# Patient Record
Sex: MALE | Race: WHITE | NOT HISPANIC OR LATINO | Employment: OTHER | URBAN - METROPOLITAN AREA
[De-identification: names, ages, dates, MRNs, and addresses within clinical notes are randomized per-mention and may not be internally consistent; named-entity substitution may affect disease eponyms.]

---

## 2017-01-23 ENCOUNTER — APPOINTMENT (OUTPATIENT)
Dept: VASCULAR LAB | Facility: MEDICAL CENTER | Age: 82
End: 2017-01-23
Attending: NURSE PRACTITIONER
Payer: MEDICARE

## 2017-01-26 ENCOUNTER — ANTICOAGULATION VISIT (OUTPATIENT)
Dept: VASCULAR LAB | Facility: MEDICAL CENTER | Age: 82
End: 2017-01-26
Attending: INTERNAL MEDICINE
Payer: MEDICARE

## 2017-01-26 VITALS — HEART RATE: 70 BPM | SYSTOLIC BLOOD PRESSURE: 146 MMHG | DIASTOLIC BLOOD PRESSURE: 87 MMHG

## 2017-01-26 DIAGNOSIS — I48.0 PAROXYSMAL ATRIAL FIBRILLATION (HCC): ICD-10-CM

## 2017-01-26 LAB
INR BLD: 3.2 (ref 0.9–1.2)
INR PPP: 3.2 (ref 2–3.5)

## 2017-01-26 PROCEDURE — 99212 OFFICE O/P EST SF 10 MIN: CPT

## 2017-01-26 PROCEDURE — 85610 PROTHROMBIN TIME: CPT

## 2017-01-26 NOTE — MR AVS SNAPSHOT
Tristan Hester   2017 9:00 AM   Anticoagulation Visit   MRN: [id number]    Department:  Vascular Medicine   Dept Phone:  [phone]    Description:  Male : 1935   Provider:  [facility] EXAM 4           Allergies as of 2017     Allergen Noted Reactions    Cantaloupe 2015       Pt stated cantaloupe causes his throat to constrict    Nkda [No Known Drug Allergy] 2008       Other Environmental 10/08/2010       Seasonal, cats-hayfever      You were diagnosed with     Paroxysmal atrial fibrillation (CMS-[facility])   [573162]         Vital Signs     Blood Pressure Pulse Smoking Status             146/87 mmHg 70 Former Smoker         Basic Information     Date Of Birth Sex Race Ethnicity Preferred Language    1935 Male White Non- English      Your appointments     2017  9:45 AM   Established Patient with [facility] EXAM 5   [facility]  (--)    [address] [place] [address]   [phone]            2017 10:15 AM   ECHO with ECHO [facility], [facility] EXAM 9   ECHOCARDIOLOGY [facility] ([facility])    [address] [place] [address]   [phone]           No prep            May 23, 2017 10:00 AM   Follow Up Visit with Michael J Bloch, M.D.   [facility]  (--)    [address] [place] [address]   [phone]              Problem List              ICD-10-CM Priority Class Noted - Resolved    Hx of TIA (transient ischemic attack) G45.9 Low  Unknown - Present    Arthritis M19.90 Medium  Unknown - Present    Hx of Prostate Cancer C80.1 Low  Unknown - Present    Essential hypertension, benign I10   Unknown - Present    Mitral regurgitation I34.0 Medium  2014 - Present    Carotid arterial disease (CMS-[facility]) I77.9 Low  2014 - Present    HLD (hyperlipidemia) E78.5 Low  2014 - Present    Paroxysmal atrial fibrillation (CMS-[facility]) I48.0 Medium  2015 - Present    DVT  (deep venous thrombosis) (CMS-[facility]) I82.409 Medium  6/18/2015 - Present    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 Low  6/18/2015 - Present    Hypokalemia E87.6 Medium  6/20/2015 - Present    Insomnia G47.00 Low  8/21/2015 - Present    Lumbar stenosis M48.06 Medium  12/28/2015 - Present    Postoperative CSF leak G97.82 High  1/5/2016 - Present    Lower back pain M54.5   12/27/2016 - Present      Health Maintenance        Date Due Completion Dates    A1C SCREENING 1935 ---    DIABETES MONOFILAMENT / LE EXAM 1935 ---    RETINAL SCREENING 6/7/1954 ---    URINE ACR / MICROALBUMIN 6/7/1954 ---    IMM DTaP/Tdap/Td Vaccine (1 - Tdap) 6/7/1955 ---    COLONOSCOPY 6/7/1986 ---    IMM ZOSTER VACCINE 6/7/1996 ---    IMM PNEUMOCOCCAL 65+ (ADULT) HIGH/HIGHEST RISK SERIES (1 of 2 - PCV13) 6/7/2001 ---    FASTING LIPID PROFILE 8/19/2015 8/19/2014, 6/2/2008    IMM INFLUENZA (1) 9/1/2016 10/1/2014, 10/9/2010    SERUM CREATININE 1/12/2017 1/12/2016, 1/11/2016, 1/10/2016, 1/6/2016, 1/4/2016, 12/31/2015, 12/30/2015, 12/29/2015, 12/14/2015, 6/23/2015, 6/22/2015, 6/21/2015, 6/20/2015, 6/19/2015, 6/16/2015, 6/14/2015, 6/13/2015, 6/12/2015, 10/24/2010, 10/1/2010, 10/16/2009, 7/2/2008, 6/4/2008, 6/3/2008, 6/2/2008, 6/2/2008            Results     POCT Protime      Component    INR    3.2                        Current Immunizations     Influenza TIV (IM) 10/1/2014, 10/9/2010  9:15 AM    Pneumococcal Vaccine (UF)Historical Data 1/1/2010      Below and/or attached are the medications your provider expects you to take. Review all of your home medications and newly ordered medications with your provider and/or pharmacist. Follow medication instructions as directed by your provider and/or pharmacist. Please keep your medication list with you and share with your provider. Update the information when medications are discontinued, doses are changed, or new medications (including over-the-counter products) are added; and carry medication information at all  times in the event of emergency situations     Allergies:  CANTALOUPE - (reactions not documented)     NKDA - (reactions not documented)     OTHER ENVIRONMENTAL - (reactions not documented)               Medications  Valid as of: January 26, 2017 -  9:15 AM    Generic Name Brand Name Tablet Size Instructions for use    Acetaminophen (Tab) TYLENOL 325 MG Take 650 mg by mouth every four hours as needed.        Atorvastatin Calcium (Tab) LIPITOR 80 MG Take 1 Tab by mouth every evening.        DiltiaZEM HCl (CAPSULE SR 24 HR) CARDIZEM  MG Take 1 Cap by mouth every day.        Enoxaparin Sodium (Solution) LOVENOX 120 MG/0.8ML Inject 120 mg as instructed every day.        Irbesartan (Tab) AVAPRO 300 MG Take 1 Tab by mouth every evening. Indications: High Blood Pressure        Multiple Vitamins-Minerals (Tab) THERAGRAN-M  Take 1 Tab by mouth every day.        Rivaroxaban (Tab) XARELTO 20 MG Take 1 Tab by mouth with dinner.        Temazepam (Cap) RESTORIL 30 MG Take 1 Cap by mouth at bedtime as needed for Sleep. Patient needs early refill - [place]        Warfarin Sodium (Tab) COUMADIN 4 MG TAKE 1 TO 2 TABLETS EVERY DAY AS DIRECTED  BY  COUMADIN  CLINIC        .                 Medicines prescribed today were sent to:     [facility] ([place]), [place] - [address] [address] [facility] ([place]) [place] [address]    Phone: [phone] Fax: [phone]    Open 24 Hours?: No    [facility] # [facility] - [place], [place] - [address] [facility]    [address] [facility] [address]    Phone: [phone] Fax: [phone]    Open 24 Hours?: No    [facility] - [place], [place] - [address] [facility]    [address] [facility] [address]    Phone: [phone] Fax: [phone]    Open 24 Hours?: No      Medication refill instructions:       If your prescription bottle indicates you have medication refills left, it is not necessary to call your provider’s office. Please contact your pharmacy and they will  refill your medication.    If your prescription bottle indicates you do not have any refills left, you may request refills at any time through one of the following ways: The online Naroomi system (except Urgent Care), by calling your provider’s office, or by asking your pharmacy to contact your provider’s office with a refill request. Medication refills are processed only during regular business hours and may not be available until the next business day. Your provider may request additional information or to have a follow-up visit with you prior to refilling your medication.   *Please Note: Medication refills are assigned a new Rx number when refilled electronically. Your pharmacy may indicate that no refills were authorized even though a new prescription for the same medication is available at the pharmacy. Please request the medicine by name with the pharmacy before contacting your provider for a refill.        Warfarin Dosing Calendar   January 2017 Details    Sun Mon Tue Wed Thu Fri Sat     1               2               3               4               5               6               7                 8               9               10               11               12               13               14                 15               16               17               18               19               20               21                 22               23               24               25               26   3.2   8 mg   See details      27      6 mg         28      6 mg           29      8 mg         30      6 mg         31      8 mg              Date Details   01/26 This INR check   INR: 3.2               How to take your warfarin dose     To take:  6 mg Take 1.5 of the 4 mg tablets.    To take:  8 mg Take 2 of the 4 mg tablets.           Warfarin Dosing Calendar   February 2017 Details    Sun Mon Tue Wed Thu Fri Sat        1      6 mg         2      8 mg         3      6 mg         4      6 mg           5       8 mg         6      6 mg         7      8 mg         8      6 mg         9      8 mg         10               11                 12               13               14               15               16               17               18                 19               20               21               22               23               24               25                 26               27               28                    Date Details   No additional details    Date of next INR:  2/9/2017         How to take your warfarin dose     To take:  6 mg Take 1.5 of the 4 mg tablets.    To take:  8 mg Take 2 of the 4 mg tablets.              Freightos Access Code: [email or web address]  Expires: 1/29/2017 12:18 PM    Freightos  A secure, online tool to manage your health information     [employer] Freightos® is a secure, online tool that connects you to your personalized health information from the privacy of your home -- day or night - making it very easy for you to manage your healthcare. Once the activation process is completed, you can even access your medical information using the Freightos azalea, which is available for free in the Apple Azalea store or Google Play store.     Freightos provides the following levels of access (as shown below):   My Chart Features   [facility] Doctor [facility]  Specialists [facility]  Urgent  Care Non-[facility]  Primary Care  Doctor   Email your healthcare team securely and privately 24/7 X X X    Manage appointments: schedule your next appointment; view details of past/upcoming appointments X      Request prescription refills. X      View recent personal medical records, including lab and immunizations X X X X   View health record, including health history, allergies, medications X X X X   Read reports about your outpatient visits, procedures, consult and ER notes X X X X   See your discharge summary, which is a recap of your hospital and/or ER visit that includes your diagnosis, lab results, and  care plan. X X       How to register for [employer]:  1. Go to  [email or web address].  2. Click on the Sign Up Now box, which takes you to the New Member Sign Up page. You will need to provide the following information:  a. Enter your [employer] Access Code exactly as it appears at the top of this page. (You will not need to use this code after you’ve completed the sign-up process. If you do not sign up before the expiration date, you must request a new code.)   b. Enter your date of birth.   c. Enter your home email address.   d. Click Submit, and follow the next screen’s instructions.  3. Create a [employer] ID. This will be your H[employer]S [employer] login ID and cannot be changed, so think of one that is secure and easy to remember.  4. Create a iCo Therapeuticst password. You can change your password at any time.  5. Enter your Password Reset Question and Answer. This can be used at a later time if you forget your password.   6. Enter your e-mail address. This allows you to receive e-mail notifications when new information is available in [employer].  7. Click Sign Up. You can now view your health information.    For assistance activating your [employer] account, call [phone]

## 2017-01-31 DIAGNOSIS — I48.0 PAROXYSMAL ATRIAL FIBRILLATION (HCC): ICD-10-CM

## 2017-01-31 DIAGNOSIS — Z86.718 HISTORY OF DVT (DEEP VEIN THROMBOSIS): ICD-10-CM

## 2017-01-31 DIAGNOSIS — Z86.73 HISTORY OF STROKE: ICD-10-CM

## 2017-02-09 ENCOUNTER — HOSPITAL ENCOUNTER (OUTPATIENT)
Dept: CARDIOLOGY | Facility: MEDICAL CENTER | Age: 82
End: 2017-02-09
Attending: INTERNAL MEDICINE
Payer: MEDICARE

## 2017-02-09 ENCOUNTER — ANTICOAGULATION VISIT (OUTPATIENT)
Dept: VASCULAR LAB | Facility: MEDICAL CENTER | Age: 82
End: 2017-02-09
Attending: NURSE PRACTITIONER
Payer: MEDICARE

## 2017-02-09 ENCOUNTER — APPOINTMENT (OUTPATIENT)
Dept: RADIOLOGY | Facility: MEDICAL CENTER | Age: 82
End: 2017-02-09
Attending: CLINICAL NURSE SPECIALIST
Payer: MEDICARE

## 2017-02-09 VITALS — SYSTOLIC BLOOD PRESSURE: 146 MMHG | DIASTOLIC BLOOD PRESSURE: 87 MMHG | HEART RATE: 70 BPM

## 2017-02-09 DIAGNOSIS — I48.0 PAROXYSMAL ATRIAL FIBRILLATION (HCC): ICD-10-CM

## 2017-02-09 DIAGNOSIS — M54.16 LUMBAR RADICULOPATHY: ICD-10-CM

## 2017-02-09 DIAGNOSIS — I34.0 MITRAL VALVE INSUFFICIENCY, UNSPECIFIED ETIOLOGY: ICD-10-CM

## 2017-02-09 LAB
INR PPP: 2 (ref 2–3.5)
LV EJECT FRACT MOD 2C 99903: 64.28
LV EJECT FRACT MOD 4C 99902: 62.02
LV EJECT FRACT MOD BP 99901: 60.55

## 2017-02-09 PROCEDURE — 99211 OFF/OP EST MAY X REQ PHY/QHP: CPT

## 2017-02-09 PROCEDURE — 93306 TTE W/DOPPLER COMPLETE: CPT | Mod: 26 | Performed by: INTERNAL MEDICINE

## 2017-02-09 PROCEDURE — 85610 PROTHROMBIN TIME: CPT

## 2017-02-09 NOTE — MR AVS SNAPSHOT
Tristan Hester   2017 9:45 AM   Anticoagulation Visit   MRN: [id number]    Department:  Vascular Medicine   Dept Phone:  [phone]    Description:  Male : 1935   Provider:  [facility] EXAM 5           Allergies as of 2017     Allergen Noted Reactions    Cantaloupe 2015       Pt stated cantaloupe causes his throat to constrict    Nkda [No Known Drug Allergy] 2008       Other Environmental 10/08/2010       Seasonal, cats-hayfever      You were diagnosed with     Paroxysmal atrial fibrillation (CMS-[facility])   [441921]         Vital Signs     Smoking Status                   Former Smoker           Basic Information     Date Of Birth Sex Race Ethnicity Preferred Language    1935 Male White Non- English      Your appointments     2017 10:15 AM   ECHO with ECHO [facility], [facility] EXAM 9   ECHOCARDIOLOGY [facility] ([facility])    [address] [place] [address]   [phone]           No prep            2017  1:30 PM   MR SP WO 30 with DOUBLE R MRI 1   IMAGING DOUBLE R. (Double R)    [address]  [facility] [address]   [phone]            Mar 09, 2017  9:45 AM   Established Patient with [facility] EXAM 4   [facility]  (--)    [address] [place] [address]   [phone]            May 23, 2017 10:00 AM   Follow Up Visit with Michael J Bloch, M.D.   [facility]  (--)    [address] [place] [address]   [phone]              Problem List              ICD-10-CM Priority Class Noted - Resolved    Hx of TIA (transient ischemic attack) G45.9 Low  Unknown - Present    Arthritis M19.90 Medium  Unknown - Present    Hx of Prostate Cancer C80.1 Low  Unknown - Present    Essential hypertension, benign I10   Unknown - Present    Mitral regurgitation I34.0 Medium  2014 - Present    Carotid arterial disease (CMS-[facility]) I77.9 Low  2014 - Present    HLD (hyperlipidemia) E78.5 Low  1/6/2014 - Present    Paroxysmal atrial fibrillation (CMS-[facility]) I48.0 Medium  6/18/2015 - Present    DVT (deep venous thrombosis) (CMS-[facility]) I82.409 Medium  6/18/2015 - Present    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 Low  6/18/2015 - Present    Hypokalemia E87.6 Medium  6/20/2015 - Present    Insomnia G47.00 Low  8/21/2015 - Present    Lumbar stenosis M48.06 Medium  12/28/2015 - Present    Postoperative CSF leak G97.82 High  1/5/2016 - Present    Lower back pain M54.5   12/27/2016 - Present      Health Maintenance        Date Due Completion Dates    A1C SCREENING 1935 ---    DIABETES MONOFILAMENT / LE EXAM 1935 ---    RETINAL SCREENING 6/7/1954 ---    URINE ACR / MICROALBUMIN 6/7/1954 ---    IMM DTaP/Tdap/Td Vaccine (1 - Tdap) 6/7/1955 ---    COLONOSCOPY 6/7/1986 ---    IMM ZOSTER VACCINE 6/7/1996 ---    IMM PNEUMOCOCCAL 65+ (ADULT) HIGH/HIGHEST RISK SERIES (1 of 2 - PCV13) 6/7/2001 ---    FASTING LIPID PROFILE 8/19/2015 8/19/2014, 6/2/2008    IMM INFLUENZA (1) 9/1/2016 10/1/2014, 10/9/2010    SERUM CREATININE 1/12/2017 1/12/2016, 1/11/2016, 1/10/2016, 1/6/2016, 1/4/2016, 12/31/2015, 12/30/2015, 12/29/2015, 12/14/2015, 6/23/2015, 6/22/2015, 6/21/2015, 6/20/2015, 6/19/2015, 6/16/2015, 6/14/2015, 6/13/2015, 6/12/2015, 10/24/2010, 10/1/2010, 10/16/2009, 7/2/2008, 6/4/2008, 6/3/2008, 6/2/2008, 6/2/2008            Results     POCT Protime      Component    INR    2.0                        Current Immunizations     Influenza TIV (IM) 10/1/2014, 10/9/2010  9:15 AM    Pneumococcal Vaccine (UF)Historical Data 1/1/2010      Below and/or attached are the medications your provider expects you to take. Review all of your home medications and newly ordered medications with your provider and/or pharmacist. Follow medication instructions as directed by your provider and/or pharmacist. Please keep your medication list with you and share with your provider. Update the information when medications are  discontinued, doses are changed, or new medications (including over-the-counter products) are added; and carry medication information at all times in the event of emergency situations     Allergies:  CANTALOUPE - (reactions not documented)     NKDA - (reactions not documented)     OTHER ENVIRONMENTAL - (reactions not documented)               Medications  Valid as of: February 09, 2017 -  9:51 AM    Generic Name Brand Name Tablet Size Instructions for use    Acetaminophen (Tab) TYLENOL 325 MG Take 650 mg by mouth every four hours as needed.        Atorvastatin Calcium (Tab) LIPITOR 80 MG Take 1 Tab by mouth every evening.        DiltiaZEM HCl (CAPSULE SR 24 HR) CARDIZEM  MG Take 1 Cap by mouth every day.        Enoxaparin Sodium (Solution) LOVENOX 120 MG/0.8ML Inject 120 mg as instructed every day.        Irbesartan (Tab) AVAPRO 300 MG Take 1 Tab by mouth every evening. Indications: High Blood Pressure        Multiple Vitamins-Minerals (Tab) THERAGRAN-M  Take 1 Tab by mouth every day.        Rivaroxaban (Tab) XARELTO 20 MG Take 1 Tab by mouth with dinner.        Temazepam (Cap) RESTORIL 30 MG Take 1 Cap by mouth at bedtime as needed for Sleep. Patient needs early refill - [place]        Warfarin Sodium (Tab) COUMADIN 4 MG TAKE 1 TO 2 TABLETS EVERY DAY AS DIRECTED  BY  COUMADIN  CLINIC        .                 Medicines prescribed today were sent to:     [facility] - [place] ([place]), [place] - [address] [facility]    [address] ([place]) [place] [address]    Phone: [phone] Fax: [phone]    Open 24 Hours?: No    [facility] # 25 - [place], [place] - [address] [facility]    [address] [facility] [place] [address]    Phone: [phone] Fax: [phone]    Open 24 Hours?: No    [facility] - [place], [place] - [address] [facility]    [address] [facility] [address]    Phone: [phone] Fax: [phone]    Open 24 Hours?: No      Medication refill instructions:       If your prescription  bottle indicates you have medication refills left, it is not necessary to call your provider’s office. Please contact your pharmacy and they will refill your medication.    If your prescription bottle indicates you do not have any refills left, you may request refills at any time through one of the following ways: The online P-Commerce system (except Urgent Care), by calling your provider’s office, or by asking your pharmacy to contact your provider’s office with a refill request. Medication refills are processed only during regular business hours and may not be available until the next business day. Your provider may request additional information or to have a follow-up visit with you prior to refilling your medication.   *Please Note: Medication refills are assigned a new Rx number when refilled electronically. Your pharmacy may indicate that no refills were authorized even though a new prescription for the same medication is available at the pharmacy. Please request the medicine by name with the pharmacy before contacting your provider for a refill.        Warfarin Dosing Calendar   February 2017 Details    Sun Mon Tue Wed Thu Fri Sat        1               2               3               4                 5               6               7               8               9   2.0   8 mg   See details      10      6 mg         11      6 mg           12      8 mg         13      6 mg         14      8 mg         15      6 mg         16      8 mg         17      6 mg         18      6 mg           19      8 mg         20      6 mg         21      8 mg         22      6 mg         23      8 mg         24      6 mg         25      6 mg           26      8 mg         27      6 mg         28      8 mg              Date Details   02/09 This INR check   INR: 2.0               How to take your warfarin dose     To take:  6 mg Take 1.5 of the 4 mg tablets.    To take:  8 mg Take 2 of the 4 mg tablets.           Warfarin Dosing  Calendar March 2017 Details    Sun Mon Tue Wed Thu Fri Sat        1      6 mg         2      8 mg         3      6 mg         4      6 mg           5      8 mg         6      6 mg         7      8 mg         8      6 mg         9      8 mg         10               11                 12               13               14               15               16               17               18                 19               20               21               22               23               24               25                 26               27               28               29               30               31                 Date Details   No additional details    Date of next INR:  3/9/2017         How to take your warfarin dose     To take:  6 mg Take 1.5 of the 4 mg tablets.    To take:  8 mg Take 2 of the 4 mg tablets.              CardioFocus Access Code: [email or web address]  Expires: 3/11/2017  9:51 AM    CardioFocus  A secure, online tool to manage your health information     [employer] CardioFocus® is a secure, online tool that connects you to your personalized health information from the privacy of your home -- day or night - making it very easy for you to manage your healthcare. Once the activation process is completed, you can even access your medical information using the CardioFocus azalea, which is available for free in the Apple Azalea store or Google Play store.     CardioFocus provides the following levels of access (as shown below):   My Chart Features   [facility] Doctor [facility]  Specialists [facility]  Urgent  Care Non-[facility]  Primary Care  Doctor   Email your healthcare team securely and privately 24/7 X X X    Manage appointments: schedule your next appointment; view details of past/upcoming appointments X      Request prescription refills. X      View recent personal medical records, including lab and immunizations X X X X   View health record, including health history, allergies, medications X X X X   Read  reports about your outpatient visits, procedures, consult and ER notes X X X X   See your discharge summary, which is a recap of your hospital and/or ER visit that includes your diagnosis, lab results, and care plan. X X       How to register for [employer]:  1. Go to  [email or web address].  2. Click on the Sign Up Now box, which takes you to the New Member Sign Up page. You will need to provide the following information:  a. Enter your [employer] Access Code exactly as it appears at the top of this page. (You will not need to use this code after you’ve completed the sign-up process. If you do not sign up before the expiration date, you must request a new code.)   b. Enter your date of birth.   c. Enter your home email address.   d. Click Submit, and follow the next screen’s instructions.  3. Create a [facility] ID. This will be your [facility] login ID and cannot be changed, so think of one that is secure and easy to remember.  4. Create a [facility] password. You can change your password at any time.  5. Enter your Password Reset Question and Answer. This can be used at a later time if you forget your password.   6. Enter your e-mail address. This allows you to receive e-mail notifications when new information is available in [employer].  7. Click Sign Up. You can now view your health information.    For assistance activating your [employer] account, call [phone]

## 2017-02-09 NOTE — PROGRESS NOTES
Anticoagulation Summary as of 2/9/2017     INR goal 2.0-3.0   Selected INR 2.0 (2/9/2017)   Maintenance plan 8 mg (4 mg x 2) on Sun, Tue, Thu; 6 mg (4 mg x 1.5) all other days   Weekly total 48 mg   Plan last modified JAVIER PierreD (1/26/2017)   Next INR check 3/9/2017   Target end date Indefinite    Indications   DVT (deep venous thrombosis) (CMS-HCC) [I82.409]  Paroxysmal atrial fibrillation (CMS-HCC) [I48.0]  Hx of Stroke (Resolved) [I63.9]  Hx of TIA (transient ischemic attack) [G45.9]  Deep vein thrombosis (DVT) (CMS-HCC) (Resolved) [I82.409]  Atrial fibrillation (CMS-HCC) (Resolved) [I48.91]         Anticoagulation Episode Summary     INR check location [facility]    Preferred lab     Send INR reminders to     Comments       Anticoagulation Care Providers     Provider Role Specialty Phone number    Michael J Bloch, M.D. Referring Internal Medicine [phone]    [facility]   [phone]        Anticoagulation Patient Findings   Negatives Missed Doses, Extra Doses, Medication Changes, Antibiotic Use, Diet Changes, Dental/Other Procedures, Hospitalization, Bleeding Gums, Nose Bleeds, Blood in Urine, Blood in Stool, Any Bruising, Other Complaints          Tristan Hester seen in clinic today  INR  therapeutic.    Denies signs/symptoms of bleeding and/or thrombosis.    Denies changes to diet or medications.   Follow up appointment in 4 week(s).    Continue weekly warfarin dose as noted    Conor Locke, PHARMD

## 2017-02-10 LAB — INR BLD: 2 (ref 0.9–1.2)

## 2017-02-14 ENCOUNTER — HOSPITAL ENCOUNTER (EMERGENCY)
Facility: MEDICAL CENTER | Age: 82
End: 2017-02-14
Attending: EMERGENCY MEDICINE
Payer: MEDICARE

## 2017-02-14 VITALS
HEART RATE: 60 BPM | TEMPERATURE: 98.3 F | SYSTOLIC BLOOD PRESSURE: 142 MMHG | RESPIRATION RATE: 16 BRPM | WEIGHT: 190 LBS | OXYGEN SATURATION: 93 % | BODY MASS INDEX: 26.6 KG/M2 | HEIGHT: 71 IN | DIASTOLIC BLOOD PRESSURE: 89 MMHG

## 2017-02-14 DIAGNOSIS — M51.36 DEGENERATIVE DISC DISEASE, LUMBAR: ICD-10-CM

## 2017-02-14 DIAGNOSIS — M54.32 BILATERAL SCIATICA: ICD-10-CM

## 2017-02-14 DIAGNOSIS — M54.31 BILATERAL SCIATICA: ICD-10-CM

## 2017-02-14 PROCEDURE — A9270 NON-COVERED ITEM OR SERVICE: HCPCS | Performed by: EMERGENCY MEDICINE

## 2017-02-14 PROCEDURE — 700102 HCHG RX REV CODE 250 W/ 637 OVERRIDE(OP): Performed by: EMERGENCY MEDICINE

## 2017-02-14 PROCEDURE — 99284 EMERGENCY DEPT VISIT MOD MDM: CPT

## 2017-02-14 RX ORDER — METHYLPREDNISOLONE 4 MG/1
TABLET ORAL
Qty: 1 KIT | Refills: 0 | Status: SHIPPED | OUTPATIENT
Start: 2017-02-14 | End: 2017-05-23

## 2017-02-14 RX ORDER — OXYCODONE AND ACETAMINOPHEN 7.5; 325 MG/1; MG/1
1 TABLET ORAL EVERY 4 HOURS PRN
Qty: 23 TAB | Refills: 0 | Status: SHIPPED | OUTPATIENT
Start: 2017-02-14 | End: 2017-05-23

## 2017-02-14 RX ORDER — DIAZEPAM 2 MG/1
2 TABLET ORAL EVERY 6 HOURS PRN
Qty: 12 TAB | Refills: 0 | Status: SHIPPED | OUTPATIENT
Start: 2017-02-14 | End: 2017-05-23

## 2017-02-14 RX ORDER — DIAZEPAM 2 MG/1
2 TABLET ORAL ONCE
Status: COMPLETED | OUTPATIENT
Start: 2017-02-14 | End: 2017-02-14

## 2017-02-14 RX ORDER — OXYCODONE AND ACETAMINOPHEN 7.5; 325 MG/1; MG/1
1 TABLET ORAL ONCE
Status: COMPLETED | OUTPATIENT
Start: 2017-02-14 | End: 2017-02-14

## 2017-02-14 RX ADMIN — OXYCODONE AND ACETAMINOPHEN 1 TABLET: 7.5; 325 TABLET ORAL at 19:49

## 2017-02-14 RX ADMIN — DIAZEPAM 2 MG: 2 TABLET ORAL at 19:49

## 2017-02-14 ASSESSMENT — PAIN SCALES - GENERAL
PAINLEVEL_OUTOF10: 5
PAINLEVEL_OUTOF10: 5

## 2017-02-14 NOTE — ED AVS SNAPSHOT
Etherios Access Code: [email or web address]  Expires: 3/11/2017  9:51 AM    Etherios  A secure, online tool to manage your health information     [employer]’s Etherios® is a secure, online tool that connects you to your personalized health information from the privacy of your home -- day or night - making it very easy for you to manage your healthcare. Once the activation process is completed, you can even access your medical information using the Etherios azalea, which is available for free in the Apple Azalea store or Google Play store.     Etherios provides the following levels of access (as shown below):   My Chart Features   [facility] Primary Care Doctor [facility]  Specialists [facility]  Urgent  Care Non-[facility]  Primary Care  Doctor   Email your healthcare team securely and privately 24/7 X X X X   Manage appointments: schedule your next appointment; view details of past/upcoming appointments X      Request prescription refills. X      View recent personal medical records, including lab and immunizations X X X X   View health record, including health history, allergies, medications X X X X   Read reports about your outpatient visits, procedures, consult and ER notes X X X X   See your discharge summary, which is a recap of your hospital and/or ER visit that includes your diagnosis, lab results, and care plan. X X       How to register for Etherios:  1. Go to  [email or web address].  2. Click on the Sign Up Now box, which takes you to the New Member Sign Up page. You will need to provide the following information:  a. Enter your Etherios Access Code exactly as it appears at the top of this page. (You will not need to use this code after you’ve completed the sign-up process. If you do not sign up before the expiration date, you must request a new code.)   b. Enter your date of birth.   c. Enter your home email address.   d. Click Submit, and follow the next screen’s instructions.  3. Create a Etherios ID. This will be your Etherios  login ID and cannot be changed, so think of one that is secure and easy to remember.  4. Create a GetO2 password. You can change your password at any time.  5. Enter your Password Reset Question and Answer. This can be used at a later time if you forget your password.   6. Enter your e-mail address. This allows you to receive e-mail notifications when new information is available in GetO2.  7. Click Sign Up. You can now view your health information.    For assistance activating your GetO2 account, call [phone]

## 2017-02-14 NOTE — ED AVS SNAPSHOT
Home Care Instructions                                                                                                                Tristan Hester   MRN: [id number]    Department:  [facility], Emergency Dept   Date of Visit:  2/14/2017            [facility], Emergency Dept    [address] [place] [id number]    Phone:  [phone]      You were seen by     Pebbles Shi M.D.      Your Diagnosis Was     Degenerative disc disease, lumbar     M51.36       These are the medications you received during your hospitalization from 02/14/2017 1746 to 02/14/2017 2003     Date/Time Order Dose Route Action    02/14/2017 1949 oxycodone-acetaminophen (PERCOCET) 7.5-325 MG per tablet 1 Tab 1 Tab Oral Given    02/14/2017 1949 diazepam (VALIUM) tablet 2 mg 2 mg Oral Given      Follow-up Information     1. Follow up with [facility], Emergency Dept.    Specialty:  Emergency Medicine    Why:  If symptoms worsen - worsening weakness, difficulty w bowel or bladder    Contact information    [address] [place] [id number] [phone]      Medication Information     Review all of your home medications and newly ordered medications with your primary doctor and/or pharmacist as soon as possible. Follow medication instructions as directed by your doctor and/or pharmacist.     Please keep your complete medication list with you and share with your physician. Update the information when medications are discontinued, doses are changed, or new medications (including over-the-counter products) are added; and carry medication information at all times in the event of emergency situations.               Medication List      START taking these medications        Instructions    diazepam 2 MG Tabs   Commonly known as:  VALIUM    Take 1 Tab by mouth every 6 hours as needed (back spasm).   Dose:  2 mg       MethylPREDNISolone 4 MG Tbpk   Commonly known as:  MEDROL DOSEPAK     Use as directed       oxycodone-acetaminophen 7.5-325 MG per tablet   Commonly known as:  PERCOCET    Take 1 Tab by mouth every four hours as needed.   Dose:  1 Tab         ASK your doctor about these medications        Instructions    acetaminophen 325 MG Tabs   Commonly known as:  TYLENOL    Take 650 mg by mouth every four hours as needed.   Dose:  650 mg       atorvastatin 80 MG tablet   Commonly known as:  LIPITOR    Take 1 Tab by mouth every evening.   Dose:  80 mg       diltiazem 120 MG XR capsule   Commonly known as:  CARDIZEM CD    Take 1 Cap by mouth every day.   Dose:  120 mg       irbesartan 300 MG Tabs   Commonly known as:  AVAPRO    Take 1 Tab by mouth every evening. Indications: High Blood Pressure   Dose:  300 mg       temazepam 30 MG capsule   Commonly known as:  RESTORIL    Take 1 Cap by mouth at bedtime as needed for Sleep. Patient needs early refill - going [place]   Dose:  30 mg       therapeutic multivitamin-minerals Tabs    Take 1 Tab by mouth every day.   Dose:  1 Tab       warfarin 4 MG Tabs   Commonly known as:  COUMADIN    TAKE 1 TO 2 TABLETS EVERY DAY AS DIRECTED  BY  [facility]  CLIN[facility]                 Discharge Instructions       Back Exercises  Back exercises help treat and prevent back injuries. The goal is to increase your strength in your belly (abdominal) and back muscles. These exercises can also help with flexibility. Start these exercises when told by your doctor.    Do not drive, operate any machinery, or partake in dangerous activities that require maximum physical and mental performance while taking the prescribed pain killer. Do not take tylenol or tylenol containing products in addition to the pain killer that was prescibed, as the excess tylenol can cause life threatening liver problems. Do not combine this drug with alcohol or other sedatives or narcotics. Follow up with your primary care doctor in regards to the future management of this medication.    HOME  CARE  Back exercises include:  Pelvic Tilt.  · Lie on your back with your knees bent. Tilt your pelvis until the lower part of your back is against the floor. Hold this position 5 to 10 sec. Repeat this exercise 5 to 10 times.  Knee to Chest.  · Pull 1 knee up against your chest and hold for 20 to 30 seconds. Repeat this with the other knee. This may be done with the other leg straight or bent, whichever feels better. Then, pull both knees up against your chest.  Sit-Ups or Curl-Ups.  · Bend your knees 90 degrees. Start with tilting your pelvis, and do a partial, slow sit-up. Only lift your upper half 30 to 45 degrees off the floor. Take at least 2 to 3 seonds for each sit-up. Do not do sit-ups with your knees out straight. If partial sit-ups are difficult, simply do the above but with only tightening your belly (abdominal) muscles and holding it as told.  Hip-Lift.  · Lie on your back with your knees flexed 90 degrees. Push down with your feet and shoulders as you raise your hips 2 inches off the floor. Hold for 10 seconds, repeat 5 to 10 times.  Back Arches.  · Lie on your stomach. Prop yourself up on bent elbows. Slowly press on your hands, causing an arch in your low back. Repeat 3 to 5 times.  Shoulder-Lifts.  · Lie face down with arms beside your body. Keep hips and belly pressed to floor as you slowly lift your head and shoulders off the floor.  Do not overdo your exercises. Be careful in the beginning. Exercises may cause you some mild back discomfort. If the pain lasts for more than 15 minutes, stop the exercises until you see your doctor. Improvement with exercise for back problems is slow.      This information is not intended to replace advice given to you by your health care provider. Make sure you discuss any questions you have with your health care provider.     Document Released: 01/20/2012 Document Revised: 03/11/2013 Document Reviewed: 02/11/2016  [employer] Interactive Patient Education ©2016  [employer]  Sciatica  Sciatica is pain, weakness, numbness, or tingling along the path of the sciatic nerve. The nerve starts in the lower back and runs down the back of each leg. The nerve controls the muscles in the lower leg and in the back of the knee, while also providing sensation to the back of the thigh, lower leg, and the sole of your foot. Sciatica is a symptom of another medical condition. For instance, nerve damage or certain conditions, such as a herniated disk or bone spur on the spine, pinch or put pressure on the sciatic nerve. This causes the pain, weakness, or other sensations normally associated with sciatica. Generally, sciatica only affects one side of the body.  CAUSES   · Herniated or slipped disc.  · Degenerative disk disease.  · A pain disorder involving the narrow muscle in the buttocks (piriformis syndrome).  · Pelvic injury or fracture.  · Pregnancy.  · Tumor (rare).  SYMPTOMS   Symptoms can vary from mild to very severe. The symptoms usually travel from the low back to the buttocks and down the back of the leg. Symptoms can include:  · Mild tingling or dull aches in the lower back, leg, or hip.  · Numbness in the back of the calf or sole of the foot.  · Burning sensations in the lower back, leg, or hip.  · Sharp pains in the lower back, leg, or hip.  · Leg weakness.  · Severe back pain inhibiting movement.  These symptoms may get worse with coughing, sneezing, laughing, or prolonged sitting or standing. Also, being overweight may worsen symptoms.  DIAGNOSIS   Your caregiver will perform a physical exam to look for common symptoms of sciatica. He or she may ask you to do certain movements or activities that would trigger sciatic nerve pain. Other tests may be performed to find the cause of the sciatica. These may include:  · Blood tests.  · X-rays.  · Imaging tests, such as an MRI or CT scan.  TREATMENT   Treatment is directed at the cause of the sciatic pain. Sometimes, treatment is  not necessary and the pain and discomfort goes away on its own. If treatment is needed, your caregiver may suggest:  · Over-the-counter medicines to relieve pain.  · Prescription medicines, such as anti-inflammatory medicine, muscle relaxants, or narcotics.  · Applying heat or ice to the painful area.  · Steroid injections to lessen pain, irritation, and inflammation around the nerve.  · Reducing activity during periods of pain.  · Exercising and stretching to strengthen your abdomen and improve flexibility of your spine. Your caregiver may suggest losing weight if the extra weight makes the back pain worse.  · Physical therapy.  · Surgery to eliminate what is pressing or pinching the nerve, such as a bone spur or part of a herniated disk.  HOME CARE INSTRUCTIONS   · Only take over-the-counter or prescription medicines for pain or discomfort as directed by your caregiver.  · Apply ice to the affected area for 20 minutes, 3-4 times a day for the first 48-72 hours. Then try heat in the same way.  · Exercise, stretch, or perform your usual activities if these do not aggravate your pain.  · Attend physical therapy sessions as directed by your caregiver.  · Keep all follow-up appointments as directed by your caregiver.  · Do not wear high heels or shoes that do not provide proper support.  · Check your mattress to see if it is too soft. A firm mattress may lessen your pain and discomfort.  SEEK IMMEDIATE MEDICAL CARE IF:   · You lose control of your bowel or bladder (incontinence).  · You have increasing weakness in the lower back, pelvis, buttocks, or legs.  · You have redness or swelling of your back.  · You have a burning sensation when you urinate.  · You have pain that gets worse when you lie down or awakens you at night.  · Your pain is worse than you have experienced in the past.  · Your pain is lasting longer than 4 weeks.  · You are suddenly losing weight without reason.  MAKE SURE YOU:  · Understand these  instructions.  · Will watch your condition.  · Will get help right away if you are not doing well or get worse.     This information is not intended to replace advice given to you by your health care provider. Make sure you discuss any questions you have with your health care provider.     Document Released: 12/12/2002 Document Revised: 06/18/2013 Document Reviewed: 04/28/2013  [employer] Interactive Patient Education ©2016 [employer]            Patient Information     Patient Information    Following emergency treatment: all patient requiring follow-up care must return either to a private physician or a clinic if your condition worsens before you are able to obtain further medical attention, please return to the emergency room.     Billing Information    At [facility], we work to make the billing process streamlined for our patients.  Our Representatives are here to answer any questions you may have regarding your hospital bill.  If you have insurance coverage and have supplied your insurance information to us, we will submit a claim to your insurer on your behalf.  Should you have any questions regarding your bill, we can be reached online or by phone as follows:  Online: You are able pay your bills online or live chat with our representatives about any billing questions you may have. We are here to help Monday - Friday from 8:00am to 7:30pm and 9:00am - 12:00pm on Saturdays.  Please visit [email or web address]  for more information.   Phone:  [phone] or [phone]    Please note that your emergency physician, surgeon, pathologist, radiologist, anesthesiologist, and other specialists are not employed by [facility] and will therefore bill separately for their services.  Please contact them directly for any questions concerning their bills at the numbers below:     Emergency Physician Services:  [phone]  [facility]:  [phone]  [facility]:  [phone]  [facility]:  [phone]    1. Your final bill may vary from the amount quoted upon discharge if all procedures are not complete at that time, or if your doctor has additional procedures of which we are not aware. You will receive an additional bill if you return to the Emergency Department at [facility] for suture removal regardless of the facility of which the sutures were placed.     2. Please arrange for settlement of this account at the emergency registration.    3. All self-pay accounts are due in full at the time of treatment.  If you are unable to meet this obligation then payment is expected within 4-5 days.     4. If you have had radiology studies (CT, X-ray, Ultrasound, MRI), you have received a preliminary result during your emergency department visit. Please contact the radiology department [phone] to receive a copy of your final result. Please discuss the Final result with your primary physician or with the follow up physician provided.     Crisis Hotline:  [place] Crisis Hotline:  1-185-EQKZXHP or 1-810.776.8039  [place] Crisis Hotline:    1-424.447.8746 or 342-023-0219         ED Discharge Follow Up Questions    1. In order to provide you with very good care, we would like to follow up with a phone call in the next few days.  May we have your permission to contact you?     YES /  NO    2. What is the best phone number to call you? (       )_____-__________    3. What is the best time to call you?      Morning  /  Afternoon  /  Evening                   Patient Signature:  ____________________________________________________________    Date:  ____________________________________________________________      Your appointments     Mar 09, 2017  9:45 AM   Established Patient with V EXAM 4   [facility]  (--)    [facility] [address]   [phone]            May 23, 2017  10:00 AM   Follow Up Visit with Michael J Bloch, M.D.   [facility]  (--)    [facility] [id number] [phone]

## 2017-02-14 NOTE — ED AVS SNAPSHOT
2/14/2017          Tristan Hester  [address] [place] [address]    Dear Tristan:    [facility] wants to ensure your discharge home is safe and you or your loved ones have had all your questions answered regarding your care after you leave the hospital.    You may receive a telephone call within two days of your discharge.  This call is to make certain you understand your discharge instructions as well as ensure we provided you with the best care possible during your stay with us.     The call will only last approximately 3-5 minutes and will be done by a nurse.    Once again, we want to ensure your discharge home is safe and that you have a clear understanding of any next steps in your care.  If you have any questions or concerns, please do not hesitate to contact us, we are here for you.  Thank you for choosing [facility] for your healthcare needs.    Sincerely,    Jamil Flowers    [facility]

## 2017-02-15 DIAGNOSIS — G47.9 SLEEP DISTURBANCE: ICD-10-CM

## 2017-02-15 NOTE — ED NOTES
Discharge in good condition, aox4, via wc. Accompanied by wife. Discharge instructions given and understood x 3 scripts. All personal belongings in hand . Escorted out of er.

## 2017-02-15 NOTE — DISCHARGE INSTRUCTIONS
Back Exercises  Back exercises help treat and prevent back injuries. The goal is to increase your strength in your belly (abdominal) and back muscles. These exercises can also help with flexibility. Start these exercises when told by your doctor.    Do not drive, operate any machinery, or partake in dangerous activities that require maximum physical and mental performance while taking the prescribed pain killer. Do not take tylenol or tylenol containing products in addition to the pain killer that was prescibed, as the excess tylenol can cause life threatening liver problems. Do not combine this drug with alcohol or other sedatives or narcotics. Follow up with your primary care doctor in regards to the future management of this medication.    HOME CARE  Back exercises include:  Pelvic Tilt.  · Lie on your back with your knees bent. Tilt your pelvis until the lower part of your back is against the floor. Hold this position 5 to 10 sec. Repeat this exercise 5 to 10 times.  Knee to Chest.  · Pull 1 knee up against your chest and hold for 20 to 30 seconds. Repeat this with the other knee. This may be done with the other leg straight or bent, whichever feels better. Then, pull both knees up against your chest.  Sit-Ups or Curl-Ups.  · Bend your knees 90 degrees. Start with tilting your pelvis, and do a partial, slow sit-up. Only lift your upper half 30 to 45 degrees off the floor. Take at least 2 to 3 seonds for each sit-up. Do not do sit-ups with your knees out straight. If partial sit-ups are difficult, simply do the above but with only tightening your belly (abdominal) muscles and holding it as told.  Hip-Lift.  · Lie on your back with your knees flexed 90 degrees. Push down with your feet and shoulders as you raise your hips 2 inches off the floor. Hold for 10 seconds, repeat 5 to 10 times.  Back Arches.  · Lie on your stomach. Prop yourself up on bent elbows. Slowly press on your hands, causing an arch in your low  back. Repeat 3 to 5 times.  Shoulder-Lifts.  · Lie face down with arms beside your body. Keep hips and belly pressed to floor as you slowly lift your head and shoulders off the floor.  Do not overdo your exercises. Be careful in the beginning. Exercises may cause you some mild back discomfort. If the pain lasts for more than 15 minutes, stop the exercises until you see your doctor. Improvement with exercise for back problems is slow.      This information is not intended to replace advice given to you by your health care provider. Make sure you discuss any questions you have with your health care provider.     Document Released: 01/20/2012 Document Revised: 03/11/2013 Document Reviewed: 02/11/2016  [employer] Interactive Patient Education ©2016 [employer]  Sciatica  Sciatica is pain, weakness, numbness, or tingling along the path of the sciatic nerve. The nerve starts in the lower back and runs down the back of each leg. The nerve controls the muscles in the lower leg and in the back of the knee, while also providing sensation to the back of the thigh, lower leg, and the sole of your foot. Sciatica is a symptom of another medical condition. For instance, nerve damage or certain conditions, such as a herniated disk or bone spur on the spine, pinch or put pressure on the sciatic nerve. This causes the pain, weakness, or other sensations normally associated with sciatica. Generally, sciatica only affects one side of the body.  CAUSES   · Herniated or slipped disc.  · Degenerative disk disease.  · A pain disorder involving the narrow muscle in the buttocks (piriformis syndrome).  · Pelvic injury or fracture.  · Pregnancy.  · Tumor (rare).  SYMPTOMS   Symptoms can vary from mild to very severe. The symptoms usually travel from the low back to the buttocks and down the back of the leg. Symptoms can include:  · Mild tingling or dull aches in the lower back, leg, or hip.  · Numbness in the back of the calf or sole of the  foot.  · Burning sensations in the lower back, leg, or hip.  · Sharp pains in the lower back, leg, or hip.  · Leg weakness.  · Severe back pain inhibiting movement.  These symptoms may get worse with coughing, sneezing, laughing, or prolonged sitting or standing. Also, being overweight may worsen symptoms.  DIAGNOSIS   Your caregiver will perform a physical exam to look for common symptoms of sciatica. He or she may ask you to do certain movements or activities that would trigger sciatic nerve pain. Other tests may be performed to find the cause of the sciatica. These may include:  · Blood tests.  · X-rays.  · Imaging tests, such as an MRI or CT scan.  TREATMENT   Treatment is directed at the cause of the sciatic pain. Sometimes, treatment is not necessary and the pain and discomfort goes away on its own. If treatment is needed, your caregiver may suggest:  · Over-the-counter medicines to relieve pain.  · Prescription medicines, such as anti-inflammatory medicine, muscle relaxants, or narcotics.  · Applying heat or ice to the painful area.  · Steroid injections to lessen pain, irritation, and inflammation around the nerve.  · Reducing activity during periods of pain.  · Exercising and stretching to strengthen your abdomen and improve flexibility of your spine. Your caregiver may suggest losing weight if the extra weight makes the back pain worse.  · Physical therapy.  · Surgery to eliminate what is pressing or pinching the nerve, such as a bone spur or part of a herniated disk.  HOME CARE INSTRUCTIONS   · Only take over-the-counter or prescription medicines for pain or discomfort as directed by your caregiver.  · Apply ice to the affected area for 20 minutes, 3-4 times a day for the first 48-72 hours. Then try heat in the same way.  · Exercise, stretch, or perform your usual activities if these do not aggravate your pain.  · Attend physical therapy sessions as directed by your caregiver.  · Keep all follow-up  appointments as directed by your caregiver.  · Do not wear high heels or shoes that do not provide proper support.  · Check your mattress to see if it is too soft. A firm mattress may lessen your pain and discomfort.  SEEK IMMEDIATE MEDICAL CARE IF:   · You lose control of your bowel or bladder (incontinence).  · You have increasing weakness in the lower back, pelvis, buttocks, or legs.  · You have redness or swelling of your back.  · You have a burning sensation when you urinate.  · You have pain that gets worse when you lie down or awakens you at night.  · Your pain is worse than you have experienced in the past.  · Your pain is lasting longer than 4 weeks.  · You are suddenly losing weight without reason.  MAKE SURE YOU:  · Understand these instructions.  · Will watch your condition.  · Will get help right away if you are not doing well or get worse.     This information is not intended to replace advice given to you by your health care provider. Make sure you discuss any questions you have with your health care provider.     Document Released: 12/12/2002 Document Revised: 06/18/2013 Document Reviewed: 04/28/2013  [employer] Interactive Patient Education ©2016 [employer]

## 2017-02-15 NOTE — ED NOTES
Tristan Hester  81 y.o.  Chief Complaint   Patient presents with   • Back Pain     Pt had lumbar fusion by dr. camilo about 1 yr ago. Pt's back pain has worsened the past wk. Today pt was unable to ambulate. Pt had recent MRI by doctor nevarez. Pt has follow up next wk. Pt's pain and inability to ambulate, pt called ems. vss no acute distress noted. Pt explained triage process. Pt aware to inform staff of any changes.

## 2017-02-15 NOTE — ED PROVIDER NOTES
ED Provider Note    CHIEF COMPLAINT  Chief Complaint   Patient presents with   • Back Pain       HPI  Tristan Hester is a 81 y.o. male who presents to the emergency department chief complaining of bilateral chronic back pain. Patient states he underwent laminectomy last year by Dr. Almonte. He states that over the last 2 months he's had chronic worsening bilateral lower back pain and over the last 10 days it is worsened and today he had bilateral back spasms which is difficult to get off of the toilet. Patient states he took 2 Norco approximate 4 PM and since then they have kicked in and he is feeling much better and able to ambulate better. He denies any difficulty with bowel or bladder habits at this time denies any fevers or recent falls or back trauma. Patient states he hasn't taken her very often but yet is a dictated to the pain. He endorses of the pain is worse on the left than the right and he feels that stinging sensation radiating down of the left thigh. Patient spoke with his neurosurgeon and actually underwent an MRI on the 19th of this month which have access to.    Currently pain is dull 4/10     REVIEW OF SYSTEMS  See HPI for further details. All other systems are negative.     PAST MEDICAL HISTORY   has a past medical history of TIA (transient ischemic attack); Arthritis; Heart valve disease; Cancer; Hypertension; Urinary bladder disorder; Mitral regurgitation (1/6/2014); Carotid arterial disease (1/6/2014); HLD (hyperlipidemia) (1/6/2014); Urinary incontinence; Blood clotting disorder; Arrhythmia; Bronchitis; Pneumonia; Pain (12-14-15); Cataract; and Stroke (2008).    SOCIAL HISTORY  Social History     Social History Main Topics   • Smoking status: Former Smoker -- 0.50 packs/day for 30 years     Types: Cigarettes, Pipe     Quit date: 01/01/1985   • Smokeless tobacco: Never Used   • Alcohol Use: 3.0 oz/week     3 Cans of beer, 3 Shots of liquor per week      Comment: 1 x week   • Drug Use: No  "  • Sexual Activity: Not Currently       SURGICAL HISTORY   has past surgical history that includes carotid endarterectomy (7/10/08); prostatectomy, radial (11/2000); bladder sling male (11/2004); cervical disk and fusion anterior (10/26/2009); lumbar laminectomy diskectomy (10/8/2010); thoracoscopy (6/13/2015); lumbar fusion posterior (12/28/2015); and lumbar decompression (12/28/2015).    CURRENT MEDICATIONS  Home Medications     **Home medications have not yet been reviewed for this encounter**          ALLERGIES  Allergies   Allergen Reactions   • Cantaloupe      Pt stated cantaloupe causes his throat to constrict   • Nkda [No Known Drug Allergy]    • Other Environmental      Seasonal, cats-hayfever       PHYSICAL EXAM  VITAL SIGNS: /96 mmHg  Pulse 75  Temp(Src) 36.8 °C (98.3 °F)  Resp 16  Ht 1.803 m (5' 11\")  Wt 86.183 kg (190 lb)  BMI 26.51 kg/m2  SpO2 96%   Pulse ox interpretation: I interpret this pulse ox as normal.  Constitutional: Alert in no apparent distress.  HENT: Normocephalic, Atraumatic  Eyes: Pupils are equal and reactive. Conjunctiva normal, non-icteric.   Heart: Regular rate and rythm, no murmurs.    Lungs: Clear to auscultation bilaterally.  Abdomen: Non-tender, non-distended, normal bowel sounds  Skin: Warm, Dry, No erythema, No rash.   Neurologic: Alert, Grossly non-focal.   +DP pulse, +cap refill, plantar and dorsiflexion intact at ankle, extension and flexion intact at knee, flexes at hip, sensation intact in four dorsal, medial, lateral and plantar foot, intact to medial and lateral calf and thigh, no bony ttp, compartments soft   Ambulates slowly with steady gait  Back: no midline pain, swelling or ttp, healed midline incision, bilateral paraspinal muscular ttp, sciatic ttp      Radiology  MRI LUMBAR SPINE 2/9/17  1.  Previous laminectomies at the L3-4 and L4-5 levels.    2.  Interval placement of pedicle screw and eliceo fixation from L3 through L5 bilaterally.    3.  Mild " "central canal stenosis at the L2-3 level secondary to facet arthropathy.    4.   Minimal multilevel lumbar spondylotic change.    5.  Mild to moderate multilevel neural foraminal narrowing.    COURSE & MEDICAL DECISION MAKING  Pertinent Labs & Imaging studies reviewed. (See chart for details)    This is a 81 y.o. male who presents with acute on chronic bilateral lower sciatica, patient had an MRI done 5 days ago shows no evidence or concern for cauda equina syndrome, no evidence of infection at this time he is ambulating after his pain is improved and does have some muscle spasms on my examination at this time. Patient will be treated here with oxycodone and Valium and written up short prescription for both as well as Medrol Dosepak. He has an appointment with his neurosurgeon in the next few weeks and feels comfortable going home and following up then. He was given strict return precautions which include any worsening or new difficulty ambulating peeing stooling or weakness or numbness in his legs. He understands the strict return precautions and feels comfortable going home    /89 mmHg  Pulse 60  Temp(Src) 36.8 °C (98.3 °F)  Resp 16  Ht 1.803 m (5' 11\")  Wt 86.183 kg (190 lb)  BMI 26.51 kg/m2  SpO2 93%      The patient will not drink alcohol nor drive with prescribed medications. The patient will return for worsening symptoms and is stable at the time of discharge. The patient verbalizes understanding and will comply.    FOLLOW UP    [facility], Emergency Dept  [address] [address] [phone]    If symptoms worsen - worsening weakness, difficulty w bowel or bladder      FINAL IMPRESSION  1. Degenerative disc disease, lumbar    2. Bilateral sciatica               Electronically signed by: Pebbles Shi, 2/14/2017 7:08 PM    This dictation has been created using voice recognition software and/or scribes. The accuracy of the dictation is limited by the " abilities of the software and the expertise of the scribes. I expect there may be some errors of grammar and possibly content. I made every attempt to manually correct the errors within my dictation. However, errors related to voice recognition software and/or scribes may still exist and should be interpreted within the appropriate context.

## 2017-02-17 RX ORDER — TEMAZEPAM 30 MG/1
CAPSULE ORAL
Qty: 90 CAP | Refills: 0 | Status: SHIPPED | OUTPATIENT
Start: 2017-02-17 | End: 2017-02-21 | Stop reason: SDUPTHER

## 2017-02-21 RX ORDER — TEMAZEPAM 30 MG/1
CAPSULE ORAL
Qty: 90 CAP | Refills: 2 | Status: SHIPPED
Start: 2017-02-21 | End: 2017-11-17 | Stop reason: SDUPTHER

## 2017-02-24 DIAGNOSIS — G47.9 SLEEP DISTURBANCE: ICD-10-CM

## 2017-02-24 RX ORDER — TEMAZEPAM 30 MG/1
CAPSULE ORAL
Qty: 90 CAP | Refills: 2 | Status: CANCELLED | OUTPATIENT
Start: 2017-02-24

## 2017-03-09 ENCOUNTER — ANTICOAGULATION VISIT (OUTPATIENT)
Dept: VASCULAR LAB | Facility: MEDICAL CENTER | Age: 82
End: 2017-03-09
Attending: INTERNAL MEDICINE
Payer: MEDICARE

## 2017-03-09 VITALS — SYSTOLIC BLOOD PRESSURE: 152 MMHG | DIASTOLIC BLOOD PRESSURE: 88 MMHG

## 2017-03-09 DIAGNOSIS — I48.0 PAROXYSMAL ATRIAL FIBRILLATION (HCC): ICD-10-CM

## 2017-03-09 LAB
INR BLD: 2.5 (ref 0.9–1.2)
INR PPP: 2.5 (ref 2–3.5)

## 2017-03-09 PROCEDURE — 85610 PROTHROMBIN TIME: CPT

## 2017-03-09 PROCEDURE — 99211 OFF/OP EST MAY X REQ PHY/QHP: CPT

## 2017-03-09 NOTE — PROGRESS NOTES
.  Anticoagulation Summary as of 3/9/2017     INR goal 2.0-3.0   Selected INR 2.5 (3/9/2017)   Maintenance plan 8 mg (4 mg x 2) on Sun, Tue, Thu; 6 mg (4 mg x 1.5) all other days   Weekly total 48 mg   Plan last modified JAVIER PierreD (1/26/2017)   Next INR check 4/20/2017   Target end date Indefinite    Indications   DVT (deep venous thrombosis) (CMS-HCC) [I82.409]  Paroxysmal atrial fibrillation (CMS-HCC) [I48.0]  Hx of Stroke (Resolved) [I63.9]  Hx of TIA (transient ischemic attack) [G45.9]  Deep vein thrombosis (DVT) (CMS-HCC) (Resolved) [I82.409]  Atrial fibrillation (CMS-HCC) (Resolved) [I48.91]         Anticoagulation Episode Summary     INR check location [facility]    Preferred lab     Send INR reminders to     Comments       Anticoagulation Care Providers     Provider Role Specialty Phone number    Michael J Bloch, M.D. Referring Internal Medicine [phone]    [facility]   [phone]        Anticoagulation Patient Findings      Tristan Hester seen in clinic today  INR  therapeutic.    Denies signs/symptoms of bleeding and/or thrombosis.    Denies changes to diet or medications.   Follow up appointment in 6 week(s).    Continue weekly warfarin dose as noted    Conor Locke, PHARMD

## 2017-03-09 NOTE — MR AVS SNAPSHOT
Tristan Hester   3/9/2017 9:45 AM   Anticoagulation Visit   MRN: [id number]    Department:  Vascular Medicine   Dept Phone:  [phone]    Description:  Male : 1935   Provider:  [facility] EXAM 4           Allergies as of 3/9/2017     Allergen Noted Reactions    Cantaloupe 2015       Pt stated cantaloupe causes his throat to constrict    Nkda [No Known Drug Allergy] 2008       Other Environmental 10/08/2010       Seasonal, cats-hayfever      You were diagnosed with     Paroxysmal atrial fibrillation (CMS-[facility])   [283514]         Vital Signs     Blood Pressure Smoking Status                152/88 mmHg Former Smoker          Basic Information     Date Of Birth Sex Race Ethnicity Preferred Language    1935 Male White Non- English      Your appointments     2017  9:45 AM   Established Patient with [facility] EXAM 4   [facility]  (--)    [address] [address]   [phone]            May 23, 2017 10:00 AM   Follow Up Visit with Michael J Bloch, M.D.   [facility]  (--)    [address] [address]   [phone]              Problem List              ICD-10-CM Priority Class Noted - Resolved    Hx of TIA (transient ischemic attack) G45.9 Low  Unknown - Present    Arthritis M19.90 Medium  Unknown - Present    Hx of Prostate Cancer C80.1 Low  Unknown - Present    Essential hypertension, benign I10   Unknown - Present    Mitral regurgitation I34.0 Medium  2014 - Present    Carotid arterial disease (CMS-HCC) I77.9 Low  2014 - Present    HLD (hyperlipidemia) E78.5 Low  2014 - Present    Paroxysmal atrial fibrillation (CMS-HCC) I48.0 Medium  2015 - Present    DVT (deep venous thrombosis) (CMS-HCC) I82.409 Medium  2015 - Present    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 Low  2015 - Present    Hypokalemia E87.6 Medium  2015 - Present    Insomnia G47.00 Low  8/21/2015 - Present    Lumbar stenosis M48.06 Medium  12/28/2015 - Present    Postoperative CSF leak G97.82 High  1/5/2016 - Present    Lower back pain M54.5   12/27/2016 - Present      Health Maintenance        Date Due Completion Dates    A1C SCREENING 1935 ---    DIABETES MONOFILAMENT / LE EXAM 1935 ---    RETINAL SCREENING 6/7/1954 ---    URINE ACR / MICROALBUMIN 6/7/1954 ---    IMM DTaP/Tdap/Td Vaccine (1 - Tdap) 6/7/1955 ---    COLONOSCOPY 6/7/1986 ---    IMM ZOSTER VACCINE 6/7/1996 ---    IMM PNEUMOCOCCAL 65+ (ADULT) HIGH/HIGHEST RISK SERIES (1 of 2 - PCV13) 6/7/2001 ---    FASTING LIPID PROFILE 8/19/2015 8/19/2014, 6/2/2008    IMM INFLUENZA (1) 9/1/2016 10/1/2014, 10/9/2010    SERUM CREATININE 1/12/2017 1/12/2016, 1/11/2016, 1/10/2016, 1/6/2016, 1/4/2016, 12/31/2015, 12/30/2015, 12/29/2015, 12/14/2015, 6/23/2015, 6/22/2015, 6/21/2015, 6/20/2015, 6/19/2015, 6/16/2015, 6/14/2015, 6/13/2015, 6/12/2015, 10/24/2010, 10/1/2010, 10/16/2009, 7/2/2008, 6/4/2008, 6/3/2008, 6/2/2008, 6/2/2008            Results     POCT Protime      Component    INR    2.5                        Current Immunizations     Influenza TIV (IM) 10/1/2014, 10/9/2010  9:15 AM    Pneumococcal Vaccine (UF)Historical Data 1/1/2010      Below and/or attached are the medications your provider expects you to take. Review all of your home medications and newly ordered medications with your provider and/or pharmacist. Follow medication instructions as directed by your provider and/or pharmacist. Please keep your medication list with you and share with your provider. Update the information when medications are discontinued, doses are changed, or new medications (including over-the-counter products) are added; and carry medication information at all times in the event of emergency situations     Allergies:  CANTALOUPE - (reactions not documented)     NKDA - (reactions not documented)     OTHER ENVIRONMENTAL - (reactions  not documented)               Medications  Valid as of: March 09, 2017 -  9:55 AM    Generic Name Brand Name Tablet Size Instructions for use    Acetaminophen (Tab) TYLENOL 325 MG Take 650 mg by mouth every four hours as needed.        Atorvastatin Calcium (Tab) LIPITOR 80 MG Take 1 Tab by mouth every evening.        DiazePAM (Tab) VALIUM 2 MG Take 1 Tab by mouth every 6 hours as needed (back spasm).        DiltiaZEM HCl (CAPSULE SR 24 HR) CARDIZEM  MG Take 1 Cap by mouth every day.        Irbesartan (Tab) AVAPRO 300 MG Take 1 Tab by mouth every evening. Indications: High Blood Pressure        MethylPREDNISolone (Tablet Therapy Pack) MEDROL DOSEPAK 4 MG Use as directed        Multiple Vitamins-Minerals (Tab) THERAGRAN-M  Take 1 Tab by mouth every day.        Oxycodone-Acetaminophen (Tab) PERCOCET 7.5-325 MG Take 1 Tab by mouth every four hours as needed.        Temazepam (Cap) RESTORIL 30 MG TAKE ONE CAPSULE BY MOUTH ONCE DAILY AT BEDTIME AS NEEDED FOR SLEEP        Warfarin Sodium (Tab) COUMADIN 4 MG TAKE 1 TO 2 TABLETS EVERY DAY AS DIRECTED  BY  COUMADIN  CLINIC        .                 Medicines prescribed today were sent to:     [facility] [address] [facility] ([place]), [place] - [address] [id number] [facility] ([place]) [place] [address]    Phone: [phone] Fax: [phone]    Open 24 Hours?: No    [facility] [address] - [place], [place] - [address] [facility]    [address] [place] [address]    Phone: [phone] Fax: [phone]    Open 24 Hours?: No    [facility] MAIL DELIVERY - [place], [place] - [address] [facility] [address] [facility]    [address] [facility] [address]    Phone: [phone] Fax: [phone]    Open 24 Hours?: No      Medication refill instructions:       If your prescription bottle indicates you have medication refills left, it is not necessary to call your provider’s office. Please contact your pharmacy and they will refill your medication.    If your prescription bottle indicates you do not have any refills  left, you may request refills at any time through one of the following ways: The online LeadFire system (except Urgent Care), by calling your provider’s office, or by asking your pharmacy to contact your provider’s office with a refill request. Medication refills are processed only during regular business hours and may not be available until the next business day. Your provider may request additional information or to have a follow-up visit with you prior to refilling your medication.   *Please Note: Medication refills are assigned a new Rx number when refilled electronically. Your pharmacy may indicate that no refills were authorized even though a new prescription for the same medication is available at the pharmacy. Please request the medicine by name with the pharmacy before contacting your provider for a refill.        Warfarin Dosing Calendar   March 2017 Details    Sun Mon Tue Wed Thu Fri Sat        1               2               3               4                 5               6               7               8               9   2.5   8 mg   See details      10      6 mg         11      6 mg           12      8 mg         13      6 mg         14      8 mg         15      6 mg         16      8 mg         17      6 mg         18      6 mg           19      8 mg         20      6 mg         21      8 mg         22      6 mg         23      8 mg         24      6 mg         25      6 mg           26      8 mg         27      6 mg         28      8 mg         29      6 mg         30      8 mg         31      6 mg           Date Details   03/09 This INR check   INR: 2.5               How to take your warfarin dose     To take:  6 mg Take 1.5 of the 4 mg tablets.    To take:  8 mg Take 2 of the 4 mg tablets.           Warfarin Dosing Calendar   April 2017 Details    Sun Mon Tue Wed Thu Fri Sat           1      6 mg           2      8 mg         3      6 mg         4      8 mg         5      6 mg         6      8 mg          7      6 mg         8      6 mg           9      8 mg         10      6 mg         11      8 mg         12      6 mg         13      8 mg         14      6 mg         15      6 mg           16      8 mg         17      6 mg         18      8 mg         19      6 mg         20      8 mg         21               22                 23               24               25               26               27               28               29                 30                      Date Details   No additional details    Date of next INR:  4/20/2017         How to take your warfarin dose     To take:  6 mg Take 1.5 of the 4 mg tablets.    To take:  8 mg Take 2 of the 4 mg tablets.              [employer] Access Code: [email or web address]  Expires: 3/11/2017  9:51 AM    [employer]  A secure, online tool to manage your health information     [employer]’s [employer]® is a secure, online tool that connects you to your personalized health information from the privacy of your home -- day or night - making it very easy for you to manage your healthcare. Once the activation process is completed, you can even access your medical information using the [employer] azalea, which is available for free in the Apple Azalea store or Google Play store.     [employer] provides the following levels of access (as shown below):   My Chart Features   [facility] Doctor [facility]  Specialists [facility]  Urgent  Care Non-[facility]  Primary Care  Doctor   Email your healthcare team securely and privately 24/7 X X X    Manage appointments: schedule your next appointment; view details of past/upcoming appointments X      Request prescription refills. X      View recent personal medical records, including lab and immunizations X X X X   View health record, including health history, allergies, medications X X X X   Read reports about your outpatient visits, procedures, consult and ER notes X X X X   See your discharge summary, which is a recap of your hospital and/or ER  visit that includes your diagnosis, lab results, and care plan. X X       How to register for [employer]:  1. Go to  [email or web address].  2. Click on the Sign Up Now box, which takes you to the New Member Sign Up page. You will need to provide the following information:  a. Enter your [employer] Access Code exactly as it appears at the top of this page. (You will not need to use this code after you’ve completed the sign-up process. If you do not sign up before the expiration date, you must request a new code.)   b. Enter your date of birth.   c. Enter your home email address.   d. Click Submit, and follow the next screen’s instructions.  3. Create a Hobbyt ID. This will be your Hobbyt login ID and cannot be changed, so think of one that is secure and easy to remember.  4. Create a Hobbyt password. You can change your password at any time.  5. Enter your Password Reset Question and Answer. This can be used at a later time if you forget your password.   6. Enter your e-mail address. This allows you to receive e-mail notifications when new information is available in [employer].  7. Click Sign Up. You can now view your health information.    For assistance activating your [employer] account, call [phone]

## 2017-04-20 ENCOUNTER — ANTICOAGULATION VISIT (OUTPATIENT)
Dept: VASCULAR LAB | Facility: MEDICAL CENTER | Age: 82
End: 2017-04-20
Attending: INTERNAL MEDICINE
Payer: MEDICARE

## 2017-04-20 VITALS — DIASTOLIC BLOOD PRESSURE: 86 MMHG | SYSTOLIC BLOOD PRESSURE: 151 MMHG | HEART RATE: 63 BPM

## 2017-04-20 DIAGNOSIS — I48.0 PAROXYSMAL ATRIAL FIBRILLATION (HCC): ICD-10-CM

## 2017-04-20 LAB
INR BLD: 2.1 (ref 0.9–1.2)
INR PPP: 2.1 (ref 2–3.5)

## 2017-04-20 PROCEDURE — 85610 PROTHROMBIN TIME: CPT

## 2017-04-20 PROCEDURE — 99211 OFF/OP EST MAY X REQ PHY/QHP: CPT

## 2017-04-20 NOTE — MR AVS SNAPSHOT
Tristan Hester   2017 9:45 AM   Anticoagulation Visit   MRN: [id number]    Department:  Vascular Medicine   Dept Phone:  [phone]    Description:  Male : 1935   Provider:  [facility] EXAM 4           Allergies as of 2017     Allergen Noted Reactions    Cantaloupe 2015       Pt stated cantaloupe causes his throat to constrict    Nkda [No Known Drug Allergy] 2008       Other Environmental 10/08/2010       Seasonal, cats-hayfever      You were diagnosed with     Paroxysmal atrial fibrillation (CMS-HCC)   [336888]         Vital Signs     Blood Pressure Pulse Smoking Status             151/86 mmHg 63 Former Smoker         Basic Information     Date Of Birth Sex Race Ethnicity Preferred Language    1935 Male White Non- English      Your appointments     May 23, 2017 10:00 AM   Follow Up Visit with Michael J Bloch, M.D.   [facility]  (--)    [address] [facility] [address]   [phone]            2017  9:00 AM   Established Patient with [facility] EXAM 5   [facility]  (--)    [address] [facility] [address]   [phone]              Problem List              ICD-10-CM Priority Class Noted - Resolved    Hx of TIA (transient ischemic attack) G45.9 Low  Unknown - Present    Arthritis M19.90 Medium  Unknown - Present    Hx of Prostate Cancer C80.1 Low  Unknown - Present    Essential hypertension, benign I10   Unknown - Present    Mitral regurgitation I34.0 Medium  2014 - Present    Carotid arterial disease (CMS-HCC) I77.9 Low  2014 - Present    HLD (hyperlipidemia) E78.5 Low  2014 - Present    Paroxysmal atrial fibrillation (CMS-HCC) I48.0 Medium  2015 - Present    DVT (deep venous thrombosis) (CMS-HCC) I82.409 Medium  2015 - Present    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 Low  2015 - Present    Hypokalemia E87.6 Medium  2015 - Present     Insomnia G47.00 Low  8/21/2015 - Present    Lumbar stenosis M48.06 Medium  12/28/2015 - Present    Postoperative CSF leak G97.82 High  1/5/2016 - Present    Lower back pain M54.5   12/27/2016 - Present      Health Maintenance        Date Due Completion Dates    A1C SCREENING 1935 ---    DIABETES MONOFILAMENT / LE EXAM 1935 ---    RETINAL SCREENING 6/7/1954 ---    URINE ACR / MICROALBUMIN 6/7/1954 ---    IMM DTaP/Tdap/Td Vaccine (1 - Tdap) 6/7/1955 ---    COLONOSCOPY 6/7/1986 ---    IMM ZOSTER VACCINE 6/7/1996 ---    IMM PNEUMOCOCCAL 65+ (ADULT) HIGH/HIGHEST RISK SERIES (1 of 2 - PCV13) 6/7/2001 ---    FASTING LIPID PROFILE 8/19/2015 8/19/2014, 6/2/2008    SERUM CREATININE 1/12/2017 1/12/2016, 1/11/2016, 1/10/2016, 1/6/2016, 1/4/2016, 12/31/2015, 12/30/2015, 12/29/2015, 12/14/2015, 6/23/2015, 6/22/2015, 6/21/2015, 6/20/2015, 6/19/2015, 6/16/2015, 6/14/2015, 6/13/2015, 6/12/2015, 10/24/2010, 10/1/2010, 10/16/2009, 7/2/2008, 6/4/2008, 6/3/2008, 6/2/2008, 6/2/2008            Results     POCT Protime      Component    INR    2.1                        Current Immunizations     Influenza TIV (IM) 10/1/2014, 10/9/2010  9:15 AM    Pneumococcal Vaccine (UF)Historical Data 1/1/2010      Below and/or attached are the medications your provider expects you to take. Review all of your home medications and newly ordered medications with your provider and/or pharmacist. Follow medication instructions as directed by your provider and/or pharmacist. Please keep your medication list with you and share with your provider. Update the information when medications are discontinued, doses are changed, or new medications (including over-the-counter products) are added; and carry medication information at all times in the event of emergency situations     Allergies:  CANTALOUPE - (reactions not documented)     NKDA - (reactions not documented)     OTHER ENVIRONMENTAL - (reactions not documented)               Medications  Valid  as of: April 20, 2017 -  9:58 AM    Generic Name Brand Name Tablet Size Instructions for use    Acetaminophen (Tab) TYLENOL 325 MG Take 650 mg by mouth every four hours as needed.        Atorvastatin Calcium (Tab) LIPITOR 80 MG Take 1 Tab by mouth every evening.        DiazePAM (Tab) VALIUM 2 MG Take 1 Tab by mouth every 6 hours as needed (back spasm).        DilTIAZem HCl (CAPSULE SR 24 HR) CARDIZEM  MG Take 1 Cap by mouth every day.        Irbesartan (Tab) AVAPRO 300 MG Take 1 Tab by mouth every evening. Indications: High Blood Pressure        MethylPREDNISolone (Tablet Therapy Pack) MEDROL DOSEPAK 4 MG Use as directed        Multiple Vitamins-Minerals (Tab) THERAGRAN-M  Take 1 Tab by mouth every day.        Oxycodone-Acetaminophen (Tab) PERCOCET 7.5-325 MG Take 1 Tab by mouth every four hours as needed.        Temazepam (Cap) RESTORIL 30 MG TAKE ONE CAPSULE BY MOUTH ONCE DAILY AT BEDTIME AS NEEDED FOR SLEEP        Warfarin Sodium (Tab) COUMADIN 4 MG TAKE 1 TO 2 TABLETS EVERY DAY AS DIRECTED  BY  COUMADIN  CLINIC        .                 Medicines prescribed today were sent to:     [facility] [address] [facility] ([place]), [place] - [address] [id number] [facility] ([place]) [place] [address]    Phone: [phone] Fax: [phone]    Open 24 Hours?: No    [facility] # [address] - [place], [place] - [address] [facility]    [address] [facility] [address]    Phone: [phone] Fax: [phone]    Open 24 Hours?: No    [facility] MAIL DELIVERY - [place], [place] - [address] [facility]    [address] [facility] [address]    Phone: [phone] Fax: [phone]    Open 24 Hours?: No      Medication refill instructions:       If your prescription bottle indicates you have medication refills left, it is not necessary to call your provider’s office. Please contact your pharmacy and they will refill your medication.    If your prescription bottle indicates you do not have any refills left, you may request refills at any time through  one of the following ways: The online W-21 system (except Urgent Care), by calling your provider’s office, or by asking your pharmacy to contact your provider’s office with a refill request. Medication refills are processed only during regular business hours and may not be available until the next business day. Your provider may request additional information or to have a follow-up visit with you prior to refilling your medication.   *Please Note: Medication refills are assigned a new Rx number when refilled electronically. Your pharmacy may indicate that no refills were authorized even though a new prescription for the same medication is available at the pharmacy. Please request the medicine by name with the pharmacy before contacting your provider for a refill.        Warfarin Dosing Calendar April 2017 Details    Sun Mon Tue Wed Thu Fri Sat           1                 2               3               4               5               6               7               8                 9               10               11               12               13               14               15                 16               17               18               19               20   2.1   8 mg   See details      21      6 mg         22      6 mg           23      8 mg         24      6 mg         25      8 mg         26      6 mg         27      8 mg         28      6 mg         29      6 mg           30      8 mg                Date Details   04/20 This INR check   INR: 2.1               How to take your warfarin dose     To take:  6 mg Take 1.5 of the 4 mg tablets.    To take:  8 mg Take 2 of the 4 mg tablets.           Warfarin Dosing Calendar   May 2017 Details    Sun Mon Tue Wed Thu Fri Sat      1      6 mg         2      8 mg         3      6 mg         4      8 mg         5      6 mg         6      6 mg           7      8 mg         8      6 mg         9      8 mg         10      6 mg         11      8 mg          12      6 mg         13      6 mg           14      8 mg         15      6 mg         16      8 mg         17      6 mg         18      8 mg         19      6 mg         20      6 mg           21      8 mg         22      6 mg         23      8 mg         24      6 mg         25      8 mg         26      6 mg         27      6 mg           28      8 mg         29      6 mg         30      8 mg         31      6 mg             Date Details   No additional details            How to take your warfarin dose     To take:  6 mg Take 1.5 of the 4 mg tablets.    To take:  8 mg Take 2 of the 4 mg tablets.           Warfarin Dosing Calendar   June 2017 Details    Sun Mon Tue Wed Thu Fri Sat         1      8 mg         2               3                 4               5               6               7               8               9               10                 11               12               13               14               15               16               17                 18               19               20               21               22               23               24                 25               26               27               28               29               30                 Date Details   No additional details    Date of next INR:  6/1/2017         How to take your warfarin dose     To take:  8 mg Take 2 of the 4 mg tablets.              [employer] Access Code: [email or web address]  Expires: 5/18/2017 12:58 PM    [employer]  A secure, online tool to manage your health information     [employer]® is a secure, online tool that connects you to your personalized health information from the privacy of your home -- day or night - making it very easy for you to manage your healthcare. Once the activation process is completed, you can even access your medical information using the [employer] azalea, which is available for free in the Apple Azalea store or Google Play store.     [employer] provides the following  levels of access (as shown below):   My Chart Features   [facility] Doctor [facility]  Specialists [facility]  Urgent  Care Non-[facility]  Primary Care  Doctor   Email your healthcare team securely and privately 24/7 X X X    Manage appointments: schedule your next appointment; view details of past/upcoming appointments X      Request prescription refills. X      View recent personal medical records, including lab and immunizations X X X X   View health record, including health history, allergies, medications X X X X   Read reports about your outpatient visits, procedures, consult and ER notes X X X X   See your discharge summary, which is a recap of your hospital and/or ER visit that includes your diagnosis, lab results, and care plan. X X       How to register for [employer]:  1. Go to  [email or web address].  2. Click on the Sign Up Now box, which takes you to the New Member Sign Up page. You will need to provide the following information:  a. Enter your [employer] Access Code exactly as it appears at the top of this page. (You will not need to use this code after you’ve completed the sign-up process. If you do not sign up before the expiration date, you must request a new code.)   b. Enter your date of birth.   c. Enter your home email address.   d. Click Submit, and follow the next screen’s instructions.  3. Create a [employer] ID. This will be your [employer] login ID and cannot be changed, so think of one that is secure and easy to remember.  4. Create a [employer] password. You can change your password at any time.  5. Enter your Password Reset Question and Answer. This can be used at a later time if you forget your password.   6. Enter your e-mail address. This allows you to receive e-mail notifications when new information is available in [employer].  7. Click Sign Up. You can now view your health information.    For assistance activating your [employer] account, call [phone]

## 2017-04-20 NOTE — PROGRESS NOTES
Anticoagulation Summary as of 4/20/2017     INR goal 2.0-3.0   Selected INR 2.1 (4/20/2017)   Maintenance plan 8 mg (4 mg x 2) on Sun, Tue, Thu; 6 mg (4 mg x 1.5) all other days   Weekly total 48 mg   Plan last modified JAVIER PierreD (1/26/2017)   Next INR check 6/1/2017   Target end date Indefinite    Indications   DVT (deep venous thrombosis) (CMS-HCC) [I82.409]  Paroxysmal atrial fibrillation (CMS-HCC) [I48.0]  Hx of Stroke (Resolved) [I63.9]  Hx of TIA (transient ischemic attack) [G45.9]  Deep vein thrombosis (DVT) (CMS-HCC) (Resolved) [I82.409]  Atrial fibrillation (CMS-HCC) (Resolved) [I48.91]         Anticoagulation Episode Summary     [facility] check location [facility]    Preferred lab     Send INR reminders to     Comments       Anticoagulation Care Providers     Provider Role Specialty Phone number    Michael J Bloch, M.D. Referring Internal Medicine [phone]    [facility] Anticoagulation Services   [phone]        Anticoagulation Patient Findings   Negatives Missed Doses, Extra Doses, Medication Changes, Antibiotic Use, Diet Changes, Dental/Other Procedures, Hospitalization, Bleeding Gums, Nose Bleeds, Blood in Urine, Blood in Stool, Any Bruising, Other Complaints        Saw patient in clinic with a therapeutic INR of 2.1.  Denies any changes to his medications or diet.  Denies any signs of bleeding.  Patient states that he will need refills at his next PCP visit on 5/23/17.  I made note of this for his next visit.  Obtained vitals.    Will have patient continue his current dosing regimen of 8mg Sun, Tues, Thurs and 6 mg all other days.    Follow up in 6 weeks.    Kinsey Bray, PharmD.  PGY-1 Resident  [phone]

## 2017-05-23 ENCOUNTER — OFFICE VISIT (OUTPATIENT)
Dept: VASCULAR LAB | Facility: MEDICAL CENTER | Age: 82
End: 2017-05-23
Attending: INTERNAL MEDICINE
Payer: MEDICARE

## 2017-05-23 VITALS
BODY MASS INDEX: 26.46 KG/M2 | HEIGHT: 71 IN | DIASTOLIC BLOOD PRESSURE: 71 MMHG | HEART RATE: 64 BPM | SYSTOLIC BLOOD PRESSURE: 130 MMHG | WEIGHT: 189 LBS

## 2017-05-23 DIAGNOSIS — E78.5 HYPERLIPIDEMIA, UNSPECIFIED HYPERLIPIDEMIA TYPE: ICD-10-CM

## 2017-05-23 DIAGNOSIS — I10 ESSENTIAL HYPERTENSION: ICD-10-CM

## 2017-05-23 DIAGNOSIS — E78.5 DYSLIPIDEMIA: ICD-10-CM

## 2017-05-23 DIAGNOSIS — I65.23 CAROTID ATHEROSCLEROSIS, BILATERAL: ICD-10-CM

## 2017-05-23 DIAGNOSIS — R26.89 BALANCE DISORDER: ICD-10-CM

## 2017-05-23 DIAGNOSIS — I48.91 ATRIAL FIBRILLATION, UNSPECIFIED TYPE (HCC): ICD-10-CM

## 2017-05-23 DIAGNOSIS — G62.9 PERIPHERAL POLYNEUROPATHY: ICD-10-CM

## 2017-05-23 LAB — INR PPP: 4.3 (ref 2–3.5)

## 2017-05-23 PROCEDURE — 99214 OFFICE O/P EST MOD 30 MIN: CPT | Performed by: INTERNAL MEDICINE

## 2017-05-23 PROCEDURE — 1101F PT FALLS ASSESS-DOCD LE1/YR: CPT | Mod: 8P | Performed by: INTERNAL MEDICINE

## 2017-05-23 PROCEDURE — 85610 PROTHROMBIN TIME: CPT | Performed by: INTERNAL MEDICINE

## 2017-05-23 PROCEDURE — 99212 OFFICE O/P EST SF 10 MIN: CPT

## 2017-05-23 PROCEDURE — 4040F PNEUMOC VAC/ADMIN/RCVD: CPT | Mod: 8P | Performed by: INTERNAL MEDICINE

## 2017-05-23 PROCEDURE — G8598 ASA/ANTIPLAT THER USED: HCPCS | Performed by: INTERNAL MEDICINE

## 2017-05-23 PROCEDURE — G8432 DEP SCR NOT DOC, RNG: HCPCS | Performed by: INTERNAL MEDICINE

## 2017-05-23 PROCEDURE — G8419 CALC BMI OUT NRM PARAM NOF/U: HCPCS | Performed by: INTERNAL MEDICINE

## 2017-05-23 PROCEDURE — 1036F TOBACCO NON-USER: CPT | Performed by: INTERNAL MEDICINE

## 2017-05-23 RX ORDER — ATORVASTATIN CALCIUM 80 MG/1
40 TABLET, FILM COATED ORAL EVERY EVENING
Qty: 90 TAB | Refills: 3
Start: 2017-05-23 | End: 2017-12-21 | Stop reason: SDUPTHER

## 2017-05-23 ASSESSMENT — ENCOUNTER SYMPTOMS
FOCAL WEAKNESS: 0
BRUISES/BLEEDS EASILY: 0
BACK PAIN: 1
SHORTNESS OF BREATH: 0
DEPRESSION: 0
HEADACHES: 0
MYALGIAS: 0
LOSS OF CONSCIOUSNESS: 0
PALPITATIONS: 0
COUGH: 0
NERVOUS/ANXIOUS: 0

## 2017-05-23 NOTE — MR AVS SNAPSHOT
"        Tristan Martin Hester   2017 10:00 AM   Office Visit   MRN: [id number]    Department:  Vascular Medicine   Dept Phone:  [phone]    Description:  Male : 1935   Provider:  Michael J Bloch, M.D.           Reason for Visit     Amb Vascular Reason For Visit           Allergies as of 2017     Allergen Noted Reactions    Cantaloupe 2015       Pt stated cantaloupe causes his throat to constrict    Nkda [No Known Drug Allergy] 2008       Other Environmental 10/08/2010       Seasonal, cats-hayfever      You were diagnosed with     Paroxysmal atrial fibrillation (CMS-HCC)   [257596]         Vital Signs     Blood Pressure Pulse Height Weight Body Mass Index Smoking Status    130/71 mmHg 64 1.803 m (5' 10.98\") 85.73 kg (189 lb) 26.37 kg/m2 Former Smoker      Basic Information     Date Of Birth Sex Race Ethnicity Preferred Language    1935 Male White Non- English      Your appointments     2017  9:00 AM   Established Patient with V EXAM 5   [facility]  (--)    [address] [place] [address]   [phone]              Problem List              ICD-10-CM Priority Class Noted - Resolved    Hx of TIA (transient ischemic attack) G45.9 Low  Unknown - Present    Arthritis M19.90 Medium  Unknown - Present    Hx of Prostate Cancer C80.1 Low  Unknown - Present    Essential hypertension, benign I10   Unknown - Present    Mitral regurgitation I34.0 Medium  2014 - Present    Carotid arterial disease (CMS-HCC) I77.9 Low  2014 - Present    HLD (hyperlipidemia) E78.5 Low  2014 - Present    Paroxysmal atrial fibrillation (CMS-HCC) I48.0 Medium  2015 - Present    DVT (deep venous thrombosis) (CMS-HCC) I82.409 Medium  2015 - Present    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 Low  2015 - Present    Hypokalemia E87.6 Medium  2015 - Present    Insomnia G47.00 Low  2015 - Present    Lumbar stenosis M48.06 " Medium  12/28/2015 - Present    Postoperative CSF leak G97.82 High  1/5/2016 - Present    Lower back pain M54.5   12/27/2016 - Present      Health Maintenance        Date Due Completion Dates    A1C SCREENING 1935 ---    DIABETES MONOFILAMENT / LE EXAM 1935 ---    RETINAL SCREENING 6/7/1954 ---    URINE ACR / MICROALBUMIN 6/7/1954 ---    IMM DTaP/Tdap/Td Vaccine (1 - Tdap) 6/7/1955 ---    COLONOSCOPY 6/7/1986 ---    IMM ZOSTER VACCINE 6/7/1996 ---    IMM PNEUMOCOCCAL 65+ (ADULT) HIGH/HIGHEST RISK SERIES (1 of 2 - PCV13) 6/7/2001 ---    FASTING LIPID PROFILE 8/19/2015 8/19/2014, 6/2/2008    SERUM CREATININE 1/12/2017 1/12/2016, 1/11/2016, 1/10/2016, 1/6/2016, 1/4/2016, 12/31/2015, 12/30/2015, 12/29/2015, 12/14/2015, 6/23/2015, 6/22/2015, 6/21/2015, 6/20/2015, 6/19/2015, 6/16/2015, 6/14/2015, 6/13/2015, 6/12/2015, 10/24/2010, 10/1/2010, 10/16/2009, 7/2/2008, 6/4/2008, 6/3/2008, 6/2/2008, 6/2/2008            Results     POCT Protime      Component    INR    4.3                        Current Immunizations     Influenza TIV (IM) 10/1/2014, 10/9/2010  9:15 AM    Pneumococcal Vaccine (UF)Historical Data 1/1/2010      Below and/or attached are the medications your provider expects you to take. Review all of your home medications and newly ordered medications with your provider and/or pharmacist. Follow medication instructions as directed by your provider and/or pharmacist. Please keep your medication list with you and share with your provider. Update the information when medications are discontinued, doses are changed, or new medications (including over-the-counter products) are added; and carry medication information at all times in the event of emergency situations     Allergies:  CANTALOUPE - (reactions not documented)     NKDA - (reactions not documented)     OTHER ENVIRONMENTAL - (reactions not documented)               Medications  Valid as of: May 23, 2017 - 10:17 AM    Generic Name Brand Name Tablet Size  Instructions for use    Acetaminophen (Tab) TYLENOL 325 MG Take 650 mg by mouth every four hours as needed.        Atorvastatin Calcium (Tab) LIPITOR 80 MG Take 1 Tab by mouth every evening.        DilTIAZem HCl (CAPSULE SR 24 HR) CARDIZEM  MG Take 1 Cap by mouth every day.        Irbesartan (Tab) AVAPRO 300 MG Take 1 Tab by mouth every evening. Indications: High Blood Pressure        Multiple Vitamins-Minerals (Tab) THERAGRAN-M  Take 1 Tab by mouth every day.        Temazepam (Cap) RESTORIL 30 MG TAKE ONE CAPSULE BY MOUTH ONCE DAILY AT BEDTIME AS NEEDED FOR SLEEP        Warfarin Sodium (Tab) COUMADIN 4 MG TAKE 1 TO 2 TABLETS EVERY DAY AS DIRECTED  BY  COUMADIN  CLINIC        .                 Medicines prescribed today were sent to:     [facility] [address] ([place] - [address]    [address] ([place]) [place] [address]    Phone: [phone] Fax: [phone]    Open 24 Hours?: No    [facility] - [place], [place] - [address]    [address] [address]    Phone: [phone] Fax: [phone]    Open 24 Hours?: No    [facility] [address]    [address] [address]    Phone: [phone] Fax: [phone]    Open 24 Hours?: No      Medication refill instructions:       If your prescription bottle indicates you have medication refills left, it is not necessary to call your provider’s office. Please contact your pharmacy and they will refill your medication.    If your prescription bottle indicates you do not have any refills left, you may request refills at any time through one of the following ways: The online PeerIndex system (except Urgent Care), by calling your provider’s office, or by asking your pharmacy to contact your provider’s office with a refill request. Medication refills are processed only during regular business hours and may not be available until the next business day. Your provider may request additional  information or to have a follow-up visit with you prior to refilling your medication.   *Please Note: Medication refills are assigned a new Rx number when refilled electronically. Your pharmacy may indicate that no refills were authorized even though a new prescription for the same medication is available at the pharmacy. Please request the medicine by name with the pharmacy before contacting your provider for a refill.        Warfarin Dosing Calendar   May 2017 Details    Sun Mon Tue Wed Thu Fri Sat      1               2               3               4               5               6                 7               8               9               10               11               12               13                 14               15               16               17               18               19               20                 21               22               23   4.3   Hold   See details      24      6 mg         25      8 mg         26      6 mg         27      6 mg           28      8 mg         29      6 mg         30      8 mg         31      6 mg             Date Details   05/23 This INR check   INR: 4.3               How to take your warfarin dose     To take:  6 mg Take 1.5 of the 4 mg tablets.    To take:  8 mg Take 2 of the 4 mg tablets.    Hold Do not take your warfarin dose. See the Details table to the right for additional instructions.                Warfarin Dosing Calendar   June 2017 Details    Sun Mon Tue Wed Thu Fri Sat         1      8 mg         2               3                 4               5               6               7               8               9               10                 11               12               13               14               15               16               17                 18               19               20               21               22               23               24                 25               26               27               28                 29               30                 Date Details   No additional details    Date of next INR:  6/1/2017         How to take your warfarin dose     To take:  8 mg Take 2 of the 4 mg tablets.              [email or web address] Access Code: [email or web address]  Expires: 6/22/2017 10:17 AM    divorce360  A secure, online tool to manage your health information     [employer]’s divorce360® is a secure, online tool that connects you to your personalized health information from the privacy of your home -- day or night - making it very easy for you to manage your healthcare. Once the activation process is completed, you can even access your medical information using the divorce360 azalea, which is available for free in the Apple Azalea store or Google Play store.     divorce360 provides the following levels of access (as shown below):   My Chart Features   [facility] Doctor [facility]  Specialists [facility]  Urgent  Care [facility]  Primary Care  Doctor   Email your healthcare team securely and privately 24/7 X X X    Manage appointments: schedule your next appointment; view details of past/upcoming appointments X      Request prescription refills. X      View recent personal medical records, including lab and immunizations X X X X   View health record, including health history, allergies, medications X X X X   Read reports about your outpatient visits, procedures, consult and ER notes X X X X   See your discharge summary, which is a recap of your hospital and/or ER visit that includes your diagnosis, lab results, and care plan. X X       How to register for divorce360:  1. Go to  [email or web address].  2. Click on the Sign Up Now box, which takes you to the New Member Sign Up page. You will need to provide the following information:  a. Enter your divorce360 Access Code exactly as it appears at the top of this page. (You will not need to use this code after you’ve completed the sign-up process. If you do not sign up before the expiration  date, you must request a new code.)   b. Enter your date of birth.   c. Enter your home email address.   d. Click Submit, and follow the next screen’s instructions.  3. Create a CinemaKi ID. This will be your Cinema[employer] login ID and cannot be changed, so think of one that is secure and easy to remember.  4. Create a [employer] password. You can change your password at any time.  5. Enter your Password Reset Question and Answer. This can be used at a later time if you forget your password.   6. Enter your e-mail address. This allows you to receive e-mail notifications when new information is available in CinemaKi.  7. Click Sign Up. You can now view your health information.    For assistance activating your [employer] account, call [phone]

## 2017-05-23 NOTE — PROGRESS NOTES
"VASCULAR FOLLOW UP VISIT  Subjective:   Tristan Hester is a 80 y.o. male who presents today 5/23/17 for vascular f/u  Chief Complaint   Patient presents with   • Amb Vascular Reason For Visit     HPI:   Patient here for f/u of HTN, dyslipidemia, carotid disease and valvular heart disease, and atrial fibrillation   Hasn't seen Dr. Escoto in some time.  No TIA or stroke symptoms.  Still with problems with balance  No TELLEZ or lightheadedness  No chest pain.  Reasonable exercise tolerance  No bleeding or bruising on warfarin  Continues to f/u in anticoagulation clinic  BP at home usually around 150s/80s - usually actually better in MD offices  HR at home in high 60s  No myaglias on statin  Had sling removed - continues to follow up with urology    Social History   Substance Use Topics   • Smoking status: Former Smoker -- 0.50 packs/day for 30 years     Types: Cigarettes, Pipe     Quit date: 01/01/1985   • Smokeless tobacco: Never Used   • Alcohol Use: 3.0 oz/week     3 Cans of beer, 3 Shots of liquor per week      Comment: 1 x week     DIET AND EXERCISE:  Weight Change: stable  Diet: common adult  Exercise: back at gym a few time a week     Review of Systems   Constitutional: Negative for malaise/fatigue.   Respiratory: Negative for cough and shortness of breath.    Cardiovascular: Negative for chest pain, palpitations and leg swelling.   Genitourinary: Negative for hematuria.   Musculoskeletal: Positive for back pain. Negative for myalgias.   Neurological: Negative for focal weakness, loss of consciousness and headaches.   Endo/Heme/Allergies: Does not bruise/bleed easily.   Psychiatric/Behavioral: Negative for depression. The patient is not nervous/anxious.       Objective:     Filed Vitals:    05/23/17 0953   BP: 130/71   Pulse: 64   Height: 1.803 m (5' 10.98\")   Weight: 85.73 kg (189 lb)      Body mass index is 26.37 kg/(m^2).  Physical Exam   Constitutional: He is oriented to person, place, and time. He appears " well-developed and well-nourished.   Cardiovascular: Normal rate and intact distal pulses.    Murmur heard.  Pulmonary/Chest: Effort normal. No respiratory distress. He has no wheezes. He has no rales.   Musculoskeletal: Normal range of motion. He exhibits no edema.   Neurological: He is alert and oriented to person, place, and time. No cranial nerve deficit.   Psychiatric: He has a normal mood and affect. His behavior is normal.         Lab Results   Component Value Date    PROTHROMBTM 25.9* 01/18/2016    INR 4.3 05/23/2017         Lab Results   Component Value Date    SODIUM 138 01/12/2016    POTASSIUM 4.3 01/12/2016    CHLORIDE 105 01/12/2016    CO2 26 01/12/2016    GLUCOSE 128* 01/12/2016    BUN 18 01/12/2016    CREATININE 1.00 01/12/2016    IFAFRICA >60 01/12/2016    IFNOTAFR >60 01/12/2016        Lab Results   Component Value Date    WBC 9.5 01/12/2016    RBC 3.92* 01/12/2016    HEMOGLOBIN 11.2* 01/12/2016    HEMATOCRIT 35.4* 01/12/2016    MCV 90.3 01/12/2016    MCH 28.6 01/12/2016    MCHC 31.6* 01/12/2016    MPV 10.0 01/12/2016      Blood work from 10-9-14: Glucose 102, GFR 68, cholesterol 127, trig 35, HDL 57, LDL-C 63, urine for MA/cr 3.4    Blood work from May 12, 2015, glucose 37, GFR 72, sodium 146, AST 19, ALT 12, hemoglobin 13.8, total cholesterol 125, temperature 66, initial 48, LDL 64, albumin creatinine ratio in urine 2.5    CXR 8/20/15: (comparison to 7/9/2015) :Bibasilar opacities, right greater than left, consistent with a combination of airspace disease and atelectasis. Minimal left basilar airspace opacities are new. A small right pleural effusion is suspected, unchanged.    Blood work from February 2016-glucose 107, GFR 82, hemoglobin 14.0, total cholesterol 130, HL 51, LDL 62    Echocardiogram February 2016  Posterior mitral leaflet prolapse with eccentric moderate mitral regurgitation  Severely dilated left atrium  Mild concentric LVH  EF 60%  Mild aortic stenosis   right ventricular  pressure estimated 37 mm    Carotid duplex May 2016:   1.  No evidence of re-stenosis in the right carotid bulb and internal    carotid artery after endarterectomy.    2.  Less than 50% left carotid bulb and internal carotid arterial stenosis.    3.  Normal bilateral subclavian and vertebral arterial exam.    4.  Compared to the prior study of 2/29/2016,  there has been no    significant change.     Blood work Oct 2016:  Glucose 106, GFR 77, LDL 68, nonHDL 86    Echo feb 2017:  Left ventricular ejection fraction is visually estimated to be 60%.   Grade I diastolic dysfunction.  Possibly severe mitral regurgitation.   Mild aortic stenosis.  Estimated right ventricular systolic pressure  is 25 mmHg.  Aortic root is 4 cm.   Compared to the images of the prior study done 2/29/16 there has been   no significant change.   Consider YOANDY for better evaluation of MR severity.    Blood work march 2017:  Glucose 94, GFR 59, Ldl-c 70, nonHDL 85    Medical Decision Making:  Today's Assessment / Status / Plan:     1. Atrial fibrillation, unspecified type (CMS-[facility])  CBC, NO DIFFERENTIAL/PLATELET   2. Essential hypertension  COMP METABOLIC PANEL   3. Dyslipidemia  COMP METABOLIC PANEL    LIPID PANEL    TSH   4. Hyperlipidemia, unspecified hyperlipidemia type  atorvastatin (LIPITOR) 80 MG tablet   5. Carotid atherosclerosis, bilateral  Carotid Duplex    REFERRAL TO NEUROLOGY   6. Peripheral polyneuropathy (CMS-[facility])     7. Balance disorder       Patient Type: Secondary Prevention    Etiology of Established CVD if Present:   1. TIA - likely cardioembolic from valve disease  2. Carotid atherosclerosis - stable on surveillance imaging  3. Valvular Heart Disease - currently asymptomatic  4. Atrial fibrillation with onset during acute illness, now chronic  5.  DVT after back surgery jan 2016    Lipid Management: Qualifies for Statin Therapy Based on 2013 ACC/AHA Guidelines: yes  Calculated 10-Year Risk of ASCVD: N/A  Currently on Statin:  Yes  ACC/aha criteria for at least mod intensity statin  Per nla goal ldl <70 and nonHDL <100  Reasonable therapeurtic response on most recent blood work  Plan:  - Continue atorvastatin at current dose    - Continue TLC  - Recheck fasting lipids prior to next visit    Blood Pressure Management:Goal: JNC8 (2013) Office BP Goal:<150/90; Under Control: yes  BP under reasonable control at least in office  Plan:  - Continue current BP meds  - Encouraged home BP monitoring  - consider ABPM in future if continued evidence of possible white coat    Glycemic Status: Prediabetic  IFG, mild and stable  - Continue lifestyle mod  - Follow fasting glucose    Anti-Platelet/Anti-Coagulant Tx: yes  - Continue indefinite anticoagulation with warfarin   - F/U with anticoagulation clinic   - limit nsaids    Smoking: continue complete avoidance    Physical Activity:  Continue physical therapy as recommended by Dr. Almonte    Weight Management and Nutrition: Dietary plan was discussed with patient at this visit including c/w Med diet    Other:     1. TIA - likely cardioembolic from valve disease - continue BP control and anticoagulation  2. Carotid atherosclerosis - stable on surveillance imaging - continue surveillance imaging  3. Valvular Heart Disease - currently asymptomatic although patient has developed atrial fibrillation, - defer management to cardiology - I've encouraged him to make a f/u appt with cards to review most recent echo  3. Colonic Polyp on recent colonoscopy - repeat colonoscopy in 2019 per GI  4. Multiple urological complaints - defer management to urology  5. Insomnia - trialed zolpidem w/o success.   Continue temazepam. Patient understands addictive potential  6.  DJD back -status post surgery. Will continue with physical therapy.   Defer further management to neurosurg  8.  Atrial fibrillation - defer management of rhythm and rate to cards.  Continue anticoag as above  9.  DVT - postoperative.  No recurrence.   Continue indefinite anticoagulation given concomitant afib  10.  H/o prostate ca and multiple urological issues - defer f/u to urology  11.  Balance issues and neuropathy - refer to neurology.  12.  Continuing medical health - cancer screening as above. Has had shingles vac, continue yearly flu shot    We will partner with other providers in the management of established vascular disease and cardiometabolic risk factors.    Studies to Be Obtained:    1. Carotid Duplex may 2017 - ordered today  2. Feb 2018 (unless requested sooner by cardiolgy)    Labs to Be Obtained: As above prior to next visit    Follow up in: 6 months    Michael J Bloch, M.D.    Cc: Tayo Escoto MD

## 2017-06-01 ENCOUNTER — ANTICOAGULATION VISIT (OUTPATIENT)
Dept: VASCULAR LAB | Facility: MEDICAL CENTER | Age: 82
End: 2017-06-01
Attending: INTERNAL MEDICINE
Payer: MEDICARE

## 2017-06-01 VITALS — DIASTOLIC BLOOD PRESSURE: 103 MMHG | SYSTOLIC BLOOD PRESSURE: 155 MMHG | HEART RATE: 73 BPM

## 2017-06-01 DIAGNOSIS — I48.91 ATRIAL FIBRILLATION, UNSPECIFIED TYPE (HCC): ICD-10-CM

## 2017-06-01 DIAGNOSIS — I48.0 PAROXYSMAL ATRIAL FIBRILLATION (HCC): ICD-10-CM

## 2017-06-01 LAB
INR BLD: 2.2 (ref 0.9–1.2)
INR PPP: 2.2 (ref 2–3.5)

## 2017-06-01 PROCEDURE — 85610 PROTHROMBIN TIME: CPT

## 2017-06-01 PROCEDURE — 99211 OFF/OP EST MAY X REQ PHY/QHP: CPT

## 2017-06-01 RX ORDER — WARFARIN SODIUM 4 MG/1
8 TABLET ORAL DAILY
Qty: 180 TAB | Refills: 1 | Status: SHIPPED | OUTPATIENT
Start: 2017-06-01 | End: 2018-01-16 | Stop reason: SDUPTHER

## 2017-06-01 NOTE — PROGRESS NOTES
Anticoagulation Summary as of 6/1/2017     INR goal 2.0-3.0   Selected INR 2.2 (6/1/2017)   Maintenance plan 8 mg (4 mg x 2) on Sun, Tue, Thu; 6 mg (4 mg x 1.5) all other days   Weekly total 48 mg   Plan last modified Quita Sim, PHARMD (1/26/2017)   Next INR check 6/29/2017   Target end date Indefinite    Indications   DVT (deep venous thrombosis) (CMS-HCC) [I82.409]  Paroxysmal atrial fibrillation (CMS-HCC) [I48.0]  Hx of Stroke (Resolved) [I63.9]  Hx of TIA (transient ischemic attack) [G45.9]  Deep vein thrombosis (DVT) (CMS-HCC) (Resolved) [I82.409]  Atrial fibrillation (CMS-HCC) (Resolved) [I48.91]         Anticoagulation Episode Summary     [facility]    Preferred lab     Send INR reminders to     Comments       Anticoagulation Care Providers     Provider Role Specialty Phone number    Michael J Bloch, M.D. Referring Internal Medicine [phone]    [facility]   [phone]        Anticoagulation Patient Findings      Current Outpatient Prescriptions on File Prior to Visit   Medication Sig Dispense Refill   • atorvastatin (LIPITOR) 80 MG tablet Take 0.5 Tabs by mouth every evening. 90 Tab 3   • temazepam (RESTORIL) 30 MG capsule TAKE ONE CAPSULE BY MOUTH ONCE DAILY AT BEDTIME AS NEEDED FOR SLEEP 90 Cap 2   • warfarin (COUMADIN) 4 MG Tab TAKE 1 TO 2 TABLETS EVERY DAY AS DIRECTED  BY  COUMADIN  CLINIC 180 Tab 1   • acetaminophen (TYLENOL) 325 MG Tab Take 650 mg by mouth every four hours as needed.     • irbesartan (AVAPRO) 300 MG Tab Take 1 Tab by mouth every evening. Indications: High Blood Pressure 90 Tab 3   • diltiazem (CARDIZEM CD) 120 MG XR capsule Take 1 Cap by mouth every day. 90 Cap 3   • therapeutic multivitamin-minerals (THERAGRAN-M) Tab Take 1 Tab by mouth every day.       No current facility-administered medications on file prior to visit.       Lab Results   Component Value Date/Time    SODIUM 138 01/12/2016 12:20 PM    POTASSIUM 4.3 01/12/2016  12:20 PM    CHLORIDE 105 01/12/2016 12:20 PM    CO2 26 01/12/2016 12:20 PM    GLUCOSE 128* 01/12/2016 12:20 PM    BUN 18 01/12/2016 12:20 PM    CREATININE 1.00 01/12/2016 12:20 PM          Tristan Hester seen in clinic today  INR  therapeutic.    Denies signs/symptoms of bleeding and/or thrombosis.    Denies changes to diet or medications.   Follow up appointment in 4 week(s).    Continue weekly warfarin dose as noted     Conor Locke, PHARMD

## 2017-06-01 NOTE — MR AVS SNAPSHOT
Tristan Hester   2017 9:00 AM   Anticoagulation Visit   MRN: [id number]    Department:  Vascular Medicine   Dept Phone:  [phone]    Description:  Male : 1935   Provider:  [facility] EXAM 5           Allergies as of 2017     Allergen Noted Reactions    Cantaloupe 2015       Pt stated cantaloupe causes his throat to constrict    Nkda [No Known Drug Allergy] 2008       Other Environmental 10/08/2010       Seasonal, cats-hayfever      You were diagnosed with     Paroxysmal atrial fibrillation (CMS-[facility])   [959268]       Atrial fibrillation, unspecified type (CMS-[facility])   [1917627]         Vital Signs     Blood Pressure Pulse Smoking Status             155/103 mmHg 73 Former Smoker         Basic Information     Date Of Birth Sex Race Ethnicity Preferred Language    1935 Male White Non- English      Your appointments     2017  9:15 AM   Established Patient with [facility] EXAM 4   [facility]  (--)    [address] [facility] [address]   [phone]            Aug 08, 2017  9:15 AM   CAROTID DUPLEX with VASCULAR LAB [facility], [facility] EXAM 6   NON-INVASIVE LAB [facility] ([facility])    [address] [facility] [id number] [phone]            2017  9:40 AM   Follow Up Visit with Michael J Bloch, M.D.   [facility]  (--)    [address] [facility] [address]   [phone]              Problem List              ICD-10-CM Priority Class Noted - Resolved    Hx of TIA (transient ischemic attack) G45.9 Low  Unknown - Present    Arthritis M19.90 Medium  Unknown - Present    Hx of Prostate Cancer C80.1 Low  Unknown - Present    Essential hypertension, benign I10   Unknown - Present    Mitral regurgitation I34.0 Medium  2014 - Present    Carotid arterial disease (CMS-HCC) I77.9 Low  2014 - Present    HLD (hyperlipidemia) E78.5 Low  2014 - Present    Paroxysmal  atrial fibrillation (CMS-HCC) I48.0 Medium  6/18/2015 - Present    DVT (deep venous thrombosis) (CMS-HCC) I82.409 Medium  6/18/2015 - Present    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 Low  6/18/2015 - Present    Hypokalemia E87.6 Medium  6/20/2015 - Present    Insomnia G47.00 Low  8/21/2015 - Present    Lumbar stenosis M48.06 Medium  12/28/2015 - Present    Postoperative CSF leak G97.82 High  1/5/2016 - Present    Lower back pain M54.5   12/27/2016 - Present      Health Maintenance        Date Due Completion Dates    A1C SCREENING 1935 ---    DIABETES MONOFILAMENT / LE EXAM 1935 ---    RETINAL SCREENING 6/7/1954 ---    URINE ACR / MICROALBUMIN 6/7/1954 ---    IMM DTaP/Tdap/Td Vaccine (1 - Tdap) 6/7/1955 ---    COLONOSCOPY 6/7/1986 ---    IMM ZOSTER VACCINE 6/7/1996 ---    IMM PNEUMOCOCCAL 65+ (ADULT) HIGH/HIGHEST RISK SERIES (1 of 2 - PCV13) 6/7/2001 ---    FASTING LIPID PROFILE 8/19/2015 8/19/2014, 6/2/2008    SERUM CREATININE 1/12/2017 1/12/2016, 1/11/2016, 1/10/2016, 1/6/2016, 1/4/2016, 12/31/2015, 12/30/2015, 12/29/2015, 12/14/2015, 6/23/2015, 6/22/2015, 6/21/2015, 6/20/2015, 6/19/2015, 6/16/2015, 6/14/2015, 6/13/2015, 6/12/2015, 10/24/2010, 10/1/2010, 10/16/2009, 7/2/2008, 6/4/2008, 6/3/2008, 6/2/2008, 6/2/2008            Results     POCT Protime      Component    INR    2.2                        Current Immunizations     Influenza TIV (IM) 10/1/2014, 10/9/2010  9:15 AM    Pneumococcal Vaccine (UF)Historical Data 1/1/2010      Below and/or attached are the medications your provider expects you to take. Review all of your home medications and newly ordered medications with your provider and/or pharmacist. Follow medication instructions as directed by your provider and/or pharmacist. Please keep your medication list with you and share with your provider. Update the information when medications are discontinued, doses are changed, or new medications (including over-the-counter products) are added; and carry  medication information at all times in the event of emergency situations     Allergies:  CANTALOUPE - (reactions not documented)     NKDA - (reactions not documented)     OTHER ENVIRONMENTAL - (reactions not documented)               Medications  Valid as of: June 01, 2017 -  9:03 AM    Generic Name Brand Name Tablet Size Instructions for use    Acetaminophen (Tab) TYLENOL 325 MG Take 650 mg by mouth every four hours as needed.        Atorvastatin Calcium (Tab) LIPITOR 80 MG Take 0.5 Tabs by mouth every evening.        DilTIAZem HCl (CAPSULE SR 24 HR) CARDIZEM  MG Take 1 Cap by mouth every day.        Irbesartan (Tab) AVAPRO 300 MG Take 1 Tab by mouth every evening. Indications: High Blood Pressure        Multiple Vitamins-Minerals (Tab) THERAGRAN-M  Take 1 Tab by mouth every day.        Temazepam (Cap) RESTORIL 30 MG TAKE ONE CAPSULE BY MOUTH ONCE DAILY AT BEDTIME AS NEEDED FOR SLEEP        Warfarin Sodium (Tab) COUMADIN 4 MG Take 2 Tabs by mouth every day.        .                 Medicines prescribed today were sent to:     [facility] [address] [facility] ([place] - [address] [id number] [facility] ([place]) [place] [address]    Phone: [phone] Fax: [phone]    Open 24 Hours?: No    [facility] [address] - [place], [place] - [address] [facility]    [address] [facility] [address]    Phone: [phone] Fax: [phone]    Open 24 Hours?: No    [facility] - [facility] [address] [facility]    [address] [facility] [address]    Phone: [phone] Fax: [phone]    Open 24 Hours?: No      Medication refill instructions:       If your prescription bottle indicates you have medication refills left, it is not necessary to call your provider’s office. Please contact your pharmacy and they will refill your medication.    If your prescription bottle indicates you do not have any refills left, you may request refills at any time through one of the following ways: The online [employer] system (except  Urgent Care), by calling your provider’s office, or by asking your pharmacy to contact your provider’s office with a refill request. Medication refills are processed only during regular business hours and may not be available until the next business day. Your provider may request additional information or to have a follow-up visit with you prior to refilling your medication.   *Please Note: Medication refills are assigned a new Rx number when refilled electronically. Your pharmacy may indicate that no refills were authorized even though a new prescription for the same medication is available at the pharmacy. Please request the medicine by name with the pharmacy before contacting your provider for a refill.        Warfarin Dosing Calendar   June 2017 Details    Sun Mon Tue Wed Thu Fri Sat         1   2.2   8 mg   See details      2      6 mg         3      6 mg           4      8 mg         5      6 mg         6      8 mg         7      6 mg         8      8 mg         9      6 mg         10      6 mg           11      8 mg         12      6 mg         13      8 mg         14      6 mg         15      8 mg         16      6 mg         17      6 mg           18      8 mg         19      6 mg         20      8 mg         21      6 mg         22      8 mg         23      6 mg         24      6 mg           25      8 mg         26      6 mg         27      8 mg         28      6 mg         29      8 mg         30                 Date Details   06/01 This INR check   INR: 2.2       Date of next INR:  6/29/2017         How to take your warfarin dose     To take:  6 mg Take 1.5 of the 4 mg tablets.    To take:  8 mg Take 2 of the 4 mg tablets.              [email or web address] Access Code: [email or web address]  Expires: 6/22/2017 10:17 AM    [email or web address]  A secure, online tool to manage your health information     [employer]’s [email or web address]® is a secure, online tool that connects you to your personalized health information from the privacy of your  home -- day or night - making it very easy for you to manage your healthcare. Once the activation process is completed, you can even access your medical information using the [employer] azalea, which is available for free in the [employer] store or Google Play store.     [employer] provides the following levels of access (as shown below):   My Chart Features   [facility] Doctor [facility]  Specialists [facility]  Urgent  Care Non-[facility]  Primary Care  Doctor   Email your healthcare team securely and privately 24/7 X X X    Manage appointments: schedule your next appointment; view details of past/upcoming appointments X      Request prescription refills. X      View recent personal medical records, including lab and immunizations X X X X   View health record, including health history, allergies, medications X X X X   Read reports about your outpatient visits, procedures, consult and ER notes X X X X   See your discharge summary, which is a recap of your hospital and/or ER visit that includes your diagnosis, lab results, and care plan. X X       How to register for [employer]:  1. Go to  [email or web address].  2. Click on the Sign Up Now box, which takes you to the New Member Sign Up page. You will need to provide the following information:  a. Enter your [employer] Access Code exactly as it appears at the top of this page. (You will not need to use this code after you’ve completed the sign-up process. If you do not sign up before the expiration date, you must request a new code.)   b. Enter your date of birth.   c. Enter your home email address.   d. Click Submit, and follow the next screen’s instructions.  3. Create a [employer] ID. This will be your [employer] login ID and cannot be changed, so think of one that is secure and easy to remember.  4. Create a [employer] password. You can change your password at any time.  5. Enter your Password Reset Question and Answer. This can be used at a later time if you forget your password.    6. Enter your e-mail address. This allows you to receive e-mail notifications when new information is available in [employer].  7. Click Sign Up. You can now view your health information.    For assistance activating your [employer] account, call [phone]

## 2017-06-06 ENCOUNTER — PATIENT OUTREACH (OUTPATIENT)
Dept: HEALTH INFORMATION MANAGEMENT | Facility: OTHER | Age: 82
End: 2017-06-06

## 2017-06-07 ENCOUNTER — OFFICE VISIT (OUTPATIENT)
Dept: NEUROLOGY | Facility: MEDICAL CENTER | Age: 82
End: 2017-06-07
Payer: MEDICARE

## 2017-06-07 VITALS
DIASTOLIC BLOOD PRESSURE: 64 MMHG | BODY MASS INDEX: 26.18 KG/M2 | WEIGHT: 187 LBS | SYSTOLIC BLOOD PRESSURE: 100 MMHG | OXYGEN SATURATION: 97 % | TEMPERATURE: 98.2 F | HEIGHT: 71 IN | RESPIRATION RATE: 16 BRPM | HEART RATE: 77 BPM

## 2017-06-07 DIAGNOSIS — M54.16 LUMBAR RADICULOPATHY: ICD-10-CM

## 2017-06-07 DIAGNOSIS — H81.312 AUDITORY VERTIGO INVOLVING LEFT EAR: ICD-10-CM

## 2017-06-07 PROCEDURE — G8419 CALC BMI OUT NRM PARAM NOF/U: HCPCS

## 2017-06-07 PROCEDURE — 4040F PNEUMOC VAC/ADMIN/RCVD: CPT

## 2017-06-07 PROCEDURE — G8432 DEP SCR NOT DOC, RNG: HCPCS

## 2017-06-07 PROCEDURE — 99204 OFFICE O/P NEW MOD 45 MIN: CPT

## 2017-06-07 PROCEDURE — 1036F TOBACCO NON-USER: CPT

## 2017-06-07 PROCEDURE — G8598 ASA/ANTIPLAT THER USED: HCPCS

## 2017-06-07 PROCEDURE — 1101F PT FALLS ASSESS-DOCD LE1/YR: CPT | Mod: 8P

## 2017-06-07 RX ORDER — DOCUSATE SODIUM 250 MG
250 CAPSULE ORAL
COMMUNITY
Start: 2016-07-19 | End: 2017-06-29

## 2017-06-07 RX ORDER — IRBESARTAN 300 MG/1
TABLET ORAL
COMMUNITY
End: 2017-06-29

## 2017-06-07 RX ORDER — HYDROCODONE BITARTRATE AND ACETAMINOPHEN 5; 325 MG/1; MG/1
TABLET ORAL
COMMUNITY
Start: 2016-07-19 | End: 2017-06-29

## 2017-06-07 RX ORDER — TEMAZEPAM 7.5 MG/1
CAPSULE ORAL
COMMUNITY
End: 2017-06-29

## 2017-06-07 RX ORDER — DILTIAZEM HYDROCHLORIDE 120 MG/1
CAPSULE, COATED, EXTENDED RELEASE ORAL
COMMUNITY
End: 2017-06-29

## 2017-06-07 RX ORDER — DIPHENOXYLATE HYDROCHLORIDE AND ATROPINE SULFATE 2.5; .025 MG/1; MG/1
TABLET ORAL
Status: ON HOLD | COMMUNITY
End: 2020-02-19

## 2017-06-07 RX ORDER — DILTIAZEM HYDROCHLORIDE 120 MG/1
120 CAPSULE, EXTENDED RELEASE ORAL DAILY
COMMUNITY
Start: 2017-04-28 | End: 2018-05-22

## 2017-06-07 RX ORDER — GABAPENTIN 300 MG/1
300 CAPSULE ORAL 3 TIMES DAILY
Qty: 90 CAP | Refills: 3 | Status: SHIPPED | OUTPATIENT
Start: 2017-06-07 | End: 2018-02-16 | Stop reason: SDUPTHER

## 2017-06-07 RX ORDER — ATORVASTATIN CALCIUM 40 MG/1
TABLET, FILM COATED ORAL
COMMUNITY
End: 2017-06-29

## 2017-06-07 NOTE — MR AVS SNAPSHOT
"        Tristan Martin Hester   2017 4:20 PM   Office Visit   MRN: [id number]    Department:  [facility]   Dept Phone:  [phone]    Description:  Male : 1935   Provider:  James Valdez M.D.           Reason for Visit     New Patient carotid artherosclerosis and dizziness      Allergies as of 2017     Allergen Noted Reactions    Cantaloupe 2015       Pt stated cantaloupe causes his throat to constrict    Nkda [No Known Drug Allergy] 2008       Other Environmental 10/08/2010       Seasonal, cats-hayfever      You were diagnosed with     Auditory vertigo involving left ear   [7352429]       Lumbar radiculopathy   [663273]         Vital Signs     Blood Pressure Pulse Temperature Respirations Height Weight    100/64 mmHg 77 36.8 °C (98.2 °F) 16 1.803 m (5' 11\") 84.823 kg (187 lb)    Body Mass Index Oxygen Saturation Smoking Status             26.09 kg/m2 97% Former Smoker         Basic Information     Date Of Birth Sex Race Ethnicity Preferred Language    1935 Male White Non- English      Your appointments     2017  9:15 AM   Established Patient with [facility] EXAM 4   [facility]  (--)    [address] [facility] [address]   [phone]            Aug 08, 2017  9:15 AM   CAROTID DUPLEX with VASCULAR LAB [facility], [facility] EXAM 6   NON-INVASIVE LAB [facility] ([facility])    [address] [facility] [address]   [phone]            2017 10:20 AM   Follow Up Visit with Michael J Bloch, M.D.   [facility]  (--)    [address] [place] [address]   [phone]              Problem List              ICD-10-CM Priority Class Noted - Resolved    Hx of TIA (transient ischemic attack) G45.9 Low  Unknown - Present    Arthritis M19.90 Medium  Unknown - Present    Hx of Prostate Cancer C80.1 Low  Unknown - Present    Essential hypertension, benign I10   Unknown - " Present    Mitral regurgitation I34.0 Medium  1/6/2014 - Present    Carotid arterial disease (CMS-HCC) I77.9 Low  1/6/2014 - Present    HLD (hyperlipidemia) E78.5 Low  1/6/2014 - Present    Paroxysmal atrial fibrillation (CMS-HCC) I48.0 Medium  6/18/2015 - Present    DVT (deep venous thrombosis) (CMS-HCC) I82.409 Medium  6/18/2015 - Present    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 Low  6/18/2015 - Present    Hypokalemia E87.6 Medium  6/20/2015 - Present    Insomnia G47.00 Low  8/21/2015 - Present    Lumbar stenosis M48.06 Medium  12/28/2015 - Present    Postoperative CSF leak G97.82 High  1/5/2016 - Present    Lower back pain M54.5   12/27/2016 - Present      Health Maintenance        Date Due Completion Dates    IMM DTaP/Tdap/Td Vaccine (1 - Tdap) 6/7/1955 ---    COLONOSCOPY 6/7/1986 ---    IMM ZOSTER VACCINE 6/7/1996 ---    IMM PNEUMOCOCCAL 65+ (ADULT) HIGH/HIGHEST RISK SERIES (2 of 2 - PPSV23) 2/9/2016 12/15/2015            Current Immunizations     13-VALENT PCV PREVNAR 12/15/2015    Influenza TIV (IM) 10/1/2014, 10/9/2010  9:15 AM    Pneumococcal Vaccine (UF)Historical Data 1/1/2010      Below and/or attached are the medications your provider expects you to take. Review all of your home medications and newly ordered medications with your provider and/or pharmacist. Follow medication instructions as directed by your provider and/or pharmacist. Please keep your medication list with you and share with your provider. Update the information when medications are discontinued, doses are changed, or new medications (including over-the-counter products) are added; and carry medication information at all times in the event of emergency situations     Allergies:  CANTALOUPE - (reactions not documented)     NKDA - (reactions not documented)     OTHER ENVIRONMENTAL - (reactions not documented)               Medications  Valid as of: June 07, 2017 -  5:04 PM    Generic Name Brand Name Tablet Size Instructions for use    Acetaminophen  (Tab) TYLENOL 325 MG Take 650 mg by mouth every four hours as needed.        Atorvastatin Calcium (Tab) LIPITOR 80 MG Take 0.5 Tabs by mouth every evening.        Atorvastatin Calcium (Tab) LIPITOR 40 MG Take  by mouth.        DilTIAZem HCl (CAPSULE SR 24 HR) CARDIZEM  MG Take 1 Cap by mouth every day.        DilTIAZem HCl Coated Beads (CAPSULE SR 24 HR) CARDIZEM  MG Take  by mouth.        DilTIAZem HCl Coated Beads (CAPSULE SR 24 HR) CARTIA  MG         Docusate Sodium (Cap) COLACE 250 MG Take 250 mg by mouth.        Enoxaparin Sodium (Solution) LOVENOX 120 MG/0.8ML         Gabapentin (Cap) NEURONTIN 300 MG Take 1 Cap by mouth 3 times a day.        Hydrocodone-Acetaminophen (Tab) NORCO 5-325 MG Take  by mouth.        Irbesartan (Tab) AVAPRO 300 MG Take 1 Tab by mouth every evening. Indications: High Blood Pressure        Irbesartan (Tab) AVAPRO 300 MG Take  by mouth.        Multiple Vitamin (Tab) MULTI-VITAMINS  Take  by mouth.        Multiple Vitamins-Minerals (Tab) THERAGRAN-M  Take 1 Tab by mouth every day.        Temazepam (Cap) RESTORIL 30 MG TAKE ONE CAPSULE BY MOUTH ONCE DAILY AT BEDTIME AS NEEDED FOR SLEEP        Temazepam (Cap) RESTORIL 7.5 MG Take  by mouth.        Warfarin Sodium (Tab) COUMADIN 4 MG Take 2 Tabs by mouth every day.        .                 Medicines prescribed today were sent to:     [facility] [address] [facility] [place] - [address] [id number] [facility] [place] ([place]) [place] [address]    Phone: [phone] Fax: [phone]    Open 24 Hours?: No    [facility] - [place], [place] - [address] [facility]    [address] [facility] [place] [address]    Phone: [phone] Fax: [phone]    Open 24 Hours?: No    [facility] - [place], [place] - [address] [facility]    [address] [facility] [address]    Phone: [phone] Fax: [phone]    Open 24 Hours?: No      Medication refill instructions:       If your prescription bottle indicates you have medication refills  left, it is not necessary to call your provider’s office. Please contact your pharmacy and they will refill your medication.    If your prescription bottle indicates you do not have any refills left, you may request refills at any time through one of the following ways: The online [employer] system (except Urgent Care), by calling your provider’s office, or by asking your pharmacy to contact your provider’s office with a refill request. Medication refills are processed only during regular business hours and may not be available until the next business day. Your provider may request additional information or to have a follow-up visit with you prior to refilling your medication.   *Please Note: Medication refills are assigned a new Rx number when refilled electronically. Your pharmacy may indicate that no refills were authorized even though a new prescription for the same medication is available at the pharmacy. Please request the medicine by name with the pharmacy before contacting your provider for a refill.        Referral     A referral request has been sent to our patient care coordination department. Please allow 3-5 business days for us to process this request and contact you either by phone or mail. If you do not hear from us by the 5th business day, please call us at [phone].           [employer] Access Code: Activation code not generated  Current [employer] Status: Active

## 2017-06-08 NOTE — PROGRESS NOTES
Neurology Consult Note  6/7/2017      Referring MD:  Dr. Michael Bloch    Patient ID:  Name:             Tristan Hester   YOB: 1935  Age:                 82 y.o.  male   MRN:               [id number]                                              Reason for Consult:      Evaluation of off-balance sensation    History of Present Illness:    This 82-year-old man who has had lumbar surgery and history of paroxysmal atrial fibrillation for which she is on anticoagulation and a history of transient ischemic attack at some point in the past. He also had a history of significant pulmonary infection which hospitalized for several weeks many years ago. His current problem began approximately a year or so ago and he noted a gradual increase in sense of loss of balance typically when he is walking more getting out of bed in the morning. Sometime in the past he had an episode of vertigo that apparently was resolved and he was seen by ear nose and throat specialist and thought to have a viral vestibular involvement. He doesn't really describe a true sensory motion more sense of being off balance specifically denies dizziness on getting up from a seated position even though he has somewhat lower blood pressure than expected. He is on multiple medications for cardiovascular problems hyperlipidemia hypertension . He denies any problems in terms of balance in the dark or in the shower and has normal sensation in his feet. He has had no problems with coordination in terms of limiting movement. He does note because of his balance issue he tends to be a little more cautious and walking and tends to maintain a wider stance.    Review of Systems: Relates currently mostly to his balance issues.  Constitutional: Denies fevers, Denies weight changes  Eyes: Denies changes in vision, no eye pain  Ears/Nose/Throat/Mouth: Denies nasal congestion or sore throat   Cardiovascular: Denies chest pain, Denies palpitations    Respiratory: Denies shortness of breath , Denies cough  Gastrointestinal/Hepatic: Denies abdominal pain, nausea, vomiting, diarrhea, constipation or GI bleeding   Genitourinary: Denies dysuria or frequency  Musculoskeletal/Rheum: Denies  joint pain and swelling, No edema  Skin: Denies rash  Neurological: Denies headache, confusion, memory loss or focal weakness/parasthesias  Psychiatric: denies mood disorder   Endocrine: Whit thyroid problems  Heme/Oncology/Lymph Nodes: Denies enlarged lymph nodes, denies brusing or known bleeding disorder  All other systems were reviewed and are negative (AMA/CMS criteria)                Past Medical History:     Past Medical History   Diagnosis Date   • TIA (transient ischemic attack)      2008   • Arthritis      osteo, hips and knees   • Heart valve disease      MVR   • Cancer (CMS-HCC)      Prostate CA--2000   • Hypertension    • Urinary bladder disorder      HAD MALE BLADDER SUSPENSION PROCEDURE DONE   • Mitral regurgitation 1/6/2014   • Carotid arterial disease (CMS-HCC) 1/6/2014   • HLD (hyperlipidemia) 1/6/2014   • Urinary incontinence    • Blood clotting disorder (CMS-HCC)      , left arm   • Arrhythmia      Afib   • Bronchitis         • Pneumonia         • Pain 12-14-15     low back, hips, 2-3/10   • Cataract      removed bilat   • Stroke (CMS-HCC) 2008     TIA, no residual       Problems addressed this visit:    Problem List Items Addressed This Visit     None      Visit Diagnoses     Auditory vertigo involving left ear         Relevant Orders     REFERRAL TO PHYSICAL THERAPY Reason for Therapy: Eval/Treat/Report     Lumbar radiculopathy         Relevant Medications     temazepam (RESTORIL) 7.5 MG capsule     gabapentin (NEURONTIN) 300 MG Cap           Past Surgical History:   Past Surgical History   Procedure Laterality Date   • Carotid endarterectomy  7/10/08     Performed by LEIGH MORATAYA at SURGERY [facility] ORS   • Prostatectomy, radial   11/2000   • Bladder sling male  11/2004   • Cervical disk and fusion anterior  10/26/2009     Performed by PHYLICIA ALMONTE at SURGERY [facility] ORS   • Lumbar laminectomy diskectomy  10/8/2010     Performed by PHYLICIA ALMONTE at SURGERY [facility]   • Thoracoscopy  6/13/2015     Procedure: THORACOSCOPY with decortication VATS , side to be determined  ;  Surgeon: Elmira Holder M.D.;  Location: SURGERY [facility];  Service:    • Lumbar fusion posterior  12/28/2015     Procedure: LUMBAR FUSION POSTERIOR L3-5 with peek rods;  Surgeon: Phylicia Almonte M.D.;  Location: SURGERY [facility];  Service:    • Lumbar decompression  12/28/2015     Procedure: LUMBAR DECOMPRESSION posterior redo decompression L3-5, with dural repair;  Surgeon: Phylicia Almonte M.D.;  Location: SURGERY [facility];  Service:          Current Outpatient Medications:  Current Outpatient Prescriptions   Medication   • docusate sodium (COLACE) 250 MG capsule   • Multiple Vitamin (MULTI-VITAMINS) Tab   • atorvastatin (LIPITOR) 40 MG Tab   • gabapentin (NEURONTIN) 300 MG Cap   • warfarin (COUMADIN) 4 MG Tab   • atorvastatin (LIPITOR) 80 MG tablet   • temazepam (RESTORIL) 30 MG capsule   • acetaminophen (TYLENOL) 325 MG Tab   • irbesartan (AVAPRO) 300 MG Tab   • diltiazem (CARDIZEM CD) 120 MG XR capsule   • therapeutic multivitamin-minerals (THERAGRAN-M) Tab   • diltiazem CD (CARDIZEM CD) 120 MG CAPSULE SR 24 HR   • enoxaparin (LOVENOX) 120 MG/0.8ML Solution inj   • hydrocodone-acetaminophen (NORCO) 5-325 MG Tab per tablet   • irbesartan (AVAPRO) 300 MG Tab   • temazepam (RESTORIL) 7.5 MG capsule   • CARTIA  MG CAPSULE SR 24 HR     No current facility-administered medications for this visit.       Medication Allergy:  Allergies   Allergen Reactions   • Cantaloupe      Pt stated cantaloupe causes his throat to constrict   • Nkda [No Known Drug Allergy]    • Other Environmental      Seasonal, cats-hayfever       Family History:  Family  "History   Problem Relation Age of Onset   • Heart Attack Neg Hx        Social History:  Social History     Social History   • Marital Status:      Spouse Name: N/A   • Number of Children: N/A   • Years of Education: N/A     Occupational History   • Not on file.     Social History Main Topics   • Smoking status: Former Smoker -- 0.50 packs/day for 30 years     Types: Cigarettes, Pipe     Quit date: 01/01/1985   • Smokeless tobacco: Never Used   • Alcohol Use: 3.0 oz/week     3 Cans of beer, 3 Shots of liquor per week      Comment: 1 x week   • Drug Use: No   • Sexual Activity: Not Currently     Other Topics Concern   • Not on file     Social History Narrative       Physical Exam: He is a well-developed well-nourished man who appears his stated age. Get out of a chair without using his hands and on initial movement he does tend a little bit off balance. He is able to walk and turn without too much difficulty. Romberg was essentially negative. He has normal position and vibration sense in his lower extremities. Finger to nose and heel-to-shin testing is done well. On headshake test he has some post-head shake nystagmus and on Epley maneuver he has a reproduction of his off-balance sensation with the left ear down. Repetition of them over his sense of imbalance was markedly reduced. External canal appears tympanic membranes are intact. Was no nystagmus on lateral gaze and extraocular movements were full and visual fields were full and pupils are equal round reactive to light.Vitals:   Filed Vitals:    06/07/17 1607   BP: 100/64   Pulse: 77   Temp: 36.8 °C (98.2 °F)   Resp: 16   Height: 1.803 m (5' 11\")   Weight: 84.823 kg (187 lb)   SpO2: 97%     General Appearance:   Weight/BMI: Body mass index is 26.09 kg/(m^2).    Neurologic Exam:   AOx3: Normal  Recent & remote memory intact: Yes  Attentive with normal coordination: Yes  Normal spontaneous speech pattern: Yes  Age appropriate fund of knowledge: Yes  Cranial " nerve II intact: Normal  Cranial nerve III, IV & VI intact: Normal  Cranial nerve V intact: Normal  Cranial nerve VIII intact: Normal  Cranial nerve IX intact: Normal  Cranial nerve XI intact: Normal  Cranial nerve XII intact: Normal  No sensory deficits: Normal  DTRs intact & symmetrical: Normal   No dysdiadochokinesia or dysmetria: Normal    Eyes:  Normal optic discs: Yes  Visual field: Normal  Pupillary responses: Normal  Extraocular movement: Normal    Cardiovascular:  Normal carotid pulses bilaterally: Yes  RRR with no MRGs: Yes  No peripheral edema, pulses intact: Yes    Musculoskeletal:  Normal gait and station: Yes  Normal muscle strength: Yes  Normal muscle tone, no atrophy: Yes  No abnormal movements: Yes    Plan:   Based on the head shake test and his Epley maneuver I think he probably has some issue with the left vestibular system. Discussed with him whether to get an electronystagmogram done or simply refer to physical therapy for vestibular therapy and he chose the latter course as the least complicated. He does have some residual pain from his back and I ordered gabapentin 300 mg 3 times a day as needed for residual lumbar radiculopathy. I'll plan on seeing him in 3 months.    Total length of time of this visit was 40 minutes of which greater than 50% was spent counseling the patient/family and coordinating care.    Thank you for allowing me to participate in his care.    Sincerely yours,        James Valdez MD, PhD

## 2017-06-08 NOTE — PROGRESS NOTES
6/8/17  -  Verify PCP: yes    Communication Preference Obtained: yes     Review Care Team: yes    Annual Wellness Visit Scheduling  1. Scheduling Status:Scheduled     Care Gap Scheduling (Attempt to Schedule EACH Overdue Care Gap!)     Health Maintenance Due   Topic Date Due   • IMM DTaP/Tdap/Td Vaccine (1 - Tdap) Will discuss care gaps with PCP   • COLONOSCOPY     • IMM ZOSTER VACCINE     • IMM PNEUMOCOCCAL 65+ (ADULT) HIGH/HIGHEST RISK SERIES (2 of 2 - PPSV23)    • Annual Wellness Visit  Scheduled         [employer] Activation: already active  Sentrigo [place]alea: yes  Virtual Visits: yes  Opt In to Text Messages: yes

## 2017-06-20 ENCOUNTER — HOSPITAL ENCOUNTER (OUTPATIENT)
Dept: PHYSICAL THERAPY | Facility: REHABILITATION | Age: 82
End: 2017-06-20
Payer: MEDICARE

## 2017-06-20 PROCEDURE — G8979 MOBILITY GOAL STATUS: HCPCS | Mod: CI

## 2017-06-20 PROCEDURE — G8978 MOBILITY CURRENT STATUS: HCPCS | Mod: CK

## 2017-06-20 PROCEDURE — 97161 PT EVAL LOW COMPLEX 20 MIN: CPT

## 2017-06-20 PROCEDURE — 97530 THERAPEUTIC ACTIVITIES: CPT

## 2017-06-22 ENCOUNTER — HOSPITAL ENCOUNTER (OUTPATIENT)
Dept: PHYSICAL THERAPY | Facility: REHABILITATION | Age: 82
End: 2017-06-22
Attending: INTERNAL MEDICINE
Payer: MEDICARE

## 2017-06-22 PROCEDURE — 97112 NEUROMUSCULAR REEDUCATION: CPT

## 2017-06-27 ENCOUNTER — HOSPITAL ENCOUNTER (OUTPATIENT)
Dept: PHYSICAL THERAPY | Facility: REHABILITATION | Age: 82
End: 2017-06-27
Attending: INTERNAL MEDICINE
Payer: MEDICARE

## 2017-06-27 PROCEDURE — 97112 NEUROMUSCULAR REEDUCATION: CPT

## 2017-06-28 ENCOUNTER — APPOINTMENT (OUTPATIENT)
Dept: PHYSICAL THERAPY | Facility: REHABILITATION | Age: 82
End: 2017-06-28
Payer: MEDICARE

## 2017-06-29 ENCOUNTER — ANTICOAGULATION VISIT (OUTPATIENT)
Dept: VASCULAR LAB | Facility: MEDICAL CENTER | Age: 82
End: 2017-06-29
Attending: INTERNAL MEDICINE
Payer: MEDICARE

## 2017-06-29 ENCOUNTER — HOSPITAL ENCOUNTER (OUTPATIENT)
Dept: PHYSICAL THERAPY | Facility: REHABILITATION | Age: 82
End: 2017-06-29
Attending: INTERNAL MEDICINE
Payer: MEDICARE

## 2017-06-29 VITALS — DIASTOLIC BLOOD PRESSURE: 86 MMHG | HEART RATE: 69 BPM | SYSTOLIC BLOOD PRESSURE: 126 MMHG

## 2017-06-29 DIAGNOSIS — I82.401 ACUTE DEEP VEIN THROMBOSIS (DVT) OF RIGHT LOWER EXTREMITY, UNSPECIFIED VEIN (HCC): ICD-10-CM

## 2017-06-29 DIAGNOSIS — I48.0 PAROXYSMAL ATRIAL FIBRILLATION (HCC): ICD-10-CM

## 2017-06-29 DIAGNOSIS — G45.0 VERTEBROBASILAR ARTERY SYNDROME: ICD-10-CM

## 2017-06-29 LAB — INR PPP: 2.4 (ref 2–3.5)

## 2017-06-29 PROCEDURE — 97112 NEUROMUSCULAR REEDUCATION: CPT

## 2017-06-29 PROCEDURE — 99211 OFF/OP EST MAY X REQ PHY/QHP: CPT

## 2017-06-29 PROCEDURE — 85610 PROTHROMBIN TIME: CPT

## 2017-06-29 NOTE — PROGRESS NOTES
Anticoagulation Summary as of 6/29/2017     INR goal 2.0-3.0   Selected INR 2.4 (6/29/2017)   Maintenance plan 8 mg (4 mg x 2) on Sun, Tue, Thu; 6 mg (4 mg x 1.5) all other days   Weekly total 48 mg   Plan last modified JAVIER PierreD (1/26/2017)   Next INR check 8/10/2017   Target end date Indefinite    Indications   DVT (deep venous thrombosis) (CMS-HCC) [I82.409]  Paroxysmal atrial fibrillation (CMS-HCC) [I48.0]  Hx of Stroke (Resolved) [I63.9]  Hx of TIA (transient ischemic attack) [G45.9]  Deep vein thrombosis (DVT) (CMS-HCC) (Resolved) [I82.409]  Atrial fibrillation (CMS-HCC) (Resolved) [I48.91]         Anticoagulation Episode Summary     INR check location [facility]    Preferred lab     Send INR reminders to     Comments       Anticoagulation Care Providers     Provider Role Specialty Phone number    Michael J Bloch, M.D. Referring Internal Medicine [phone]    [facility]   [phone]        Anticoagulation Patient Findings   Negatives Missed Doses, Extra Doses, Medication Changes, Antibiotic Use, Diet Changes, Dental/Other Procedures, Hospitalization, Bleeding Gums, Nose Bleeds, Blood in Urine, Blood in Stool, Any Bruising, Other Complaints        Pt remains therapeutic today. Pt is to continue with current warfarin dosing regimen.  Pt denies any unusual s/s of bleeding, bruising, clotting or any changes to diet or medications.  Follow up in 6 weeks.    Yashira Boggs, PHARMD

## 2017-06-29 NOTE — MR AVS SNAPSHOT
Tristan Hester   2017 9:15 AM   Anticoagulation Visit   MRN: [id number]    Department:  Vascular Medicine   Dept Phone:  [phone]    Description:  Male : 1935   Provider:  [facility] EXAM 4           Allergies as of 2017     Allergen Noted Reactions    Cantaloupe 2015       Pt stated cantaloupe causes his throat to constrict    Nkda [No Known Drug Allergy] 2008       Other Environmental 10/08/2010       Seasonal, cats-hayfever      You were diagnosed with     Acute deep vein thrombosis (DVT) of right lower extremity, unspecified vein (CMS-[facility])   [4904756]       Paroxysmal atrial fibrillation (CMS-[facility])   [262823]       Vertebrobasilar artery syndrome   [435.3.ICD-9-CM]         Vital Signs     Blood Pressure Pulse Smoking Status             126/86 mmHg 69 Former Smoker         Basic Information     Date Of Birth Sex Race Ethnicity Preferred Language    1935 Male White Non- English      Your appointments     2017 10:00 AM   PT Follow Up 30 Minutes with TONG Jaime Physical Therapy [facility] ([address])    [address] [place] [id number]   [phone]            2017  9:00 AM   PT Follow Up 30 Minutes with TONG Jaime Physical Therapy [facility] ([address])    [address] [place] [id number]   [phone]            Jul 10, 2017 10:00 AM   PT Follow Up 30 Minutes with TONG Jaime Physical Therapy [facility] ([address])    [address] [place] [id number]   [phone]            2017  9:30 AM   PT Follow Up 30 Minutes with TONG Jaime Physical Therapy [facility] ([address])    [address] [place] [address]   [phone]            Aug 08, 2017  9:15 AM   CAROTID DUPLEX with VASCULAR LAB [facility], [facility] EXAM 6   NON-INVASIVE LAB [facility] ([facility])    [address] [place] [address]  [address]   [phone]            Aug 10, 2017  9:30 AM   Established Patient with [facility] EXAM 4   [facility]  (--)    [address] [facility] [address]   [phone]            Oct 05, 2017 10:20 AM   Follow Up Visit with James Valdez M.D.   [facility] (--)    [address] [facility], [address]  [facility] [id number] [phone]           You will be receiving a confirmation call a few days before your appointment from our automated call confirmation system.            Nov 16, 2017 10:20 AM   Follow Up Visit with Michael J Bloch, M.D.   [facility]  (--)    [address] [facility] [address]   [phone]              Problem List              ICD-10-CM Priority Class Noted - Resolved    Hx of TIA (transient ischemic attack) G45.9 Low  Unknown - Present    Arthritis M19.90 Medium  Unknown - Present    Hx of Prostate Cancer C80.1 Low  Unknown - Present    Essential hypertension, benign I10   Unknown - Present    Mitral regurgitation I34.0 Medium  1/6/2014 - Present    Carotid arterial disease (CMS-HCC) I77.9 Low  1/6/2014 - Present    HLD (hyperlipidemia) E78.5 Low  1/6/2014 - Present    Paroxysmal atrial fibrillation (CMS-HCC) I48.0 Medium  6/18/2015 - Present    DVT (deep venous thrombosis) (CMS-HCC) I82.409 Medium  6/18/2015 - Present    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 Low  6/18/2015 - Present    Hypokalemia E87.6 Medium  6/20/2015 - Present    Insomnia G47.00 Low  8/21/2015 - Present    Lumbar stenosis M48.06 Medium  12/28/2015 - Present    Postoperative CSF leak G97.82 High  1/5/2016 - Present    Lower back pain M54.5   12/27/2016 - Present      Health Maintenance        Date Due Completion Dates    IMM DTaP/Tdap/Td Vaccine (1 - Tdap) 6/7/1955 ---    COLONOSCOPY 6/7/1986 ---    IMM ZOSTER VACCINE 6/7/1996 ---    IMM PNEUMOCOCCAL 65+ (ADULT) HIGH/HIGHEST RISK SERIES (2 of 2 - PPSV23)  2/9/2016 12/15/2015            Results     POCT Protime      Component    INR    2.4                        Current Immunizations     13-VALENT PCV PREVNAR 12/15/2015    Influenza TIV (IM) 10/1/2014, 10/9/2010  9:15 AM    Pneumococcal Vaccine (UF)Historical Data 1/1/2010      Below and/or attached are the medications your provider expects you to take. Review all of your home medications and newly ordered medications with your provider and/or pharmacist. Follow medication instructions as directed by your provider and/or pharmacist. Please keep your medication list with you and share with your provider. Update the information when medications are discontinued, doses are changed, or new medications (including over-the-counter products) are added; and carry medication information at all times in the event of emergency situations     Allergies:  CANTALOUPE - (reactions not documented)     NKDA - (reactions not documented)     OTHER ENVIRONMENTAL - (reactions not documented)               Medications  Valid as of: June 29, 2017 -  9:28 AM    Generic Name Brand Name Tablet Size Instructions for use    Acetaminophen (Tab) TYLENOL 325 MG Take 650 mg by mouth every four hours as needed.        Atorvastatin Calcium (Tab) LIPITOR 80 MG Take 0.5 Tabs by mouth every evening.        DilTIAZem HCl Coated Beads (CAPSULE SR 24 HR) CARTIA  MG         Gabapentin (Cap) NEURONTIN 300 MG Take 1 Cap by mouth 3 times a day.        Multiple Vitamin (Tab) MULTI-VITAMINS  Take  by mouth.        Temazepam (Cap) RESTORIL 30 MG TAKE ONE CAPSULE BY MOUTH ONCE DAILY AT BEDTIME AS NEEDED FOR SLEEP        Warfarin Sodium (Tab) COUMADIN 4 MG Take 2 Tabs by mouth every day.        .                 Medicines prescribed today were sent to:     [id number]  [place] - [address] [facility] [place] [address]    Phone: [phone] Fax: [phone]    Open 24 Hours?: No    [id number] - [place] - [address] [facility]    [address] [facility] [place] [address]    Phone: [phone] Fax: [phone]    Open 24 Hours?: No    [facility] - [place], [place] - [address] [facility]    [address] [facility] [address]    Phone: [phone] Fax: [phone]    Open 24 Hours?: No      Medication refill instructions:       If your prescription bottle indicates you have medication refills left, it is not necessary to call your provider’s office. Please contact your pharmacy and they will refill your medication.    If your prescription bottle indicates you do not have any refills left, you may request refills at any time through one of the following ways: The online [employer] system (except Urgent Care), by calling your provider’s office, or by asking your pharmacy to contact your provider’s office with a refill request. Medication refills are processed only during regular business hours and may not be available until the next business day. Your provider may request additional information or to have a follow-up visit with you prior to refilling your medication.   *Please Note: Medication refills are assigned a new Rx number when refilled electronically. Your pharmacy may indicate that no refills were authorized even though a new prescription for the same medication is available at the pharmacy. Please request the medicine by name with the pharmacy before contacting your provider for a refill.        Warfarin Dosing Calendar   June 2017 Details    Sun Mon Tue Wed Thu Fri Sat         1               2               3                 4               5               6               7               8               9               10                 11               12               13               14               15               16               17                 18               19               20               21               22               23               24                 25               26               27                28               29   2.4   8 mg   See details      30      6 mg           Date Details   06/29 This INR check   INR: 2.4               How to take your warfarin dose     To take:  6 mg Take 1.5 of the 4 mg tablets.    To take:  8 mg Take 2 of the 4 mg tablets.           Warfarin Dosing Calendar   July 2017 Details    Sun Mon Tue Wed Thu Fri Sat           1      6 mg           2      8 mg         3      6 mg         4      8 mg         5      6 mg         6      8 mg         7      6 mg         8      6 mg           9      8 mg         10      6 mg         11      8 mg         12      6 mg         13      8 mg         14      6 mg         15      6 mg           16      8 mg         17      6 mg         18      8 mg         19      6 mg         20      8 mg         21      6 mg         22      6 mg           23      8 mg         24      6 mg         25      8 mg         26      6 mg         27      8 mg         28      6 mg         29      6 mg           30      8 mg         31      6 mg               Date Details   No additional details            How to take your warfarin dose     To take:  6 mg Take 1.5 of the 4 mg tablets.    To take:  8 mg Take 2 of the 4 mg tablets.           Warfarin Dosing Calendar   August 2017 Details    Sun Mon Tue Wed Thu Fri Sat       1      8 mg         2      6 mg         3      8 mg         4      6 mg         5      6 mg           6      8 mg         7      6 mg         8      8 mg         9      6 mg         10      8 mg         11               12                 13               14               15               16               17               18               19                 20               21               22               23               24               25               26                 27               28               29               30               31                  Date Details   No additional details    Date of next INR:  8/10/2017         How  to take your warfarin dose     To take:  6 mg Take 1.5 of the 4 mg tablets.    To take:  8 mg Take 2 of the 4 mg tablets.              [employer] Access Code: Activation code not generated  Current [employer] Status: Active

## 2017-06-30 LAB — INR BLD: 2.4 (ref 0.9–1.2)

## 2017-07-03 ENCOUNTER — APPOINTMENT (OUTPATIENT)
Dept: PHYSICAL THERAPY | Facility: REHABILITATION | Age: 82
End: 2017-07-03
Payer: MEDICARE

## 2017-07-06 ENCOUNTER — APPOINTMENT (OUTPATIENT)
Dept: PHYSICAL THERAPY | Facility: REHABILITATION | Age: 82
End: 2017-07-06
Payer: MEDICARE

## 2017-07-06 ENCOUNTER — HOSPITAL ENCOUNTER (OUTPATIENT)
Dept: PHYSICAL THERAPY | Facility: REHABILITATION | Age: 82
End: 2017-07-06
Payer: MEDICARE

## 2017-07-06 PROCEDURE — 97112 NEUROMUSCULAR REEDUCATION: CPT

## 2017-07-10 ENCOUNTER — APPOINTMENT (OUTPATIENT)
Dept: PHYSICAL THERAPY | Facility: REHABILITATION | Age: 82
End: 2017-07-10
Payer: MEDICARE

## 2017-07-10 ENCOUNTER — HOSPITAL ENCOUNTER (OUTPATIENT)
Dept: PHYSICAL THERAPY | Facility: REHABILITATION | Age: 82
End: 2017-07-10
Payer: MEDICARE

## 2017-07-10 PROCEDURE — 97112 NEUROMUSCULAR REEDUCATION: CPT

## 2017-07-12 ENCOUNTER — APPOINTMENT (OUTPATIENT)
Dept: PHYSICAL THERAPY | Facility: REHABILITATION | Age: 82
End: 2017-07-12
Payer: MEDICARE

## 2017-07-12 ENCOUNTER — HOSPITAL ENCOUNTER (OUTPATIENT)
Dept: PHYSICAL THERAPY | Facility: REHABILITATION | Age: 82
End: 2017-07-12
Payer: MEDICARE

## 2017-07-12 PROCEDURE — 97530 THERAPEUTIC ACTIVITIES: CPT

## 2017-07-12 PROCEDURE — 97112 NEUROMUSCULAR REEDUCATION: CPT

## 2017-07-14 ENCOUNTER — TELEPHONE (OUTPATIENT)
Dept: VASCULAR LAB | Facility: MEDICAL CENTER | Age: 82
End: 2017-07-14

## 2017-07-14 DIAGNOSIS — R42 VERTIGO: ICD-10-CM

## 2017-07-17 ENCOUNTER — APPOINTMENT (OUTPATIENT)
Dept: PHYSICAL THERAPY | Facility: REHABILITATION | Age: 82
End: 2017-07-17
Payer: MEDICARE

## 2017-07-19 ENCOUNTER — APPOINTMENT (OUTPATIENT)
Dept: PHYSICAL THERAPY | Facility: REHABILITATION | Age: 82
End: 2017-07-19
Payer: MEDICARE

## 2017-07-20 ENCOUNTER — HOSPITAL ENCOUNTER (OUTPATIENT)
Dept: PHYSICAL THERAPY | Facility: REHABILITATION | Age: 82
End: 2017-07-20
Payer: MEDICARE

## 2017-07-20 PROCEDURE — 97112 NEUROMUSCULAR REEDUCATION: CPT

## 2017-07-24 ENCOUNTER — APPOINTMENT (OUTPATIENT)
Dept: PHYSICAL THERAPY | Facility: REHABILITATION | Age: 82
End: 2017-07-24
Payer: MEDICARE

## 2017-08-03 ENCOUNTER — HOSPITAL ENCOUNTER (OUTPATIENT)
Dept: PHYSICAL THERAPY | Facility: REHABILITATION | Age: 82
End: 2017-08-03
Payer: MEDICARE

## 2017-08-03 PROCEDURE — 97112 NEUROMUSCULAR REEDUCATION: CPT

## 2017-08-08 ENCOUNTER — HOSPITAL ENCOUNTER (OUTPATIENT)
Dept: RADIOLOGY | Facility: MEDICAL CENTER | Age: 82
End: 2017-08-08
Attending: INTERNAL MEDICINE
Payer: MEDICARE

## 2017-08-08 DIAGNOSIS — I65.23 CAROTID ATHEROSCLEROSIS, BILATERAL: ICD-10-CM

## 2017-08-08 PROCEDURE — 93880 EXTRACRANIAL BILAT STUDY: CPT

## 2017-08-10 ENCOUNTER — ANTICOAGULATION VISIT (OUTPATIENT)
Dept: VASCULAR LAB | Facility: MEDICAL CENTER | Age: 82
End: 2017-08-10
Attending: INTERNAL MEDICINE
Payer: MEDICARE

## 2017-08-10 VITALS — SYSTOLIC BLOOD PRESSURE: 138 MMHG | HEART RATE: 67 BPM | DIASTOLIC BLOOD PRESSURE: 90 MMHG

## 2017-08-10 DIAGNOSIS — I48.0 PAROXYSMAL ATRIAL FIBRILLATION (HCC): ICD-10-CM

## 2017-08-10 DIAGNOSIS — I82.401 ACUTE DEEP VEIN THROMBOSIS (DVT) OF RIGHT LOWER EXTREMITY, UNSPECIFIED VEIN (HCC): ICD-10-CM

## 2017-08-10 DIAGNOSIS — G45.0 VERTEBROBASILAR ARTERY SYNDROME: ICD-10-CM

## 2017-08-10 LAB — INR PPP: 2 (ref 2–3.5)

## 2017-08-10 PROCEDURE — 99211 OFF/OP EST MAY X REQ PHY/QHP: CPT | Performed by: PHARMACIST

## 2017-08-10 PROCEDURE — 85610 PROTHROMBIN TIME: CPT

## 2017-08-10 NOTE — PROGRESS NOTES
Anticoagulation Summary as of 8/10/2017     INR goal 2.0-3.0   Selected INR 2.0 (8/10/2017)   Maintenance plan 8 mg (4 mg x 2) on Sun, Tue, Thu; 6 mg (4 mg x 1.5) all other days   Weekly total 48 mg   Plan last modified Quita Sim, PHARMD (1/26/2017)   Next INR check 10/5/2017   Target end date Indefinite    Indications   DVT (deep venous thrombosis) (CMS-HCC) [I82.409]  Paroxysmal atrial fibrillation (CMS-HCC) [I48.0]  Hx of Stroke (Resolved) [I63.9]  Hx of TIA (transient ischemic attack) [G45.9]  Deep vein thrombosis (DVT) (CMS-HCC) (Resolved) [I82.409]  Atrial fibrillation (CMS-HCC) (Resolved) [I48.91]         Anticoagulation Episode Summary     INR check location [facility]    Preferred lab     Send INR reminders to     Comments       Anticoagulation Care Providers     Provider Role Specialty Phone number    Michael J Bloch, M.D. Referring Internal Medicine [phone]    [facility]   [phone]        Anticoagulation Patient Findings      Current Outpatient Prescriptions on File Prior to Visit   Medication Sig Dispense Refill   • Multiple Vitamin (MULTI-VITAMINS) Tab Take  by mouth.     • CARTIA  MG CAPSULE SR 24 HR      • gabapentin (NEURONTIN) 300 MG Cap Take 1 Cap by mouth 3 times a day. 90 Cap 3   • warfarin (COUMADIN) 4 MG Tab Take 2 Tabs by mouth every day. 180 Tab 1   • atorvastatin (LIPITOR) 80 MG tablet Take 0.5 Tabs by mouth every evening. 90 Tab 3   • temazepam (RESTORIL) 30 MG capsule TAKE ONE CAPSULE BY MOUTH ONCE DAILY AT BEDTIME AS NEEDED FOR SLEEP 90 Cap 2   • acetaminophen (TYLENOL) 325 MG Tab Take 650 mg by mouth every four hours as needed.       No current facility-administered medications on file prior to visit.       Lab Results   Component Value Date/Time    SODIUM 138 01/12/2016 12:20 PM    POTASSIUM 4.3 01/12/2016 12:20 PM    CHLORIDE 105 01/12/2016 12:20 PM    CO2 26 01/12/2016 12:20 PM    GLUCOSE 128* 01/12/2016 12:20 PM    BUN 18 01/12/2016  12:20 PM    CREATININE 1.00 01/12/2016 12:20 PM          Tristan Hester seen in clinic today  INR  is-therapeutic.    Denies signs/symptoms of bleeding and/or thrombosis.    Denies changes to diet or medications.   Follow up appointment in 8 week(s).      Will continue same warfarin dosing.     Yasmin Barry, PHARMD

## 2017-08-10 NOTE — MR AVS SNAPSHOT
Tristan Hester   8/10/2017 9:30 AM   Anticoagulation Visit   MRN: [id number]    Department:  Vascular Medicine   Dept Phone:  [phone]    Description:  Male : 1935   Provider:  [facility] EXAM 4           Allergies as of 8/10/2017     Allergen Noted Reactions    Cantaloupe 2015       Pt stated cantaloupe causes his throat to constrict    Nkda [No Known Drug Allergy] 2008       Other Environmental 10/08/2010       Seasonal, cats-hayfever      You were diagnosed with     Acute deep vein thrombosis (DVT) of right lower extremity, unspecified vein (CMS-[facility])   [4023732]       Paroxysmal atrial fibrillation (CMS-[facility])   [252203]       Vertebrobasilar artery syndrome   [435.3.ICD-9-CM]         Vital Signs     Blood Pressure Pulse Smoking Status             138/90 mmHg 67 Former Smoker         Basic Information     Date Of Birth Sex Race Ethnicity Preferred Language    1935 Male White Non- English      Your appointments     Oct 05, 2017  9:45 AM   Established Patient with [facility] EXAM 4   [facility]  (--)    [address] [place] [address]   [phone]            Oct 05, 2017 10:20 AM   Follow Up Visit with James Valdez M.D.   [facility] (--)    [address] [place] [address], [address]  [facility] [address] [phone]           You will be receiving a confirmation call a few days before your appointment from our automated call confirmation system.            2017 10:20 AM   Follow Up Visit with Michael J Bloch, M.D.   [facility]  (--)    [address] [place] [address]   [phone]              Problem List              ICD-10-CM Priority Class Noted - Resolved    Hx of TIA (transient ischemic attack) G45.9 Low  Unknown - Present    Arthritis M19.90 Medium  Unknown - Present    Hx of Prostate Cancer C80.1 Low  Unknown - Present    Essential  hypertension, benign I10   Unknown - Present    Mitral regurgitation I34.0 Medium  1/6/2014 - Present    Carotid arterial disease (CMS-HCC) I77.9 Low  1/6/2014 - Present    HLD (hyperlipidemia) E78.5 Low  1/6/2014 - Present    Paroxysmal atrial fibrillation (CMS-HCC) I48.0 Medium  6/18/2015 - Present    DVT (deep venous thrombosis) (CMS-HCC) I82.409 Medium  6/18/2015 - Present    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 Low  6/18/2015 - Present    Hypokalemia E87.6 Medium  6/20/2015 - Present    Insomnia G47.00 Low  8/21/2015 - Present    Lumbar stenosis M48.06 Medium  12/28/2015 - Present    Postoperative CSF leak G97.82 High  1/5/2016 - Present    Lower back pain M54.5   12/27/2016 - Present    Vertigo R42   7/14/2017 - Present      Health Maintenance        Date Due Completion Dates    IMM DTaP/Tdap/Td Vaccine (1 - Tdap) 6/7/1955 ---    COLONOSCOPY 6/7/1986 ---    IMM ZOSTER VACCINE 6/7/1996 ---    IMM PNEUMOCOCCAL 65+ (ADULT) HIGH/HIGHEST RISK SERIES (2 of 2 - PPSV23) 2/9/2016 12/15/2015    IMM INFLUENZA (1) 9/1/2017 10/1/2014, 10/9/2010            Results     POCT Protime      Component    INR    2.0                        Current Immunizations     13-VALENT PCV PREVNAR 12/15/2015    Influenza TIV (IM) 10/1/2014, 10/9/2010  9:15 AM    Pneumococcal Vaccine (UF)Historical Data 1/1/2010      Below and/or attached are the medications your provider expects you to take. Review all of your home medications and newly ordered medications with your provider and/or pharmacist. Follow medication instructions as directed by your provider and/or pharmacist. Please keep your medication list with you and share with your provider. Update the information when medications are discontinued, doses are changed, or new medications (including over-the-counter products) are added; and carry medication information at all times in the event of emergency situations     Allergies:  CANTALOUPE - (reactions not documented)     NKDA - (reactions not  documented)     OTHER ENVIRONMENTAL - (reactions not documented)               Medications  Valid as of: August 10, 2017 -  9:51 AM    Generic Name Brand Name Tablet Size Instructions for use    Acetaminophen (Tab) TYLENOL 325 MG Take 650 mg by mouth every four hours as needed.        Atorvastatin Calcium (Tab) LIPITOR 80 MG Take 0.5 Tabs by mouth every evening.        DilTIAZem HCl Coated Beads (CAPSULE SR 24 HR) CARTIA  MG         Gabapentin (Cap) NEURONTIN 300 MG Take 1 Cap by mouth 3 times a day.        Multiple Vitamin (Tab) MULTI-VITAMINS  Take  by mouth.        Temazepam (Cap) RESTORIL 30 MG TAKE ONE CAPSULE BY MOUTH ONCE DAILY AT BEDTIME AS NEEDED FOR SLEEP        Warfarin Sodium (Tab) COUMADIN 4 MG Take 2 Tabs by mouth every day.        .                 Medicines prescribed today were sent to:     [facility] [address] [facility] ([place] - [address]    [address] [facility] [place] [address]    Phone: [phone] Fax: [phone]    Open 24 Hours?: No    [facility] - [place], [place] - [address] [facility]    [address] [facility] [address]    Phone: [phone] Fax: [phone]    Open 24 Hours?: No    [facility] [address]    [address] [facility] [address]    Phone: [phone] Fax: [phone]    Open 24 Hours?: No      Medication refill instructions:       If your prescription bottle indicates you have medication refills left, it is not necessary to call your provider’s office. Please contact your pharmacy and they will refill your medication.    If your prescription bottle indicates you do not have any refills left, you may request refills at any time through one of the following ways: The online [employer] system (except Urgent Care), by calling your provider’s office, or by asking your pharmacy to contact your provider’s office with a refill request. Medication refills are processed only during regular business hours and may not be  available until the next business day. Your provider may request additional information or to have a follow-up visit with you prior to refilling your medication.   *Please Note: Medication refills are assigned a new Rx number when refilled electronically. Your pharmacy may indicate that no refills were authorized even though a new prescription for the same medication is available at the pharmacy. Please request the medicine by name with the pharmacy before contacting your provider for a refill.        Warfarin Dosing Calendar   August 2017 Details    Sun Mon Tue Wed Thu Fri Sat       1               2               3               4               5                 6               7               8               9               10   2.0   8 mg   See details      11      6 mg         12      6 mg           13      8 mg         14      6 mg         15      8 mg         16      6 mg         17      8 mg         18      6 mg         19      6 mg           20      8 mg         21      6 mg         22      8 mg         23      6 mg         24      8 mg         25      6 mg         26      6 mg           27      8 mg         28      6 mg         29      8 mg         30      6 mg         31      8 mg            Date Details   08/10 This INR check   INR: 2.0               How to take your warfarin dose     To take:  6 mg Take 1.5 of the 4 mg tablets.    To take:  8 mg Take 2 of the 4 mg tablets.           Warfarin Dosing Calendar   September 2017 Details    Sun Mon Tue Wed Thu Fri Sat          1      6 mg         2      6 mg           3      8 mg         4      6 mg         5      8 mg         6      6 mg         7      8 mg         8      6 mg         9      6 mg           10      8 mg         11      6 mg         12      8 mg         13      6 mg         14      8 mg         15      6 mg         16      6 mg           17      8 mg         18      6 mg         19      8 mg         20      6 mg         21      8 mg         22       6 mg         23      6 mg           24      8 mg         25      6 mg         26      8 mg         27      6 mg         28      8 mg         29      6 mg         30      6 mg          Date Details   No additional details            How to take your warfarin dose     To take:  6 mg Take 1.5 of the 4 mg tablets.    To take:  8 mg Take 2 of the 4 mg tablets.           Warfarin Dosing Calendar   October 2017 Details    Sun Mon Tue Wed Thu Fri Sat     1      8 mg         2      6 mg         3      8 mg         4      6 mg         5      8 mg         6               7                 8               9               10               11               12               13               14                 15               16               17               18               19               20               21                 22               23               24               25               26               27               28                 29               30               31                    Date Details   No additional details    Date of next INR:  10/5/2017         How to take your warfarin dose     To take:  6 mg Take 1.5 of the 4 mg tablets.    To take:  8 mg Take 2 of the 4 mg tablets.              [employer] Access Code: Activation code not generated  Current [employer] Status: Active

## 2017-08-15 ENCOUNTER — TELEPHONE (OUTPATIENT)
Dept: VASCULAR LAB | Facility: MEDICAL CENTER | Age: 82
End: 2017-08-15

## 2017-08-15 NOTE — TELEPHONE ENCOUNTER
Carotid duplex reviewed.  Mild disease bilat   CEA almost 10 years ago.  Continue medical management  Anticipate repeat carotid duplex in 2 years    Michael Bloch, MD  Vascular Care

## 2017-08-18 LAB — INR BLD: 2 (ref 0.9–1.2)

## 2017-09-28 LAB
ALBUMIN SERPL-MCNC: 4.2 G/DL (ref 3.5–4.7)
ALBUMIN/GLOB SERPL: 1.6 {RATIO} (ref 1.2–2.2)
ALP SERPL-CCNC: 63 IU/L (ref 39–117)
ALT SERPL-CCNC: 15 IU/L (ref 0–44)
AST SERPL-CCNC: 25 IU/L (ref 0–40)
BILIRUB SERPL-MCNC: 1.1 MG/DL (ref 0–1.2)
BUN SERPL-MCNC: 22 MG/DL (ref 8–27)
BUN/CREAT SERPL: 22 (ref 10–24)
CALCIUM SERPL-MCNC: 9.3 MG/DL (ref 8.6–10.2)
CHLORIDE SERPL-SCNC: 106 MMOL/L (ref 96–106)
CHOLEST SERPL-MCNC: 130 MG/DL (ref 100–199)
CO2 SERPL-SCNC: 24 MMOL/L (ref 18–29)
COMMENT 011824: NORMAL
CREAT SERPL-MCNC: 0.99 MG/DL (ref 0.76–1.27)
ERYTHROCYTE [DISTWIDTH] IN BLOOD BY AUTOMATED COUNT: 14.6 % (ref 12.3–15.4)
GLOBULIN SER CALC-MCNC: 2.6 G/DL (ref 1.5–4.5)
GLUCOSE SERPL-MCNC: 103 MG/DL (ref 65–99)
HCT VFR BLD AUTO: 45.2 % (ref 37.5–51)
HDLC SERPL-MCNC: 54 MG/DL
HGB BLD-MCNC: 15.1 G/DL (ref 12.6–17.7)
LDLC SERPL CALC-MCNC: 64 MG/DL (ref 0–99)
MCH RBC QN AUTO: 30 PG (ref 26.6–33)
MCHC RBC AUTO-ENTMCNC: 33.4 G/DL (ref 31.5–35.7)
MCV RBC AUTO: 90 FL (ref 79–97)
NRBC BLD AUTO-RTO: NORMAL %
POTASSIUM SERPL-SCNC: 4.5 MMOL/L (ref 3.5–5.2)
PROT SERPL-MCNC: 6.8 G/DL (ref 6–8.5)
RBC # BLD AUTO: 5.03 X10E6/UL (ref 4.14–5.8)
SODIUM SERPL-SCNC: 144 MMOL/L (ref 134–144)
TRIGL SERPL-MCNC: 58 MG/DL (ref 0–149)
TSH SERPL DL<=0.005 MIU/L-ACNC: 1.49 UIU/ML (ref 0.45–4.5)
VLDLC SERPL CALC-MCNC: 12 MG/DL (ref 5–40)
WBC # BLD AUTO: 4.9 X10E3/UL (ref 3.4–10.8)

## 2017-10-05 ENCOUNTER — ANTICOAGULATION VISIT (OUTPATIENT)
Dept: VASCULAR LAB | Facility: MEDICAL CENTER | Age: 82
End: 2017-10-05
Attending: INTERNAL MEDICINE
Payer: MEDICARE

## 2017-10-05 VITALS — HEART RATE: 74 BPM | DIASTOLIC BLOOD PRESSURE: 76 MMHG | SYSTOLIC BLOOD PRESSURE: 128 MMHG

## 2017-10-05 DIAGNOSIS — I48.0 PAROXYSMAL ATRIAL FIBRILLATION (HCC): ICD-10-CM

## 2017-10-05 LAB
INR BLD: 1.8 (ref 0.9–1.2)
INR PPP: 1.8 (ref 2–3.5)

## 2017-10-05 PROCEDURE — 99212 OFFICE O/P EST SF 10 MIN: CPT | Performed by: PHARMACIST

## 2017-10-05 PROCEDURE — 85610 PROTHROMBIN TIME: CPT

## 2017-10-05 NOTE — PROGRESS NOTES
Anticoagulation Summary  As of 10/5/2017    INR goal:   2.0-3.0   TTR:   60.5 % (2.2 y)   Today's INR:   1.8!   Maintenance plan:   8 mg (4 mg x 2) on Sun, Tue, Thu; 6 mg (4 mg x 1.5) all other days   Weekly total:   48 mg   Plan last modified:   Quita Sim, PharmD (1/26/2017)   Next INR check:   11/2/2017   Target end date:   Indefinite    Indications    DVT (deep venous thrombosis) (CMS-HCC) [I82.409]  Paroxysmal atrial fibrillation (CMS-HCC) [I48.0]  Hx of Stroke (Resolved) [I63.9]  Hx of TIA (transient ischemic attack) [G45.9]  Deep vein thrombosis (DVT) (CMS-HCC) (Resolved) [I82.409]  Atrial fibrillation (CMS-HCC) (Resolved) [I48.91]             Anticoagulation Episode Summary     [facility] check [facility]   [facility]    Preferred lab:       Send INR reminders to:       Comments:         Anticoagulation Care Providers     Provider Role Specialty Phone number    Michael J Bloch, M.D. Referring Internal Medicine [phone]    [facility]   [phone]        Anticoagulation Patient Findings      HPI:  Tristan Hester seen in clinic today, on anticoagulation therapy with warfarin for Afib, DVT, stroke  Changes to current medical/health status since last appt: denies  Denies signs/symptoms of bleeding and/or thrombosis since the last appt.    Denies any interval changes to diet  Denies any interval changes to medications since last appt.   Denies any complications or cost restrictions with current therapy.   BP recorded in vitals.      A/P   INR  is slightly SUB-therapeutic.   Will have pt boost dose today to 12mg x1, pt denies any s/s clot/stroke. And then pt to resume his normal weekly dosing.     Follow up appointment in 4 week(s) per pt preference, he is out of town until 10/26.    Yasmin Barry, PharmD

## 2017-10-07 DIAGNOSIS — M54.16 LUMBAR RADICULOPATHY: ICD-10-CM

## 2017-10-09 RX ORDER — GABAPENTIN 300 MG/1
CAPSULE ORAL
Refills: 3 | OUTPATIENT
Start: 2017-10-09

## 2017-11-02 ENCOUNTER — ANTICOAGULATION VISIT (OUTPATIENT)
Dept: VASCULAR LAB | Facility: MEDICAL CENTER | Age: 82
End: 2017-11-02
Attending: INTERNAL MEDICINE
Payer: MEDICARE

## 2017-11-02 VITALS — SYSTOLIC BLOOD PRESSURE: 119 MMHG | DIASTOLIC BLOOD PRESSURE: 74 MMHG | HEART RATE: 67 BPM

## 2017-11-02 DIAGNOSIS — I82.90 DEEP VEIN THROMBOSIS (DVT) OF NON-EXTREMITY VEIN, UNSPECIFIED CHRONICITY: ICD-10-CM

## 2017-11-02 DIAGNOSIS — G45.9 TRANSIENT CEREBRAL ISCHEMIA, UNSPECIFIED TYPE: ICD-10-CM

## 2017-11-02 DIAGNOSIS — I48.0 PAROXYSMAL ATRIAL FIBRILLATION (HCC): ICD-10-CM

## 2017-11-02 LAB — INR PPP: 2.1 (ref 2–3.5)

## 2017-11-02 PROCEDURE — 85610 PROTHROMBIN TIME: CPT

## 2017-11-02 PROCEDURE — 99211 OFF/OP EST MAY X REQ PHY/QHP: CPT | Performed by: NURSE PRACTITIONER

## 2017-11-02 NOTE — PROGRESS NOTES
Anticoagulation Summary  As of 11/2/2017    INR goal:   2.0-3.0   TTR:   59.5 % (2.2 y)   Today's INR:   2.1   Maintenance plan:   8 mg (4 mg x 2) on Sun, Tue, Thu; 6 mg (4 mg x 1.5) all other days   Weekly total:   48 mg   Plan last modified:   Quita Sim, PharmD (1/26/2017)   Next INR check:   11/30/2017   Target end date:   Indefinite    Indications    DVT (deep venous thrombosis) (CMS-HCC) [I82.409]  Paroxysmal atrial fibrillation (CMS-HCC) [I48.0]  Hx of Stroke (Resolved) [I63.9]  Hx of TIA (transient ischemic attack) [G45.9]  Deep vein thrombosis (DVT) (CMS-HCC) (Resolved) [I82.409]  Atrial fibrillation (CMS-HCC) (Resolved) [I48.91]             Anticoagulation Episode Summary     [facility] check [facility]   [facility]    Preferred lab:       Send INR reminders to:       Comments:         Anticoagulation Care Providers     Provider Role Specialty Phone number    Michael J Bloch, M.D. Referring Internal Medicine [phone]    [facility]   [phone]        Anticoagulation Patient Findings      HPI:  Tristan Hester seen in clinic today for follow up on anticoagulation therapy in the presence of AF, DVT hx. Denies any changes to current medical/health status since last appointment. Denies any medication or diet changes. No current symptoms of bleeding or thrombosis reported.    A/P:   INR therapeutic. Continue current regimen. BP recorded in vitals.     Follow up appointment in 4 week(s).    Next Appointment: Thursday, November 30, 2017 at 9:30 am.     Thi WILLIAM

## 2017-11-03 LAB — INR BLD: 2.1 (ref 0.9–1.2)

## 2017-11-17 ENCOUNTER — OFFICE VISIT (OUTPATIENT)
Dept: VASCULAR LAB | Facility: MEDICAL CENTER | Age: 82
End: 2017-11-17
Attending: INTERNAL MEDICINE
Payer: MEDICARE

## 2017-11-17 VITALS
HEART RATE: 63 BPM | HEIGHT: 71 IN | WEIGHT: 187 LBS | DIASTOLIC BLOOD PRESSURE: 69 MMHG | SYSTOLIC BLOOD PRESSURE: 130 MMHG | BODY MASS INDEX: 26.18 KG/M2

## 2017-11-17 DIAGNOSIS — E78.5 HYPERLIPIDEMIA, UNSPECIFIED HYPERLIPIDEMIA TYPE: ICD-10-CM

## 2017-11-17 DIAGNOSIS — I10 ESSENTIAL HYPERTENSION: ICD-10-CM

## 2017-11-17 DIAGNOSIS — I48.0 PAROXYSMAL ATRIAL FIBRILLATION (HCC): ICD-10-CM

## 2017-11-17 DIAGNOSIS — E78.5 DYSLIPIDEMIA: ICD-10-CM

## 2017-11-17 DIAGNOSIS — I82.90 DEEP VEIN THROMBOSIS (DVT) OF NON-EXTREMITY VEIN, UNSPECIFIED CHRONICITY: ICD-10-CM

## 2017-11-17 DIAGNOSIS — G47.9 SLEEP DISTURBANCE: ICD-10-CM

## 2017-11-17 PROCEDURE — 99214 OFFICE O/P EST MOD 30 MIN: CPT | Performed by: INTERNAL MEDICINE

## 2017-11-17 PROCEDURE — 99212 OFFICE O/P EST SF 10 MIN: CPT

## 2017-11-17 RX ORDER — TEMAZEPAM 30 MG/1
CAPSULE ORAL
Qty: 90 CAP | Refills: 1 | Status: SHIPPED
Start: 2017-11-17 | End: 2018-05-14

## 2017-11-17 ASSESSMENT — ENCOUNTER SYMPTOMS
LOSS OF CONSCIOUSNESS: 0
COUGH: 0
SHORTNESS OF BREATH: 0
BRUISES/BLEEDS EASILY: 0
PALPITATIONS: 0
MYALGIAS: 0
HEADACHES: 0
NERVOUS/ANXIOUS: 0
BACK PAIN: 1
DEPRESSION: 0
FOCAL WEAKNESS: 0

## 2017-11-17 NOTE — PROGRESS NOTES
VASCULAR FOLLOW UP VISIT  Subjective:   Tristan Hester is a 80 y.o. male who presents today 11/17/17 for vascular f/u  Chief Complaint   Patient presents with   • Amb Vascular Reason For Visit     Needs Refill on Temazepam please fill at the [facility] on [address]     HPI:   Patient here for f/u of HTN, dyslipidemia, carotid disease and valvular heart disease, and atrial fibrillation   Hasn't seen Dr. Escoto in some time, but does have appt later this month  No TIA or stroke symptoms.  Still with problems with balance  No TELLEZ or lightheadedness, but less stamina than previous  No chest pain.  No bleeding or bruising on warfarin  Continues to f/u in anticoagulation clinic  Not taking BP at home - mostly in 120s at ac visits  HR at home in high 60s  No myaglias on statin  Had sling removed - continues to follow up with urology  Saw neurology - now on gabapentin for back pain  Did vestibular therapy without much improvement.  Saw ENT -     Social History   Substance Use Topics   • Smoking status: Former Smoker     Packs/day: 0.50     Years: 30.00     Types: Cigarettes, Pipe     Quit date: 1/1/1985   • Smokeless tobacco: Never Used   • Alcohol use 3.0 oz/week     3 Cans of beer, 3 Shots of liquor per week      Comment: 1 x week     DIET AND EXERCISE:  Weight Change: stable  Diet: common adult  Exercise: back at gym a few time a week     Review of Systems   Constitutional: Negative for malaise/fatigue.   Respiratory: Negative for cough and shortness of breath.    Cardiovascular: Negative for chest pain, palpitations and leg swelling.   Genitourinary: Negative for hematuria.   Musculoskeletal: Positive for back pain. Negative for myalgias.   Neurological: Negative for focal weakness, loss of consciousness and headaches.   Endo/Heme/Allergies: Does not bruise/bleed easily.   Psychiatric/Behavioral: Negative for depression. The patient is not nervous/anxious.       Objective:     Vitals:    11/17/17 1453 11/17/17 1456  "  BP: 146/76 130/69   Pulse: 65 63   Weight: 84.8 kg (187 lb)    Height: 1.803 m (5' 11\")       Body mass index is 26.08 kg/m².  Physical Exam   Constitutional: He is oriented to person, place, and time. He appears well-developed and well-nourished.   Cardiovascular: Normal rate and intact distal pulses.    Murmur heard.  Pulmonary/Chest: Effort normal. No respiratory distress. He has no wheezes. He has no rales.   Musculoskeletal: Normal range of motion. He exhibits no edema.   Neurological: He is alert and oriented to person, place, and time. No cranial nerve deficit.   Psychiatric: He has a normal mood and affect. His behavior is normal.     Lab Results   Component Value Date    CHOLSTRLTOT 130 09/27/2017    LDL 64 09/27/2017    HDL 54 09/27/2017    TRIGLYCERIDE 58 09/27/2017      Lab Results   Component Value Date    PROTHROMBTM 25.9 (H) 01/18/2016    INR 2.1 11/02/2017         Lab Results   Component Value Date    SODIUM 144 09/27/2017    SODIUM 138 01/12/2016    POTASSIUM 4.5 09/27/2017    POTASSIUM 4.3 01/12/2016    CHLORIDE 106 09/27/2017    CHLORIDE 105 01/12/2016    CO2 24 09/27/2017    CO2 26 01/12/2016    GLUCOSE 103 (H) 09/27/2017    GLUCOSE 128 (H) 01/12/2016    BUN 22 09/27/2017    BUN 18 01/12/2016    CREATININE 0.99 09/27/2017    CREATININE 1.00 01/12/2016    BUNCREATRAT 22 09/27/2017    IFAFRICA 82 09/27/2017    IFAFRICA >60 01/12/2016    IFNOTAFR 71 09/27/2017    IFNOTAFR >60 01/12/2016      Lab Results   Component Value Date    TSH 1.490 09/27/2017      Lab Results   Component Value Date    WBC 4.9 09/27/2017    WBC 9.5 01/12/2016    RBC 5.03 09/27/2017    RBC 3.92 (L) 01/12/2016    HEMOGLOBIN 15.1 09/27/2017    HEMOGLOBIN 11.2 (L) 01/12/2016    HEMATOCRIT 45.2 09/27/2017    HEMATOCRIT 35.4 (L) 01/12/2016    MCV 90 09/27/2017    MCV 90.3 01/12/2016    MCH 30.0 09/27/2017    MCH 28.6 01/12/2016    MCHC 33.4 09/27/2017    MCHC 31.6 (L) 01/12/2016    MPV 10.0 01/12/2016      Blood work from 10-9-14: " Glucose 102, GFR 68, cholesterol 127, trig 35, HDL 57, LDL-C 63, urine for MA/cr 3.4    Blood work from May 12, 2015, glucose 37, GFR 72, sodium 146, AST 19, ALT 12, hemoglobin 13.8, total cholesterol 125, temperature 66, initial 48, LDL 64, albumin creatinine ratio in urine 2.5    CXR 8/20/15: (comparison to 7/9/2015) :Bibasilar opacities, right greater than left, consistent with a combination of airspace disease and atelectasis. Minimal left basilar airspace opacities are new. A small right pleural effusion is suspected, unchanged.    Blood work from February 2016-glucose 107, GFR 82, hemoglobin 14.0, total cholesterol 130, HL 51, LDL 62    Echocardiogram February 2016  Posterior mitral leaflet prolapse with eccentric moderate mitral regurgitation  Severely dilated left atrium  Mild concentric LVH  EF 60%  Mild aortic stenosis   right ventricular pressure estimated 37 mm    Carotid duplex May 2016:   1.  No evidence of re-stenosis in the right carotid bulb and internal    carotid artery after endarterectomy.    2.  Less than 50% left carotid bulb and internal carotid arterial stenosis.    3.  Normal bilateral subclavian and vertebral arterial exam.    4.  Compared to the prior study of 2/29/2016,  there has been no    significant change.     Blood work Oct 2016:  Glucose 106, GFR 77, LDL 68, nonHDL 86    Echo feb 2017:  Left ventricular ejection fraction is visually estimated to be 60%.   Grade I diastolic dysfunction.  Possibly severe mitral regurgitation.   Mild aortic stenosis.  Estimated right ventricular systolic pressure  is 25 mmHg.  Aortic root is 4 cm.   Compared to the images of the prior study done 2/29/16 there has been   no significant change.   Consider YOANDY for better evaluation of MR severity.    Blood work march 2017:  Glucose 94, GFR 59, Ldl-c 70, nonHDL 85    Carotid August 2017  Mild plaque of the right internal carotid artery with no significant    stenosis (<50%)   Mild plaque of the left  internal carotid artery with no significant    stenosis (<50%).   No significant stenosis of the right or left subclavian artery.   No significant stenosis of the right or left vertebral artery.    Medical Decision Making:  Today's Assessment / Status / Plan:     1. Deep vein thrombosis (DVT) of non-extremity vein, unspecified chronicity     2. Paroxysmal atrial fibrillation (CMS-HCC)     3. Hyperlipidemia, unspecified hyperlipidemia type     4. Dyslipidemia  COMP METABOLIC PANEL    LIPID PROFILE    TSH   5. Essential hypertension  COMP METABOLIC PANEL    MICROALBUMIN CREAT RATIO URINE   6. Sleep disturbance  temazepam (RESTORIL) 30 MG capsule     Patient Type: Secondary Prevention    Etiology of Established CVD if Present:   1. TIA - likely cardioembolic from valve disease  2. Carotid atherosclerosis - stable on surveillance imaging  3. Valvular Heart Disease - currently asymptomatic  4. Atrial fibrillation with onset during acute illness, now chronic  5.  DVT after back surgery jan 2016    Lipid Management: Qualifies for Statin Therapy Based on 2013 ACC/AHA Guidelines: yes  Calculated 10-Year Risk of ASCVD: N/A  Currently on Statin: Yes  ACC/aha criteria for at least mod intensity statin  Per nla goal ldl <70 and nonHDL <100  Reasonable therapeurtic response on most recent blood work  Plan:  - Continue atorvastatin at current dose    - Continue TLC  - Recheck fasting lipids prior to next visit    Blood Pressure Management:Goal: JNC8 (2013) Office BP Goal:<150/90; Under Control: yes  BP under reasonable control at least in office  Plan:  - Continue current BP meds  - Encouraged home BP monitoring    Glycemic Status: Prediabetic  IFG, mild and stable  - Continue lifestyle mod  - Follow fasting glucose    Anti-Platelet/Anti-Coagulant Tx: yes  - Continue indefinite anticoagulation with warfarin   - F/U with anticoagulation clinic   - limit nsaids    Smoking: continue complete avoidance    Physical Activity:  Continue  physical therapy as recommended by Dr. Almnote    Weight Management and Nutrition: Dietary plan was discussed with patient at this visit including c/w Med diet    Other:     1. TIA - likely cardioembolic from valve disease - continue BP control and anticoagulation  2. Carotid atherosclerosis - stable on surveillance imaging - continue surveillance imaging - every 2 years  3. Valvular Heart Disease - currently asymptomatic although patient has developed atrial fibrillation, - defer management to cardiology  3. Colonic Polyp on recent colonoscopy - repeat colonoscopy in 2019 per GI  4. Multiple urological complaints - defer management to urology - encouraged to make a follow up appt  5. Insomnia - trialed zolpidem w/o success.   Continue temazepam. Patient understands addictive potential  6.  DJD back -status post surgery. Will continue with physical therapy.   Defer further management to neurosurg  8.  Atrial fibrillation - defer management of rhythm and rate to cards.  Continue anticoag as above  9.  DVT - postoperative.  No recurrence.  Continue indefinite anticoagulation given concomitant afib  10.  H/o prostate ca and multiple urological issues - defer f/u to urology  11.  Balance issues and neuropathy - defer f/u to ENT and neurology  12.  Continuing medical health - cancer screening as above. Has had shingles vac, continue yearly flu shot    We will partner with other providers in the management of established vascular disease and cardiometabolic risk factors.    Studies to Be Obtained:    1. Carotid Duplex aug 2019  2. Feb 2018 (unless requested sooner by cardiolgy)    Labs to Be Obtained: As above prior to next visit    Follow up in: 6 months    Michael J Bloch, M.D.    Cc:   MD CORA Jones MD

## 2017-11-28 ENCOUNTER — OFFICE VISIT (OUTPATIENT)
Dept: CARDIOLOGY | Facility: MEDICAL CENTER | Age: 82
End: 2017-11-28
Payer: MEDICARE

## 2017-11-28 VITALS
SYSTOLIC BLOOD PRESSURE: 130 MMHG | HEIGHT: 71 IN | HEART RATE: 70 BPM | OXYGEN SATURATION: 94 % | DIASTOLIC BLOOD PRESSURE: 70 MMHG | BODY MASS INDEX: 26.6 KG/M2 | WEIGHT: 190 LBS

## 2017-11-28 DIAGNOSIS — I10 ESSENTIAL HYPERTENSION, BENIGN: ICD-10-CM

## 2017-11-28 DIAGNOSIS — I48.0 PAROXYSMAL ATRIAL FIBRILLATION (HCC): ICD-10-CM

## 2017-11-28 DIAGNOSIS — E78.5 DYSLIPIDEMIA: ICD-10-CM

## 2017-11-28 DIAGNOSIS — I34.0 NON-RHEUMATIC MITRAL REGURGITATION: ICD-10-CM

## 2017-11-28 PROCEDURE — 99214 OFFICE O/P EST MOD 30 MIN: CPT | Performed by: INTERNAL MEDICINE

## 2017-11-28 NOTE — LETTER
[facility]   [address]  [place] [address]  Phone: [phone]  Fax: [phone]              Tristan Hester  1935    Encounter Date: 11/28/2017    Senthil Escoto M.D.          PROGRESS NOTE:  Subjective:   Tristan Hester is a 82 y.o. male who presents today for follow-up of his PAF, hypertension and dyslipidemia.    His blood pressures at home running approximately 120-130/70-75.    He has been having significant difficulty with his balance. He has seen both neurology and ENT. He's been stable with respect to his cardiac status. He denies any chest discomfort, edema, palpitations or lightheadedness. He feels he can walk about a mile before he is dyspneic. However, his activity level has been limited somewhat because of his balance difficulties.    Past Medical History:   Diagnosis Date   • Arrhythmia     Afib   • Arthritis     osteo, hips and knees   • Blood clotting disorder (CMS-HCC)     , left arm   • Bronchitis        • Cancer (CMS-HCC)     Prostate CA--2000   • Carotid arterial disease (CMS-HCC) 1/6/2014   • Cataract     removed bilat   • Heart valve disease     MVR   • HLD (hyperlipidemia) 1/6/2014   • Hypertension    • Mitral regurgitation 1/6/2014   • Pain 12-14-15    low back, hips, 2-3/10   • Pneumonia        • Stroke (CMS-HCC) 2008    TIA, no residual   • TIA (transient ischemic attack)     2008   • Urinary bladder disorder     HAD MALE BLADDER SUSPENSION PROCEDURE DONE   • Urinary incontinence      Past Surgical History:   Procedure Laterality Date   • LUMBAR FUSION POSTERIOR  12/28/2015    Procedure: LUMBAR FUSION POSTERIOR L3-5 with peek rods;  Surgeon: Michael Almonte M.D.;  Location: [facility];  Service:    • LUMBAR DECOMPRESSION  12/28/2015    Procedure: LUMBAR DECOMPRESSION posterior redo decompression L3-5, with dural repair;  Surgeon: Michael Almonte M.D.;  Location: [facility]  "ORS;  Service:    • THORACOSCOPY  6/13/2015    Procedure: THORACOSCOPY with decortication VATS , side to be determined  ;  Surgeon: Elmira Holder M.D.;  Location: [facility] ORS;  Service:    • LUMBAR LAMINECTOMY DISKECTOMY  10/8/2010    Performed by PHYLICIA SARKAR at SURGERY [facility] ORS   • CERVICAL DISK AND FUSION ANTERIOR  10/26/2009    Performed by PHYLICIA SARKAR at SURGERY [facility] ORS   • CAROTID ENDARTERECTOMY  7/10/08    Performed by LEIGH MORATAYA at SURGERY [facility] ORS   • BLADDER SLING MALE  11/2004   • PROSTATECTOMY, RADIAL  11/2000     Family History   Problem Relation Age of Onset   • Heart Attack Neg Hx      History   Smoking Status   • Former Smoker   • Packs/day: 0.50   • Years: 30.00   • Types: Cigarettes, Pipe   • Quit date: 1/1/1985   Smokeless Tobacco   • Never Used     Allergies   Allergen Reactions   • Cantaloupe      Pt stated cantaloupe causes his throat to constrict   • Nkda [No Known Drug Allergy]    • Other Environmental      Seasonal, cats-hayfever     Outpatient Encounter Prescriptions as of 11/28/2017   Medication Sig Dispense Refill   • temazepam (RESTORIL) 30 MG capsule TAKE ONE CAPSULE BY MOUTH ONCE DAILY AT BEDTIME AS NEEDED FOR SLEEP 90 Cap 1   • Multiple Vitamin (MULTI-VITAMINS) Tab Take  by mouth.     • CARTIA  MG CAPSULE SR 24 HR      • gabapentin (NEURONTIN) 300 MG Cap Take 1 Cap by mouth 3 times a day. 90 Cap 3   • warfarin (COUMADIN) 4 MG Tab Take 2 Tabs by mouth every day. 180 Tab 1   • atorvastatin (LIPITOR) 80 MG tablet Take 0.5 Tabs by mouth every evening. 90 Tab 3   • acetaminophen (TYLENOL) 325 MG Tab Take 650 mg by mouth every four hours as needed.       No facility-administered encounter medications on file as of 11/28/2017.      ROS     Objective:   /70   Pulse 70   Ht 1.803 m (5' 11\")   Wt 86.2 kg (190 lb)   SpO2 94%   BMI 26.50 kg/m²      Physical Exam   Neck: No JVD present.   Cardiovascular: Normal rate and regular rhythm.  " Exam reveals no gallop.    Murmur (3/6 systolic at the base of rating towards the apex.) heard.  Pulmonary/Chest: Effort normal. He has no rales.   Abdominal: Soft. There is no tenderness.   Musculoskeletal: He exhibits no edema.     Lab Results   Component Value Date/Time    CHOLSTRLTOT 130 09/27/2017 07:53 AM    CHOLSTRLTOT 146 06/02/2008 11:00 PM    LDL 64 09/27/2017 07:53 AM    LDL 85 06/02/2008 11:00 PM    HDL 54 09/27/2017 07:53 AM    HDL 49 06/02/2008 11:00 PM    TRIGLYCERIDE 58 09/27/2017 07:53 AM    TRIGLYCERIDE 60 06/02/2008 11:00 PM       Lab Results   Component Value Date/Time    SODIUM 144 09/27/2017 07:53 AM    SODIUM 138 01/12/2016 12:20 PM    POTASSIUM 4.5 09/27/2017 07:53 AM    POTASSIUM 4.3 01/12/2016 12:20 PM    CHLORIDE 106 09/27/2017 07:53 AM    CHLORIDE 105 01/12/2016 12:20 PM    CO2 24 09/27/2017 07:53 AM    CO2 26 01/12/2016 12:20 PM    GLUCOSE 103 (H) 09/27/2017 07:53 AM    GLUCOSE 128 (H) 01/12/2016 12:20 PM    BUN 22 09/27/2017 07:53 AM    BUN 18 01/12/2016 12:20 PM    CREATININE 0.99 09/27/2017 07:53 AM    CREATININE 1.00 01/12/2016 12:20 PM    CREATININE 1.0 07/02/2008 03:43 PM    BUNCREATRAT 22 09/27/2017 07:53 AM     Lab Results   Component Value Date/Time    ALKPHOSPHAT 63 09/27/2017 07:53 AM    ALKPHOSPHAT 52 01/11/2016 04:25 AM    ASTSGOT 25 09/27/2017 07:53 AM    ASTSGOT 14 01/11/2016 04:25 AM    ALTSGPT 15 09/27/2017 07:53 AM    ALTSGPT 11 01/11/2016 04:25 AM    TBILIRUBIN 1.1 09/27/2017 07:53 AM    TBILIRUBIN 1.1 01/11/2016 04:25 AM      Lab Results   Component Value Date/Time    BNPBTYPENAT 244 (H) 06/23/2015 01:41 AM      Echocardiogram:  CONCLUSIONS  Left ventricular ejection fraction is visually estimated to be 60%.   Grade I diastolic dysfunction.  Possibly severe mitral regurgitation.   Mild aortic stenosis.  Estimated right ventricular systolic pressure  is 25 mmHg.  Aortic root is 4 cm.   Compared to the images of the prior study done 2/29/16 there has been   no  significant change.   Consider YOANDY for better evaluation of MR severity.    Exam Date:         02/09/2017       Assessment:     1. Paroxysmal atrial fibrillation (CMS-HCC)     2. Essential hypertension, benign     3. Dyslipidemia         Medical Decision Making:  Today's Assessment / Status / Plan:     Mr. Hester is clinically stable except for his difficulty with his balance. His echocardiogram in February of this year showed possibly severe mitral regurgitation. However, his LV function is normal and he is asymptomatic from this. We will reevaluate this in a few months with a repeat echocardiogram and BNP. Patient will follow-up after those procedures. His blood pressure appears to be under good control. His lipid status is also under good control.      Michael J Bloch, M.D.  [address] [address]  VIA In Basket

## 2017-11-28 NOTE — PROGRESS NOTES
Subjective:   Tristan Hester is a 82 y.o. male who presents today for follow-up of his PAF, hypertension and dyslipidemia.    His blood pressures at home running approximately 120-130/70-75.    He has been having significant difficulty with his balance. He has seen both neurology and ENT. He's been stable with respect to his cardiac status. He denies any chest discomfort, edema, palpitations or lightheadedness. He feels he can walk about a mile before he is dyspneic. However, his activity level has been limited somewhat because of his balance difficulties.    Past Medical History:   Diagnosis Date   • Arrhythmia     Afib   • Arthritis     osteo, hips and knees   • Blood clotting disorder (CMS-HCC)     , left arm   • Bronchitis        • Cancer (CMS-HCC)     Prostate CA--2000   • Carotid arterial disease (CMS-HCC) 1/6/2014   • Cataract     removed bilat   • Heart valve disease     MVR   • HLD (hyperlipidemia) 1/6/2014   • Hypertension    • Mitral regurgitation 1/6/2014   • Pain 12-14-15    low back, hips, 2-3/10   • Pneumonia        • Stroke (CMS-HCC) 2008    TIA, no residual   • TIA (transient ischemic attack)     2008   • Urinary bladder disorder     HAD MALE BLADDER SUSPENSION PROCEDURE DONE   • Urinary incontinence      Past Surgical History:   Procedure Laterality Date   • LUMBAR FUSION POSTERIOR  12/28/2015    Procedure: LUMBAR FUSION POSTERIOR L3-5 with peek rods;  Surgeon: Michael Almonte M.D.;  Location: [facility];  Service:    • LUMBAR DECOMPRESSION  12/28/2015    Procedure: LUMBAR DECOMPRESSION posterior redo decompression L3-5, with dural repair;  Surgeon: Michael Almonte M.D.;  Location: [facility];  Service:    • THORACOSCOPY  6/13/2015    Procedure: THORACOSCOPY with decortication VATS , side to be determined  ;  Surgeon: Elmira Holder M.D.;  Location: [facility];  Service:    • LUMBAR LAMINECTOMY DISKECTOMY  10/8/2010    Performed by  "PHYLICIA SARKAR at SURGERY [facility] ORS   • CERVICAL DISK AND FUSION ANTERIOR  10/26/2009    Performed by PHYLICIA SARKAR at SURGERY [facility] ORS   • CAROTID ENDARTERECTOMY  7/10/08    Performed by LEIGH MORATAYA at SURGERY [facility] ORS   • BLADDER SLING MALE  11/2004   • PROSTATECTOMY, RADIAL  11/2000     Family History   Problem Relation Age of Onset   • Heart Attack Neg Hx      History   Smoking Status   • Former Smoker   • Packs/day: 0.50   • Years: 30.00   • Types: Cigarettes, Pipe   • Quit date: 1/1/1985   Smokeless Tobacco   • Never Used     Allergies   Allergen Reactions   • Cantaloupe      Pt stated cantaloupe causes his throat to constrict   • Nkda [No Known Drug Allergy]    • Other Environmental      Seasonal, cats-hayfever     Outpatient Encounter Prescriptions as of 11/28/2017   Medication Sig Dispense Refill   • temazepam (RESTORIL) 30 MG capsule TAKE ONE CAPSULE BY MOUTH ONCE DAILY AT BEDTIME AS NEEDED FOR SLEEP 90 Cap 1   • Multiple Vitamin (MULTI-VITAMINS) Tab Take  by mouth.     • CARTIA  MG CAPSULE SR 24 HR      • gabapentin (NEURONTIN) 300 MG Cap Take 1 Cap by mouth 3 times a day. 90 Cap 3   • warfarin (COUMADIN) 4 MG Tab Take 2 Tabs by mouth every day. 180 Tab 1   • atorvastatin (LIPITOR) 80 MG tablet Take 0.5 Tabs by mouth every evening. 90 Tab 3   • acetaminophen (TYLENOL) 325 MG Tab Take 650 mg by mouth every four hours as needed.       No facility-administered encounter medications on file as of 11/28/2017.      ROS     Objective:   /70   Pulse 70   Ht 1.803 m (5' 11\")   Wt 86.2 kg (190 lb)   SpO2 94%   BMI 26.50 kg/m²     Physical Exam   Neck: No JVD present.   Cardiovascular: Normal rate and regular rhythm.  Exam reveals no gallop.    Murmur (3/6 systolic at the base of rating towards the apex.) heard.  Pulmonary/Chest: Effort normal. He has no rales.   Abdominal: Soft. There is no tenderness.   Musculoskeletal: He exhibits no edema.     Lab Results   Component Value " Date/Time    CHOLSTRLTOT 130 09/27/2017 07:53 AM    CHOLSTRLTOT 146 06/02/2008 11:00 PM    LDL 64 09/27/2017 07:53 AM    LDL 85 06/02/2008 11:00 PM    HDL 54 09/27/2017 07:53 AM    HDL 49 06/02/2008 11:00 PM    TRIGLYCERIDE 58 09/27/2017 07:53 AM    TRIGLYCERIDE 60 06/02/2008 11:00 PM       Lab Results   Component Value Date/Time    SODIUM 144 09/27/2017 07:53 AM    SODIUM 138 01/12/2016 12:20 PM    POTASSIUM 4.5 09/27/2017 07:53 AM    POTASSIUM 4.3 01/12/2016 12:20 PM    CHLORIDE 106 09/27/2017 07:53 AM    CHLORIDE 105 01/12/2016 12:20 PM    CO2 24 09/27/2017 07:53 AM    CO2 26 01/12/2016 12:20 PM    GLUCOSE 103 (H) 09/27/2017 07:53 AM    GLUCOSE 128 (H) 01/12/2016 12:20 PM    BUN 22 09/27/2017 07:53 AM    BUN 18 01/12/2016 12:20 PM    CREATININE 0.99 09/27/2017 07:53 AM    CREATININE 1.00 01/12/2016 12:20 PM    CREATININE 1.0 07/02/2008 03:43 PM    BUNCREATRAT 22 09/27/2017 07:53 AM     Lab Results   Component Value Date/Time    ALKPHOSPHAT 63 09/27/2017 07:53 AM    ALKPHOSPHAT 52 01/11/2016 04:25 AM    ASTSGOT 25 09/27/2017 07:53 AM    ASTSGOT 14 01/11/2016 04:25 AM    ALTSGPT 15 09/27/2017 07:53 AM    ALTSGPT 11 01/11/2016 04:25 AM    TBILIRUBIN 1.1 09/27/2017 07:53 AM    TBILIRUBIN 1.1 01/11/2016 04:25 AM      Lab Results   Component Value Date/Time    BNPBTYPENAT 244 (H) 06/23/2015 01:41 AM      Echocardiogram:  CONCLUSIONS  Left ventricular ejection fraction is visually estimated to be 60%.   Grade I diastolic dysfunction.  Possibly severe mitral regurgitation.   Mild aortic stenosis.  Estimated right ventricular systolic pressure  is 25 mmHg.  Aortic root is 4 cm.   Compared to the images of the prior study done 2/29/16 there has been   no significant change.   Consider YOANDY for better evaluation of MR severity.    Exam Date:         02/09/2017       Assessment:     1. Paroxysmal atrial fibrillation (CMS-HCC)     2. Essential hypertension, benign     3. Dyslipidemia         Medical Decision Making:  Today's  Assessment / Status / Plan:     Mr. Hester is clinically stable except for his difficulty with his balance. His echocardiogram in February of this year showed possibly severe mitral regurgitation. However, his LV function is normal and he is asymptomatic from this. We will reevaluate this in a few months with a repeat echocardiogram and BNP. Patient will follow-up after those procedures. His blood pressure appears to be under good control. His lipid status is also under good control.

## 2017-12-05 ENCOUNTER — ANTICOAGULATION VISIT (OUTPATIENT)
Dept: VASCULAR LAB | Facility: MEDICAL CENTER | Age: 82
End: 2017-12-05
Attending: INTERNAL MEDICINE
Payer: MEDICARE

## 2017-12-05 VITALS — HEART RATE: 75 BPM | DIASTOLIC BLOOD PRESSURE: 75 MMHG | SYSTOLIC BLOOD PRESSURE: 134 MMHG

## 2017-12-05 DIAGNOSIS — I48.0 PAROXYSMAL ATRIAL FIBRILLATION (HCC): ICD-10-CM

## 2017-12-05 LAB
INR BLD: 2.6 (ref 0.9–1.2)
INR PPP: 2.6 (ref 2–3.5)

## 2017-12-05 PROCEDURE — 99211 OFF/OP EST MAY X REQ PHY/QHP: CPT

## 2017-12-05 PROCEDURE — 85610 PROTHROMBIN TIME: CPT

## 2017-12-05 NOTE — PROGRESS NOTES
Anticoagulation Summary  As of 12/5/2017    INR goal:   2.0-3.0   TTR:   61.1 % (2.3 y)   Today's INR:   2.6   Maintenance plan:   8 mg (4 mg x 2) on Sun, Tue, Thu; 6 mg (4 mg x 1.5) all other days   Weekly total:   48 mg   Plan last modified:   Quita Sim, PharmD (1/26/2017)   Next INR check:   1/16/2018   Target end date:   Indefinite    Indications    DVT (deep venous thrombosis) (CMS-HCC) [I82.409]  Paroxysmal atrial fibrillation (CMS-HCC) [I48.0]  Hx of Stroke (Resolved) [I63.9]  Hx of TIA (transient ischemic attack) [G45.9]  Deep vein thrombosis (DVT) (CMS-HCC) (Resolved) [I82.409]  Atrial fibrillation (CMS-HCC) (Resolved) [I48.91]             Anticoagulation Episode Summary     INR check location:   [facility]    Preferred lab:       Send INR reminders to:       Comments:         Anticoagulation Care Providers     Provider Role Specialty Phone number    Michael J Bloch, M.D. Referring Internal Medicine [phone]    [facility]   [phone]        Anticoagulation Patient Findings      HPI:  Tristan Hester seen in clinic today, on anticoagulation therapy with warfarin  for DVT.   Changes to current medical/health status since last appt: none.   Denies signs/symptoms of bleeding and/or thrombosis since the last appt.    Denies any interval changes to diet  Denies any interval changes to medications since last appt.   Denies any complications or cost restrictions with current therapy.   BP recorded in vitals.  Confirmed dosing regimen.     A/P   INR  therapeutic.   Pt is to continue with current warfarin dosing regimen.     Follow up appointment in 6 week(s).    Satya Parra, JerzyD

## 2017-12-13 ENCOUNTER — OFFICE VISIT (OUTPATIENT)
Dept: NEUROLOGY | Facility: MEDICAL CENTER | Age: 82
End: 2017-12-13
Payer: MEDICARE

## 2017-12-13 VITALS
DIASTOLIC BLOOD PRESSURE: 80 MMHG | WEIGHT: 192 LBS | OXYGEN SATURATION: 95 % | HEIGHT: 71 IN | BODY MASS INDEX: 26.88 KG/M2 | SYSTOLIC BLOOD PRESSURE: 130 MMHG | TEMPERATURE: 97.5 F | HEART RATE: 70 BPM

## 2017-12-13 DIAGNOSIS — M54.16 LUMBAR RADICULOPATHY: ICD-10-CM

## 2017-12-13 DIAGNOSIS — H81.312 AUDITORY VERTIGO INVOLVING LEFT EAR: Primary | ICD-10-CM

## 2017-12-13 PROCEDURE — 99212 OFFICE O/P EST SF 10 MIN: CPT

## 2017-12-13 RX ORDER — ASCORBIC ACID 500 MG
500 TABLET ORAL DAILY
COMMUNITY
End: 2018-03-13

## 2017-12-13 NOTE — PROGRESS NOTES
Neurology Progress Note  12/13/2017      Referring MD:  Dr. Bloch  Patient ID:  Name:             Tristan Hester   YOB: 1935  Age:                 82 y.o.  male   MRN:               [id number]                                              Reason for Consult:      Follow-up of off-balance sensation    History of Present Illness:    This 82-year-old man has had some persistent mild sensation of off balance and was initially thought to have a vestibular issue and tried physical therapy which didn't result in a big improvement. He then had an electronystagmogram testing and audiometry through an airand throat specialist which did not show vestibular call us. He was given suggestion that perhaps an MRI should be carried out. His last MRI was in 2008 and showed only some minimal small vessel disease consistent with age. There were no internal auditory or posterior fossa issues and it didn't appear to be any significant cerebellar atrophy.    Review of Systems: He does have atrial fibrillation and is on anticoagulation.  Constitutional: Denies fevers, Denies weight changes  Eyes: Denies changes in vision, no eye pain  Ears/Nose/Throat/Mouth: Denies nasal congestion or sore throat   Cardiovascular: Denies chest pain, Denies palpitations   Respiratory: Denies shortness of breath , Denies cough  Gastrointestinal/Hepatic: Denies abdominal pain, nausea, vomiting, diarrhea, constipation or GI bleeding   Genitourinary: Denies dysuria or frequency  Musculoskeletal/Rheum: Denies  joint pain and swelling, No edema  Skin: Denies rash  Neurological: Denies headache, confusion, memory loss or focal weakness/parasthesias  Psychiatric: denies mood disorder   Endocrine: Whit thyroid problems  Heme/Oncology/Lymph Nodes: Denies enlarged lymph nodes, denies brusing or known bleeding disorder  All other systems were reviewed and are negative (AMA/CMS criteria)                Past Medical History:     Past Medical History:    Diagnosis Date   • Arrhythmia     Afib   • Arthritis     osteo, hips and knees   • Blood clotting disorder (CMS-HCC)     , left arm   • Bronchitis        • Cancer (CMS-HCC)     Prostate CA--2000   • Carotid arterial disease (CMS-HCC) 1/6/2014   • Cataract     removed bilat   • Heart valve disease     MVR   • HLD (hyperlipidemia) 1/6/2014   • Hypertension    • Mitral regurgitation 1/6/2014   • Pain 12-14-15    low back, hips, 2-3/10   • Pneumonia        • Stroke (CMS-HCC) 2008    TIA, no residual   • TIA (transient ischemic attack)     2008   • Urinary bladder disorder     HAD MALE BLADDER SUSPENSION PROCEDURE DONE   • Urinary incontinence        Problems addressed this visit:    Problem List Items Addressed This Visit     None      Visit Diagnoses     Auditory vertigo involving left ear    -  Primary    Relevant Orders    MR-BRAIN-W/O    Lumbar radiculopathy              Past Surgical History:   Past Surgical History:   Procedure Laterality Date   • LUMBAR FUSION POSTERIOR  12/28/2015    Procedure: LUMBAR FUSION POSTERIOR L3-5 with peek rods;  Surgeon: Michael Sarkar M.D.;  Location: [facility];  Service:    • LUMBAR DECOMPRESSION  12/28/2015    Procedure: LUMBAR DECOMPRESSION posterior redo decompression L3-5, with dural repair;  Surgeon: Michael Sarkar M.D.;  Location: [facility];  Service:    • THORACOSCOPY  6/13/2015    Procedure: THORACOSCOPY with decortication VATS , side to be determined  ;  Surgeon: Elmira Holder M.D.;  Location: [facility];  Service:    • LUMBAR LAMINECTOMY DISKECTOMY  10/8/2010    Performed by MICHAEL SARKAR at [facility]   • CERVICAL DISK AND FUSION ANTERIOR  10/26/2009    Performed by MICHAEL SARKAR at [facility]   • CAROTID ENDARTERECTOMY  7/10/08    Performed by LEIGH MORATAYA at [facility]   • BLADDER SLING MALE  11/2004   • PROSTATECTOMY, RADIAL  11/2000         Current Outpatient  "Medications:  Current Outpatient Prescriptions   Medication   • ascorbic acid (ASCORBIC ACID) 500 MG Tab   • temazepam (RESTORIL) 30 MG capsule   • Multiple Vitamin (MULTI-VITAMINS) Tab   • CARTIA  MG CAPSULE SR 24 HR   • gabapentin (NEURONTIN) 300 MG Cap   • warfarin (COUMADIN) 4 MG Tab   • atorvastatin (LIPITOR) 80 MG tablet   • acetaminophen (TYLENOL) 325 MG Tab     No current facility-administered medications for this visit.        Medication Allergy:  Allergies   Allergen Reactions   • Cantaloupe      Pt stated cantaloupe causes his throat to constrict   • Nkda [No Known Drug Allergy]    • Other Environmental      Seasonal, cats-hayfever       Family History:  Family History   Problem Relation Age of Onset   • Heart Attack Neg Hx        Social History:  Social History     Social History   • Marital status:      Spouse name: N/A   • Number of children: N/A   • Years of education: N/A     Occupational History   • Not on file.     Social History Main Topics   • Smoking status: Former Smoker     Packs/day: 0.50     Years: 30.00     Types: Cigarettes, Pipe     Quit date: 1/1/1985   • Smokeless tobacco: Never Used   • Alcohol use 3.0 oz/week     3 Cans of beer, 3 Shots of liquor per week      Comment: 1 x week   • Drug use: No   • Sexual activity: Not Currently     Other Topics Concern   • Not on file     Social History Narrative   • No narrative on file       Physical Exam:  Vitals:   Vitals:    12/13/17 0852   BP: 130/80   Pulse: 70   Temp: 36.4 °C (97.5 °F)   SpO2: 95%   Weight: 87.1 kg (192 lb)   Height: 1.803 m (5' 11\")     General Appearance:He is well-developed well-nourished gentleman who appears his stated age. He get out of the chair without using his hands has normal stride length and normal width of stance. He can turn without becoming off balance. There is no nystagmus and extraocular movements are full and equal.  Weight/BMI: Body mass index is 26.78 kg/m².      Eyes:  Normal optic discs: " Yes  Visual field: Normal  Pupillary responses: Normal  Extraocular movement: Normal    Cardiovascular:  Normal carotid pulses bilaterally: Yes  RRR with no MRGs: No atrial fibrillation  No peripheral edema, pulses intact: Yes    Musculoskeletal:  Normal gait and station: Yes  Normal muscle strength: Yes  Normal muscle tone, no atrophy: Yes  No abnormal movements: Yes    Plan:   We'll plan on getting an MRI without contrast and conveyed the results to him or see him back in a month.    Total length of time of this visit was 20 minutes of which greater than 50% was spent counseling the patient/family and coordinating care.    Thank you for allowing me to participate in his care.    Sincerely yours,        James Valdez MD, PhD

## 2017-12-16 ENCOUNTER — HOSPITAL ENCOUNTER (OUTPATIENT)
Dept: RADIOLOGY | Facility: MEDICAL CENTER | Age: 82
End: 2017-12-16
Payer: MEDICARE

## 2017-12-16 PROCEDURE — 70551 MRI BRAIN STEM W/O DYE: CPT

## 2017-12-21 DIAGNOSIS — E78.5 HYPERLIPIDEMIA, UNSPECIFIED HYPERLIPIDEMIA TYPE: ICD-10-CM

## 2017-12-22 RX ORDER — ATORVASTATIN CALCIUM 80 MG/1
40 TABLET, FILM COATED ORAL EVERY EVENING
Qty: 90 TAB | Refills: 3 | Status: SHIPPED | OUTPATIENT
Start: 2017-12-22 | End: 2018-11-19 | Stop reason: SDUPTHER

## 2017-12-30 ENCOUNTER — HOSPITAL ENCOUNTER (EMERGENCY)
Facility: MEDICAL CENTER | Age: 82
End: 2017-12-30
Attending: EMERGENCY MEDICINE
Payer: MEDICARE

## 2017-12-30 ENCOUNTER — APPOINTMENT (OUTPATIENT)
Dept: RADIOLOGY | Facility: MEDICAL CENTER | Age: 82
End: 2017-12-30
Attending: EMERGENCY MEDICINE
Payer: MEDICARE

## 2017-12-30 VITALS
OXYGEN SATURATION: 93 % | WEIGHT: 194.89 LBS | TEMPERATURE: 98.1 F | HEART RATE: 83 BPM | DIASTOLIC BLOOD PRESSURE: 72 MMHG | SYSTOLIC BLOOD PRESSURE: 116 MMHG | BODY MASS INDEX: 27.28 KG/M2 | HEIGHT: 71 IN | RESPIRATION RATE: 16 BRPM

## 2017-12-30 DIAGNOSIS — M17.12 ARTHRITIS OF KNEE, LEFT: ICD-10-CM

## 2017-12-30 DIAGNOSIS — M25.462 EFFUSION OF LEFT KNEE: ICD-10-CM

## 2017-12-30 LAB
INR PPP: 2.37 (ref 0.87–1.13)
PROTHROMBIN TIME: 25.6 SEC (ref 12–14.6)

## 2017-12-30 PROCEDURE — 85610 PROTHROMBIN TIME: CPT

## 2017-12-30 PROCEDURE — 93971 EXTREMITY STUDY: CPT

## 2017-12-30 PROCEDURE — 73564 X-RAY EXAM KNEE 4 OR MORE: CPT | Mod: LT

## 2017-12-30 PROCEDURE — 36415 COLL VENOUS BLD VENIPUNCTURE: CPT

## 2017-12-30 PROCEDURE — 99284 EMERGENCY DEPT VISIT MOD MDM: CPT

## 2017-12-30 ASSESSMENT — PAIN SCALES - GENERAL: PAINLEVEL_OUTOF10: 3

## 2017-12-30 NOTE — ED NOTES
Pt verbalized understanding of DC instructions, pt with no further questions. Pt ambulate out of ED with steady gait with cane and knee immobalizer

## 2017-12-30 NOTE — DISCHARGE INSTRUCTIONS
Knee Effusion  Knee effusion means that you have extra fluid in your knee. This can cause pain. Your knee may be more difficult to bend and move.  HOME CARE  · Use crutches as told by your doctor.  · Wear a knee brace as told by your doctor.  · Apply ice to the swollen area:  ¨ Put ice in a plastic bag.  ¨ Place a towel between your skin and the bag.  ¨ Leave the ice on for 20 minutes, 2-3 times per day.  · Keep your knee raised (elevated) when you are sitting or lying down.  · Take medicines only as told by your doctor.  · Do any rehabilitation or strengthening exercises as told by your doctor.  · Rest your knee as told by your doctor. You may start doing your normal activities again when your doctor says it is okay.  · Keep all follow-up visits as told by your doctor. This is important.  GET HELP IF:   · You continue to have pain in your knee.  GET HELP RIGHT AWAY IF:  · You have increased swelling or redness of your knee.  · You have severe pain in your knee.  · You have a fever.     This information is not intended to replace advice given to you by your health care provider. Make sure you discuss any questions you have with your health care provider.     Document Released: 01/20/2012 Document Revised: 01/08/2016 Document Reviewed: 08/03/2015  [employer] Interactive Patient Education ©2016 [employer]

## 2017-12-30 NOTE — ED PROVIDER NOTES
ED Provider Note    CHIEF COMPLAINT   Chief Complaint   Patient presents with   • Knee Swelling   • Knee Pain     left, tuesday night, after walking. increase pain last night        HPI   Tristan Hester is a 82 y.o. male who presentsTo the ED secondary to left knee pain. The patient states her last 4-5 days the patient has had increasing pain and swelling throughout the left knee. Patient does not have any history of having problems with the left knee. The patient is on Coumadin secondary to DVTs. Patient is concerned about a DVT. No chest pains, shortness of breath. Patient states the pain is better in the morning, worse at night. No abdominal pains, nausea vomiting    REVIEW OF SYSTEMS   See HPI for further details.      PAST MEDICAL HISTORY   Past Medical History:   Diagnosis Date   • Arrhythmia     Afib   • Arthritis     osteo, hips and knees   • Blood clotting disorder (CMS-HCC)     , left arm   • Bronchitis        • Cancer (CMS-HCC)     Prostate CA--2000   • Carotid arterial disease (CMS-HCC) 1/6/2014   • Cataract     removed bilat   • DVT (deep venous thrombosis) (CMS-HCC) 2016   • Heart valve disease     MVR   • HLD (hyperlipidemia) 1/6/2014   • Hypertension    • Mitral regurgitation 1/6/2014   • Pain 12-14-15    low back, hips, 2-3/10   • Pneumonia        • Stroke (CMS-HCC) 2008    TIA, no residual   • TIA (transient ischemic attack)     2008   • Urinary bladder disorder     HAD MALE BLADDER SUSPENSION PROCEDURE DONE   • Urinary incontinence        FAMILY HISTORY  Family History   Problem Relation Age of Onset   • Heart Attack Neg Hx        SOCIAL HISTORY  Social History     Social History   • Marital status:      Spouse name: N/A   • Number of children: N/A   • Years of education: N/A     Social History Main Topics   • Smoking status: Former Smoker     Packs/day: 0.50     Years: 30.00     Types: Cigarettes, Pipe     Quit date: 1/1/1985   • Smokeless tobacco: Never Used   •  Alcohol use 3.0 oz/week     3 Cans of beer, 3 Shots of liquor per week      Comment: 1 x week   • Drug use: No   • Sexual activity: Not Currently     Other Topics Concern   • Not on file     Social History Narrative   • No narrative on file       SURGICAL HISTORY  Past Surgical History:   Procedure Laterality Date   • LUMBAR FUSION POSTERIOR  12/28/2015    Procedure: LUMBAR FUSION POSTERIOR L3-5 with peek rods;  Surgeon: Phylicia Almonte M.D.;  Location: [facility];  Service:    • LUMBAR DECOMPRESSION  12/28/2015    Procedure: LUMBAR DECOMPRESSION posterior redo decompression L3-5, with dural repair;  Surgeon: Phylicia Almonte M.D.;  Location: [facility];  Service:    • THORACOSCOPY  6/13/2015    Procedure: THORACOSCOPY with decortication VATS , side to be determined  ;  Surgeon: Elmira Holder M.D.;  Location: [facility];  Service:    • LUMBAR LAMINECTOMY DISKECTOMY  10/8/2010    Performed by PHYLICIA ALMONTE at [facility]   • CERVICAL DISK AND FUSION ANTERIOR  10/26/2009    Performed by PHYLICIA ALMONTE at [facility]   • CAROTID ENDARTERECTOMY  7/10/08    Performed by LEIGH MORATAYA at [facility]   • BLADDER SLING MALE  11/2004   • PROSTATECTOMY, RADIAL  11/2000       CURRENT MEDICATIONS   Home Medications     Reviewed by Siri Hurt R.N. (Registered Nurse) on 12/30/17 at 0911  Med List Status: Partial   Medication Last Dose Status   acetaminophen (TYLENOL) 325 MG Tab 12/13/2017 Active   ascorbic acid (ASCORBIC ACID) 500 MG Tab 12/13/2017 Active   atorvastatin (LIPITOR) 80 MG tablet 12/29/2017 Active   CARTIA  MG CAPSULE SR 24 HR 12/30/2017 Active   gabapentin (NEURONTIN) 300 MG Cap 12/13/2017 Active   Multiple Vitamin (MULTI-VITAMINS) Tab 12/13/2017 Active   temazepam (RESTORIL) 30 MG capsule 12/13/2017 Active   warfarin (COUMADIN) 4 MG Tab 12/29/2017 Active                ALLERGIES   Allergies   Allergen Reactions   • Cantaloupe       "Pt stated cantaloupe causes his throat to constrict   • Nkda [No Known Drug Allergy]    • Other Environmental      Seasonal, cats-hayfever       PHYSICAL EXAM  VITAL SIGNS: /72   Pulse 83   Temp 36.7 °C (98.1 °F)   Resp 16   Ht 1.803 m (5' 11\")   Wt 88.4 kg (194 lb 14.2 oz)   SpO2 93%   BMI 27.18 kg/m²   Constitutional: Well developed, Well nourished,Mild distress, Non-toxic appearance.   HENT:  Atraumatic, Normocephalic, Oral pharynx with moist mucous membranes.   Eyes: EOMI, PERRL  Cardiovascular: Good Pulses  Thorax & Lungs: No respiratory distress  Abdomen: Soft nontender   Skin: Warm, Dry, No erythema, No rash.   Musculoskeletal: Moderate joint effusion of the left knee, decreased range of motion secondary to pain, slight tenderness to palpation in the posterior knee and distal thigh proximal calf area.  Neurologic: Alert & oriented x 3,     RADIOLOGY/PROCEDURES  LE VENOUS DUPLEX (Specify in Comments Left, Right Or Bilateral)   Final Result      DX-KNEE COMPLETE 4+ LEFT   Final Result      1.  Tricompartmental left knee osteoarthritis      2.  No fracture      3.  Extensive atherosclerotic plaque            COURSE & MEDICAL DECISION MAKING  Pertinent Labs & Imaging studies reviewed. (See chart for details)  Patient with history of DVT who is on chronic Coumadin who is coming in with left knee pain and swelling, to piece appears to be more likely reactive arthritis with a joint effusion. We'll get an ultrasound rule out DVT, check the patient's INR, plain x-ray.  X-ray shows severe arthritis, joint effusion, ultrasound was negative, we'll put the patient in a knee immobilizer, have the patient follow up with Dr. Hooks with orthopedics, have the patient return with any other concerns.    FINAL IMPRESSION  1. Effusion of left knee    2. Arthritis of knee, left        Patient referred to primary care provider for blood pressure management     This dictation was created using voice recognition " software. The accuracy of the dictation is limited to the abilities of the software. I expect there may be some errors of grammar and possibly content. The nursing notes were reviewed and certain aspects of this information were incorporated into this note.    Electronically signed by: Drew Landers, 12/30/2017 9:29 AM

## 2017-12-30 NOTE — ED NOTES
83 y/o male ambulate to triage with cane  Chief Complaint   Patient presents with   • Knee Swelling   • Knee Pain     left, tuesday night, after walking. increase pain last night      Pt denies trauma.

## 2017-12-31 ENCOUNTER — PATIENT OUTREACH (OUTPATIENT)
Dept: HEALTH INFORMATION MANAGEMENT | Facility: OTHER | Age: 82
End: 2017-12-31

## 2017-12-31 NOTE — PROGRESS NOTES
Placed discharge outreach phone call to patient s/p ER discharge 12/30/17.  Left voicemail providing my contact information and instructions to call with any questions or concerns.

## 2018-01-16 ENCOUNTER — ANTICOAGULATION VISIT (OUTPATIENT)
Dept: VASCULAR LAB | Facility: MEDICAL CENTER | Age: 83
End: 2018-01-16
Attending: INTERNAL MEDICINE
Payer: MEDICARE

## 2018-01-16 VITALS — HEART RATE: 73 BPM | SYSTOLIC BLOOD PRESSURE: 119 MMHG | DIASTOLIC BLOOD PRESSURE: 74 MMHG

## 2018-01-16 DIAGNOSIS — G45.0 VERTEBROBASILAR ARTERY SYNDROME: ICD-10-CM

## 2018-01-16 DIAGNOSIS — I48.0 PAROXYSMAL ATRIAL FIBRILLATION (HCC): ICD-10-CM

## 2018-01-16 DIAGNOSIS — I48.91 ATRIAL FIBRILLATION, UNSPECIFIED TYPE (HCC): ICD-10-CM

## 2018-01-16 DIAGNOSIS — I82.401 ACUTE DEEP VEIN THROMBOSIS (DVT) OF RIGHT LOWER EXTREMITY, UNSPECIFIED VEIN (HCC): ICD-10-CM

## 2018-01-16 LAB
INR BLD: 2.3 (ref 0.9–1.2)
INR PPP: 2.3 (ref 2–3.5)

## 2018-01-16 PROCEDURE — 85610 PROTHROMBIN TIME: CPT

## 2018-01-16 PROCEDURE — 99211 OFF/OP EST MAY X REQ PHY/QHP: CPT

## 2018-01-16 RX ORDER — WARFARIN SODIUM 4 MG/1
TABLET ORAL
Qty: 180 TAB | Refills: 1 | Status: SHIPPED | OUTPATIENT
Start: 2018-01-16 | End: 2018-04-19 | Stop reason: SDUPTHER

## 2018-01-16 NOTE — PROGRESS NOTES
Anticoagulation Summary  As of 1/16/2018    INR goal:   2.0-3.0   TTR:   62.9 % (2.4 y)   Today's INR:   2.3   Maintenance plan:   8 mg (4 mg x 2) on Sun, Tue, Thu; 6 mg (4 mg x 1.5) all other days   Weekly total:   48 mg   Plan last modified:   Quita Sim, PharmD (1/26/2017)   Next INR check:   3/13/2018   Target end date:   Indefinite    Indications    DVT (deep venous thrombosis) (CMS-HCC) [I82.409]  Paroxysmal atrial fibrillation (CMS-HCC) [I48.0]  Hx of Stroke (Resolved) [I63.9]  Hx of TIA (transient ischemic attack) [G45.9]  Deep vein thrombosis (DVT) (CMS-HCC) (Resolved) [I82.409]  Atrial fibrillation (CMS-HCC) (Resolved) [I48.91]             Anticoagulation Episode Summary     [facility] check [facility]   [facility]    Preferred lab:       Send INR reminders to:       Comments:         Anticoagulation Care Providers     Provider Role Specialty Phone number    Michael J Bloch, M.D. Referring Internal Medicine [phone]    [facility]   [phone]        Anticoagulation Patient Findings  Patient Findings     Negatives:   Signs/symptoms of thrombosis, Signs/symptoms of bleeding, Laboratory test error suspected, Change in health, Change in alcohol use, Change in activity, Upcoming invasive procedure, Emergency department visit, Upcoming dental procedure, Missed doses, Extra doses, Change in medications, Change in diet/appetite, Hospital admission, Bruising, Other complaints        History of Present Illness: follow up appointment for chronic anticoagulation with the high risk medication, warfarin for atrial fibrillation/ TIA/DVT  Pt remains therapeutic today. Pt is to continue with current warfarin dosing regimen.  Follow up in 8 weeks, to reduce risk of adverse events related to this high risk medication,  Warfarin.    .sgi

## 2018-01-24 DIAGNOSIS — Z79.01 CHRONIC ANTICOAGULATION: ICD-10-CM

## 2018-01-26 LAB
ALBUMIN SERPL-MCNC: 4 G/DL (ref 3.5–4.7)
ALBUMIN/CREAT UR: 2.1 MG/G CREAT (ref 0–30)
ALBUMIN/GLOB SERPL: 1.4 {RATIO} (ref 1.2–2.2)
ALP SERPL-CCNC: 61 IU/L (ref 39–117)
ALT SERPL-CCNC: 16 IU/L (ref 0–44)
AST SERPL-CCNC: 22 IU/L (ref 0–40)
BILIRUB SERPL-MCNC: 1.1 MG/DL (ref 0–1.2)
BNP SERPL-MCNC: 615.6 PG/ML (ref 0–100)
BUN SERPL-MCNC: 21 MG/DL (ref 8–27)
BUN/CREAT SERPL: 18 (ref 10–24)
CALCIUM SERPL-MCNC: 9.4 MG/DL (ref 8.6–10.2)
CHLORIDE SERPL-SCNC: 106 MMOL/L (ref 96–106)
CHOLEST SERPL-MCNC: 149 MG/DL (ref 100–199)
CO2 SERPL-SCNC: 24 MMOL/L (ref 18–29)
COMMENT 011824: NORMAL
CREAT SERPL-MCNC: 1.16 MG/DL (ref 0.76–1.27)
CREAT UR-MCNC: 206.2 MG/DL
GFR SERPLBLD CREATININE-BSD FMLA CKD-EPI: 59 ML/MIN/1.73
GFR SERPLBLD CREATININE-BSD FMLA CKD-EPI: 68 ML/MIN/1.73
GLOBULIN SER CALC-MCNC: 2.8 G/DL (ref 1.5–4.5)
GLUCOSE SERPL-MCNC: 90 MG/DL (ref 65–99)
HDLC SERPL-MCNC: 60 MG/DL
LDLC SERPL CALC-MCNC: 78 MG/DL (ref 0–99)
MICROALBUMIN UR-MCNC: 4.4 UG/ML
POTASSIUM SERPL-SCNC: 4.2 MMOL/L (ref 3.5–5.2)
PROT SERPL-MCNC: 6.8 G/DL (ref 6–8.5)
SODIUM SERPL-SCNC: 145 MMOL/L (ref 134–144)
TRIGL SERPL-MCNC: 53 MG/DL (ref 0–149)
TSH SERPL DL<=0.005 MIU/L-ACNC: 1.4 UIU/ML (ref 0.45–4.5)
VLDLC SERPL CALC-MCNC: 11 MG/DL (ref 5–40)

## 2018-02-13 ENCOUNTER — HOSPITAL ENCOUNTER (OUTPATIENT)
Dept: CARDIOLOGY | Facility: MEDICAL CENTER | Age: 83
End: 2018-02-13
Attending: INTERNAL MEDICINE
Payer: MEDICARE

## 2018-02-13 DIAGNOSIS — I34.0 NON-RHEUMATIC MITRAL REGURGITATION: ICD-10-CM

## 2018-02-13 PROCEDURE — 93306 TTE W/DOPPLER COMPLETE: CPT

## 2018-02-13 PROCEDURE — 93306 TTE W/DOPPLER COMPLETE: CPT | Mod: 26 | Performed by: INTERNAL MEDICINE

## 2018-02-14 LAB
LV EJECT FRACT  99904: 60
LV EJECT FRACT MOD 2C 99903: 60.77
LV EJECT FRACT MOD 4C 99902: 61.28
LV EJECT FRACT MOD BP 99901: 62.07

## 2018-02-16 DIAGNOSIS — M54.16 LUMBAR RADICULOPATHY: ICD-10-CM

## 2018-02-16 RX ORDER — GABAPENTIN 300 MG/1
300 CAPSULE ORAL 3 TIMES DAILY
Qty: 90 CAP | Refills: 3 | Status: SHIPPED | OUTPATIENT
Start: 2018-02-16 | End: 2018-02-16

## 2018-02-16 RX ORDER — GABAPENTIN 300 MG/1
300 CAPSULE ORAL 3 TIMES DAILY
Qty: 270 CAP | Refills: 3 | Status: SHIPPED | OUTPATIENT
Start: 2018-02-16 | End: 2018-06-18 | Stop reason: SDUPTHER

## 2018-02-20 ENCOUNTER — OFFICE VISIT (OUTPATIENT)
Dept: CARDIOLOGY | Facility: MEDICAL CENTER | Age: 83
End: 2018-02-20
Payer: MEDICARE

## 2018-02-20 VITALS
HEART RATE: 72 BPM | WEIGHT: 190 LBS | HEIGHT: 71 IN | OXYGEN SATURATION: 95 % | SYSTOLIC BLOOD PRESSURE: 128 MMHG | DIASTOLIC BLOOD PRESSURE: 70 MMHG | BODY MASS INDEX: 26.6 KG/M2

## 2018-02-20 DIAGNOSIS — I10 ESSENTIAL HYPERTENSION, BENIGN: ICD-10-CM

## 2018-02-20 DIAGNOSIS — R79.89 ELEVATED BRAIN NATRIURETIC PEPTIDE (BNP) LEVEL: ICD-10-CM

## 2018-02-20 DIAGNOSIS — I48.0 PAROXYSMAL ATRIAL FIBRILLATION (HCC): ICD-10-CM

## 2018-02-20 DIAGNOSIS — E78.5 DYSLIPIDEMIA: ICD-10-CM

## 2018-02-20 DIAGNOSIS — I77.9 BILATERAL CAROTID ARTERY DISEASE (HCC): ICD-10-CM

## 2018-02-20 DIAGNOSIS — I34.0 MITRAL VALVE INSUFFICIENCY, UNSPECIFIED ETIOLOGY: ICD-10-CM

## 2018-02-20 PROCEDURE — 99214 OFFICE O/P EST MOD 30 MIN: CPT | Performed by: INTERNAL MEDICINE

## 2018-02-20 NOTE — PROGRESS NOTES
Subjective:   Tristan Hester is a 82 y.o. male who presents today for follow-up of his PAF, hypertension and dyslipidemia.    He's had no chest discomfort. He states he can walk about a mile on a flat walk without difficulty. He will have some dyspnea if he walks up hill. He's had no edema, palpitations or lightheadedness. He continues to have some difficulty with his balance.        Past Medical History:   Diagnosis Date   • Arrhythmia     Afib   • Arthritis     osteo, hips and knees   • Blood clotting disorder (CMS-HCC)     , left arm   • Bronchitis        • Cancer (CMS-HCC)     Prostate CA--2000   • Carotid arterial disease (CMS-HCC) 1/6/2014   • Cataract     removed bilat   • DVT (deep venous thrombosis) (CMS-HCC) 2016   • Heart valve disease     MVR   • HLD (hyperlipidemia) 1/6/2014   • Hypertension    • Mitral regurgitation 1/6/2014   • Pain 12-14-15    low back, hips, 2-3/10   • Pneumonia        • Stroke (CMS-HCC) 2008    TIA, no residual   • TIA (transient ischemic attack)     2008   • Urinary bladder disorder     HAD MALE BLADDER SUSPENSION PROCEDURE DONE   • Urinary incontinence      Past Surgical History:   Procedure Laterality Date   • LUMBAR FUSION POSTERIOR  12/28/2015    Procedure: LUMBAR FUSION POSTERIOR L3-5 with peek rods;  Surgeon: Phylicia Sarkar M.D.;  Location: [facility];  Service:    • LUMBAR DECOMPRESSION  12/28/2015    Procedure: LUMBAR DECOMPRESSION posterior redo decompression L3-5, with dural repair;  Surgeon: Phylicia Sarkar M.D.;  Location: [facility];  Service:    • THORACOSCOPY  6/13/2015    Procedure: THORACOSCOPY with decortication VATS , side to be determined  ;  Surgeon: Elmira Holder M.D.;  Location: [facility];  Service:    • LUMBAR LAMINECTOMY DISKECTOMY  10/8/2010    Performed by PHYLICIA SARKAR at [facility]   • CERVICAL DISK AND FUSION ANTERIOR  10/26/2009    Performed by PHYLICIA SARKAR at [facility] ORS   • CAROTID ENDARTERECTOMY  7/10/08    Performed by LEIGH MORATAYA at SURGERY [facility] ORS   • BLADDER SLING MALE  11/2004   • PROSTATECTOMY, RADIAL  11/2000     Family History   Problem Relation Age of Onset   • Heart Attack Neg Hx      History   Smoking Status   • Former Smoker   • Packs/day: 0.50   • Years: 30.00   • Types: Cigarettes, Pipe   • Quit date: 1/1/1985   Smokeless Tobacco   • Never Used     Allergies   Allergen Reactions   • Cantaloupe      Pt stated cantaloupe causes his throat to constrict   • Nkda [No Known Drug Allergy]    • Other Environmental      Seasonal, cats-hayfever     Outpatient Encounter Prescriptions as of 2/20/2018   Medication Sig Dispense Refill   • gabapentin (NEURONTIN) 300 MG Cap Take 1 Cap by mouth 3 times a day. 270 Cap 3   • warfarin (COUMADIN) 4 MG Tab Take one and one-half to two (1.5-2) tablets daily as directed by [facility] 180 Tab 1   • atorvastatin (LIPITOR) 80 MG tablet Take 0.5 Tabs by mouth every evening. 90 Tab 3   • ascorbic acid (ASCORBIC ACID) 500 MG Tab Take 500 mg by mouth every day.     • temazepam (RESTORIL) 30 MG capsule TAKE ONE CAPSULE BY MOUTH ONCE DAILY AT BEDTIME AS NEEDED FOR SLEEP 90 Cap 1   • Multiple Vitamin (MULTI-VITAMINS) Tab Take  by mouth.     • CARTIA  MG CAPSULE SR 24 HR      • acetaminophen (TYLENOL) 325 MG Tab Take 650 mg by mouth every four hours as needed.       No facility-administered encounter medications on file as of 2/20/2018.      ROS       Objective:   /70   Pulse 72   Ht 1.803 m (5' 11\")   Wt 86.2 kg (190 lb)   SpO2 95%   BMI 26.50 kg/m²     Physical Exam   Neck: No JVD present.   Cardiovascular: Normal rate and regular rhythm.  Exam reveals no gallop.    Murmur (3/6 systolic at the base of rating towards the apex.) heard.  Pulmonary/Chest: Effort normal. He has no rales.   Abdominal: Soft. There is no tenderness.   Musculoskeletal: He exhibits no edema.     Lab Results "   Component Value Date/Time    CHOLSTRLTOT 149 01/25/2018 08:24 AM    CHOLSTRLTOT 146 06/02/2008 11:00 PM    LDL 78 01/25/2018 08:24 AM    LDL 85 06/02/2008 11:00 PM    HDL 60 01/25/2018 08:24 AM    HDL 49 06/02/2008 11:00 PM    TRIGLYCERIDE 53 01/25/2018 08:24 AM    TRIGLYCERIDE 60 06/02/2008 11:00 PM       Lab Results   Component Value Date/Time    SODIUM 145 (H) 01/25/2018 08:24 AM    SODIUM 138 01/12/2016 12:20 PM    POTASSIUM 4.2 01/25/2018 08:24 AM    POTASSIUM 4.3 01/12/2016 12:20 PM    CHLORIDE 106 01/25/2018 08:24 AM    CHLORIDE 105 01/12/2016 12:20 PM    CO2 24 01/25/2018 08:24 AM    CO2 26 01/12/2016 12:20 PM    GLUCOSE 90 01/25/2018 08:24 AM    GLUCOSE 128 (H) 01/12/2016 12:20 PM    BUN 21 01/25/2018 08:24 AM    BUN 18 01/12/2016 12:20 PM    CREATININE 1.16 01/25/2018 08:24 AM    CREATININE 1.00 01/12/2016 12:20 PM    CREATININE 1.0 07/02/2008 03:43 PM    BUNCREATRAT 18 01/25/2018 08:24 AM     Lab Results   Component Value Date/Time    ALKPHOSPHAT 61 01/25/2018 08:24 AM    ALKPHOSPHAT 52 01/11/2016 04:25 AM    ASTSGOT 22 01/25/2018 08:24 AM    ASTSGOT 14 01/11/2016 04:25 AM    ALTSGPT 16 01/25/2018 08:24 AM    ALTSGPT 11 01/11/2016 04:25 AM    TBILIRUBIN 1.1 01/25/2018 08:24 AM    TBILIRUBIN 1.1 01/11/2016 04:25 AM      Lab Results   Component Value Date/Time    BNPBTYPENAT 615.6 (H) 01/25/2018 08:30 AM    BNPBTYPENAT 244 (H) 06/23/2015 01:41 AM      Echocardiogram:  CONCLUSIONS  Left ventricular ejection fraction is visually estimated to be 60%.   Grade I diastolic dysfunction.  Possibly severe mitral regurgitation.   Mild aortic stenosis.  Estimated right ventricular systolic pressure  is 25 mmHg.  Aortic root is 4 cm.   Compared to the images of the prior study done 2/29/16 there has been   no significant change.   Consider YOANDY for better evaluation of MR severity.    Exam Date:         02/09/2017     Echocardiogram:  CONCLUSIONS  Normal left ventricular size, wall thickness, and systolic function.    Left ventricular ejection fraction is visually estimated to be 60%.   Normal regional wall motion. Diastolic function is difficult to assess   with valvular disease.  Mitral annular calcification. No mitral stenosis. Moderate mitral   regurgitation. ERO by PISA method is 0.2 sq cm. Both leaflet appear to   attach to the anterior lateral wall. Can not rule out parachute mitral   valve.  Calcified aortic valve leaflets. Moderate aortic stenosis.   Compared to the report of the study done 2/2017- the MR appears to be   less severe, but, there has been some progression of the AS.     Exam Date:         02/13/2018       Assessment:     1. Paroxysmal atrial fibrillation (CMS-[facility])     2. Mitral valve insufficiency, unspecified etiology     3. Essential hypertension, benign     4. Dyslipidemia     5. Bilateral carotid artery disease (CMS-[facility]): Mild bilateral in 2017         Medical Decision Making:  Today's Assessment / Status / Plan:     Mr. Hester is clinically stable. He has had some progression of his aortic stenosis but his mitral insufficiency appeared less severe. However, his BNP did go up. I feel we should repeat his BNP at about 6 months. If it is still going up at that time, we will consider a YOANDY. His blood pressure appears to be under good control. He continues on anticoagulation therapy for his PAF. He is on guideline mediated therapy for his dyslipidemia. Optimally, I would like his LDL a bit lower given his carotid plaque. We'll repeat his laboratory in about 6 months.

## 2018-02-20 NOTE — LETTER
[facility]   [address], [place] [address]  [place] [address]  Phone: [phone]  Fax: [phone]              Tristan Hester  1935    Encounter Date: 2/20/2018    Senthil Escoto M.D.          PROGRESS NOTE:  Subjective:   Tristan Hester is a 82 y.o. male who presents today for follow-up of his PAF, hypertension and dyslipidemia.    He's had no chest discomfort. He states he can walk about a mile on a flat walk without difficulty. He will have some dyspnea if he walks up hill. He's had no edema, palpitations or lightheadedness. He continues to have some difficulty with his balance.        Past Medical History:   Diagnosis Date   • Arrhythmia     Afib   • Arthritis     osteo, hips and knees   • Blood clotting disorder (CMS-HCC)     , left arm   • Bronchitis        • Cancer (CMS-HCC)     Prostate CA--2000   • Carotid arterial disease (CMS-HCC) 1/6/2014   • Cataract     removed bilat   • DVT (deep venous thrombosis) (CMS-HCC) 2016   • Heart valve disease     MVR   • HLD (hyperlipidemia) 1/6/2014   • Hypertension    • Mitral regurgitation 1/6/2014   • Pain 12-14-15    low back, hips, 2-3/10   • Pneumonia        • Stroke (CMS-HCC) 2008    TIA, no residual   • TIA (transient ischemic attack)     2008   • Urinary bladder disorder     HAD MALE BLADDER SUSPENSION PROCEDURE DONE   • Urinary incontinence      Past Surgical History:   Procedure Laterality Date   • LUMBAR FUSION POSTERIOR  12/28/2015    Procedure: LUMBAR FUSION POSTERIOR L3-5 with peek rods;  Surgeon: Michael Almonte M.D.;  Location: [facility];  Service:    • LUMBAR DECOMPRESSION  12/28/2015    Procedure: LUMBAR DECOMPRESSION posterior redo decompression L3-5, with dural repair;  Surgeon: Michael Almonte M.D.;  Location: [facility];  Service:    • THORACOSCOPY  6/13/2015    Procedure: THORACOSCOPY with decortication VATS , side to be determined  ;  Surgeon:  "Elmira Holder M.D.;  Location: SURGERY [facility];  Service:    • LUMBAR LAMINECTOMY DISKECTOMY  10/8/2010    Performed by PHYLICIA SARKAR at SURGERY [facility] ORS   • CERVICAL DISK AND FUSION ANTERIOR  10/26/2009    Performed by PHYLICIA SARKAR at SURGERY [facility] ORS   • CAROTID ENDARTERECTOMY  7/10/08    Performed by LEIGH MORATAYA at SURGERY [facility]   • BLADDER SLING MALE  11/2004   • PROSTATECTOMY, RADIAL  11/2000     Family History   Problem Relation Age of Onset   • Heart Attack Neg Hx      History   Smoking Status   • Former Smoker   • Packs/day: 0.50   • Years: 30.00   • Types: Cigarettes, Pipe   • Quit date: 1/1/1985   Smokeless Tobacco   • Never Used     Allergies   Allergen Reactions   • Cantaloupe      Pt stated cantaloupe causes his throat to constrict   • Nkda [No Known Drug Allergy]    • Other Environmental      Seasonal, cats-hayfever     Outpatient Encounter Prescriptions as of 2/20/2018   Medication Sig Dispense Refill   • gabapentin (NEURONTIN) 300 MG Cap Take 1 Cap by mouth 3 times a day. 270 Cap 3   • warfarin (COUMADIN) 4 MG Tab Take one and one-half to two (1.5-2) tablets daily as directed by [facility] 180 Tab 1   • atorvastatin (LIPITOR) 80 MG tablet Take 0.5 Tabs by mouth every evening. 90 Tab 3   • ascorbic acid (ASCORBIC ACID) 500 MG Tab Take 500 mg by mouth every day.     • temazepam (RESTORIL) 30 MG capsule TAKE ONE CAPSULE BY MOUTH ONCE DAILY AT BEDTIME AS NEEDED FOR SLEEP 90 Cap 1   • Multiple Vitamin (MULTI-VITAMINS) Tab Take  by mouth.     • CARTIA  MG CAPSULE SR 24 HR      • acetaminophen (TYLENOL) 325 MG Tab Take 650 mg by mouth every four hours as needed.       No facility-administered encounter medications on file as of 2/20/2018.      ROS       Objective:   /70   Pulse 72   Ht 1.803 m (5' 11\")   Wt 86.2 kg (190 lb)   SpO2 95%   BMI 26.50 kg/m²      Physical Exam   Neck: No JVD present.   Cardiovascular: Normal rate and " regular rhythm.  Exam reveals no gallop.    Murmur (3/6 systolic at the base of rating towards the apex.) heard.  Pulmonary/Chest: Effort normal. He has no rales.   Abdominal: Soft. There is no tenderness.   Musculoskeletal: He exhibits no edema.     Lab Results   Component Value Date/Time    CHOLSTRLTOT 149 01/25/2018 08:24 AM    CHOLSTRLTOT 146 06/02/2008 11:00 PM    LDL 78 01/25/2018 08:24 AM    LDL 85 06/02/2008 11:00 PM    HDL 60 01/25/2018 08:24 AM    HDL 49 06/02/2008 11:00 PM    TRIGLYCERIDE 53 01/25/2018 08:24 AM    TRIGLYCERIDE 60 06/02/2008 11:00 PM       Lab Results   Component Value Date/Time    SODIUM 145 (H) 01/25/2018 08:24 AM    SODIUM 138 01/12/2016 12:20 PM    POTASSIUM 4.2 01/25/2018 08:24 AM    POTASSIUM 4.3 01/12/2016 12:20 PM    CHLORIDE 106 01/25/2018 08:24 AM    CHLORIDE 105 01/12/2016 12:20 PM    CO2 24 01/25/2018 08:24 AM    CO2 26 01/12/2016 12:20 PM    GLUCOSE 90 01/25/2018 08:24 AM    GLUCOSE 128 (H) 01/12/2016 12:20 PM    BUN 21 01/25/2018 08:24 AM    BUN 18 01/12/2016 12:20 PM    CREATININE 1.16 01/25/2018 08:24 AM    CREATININE 1.00 01/12/2016 12:20 PM    CREATININE 1.0 07/02/2008 03:43 PM    BUNCREATRAT 18 01/25/2018 08:24 AM     Lab Results   Component Value Date/Time    ALKPHOSPHAT 61 01/25/2018 08:24 AM    ALKPHOSPHAT 52 01/11/2016 04:25 AM    ASTSGOT 22 01/25/2018 08:24 AM    ASTSGOT 14 01/11/2016 04:25 AM    ALTSGPT 16 01/25/2018 08:24 AM    ALTSGPT 11 01/11/2016 04:25 AM    TBILIRUBIN 1.1 01/25/2018 08:24 AM    TBILIRUBIN 1.1 01/11/2016 04:25 AM      Lab Results   Component Value Date/Time    BNPBTYPENAT 615.6 (H) 01/25/2018 08:30 AM    BNPBTYPENAT 244 (H) 06/23/2015 01:41 AM      Echocardiogram:  CONCLUSIONS  Left ventricular ejection fraction is visually estimated to be 60%.   Grade I diastolic dysfunction.  Possibly severe mitral regurgitation.   Mild aortic stenosis.  Estimated right ventricular systolic pressure  is 25 mmHg.  Aortic root is 4 cm.   Compared to the images  of the prior study done 2/29/16 there has been   no significant change.   Consider YOANDY for better evaluation of MR severity.    Exam Date:         02/09/2017     Echocardiogram:  CONCLUSIONS  Normal left ventricular size, wall thickness, and systolic function.   Left ventricular ejection fraction is visually estimated to be 60%.   Normal regional wall motion. Diastolic function is difficult to assess   with valvular disease.  Mitral annular calcification. No mitral stenosis. Moderate mitral   regurgitation. ERO by PISA method is 0.2 sq cm. Both leaflet appear to   attach to the anterior lateral wall. Can not rule out parachute mitral   valve.  Calcified aortic valve leaflets. Moderate aortic stenosis.   Compared to the report of the study done 2/2017- the MR appears to be   less severe, but, there has been some progression of the AS.     Exam Date:         02/13/2018       Assessment:     1. Paroxysmal atrial fibrillation (CMS-[facility])     2. Mitral valve insufficiency, unspecified etiology     3. Essential hypertension, benign     4. Dyslipidemia     5. Bilateral carotid artery disease (CMS-[facility]): Mild bilateral in 2017         Medical Decision Making:  Today's Assessment / Status / Plan:     Mr. Hester is clinically stable. He has had some progression of his aortic stenosis but his mitral insufficiency appeared less severe. However, his BNP did go up. I feel we should repeat his BNP at about 6 months. If it is still going up at that time, we will consider a YOANDY. His blood pressure appears to be under good control. He continues on anticoagulation therapy for his PAF. He is on guideline mediated therapy for his dyslipidemia. Optimally, I would like his LDL a bit lower given his carotid plaque. We'll repeat his laboratory in about 6 months.      Michael J Bloch, M.D.  [id number] [facility] [address] [id number]  VIA In Basket

## 2018-02-28 ENCOUNTER — TELEPHONE (OUTPATIENT)
Dept: VASCULAR LAB | Facility: MEDICAL CENTER | Age: 83
End: 2018-02-28

## 2018-03-01 NOTE — TELEPHONE ENCOUNTER
----- Message from Michael J Bloch, M.D. sent at 2/28/2018  4:56 PM PST -----  Regarding: RE: Tamiflu Prescription  If he thinks he has the flu he should go to an urgent care (or ER) where they can test for it.    We usually don't prescribe tamiflu without knowing the diagnosis for sure     Please drop a phone note after you talk to him    Thanks  Bloch  ----- Message -----  From: Afsaneh Walton, Med Ass't  Sent: 2/28/2018   1:10 PM  To: Michael J Bloch, M.D.  Subject: Tamiflu Prescription                             Dr. Bloch, Michael called in and he is asking for a Prescription on Tamiflu.

## 2018-03-01 NOTE — TELEPHONE ENCOUNTER
Patient called with symptoms he feels are consistent with influenza.  Wanted script for tamiflu.  We suggested he go to urgent care for flu confirmatory tests and treatment.    MA instructed patient.    Michael Bloch, MD  [facility]

## 2018-03-13 ENCOUNTER — HOSPITAL ENCOUNTER (EMERGENCY)
Facility: MEDICAL CENTER | Age: 83
End: 2018-03-13
Attending: EMERGENCY MEDICINE
Payer: MEDICARE

## 2018-03-13 ENCOUNTER — APPOINTMENT (OUTPATIENT)
Dept: VASCULAR LAB | Facility: MEDICAL CENTER | Age: 83
End: 2018-03-13
Payer: MEDICARE

## 2018-03-13 VITALS
SYSTOLIC BLOOD PRESSURE: 133 MMHG | DIASTOLIC BLOOD PRESSURE: 87 MMHG | OXYGEN SATURATION: 93 % | HEART RATE: 66 BPM | TEMPERATURE: 97.6 F | WEIGHT: 192.68 LBS | RESPIRATION RATE: 16 BRPM | BODY MASS INDEX: 26.98 KG/M2 | HEIGHT: 71 IN

## 2018-03-13 DIAGNOSIS — G89.29 ACUTE EXACERBATION OF CHRONIC LOW BACK PAIN: ICD-10-CM

## 2018-03-13 DIAGNOSIS — M54.50 ACUTE EXACERBATION OF CHRONIC LOW BACK PAIN: ICD-10-CM

## 2018-03-13 DIAGNOSIS — M54.17 LUMBOSACRAL RADICULOPATHY: ICD-10-CM

## 2018-03-13 LAB
APPEARANCE UR: CLEAR
BILIRUB UR QL STRIP.AUTO: NEGATIVE
COLOR UR: YELLOW
CULTURE IF INDICATED INDCX: NO UA CULTURE
EPI CELLS #/AREA URNS HPF: ABNORMAL /HPF
GLUCOSE UR STRIP.AUTO-MCNC: NEGATIVE MG/DL
KETONES UR STRIP.AUTO-MCNC: NEGATIVE MG/DL
LEUKOCYTE ESTERASE UR QL STRIP.AUTO: NEGATIVE
MICRO URNS: ABNORMAL
NITRITE UR QL STRIP.AUTO: NEGATIVE
PH UR STRIP.AUTO: 5 [PH]
PROT UR QL STRIP: NEGATIVE MG/DL
RBC # URNS HPF: ABNORMAL /HPF
RBC UR QL AUTO: ABNORMAL
SP GR UR STRIP.AUTO: <=1.005
WBC #/AREA URNS HPF: ABNORMAL /HPF

## 2018-03-13 PROCEDURE — A9270 NON-COVERED ITEM OR SERVICE: HCPCS | Performed by: EMERGENCY MEDICINE

## 2018-03-13 PROCEDURE — 96372 THER/PROPH/DIAG INJ SC/IM: CPT

## 2018-03-13 PROCEDURE — 81001 URINALYSIS AUTO W/SCOPE: CPT

## 2018-03-13 PROCEDURE — 99284 EMERGENCY DEPT VISIT MOD MDM: CPT

## 2018-03-13 PROCEDURE — 700102 HCHG RX REV CODE 250 W/ 637 OVERRIDE(OP): Performed by: EMERGENCY MEDICINE

## 2018-03-13 PROCEDURE — 700111 HCHG RX REV CODE 636 W/ 250 OVERRIDE (IP): Performed by: EMERGENCY MEDICINE

## 2018-03-13 RX ORDER — DIAZEPAM 2 MG/1
2 TABLET ORAL ONCE
Status: COMPLETED | OUTPATIENT
Start: 2018-03-13 | End: 2018-03-13

## 2018-03-13 RX ORDER — METHYLPREDNISOLONE 4 MG/1
TABLET ORAL
Qty: 1 KIT | Refills: 0 | Status: SHIPPED | OUTPATIENT
Start: 2018-03-13 | End: 2018-11-15

## 2018-03-13 RX ORDER — IBUPROFEN 200 MG
400 TABLET ORAL ONCE
Status: COMPLETED | OUTPATIENT
Start: 2018-03-13 | End: 2018-03-13

## 2018-03-13 RX ORDER — DIAZEPAM 2 MG/1
2 TABLET ORAL EVERY 6 HOURS PRN
Qty: 20 TAB | Refills: 0 | Status: SHIPPED | OUTPATIENT
Start: 2018-03-13 | End: 2018-03-18

## 2018-03-13 RX ORDER — METHYLPREDNISOLONE SODIUM SUCCINATE 125 MG/2ML
125 INJECTION, POWDER, LYOPHILIZED, FOR SOLUTION INTRAMUSCULAR; INTRAVENOUS ONCE
Status: COMPLETED | OUTPATIENT
Start: 2018-03-13 | End: 2018-03-13

## 2018-03-13 RX ADMIN — IBUPROFEN 400 MG: 200 TABLET, FILM COATED ORAL at 12:14

## 2018-03-13 RX ADMIN — METHYLPREDNISOLONE SODIUM SUCCINATE 125 MG: 125 INJECTION, POWDER, FOR SOLUTION INTRAMUSCULAR; INTRAVENOUS at 12:14

## 2018-03-13 RX ADMIN — DIAZEPAM 2 MG: 2 TABLET ORAL at 12:14

## 2018-03-13 ASSESSMENT — PAIN SCALES - GENERAL: PAINLEVEL_OUTOF10: 8

## 2018-03-13 NOTE — ED NOTES
"Chief Complaint   Patient presents with   • Back Pain     C/o right side lower back pain since Sunday.     • Leg Pain     c/o left thigh pain. States that he has some numbness in his left thigh     /96   Pulse 69   Temp 36.4 °C (97.6 °F)   Resp 18   Ht 1.803 m (5' 11\")   Wt 87.4 kg (192 lb 10.9 oz)   SpO2 94%   BMI 26.87 kg/m²     "

## 2018-03-13 NOTE — DISCHARGE INSTRUCTIONS
Follow-up with primary care 1-2 days for reevaluation, medication management, close blood pressure monitoring and outpatient imaging/MRI/referral to neurosurgery if symptoms persist.    Continue home medications as previously indicated.  Medrol Dosepak as directed.  Ibuprofen 3 times daily as needed for discomfort.  Valium every 6-8 hours as needed for spasm.  Do not take temazepam while taking Valium.    Weightbearing and activity as tolerated. Slow stretching and range of motion exercises as described.    Return to emergency department for persistent or worsening back pain, lower extremity weakness or paresthesias, incontinence or urinary retention or other new concerns.    Lumbosacral Radiculopathy  Introduction  Lumbosacral radiculopathy is a condition that involves the spinal nerves and nerve roots in the low back and bottom of the spine. The condition develops when these nerves and nerve roots move out of place or become inflamed and cause symptoms.  What are the causes?  This condition may be caused by:  · Pressure from a disk that bulges out of place (herniated disk). A disk is a plate of cartilage that separates bones in the spine.  · Disk degeneration.  · A narrowing of the bones of the lower back (spinal stenosis).  · A tumor.  · An infection.  · An injury that places sudden pressure on the disks that cushion the bones of your lower spine.  What increases the risk?  This condition is more likely to develop in:  · Males aged 30-50 years.  · Females aged 50-60 years.  · People who lift improperly.  · People who are overweight or live a sedentary lifestyle.  · People who smoke.  · People who perform repetitive activities that strain the spine.  What are the signs or symptoms?  Symptoms of this condition include:  · Pain that goes down from the back into the legs (sciatica). This is the most common symptom. The pain may be worse with sitting, coughing, or sneezing.  · Pain and numbness in the arms and  legs.  · Muscle weakness.  · Tingling.  · Loss of bladder control or bowel control.  How is this diagnosed?  This condition is diagnosed with a physical exam and medical history. If the pain is lasting, you may have tests, such as:  · MRI scan.  · X-ray.  · CT scan.  · Myelogram.  · Nerve conduction study.  How is this treated?  This condition is often treated with:  · Hot packs and ice applied to affected areas.  · Stretches to improve flexibility.  · Exercises to strengthen back muscles.  · Physical therapy.  · Pain medicine.  · A steroid injection in the spine.  In some cases, no treatment is needed. If the condition is long-lasting (chronic), or if symptoms are severe, treatment may involve surgery or lifestyle changes, such as following a weight loss plan.  Follow these instructions at home:  Medicines  · Take medicines only as directed by your health care provider.  · Do not drive or operate heavy machinery while taking pain medicine.  Injury care  · Apply a heat pack to the injured area as directed by your health care provider.  · Apply ice to the affected area:  ¨ Put ice in a plastic bag.  ¨ Place a towel between your skin and the bag.  ¨ Leave the ice on for 20-30 minutes, every 2 hours while you are awake or as needed. Or, leave the ice on for as long as directed by your health care provider.  Other Instructions  · If you were shown how to do any exercises or stretches, do them as directed by your health care provider.  · If your health care provider prescribed a diet or exercise program, follow it as directed.  · Keep all follow-up visits as directed by your health care provider. This is important.  Contact a health care provider if:  · Your pain does not improve over time even when taking pain medicines.  Get help right away if:  · Your develop severe pain.  · Your pain suddenly gets worse.  · You develop increasing weakness in your legs.  · You lose the ability to control your bladder or bowel.  · You  have difficulty walking or balancing.  · You have a fever.  This information is not intended to replace advice given to you by your health care provider. Make sure you discuss any questions you have with your health care provider.  Document Released: 12/18/2006 Document Revised: 05/25/2017 Document Reviewed: 12/14/2015  © 2017 [employer]    Back Exercises  Back exercises help treat and prevent back injuries. The goal is to increase your strength in your belly (abdominal) and back muscles. These exercises can also help with flexibility. Start these exercises when told by your doctor.  HOME CARE  Back exercises include:  Pelvic Tilt.  · Lie on your back with your knees bent. Tilt your pelvis until the lower part of your back is against the floor. Hold this position 5 to 10 sec. Repeat this exercise 5 to 10 times.  Knee to Chest.  · Pull 1 knee up against your chest and hold for 20 to 30 seconds. Repeat this with the other knee. This may be done with the other leg straight or bent, whichever feels better. Then, pull both knees up against your chest.  Sit-Ups or Curl-Ups.  · Bend your knees 90 degrees. Start with tilting your pelvis, and do a partial, slow sit-up. Only lift your upper half 30 to 45 degrees off the floor. Take at least 2 to 3 seonds for each sit-up. Do not do sit-ups with your knees out straight. If partial sit-ups are difficult, simply do the above but with only tightening your belly (abdominal) muscles and holding it as told.  Hip-Lift.  · Lie on your back with your knees flexed 90 degrees. Push down with your feet and shoulders as you raise your hips 2 inches off the floor. Hold for 10 seconds, repeat 5 to 10 times.  Back Arches.  · Lie on your stomach. Prop yourself up on bent elbows. Slowly press on your hands, causing an arch in your low back. Repeat 3 to 5 times.  Shoulder-Lifts.  · Lie face down with arms beside your body. Keep hips and belly pressed to floor as you slowly lift your head and  shoulders off the floor.  Do not overdo your exercises. Be careful in the beginning. Exercises may cause you some mild back discomfort. If the pain lasts for more than 15 minutes, stop the exercises until you see your doctor. Improvement with exercise for back problems is slow.      This information is not intended to replace advice given to you by your health care provider. Make sure you discuss any questions you have with your health care provider.     Document Released: 01/20/2012 Document Revised: 03/11/2013 Document Reviewed: 02/11/2016  [employer] Interactive Patient Education ©2016 [employer]

## 2018-03-13 NOTE — ED NOTES
Ready for d/c pt states understanding of instructions.  Rx for medrol dose pack and Valium given.  Amb out of ED with cane.

## 2018-03-13 NOTE — ED PROVIDER NOTES
"ED Provider Note    CHIEF COMPLAINT  Chief Complaint   Patient presents with   • Back Pain     C/o right side lower back pain since Sunday.     • Leg Pain     c/o left thigh pain. States that he has some numbness in his left thigh       HPI  Tristan Hester is a 82 y.o. male ambulates to the emergency department through triage with his wife for low back pain and left leg pain. Patient describes somewhat chronic neck and back pain, previous cervical fixation, now with exacerbation of low back pain for 3 days. Denies trauma, strain pattern, fall or other injury. Low back pain, greatest in the right lumbar region however with some radiculopathy in the left upper leg. Pain, 7 out of 10, sharp, spasm and worse with ambulation and going from sitting to standing. Patient is doing well with a cane. No lower extremity weakness or paresthesias. No incontinence or urinary retention. No hematuria, dysuria. No abdominal pain distention or vomiting.    Symptomatology previously responded well to steroids\" may be a muscle relaxer.\"    REVIEW OF SYSTEMS  See HPI for further details.    PAST MEDICAL HISTORY   has a past medical history of Arrhythmia; Arthritis; Blood clotting disorder (CMS-HCC); Bronchitis; Cancer (CMS-HCC); Carotid arterial disease (CMS-HCC) (1/6/2014); Cataract; DVT (deep venous thrombosis) (CMS-HCC) (2016); Heart valve disease; HLD (hyperlipidemia) (1/6/2014); Hypertension; Mitral regurgitation (1/6/2014); Pain (12-14-15); Pneumonia; Stroke (CMS-HCC) (2008); TIA (transient ischemic attack); Urinary bladder disorder; and Urinary incontinence.    SOCIAL HISTORY  Social History     Social History Main Topics   • Smoking status: Former Smoker     Packs/day: 0.50     Years: 30.00     Types: Cigarettes, Pipe     Quit date: 1/1/1985   • Smokeless tobacco: Never Used   • Alcohol use 3.0 oz/week     3 Cans of beer, 3 Shots of liquor per week      Comment: 1 x week   • Drug use: No   • Sexual activity: Not Currently " "      SURGICAL HISTORY   has a past surgical history that includes carotid endarterectomy (7/10/08); prostatectomy, radial (11/2000); bladder sling male (11/2004); cervical disk and fusion anterior (10/26/2009); lumbar laminectomy diskectomy (10/8/2010); thoracoscopy (6/13/2015); lumbar fusion posterior (12/28/2015); and lumbar decompression (12/28/2015).    CURRENT MEDICATIONS  Home Medications     Reviewed by Ethel Swan (Pharmacy Tech) on 03/13/18 at 1317  Med List Status: Complete   Medication Last Dose Status   acetaminophen (TYLENOL) 325 MG Tab 3/13/2018 Active   atorvastatin (LIPITOR) 80 MG tablet 3/12/2018 Active   CARTIA  MG CAPSULE SR 24 HR 3/13/2018 Active   gabapentin (NEURONTIN) 300 MG Cap 3/13/2018 Active   Multiple Vitamin (MULTI-VITAMINS) Tab 3/13/2018 Active   temazepam (RESTORIL) 30 MG capsule 3/12/2018 Active   warfarin (COUMADIN) 4 MG Tab 3/12/2018 Active                ALLERGIES  Allergies   Allergen Reactions   • Cantaloupe      Pt stated cantaloupe causes his throat to constrict   • Nkda [No Known Drug Allergy]    • Other Environmental      Seasonal, cats-hayfever       PHYSICAL EXAM  VITAL SIGNS: /96   Pulse 69   Temp 36.4 °C (97.6 °F)   Resp 18   Ht 1.803 m (5' 11\")   Wt 87.4 kg (192 lb 10.9 oz)   SpO2 94%   BMI 26.87 kg/m²   Pulse ox interpretation: I interpret this pulse ox as normal.  Constitutional: Alert in no apparent distress.  HENT: Normocephalic, atraumatic. Bilateral external ears normal, Nose normal. Moist mucous membranes.    Eyes: Pupils are equal and reactive, Conjunctiva normal.   Neck: Normal range of motion, Supple.  Cardiovascular: Normal peripheral perfusion..  Thorax & Lungs: Nonlabored respirations.  Abdomen: Soft, non-distended, non-tender to palpation. No palpable or pulsatile masses.  Skin: Warm, Dr.  Musculoskeletal: Good range of motion in all major joints. No major deformities noted. Mild reproducible discomfort with palpation in the " midline and bilateral lumbar sacral regions.  Neurologic: Alert , no gross focal deficit noted. 5 out of 5 strength bilateral lower extremities and hip flexion/extension, knee flexion/extension, dorsiflexion/plantar flexion. Sensation intact to light touch, no saddle anesthesia. Positive discomfort with straight leg raise bilaterally, right greater than left. Gait stable independently and without ataxia.  Psychiatric: Affect normal, Judgment normal, Mood normal.       DIAGNOSTIC STUDIES / PROCEDURES    LABS  Results for orders placed or performed during the hospital encounter of 03/13/18   URINALYSIS,CULTURE IF INDICATED   Result Value Ref Range    Color Yellow     Character Clear     Specific Gravity <=1.005 <1.035    Ph 5.0 5.0 - 8.0    Glucose Negative Negative mg/dL    Ketones Negative Negative mg/dL    Protein Negative Negative mg/dL    Bilirubin Negative Negative    Nitrite Negative Negative    Leukocyte Esterase Negative Negative    Occult Blood Trace (A) Negative    Micro Urine Req Microscopic     Culture Indicated No UA Culture   URINE MICROSCOPIC (W/UA)   Result Value Ref Range    WBC Rare (A) /hpf    RBC 0-2 (A) /hpf    Epithelial Cells Rare Few /hpf       COURSE & MEDICAL DECISION MAKING  Nursing notes and vital signs were reviewed. (See chart for details)  The patients records were reviewed, history was obtained from the patient and his wife;     Evaluation is most consistent with acute exacerbation of chronic low back pain with radiculopathy. Patient has had symptomatology similar to this previously. Denies new trauma, fall or other strain pattern. Denies new neurologic symptoms. Physical exam as described above, positive straight leg raise and reproducible discomfort with palpation. Patient indicates independently and without difficulty. Urinalysis is unremarkable, symptomatology is atypical for ureteral colic or pyelonephritis. Abdominal exam is benign, no palpable pulsatile mass to suggest aneurysm,  no pain to suggest dissection and again discomfort is subacute in nature. Solu-Medrol given intramuscularly, Valium and Motrin orally. Patient had noted some improvement before discharge. There is no history of clinical evidence to suggest cauda equina, epidural abscess or other spinal cord compression syndrome.    Patient is stable for discharge at this time, anticipatory guidance provided, Medrol Dosepak as directed, Motrin and Valium as needed for discomfort and spasm, close follow-up is encouraged, and strict ED return instructions have been detailed. Patient is agreeable to the disposition and plan.    Patient's blood pressure was elevated in the emergency department, and has been referred to primary care for close monitoring.    FINAL IMPRESSION  (M54.5,  G89.29) Acute exacerbation of chronic low back pain  (M54.17) Lumbosacral radiculopathy      Electronically signed by: Pebbles Lloyd, 3/13/2018 1:23 PM      This dictation was created using voice recognition software. The accuracy of the dictation is limited to the abilities of the software. I expect there may be some errors of grammar and possibly content. The nursing notes were reviewed and certain aspects of this information were incorporated into this note.

## 2018-03-13 NOTE — ED NOTES
Pt up to BR without assistance. Pt ambulated without difficulty. No signs of distress or discomfort noted.

## 2018-03-14 ENCOUNTER — PATIENT OUTREACH (OUTPATIENT)
Dept: HEALTH INFORMATION MANAGEMENT | Facility: OTHER | Age: 83
End: 2018-03-14

## 2018-03-22 ENCOUNTER — ANTICOAGULATION VISIT (OUTPATIENT)
Dept: VASCULAR LAB | Facility: MEDICAL CENTER | Age: 83
End: 2018-03-22
Attending: INTERNAL MEDICINE
Payer: MEDICARE

## 2018-03-22 VITALS — HEART RATE: 68 BPM | SYSTOLIC BLOOD PRESSURE: 124 MMHG | DIASTOLIC BLOOD PRESSURE: 79 MMHG

## 2018-03-22 DIAGNOSIS — I82.401 ACUTE DEEP VEIN THROMBOSIS (DVT) OF RIGHT LOWER EXTREMITY, UNSPECIFIED VEIN (HCC): ICD-10-CM

## 2018-03-22 DIAGNOSIS — G45.0 VERTEBROBASILAR ARTERY SYNDROME: ICD-10-CM

## 2018-03-22 DIAGNOSIS — I48.0 PAROXYSMAL ATRIAL FIBRILLATION (HCC): ICD-10-CM

## 2018-03-22 LAB — INR PPP: 3.5 (ref 2–3.5)

## 2018-03-22 PROCEDURE — 99212 OFFICE O/P EST SF 10 MIN: CPT | Performed by: NURSE PRACTITIONER

## 2018-03-22 PROCEDURE — 85610 PROTHROMBIN TIME: CPT

## 2018-03-22 NOTE — PROGRESS NOTES
Anticoagulation Summary  As of 3/22/2018    INR goal:   2.0-3.0   TTR:   62.6 % (2.6 y)   Today's INR:   3.5!   Maintenance plan:   8 mg (4 mg x 2) on Sun, Tue, Thu; 6 mg (4 mg x 1.5) all other days   Weekly total:   48 mg   Plan last modified:   Quita Sim, PharmD (1/26/2017)   Next INR check:      Target end date:   Indefinite    Indications    DVT (deep venous thrombosis) (CMS-HCC) [I82.409]  Paroxysmal atrial fibrillation (CMS-HCC) [I48.0]  Hx of Stroke (Resolved) [I63.9]  Hx of TIA (transient ischemic attack) [G45.9]  Deep vein thrombosis (DVT) (CMS-HCC) (Resolved) [I82.409]  Atrial fibrillation (CMS-HCC) (Resolved) [I48.91]             Anticoagulation Episode Summary     INR check location:   [facility]    Preferred lab:       Send INR reminders to:       Comments:         Anticoagulation Care Providers     Provider Role Specialty Phone number    Michael J Bloch, M.D. Referring Internal Medicine [phone]    [facility]   [phone]        Anticoagulation Patient Findings      HPI:  Tristan Hester seen in clinic today for follow up on anticoagulation therapy in the presence of DVT, AF, CVA. Denies any changes to current medical/health status since last appointment. Finished a medrol dose constance earlier this week. Denies any diet changes. No current symptoms of bleeding or thrombosis reported.    A/P:   INR supratherapeutic. Will lower two doses then continue current regimen. BP recorded in vitals.    Follow up appointment in 4 week(s) per pt's preference.    Next Appointment: Thursday, April 19, 2018 at 9:15 am.    Thi WILLIAM

## 2018-03-23 LAB — INR BLD: 3.5 (ref 0.9–1.2)

## 2018-04-19 ENCOUNTER — ANTICOAGULATION VISIT (OUTPATIENT)
Dept: VASCULAR LAB | Facility: MEDICAL CENTER | Age: 83
End: 2018-04-19
Attending: INTERNAL MEDICINE
Payer: MEDICARE

## 2018-04-19 VITALS — HEART RATE: 73 BPM | SYSTOLIC BLOOD PRESSURE: 141 MMHG | DIASTOLIC BLOOD PRESSURE: 92 MMHG

## 2018-04-19 DIAGNOSIS — G45.0 VERTEBROBASILAR ARTERY SYNDROME: ICD-10-CM

## 2018-04-19 DIAGNOSIS — I48.91 ATRIAL FIBRILLATION, UNSPECIFIED TYPE (HCC): ICD-10-CM

## 2018-04-19 DIAGNOSIS — I82.401 ACUTE DEEP VEIN THROMBOSIS (DVT) OF RIGHT LOWER EXTREMITY, UNSPECIFIED VEIN (HCC): ICD-10-CM

## 2018-04-19 DIAGNOSIS — I48.0 PAROXYSMAL ATRIAL FIBRILLATION (HCC): ICD-10-CM

## 2018-04-19 LAB
INR BLD: 2.2 (ref 0.9–1.2)
INR PPP: 2.2 (ref 2–3.5)

## 2018-04-19 PROCEDURE — 85610 PROTHROMBIN TIME: CPT

## 2018-04-19 PROCEDURE — 99211 OFF/OP EST MAY X REQ PHY/QHP: CPT | Performed by: NURSE PRACTITIONER

## 2018-04-19 RX ORDER — WARFARIN SODIUM 4 MG/1
TABLET ORAL
Qty: 180 TAB | Refills: 1 | Status: SHIPPED | OUTPATIENT
Start: 2018-04-19 | End: 2018-11-13 | Stop reason: SDUPTHER

## 2018-04-19 NOTE — PROGRESS NOTES
Anticoagulation Summary  As of 4/19/2018    INR goal:   2.0-3.0   TTR:   62.6 % (2.7 y)   Today's INR:   2.2   Maintenance plan:   8 mg (4 mg x 2) on Sun, Tue, Thu; 6 mg (4 mg x 1.5) all other days   Weekly total:   48 mg   No change documented:   Thi Rosario, A.P.N.   Plan last modified:   Quita Sim, PharmD (1/26/2017)   Next INR check:   6/14/2018   Target end date:   Indefinite    Indications    DVT (deep venous thrombosis) (CMS-HCC) [I82.409]  Paroxysmal atrial fibrillation (CMS-HCC) [I48.0]  Hx of Stroke (Resolved) [I63.9]  Hx of TIA (transient ischemic attack) [G45.9]  Deep vein thrombosis (DVT) (CMS-HCC) (Resolved) [I82.409]  Atrial fibrillation (CMS-HCC) (Resolved) [I48.91]             Anticoagulation Episode Summary     INR check location:   [facility]    Preferred lab:       Send INR reminders to:       Comments:         Anticoagulation Care Providers     Provider Role Specialty Phone number    Michael J Bloch, M.D. Referring Internal Medicine [phone]    [facility]   [phone]        Anticoagulation Patient Findings      HPI:  Tristan Hester seen in clinic today for follow up on anticoagulation therapy in the presence of DVT, CVA, AF, TIA hx. Denies any changes to current medical/health status since last appointment. Denies any medication or diet changes. No current symptoms of bleeding or thrombosis reported.    A/P:   INR therapeutic. Continue current regimen. BP recorded in vitals.    Follow up appointment in 8 week(s) per pt's preference.    Next Appointment: Thursday, June 14, 2018 at 9:30 am.     Thi WILLIAM

## 2018-05-14 DIAGNOSIS — G47.9 SLEEP DISTURBANCE: ICD-10-CM

## 2018-05-14 RX ORDER — TEMAZEPAM 30 MG/1
CAPSULE ORAL
Qty: 90 CAP | Refills: 1 | Status: SHIPPED
Start: 2018-05-14 | End: 2018-11-15

## 2018-05-22 ENCOUNTER — OFFICE VISIT (OUTPATIENT)
Dept: VASCULAR LAB | Facility: MEDICAL CENTER | Age: 83
End: 2018-05-22
Attending: INTERNAL MEDICINE
Payer: MEDICARE

## 2018-05-22 VITALS
SYSTOLIC BLOOD PRESSURE: 126 MMHG | BODY MASS INDEX: 26.92 KG/M2 | HEIGHT: 71 IN | HEART RATE: 60 BPM | WEIGHT: 192.3 LBS | DIASTOLIC BLOOD PRESSURE: 73 MMHG

## 2018-05-22 DIAGNOSIS — I38 VALVULAR HEART DISEASE: ICD-10-CM

## 2018-05-22 DIAGNOSIS — I10 ESSENTIAL HYPERTENSION, BENIGN: ICD-10-CM

## 2018-05-22 DIAGNOSIS — E78.5 HYPERLIPIDEMIA, UNSPECIFIED HYPERLIPIDEMIA TYPE: ICD-10-CM

## 2018-05-22 DIAGNOSIS — I48.0 PAROXYSMAL ATRIAL FIBRILLATION (HCC): ICD-10-CM

## 2018-05-22 PROCEDURE — 99212 OFFICE O/P EST SF 10 MIN: CPT | Performed by: INTERNAL MEDICINE

## 2018-05-22 PROCEDURE — 99214 OFFICE O/P EST MOD 30 MIN: CPT | Performed by: INTERNAL MEDICINE

## 2018-05-22 RX ORDER — IRBESARTAN 300 MG/1
300 TABLET ORAL DAILY
Qty: 30 TAB | Refills: 11 | Status: SHIPPED | OUTPATIENT
Start: 2018-05-22 | End: 2019-05-03 | Stop reason: SDUPTHER

## 2018-05-22 RX ORDER — DILTIAZEM HYDROCHLORIDE 120 MG/1
120 CAPSULE, EXTENDED RELEASE ORAL DAILY
Qty: 30 CAP | Refills: 11 | COMMUNITY
Start: 2018-05-22 | End: 2019-05-01 | Stop reason: SDUPTHER

## 2018-05-22 ASSESSMENT — ENCOUNTER SYMPTOMS
SHORTNESS OF BREATH: 0
FOCAL WEAKNESS: 0
NERVOUS/ANXIOUS: 0
PALPITATIONS: 0
MYALGIAS: 0
HEADACHES: 0
BRUISES/BLEEDS EASILY: 0
COUGH: 0
DEPRESSION: 0
BACK PAIN: 1
LOSS OF CONSCIOUSNESS: 0

## 2018-05-22 NOTE — PROGRESS NOTES
VASCULAR FOLLOW UP VISIT  Subjective:   Tristan Hester is a 82 y.o. male who presents today 5-22-18 for vascular f/u  Chief Complaint   Patient presents with   • Follow-Up     HPI:   Patient here for f/u of HTN, dyslipidemia, carotid disease and valvular heart disease, and atrial fibrillation   Having more issues with balance  Has appt with neurology upcoming.  Still seeing cards  Hasn't seen Dr. Escoto in some time, but does have appt later this month  No TIA or stroke symptoms.  No TELLEZ or lightheadedness, but less stamina than previous  No chest pain.  No bleeding or bruising on warfarin  Continues to f/u in anticoagulation clinic  Not taking BP at home - mostly in 120s at ac visits  HR at home in high 60s  No myaglias on statin  continues to follow up with urology  Did vestibular therapy without much improvement.    Social History   Substance Use Topics   • Smoking status: Former Smoker     Packs/day: 0.50     Years: 30.00     Types: Cigarettes, Pipe     Quit date: 1/1/1985   • Smokeless tobacco: Never Used   • Alcohol use 3.0 oz/week     3 Cans of beer, 3 Shots of liquor per week      Comment: 1 x week     DIET AND EXERCISE:  Weight Change: stable  Diet: common adult  Exercise: back at gym a few time a week and walks a couple of times per week     Review of Systems   Constitutional: Negative for malaise/fatigue.   Respiratory: Negative for cough and shortness of breath.    Cardiovascular: Negative for chest pain, palpitations and leg swelling.   Genitourinary: Negative for hematuria.   Musculoskeletal: Positive for back pain. Negative for myalgias.   Neurological: Negative for focal weakness, loss of consciousness and headaches.   Endo/Heme/Allergies: Does not bruise/bleed easily.   Psychiatric/Behavioral: Negative for depression. The patient is not nervous/anxious.       Objective:     Vitals:    05/22/18 0942 05/22/18 0947   BP: 131/73 126/73   Pulse: 60 60   Weight: 87.2 kg (192 lb 3.2 oz) 87.2 kg (192  "lb 4.8 oz)   Height: 1.803 m (5' 11\") 1.803 m (5' 11\")      Body mass index is 26.82 kg/m².  Physical Exam   Constitutional: He is oriented to person, place, and time. He appears well-developed and well-nourished.   Cardiovascular: Normal rate and intact distal pulses.    Murmur heard.  Pulmonary/Chest: Effort normal. No respiratory distress. He has no wheezes. He has no rales.   Musculoskeletal: Normal range of motion. He exhibits no edema.   Neurological: He is alert and oriented to person, place, and time. No cranial nerve deficit.   Psychiatric: He has a normal mood and affect. His behavior is normal.     Lab Results   Component Value Date    CHOLSTRLTOT 149 01/25/2018    LDL 78 01/25/2018    HDL 60 01/25/2018    TRIGLYCERIDE 53 01/25/2018      Lab Results   Component Value Date    PROTHROMBTM 25.6 (H) 12/30/2017    INR 2.2 04/19/2018         Lab Results   Component Value Date    SODIUM 145 (H) 01/25/2018    POTASSIUM 4.2 01/25/2018    CHLORIDE 106 01/25/2018    CO2 24 01/25/2018    GLUCOSE 90 01/25/2018    BUN 21 01/25/2018    CREATININE 1.16 01/25/2018    BUNCREATRAT 18 01/25/2018    IFAFRICA 68 01/25/2018    IFNOTAFR 59 (L) 01/25/2018      Lab Results   Component Value Date    TSH 1.400 01/25/2018      Lab Results   Component Value Date    WBC 4.9 09/27/2017    RBC 5.03 09/27/2017    HEMOGLOBIN 15.1 09/27/2017    HEMATOCRIT 45.2 09/27/2017    MCV 90 09/27/2017    MCH 30.0 09/27/2017    MCHC 33.4 09/27/2017      Blood work from 10-9-14: Glucose 102, GFR 68, cholesterol 127, trig 35, HDL 57, LDL-C 63, urine for MA/cr 3.4    Blood work from May 12, 2015, glucose 37, GFR 72, sodium 146, AST 19, ALT 12, hemoglobin 13.8, total cholesterol 125, temperature 66, initial 48, LDL 64, albumin creatinine ratio in urine 2.5    CXR 8/20/15: (comparison to 7/9/2015) :Bibasilar opacities, right greater than left, consistent with a combination of airspace disease and atelectasis. Minimal left basilar airspace opacities are " new. A small right pleural effusion is suspected, unchanged.    Blood work from February 2016-glucose 107, GFR 82, hemoglobin 14.0, total cholesterol 130, HL 51, LDL 62    Echocardiogram February 2016  Posterior mitral leaflet prolapse with eccentric moderate mitral regurgitation  Severely dilated left atrium  Mild concentric LVH  EF 60%  Mild aortic stenosis   right ventricular pressure estimated 37 mm    Carotid duplex May 2016:   1.  No evidence of re-stenosis in the right carotid bulb and internal    carotid artery after endarterectomy.    2.  Less than 50% left carotid bulb and internal carotid arterial stenosis.    3.  Normal bilateral subclavian and vertebral arterial exam.    4.  Compared to the prior study of 2/29/2016,  there has been no    significant change.     Blood work Oct 2016:  Glucose 106, GFR 77, LDL 68, nonHDL 86    Echo feb 2017:  Left ventricular ejection fraction is visually estimated to be 60%.   Grade I diastolic dysfunction.  Possibly severe mitral regurgitation.   Mild aortic stenosis.  Estimated right ventricular systolic pressure  is 25 mmHg.  Aortic root is 4 cm.   Compared to the images of the prior study done 2/29/16 there has been   no significant change.   Consider YOANDY for better evaluation of MR severity.    Blood work march 2017:  Glucose 94, GFR 59, Ldl-c 70, nonHDL 85    Carotid August 2017  Mild plaque of the right internal carotid artery with no significant    stenosis (<50%)   Mild plaque of the left internal carotid artery with no significant    stenosis (<50%).   No significant stenosis of the right or left subclavian artery.   No significant stenosis of the right or left vertebral artery.    MRI Brain Dec 2017:  MRI of the brain without contrast within normal limits for age with moderate white matter changes without demonstrable interval progression since the prior study.    Echocardiogram feb 2018:  Normal left ventricular size, wall thickness, and systolic function.    Left ventricular ejection fraction is visually estimated to be 60%.   Normal regional wall motion. Diastolic function is difficult to assess   with valvular disease.  Mitral annular calcification. No mitral stenosis. Moderate mitral   regurgitation. ERO by PISA method is 0.2 sq cm. Both leaflet appear to   attach to the anterior lateral wall. Can not rule out parachute mitral   valve.  Calcified aortic valve leaflets. Moderate aortic stenosis.   Compared to the report of the study done 2/2017- the MR appears to be   less severe, but, there has been some progression of the AS.     Medical Decision Making:  Today's Assessment / Status / Plan:     1. Essential hypertension, benign  COMP METABOLIC PANEL    MICROALBUMIN CREAT RATIO URINE   2. Paroxysmal atrial fibrillation (HCC)     3. Hyperlipidemia, unspecified hyperlipidemia type  COMP METABOLIC PANEL    LIPID PROFILE    TSH   4. Valvular heart disease  BTYPE NATRIURETIC PEPTIDE    CBC WITHOUT DIFFERENTIAL     Patient Type: Secondary Prevention    Etiology of Established CVD if Present:   1. TIA - likely cardioembolic from valve disease  2. Carotid atherosclerosis - stable on surveillance imaging  3. Valvular Heart Disease - currently asymptomatic  4. Atrial fibrillation with onset during acute illness, now chronic  5.  DVT after back surgery jan 2016    Lipid Management: Qualifies for Statin Therapy Based on 2013 ACC/AHA Guidelines: yes  Calculated 10-Year Risk of ASCVD: N/A  Currently on Statin: Yes  ACC/aha criteria for at least mod intensity statin  Per nla goal ldl <70 and nonHDL <100  Reasonable therapeurtic response on most recent blood work  Plan:  - Continue atorvastatin at current dose    - Continue TLC  - Recheck fasting lipids prior to next visit    Blood Pressure Management:  Acc/aha bp goal  BP under reasonable control at least in office on multiple visits  Plan:  - Continue current BP meds  - Encouraged home BP monitoring  - recheck gfr and urine for  ma prior to next visit    Glycemic Status: Prediabetic  IFG, mild and stable  - Continue lifestyle mod  - Follow fasting glucose    Anti-Platelet/Anti-Coagulant Tx: yes  - Continue indefinite anticoagulation with warfarin   - F/U with anticoagulation clinic   - limit nsaids    Smoking: continue complete avoidance    Physical Activity:  Continue physical therapy as recommended by Dr. Almonte    Weight Management and Nutrition: Dietary plan was discussed with patient at this visit including c/w Med diet    Other:     1. TIA - likely cardioembolic from valve disease - continue BP control and anticoagulation  2. Carotid atherosclerosis - stable on surveillance imaging - continue surveillance imaging - every 2 years  3. Valvular Heart Disease - currently asymptomatic although BNP up slightly.  Will defer f/u to cardiology.  We have ordered bnp for cardiology f/u  3. Colonic Polyp on recent colonoscopy - repeat colonoscopy in 2019 per GI  4. Multiple urological complaints - defer management to urology - encouraged to make a follow up appt  5. Insomnia - trialed zolpidem w/o success.   Continue temazepam. Patient understands addictive potential  6.  DJD back -status post surgery. Will continue with physical therapy.   Defer further management to neurosurg  8.  Atrial fibrillation - defer management of rhythm and rate to cards.  Continue anticoag as above  9.  DVT - postoperative.  No recurrence.  Continue indefinite anticoagulation given concomitant afib  10.  H/o prostate ca and multiple urological issues - defer f/u to urology  11.  Balance issues and neuropathy - defer f/u to ENT and neurology  12.  Continuing medical health - cancer screening as above. Has had shingles vac, continue yearly flu shot    We will partner with other providers in the management of established vascular disease and cardiometabolic risk factors.    Studies to Be Obtained:    1. Carotid Duplex aug 2019  2. Echo (YOANDY or TTE) per cards    Labs to  Be Obtained: As above prior to next visit    Follow up in: 6 months    Michael J Bloch, M.D.    Cc:   MD CORA Jones MD

## 2018-05-25 ENCOUNTER — TELEPHONE (OUTPATIENT)
Dept: VASCULAR LAB | Facility: MEDICAL CENTER | Age: 83
End: 2018-05-25

## 2018-05-25 NOTE — TELEPHONE ENCOUNTER
Pt called from Pharmacy saying that the Cartia XT was never received by the pharmacy. Pharmacy has been called with refill request.    Terry Weston, Med. Ass't  [facility]

## 2018-06-05 ENCOUNTER — APPOINTMENT (OUTPATIENT)
Dept: NEUROLOGY | Facility: MEDICAL CENTER | Age: 83
End: 2018-06-05
Payer: MEDICARE

## 2018-06-18 DIAGNOSIS — M54.16 LUMBAR RADICULOPATHY: ICD-10-CM

## 2018-06-18 RX ORDER — GABAPENTIN 300 MG/1
300 CAPSULE ORAL 3 TIMES DAILY
Qty: 270 CAP | Refills: 3 | Status: SHIPPED | OUTPATIENT
Start: 2018-06-18 | End: 2019-08-27 | Stop reason: SDUPTHER

## 2018-06-18 NOTE — TELEPHONE ENCOUNTER
Was the patient seen in the last year in this department? Yes     Does patient have an active prescription for medications requested? Yes     Received Request Via: Pharmacy     Patient was a Dr Valdez pt and will be seeing Dr Nichols in a new appointment on 06/28/18. He is asking for a refill on this medication. Please advise, thanks.

## 2018-06-28 ENCOUNTER — OFFICE VISIT (OUTPATIENT)
Dept: NEUROLOGY | Facility: MEDICAL CENTER | Age: 83
End: 2018-06-28
Payer: MEDICARE

## 2018-06-28 VITALS
DIASTOLIC BLOOD PRESSURE: 62 MMHG | TEMPERATURE: 96.6 F | HEIGHT: 71 IN | HEART RATE: 80 BPM | OXYGEN SATURATION: 96 % | WEIGHT: 190.2 LBS | RESPIRATION RATE: 16 BRPM | BODY MASS INDEX: 26.63 KG/M2 | SYSTOLIC BLOOD PRESSURE: 130 MMHG

## 2018-06-28 DIAGNOSIS — R26.89 BALANCE DISORDER: ICD-10-CM

## 2018-06-28 PROCEDURE — 99214 OFFICE O/P EST MOD 30 MIN: CPT | Performed by: PSYCHIATRY & NEUROLOGY

## 2018-06-28 ASSESSMENT — PATIENT HEALTH QUESTIONNAIRE - PHQ9: CLINICAL INTERPRETATION OF PHQ2 SCORE: 0
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
## 2018-06-28 NOTE — ASSESSMENT & PLAN NOTE
Mr. Hester is an 84 yo M with pmhx of DVT and atrial fibrillation on coumadin, prostate cancer s/p prostatectomy, htn, hld, TIA's, with 3 years of balance issues first noted after prolonged hospitalization for pneumonia and empyema. He has had a negative ENT workup and brain MRI does not offer any structural explanation. There is no cerebellar atrophy and the ventricles are of a normal size. On his physical exam, there is direction changing nystagmus, and a slightly broad based gait. However, there is no appendicular dystaxia. I suspect that there may be cerebellar dysfunction, but the cause is not clear. He has a history of alcohol use, and admits to having consumed heavier amounts at different stages of his life, but has never had a problem with it. I discussed the possibility of a spinocerebellar ataxia with him, however, there is no family history and the development at such a late stage of life seems atypical. We decided to meet again in one year to see if his examination has changed. In the meantime, I recommended that he try riding his bike again, but short distances at first, and to recognize his own limitations. I encouraged a helmet or other protective gear as well.    Plan:  1. Return to clinic in 1 year, sooner if any changes are noted.

## 2018-07-21 LAB
ALBUMIN SERPL-MCNC: 4.1 G/DL (ref 3.5–4.7)
ALBUMIN/GLOB SERPL: 1.6 {RATIO} (ref 1.2–2.2)
ALP SERPL-CCNC: 72 IU/L (ref 39–117)
ALT SERPL-CCNC: 15 IU/L (ref 0–44)
AST SERPL-CCNC: 20 IU/L (ref 0–40)
BILIRUB SERPL-MCNC: 0.9 MG/DL (ref 0–1.2)
BNP SERPL-MCNC: 694.6 PG/ML (ref 0–100)
BUN SERPL-MCNC: 22 MG/DL (ref 8–27)
BUN/CREAT SERPL: 20 (ref 10–24)
CALCIUM SERPL-MCNC: 9.4 MG/DL (ref 8.6–10.2)
CHLORIDE SERPL-SCNC: 108 MMOL/L (ref 96–106)
CHOLEST SERPL-MCNC: 143 MG/DL (ref 100–199)
CO2 SERPL-SCNC: 22 MMOL/L (ref 20–29)
CREAT SERPL-MCNC: 1.12 MG/DL (ref 0.76–1.27)
GLOBULIN SER CALC-MCNC: 2.5 G/DL (ref 1.5–4.5)
GLUCOSE SERPL-MCNC: 96 MG/DL (ref 65–99)
HDLC SERPL-MCNC: 46 MG/DL
IF AFRICAN AMERICAN  100797: 70 ML/MIN/1.73
IF NON AFRICAN AMER 100791: 61 ML/MIN/1.73
LABORATORY COMMENT REPORT: NORMAL
LDLC SERPL CALC-MCNC: 82 MG/DL (ref 0–99)
POTASSIUM SERPL-SCNC: 4.5 MMOL/L (ref 3.5–5.2)
PROT SERPL-MCNC: 6.6 G/DL (ref 6–8.5)
SODIUM SERPL-SCNC: 147 MMOL/L (ref 134–144)
TRIGL SERPL-MCNC: 73 MG/DL (ref 0–149)
VLDLC SERPL CALC-MCNC: 15 MG/DL (ref 5–40)

## 2018-07-23 ENCOUNTER — TELEPHONE (OUTPATIENT)
Dept: CARDIOLOGY | Facility: MEDICAL CENTER | Age: 83
End: 2018-07-23

## 2018-07-23 NOTE — TELEPHONE ENCOUNTER
Monika at [facility] at [phone] called. Her fax number is [phone]. Patient is there now and she needs a lab order faxed asap.       Faxed lab orders to [facility]. [phone]

## 2018-08-14 ENCOUNTER — OFFICE VISIT (OUTPATIENT)
Dept: CARDIOLOGY | Facility: MEDICAL CENTER | Age: 83
End: 2018-08-14
Payer: MEDICARE

## 2018-08-14 ENCOUNTER — TELEPHONE (OUTPATIENT)
Dept: CARDIOLOGY | Facility: MEDICAL CENTER | Age: 83
End: 2018-08-14

## 2018-08-14 VITALS
SYSTOLIC BLOOD PRESSURE: 130 MMHG | BODY MASS INDEX: 27.02 KG/M2 | HEART RATE: 62 BPM | HEIGHT: 71 IN | OXYGEN SATURATION: 95 % | DIASTOLIC BLOOD PRESSURE: 86 MMHG | WEIGHT: 193 LBS

## 2018-08-14 DIAGNOSIS — E78.5 DYSLIPIDEMIA: ICD-10-CM

## 2018-08-14 DIAGNOSIS — I77.9 BILATERAL CAROTID ARTERY DISEASE (HCC): ICD-10-CM

## 2018-08-14 DIAGNOSIS — I48.0 PAROXYSMAL ATRIAL FIBRILLATION (HCC): ICD-10-CM

## 2018-08-14 DIAGNOSIS — I34.0 MITRAL VALVE INSUFFICIENCY, UNSPECIFIED ETIOLOGY: ICD-10-CM

## 2018-08-14 DIAGNOSIS — I10 ESSENTIAL HYPERTENSION, BENIGN: ICD-10-CM

## 2018-08-14 PROCEDURE — 99214 OFFICE O/P EST MOD 30 MIN: CPT | Performed by: INTERNAL MEDICINE

## 2018-08-14 RX ORDER — EZETIMIBE 10 MG/1
10 TABLET ORAL DAILY
Qty: 30 TAB | Refills: 11 | Status: SHIPPED | OUTPATIENT
Start: 2018-08-14 | End: 2019-08-15 | Stop reason: SDUPTHER

## 2018-08-14 NOTE — PROGRESS NOTES
Chief Complaint   Patient presents with   • Atrial Fibrillation     Follow up       Subjective:   Tristan Hester is a 83 y.o. male who presents today for follow-up of his PAF, hypertension and dyslipidemia.  He also has aortic stenosis and mitral insufficiency.    He is exercising regularly without difficulty.  He has had some difficulty with his balance and needs to carry a cane when he goes out for his walks.  He feels his stamina may have dropped a little bit but he has dyspnea on exertion only when he walks uphill.  He has had no PND, orthopnea or edema.  He denies any chest discomfort, palpitations or lightheadedness.    Past Medical History:   Diagnosis Date   • Arrhythmia     Afib   • Arthritis     osteo, hips and knees   • Blood clotting disorder ([facility])     , left arm   • Bronchitis        • Cancer ([facility])     Prostate CA--2000   • Carotid arterial disease ([facility]) 1/6/2014   • Cataract     removed bilat   • DVT (deep venous thrombosis) ([facility]) 2016   • Heart valve disease     MVR   • HLD (hyperlipidemia) 1/6/2014   • Hypertension    • Mitral regurgitation 1/6/2014   • Pain 12-14-15    low back, hips, 2-3/10   • Pneumonia        • Stroke ([facility]) 2008    TIA, no residual   • TIA (transient ischemic attack)     2008   • Urinary bladder disorder     HAD MALE BLADDER SUSPENSION PROCEDURE DONE   • Urinary incontinence      Past Surgical History:   Procedure Laterality Date   • LUMBAR FUSION POSTERIOR  12/28/2015    Procedure: LUMBAR FUSION POSTERIOR L3-5 with peek rods;  Surgeon: Michael Almonte M.D.;  Location: [facility];  Service:    • LUMBAR DECOMPRESSION  12/28/2015    Procedure: LUMBAR DECOMPRESSION posterior redo decompression L3-5, with dural repair;  Surgeon: Michael Almonte M.D.;  Location: [facility];  Service:    • THORACOSCOPY  6/13/2015    Procedure: THORACOSCOPY with decortication VATS , side to be determined  ;  Surgeon: Elmira Holder M.D.;  Location:  SURGERY [facility];  Service:    • LUMBAR LAMINECTOMY DISKECTOMY  10/8/2010    Performed by PHYLICIA SARKAR at SUR[facility]ERY [facility]   • CERVICAL DISK AND FUSION ANTERIOR  10/26/2009    Performed by PHYLICIA SARKAR at [facility]   • CAROTID ENDARTERECTOMY  7/10/08    Performed by LEIGH MORATAYA at [facility]   • BLADDER SLING MALE  11/2004   • PROSTATECTOMY, RADIAL  11/2000     Family History   Problem Relation Age of Onset   • Other Brother         Aneurysm   • Cancer Brother         Seminal vascular cancer   • Autoimmune Disease Daughter         Lupus, RA   • No Known Problems Daughter    • No Known Problems Son    • No Known Problems Son    • Heart Attack Neg Hx      Social History     Social History   • Marital status:      Spouse name: N/A   • Number of children: N/A   • Years of education: N/A     Occupational History   • Not on file.     Social History Main Topics   • Smoking status: Former Smoker     Packs/day: 0.50     Years: 30.00     Types: Cigarettes, Pipe     Quit date: 1/1/1985   • Smokeless tobacco: Never Used   • Alcohol use 3.0 oz/week     3 Cans of beer, 3 Shots of liquor per week      Comment: 1 x week   • Drug use: No   • Sexual activity: Not Currently     Other Topics Concern   • Not on file     Social History Narrative   • No narrative on file     Allergies   Allergen Reactions   • Cantaloupe      Pt stated cantaloupe causes his throat to constrict   • Nkda [No Known Drug Allergy]    • Other Environmental      Seasonal, cats-hayfever     Outpatient Encounter Prescriptions as of 8/14/2018   Medication Sig Dispense Refill   • gabapentin (NEURONTIN) 300 MG Cap Take 1 Cap by mouth 3 times a day. 270 Cap 3   • CARTIA  MG CAPSULE SR 24 HR Take 1 Cap by mouth every day. 30 Cap 11   • irbesartan (AVAPRO) 300 MG Tab Take 1 Tab by mouth every day. 30 Tab 11   • temazepam (RESTORIL) 30 MG capsule TAKE ONE CAPSULE BY MOUTH ONCE DAILY AT BEDTIME AS NEEDED FOR SLEEP  "90 Cap 1   • warfarin (COUMADIN) 4 MG Tab Take one and one-half to two (1.5-2) tablets daily as directed by [facility] 180 Tab 1   • atorvastatin (LIPITOR) 80 MG tablet Take 0.5 Tabs by mouth every evening. 90 Tab 3   • Multiple Vitamin (MULTI-VITAMINS) Tab Take  by mouth.     • MethylPREDNISolone (MEDROL DOSEPAK) 4 MG Tablet Therapy Pack Use as directed (Patient not taking: Reported on 8/14/2018) 1 Kit 0   • acetaminophen (TYLENOL) 325 MG Tab Take 650 mg by mouth every four hours as needed.       No facility-administered encounter medications on file as of 8/14/2018.      ROS     Objective:   /86   Pulse 62   Ht 1.803 m (5' 11\")   Wt 87.5 kg (193 lb)   SpO2 95%   BMI 26.92 kg/m²     Physical Exam   Constitutional: He appears well-developed and well-nourished.   Neck: No JVD present.   Cardiovascular: Normal rate and regular rhythm.    Murmur (3/6 to 4/6 systolic at the base and radiating towards the apex) heard.  Pulmonary/Chest: Effort normal and breath sounds normal. He has no rales.   Abdominal: Soft. There is no tenderness.   Musculoskeletal: He exhibits no edema.       Transthoracic  Echo Report      Echocardiography Laboratory    CONCLUSIONS  Normal left ventricular size, wall thickness, and systolic function.   Left ventricular ejection fraction is visually estimated to be 60%.   Normal regional wall motion. Diastolic function is difficult to assess   with valvular disease.  Mitral annular calcification. No mitral stenosis. Moderate mitral   regurgitation. ERO by PISA method is 0.2 sq cm. Both leaflet appear to   attach to the anterior lateral wall. Can not rule out parachute mitral   valve.  Calcified aortic valve leaflets. Moderate aortic stenosis.   Compared to the report of the study done 2/2017- the MR appears to be   less severe, but, there has been some progression of the AS.     ANTHONY JOSHI  Exam Date:         02/13/2018    Lab Results   Component Value Date/Time    " CHOLSTRLTOT 143 07/19/2018 09:55 AM    CHOLSTRLTOT 146 06/02/2008 11:00 PM    LDL 82 07/19/2018 09:55 AM    LDL 85 06/02/2008 11:00 PM    HDL 46 07/19/2018 09:55 AM    HDL 49 06/02/2008 11:00 PM    TRIGLYCERIDE 73 07/19/2018 09:55 AM    TRIGLYCERIDE 60 06/02/2008 11:00 PM       Lab Results   Component Value Date/Time    SODIUM 147 (H) 07/19/2018 09:55 AM    SODIUM 138 01/12/2016 12:20 PM    POTASSIUM 4.5 07/19/2018 09:55 AM    POTASSIUM 4.3 01/12/2016 12:20 PM    CHLORIDE 108 (H) 07/19/2018 09:55 AM    CHLORIDE 105 01/12/2016 12:20 PM    CO2 22 07/19/2018 09:55 AM    CO2 26 01/12/2016 12:20 PM    GLUCOSE 96 07/19/2018 09:55 AM    GLUCOSE 128 (H) 01/12/2016 12:20 PM    BUN 22 07/19/2018 09:55 AM    BUN 18 01/12/2016 12:20 PM    CREATININE 1.12 07/19/2018 09:55 AM    CREATININE 1.00 01/12/2016 12:20 PM    CREATININE 1.0 07/02/2008 03:43 PM    BUNCREATRAT 20 07/19/2018 09:55 AM     Lab Results   Component Value Date/Time    ALKPHOSPHAT 72 07/19/2018 09:55 AM    ALKPHOSPHAT 52 01/11/2016 04:25 AM    ASTSGOT 20 07/19/2018 09:55 AM    ASTSGOT 14 01/11/2016 04:25 AM    ALTSGPT 15 07/19/2018 09:55 AM    ALTSGPT 11 01/11/2016 04:25 AM    TBILIRUBIN 0.9 07/19/2018 09:55 AM    TBILIRUBIN 1.1 01/11/2016 04:25 AM      Lab Results   Component Value Date/Time    BNPBTYPENAT 694.6 (H) 07/19/2018 09:55 AM    BNPBTYPENAT 244 (H) 06/23/2015 01:41 AM          Assessment:     1. Paroxysmal atrial fibrillation (HCC)     2. Mitral valve insufficiency, unspecified etiology     3. Essential hypertension, benign     4. Bilateral carotid artery disease (CMS-HCC): Mild bilateral in 2017     5. Dyslipidemia         Medical Decision Making:  Today's Assessment / Status / Plan:     Mr. Hester is clinically stable.  However, he has had some drop in his exercise tolerance.  His BNP has been gradually increasing.  At this time, I feel we should proceed to YOANDY to better evaluate his mitral insufficiency.  I suspect it might be severe.  His lipid status  is not under optimal control and we will add ezetimibe 10 mg daily.  He will follow-up in about 6 weeks with repeat lab work.  His blood pressure appears to be under fairly good control and he appears to be maintaining a sinus rhythm at the present time.

## 2018-08-14 NOTE — LETTER
[facility]   [address], [address]  [place] [address]  Phone: [phone]  Fax: [phone]              Tristan Hester  1935    Encounter Date: 8/14/2018    Senthil Escoto M.D.          PROGRESS NOTE:  Chief Complaint   Patient presents with   • Atrial Fibrillation     Follow up       Subjective:   Tristan Hester is a 83 y.o. male who presents today for follow-up of his PAF, hypertension and dyslipidemia.  He also has aortic stenosis and mitral insufficiency.    He is exercising regularly without difficulty.  He has had some difficulty with his balance and needs to carry a cane when he goes out for his walks.  He feels his stamina may have dropped a little bit but he has dyspnea on exertion only when he walks uphill.  He has had no PND, orthopnea or edema.  He denies any chest discomfort, palpitations or lightheadedness.    Past Medical History:   Diagnosis Date   • Arrhythmia     Afib   • Arthritis     osteo, hips and knees   • Blood clotting disorder ([facility])     , left arm   • Bronchitis        • Cancer ([facility])     Prostate CA--2000   • Carotid arterial disease ([facility]) 1/6/2014   • Cataract     removed bilat   • DVT (deep venous thrombosis) ([facility]) 2016   • Heart valve disease     MVR   • HLD (hyperlipidemia) 1/6/2014   • Hypertension    • Mitral regurgitation 1/6/2014   • Pain 12-14-15    low back, hips, 2-3/10   • Pneumonia        • Stroke ([facility]) 2008    TIA, no residual   • TIA (transient ischemic attack)     2008   • Urinary bladder disorder     HAD MALE BLADDER SUSPENSION PROCEDURE DONE   • Urinary incontinence      Past Surgical History:   Procedure Laterality Date   • LUMBAR FUSION POSTERIOR  12/28/2015    Procedure: LUMBAR FUSION POSTERIOR L3-5 with peek rods;  Surgeon: Michael Almonte M.D.;  Location: SURGERY [facility];  Service:    • LUMBAR DECOMPRESSION  12/28/2015    Procedure: LUMBAR DECOMPRESSION posterior redo  decompression L3-5, with dural repair;  Surgeon: Michael Sarkar M.D.;  Location: SURGERY [facility];  Service:    • THORACOSCOPY  6/13/2015    Procedure: THORACOSCOPY with decortication VATS , side to be determined  ;  Surgeon: Elmira Holder M.D.;  Location: SURGERY [facility];  Service:    • LUMBAR LAMINECTOMY DISKECTOMY  10/8/2010    Performed by MICHAEL SARKAR at [facility]   • CERVICAL DISK AND FUSION ANTERIOR  10/26/2009    Performed by MICHAEL SARKAR at [facility]   • CAROTID ENDARTERECTOMY  7/10/08    Performed by LEIGH MORATAYA at [facility]   • BLADDER SLING MALE  11/2004   • PROSTATECTOMY, RADIAL  11/2000     Family History   Problem Relation Age of Onset   • Other Brother         Aneurysm   • Cancer Brother         Seminal vascular cancer   • Autoimmune Disease Daughter         Lupus, RA   • No Known Problems Daughter    • No Known Problems Son    • No Known Problems Son    • Heart Attack Neg Hx      Social History     Social History   • Marital status:      Spouse name: N/A   • Number of children: N/A   • Years of education: N/A     Occupational History   • Not on file.     Social History Main Topics   • Smoking status: Former Smoker     Packs/day: 0.50     Years: 30.00     Types: Cigarettes, Pipe     Quit date: 1/1/1985   • Smokeless tobacco: Never Used   • Alcohol use 3.0 oz/week     3 Cans of beer, 3 Shots of liquor per week      Comment: 1 x week   • Drug use: No   • Sexual activity: Not Currently     Other Topics Concern   • Not on file     Social History Narrative   • No narrative on file     Allergies   Allergen Reactions   • Cantaloupe      Pt stated cantaloupe causes his throat to constrict   • Nkda [No Known Drug Allergy]    • Other Environmental      Seasonal, cats-hayfever     Outpatient Encounter Prescriptions as of 8/14/2018   Medication Sig Dispense Refill   • gabapentin (NEURONTIN) 300 MG Cap Take 1 Cap by mouth 3 times a day. 270 Cap  "3   • CARTIA  MG CAPSULE SR 24 HR Take 1 Cap by mouth every day. 30 Cap 11   • irbesartan (AVAPRO) 300 MG Tab Take 1 Tab by mouth every day. 30 Tab 11   • temazepam (RESTORIL) 30 MG capsule TAKE ONE CAPSULE BY MOUTH ONCE DAILY AT BEDTIME AS NEEDED FOR SLEEP 90 Cap 1   • warfarin (COUMADIN) 4 MG Tab Take one and one-half to two (1.5-2) tablets daily as directed by [facility] 180 Tab 1   • atorvastatin (LIPITOR) 80 MG tablet Take 0.5 Tabs by mouth every evening. 90 Tab 3   • Multiple Vitamin (MULTI-VITAMINS) Tab Take  by mouth.     • MethylPREDNISolone (MEDROL DOSEPAK) 4 MG Tablet Therapy Pack Use as directed (Patient not taking: Reported on 8/14/2018) 1 Kit 0   • acetaminophen (TYLENOL) 325 MG Tab Take 650 mg by mouth every four hours as needed.       No facility-administered encounter medications on file as of 8/14/2018.      ROS     Objective:   /86   Pulse 62   Ht 1.803 m (5' 11\")   Wt 87.5 kg (193 lb)   SpO2 95%   BMI 26.92 kg/m²      Physical Exam   Constitutional: He appears well-developed and well-nourished.   Neck: No JVD present.   Cardiovascular: Normal rate and regular rhythm.    Murmur (3/6 to 4/6 systolic at the base and radiating towards the apex) heard.  Pulmonary/Chest: Effort normal and breath sounds normal. He has no rales.   Abdominal: Soft. There is no tenderness.   Musculoskeletal: He exhibits no edema.       Transthoracic  Echo Report      Echocardiography Laboratory    CONCLUSIONS  Normal left ventricular size, wall thickness, and systolic function.   Left ventricular ejection fraction is visually estimated to be 60%.   Normal regional wall motion. Diastolic function is difficult to assess   with valvular disease.  Mitral annular calcification. No mitral stenosis. Moderate mitral   regurgitation. ERO by PISA method is 0.2 sq cm. Both leaflet appear to   attach to the anterior lateral wall. Can not rule out parachute mitral   valve.  Calcified aortic valve " leaflets. Moderate aortic stenosis.   Compared to the report of the study done 2/2017- the MR appears to be   less severe, but, there has been some progression of the AS.     ANTHONY JOSHI  Exam Date:         02/13/2018    Lab Results   Component Value Date/Time    CHOLSTRLTOT 143 07/19/2018 09:55 AM    CHOLSTRLTOT 146 06/02/2008 11:00 PM    LDL 82 07/19/2018 09:55 AM    LDL 85 06/02/2008 11:00 PM    HDL 46 07/19/2018 09:55 AM    HDL 49 06/02/2008 11:00 PM    TRIGLYCERIDE 73 07/19/2018 09:55 AM    TRIGLYCERIDE 60 06/02/2008 11:00 PM       Lab Results   Component Value Date/Time    SODIUM 147 (H) 07/19/2018 09:55 AM    SODIUM 138 01/12/2016 12:20 PM    POTASSIUM 4.5 07/19/2018 09:55 AM    POTASSIUM 4.3 01/12/2016 12:20 PM    CHLORIDE 108 (H) 07/19/2018 09:55 AM    CHLORIDE 105 01/12/2016 12:20 PM    CO2 22 07/19/2018 09:55 AM    CO2 26 01/12/2016 12:20 PM    GLUCOSE 96 07/19/2018 09:55 AM    GLUCOSE 128 (H) 01/12/2016 12:20 PM    BUN 22 07/19/2018 09:55 AM    BUN 18 01/12/2016 12:20 PM    CREATININE 1.12 07/19/2018 09:55 AM    CREATININE 1.00 01/12/2016 12:20 PM    CREATININE 1.0 07/02/2008 03:43 PM    BUNCREATRAT 20 07/19/2018 09:55 AM     Lab Results   Component Value Date/Time    ALKPHOSPHAT 72 07/19/2018 09:55 AM    ALKPHOSPHAT 52 01/11/2016 04:25 AM    ASTSGOT 20 07/19/2018 09:55 AM    ASTSGOT 14 01/11/2016 04:25 AM    ALTSGPT 15 07/19/2018 09:55 AM    ALTSGPT 11 01/11/2016 04:25 AM    TBILIRUBIN 0.9 07/19/2018 09:55 AM    TBILIRUBIN 1.1 01/11/2016 04:25 AM      Lab Results   Component Value Date/Time    BNPBTYPENAT 694.6 (H) 07/19/2018 09:55 AM    BNPBTYPENAT 244 (H) 06/23/2015 01:41 AM          Assessment:     1. Paroxysmal atrial fibrillation (HCC)     2. Mitral valve insufficiency, unspecified etiology     3. Essential hypertension, benign     4. Bilateral carotid artery disease (CMS-HCC): Mild bilateral in 2017     5. Dyslipidemia         Medical Decision Making:  Today's Assessment / Status / Plan:       Kacie is clinically stable.  However, he has had some drop in his exercise tolerance.  His BNP has been gradually increasing.  At this time, I feel we should proceed to YOANDY to better evaluate his mitral insufficiency.  I suspect it might be severe.  His lipid status is not under optimal control and we will add ezetimibe 10 mg daily.  He will follow-up in about 6 weeks with repeat lab work.  His blood pressure appears to be under fairly good control and he appears to be maintaining a sinus rhythm at the present time.      Michael J Bloch, M.D.  [address] [address]  VIA In Basket

## 2018-08-14 NOTE — TELEPHONE ENCOUNTER
Patient scheduled for YOANDY on 8-28-18 at [facility] with Dr. Roman at 11:00. J CARLOS Roman.

## 2018-08-16 NOTE — TELEPHONE ENCOUNTER
Recvd call from patient - he would like to cancel the YOANDY at this time. He will call back if he decides to reschedule. FYI to Dr. Escoto.  LM on Cassie's voicemail to cancel the case and cancelled with Mera with anesthesia.

## 2018-08-21 ENCOUNTER — ANTICOAGULATION VISIT (OUTPATIENT)
Dept: VASCULAR LAB | Facility: MEDICAL CENTER | Age: 83
End: 2018-08-21
Attending: INTERNAL MEDICINE
Payer: MEDICARE

## 2018-08-21 VITALS — SYSTOLIC BLOOD PRESSURE: 121 MMHG | DIASTOLIC BLOOD PRESSURE: 76 MMHG | HEART RATE: 65 BPM

## 2018-08-21 DIAGNOSIS — I48.0 PAROXYSMAL ATRIAL FIBRILLATION (HCC): ICD-10-CM

## 2018-08-21 LAB
INR BLD: 2.3 (ref 0.9–1.2)
INR PPP: 2.3 (ref 2–3.5)

## 2018-08-21 PROCEDURE — 99211 OFF/OP EST MAY X REQ PHY/QHP: CPT

## 2018-08-21 PROCEDURE — 85610 PROTHROMBIN TIME: CPT

## 2018-08-21 NOTE — PROGRESS NOTES
Anticoagulation Summary  As of 8/21/2018    INR goal:   2.0-3.0   TTR:   66.8 % (3 y)   Today's INR:   2.3   Warfarin maintenance plan:   8 mg (4 mg x 2) on Sun, Tue, Thu; 6 mg (4 mg x 1.5) all other days   Weekly warfarin total:   48 mg   No change documented:   Yashira Boggs PharmD   Plan last modified:   Quita Sim PharmD (1/26/2017)   Next INR check:   11/13/2018   Priority:   Maintenance   Target end date:   Indefinite    Indications    DVT (deep venous thrombosis) (HCC) [I82.409]  Paroxysmal atrial fibrillation (HCC) [I48.0]  Hx of Stroke (Resolved) [I63.9]  Hx of TIA (transient ischemic attack) [G45.9]  Deep vein thrombosis (DVT) (HCC) (Resolved) [I82.409]  Atrial fibrillation (HCC) (Resolved) [I48.91]             Anticoagulation Episode Summary     [facility] check location:   [facility]    Preferred lab:       Send INR reminders to:       Comments:         Anticoagulation Care Providers     Provider Role Specialty Phone number    Michael J Bloch, M.D. Referring Internal Medicine [phone]    [facility]   [phone]        Anticoagulation Patient Findings  Patient Findings     Negatives:   Signs/symptoms of thrombosis, Signs/symptoms of bleeding, Laboratory test error suspected, Change in health, Change in alcohol use, Change in activity, Upcoming invasive procedure, Emergency department visit, Upcoming dental procedure, Missed doses, Extra doses, Change in medications, Change in diet/appetite, Hospital admission, Bruising, Other complaints        History of Present Illness: follow up appointment for chronic anticoagulation with the high risk medication, warfarin for stroke.    Pt remains therapeutic today. Pt is to continue with current warfarin dosing regimen.  Follow up in 12 weeks, to reduce risk of adverse events related to this high risk medication,  Warfarin.    Yashira Boggs PharmD    Pt will be departing in October for a month in [facility].

## 2018-11-08 ENCOUNTER — OFFICE VISIT (OUTPATIENT)
Dept: VASCULAR LAB | Facility: MEDICAL CENTER | Age: 83
End: 2018-11-08
Attending: INTERNAL MEDICINE
Payer: MEDICARE

## 2018-11-08 VITALS
WEIGHT: 191 LBS | HEART RATE: 67 BPM | BODY MASS INDEX: 26.74 KG/M2 | SYSTOLIC BLOOD PRESSURE: 130 MMHG | DIASTOLIC BLOOD PRESSURE: 83 MMHG | HEIGHT: 71 IN

## 2018-11-08 DIAGNOSIS — I48.0 PAROXYSMAL ATRIAL FIBRILLATION (HCC): ICD-10-CM

## 2018-11-08 DIAGNOSIS — E78.5 HYPERLIPIDEMIA, UNSPECIFIED HYPERLIPIDEMIA TYPE: ICD-10-CM

## 2018-11-08 DIAGNOSIS — I10 ESSENTIAL HYPERTENSION, BENIGN: ICD-10-CM

## 2018-11-08 DIAGNOSIS — I38 VALVULAR HEART DISEASE: ICD-10-CM

## 2018-11-08 DIAGNOSIS — G45.9 TIA (TRANSIENT ISCHEMIC ATTACK): ICD-10-CM

## 2018-11-08 PROCEDURE — 99214 OFFICE O/P EST MOD 30 MIN: CPT | Performed by: INTERNAL MEDICINE

## 2018-11-08 PROCEDURE — 99212 OFFICE O/P EST SF 10 MIN: CPT | Performed by: INTERNAL MEDICINE

## 2018-11-08 ASSESSMENT — ENCOUNTER SYMPTOMS
COUGH: 0
MYALGIAS: 0
BACK PAIN: 1
HEADACHES: 0
NERVOUS/ANXIOUS: 0
PALPITATIONS: 0
BRUISES/BLEEDS EASILY: 0
FOCAL WEAKNESS: 0
LOSS OF CONSCIOUSNESS: 0
SHORTNESS OF BREATH: 0
DEPRESSION: 0

## 2018-11-08 NOTE — PROGRESS NOTES
VASCULAR FOLLOW UP VISIT  Subjective:   Tristan Hester is a 83 y.o. male who presents today 11-8-18 for vascular f/u  Chief Complaint   Patient presents with   • Follow-Up     HPI:   Patient here for f/u of HTN, dyslipidemia, carotid disease and valvular heart disease, and atrial fibrillation   Saw Dr Escoto earlier in the year - he recommended yuan but patient refused.  Has noticed a decrease in exercise tolerance but modest  Having more issues with balance  Saw neurology and ENT - no specific diagnosis  No TIA or stroke symptoms.  No TELLEZ or lightheadedness, but less stamina than previous  No chest pain.  No bleeding or bruising on warfarin  Continues to f/u in anticoagulation clinic  Not checking bp at home  No myaglias on statin  Dr. Escoto added tori  Didn't have follow up labs yet  continues to follow up with urology    Social History   Substance Use Topics   • Smoking status: Former Smoker     Packs/day: 0.50     Years: 30.00     Types: Cigarettes, Pipe     Quit date: 1/1/1985   • Smokeless tobacco: Never Used   • Alcohol use 3.0 oz/week     3 Cans of beer, 3 Shots of liquor per week      Comment: 1 x week     DIET AND EXERCISE:  Weight Change: stable  Diet: common adult  Exercise: back at gym a few time a week and walks a couple of times per week     Review of Systems   Constitutional: Negative for malaise/fatigue.   Respiratory: Negative for cough and shortness of breath.    Cardiovascular: Negative for chest pain, palpitations and leg swelling.   Genitourinary: Negative for hematuria.   Musculoskeletal: Positive for back pain. Negative for myalgias.   Neurological: Negative for focal weakness, loss of consciousness and headaches.   Endo/Heme/Allergies: Does not bruise/bleed easily.   Psychiatric/Behavioral: Negative for depression. The patient is not nervous/anxious.       Objective:     Vitals:    11/08/18 1026 11/08/18 1032   BP: 138/81 130/83   BP Location: Left arm Left arm   Patient Position:  "Sitting Sitting   BP Cuff Size: Adult Adult   Pulse: 67 67   Weight: 86.6 kg (191 lb)    Height: 1.803 m (5' 11\")       Body mass index is 26.64 kg/m².  Physical Exam   Constitutional: He is oriented to person, place, and time. He appears well-developed and well-nourished.   Cardiovascular: Normal rate and intact distal pulses.    Murmur heard.  Pulmonary/Chest: Effort normal. No respiratory distress. He has no wheezes. He has no rales.   Musculoskeletal: Normal range of motion. He exhibits no edema.   Neurological: He is alert and oriented to person, place, and time. No cranial nerve deficit.   Psychiatric: He has a normal mood and affect. His behavior is normal.     Lab Results   Component Value Date    CHOLSTRLTOT 143 07/19/2018    LDL 82 07/19/2018    HDL 46 07/19/2018    TRIGLYCERIDE 73 07/19/2018      Lab Results   Component Value Date    PROTHROMBTM 25.6 (H) 12/30/2017    INR 2.3 08/21/2018         Lab Results   Component Value Date    SODIUM 147 (H) 07/19/2018    POTASSIUM 4.5 07/19/2018    CHLORIDE 108 (H) 07/19/2018    CO2 22 07/19/2018    GLUCOSE 96 07/19/2018    BUN 22 07/19/2018    CREATININE 1.12 07/19/2018    BUNCREATRAT 20 07/19/2018    IFAFRICA 70 07/19/2018    IFNOTAFR 61 07/19/2018      Lab Results   Component Value Date    TSH 1.400 01/25/2018      Lab Results   Component Value Date    WBC 4.9 09/27/2017    RBC 5.03 09/27/2017    HEMOGLOBIN 15.1 09/27/2017    HEMATOCRIT 45.2 09/27/2017    MCV 90 09/27/2017    MCH 30.0 09/27/2017    MCHC 33.4 09/27/2017      Blood work from 10-9-14: Glucose 102, GFR 68, cholesterol 127, trig 35, HDL 57, LDL-C 63, urine for MA/cr 3.4    Blood work from May 12, 2015, glucose 37, GFR 72, sodium 146, AST 19, ALT 12, hemoglobin 13.8, total cholesterol 125, temperature 66, initial 48, LDL 64, albumin creatinine ratio in urine 2.5    CXR 8/20/15: (comparison to 7/9/2015) :Bibasilar opacities, right greater than left, consistent with a combination of airspace disease and " atelectasis. Minimal left basilar airspace opacities are new. A small right pleural effusion is suspected, unchanged.    Blood work from February 2016-glucose 107, GFR 82, hemoglobin 14.0, total cholesterol 130, HL 51, LDL 62    Echocardiogram February 2016  Posterior mitral leaflet prolapse with eccentric moderate mitral regurgitation  Severely dilated left atrium  Mild concentric LVH  EF 60%  Mild aortic stenosis   right ventricular pressure estimated 37 mm    Carotid duplex May 2016:   1.  No evidence of re-stenosis in the right carotid bulb and internal    carotid artery after endarterectomy.    2.  Less than 50% left carotid bulb and internal carotid arterial stenosis.    3.  Normal bilateral subclavian and vertebral arterial exam.    4.  Compared to the prior study of 2/29/2016,  there has been no    significant change.     Blood work Oct 2016:  Glucose 106, GFR 77, LDL 68, nonHDL 86    Echo feb 2017:  Left ventricular ejection fraction is visually estimated to be 60%.   Grade I diastolic dysfunction.  Possibly severe mitral regurgitation.   Mild aortic stenosis.  Estimated right ventricular systolic pressure  is 25 mmHg.  Aortic root is 4 cm.   Compared to the images of the prior study done 2/29/16 there has been   no significant change.   Consider YOANDY for better evaluation of MR severity.    Blood work march 2017:  Glucose 94, GFR 59, Ldl-c 70, nonHDL 85    Carotid August 2017  Mild plaque of the right internal carotid artery with no significant    stenosis (<50%)   Mild plaque of the left internal carotid artery with no significant    stenosis (<50%).   No significant stenosis of the right or left subclavian artery.   No significant stenosis of the right or left vertebral artery.    MRI Brain Dec 2017:  MRI of the brain without contrast within normal limits for age with moderate white matter changes without demonstrable interval progression since the prior study.    Echocardiogram feb 2018:  Normal left  ventricular size, wall thickness, and systolic function.   Left ventricular ejection fraction is visually estimated to be 60%.   Normal regional wall motion. Diastolic function is difficult to assess   with valvular disease.  Mitral annular calcification. No mitral stenosis. Moderate mitral   regurgitation. ERO by PISA method is 0.2 sq cm. Both leaflet appear to   attach to the anterior lateral wall. Can not rule out parachute mitral   valve.  Calcified aortic valve leaflets. Moderate aortic stenosis.   Compared to the report of the study done 2/2017- the MR appears to be   less severe, but, there has been some progression of the AS.     Medical Decision Making:  Today's Assessment / Status / Plan:     1. Paroxysmal atrial fibrillation (HCC)     2. Essential hypertension, benign     3. Hyperlipidemia, unspecified hyperlipidemia type     4. Valvular heart disease     5. TIA (transient ischemic attack)       Patient Type: Secondary Prevention    Etiology of Established CVD if Present:   1. TIA - likely cardioembolic from valve disease  2. Carotid atherosclerosis - stable on surveillance imaging  3. Valvular Heart Disease - currently asymptomatic  4. Atrial fibrillation with onset during acute illness, now chronic  5.  DVT after back surgery jan 2016    Lipid Management: Qualifies for Statin Therapy Based on 2013 ACC/AHA Guidelines: yes  Calculated 10-Year Risk of ASCVD: N/A  Currently on Statin: Yes  ACC/aha criteria for at least mod intensity statin  Per nla goal ldl <70 and nonHDL <100  Zetia recently added by [email or web address]  Plan:  - Continue atorvastatin at current dose    - continue zetia  - Continue TLC  - Recheck fasting lipids per [email or web address]    Blood Pressure Management:  Acc/aha bp goal <130/80  BP under reasonable control at least in office on multiple visits  Plan:  - Continue current BP meds  - Encouraged home BP monitoring  - recheck gfr and urine for ma prior to next visit    Glycemic Status: Prediabetic  IFG, mild  and stable  - Continue lifestyle mod  - Follow fasting glucose    Anti-Platelet/Anti-Coagulant Tx: yes  - Continue indefinite anticoagulation with warfarin   - F/U with anticoagulation clinic   - limit nsaids    Smoking: continue complete avoidance    Physical Activity:  Encouraged more walking    Weight Management and Nutrition: Dietary plan was discussed with patient at this visit including c/w Med diet    Other:     1. TIA - likely cardioembolic from valve disease - continue BP control and anticoagulation  2. Carotid atherosclerosis - stable on surveillance imaging - continue surveillance imaging - every 2 years  3. Valvular Heart Disease - unclear if becoming symptomatic.  Cards recommended yuan but patient refused. Symptoms stable.  Defer f/u to cards  4. Colonic Polyp on recent colonoscopy - repeat colonoscopy in 2019 per GI  5. Insomnia - trialed zolpidem w/o success.   Continue temazepam. Patient understands addictive potential  6.  DJD back -status post surgery. Will continue with physical therapy.   Defer further management to neurosurg  7.  Atrial fibrillation - defer management of rhythm and rate to cards.  Continue anticoag as above  8.  DVT - postoperative.  No recurrence.  Continue indefinite anticoagulation given concomitant afib  9.  H/o prostate ca and multiple urological issues - defer f/u to urology  10.  Balance issues and neuropathy - defer f/u to ENT and neurology  11.  Continuing medical health - cancer screening as above. Has had shingles vac, continue yearly flu shot    We will partner with other providers in the management of established vascular disease and cardiometabolic risk factors.    Studies to Be Obtained:    1. Carotid Duplex aug 2019  2. Echo (YUAN or TTE) per cards    Labs to Be Obtained: lipids and cmp    Follow up in: 6 months    Michael J Bloch, M.D.    Cc:   MD CORA Jones MD      Anticoagulation Summary  As of 11/8/2018    INR goal:   2.0-3.0   TTR:   66.8 % (3  y)   Today's INR:      Warfarin maintenance plan:   8 mg (4 mg x 2) on Sun, Tue, Thu; 6 mg (4 mg x 1.5) all other days   Weekly warfarin total:   48 mg   Plan last modified:   Quita Sim, PharmD (1/26/2017)   Next INR check:   1/31/2019   Priority:   Maintenance   Target end date:   Indefinite    Indications    DVT (deep venous thrombosis) (HCC) [I82.409]  Paroxysmal atrial fibrillation (HCC) [I48.0]  Hx of Stroke (Resolved) [I63.9]  Hx of TIA (transient ischemic attack) [G45.9]  Deep vein thrombosis (DVT) (HCC) (Resolved) [I82.409]  Atrial fibrillation (HCC) (Resolved) [I48.91]             Anticoagulation Episode Summary     [facility] check location[facility]   [facility]    Preferred lab:       Send INR reminders to:       Comments:         Anticoagulation Care Providers     Provider Role Specialty Phone number    Michael J Bloch, M.D. Referring Internal Medicine [phone]    [facility]   [phone]        Anticoagulation Patient Findings      HPI:  Tristan Hester seen in clinic today, on anticoagulation therapy with warfarin for TIA.   Changes to current medical/health status since last appt: none  Denies signs/symptoms of bleeding and/or thrombosis since the last appt.    Denies any interval changes to diet  Denies any interval changes to medications since last appt.   Denies any complications or cost restrictions with current therapy.   BP recorded in vitals.  Confirmed dosing regimen.     A/P   INR  therapeutic.   Pt is to continue with current warfarin dosing regimen.     Follow up appointment in 12 week(s).    Satya Parra, PharmD

## 2018-11-13 DIAGNOSIS — I48.91 ATRIAL FIBRILLATION, UNSPECIFIED TYPE (HCC): ICD-10-CM

## 2018-11-13 RX ORDER — WARFARIN SODIUM 4 MG/1
TABLET ORAL
Qty: 180 TAB | Refills: 1 | Status: SHIPPED | OUTPATIENT
Start: 2018-11-13 | End: 2019-05-05 | Stop reason: SDUPTHER

## 2018-11-15 DIAGNOSIS — G47.9 SLEEP DISTURBANCE: ICD-10-CM

## 2018-11-15 RX ORDER — TEMAZEPAM 30 MG/1
CAPSULE ORAL
Qty: 90 CAP | Refills: 1 | Status: SHIPPED
Start: 2018-11-15 | End: 2019-05-16

## 2018-11-16 DIAGNOSIS — G47.9 SLEEP DISTURBANCE: ICD-10-CM

## 2018-11-19 DIAGNOSIS — E78.5 HYPERLIPIDEMIA, UNSPECIFIED HYPERLIPIDEMIA TYPE: ICD-10-CM

## 2018-11-19 RX ORDER — TEMAZEPAM 30 MG/1
CAPSULE ORAL
Qty: 90 CAP | Refills: 1 | OUTPATIENT
Start: 2018-11-19 | End: 2019-10-17

## 2018-11-19 RX ORDER — ATORVASTATIN CALCIUM 80 MG/1
40 TABLET, FILM COATED ORAL EVERY EVENING
Qty: 90 TAB | Refills: 3 | Status: SHIPPED | OUTPATIENT
Start: 2018-11-19 | End: 2019-12-26

## 2018-11-27 ENCOUNTER — TELEPHONE (OUTPATIENT)
Dept: VASCULAR LAB | Facility: MEDICAL CENTER | Age: 83
End: 2018-11-27

## 2018-11-27 DIAGNOSIS — I34.0 MITRAL VALVE INSUFFICIENCY, UNSPECIFIED ETIOLOGY: ICD-10-CM

## 2018-11-30 ENCOUNTER — TELEPHONE (OUTPATIENT)
Dept: CARDIOLOGY | Facility: MEDICAL CENTER | Age: 83
End: 2018-11-30

## 2018-12-04 LAB
ALBUMIN SERPL-MCNC: 3.8 G/DL (ref 3.5–4.7)
ALBUMIN/GLOB SERPL: 1.4 {RATIO} (ref 1.2–2.2)
ALP SERPL-CCNC: 56 IU/L (ref 39–117)
ALT SERPL-CCNC: 21 IU/L (ref 0–44)
AST SERPL-CCNC: 27 IU/L (ref 0–40)
BILIRUB SERPL-MCNC: 0.7 MG/DL (ref 0–1.2)
BUN SERPL-MCNC: 26 MG/DL (ref 8–27)
BUN/CREAT SERPL: 21 (ref 10–24)
CALCIUM SERPL-MCNC: 9.1 MG/DL (ref 8.6–10.2)
CHLORIDE SERPL-SCNC: 107 MMOL/L (ref 96–106)
CHOLEST SERPL-MCNC: 116 MG/DL (ref 100–199)
CO2 SERPL-SCNC: 23 MMOL/L (ref 20–29)
CREAT SERPL-MCNC: 1.23 MG/DL (ref 0.76–1.27)
GLOBULIN SER CALC-MCNC: 2.7 G/DL (ref 1.5–4.5)
GLUCOSE SERPL-MCNC: 87 MG/DL (ref 65–99)
HDLC SERPL-MCNC: 54 MG/DL
IF AFRICAN AMERICAN  100797: 63 ML/MIN/1.73
IF NON AFRICAN AMER 100791: 54 ML/MIN/1.73
LABORATORY COMMENT REPORT: NORMAL
LDLC SERPL CALC-MCNC: 54 MG/DL (ref 0–99)
POTASSIUM SERPL-SCNC: 4.9 MMOL/L (ref 3.5–5.2)
PROT SERPL-MCNC: 6.5 G/DL (ref 6–8.5)
SODIUM SERPL-SCNC: 143 MMOL/L (ref 134–144)
TRIGL SERPL-MCNC: 42 MG/DL (ref 0–149)
VLDLC SERPL CALC-MCNC: 8 MG/DL (ref 5–40)

## 2018-12-13 ENCOUNTER — OFFICE VISIT (OUTPATIENT)
Dept: URGENT CARE | Facility: CLINIC | Age: 83
End: 2018-12-13
Payer: MEDICARE

## 2018-12-13 ENCOUNTER — APPOINTMENT (OUTPATIENT)
Dept: RADIOLOGY | Facility: IMAGING CENTER | Age: 83
End: 2018-12-13
Attending: NURSE PRACTITIONER
Payer: MEDICARE

## 2018-12-13 VITALS
SYSTOLIC BLOOD PRESSURE: 124 MMHG | DIASTOLIC BLOOD PRESSURE: 70 MMHG | HEART RATE: 84 BPM | RESPIRATION RATE: 14 BRPM | OXYGEN SATURATION: 92 % | TEMPERATURE: 97.8 F

## 2018-12-13 DIAGNOSIS — R05.9 COUGH: ICD-10-CM

## 2018-12-13 DIAGNOSIS — J06.9 UPPER RESPIRATORY TRACT INFECTION, UNSPECIFIED TYPE: Primary | ICD-10-CM

## 2018-12-13 PROCEDURE — 94640 AIRWAY INHALATION TREATMENT: CPT | Performed by: NURSE PRACTITIONER

## 2018-12-13 PROCEDURE — 99204 OFFICE O/P NEW MOD 45 MIN: CPT | Mod: 25 | Performed by: NURSE PRACTITIONER

## 2018-12-13 PROCEDURE — 71046 X-RAY EXAM CHEST 2 VIEWS: CPT | Mod: 26 | Performed by: NURSE PRACTITIONER

## 2018-12-13 RX ORDER — IPRATROPIUM BROMIDE AND ALBUTEROL SULFATE 2.5; .5 MG/3ML; MG/3ML
3 SOLUTION RESPIRATORY (INHALATION) ONCE
Status: COMPLETED | OUTPATIENT
Start: 2018-12-13 | End: 2018-12-13

## 2018-12-13 RX ORDER — CEFDINIR 300 MG/1
300 CAPSULE ORAL 2 TIMES DAILY
Qty: 14 CAP | Refills: 0 | Status: SHIPPED | OUTPATIENT
Start: 2018-12-13 | End: 2018-12-20

## 2018-12-13 RX ORDER — AZITHROMYCIN 250 MG/1
TABLET, FILM COATED ORAL
Qty: 6 TAB | Refills: 0 | Status: SHIPPED | OUTPATIENT
Start: 2018-12-13 | End: 2018-12-13

## 2018-12-13 RX ADMIN — IPRATROPIUM BROMIDE AND ALBUTEROL SULFATE 3 ML: 2.5; .5 SOLUTION RESPIRATORY (INHALATION) at 18:52

## 2018-12-13 ASSESSMENT — ENCOUNTER SYMPTOMS
WHEEZING: 0
MYALGIAS: 0
CARDIOVASCULAR NEGATIVE: 1
SHORTNESS OF BREATH: 0
ABDOMINAL PAIN: 0
WEAKNESS: 0
DIZZINESS: 1
GASTROINTESTINAL NEGATIVE: 1
COUGH: 1
EYES NEGATIVE: 1
SORE THROAT: 0
FEVER: 0
LOSS OF CONSCIOUSNESS: 0
FOCAL WEAKNESS: 0
CHILLS: 0
MUSCULOSKELETAL NEGATIVE: 1
HEADACHES: 1
PSYCHIATRIC NEGATIVE: 1
SENSORY CHANGE: 0

## 2018-12-14 NOTE — PROGRESS NOTES
Subjective:     Tristan Hester is a 83 y.o. male who presents for Cough (x 4 days and states he has been loosing his valance and feeling weak and has a headache)       Cough   This is a new problem. Episode onset: 4 days ago. The problem has been gradually worsening. The cough is non-productive. Associated symptoms include headaches. Pertinent negatives include no chest pain, chills, ear pain, fever, myalgias, nasal congestion, sore throat, shortness of breath or wheezing. His past medical history is significant for pneumonia. Hx of empyema and balance problems     PMH:  has a past medical history of Arrhythmia; Arthritis; Blood clotting disorder ([facility]); Bronchitis; Cancer ([facility]); Carotid arterial disease ([facility]) (1/6/2014); Cataract; DVT (deep venous thrombosis) ([facility]) (2016); Heart valve disease; HLD (hyperlipidemia) (1/6/2014); Hypertension; Mitral regurgitation (1/6/2014); Pain (12-14-15); Pneumonia; Stroke ([facility]) (2008); TIA (transient ischemic attack); Urinary bladder disorder; and Urinary incontinence. He also has no past medical history of Liver disease.    MEDS:   Current Outpatient Prescriptions:   •  cefdinir (OMNICEF) 300 MG Cap, Take 1 Cap by mouth 2 times a day for 7 days., Disp: 14 Cap, Rfl: 0  •  atorvastatin (LIPITOR) 80 MG tablet, Take 0.5 Tabs by mouth every evening., Disp: 90 Tab, Rfl: 3  •  temazepam (RESTORIL) 30 MG capsule, TAKE ONE CAPSULE BY MOUTH ONCE DAILY AT BEDTIME AS NEEDED FOR SLEEP, Disp: 90 Cap, Rfl: 1  •  warfarin (COUMADIN) 4 MG Tab, Take one and one-half to two (1.5-2) tablets daily as directed by [facility], Disp: 180 Tab, Rfl: 1  •  ezetimibe (ZETIA) 10 MG Tab, Take 1 Tab by mouth every day., Disp: 30 Tab, Rfl: 11  •  gabapentin (NEURONTIN) 300 MG Cap, Take 1 Cap by mouth 3 times a day., Disp: 270 Cap, Rfl: 3  •  CARTIA  MG CAPSULE SR 24 HR, Take 1 Cap by mouth every day., Disp: 30 Cap, Rfl: 11  •  irbesartan (AVAPRO) 300 MG Tab, Take 1 Tab by mouth  every day., Disp: 30 Tab, Rfl: 11  •  Multiple Vitamin (MULTI-VITAMINS) Tab, Take  by mouth., Disp: , Rfl:   •  acetaminophen (TYLENOL) 325 MG Tab, Take 650 mg by mouth every four hours as needed., Disp: , Rfl:     ALLERGIES:   Allergies   Allergen Reactions   • Cantaloupe      Pt stated cantaloupe causes his throat to constrict   • Nkda [No Known Drug Allergy]    • Other Environmental      Seasonal, cats-hayfever     SURGHX:   Past Surgical History:   Procedure Laterality Date   • LUMBAR FUSION POSTERIOR  12/28/2015    Procedure: LUMBAR FUSION POSTERIOR L3-5 with peek rods;  Surgeon: Michael Sarkar M.D.;  Location: SURGERY [facility];  Service:    • LUMBAR DECOMPRESSION  12/28/2015    Procedure: LUMBAR DECOMPRESSION posterior redo decompression L3-5, with dural repair;  Surgeon: Michael Sarkar M.D.;  Location: SURGERY [facility];  Service:    • THORACOSCOPY  6/13/2015    Procedure: THORACOSCOPY with decortication VATS , side to be determined  ;  Surgeon: Elmira Holder M.D.;  Location: SURGERY [facility];  Service:    • LUMBAR LAMINECTOMY DISKECTOMY  10/8/2010    Performed by MICHAEL SARKAR at [facility]   • CERVICAL DISK AND FUSION ANTERIOR  10/26/2009    Performed by MICHAEL SARKAR at [facility]   • CAROTID ENDARTERECTOMY  7/10/08    Performed by LEIGH MORATAYA at [facility]   • BLADDER SLING MALE  11/2004   • PROSTATECTOMY, RADIAL  11/2000     SOCHX:  reports that he quit smoking about 33 years ago. His smoking use included Cigarettes and Pipe. He has a 15.00 pack-year smoking history. He has never used smokeless tobacco. He reports that he drinks about 3.0 oz of alcohol per week . He reports that he does not use drugs.    FH: Reviewed with patient, not pertinent to this visit.     Review of Systems   Constitutional: Positive for malaise/fatigue. Negative for chills and fever.   HENT: Negative.  Negative for ear pain and sore throat.    Eyes: Negative.     Respiratory: Positive for cough. Negative for shortness of breath and wheezing.    Cardiovascular: Negative.  Negative for chest pain.   Gastrointestinal: Negative.  Negative for abdominal pain.   Genitourinary: Negative.    Musculoskeletal: Negative.  Negative for myalgias.   Skin: Negative.    Neurological: Positive for dizziness and headaches. Negative for sensory change, focal weakness, loss of consciousness and weakness.   Psychiatric/Behavioral: Negative.    All other systems reviewed and are negative.     Objective:     /70 (BP Location: Right arm, Patient Position: Sitting, BP Cuff Size: Adult)   Pulse 84   Temp 36.6 °C (97.8 °F) (Temporal)   Resp 14   SpO2 92%     Physical Exam   Constitutional: He is oriented to person, place, and time. He appears well-developed and well-nourished.   HENT:   Head: Normocephalic and atraumatic.   Right Ear: External ear normal.   Left Ear: External ear normal.   Nose: Nose normal.   Mouth/Throat: Uvula is midline, oropharynx is clear and moist and mucous membranes are normal.   Eyes: Pupils are equal, round, and reactive to light. Conjunctivae and EOM are normal. Right eye exhibits no discharge. Left eye exhibits no discharge.   Neck: Normal range of motion.   Cardiovascular: Normal rate, regular rhythm, normal heart sounds and intact distal pulses.    Pulmonary/Chest: Effort normal. No respiratory distress. He has decreased breath sounds.   Abdominal: Soft. Bowel sounds are normal. There is no tenderness.   Musculoskeletal: Normal range of motion. He exhibits no deformity.   Neurological: He is alert and oriented to person, place, and time. He has normal strength. No sensory deficit.   Skin: Skin is warm and dry. Capillary refill takes less than 2 seconds.   Psychiatric: He has a normal mood and affect.   Vitals reviewed.    Chest x-ray results:    Narrative     12/13/2018 5:04 PM    HISTORY/REASON FOR EXAM:  Cough    TECHNIQUE/EXAM DESCRIPTION AND NUMBER OF  VIEWS:  Two views of the chest.    COMPARISON: 12/31/2015    FINDINGS:  There is bibasilar linear atelectasis.  The heart is normal in size.  There is no evidence of pleural effusion.  There are surgical changes involving the cervical spine. There has been prior left rib fracture.  There is chronic mild elevation the right hemidiaphragm.     Impression     Bibasilar linear atelectasis.        Assessment/Plan:     1. Upper respiratory tract infection, unspecified type  - cefdinir (OMNICEF) 300 MG Cap; Take 1 Cap by mouth 2 times a day for 7 days.  Dispense: 14 Cap; Refill: 0    2. Cough  - DX-CHEST-2 VIEWS; Future  - ipratropium-albuterol (DUONEB) nebulizer solution; 3 mL by Nebulization route Once.    Duoneb given in clinic. Lung sounds improved. SpO2 94%. Rx for Omnicef. Discussed supportive measures including increasing fluids and rest as well as symptom management including OTC acetaminophen and/or ibuprofen PRN pain and/or fever.    Otherwise, patient advised to: Return for 1) Symptoms that change or worsen, or go to the ER, 2) Follow up with primary care.    Differential diagnosis, natural history, supportive care, and indications for immediate follow-up discussed. All questions answered. Patient agrees with the plan of care.

## 2018-12-14 NOTE — PATIENT INSTRUCTIONS
"Upper Respiratory Infection, Adult  Most upper respiratory infections (URIs) are a viral infection of the air passages leading to the lungs. A URI affects the nose, throat, and upper air passages. The most common type of URI is nasopharyngitis and is typically referred to as \"the common cold.\"  URIs run their course and usually go away on their own. Most of the time, a URI does not require medical attention, but sometimes a bacterial infection in the upper airways can follow a viral infection. This is called a secondary infection. Sinus and middle ear infections are common types of secondary upper respiratory infections.  Bacterial pneumonia can also complicate a URI. A URI can worsen asthma and chronic obstructive pulmonary disease (COPD). Sometimes, these complications can require emergency medical care and may be life threatening.  What are the causes?  Almost all URIs are caused by viruses. A virus is a type of germ and can spread from one person to another.  What increases the risk?  You may be at risk for a URI if:  · You smoke.  · You have chronic heart or lung disease.  · You have a weakened defense (immune) system.  · You are very young or very old.  · You have nasal allergies or asthma.  · You work in crowded or poorly ventilated areas.  · You work in health care facilities or schools.  What are the signs or symptoms?  Symptoms typically develop 2-3 days after you come in contact with a cold virus. Most viral URIs last 7-10 days. However, viral URIs from the influenza virus (flu virus) can last 14-18 days and are typically more severe. Symptoms may include:  · Runny or stuffy (congested) nose.  · Sneezing.  · Cough.  · Sore throat.  · Headache.  · Fatigue.  · Fever.  · Loss of appetite.  · Pain in your forehead, behind your eyes, and over your cheekbones (sinus pain).  · Muscle aches.  How is this diagnosed?  Your health care provider may diagnose a URI by:  · Physical exam.  · Tests to check that your " symptoms are not due to another condition such as:  ¨ Strep throat.  ¨ Sinusitis.  ¨ Pneumonia.  ¨ Asthma.  How is this treated?  A URI goes away on its own with time. It cannot be cured with medicines, but medicines may be prescribed or recommended to relieve symptoms. Medicines may help:  · Reduce your fever.  · Reduce your cough.  · Relieve nasal congestion.  Follow these instructions at home:  · Take medicines only as directed by your health care provider.  · Gargle warm saltwater or take cough drops to comfort your throat as directed by your health care provider.  · Use a warm mist humidifier or inhale steam from a shower to increase air moisture. This may make it easier to breathe.  · Drink enough fluid to keep your urine clear or pale yellow.  · Eat soups and other clear broths and maintain good nutrition.  · Rest as needed.  · Return to work when your temperature has returned to normal or as your health care provider advises. You may need to stay home longer to avoid infecting others. You can also use a face mask and careful hand washing to prevent spread of the virus.  · Increase the usage of your inhaler if you have asthma.  · Do not use any tobacco products, including cigarettes, chewing tobacco, or electronic cigarettes. If you need help quitting, ask your health care provider.  How is this prevented?  The best way to protect yourself from getting a cold is to practice good hygiene.  · Avoid oral or hand contact with people with cold symptoms.  · Wash your hands often if contact occurs.  There is no clear evidence that vitamin C, vitamin E, echinacea, or exercise reduces the chance of developing a cold. However, it is always recommended to get plenty of rest, exercise, and practice good nutrition.  Contact a health care provider if:  · You are getting worse rather than better.  · Your symptoms are not controlled by medicine.  · You have chills.  · You have worsening shortness of breath.  · You have brown  or red mucus.  · You have yellow or brown nasal discharge.  · You have pain in your face, especially when you bend forward.  · You have a fever.  · You have swollen neck glands.  · You have pain while swallowing.  · You have white areas in the back of your throat.  Get help right away if:  · You have severe or persistent:  ¨ Headache.  ¨ Ear pain.  ¨ Sinus pain.  ¨ Chest pain.  · You have chronic lung disease and any of the following:  ¨ Wheezing.  ¨ Prolonged cough.  ¨ Coughing up blood.  ¨ A change in your usual mucus.  · You have a stiff neck.  · You have changes in your:  ¨ Vision.  ¨ Hearing.  ¨ Thinking.  ¨ Mood.  This information is not intended to replace advice given to you by your health care provider. Make sure you discuss any questions you have with your health care provider.  Document Released: 06/13/2002 Document Revised: 08/20/2017 Document Reviewed: 03/25/2015  [employer] Interactive Patient Education © 2017 [employer]

## 2019-01-22 DIAGNOSIS — I48.91 ATRIAL FIBRILLATION, UNSPECIFIED TYPE (HCC): ICD-10-CM

## 2019-01-30 ENCOUNTER — OFFICE VISIT (OUTPATIENT)
Dept: CARDIOLOGY | Facility: MEDICAL CENTER | Age: 84
End: 2019-01-30
Payer: MEDICARE

## 2019-01-30 ENCOUNTER — ANTICOAGULATION VISIT (OUTPATIENT)
Dept: VASCULAR LAB | Facility: MEDICAL CENTER | Age: 84
End: 2019-01-30
Attending: INTERNAL MEDICINE
Payer: MEDICARE

## 2019-01-30 VITALS
DIASTOLIC BLOOD PRESSURE: 70 MMHG | WEIGHT: 195 LBS | HEART RATE: 72 BPM | SYSTOLIC BLOOD PRESSURE: 120 MMHG | OXYGEN SATURATION: 93 % | HEIGHT: 71 IN | BODY MASS INDEX: 27.3 KG/M2

## 2019-01-30 DIAGNOSIS — I35.0 NONRHEUMATIC AORTIC VALVE STENOSIS: ICD-10-CM

## 2019-01-30 DIAGNOSIS — G45.9 TIA (TRANSIENT ISCHEMIC ATTACK): ICD-10-CM

## 2019-01-30 DIAGNOSIS — I48.0 PAROXYSMAL ATRIAL FIBRILLATION (HCC): ICD-10-CM

## 2019-01-30 DIAGNOSIS — E78.5 DYSLIPIDEMIA: ICD-10-CM

## 2019-01-30 DIAGNOSIS — R79.89 ELEVATED BRAIN NATRIURETIC PEPTIDE (BNP) LEVEL: ICD-10-CM

## 2019-01-30 DIAGNOSIS — I34.0 NON-RHEUMATIC MITRAL REGURGITATION: ICD-10-CM

## 2019-01-30 DIAGNOSIS — I10 ESSENTIAL HYPERTENSION, BENIGN: ICD-10-CM

## 2019-01-30 DIAGNOSIS — I82.90 DEEP VEIN THROMBOSIS (DVT) OF NON-EXTREMITY VEIN, UNSPECIFIED CHRONICITY: ICD-10-CM

## 2019-01-30 LAB — INR PPP: 2.4 (ref 2–3.5)

## 2019-01-30 PROCEDURE — 85610 PROTHROMBIN TIME: CPT

## 2019-01-30 PROCEDURE — 99214 OFFICE O/P EST MOD 30 MIN: CPT | Performed by: INTERNAL MEDICINE

## 2019-01-30 PROCEDURE — 99211 OFF/OP EST MAY X REQ PHY/QHP: CPT | Performed by: NURSE PRACTITIONER

## 2019-01-30 NOTE — LETTER
[facility]   [address], [address]  [place] [address]  Phone: [phone]  Fax: [phone]              Tristan Hester  1935    Encounter Date: 1/30/2019    Senthil Escoto M.D.          PROGRESS NOTE:  Chief Complaint   Patient presents with   • Atrial Fibrillation     F/V: 5 MO/ LABS IN EPIC       Subjective:   Tristan Hester is a 83 y.o. male who presents today for follow-up of his PAF, hypertension and dyslipidemia.  He also has aortic stenosis and mitral insufficiency.    He has had no chest discomfort, dyspnea on exertion, PND, orthopnea or edema.  He is exercising regularly without difficulty.  He denies any palpitations or lightheadedness but does have some difficulty with his balance.      Past Medical History:   Diagnosis Date   • Arrhythmia     Afib   • Arthritis     osteo, hips and knees   • Blood clotting disorder ([facility])     , left arm   • Bronchitis        • Cancer ([facility])     Prostate CA--2000   • Carotid arterial disease ([facility]) 1/6/2014   • Cataract     removed bilat   • DVT (deep venous thrombosis) ([facility]) 2016   • Heart valve disease     MVR   • HLD (hyperlipidemia) 1/6/2014   • Hypertension    • Mitral regurgitation 1/6/2014   • Pain 12-14-15    low back, hips, 2-3/10   • Pneumonia        • Stroke ([facility]) 2008    TIA, no residual   • TIA (transient ischemic attack)     2008   • Urinary bladder disorder     HAD MALE BLADDER SUSPENSION PROCEDURE DONE   • Urinary incontinence      Past Surgical History:   Procedure Laterality Date   • LUMBAR FUSION POSTERIOR  12/28/2015    Procedure: LUMBAR FUSION POSTERIOR L3-5 with peek rods;  Surgeon: Michael Almonte M.D.;  Location: SURGERY [facility];  Service:    • LUMBAR DECOMPRESSION  12/28/2015    Procedure: LUMBAR DECOMPRESSION posterior redo decompression L3-5, with dural repair;  Surgeon: Michael Almonte M.D.;  Location: SURGERY [facility];  Service:    • THORACOSCOPY   6/13/2015    Procedure: THORACOSCOPY with decortication VATS , side to be determined  ;  Surgeon: Elmira Holder M.D.;  Location: SURGERY [facility];  Service:    • LUMBAR LAMINECTOMY DISKECTOMY  10/8/2010    Performed by PHYLICIA SARKAR at SURGERY [facility]   • CERVICAL DISK AND FUSION ANTERIOR  10/26/2009    Performed by PHYLICIA SARKAR at [facility]   • CAROTID ENDARTERECTOMY  7/10/08    Performed by LEIGH MORATAYA at SURGERY [facility]   • BLADDER SLING MALE  11/2004   • PROSTATECTOMY, RADIAL  11/2000     Family History   Problem Relation Age of Onset   • Other Brother         Aneurysm   • Cancer Brother         Seminal vascular cancer   • Autoimmune Disease Daughter         Lupus, RA   • No Known Problems Daughter    • No Known Problems Son    • No Known Problems Son    • Heart Attack Neg Hx      Social History     Social History   • Marital status:      Spouse name: N/A   • Number of children: N/A   • Years of education: N/A     Occupational History   • Not on file.     Social History Main Topics   • Smoking status: Former Smoker     Packs/day: 0.50     Years: 30.00     Types: Cigarettes, Pipe     Quit date: 1/1/1985   • Smokeless tobacco: Never Used   • Alcohol use 3.0 oz/week     3 Cans of beer, 3 Shots of liquor per week      Comment: 1 x week   • Drug use: No   • Sexual activity: Not Currently     Other Topics Concern   • Not on file     Social History Narrative   • No narrative on file     Allergies   Allergen Reactions   • Cantaloupe      Pt stated cantaloupe causes his throat to constrict   • Nkda [No Known Drug Allergy]    • Other Environmental      Seasonal, cats-hayfever     Outpatient Encounter Prescriptions as of 1/30/2019   Medication Sig Dispense Refill   • atorvastatin (LIPITOR) 80 MG tablet Take 0.5 Tabs by mouth every evening. 90 Tab 3   • warfarin (COUMADIN) 4 MG Tab Take one and one-half to two (1.5-2) tablets daily as directed by [facility] 180 Tab 1   • ezetimibe (ZETIA) 10 MG Tab Take 1 Tab by mouth every day. 30 Tab 11   • gabapentin (NEURONTIN) 300 MG Cap Take 1 Cap by mouth 3 times a day. 270 Cap 3   • CARTIA  MG CAPSULE SR 24 HR Take 1 Cap by mouth every day. 30 Cap 11   • irbesartan (AVAPRO) 300 MG Tab Take 1 Tab by mouth every day. 30 Tab 11   • Multiple Vitamin (MULTI-VITAMINS) Tab Take  by mouth.     • temazepam (RESTORIL) 30 MG capsule TAKE ONE CAPSULE BY MOUTH ONCE DAILY AT BEDTIME AS NEEDED FOR SLEEP 90 Cap 1   • acetaminophen (TYLENOL) 325 MG Tab Take 650 mg by mouth every four hours as needed.       No facility-administered encounter medications on file as of 1/30/2019.      ROS     Objective:   /70 (BP Location: Left arm, Patient Position: Sitting, BP Cuff Size: Adult)   Pulse 72   Ht 1.803 m (5' 11\")   Wt 88.5 kg (195 lb)   SpO2 93%   BMI 27.20 kg/m²      Physical Exam   Constitutional: He appears well-developed and well-nourished.   Neck: No JVD present.   Cardiovascular: Normal rate and regular rhythm.    Murmur (3/6 systolic at the base) heard.  Pulmonary/Chest: Effort normal and breath sounds normal. He has no rales.   Abdominal: Soft. There is no tenderness.   Musculoskeletal: He exhibits no edema.     Transthoracic  Echo Report    CONCLUSIONS  Normal left ventricular size, wall thickness, and systolic function.   Left ventricular ejection fraction is visually estimated to be 60%.   Normal regional wall motion. Diastolic function is difficult to assess   with valvular disease.  Mitral annular calcification. No mitral stenosis. Moderate mitral   regurgitation. ERO by PISA method is 0.2 sq cm. Both leaflet appear to   attach to the anterior lateral wall. Can not rule out parachute mitral   valve.  Calcified aortic valve leaflets. Moderate aortic stenosis.   Compared to the report of the study done 2/2017- the MR appears to be   less severe, but, there has been some progression of the AS.     MADELYN, " ANTHONY  Exam Date:         02/13/2018     Lab Results   Component Value Date/Time    CHOLSTRLTOT 116 12/03/2018 08:12 AM    CHOLSTRLTOT 146 06/02/2008 11:00 PM    LDL 54 12/03/2018 08:12 AM    LDL 85 06/02/2008 11:00 PM    HDL 54 12/03/2018 08:12 AM    HDL 49 06/02/2008 11:00 PM    TRIGLYCERIDE 42 12/03/2018 08:12 AM    TRIGLYCERIDE 60 06/02/2008 11:00 PM       Lab Results   Component Value Date/Time    SODIUM 143 12/03/2018 08:12 AM    SODIUM 138 01/12/2016 12:20 PM    POTASSIUM 4.9 12/03/2018 08:12 AM    POTASSIUM 4.3 01/12/2016 12:20 PM    CHLORIDE 107 (H) 12/03/2018 08:12 AM    CHLORIDE 105 01/12/2016 12:20 PM    CO2 23 12/03/2018 08:12 AM    CO2 26 01/12/2016 12:20 PM    GLUCOSE 87 12/03/2018 08:12 AM    GLUCOSE 128 (H) 01/12/2016 12:20 PM    BUN 26 12/03/2018 08:12 AM    BUN 18 01/12/2016 12:20 PM    CREATININE 1.23 12/03/2018 08:12 AM    CREATININE 1.00 01/12/2016 12:20 PM    CREATININE 1.0 07/02/2008 03:43 PM    BUNCREATRAT 21 12/03/2018 08:12 AM     Lab Results   Component Value Date/Time    ALKPHOSPHAT 56 12/03/2018 08:12 AM    ALKPHOSPHAT 52 01/11/2016 04:25 AM    ASTSGOT 27 12/03/2018 08:12 AM    ASTSGOT 14 01/11/2016 04:25 AM    ALTSGPT 21 12/03/2018 08:12 AM    ALTSGPT 11 01/11/2016 04:25 AM    TBILIRUBIN 0.7 12/03/2018 08:12 AM    TBILIRUBIN 1.1 01/11/2016 04:25 AM      Lab Results   Component Value Date/Time    BNPBTYPENAT 694.6 (H) 07/19/2018 09:55 AM    BNPBTYPENAT 244 (H) 06/23/2015 01:41 AM          Assessment:     1. Paroxysmal atrial fibrillation (HCC)     2. Non-rheumatic mitral regurgitation     3. Essential hypertension, benign     4. Dyslipidemia     5. Nonrheumatic aortic valve stenosis     6. Elevated brain natriuretic peptide (BNP) level           Medical Decision Making:  Today's Assessment / Status / Plan:     Mr. Hester is clinically stable.  However, he does have aortic stenosis and mitral insufficiency.  His BNP was elevated last year though he is really asymptomatic from a  cardiovascular standpoint.  His mean gradient on his echocardiogram for his aortic stenosis was only mildly elevated.  However, his dimensionless index was low.  I feel he should have a repeat echocardiogram prior to his follow-up appointment in about 6 months.  His blood pressure appears to be under good control.  His lipid status is also under improved control and we will recheck a lipid and CMP prior to follow-up.      Michael J Bloch, M.D.  [address] [address]  VIA In Basket

## 2019-01-30 NOTE — PROGRESS NOTES
Chief Complaint   Patient presents with   • Atrial Fibrillation     F/V: 5 MO/ LABS IN EPIC       Subjective:   Tristan Hester is a 83 y.o. male who presents today for follow-up of his PAF, hypertension and dyslipidemia.  He also has aortic stenosis and mitral insufficiency.    He has had no chest discomfort, dyspnea on exertion, PND, orthopnea or edema.  He is exercising regularly without difficulty.  He denies any palpitations or lightheadedness but does have some difficulty with his balance.      Past Medical History:   Diagnosis Date   • Arrhythmia     Afib   • Arthritis     osteo, hips and knees   • Blood clotting disorder ([facility])     , left arm   • Bronchitis        • Cancer ([facility])     Prostate CA--2000   • Carotid arterial disease ([facility]) 1/6/2014   • Cataract     removed bilat   • DVT (deep venous thrombosis) ([facility]) 2016   • Heart valve disease     MVR   • HLD (hyperlipidemia) 1/6/2014   • Hypertension    • Mitral regurgitation 1/6/2014   • Pain 12-14-15    low back, hips, 2-3/10   • Pneumonia        • Stroke ([facility]) 2008    TIA, no residual   • TIA (transient ischemic attack)     2008   • Urinary bladder disorder     HAD MALE BLADDER SUSPENSION PROCEDURE DONE   • Urinary incontinence      Past Surgical History:   Procedure Laterality Date   • LUMBAR FUSION POSTERIOR  12/28/2015    Procedure: LUMBAR FUSION POSTERIOR L3-5 with peek rods;  Surgeon: Phylicia Sarkar M.D.;  Location: [facility];  Service:    • LUMBAR DECOMPRESSION  12/28/2015    Procedure: LUMBAR DECOMPRESSION posterior redo decompression L3-5, with dural repair;  Surgeon: Phylicia Sarkar M.D.;  Location: [facility];  Service:    • THORACOSCOPY  6/13/2015    Procedure: THORACOSCOPY with decortication VATS , side to be determined  ;  Surgeon: Elmira Holder M.D.;  Location: [facility];  Service:    • LUMBAR LAMINECTOMY DISKECTOMY  10/8/2010    Performed by PHYLICIA SARKAR at [facility]  ORS   • CERVICAL DISK AND FUSION ANTERIOR  10/26/2009    Performed by PHYLICIA SARKAR at [facility] ORS   • CAROTID ENDARTERECTOMY  7/10/08    Performed by LEIGH MORATAYA at [facility] ORS   • BLADDER SLING MALE  11/2004   • PROSTATECTOMY, RADIAL  11/2000     Family History   Problem Relation Age of Onset   • Other Brother         Aneurysm   • Cancer Brother         Seminal vascular cancer   • Autoimmune Disease Daughter         Lupus, RA   • No Known Problems Daughter    • No Known Problems Son    • No Known Problems Son    • Heart Attack Neg Hx      Social History     Social History   • Marital status:      Spouse name: N/A   • Number of children: N/A   • Years of education: N/A     Occupational History   • Not on file.     Social History Main Topics   • Smoking status: Former Smoker     Packs/day: 0.50     Years: 30.00     Types: Cigarettes, Pipe     Quit date: 1/1/1985   • Smokeless tobacco: Never Used   • Alcohol use 3.0 oz/week     3 Cans of beer, 3 Shots of liquor per week      Comment: 1 x week   • Drug use: No   • Sexual activity: Not Currently     Other Topics Concern   • Not on file     Social History Narrative   • No narrative on file     Allergies   Allergen Reactions   • Cantaloupe      Pt stated cantaloupe causes his throat to constrict   • Nkda [No Known Drug Allergy]    • Other Environmental      Seasonal, cats-hayfever     Outpatient Encounter Prescriptions as of 1/30/2019   Medication Sig Dispense Refill   • atorvastatin (LIPITOR) 80 MG tablet Take 0.5 Tabs by mouth every evening. 90 Tab 3   • warfarin (COUMADIN) 4 MG Tab Take one and one-half to two (1.5-2) tablets daily as directed by [facility] 180 Tab 1   • ezetimibe (ZETIA) 10 MG Tab Take 1 Tab by mouth every day. 30 Tab 11   • gabapentin (NEURONTIN) 300 MG Cap Take 1 Cap by mouth 3 times a day. 270 Cap 3   • CARTIA  MG CAPSULE SR 24 HR Take 1 Cap by mouth every day. 30 Cap 11   • irbesartan  "(AVAPRO) 300 MG Tab Take 1 Tab by mouth every day. 30 Tab 11   • Multiple Vitamin (MULTI-VITAMINS) Tab Take  by mouth.     • temazepam (RESTORIL) 30 MG capsule TAKE ONE CAPSULE BY MOUTH ONCE DAILY AT BEDTIME AS NEEDED FOR SLEEP 90 Cap 1   • acetaminophen (TYLENOL) 325 MG Tab Take 650 mg by mouth every four hours as needed.       No facility-administered encounter medications on file as of 1/30/2019.      ROS     Objective:   /70 (BP Location: Left arm, Patient Position: Sitting, BP Cuff Size: Adult)   Pulse 72   Ht 1.803 m (5' 11\")   Wt 88.5 kg (195 lb)   SpO2 93%   BMI 27.20 kg/m²     Physical Exam   Constitutional: He appears well-developed and well-nourished.   Neck: No JVD present.   Cardiovascular: Normal rate and regular rhythm.    Murmur (3/6 systolic at the base) heard.  Pulmonary/Chest: Effort normal and breath sounds normal. He has no rales.   Abdominal: Soft. There is no tenderness.   Musculoskeletal: He exhibits no edema.     Transthoracic  Echo Report    CONCLUSIONS  Normal left ventricular size, wall thickness, and systolic function.   Left ventricular ejection fraction is visually estimated to be 60%.   Normal regional wall motion. Diastolic function is difficult to assess   with valvular disease.  Mitral annular calcification. No mitral stenosis. Moderate mitral   regurgitation. ERO by PISA method is 0.2 sq cm. Both leaflet appear to   attach to the anterior lateral wall. Can not rule out parachute mitral   valve.  Calcified aortic valve leaflets. Moderate aortic stenosis.   Compared to the report of the study done 2/2017- the MR appears to be   less severe, but, there has been some progression of the AS.     ANTHONY JOSHI  Exam Date:         02/13/2018     Lab Results   Component Value Date/Time    CHOLSTRLTOT 116 12/03/2018 08:12 AM    CHOLSTRLTOT 146 06/02/2008 11:00 PM    LDL 54 12/03/2018 08:12 AM    LDL 85 06/02/2008 11:00 PM    HDL 54 12/03/2018 08:12 AM    HDL 49 06/02/2008 11:00 " PM    TRIGLYCERIDE 42 12/03/2018 08:12 AM    TRIGLYCERIDE 60 06/02/2008 11:00 PM       Lab Results   Component Value Date/Time    SODIUM 143 12/03/2018 08:12 AM    SODIUM 138 01/12/2016 12:20 PM    POTASSIUM 4.9 12/03/2018 08:12 AM    POTASSIUM 4.3 01/12/2016 12:20 PM    CHLORIDE 107 (H) 12/03/2018 08:12 AM    CHLORIDE 105 01/12/2016 12:20 PM    CO2 23 12/03/2018 08:12 AM    CO2 26 01/12/2016 12:20 PM    GLUCOSE 87 12/03/2018 08:12 AM    GLUCOSE 128 (H) 01/12/2016 12:20 PM    BUN 26 12/03/2018 08:12 AM    BUN 18 01/12/2016 12:20 PM    CREATININE 1.23 12/03/2018 08:12 AM    CREATININE 1.00 01/12/2016 12:20 PM    CREATININE 1.0 07/02/2008 03:43 PM    BUNCREATRAT 21 12/03/2018 08:12 AM     Lab Results   Component Value Date/Time    ALKPHOSPHAT 56 12/03/2018 08:12 AM    ALKPHOSPHAT 52 01/11/2016 04:25 AM    ASTSGOT 27 12/03/2018 08:12 AM    ASTSGOT 14 01/11/2016 04:25 AM    ALTSGPT 21 12/03/2018 08:12 AM    ALTSGPT 11 01/11/2016 04:25 AM    TBILIRUBIN 0.7 12/03/2018 08:12 AM    TBILIRUBIN 1.1 01/11/2016 04:25 AM      Lab Results   Component Value Date/Time    BNPBTYPENAT 694.6 (H) 07/19/2018 09:55 AM    BNPBTYPENAT 244 (H) 06/23/2015 01:41 AM          Assessment:     1. Paroxysmal atrial fibrillation (HCC)     2. Non-rheumatic mitral regurgitation     3. Essential hypertension, benign     4. Dyslipidemia     5. Nonrheumatic aortic valve stenosis     6. Elevated brain natriuretic peptide (BNP) level           Medical Decision Making:  Today's Assessment / Status / Plan:     Mr. Hester is clinically stable.  However, he does have aortic stenosis and mitral insufficiency.  His BNP was elevated last year though he is really asymptomatic from a cardiovascular standpoint.  His mean gradient on his echocardiogram for his aortic stenosis was only mildly elevated.  However, his dimensionless index was low.  I feel he should have a repeat echocardiogram prior to his follow-up appointment in about 6 months.  His blood pressure  appears to be under good control.  His lipid status is also under improved control and we will recheck a lipid and CMP prior to follow-up.

## 2019-01-30 NOTE — PROGRESS NOTES
Anticoagulation Summary  As of 2019    INR goal:   2.0-3.0   TTR:   71.0 % (3.5 y)   INR used for dosin.4 (2019)   Warfarin maintenance plan:   8 mg (4 mg x 2) every Sun, Tue, Thu; 6 mg (4 mg x 1.5) all other days   Weekly warfarin total:   48 mg   Plan last modified:   Quita Sim, PharmD (2017)   Next INR check:   2019   Priority:   Maintenance   Target end date:   Indefinite    Indications    DVT (deep venous thrombosis) (HCC) [I82.409]  Paroxysmal atrial fibrillation (HCC) [I48.0]  Hx of Stroke (Resolved) [I63.9]  Hx of TIA (transient ischemic attack) [G45.9]  Deep vein thrombosis (DVT) (HCC) (Resolved) [I82.409]  Atrial fibrillation (HCC) (Resolved) [I48.91]             Anticoagulation Episode Summary     INR check location:   [facility]    Preferred lab:       Send INR reminders to:       Comments:         Anticoagulation Care Providers     Provider Role Specialty Phone number    Michael J Bloch, M.D. Referring Internal Medicine [phone]    [facility]   [phone]        Anticoagulation Patient Findings      HPI:  Tristan Hester seen in clinic today for follow up on anticoagulation therapy in the presence of AF, DVT hx, CVA/TIA. Denies any changes to current medical/health status since last appointment. Denies any medication or diet changes. No current symptoms of bleeding or thrombosis reported.    A/P:   INR therapeutic. Continue current regimen. BP recorded in vitals.    Follow up appointment in 12 week(s).    Next Appointment: 2019 at 8:15 am.    Thi WILLIAM

## 2019-01-31 ENCOUNTER — APPOINTMENT (OUTPATIENT)
Dept: VASCULAR LAB | Facility: MEDICAL CENTER | Age: 84
End: 2019-01-31
Payer: MEDICARE

## 2019-01-31 LAB — INR BLD: 2.4 (ref 0.9–1.2)

## 2019-04-24 ENCOUNTER — ANTICOAGULATION VISIT (OUTPATIENT)
Dept: VASCULAR LAB | Facility: MEDICAL CENTER | Age: 84
End: 2019-04-24
Attending: INTERNAL MEDICINE
Payer: MEDICARE

## 2019-04-24 VITALS — DIASTOLIC BLOOD PRESSURE: 93 MMHG | SYSTOLIC BLOOD PRESSURE: 138 MMHG | HEART RATE: 72 BPM

## 2019-04-24 DIAGNOSIS — I82.90 DEEP VEIN THROMBOSIS (DVT) OF NON-EXTREMITY VEIN, UNSPECIFIED CHRONICITY: ICD-10-CM

## 2019-04-24 DIAGNOSIS — I48.0 PAROXYSMAL ATRIAL FIBRILLATION (HCC): ICD-10-CM

## 2019-04-24 DIAGNOSIS — G45.9 TIA (TRANSIENT ISCHEMIC ATTACK): ICD-10-CM

## 2019-04-24 LAB
INR BLD: 2.3 (ref 0.9–1.2)
INR PPP: 2.3 (ref 2–3.5)

## 2019-04-24 PROCEDURE — 99211 OFF/OP EST MAY X REQ PHY/QHP: CPT | Performed by: NURSE PRACTITIONER

## 2019-04-24 PROCEDURE — 85610 PROTHROMBIN TIME: CPT

## 2019-04-24 NOTE — PROGRESS NOTES
Anticoagulation Summary  As of 2019    INR goal:   2.0-3.0   TTR:   72.8 % (3.7 y)   INR used for dosin.30 (2019)   Warfarin maintenance plan:   8 mg (4 mg x 2) every Sun, Tue, Thu; 6 mg (4 mg x 1.5) all other days   Weekly warfarin total:   48 mg   Plan last modified:   Quita Sim, PharmD (2017)   Next INR check:      Priority:   Maintenance   Target end date:   Indefinite    Indications    DVT (deep venous thrombosis) (HCC) [I82.409]  Paroxysmal atrial fibrillation (HCC) [I48.0]  Hx of Stroke (Resolved) [I63.9]  Hx of TIA (transient ischemic attack) [G45.9]  Deep vein thrombosis (DVT) (HCC) (Resolved) [I82.409]  Atrial fibrillation (HCC) (Resolved) [I48.91]             Anticoagulation Episode Summary     INR check location:   [facility]    Preferred lab:       Send INR reminders to:       Comments:         Anticoagulation Care Providers     Provider Role Specialty Phone number    Michael J Bloch, M.D. Referring Internal Medicine [phone]    [facility]   [phone]        Anticoagulation Patient Findings      HPI:  Tristan Hester seen in clinic today for follow up on anticoagulation therapy in the presence of DVT, CVA, TIA, AF hx. Denies any changes to current medical/health status since last appointment. Denies any medication or diet changes. No current symptoms of bleeding or thrombosis reported.    A/P:   INR therapeutic. Continue current regimen. BP recorded in vitals.     Follow up appointment in 12 week(s).    Next Appointment: 2019 at 8:15 am.     Thi WILLIAM

## 2019-04-26 ENCOUNTER — TELEPHONE (OUTPATIENT)
Dept: CARDIOLOGY | Facility: MEDICAL CENTER | Age: 84
End: 2019-04-26

## 2019-04-26 NOTE — TELEPHONE ENCOUNTER
Per Roxanne: as long as pt. Is asymptomatic, pt. Should schedule echo (now scheduled in July) in the next 2 weeks for more information. He can cancel today's appointment.  Called wife to advise.  alerted to call pt. To reschedule echo in 2 weeks.   FYI to Dr. Escoto.

## 2019-04-26 NOTE — TELEPHONE ENCOUNTER
Spoke with pt. BNP drawn 4-23-19=2167.9; last DTY=308.6 0n 7-23-19. He denied increased dependent edema, reported mild increase in dyspnea. He last saw Dr. Escoto in Jan. Per his request scheduled him to see Roxanne today.  J CARLOS to Roxanne.

## 2019-04-26 NOTE — TELEPHONE ENCOUNTER
----- Message from Nury Mckinney sent at 4/26/2019  8:48 AM PDT -----  Regarding: patient worried about alarmingly high btype peptide  ARIE/Lamar      Patient received lab results and his btype natriuretic peptide was alarmingly high at 2167.9 and he wants to know if he needs to do. He can be reached at [phone] for about another hour then again later this afternoon,

## 2019-05-02 ENCOUNTER — HOSPITAL ENCOUNTER (OUTPATIENT)
Dept: CARDIOLOGY | Facility: MEDICAL CENTER | Age: 84
End: 2019-05-02
Attending: INTERNAL MEDICINE
Payer: MEDICARE

## 2019-05-02 ENCOUNTER — TELEPHONE (OUTPATIENT)
Dept: VASCULAR LAB | Facility: MEDICAL CENTER | Age: 84
End: 2019-05-02

## 2019-05-02 DIAGNOSIS — I10 ESSENTIAL HYPERTENSION, BENIGN: ICD-10-CM

## 2019-05-02 DIAGNOSIS — I35.0 NONRHEUMATIC AORTIC VALVE STENOSIS: ICD-10-CM

## 2019-05-02 DIAGNOSIS — I34.0 NON-RHEUMATIC MITRAL REGURGITATION: ICD-10-CM

## 2019-05-02 PROCEDURE — 93306 TTE W/DOPPLER COMPLETE: CPT

## 2019-05-02 NOTE — TELEPHONE ENCOUNTER
Spoke to pt and instructed pt to go to the ER or urgent care if any symptoms develop and Dr Bloch will se him at his appt on May16th.

## 2019-05-03 LAB
LV EJECT FRACT  99904: 55
LV EJECT FRACT MOD 2C 99903: 45.76
LV EJECT FRACT MOD 4C 99902: 57.82
LV EJECT FRACT MOD BP 99901: 56.28

## 2019-05-03 PROCEDURE — 93306 TTE W/DOPPLER COMPLETE: CPT | Mod: 26 | Performed by: INTERNAL MEDICINE

## 2019-05-03 RX ORDER — IRBESARTAN 300 MG/1
300 TABLET ORAL DAILY
Qty: 30 TAB | Refills: 11 | Status: ON HOLD
Start: 2019-05-03 | End: 2020-03-02

## 2019-05-05 DIAGNOSIS — I48.91 ATRIAL FIBRILLATION, UNSPECIFIED TYPE (HCC): ICD-10-CM

## 2019-05-06 RX ORDER — WARFARIN SODIUM 4 MG/1
TABLET ORAL
Qty: 180 TAB | Refills: 1 | Status: SHIPPED | OUTPATIENT
Start: 2019-05-06 | End: 2019-08-12 | Stop reason: SDUPTHER

## 2019-05-16 ENCOUNTER — OFFICE VISIT (OUTPATIENT)
Dept: VASCULAR LAB | Facility: MEDICAL CENTER | Age: 84
End: 2019-05-16
Attending: INTERNAL MEDICINE
Payer: MEDICARE

## 2019-05-16 VITALS
HEART RATE: 72 BPM | HEIGHT: 71 IN | WEIGHT: 193.4 LBS | SYSTOLIC BLOOD PRESSURE: 126 MMHG | BODY MASS INDEX: 27.07 KG/M2 | DIASTOLIC BLOOD PRESSURE: 74 MMHG

## 2019-05-16 DIAGNOSIS — I82.90 DEEP VEIN THROMBOSIS (DVT) OF NON-EXTREMITY VEIN, UNSPECIFIED CHRONICITY: ICD-10-CM

## 2019-05-16 DIAGNOSIS — G45.9 TIA (TRANSIENT ISCHEMIC ATTACK): ICD-10-CM

## 2019-05-16 DIAGNOSIS — G47.9 SLEEP DISTURBANCE: ICD-10-CM

## 2019-05-16 DIAGNOSIS — E78.5 DYSLIPIDEMIA: ICD-10-CM

## 2019-05-16 DIAGNOSIS — I38 VALVULAR HEART DISEASE: ICD-10-CM

## 2019-05-16 DIAGNOSIS — I48.91 ATRIAL FIBRILLATION, UNSPECIFIED TYPE (HCC): ICD-10-CM

## 2019-05-16 DIAGNOSIS — I10 ESSENTIAL HYPERTENSION: ICD-10-CM

## 2019-05-16 PROCEDURE — 99214 OFFICE O/P EST MOD 30 MIN: CPT | Performed by: INTERNAL MEDICINE

## 2019-05-16 PROCEDURE — 99212 OFFICE O/P EST SF 10 MIN: CPT

## 2019-05-16 RX ORDER — TEMAZEPAM 30 MG/1
CAPSULE ORAL
Qty: 90 CAP | Refills: 1 | Status: SHIPPED
Start: 2019-05-16 | End: 2019-09-19 | Stop reason: SDUPTHER

## 2019-05-16 ASSESSMENT — ENCOUNTER SYMPTOMS
BLOOD IN STOOL: 0
PALPITATIONS: 0
DIZZINESS: 0
WEAKNESS: 0
NERVOUS/ANXIOUS: 0
FOCAL WEAKNESS: 0
SENSORY CHANGE: 0
MYALGIAS: 0
DEPRESSION: 0
BACK PAIN: 1
HEADACHES: 0
LOSS OF CONSCIOUSNESS: 0
SHORTNESS OF BREATH: 0
COUGH: 0
SPEECH CHANGE: 0

## 2019-05-16 NOTE — PROGRESS NOTES
"VASCULAR FOLLOW UP VISIT  Subjective:   Tristan Hester is a 83 y.o. male who presents today 5-16-19 for vascular f/u  Chief Complaint   Patient presents with   • Follow-Up     HPI:   Patient here for f/u of HTN, dyslipidemia, carotid disease and valvular heart disease, and atrial fibrillation.  Minimal TELLEZ, no edema, no chest pain.   No lightheadedness  Still with balance issues - no change  Saw neurology and ENT - no specific diagnosis  No TIA or stroke symptoms.  No bleeding or bruising on warfarin  Continues to f/u in anticoagulation clinic  Not checking bp at home  No myaglias on statin and ezemibe  continues to follow up with urology    Social History   Substance Use Topics   • Smoking status: Former Smoker     Packs/day: 0.50     Years: 30.00     Types: Cigarettes, Pipe     Quit date: 1/1/1985   • Smokeless tobacco: Never Used   • Alcohol use 3.0 oz/week     3 Cans of beer, 3 Shots of liquor per week      Comment: 1 x week     DIET AND EXERCISE:  Weight Change: stable  Diet: common adult  Exercise: moderate regular exercise     Review of Systems   Constitutional: Negative for malaise/fatigue.   Respiratory: Negative for cough and shortness of breath.    Cardiovascular: Negative for chest pain, palpitations and leg swelling.   Gastrointestinal: Negative for blood in stool and melena.   Genitourinary: Negative for dysuria and hematuria.   Musculoskeletal: Positive for back pain. Negative for myalgias.   Neurological: Negative for dizziness, sensory change, speech change, focal weakness, loss of consciousness, weakness and headaches.   Psychiatric/Behavioral: Negative for depression. The patient is not nervous/anxious.       Objective:     Vitals:    05/16/19 1401   BP: 126/74   BP Location: Left arm   Patient Position: Sitting   BP Cuff Size: Adult   Pulse: 72   Weight: 87.7 kg (193 lb 6.4 oz)   Height: 1.8 m (5' 10.87\")      Body mass index is 27.08 kg/m².  Physical Exam   Constitutional: He is oriented to " person, place, and time. He appears well-developed and well-nourished.   Cardiovascular: Normal rate and intact distal pulses.    Murmur heard.  Pulmonary/Chest: Effort normal. No respiratory distress. He has no wheezes. He has no rales.   Musculoskeletal: Normal range of motion. He exhibits no edema.   Neurological: He is alert and oriented to person, place, and time. No cranial nerve deficit.   Psychiatric: He has a normal mood and affect. His behavior is normal.     Lab Results   Component Value Date    CHOLSTRLTOT 116 12/03/2018    LDL 54 12/03/2018    HDL 54 12/03/2018    TRIGLYCERIDE 42 12/03/2018      Lab Results   Component Value Date    PROTHROMBTM 25.6 (H) 12/30/2017    INR 2.30 04/24/2019         Lab Results   Component Value Date    SODIUM 143 12/03/2018    POTASSIUM 4.9 12/03/2018    CHLORIDE 107 (H) 12/03/2018    CO2 23 12/03/2018    GLUCOSE 87 12/03/2018    BUN 26 12/03/2018    CREATININE 1.23 12/03/2018    BUNCREATRAT 21 12/03/2018    IFAFRICA 63 12/03/2018    IFNOTAFR 54 (L) 12/03/2018      Lab Results   Component Value Date    TSH 1.400 01/25/2018      Lab Results   Component Value Date    WBC 4.9 09/27/2017    RBC 5.03 09/27/2017    HEMOGLOBIN 15.1 09/27/2017    HEMATOCRIT 45.2 09/27/2017    MCV 90 09/27/2017    MCH 30.0 09/27/2017    MCHC 33.4 09/27/2017      Blood work from 10-9-14: Glucose 102, GFR 68, cholesterol 127, trig 35, HDL 57, LDL-C 63, urine for MA/cr 3.4    Blood work from May 12, 2015, glucose 37, GFR 72, sodium 146, AST 19, ALT 12, hemoglobin 13.8, total cholesterol 125, temperature 66, initial 48, LDL 64, albumin creatinine ratio in urine 2.5    CXR 8/20/15: (comparison to 7/9/2015) :Bibasilar opacities, right greater than left, consistent with a combination of airspace disease and atelectasis. Minimal left basilar airspace opacities are new. A small right pleural effusion is suspected, unchanged.    Blood work from February 2016-glucose 107, GFR 82, hemoglobin 14.0, total  cholesterol 130, HL 51, LDL 62    Echocardiogram February 2016  Posterior mitral leaflet prolapse with eccentric moderate mitral regurgitation  Severely dilated left atrium  Mild concentric LVH  EF 60%  Mild aortic stenosis   right ventricular pressure estimated 37 mm    Carotid duplex May 2016:   1.  No evidence of re-stenosis in the right carotid bulb and internal    carotid artery after endarterectomy.    2.  Less than 50% left carotid bulb and internal carotid arterial stenosis.    3.  Normal bilateral subclavian and vertebral arterial exam.    4.  Compared to the prior study of 2/29/2016,  there has been no    significant change.     Blood work Oct 2016:  Glucose 106, GFR 77, LDL 68, nonHDL 86    Echo feb 2017:  Left ventricular ejection fraction is visually estimated to be 60%.   Grade I diastolic dysfunction.  Possibly severe mitral regurgitation.   Mild aortic stenosis.  Estimated right ventricular systolic pressure  is 25 mmHg.  Aortic root is 4 cm.   Compared to the images of the prior study done 2/29/16 there has been   no significant change.   Consider YOANDY for better evaluation of MR severity.    Blood work march 2017:  Glucose 94, GFR 59, Ldl-c 70, nonHDL 85    Carotid August 2017  Mild plaque of the right internal carotid artery with no significant    stenosis (<50%)   Mild plaque of the left internal carotid artery with no significant    stenosis (<50%).   No significant stenosis of the right or left subclavian artery.   No significant stenosis of the right or left vertebral artery.    MRI Brain Dec 2017:  MRI of the brain without contrast within normal limits for age with moderate white matter changes without demonstrable interval progression since the prior study.    Echocardiogram feb 2018:  Normal left ventricular size, wall thickness, and systolic function.   Left ventricular ejection fraction is visually estimated to be 60%.   Normal regional wall motion. Diastolic function is difficult to  assess   with valvular disease.  Mitral annular calcification. No mitral stenosis. Moderate mitral   regurgitation. ERO by PISA method is 0.2 sq cm. Both leaflet appear to   attach to the anterior lateral wall. Can not rule out parachute mitral   valve.  Calcified aortic valve leaflets. Moderate aortic stenosis.   Compared to the report of the study done 2/2017- the MR appears to be   less severe, but, there has been some progression of the AS.     Echocardiogram May 2019  Normal left ventricular size and systolic function. Moderate concentric   left ventricular hypertrophy. Grade II diastolic dysfunction. Normal   regional wall motion. Left ventricular ejection fraction is visually   estimated to be 55%.  Severely dilated left atrium.  Moderate mitral regurgitation.  Tricuspid aortic valve. Moderate aortic stenosis. Aortic valve area   calculated from the continuity equation is 1. 1 cm2 Vmax is  3.5  m/s.   Transvalvular gradients are - Peak: 50  mmHg, Mean: 33 mmHg. However   the dimensionless index is 0. 2, which would be consistent with severe   AS. No aortic insufficiency.  Ascending aorta is borderline dilated with a diameter of 3.9  cm.  Compared to the report of the study done 2/2018- there has been an   further increase in the AV gradient though the dimensionless index has   not changed.    Blood work from [facility] [place] April 2019  Hemoglobin 15.0, glucose 116, GFR 72, total cholesterol 120, triglycerides 55, HDL 52, LDL 57, albumin creatinine ratio 9.3, TSH 1.4, BNP 2167    Medical Decision Making:  Today's Assessment / Status / Plan:     1. Atrial fibrillation, unspecified type (HCC)  CBC, NO DIFFERENTIAL/PLATELET   2. Deep vein thrombosis (DVT) of non-extremity vein, unspecified chronicity     3. TIA (transient ischemic attack)     4. Valvular heart disease     5. Dyslipidemia  LIPID PANEL    CMP14+LP   6. Essential hypertension  CMP14+LP   7. Sleep disturbance  temazepam (RESTORIL) 30 MG capsule     Patient  Type: Secondary Prevention    Etiology of Established CVD if Present:   1. TIA - likely cardioembolic from valve disease  2. Carotid atherosclerosis - stable on surveillance imaging  3. Valvular Heart Disease - currently asymptomatic  4. Atrial fibrillation with onset during acute illness, now chronic  5.  DVT after back surgery jan 2016    Lipid Management: Qualifies for Statin Therapy Based on 2013 ACC/AHA Guidelines: yes  Calculated 10-Year Risk of ASCVD: N/A  Currently on Statin: Yes  ACC/aha criteria for at least mod intensity statin  Per nla goal ldl <70 and nonHDL <100 - below threshold  Plan:  - Continue atorvastatin at current dose    - continue zetia  - Continue TLC  - Recheck fasting lipids in six months    Blood Pressure Management:  Acc/aha bp goal <130/80  BP under reasonable control at least in office on multiple visits  No significant albuminuria  Plan:  - Continue current BP meds  - Encouraged home BP monitoring    Glycemic Status: Prediabetic  IFG, mild and stable  - Continue lifestyle mod  - Follow fasting glucose    Anti-Platelet/Anti-Coagulant Tx: yes  - Continue indefinite anticoagulation with warfarin   - F/U with anticoagulation clinic   - limit nsaids    Smoking: continue complete avoidance    Physical Activity:  Encouraged more walking    Weight Management and Nutrition: Dietary plan was discussed with patient at this visit including c/w Med diet    Other:     1. TIA - likely cardioembolic from valve disease - continue BP control and anticoagulation  2. Carotid atherosclerosis - stable on surveillance imaging - continue surveillance imaging - every 2 years  3. Valvular Heart Disease - unclear if becoming symptomatic. BNP elevated. Defer further w/u and management to cards  4. Colonic Polyp on recent colonoscopy - repeat colonoscopy in 2019 per GI.  Encouraged patient to call GI for f/u instructions  5. Insomnia - trialed zolpidem w/o success.   Continue temazepam. Patient understands  addictive potential  6.  Atrial fibrillation - defer management of rhythm and rate to cards.  Continue anticoag as above  7.  DVT - postoperative.  No recurrence.  Continue indefinite anticoagulation given concomitant afib  8.  H/o prostate ca and multiple urological issues - defer f/u to urology  9.  Balance issues and neuropathy - defer f/u to ENT and neurology  10.  Continuing medical health - cancer screening as above. Has had shingles vac, continue yearly flu shot    We will partner with other providers in the management of established vascular disease and cardiometabolic risk factors.    Studies to Be Obtained:    1. Carotid Duplex aug 2019  2. Echo (YOANDY or TTE) per cards     Labs to Be Obtained: as above prior to next visit    Follow up in: 6 months    Michael J Bloch, M.D.    Cc:   MD CORA Jones MD

## 2019-05-24 ENCOUNTER — TELEPHONE (OUTPATIENT)
Dept: VASCULAR LAB | Facility: MEDICAL CENTER | Age: 84
End: 2019-05-24
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
## 2019-05-28 ENCOUNTER — TELEPHONE (OUTPATIENT)
Dept: VASCULAR LAB | Facility: MEDICAL CENTER | Age: 84
End: 2019-05-28

## 2019-05-28 NOTE — TELEPHONE ENCOUNTER
Spoke to pt about Dr. Bloch recommending seeing an orthopedic surgeon and then they can decide on what imaging is best. Pt has a ortho already and will follow up with them.

## 2019-06-10 ENCOUNTER — HOSPITAL ENCOUNTER (OUTPATIENT)
Dept: RADIOLOGY | Facility: MEDICAL CENTER | Age: 84
End: 2019-06-10
Attending: CLINICAL NURSE SPECIALIST
Payer: MEDICARE

## 2019-06-10 DIAGNOSIS — M54.16 LUMBAR RADICULOPATHY: ICD-10-CM

## 2019-06-10 DIAGNOSIS — M54.12 CERVICAL RADICULOPATHY: ICD-10-CM

## 2019-06-10 PROCEDURE — 72050 X-RAY EXAM NECK SPINE 4/5VWS: CPT

## 2019-06-10 PROCEDURE — 72110 X-RAY EXAM L-2 SPINE 4/>VWS: CPT

## 2019-06-10 PROCEDURE — 72141 MRI NECK SPINE W/O DYE: CPT

## 2019-06-11 ENCOUNTER — TELEPHONE (OUTPATIENT)
Dept: VASCULAR LAB | Facility: MEDICAL CENTER | Age: 84
End: 2019-06-11

## 2019-06-11 DIAGNOSIS — I65.23 BILATERAL CAROTID ARTERY STENOSIS: ICD-10-CM

## 2019-06-11 DIAGNOSIS — Z48.812 POSTOP CAROTID ENDARTERECTOMY SURVEILLANCE, ENCOUNTER FOR: ICD-10-CM

## 2019-07-18 ENCOUNTER — TELEPHONE (OUTPATIENT)
Dept: VASCULAR LAB | Facility: MEDICAL CENTER | Age: 84
End: 2019-07-18

## 2019-07-18 NOTE — TELEPHONE ENCOUNTER
[facility]    Spoke with pt about upcoming colonoscopy.  Given significant hx for CVA and multiple DVT's per pt, he will need enoxaparin bridging.  Pt scheduled for our clinic and clearance returned to GI consultants.     Satya Parra, PharmD

## 2019-07-19 ENCOUNTER — ANTICOAGULATION VISIT (OUTPATIENT)
Dept: VASCULAR LAB | Facility: MEDICAL CENTER | Age: 84
End: 2019-07-19
Attending: INTERNAL MEDICINE
Payer: MEDICARE

## 2019-07-19 DIAGNOSIS — I48.0 PAROXYSMAL ATRIAL FIBRILLATION (HCC): ICD-10-CM

## 2019-07-19 DIAGNOSIS — G45.9 TIA (TRANSIENT ISCHEMIC ATTACK): ICD-10-CM

## 2019-07-19 DIAGNOSIS — I82.90 DEEP VEIN THROMBOSIS (DVT) OF NON-EXTREMITY VEIN, UNSPECIFIED CHRONICITY: ICD-10-CM

## 2019-07-19 LAB
INR BLD: 3.8 (ref 0.9–1.2)
INR PPP: 3.8 (ref 2–3.5)

## 2019-07-19 PROCEDURE — 99212 OFFICE O/P EST SF 10 MIN: CPT

## 2019-07-19 PROCEDURE — 85610 PROTHROMBIN TIME: CPT

## 2019-07-19 NOTE — PROGRESS NOTES
Anticoagulation Summary  As of 7/19/2019    INR goal:   2.0-3.0   TTR:   71.2 % (4 y)   INR used for dosing:   3.80! (7/19/2019)   Warfarin maintenance plan:   8 mg (4 mg x 2) every Sun, Tue, Thu; 6 mg (4 mg x 1.5) all other days   Weekly warfarin total:   48 mg   Plan last modified:   Quita Sim, PharmD (1/26/2017)   Next INR check:   8/12/2019   Priority:   Maintenance   Target end date:   Indefinite    Indications    DVT (deep venous thrombosis) (HCC) [I82.409]  Paroxysmal atrial fibrillation (HCC) [I48.0]  Hx of Stroke (Resolved) [I63.9]  Hx of TIA (transient ischemic attack) [G45.9]  Deep vein thrombosis (DVT) (HCC) (Resolved) [I82.409]  Atrial fibrillation (HCC) (Resolved) [I48.91]             Anticoagulation Episode Summary     INR check location:   [facility]    Preferred lab:       Send INR reminders to:       Comments:         Anticoagulation Care Providers     Provider Role Specialty Phone number    Michael J Bloch, M.D. Referring Internal Medicine [phone]    [facility]   [phone]        Anticoagulation Patient Findings      HPI:  Tristan Hester seen in clinic today for follow up on anticoagulation therapy in the presence of DVT, PE Hx of Stroke.   Denies any changes to current medical/health status since last appointment.   Denies any medication or diet changes.   No current symptoms of bleeding or thrombosis reported.    A/P:   INR is supra-therapeutic at 3.8. Pt to take decreased dose of 2 mg tonight then return to regular regimen    Pt to have Colonoscopy on Thursday 08/08/2019. Due to Pt history lovenox bridging is indicated.   Pt to follow instructions as below, after procedure to ask operating MD when safe to resume anticoagulation, if no instructions given, pt will follow as below.     Pt eGFR 54mL/min 12/2018, (1.5mg/kg) Enoxaparin 120 mg daily dose to be utilized, prescription sent to pharmacy of choice        Date  Warfarin dosing PM Lovenox   5 Days  before procedure 08/03/19 0mg NONE   4 Days before procedure 08/04/19 0mg Lovenox injection   3 Days before procedure 08/05/19 0mg Lovenox injection   2 Days before procedure 08/06/19 0mg Lovenox injection   1 Days before procedure 08/07/19 0mg NONE   PROCEDURE 08/08/19 8 mg NONE   1 Day after procedure 08/09/19 8 mg Restart Lovenox injections      2 Days after procedure 08/10/19 8 mg Lovenox injection   3 Days after procedure 08/11/19 8 mg Lovenox injection   4 Days after procedure 08/12/19 6 mg GET INR checked     Follow up appointment in 4 week(s).    Next Appointment: 08/12/2019, After colonoscopy    Alexia Evans, JerzyD

## 2019-07-24 ENCOUNTER — TELEPHONE (OUTPATIENT)
Dept: VASCULAR LAB | Facility: MEDICAL CENTER | Age: 84
End: 2019-07-24

## 2019-07-24 NOTE — TELEPHONE ENCOUNTER
[facility]     Clearance form filled out and returned to [facility].  See Media.    Satya Parra, PharmD

## 2019-07-30 ENCOUNTER — OFFICE VISIT (OUTPATIENT)
Dept: CARDIOLOGY | Facility: MEDICAL CENTER | Age: 84
End: 2019-07-30
Payer: MEDICARE

## 2019-07-30 VITALS
SYSTOLIC BLOOD PRESSURE: 118 MMHG | BODY MASS INDEX: 26.8 KG/M2 | OXYGEN SATURATION: 96 % | HEART RATE: 65 BPM | WEIGHT: 191.4 LBS | DIASTOLIC BLOOD PRESSURE: 72 MMHG | HEIGHT: 71 IN

## 2019-07-30 DIAGNOSIS — I48.0 PAROXYSMAL ATRIAL FIBRILLATION (HCC): ICD-10-CM

## 2019-07-30 DIAGNOSIS — I10 ESSENTIAL HYPERTENSION, BENIGN: ICD-10-CM

## 2019-07-30 DIAGNOSIS — I35.0 NONRHEUMATIC AORTIC VALVE STENOSIS: ICD-10-CM

## 2019-07-30 DIAGNOSIS — I77.9 BILATERAL CAROTID ARTERY DISEASE, UNSPECIFIED TYPE (HCC): ICD-10-CM

## 2019-07-30 DIAGNOSIS — E78.5 DYSLIPIDEMIA: ICD-10-CM

## 2019-07-30 PROCEDURE — 99214 OFFICE O/P EST MOD 30 MIN: CPT | Performed by: INTERNAL MEDICINE

## 2019-07-30 NOTE — TELEPHONE ENCOUNTER
Pt requesting Lovenox 120 mg #6 syringes [facility]    Quita Sim, Pharm D, BCACP, CACP

## 2019-07-30 NOTE — PROGRESS NOTES
Chief Complaint   Patient presents with   • Atrial Fibrillation     6mo F/V       Subjective:   Tristan Hester is a 83 y.o. male who presents today for follow-up of his PAF, hypertension and dyslipidemia.  He also has aortic stenosis and mitral insufficiency.    He is exercising regularly without difficulty.  He denies any chest discomfort, dyspnea, edema, palpitations or lightheadedness.      Past Medical History:   Diagnosis Date   • Arrhythmia     Afib   • Arthritis     osteo, hips and knees   • Blood clotting disorder ([facility])     , left arm   • Bronchitis        • Cancer ([facility])     Prostate CA--2000   • Carotid arterial disease ([facility]) 1/6/2014   • Cataract     removed bilat   • DVT (deep venous thrombosis) ([facility]) 2016   • Heart valve disease     MVR   • HLD (hyperlipidemia) 1/6/2014   • Hypertension    • Mitral regurgitation 1/6/2014   • Pain 12-14-15    low back, hips, 2-3/10   • Pneumonia        • Stroke ([facility]) 2008    TIA, no residual   • TIA (transient ischemic attack)     2008   • Urinary bladder disorder     HAD MALE BLADDER SUSPENSION PROCEDURE DONE   • Urinary incontinence      Past Surgical History:   Procedure Laterality Date   • LUMBAR FUSION POSTERIOR  12/28/2015    Procedure: LUMBAR FUSION POSTERIOR L3-5 with peek rods;  Surgeon: Phylicia Sarkar M.D.;  Location: [facility];  Service:    • LUMBAR DECOMPRESSION  12/28/2015    Procedure: LUMBAR DECOMPRESSION posterior redo decompression L3-5, with dural repair;  Surgeon: Phylicia Sarkar M.D.;  Location: [facility];  Service:    • THORACOSCOPY  6/13/2015    Procedure: THORACOSCOPY with decortication VATS , side to be determined  ;  Surgeon: Elmira Holder M.D.;  Location: [facility];  Service:    • LUMBAR LAMINECTOMY DISKECTOMY  10/8/2010    Performed by PHYLICIA SARKAR at [facility]   • CERVICAL DISK AND FUSION ANTERIOR  10/26/2009    Performed by PHYLICIA SARKAR at [facility]  ORS   • CAROTID ENDARTERECTOMY  7/10/08    Performed by LEIGH MORATAYA at [facility] ORS   • BLADDER SLING MALE  11/2004   • PROSTATECTOMY, RADIAL  11/2000     Family History   Problem Relation Age of Onset   • Other Brother         Aneurysm   • Cancer Brother         Seminal vascular cancer   • Autoimmune Disease Daughter         Lupus, RA   • No Known Problems Daughter    • No Known Problems Son    • No Known Problems Son    • Heart Attack Neg Hx      Social History     Social History   • Marital status:      Spouse name: N/A   • Number of children: N/A   • Years of education: N/A     Occupational History   • Not on file.     Social History Main Topics   • Smoking status: Former Smoker     Packs/day: 0.50     Years: 30.00     Types: Cigarettes, Pipe     Quit date: 1/1/1985   • Smokeless tobacco: Never Used   • Alcohol use 3.0 oz/week     3 Cans of beer, 3 Shots of liquor per week      Comment: 1 x week   • Drug use: No   • Sexual activity: Not Currently     Other Topics Concern   • Not on file     Social History Narrative   • No narrative on file     Allergies   Allergen Reactions   • Cantaloupe      Pt stated cantaloupe causes his throat to constrict   • Nkda [No Known Drug Allergy]    • Other Environmental      Seasonal, cats-hayfever     Outpatient Encounter Prescriptions as of 7/30/2019   Medication Sig Dispense Refill   • temazepam (RESTORIL) 30 MG capsule TAKE ONE CAPSULE BY MOUTH ONCE DAILY AT BEDTIME AS NEEDED FOR SLEEP 90 Cap 1   • warfarin (COUMADIN) 4 MG Tab TAKE 1 & 1/2 TO 2 (ONE & ONE-HALF TO TWO) TABLETS BY MOUTH ONCE DAILY AS  DIRECTED  BY  [facility]  ANTICOAGULATION  SERVICES 180 Tab 1   • irbesartan (AVAPRO) 300 MG Tab Take 1 Tab by mouth every day. 30 Tab 11   • CARTIA  MG CAPSULE SR 24 HR TAKE 1 CAPSULE BY MOUTH ONCE DAILY 90 Cap 3   • atorvastatin (LIPITOR) 80 MG tablet Take 0.5 Tabs by mouth every evening. 90 Tab 3   • ezetimibe (ZETIA) 10 MG Tab Take 1 Tab by mouth every  "day. 30 Tab 11   • gabapentin (NEURONTIN) 300 MG Cap Take 1 Cap by mouth 3 times a day. 270 Cap 3   • Multiple Vitamin (MULTI-VITAMINS) Tab Take  by mouth.     • enoxaparin (LOVENOX) 120 MG/0.8ML Solution inj Inject 120 mg as instructed every day. (Patient not taking: Reported on 7/30/2019) 10 Syringe 0   • acetaminophen (TYLENOL) 325 MG Tab Take 650 mg by mouth every four hours as needed.       No facility-administered encounter medications on file as of 7/30/2019.      ROS       Objective:   /72 (BP Location: Left arm, Patient Position: Sitting, BP Cuff Size: Adult)   Pulse 65   Ht 1.8 m (5' 10.87\")   Wt 86.8 kg (191 lb 6.4 oz)   SpO2 96%   BMI 26.79 kg/m²     Physical Exam   Constitutional: He appears well-developed and well-nourished.   Neck: No JVD present.   Cardiovascular: Normal rate and regular rhythm.    Murmur (3/6 systolic at the base) heard.  Pulmonary/Chest: Effort normal and breath sounds normal. He has no rales.   Abdominal: Soft. There is no tenderness.   Musculoskeletal: He exhibits no edema.     Transthoracic  Echo Report    CONCLUSIONS  Normal left ventricular size, wall thickness, and systolic function.   Left ventricular ejection fraction is visually estimated to be 60%.   Normal regional wall motion. Diastolic function is difficult to assess   with valvular disease.  Mitral annular calcification. No mitral stenosis. Moderate mitral   regurgitation. ERO by PISA method is 0.2 sq cm. Both leaflet appear to   attach to the anterior lateral wall. Can not rule out parachute mitral   valve.  Calcified aortic valve leaflets. Moderate aortic stenosis.   Compared to the report of the study done 2/2017- the MR appears to be   less severe, but, there has been some progression of the AS.     ANTHONY JOSHI  Exam Date:         02/13/2018     Transthoracic  Echo Report    CONCLUSIONS  Normal left ventricular size and systolic function. Moderate concentric   left ventricular hypertrophy. Grade II " diastolic dysfunction. Normal   regional wall motion. Left ventricular ejection fraction is visually   estimated to be 55%.  Severely dilated left atrium.  Moderate mitral regurgitation.  Tricuspid aortic valve. Moderate aortic stenosis. Aortic valve area   calculated from the continuity equation is 1. 1 cm2 Vmax is  3.5  m/s.   Transvalvular gradients are - Peak: 50  mmHg, Mean: 33 mmHg. However   the dimensionless index is 0. 2, which would be consistent with severe   AS. No aortic insufficiency.  Ascending aorta is borderline dilated with a diameter of 3.9  cm.  Compared to the report of the study done 2/2018- there has been an   further increase in the AV gradient though the dimensionless index has   not changed.     ANTHONY JOSHI  Exam Date:         05/02/2019     Lab Results   Component Value Date/Time    CHOLSTRLTOT 116 12/03/2018 08:12 AM    CHOLSTRLTOT 146 06/02/2008 11:00 PM    LDL 54 12/03/2018 08:12 AM    LDL 85 06/02/2008 11:00 PM    HDL 54 12/03/2018 08:12 AM    HDL 49 06/02/2008 11:00 PM    TRIGLYCERIDE 42 12/03/2018 08:12 AM    TRIGLYCERIDE 60 06/02/2008 11:00 PM       Lab Results   Component Value Date/Time    SODIUM 143 12/03/2018 08:12 AM    SODIUM 138 01/12/2016 12:20 PM    POTASSIUM 4.9 12/03/2018 08:12 AM    POTASSIUM 4.3 01/12/2016 12:20 PM    CHLORIDE 107 (H) 12/03/2018 08:12 AM    CHLORIDE 105 01/12/2016 12:20 PM    CO2 23 12/03/2018 08:12 AM    CO2 26 01/12/2016 12:20 PM    GLUCOSE 87 12/03/2018 08:12 AM    GLUCOSE 128 (H) 01/12/2016 12:20 PM    BUN 26 12/03/2018 08:12 AM    BUN 18 01/12/2016 12:20 PM    CREATININE 1.23 12/03/2018 08:12 AM    CREATININE 1.00 01/12/2016 12:20 PM    CREATININE 1.0 07/02/2008 03:43 PM    BUNCREATRAT 21 12/03/2018 08:12 AM     Lab Results   Component Value Date/Time    ALKPHOSPHAT 56 12/03/2018 08:12 AM    ALKPHOSPHAT 52 01/11/2016 04:25 AM    ASTSGOT 27 12/03/2018 08:12 AM    ASTSGOT 14 01/11/2016 04:25 AM    ALTSGPT 21 12/03/2018 08:12 AM    ALTSGPT 11  01/11/2016 04:25 AM    TBILIRUBIN 0.7 12/03/2018 08:12 AM    TBILIRUBIN 1.1 01/11/2016 04:25 AM      Lab Results   Component Value Date/Time    BNPBTYPENAT 694.6 (H) 07/19/2018 09:55 AM    BNPBTYPENAT 244 (H) 06/23/2015 01:41 AM          Assessment:     1. Paroxysmal atrial fibrillation (HCC)     2. Essential hypertension, benign     3. Nonrheumatic aortic valve stenosis     4. Bilateral carotid artery disease, unspecified type (HCC)     5. Dyslipidemia           Medical Decision Making:  Today's Assessment / Status / Plan:     Mr. Hester is clinically stable.  He is asymptomatic but has had some progression of his aortic stenosis with an increasing mean gradient over the last year.  At this time, we will refer him to valve clinic for close observation.  I suspect he will need an aortic valve replacement within the next couple of years.    He continues on chronic anticoagulation for his PAF.  He will follow-up in about 6 months.  He does have a lipid panel every 6 months and we do not have that in our records.  We will try to obtain notes from review.  His blood pressure is under good control.

## 2019-08-05 ENCOUNTER — HOSPITAL ENCOUNTER (OUTPATIENT)
Dept: RADIOLOGY | Facility: MEDICAL CENTER | Age: 84
End: 2019-08-05
Attending: NURSE PRACTITIONER
Payer: MEDICARE

## 2019-08-05 DIAGNOSIS — I65.23 BILATERAL CAROTID ARTERY STENOSIS: ICD-10-CM

## 2019-08-05 PROCEDURE — 93880 EXTRACRANIAL BILAT STUDY: CPT

## 2019-08-06 ENCOUNTER — TELEPHONE (OUTPATIENT)
Dept: VASCULAR LAB | Facility: MEDICAL CENTER | Age: 84
End: 2019-08-06

## 2019-08-06 NOTE — TELEPHONE ENCOUNTER
Carotid duplex reviewed.  No significant restenosis after cea many years ago.  Continue medical management   Repeat carotid duplex in 2 years.    Michael Bloch, MD  [facility]

## 2019-08-07 ENCOUNTER — TELEPHONE (OUTPATIENT)
Dept: VASCULAR LAB | Facility: MEDICAL CENTER | Age: 84
End: 2019-08-07

## 2019-08-12 ENCOUNTER — ANTICOAGULATION VISIT (OUTPATIENT)
Dept: VASCULAR LAB | Facility: MEDICAL CENTER | Age: 84
End: 2019-08-12
Attending: INTERNAL MEDICINE
Payer: MEDICARE

## 2019-08-12 VITALS — HEART RATE: 62 BPM | DIASTOLIC BLOOD PRESSURE: 69 MMHG | SYSTOLIC BLOOD PRESSURE: 114 MMHG

## 2019-08-12 DIAGNOSIS — I48.0 PAROXYSMAL ATRIAL FIBRILLATION (HCC): ICD-10-CM

## 2019-08-12 DIAGNOSIS — I48.91 ATRIAL FIBRILLATION, UNSPECIFIED TYPE (HCC): ICD-10-CM

## 2019-08-12 DIAGNOSIS — I82.90 DEEP VEIN THROMBOSIS (DVT) OF NON-EXTREMITY VEIN, UNSPECIFIED CHRONICITY: ICD-10-CM

## 2019-08-12 DIAGNOSIS — G45.9 TIA (TRANSIENT ISCHEMIC ATTACK): ICD-10-CM

## 2019-08-12 LAB
INR BLD: 1.6 (ref 0.9–1.2)
INR PPP: 1.6 (ref 2–3.5)

## 2019-08-12 PROCEDURE — 99213 OFFICE O/P EST LOW 20 MIN: CPT

## 2019-08-12 PROCEDURE — 85610 PROTHROMBIN TIME: CPT

## 2019-08-12 RX ORDER — WARFARIN SODIUM 4 MG/1
TABLET ORAL
Qty: 200 TAB | Refills: 1 | Status: SHIPPED
Start: 2019-08-12 | End: 2020-02-17

## 2019-08-12 NOTE — PROGRESS NOTES
Anticoagulation Summary  As of 2019    INR goal:   2.0-3.0   TTR:   70.8 % (4 y)   INR used for dosin.60! (2019)   Warfarin maintenance plan:   8 mg (4 mg x 2) every Sun, Tue, u; 6 mg (4 mg x 1.5) all other days   Weekly warfarin total:   48 mg   Plan last modified:   Quita Sim, PharmD (2017)   Next INR check:      Priority:   Maintenance   Target end date:   Indefinite    Indications    DVT (deep venous thrombosis) (HCC) [I82.409]  Paroxysmal atrial fibrillation (HCC) [I48.0]  Hx of Stroke (Resolved) [I63.9]  Hx of TIA (transient ischemic attack) [G45.9]  Deep vein thrombosis (DVT) (HCC) (Resolved) [I82.409]  Atrial fibrillation (HCC) (Resolved) [I48.91]             Anticoagulation Episode Summary     INR check location:   Anticoagulation Clinic    Preferred lab:       Send INR reminders to:       Comments:         Anticoagulation Care Providers     Provider Role Specialty Phone number    Michael J Bloch, M.D. Referring Internal Medicine [phone]    [facility]   [phone]        Anticoagulation Patient Findings     History of Present Illness: follow up appointment for chronic anticoagulation with the high risk medication, warfarin for DVT/Stroke, atrial fibrillation    Last INR was out of range, dosage adjusted: pt has been holding/ bridging for a colonoscopy.  Pt is not to goal.  Will bolus with 8mg tonight, and CONTINUE LOVENOX  Until INR >/2.0. Follow up in 2 days, to reduce the risk of adverse events related to this high risk medication, warfarin.    Yashira Boggs, Clinical Pharmacist

## 2019-08-14 ENCOUNTER — ANTICOAGULATION VISIT (OUTPATIENT)
Dept: VASCULAR LAB | Facility: MEDICAL CENTER | Age: 84
End: 2019-08-14
Attending: INTERNAL MEDICINE
Payer: MEDICARE

## 2019-08-14 VITALS — HEART RATE: 65 BPM | DIASTOLIC BLOOD PRESSURE: 84 MMHG | SYSTOLIC BLOOD PRESSURE: 136 MMHG

## 2019-08-14 DIAGNOSIS — G45.9 TIA (TRANSIENT ISCHEMIC ATTACK): ICD-10-CM

## 2019-08-14 DIAGNOSIS — I82.90 DEEP VEIN THROMBOSIS (DVT) OF NON-EXTREMITY VEIN, UNSPECIFIED CHRONICITY: ICD-10-CM

## 2019-08-14 DIAGNOSIS — I48.0 PAROXYSMAL ATRIAL FIBRILLATION (HCC): ICD-10-CM

## 2019-08-14 LAB — INR PPP: 2 (ref 2–3.5)

## 2019-08-14 PROCEDURE — 85610 PROTHROMBIN TIME: CPT

## 2019-08-14 PROCEDURE — 99211 OFF/OP EST MAY X REQ PHY/QHP: CPT | Performed by: NURSE PRACTITIONER

## 2019-08-14 NOTE — PROGRESS NOTES
Anticoagulation Summary  As of 2019    INR goal:   2.0-3.0   TTR:   70.7 % (4 y)   INR used for dosin.00 (2019)   Warfarin maintenance plan:   8 mg (4 mg x 2) every Sun, Tue, Thu; 6 mg (4 mg x 1.5) all other days   Weekly warfarin total:   48 mg   Plan last modified:   Quita Sim, PharmD (2017)   Next INR check:   2019   Priority:   Maintenance   Target end date:   Indefinite    Indications    DVT (deep venous thrombosis) (HCC) [I82.409]  Paroxysmal atrial fibrillation (HCC) [I48.0]  Hx of Stroke (Resolved) [I63.9]  Hx of TIA (transient ischemic attack) [G45.9]  Deep vein thrombosis (DVT) (HCC) (Resolved) [I82.409]  Atrial fibrillation (HCC) (Resolved) [I48.91]             Anticoagulation Episode Summary     INR check location:   Anticoagulation Clinic    Preferred lab:       Send INR reminders to:       Comments:         Anticoagulation Care Providers     Provider Role Specialty Phone number    Michael J Bloch, M.D. Referring Internal Medicine [phone]    [facility]   [phone]        Anticoagulation Patient Findings      HPI:  Tristan Hester seen in clinic today for follow up on anticoagulation therapy in the presence of DVT, TIA, CVA, AF hx.   Denies any changes to current medical/health status since last appointment.   Denies any medication or diet changes.   No current symptoms of bleeding or thrombosis reported.  He is bridging with Lovenox. Had colonoscopy on 19.    A/P:   INR therapeutic. Stop Lovenox.  Will have pt resume his usual regimen.   BP recorded in vitals.    Follow up appointment in 2 week(s).    Next Appointment: 2019 at 8:15 am.    Thi WILLIAM

## 2019-08-15 LAB — INR BLD: 2 (ref 0.9–1.2)

## 2019-08-17 LAB
ALBUMIN SERPL-MCNC: 4 G/DL (ref 3.5–4.7)
ALBUMIN/GLOB SERPL: 1.5 {RATIO} (ref 1.2–2.2)
ALP SERPL-CCNC: 67 IU/L (ref 39–117)
ALT SERPL-CCNC: 66 IU/L (ref 0–44)
AST SERPL-CCNC: 59 IU/L (ref 0–40)
BILIRUB SERPL-MCNC: 0.7 MG/DL (ref 0–1.2)
BUN SERPL-MCNC: 21 MG/DL (ref 8–27)
BUN/CREAT SERPL: 20 (ref 10–24)
CALCIUM SERPL-MCNC: 9.3 MG/DL (ref 8.6–10.2)
CHLORIDE SERPL-SCNC: 106 MMOL/L (ref 96–106)
CHOLEST SERPL-MCNC: 112 MG/DL (ref 100–199)
CHOLEST/HDLC SERPL: 2.3 RATIO (ref 0–5)
CO2 SERPL-SCNC: 20 MMOL/L (ref 20–29)
CREAT SERPL-MCNC: 1.06 MG/DL (ref 0.76–1.27)
ERYTHROCYTE [DISTWIDTH] IN BLOOD BY AUTOMATED COUNT: 14.3 % (ref 12.3–15.4)
GLOBULIN SER CALC-MCNC: 2.7 G/DL (ref 1.5–4.5)
GLUCOSE SERPL-MCNC: 111 MG/DL (ref 65–99)
HCT VFR BLD AUTO: 42.7 % (ref 37.5–51)
HDLC SERPL-MCNC: 48 MG/DL
HGB BLD-MCNC: 14.1 G/DL (ref 13–17.7)
LABORATORY COMMENT REPORT: ABNORMAL
LDLC SERPL CALC-MCNC: 52 MG/DL (ref 0–99)
MCH RBC QN AUTO: 29.7 PG (ref 26.6–33)
MCHC RBC AUTO-ENTMCNC: 33 G/DL (ref 31.5–35.7)
MCV RBC AUTO: 90 FL (ref 79–97)
NRBC BLD AUTO-RTO: NORMAL %
POTASSIUM SERPL-SCNC: 4.3 MMOL/L (ref 3.5–5.2)
PROT SERPL-MCNC: 6.7 G/DL (ref 6–8.5)
RBC # BLD AUTO: 4.75 X10E6/UL (ref 4.14–5.8)
SODIUM SERPL-SCNC: 143 MMOL/L (ref 134–144)
TRIGL SERPL-MCNC: 61 MG/DL (ref 0–149)
VLDLC SERPL CALC-MCNC: 12 MG/DL (ref 5–40)
WBC # BLD AUTO: 5.7 X10E3/UL (ref 3.4–10.8)

## 2019-08-19 ENCOUNTER — TELEPHONE (OUTPATIENT)
Dept: VASCULAR LAB | Facility: MEDICAL CENTER | Age: 84
End: 2019-08-19

## 2019-08-19 NOTE — TELEPHONE ENCOUNTER
Called pt regarding his lab results. Informed that his BS is up and he should watch his sugar intake. Also informed that his AST and ALT are elevated slightly this is not unusual for the statin medication and is not anything that would require a medication change. CHEYENNE Salazar.

## 2019-08-21 ENCOUNTER — HOSPITAL ENCOUNTER (OUTPATIENT)
Dept: RADIOLOGY | Facility: MEDICAL CENTER | Age: 84
End: 2019-08-21
Attending: NURSE PRACTITIONER
Payer: MEDICARE

## 2019-08-21 DIAGNOSIS — R26.89 OTHER ABNORMALITIES OF GAIT AND MOBILITY: ICD-10-CM

## 2019-08-21 PROCEDURE — 72148 MRI LUMBAR SPINE W/O DYE: CPT

## 2019-08-21 PROCEDURE — 72146 MRI CHEST SPINE W/O DYE: CPT

## 2019-08-27 ENCOUNTER — ANTICOAGULATION VISIT (OUTPATIENT)
Dept: VASCULAR LAB | Facility: MEDICAL CENTER | Age: 84
End: 2019-08-27
Attending: INTERNAL MEDICINE
Payer: MEDICARE

## 2019-08-27 VITALS
SYSTOLIC BLOOD PRESSURE: 101 MMHG | HEART RATE: 64 BPM | WEIGHT: 191 LBS | DIASTOLIC BLOOD PRESSURE: 67 MMHG | BODY MASS INDEX: 26.74 KG/M2

## 2019-08-27 DIAGNOSIS — G45.9 TIA (TRANSIENT ISCHEMIC ATTACK): ICD-10-CM

## 2019-08-27 DIAGNOSIS — I82.90 DEEP VEIN THROMBOSIS (DVT) OF NON-EXTREMITY VEIN, UNSPECIFIED CHRONICITY: ICD-10-CM

## 2019-08-27 DIAGNOSIS — I48.0 PAROXYSMAL ATRIAL FIBRILLATION (HCC): ICD-10-CM

## 2019-08-27 DIAGNOSIS — M54.16 LUMBAR RADICULOPATHY: ICD-10-CM

## 2019-08-27 LAB
INR BLD: 3 (ref 0.9–1.2)
INR PPP: 3 (ref 2–3.5)

## 2019-08-27 PROCEDURE — 99211 OFF/OP EST MAY X REQ PHY/QHP: CPT

## 2019-08-27 PROCEDURE — 85610 PROTHROMBIN TIME: CPT

## 2019-08-27 RX ORDER — GABAPENTIN 300 MG/1
CAPSULE ORAL
Qty: 270 CAP | Refills: 3 | Status: SHIPPED | OUTPATIENT
Start: 2019-08-27 | End: 2020-02-17

## 2019-08-27 NOTE — PROGRESS NOTES
OP Anticoagulation Service Note    Date: 8/27/2019    Anticoagulation Summary  As of 8/27/2019    INR goal:   2.0-3.0   TTR:   71.0 % (4.1 y)   INR used for dosing:   3.00 (8/27/2019)   Warfarin maintenance plan:   8 mg (4 mg x 2) every Sun, Tue, Thu; 6 mg (4 mg x 1.5) all other days   Weekly warfarin total:   48 mg   Plan last modified:   Conor Locke, PharmD (8/27/2019)   Next INR check:   9/26/2019   Priority:   Maintenance   Target end date:   Indefinite    Indications    DVT (deep venous thrombosis) (HCC) [I82.409]  Paroxysmal atrial fibrillation (HCC) [I48.0]  Hx of Stroke (Resolved) [I63.9]  Hx of TIA (transient ischemic attack) [G45.9]  Deep vein thrombosis (DVT) (HCC) (Resolved) [I82.409]  Atrial fibrillation (HCC) (Resolved) [I48.91]             Anticoagulation Episode Summary     INR check location:   Anticoagulation Clinic    Preferred lab:       Send INR reminders to:       Comments:         Anticoagulation Care Providers     Provider Role Specialty Phone number    Michael J Bloch, M.D. Referring Internal Medicine [phone]    [facility] Anticoagulation Services   [phone]        Anticoagulation Patient Findings      HPI:   Tristan Hester seen in clinic today, they are here today for a INR check on anticoagulation therapy     The reason for today's visit is to prevent morbidity and mortality from a blood clot or stroke and to reduce the risk of bleeding while on a anticoagulant.     Dose the patient in the medical group have a [facility] primary care provider and proper insurance?  Michael J Bloch, M.D.  [address] [facility] [id number] [phone]      Additional education provided today regarding reducing bleed risk and dietary constraints:  About how vitamin K and foods work with warfarin and the bleeding risk on a anticoagulant     Any upcoming procedures:   none    Confirmed warfarin dosing regimen  Interval Changes with foods rich in vitamin K: No  Interval Changes in ETOH:    No  Interval Changes in smoking status:  No  Interval Changes in medication:  No   Cost restriction:  No  S/S of bleeding or bruising:  No  Signs/symptoms  thrombosis since the last appt:  No  Bleed risk is:  moderate,       Assessment:   INR  therapeutic.     Medications reviewed and updated--    3 vitals included with today's appt :  (BP, HR, weight, ht, RR)   Vitals:    08/27/19 0817   BP: 101/67   Pulse: 64   Weight: 86.6 kg (191 lb)         Plan:  Continue weekly warfarin dose as noted      Follow up:  Follow up appointment in 4 week(s)       Other info:  Pt educated to contact our clinic with any changes in medications or s/s of bleeding or thrombosis    CHEST guidelines recommend frequent INR monitoring at regular intervals (a few days up to a max of 12 weeks) to ensure they are on the proper dose of warfarin and not having any complications from therapy.  INRs can dramatically change over a short time period due to diet, medications, and medical conditions.     Conor Locke M.S., Pharm.D., [facility], [facility]    This note was created using voice recognition software (Dragon). The accuracy of the dictation is limited by the abilities of the software. I have reviewed the note prior to signing, however some errors in grammar and context are still possible. If you have any questions related to this note please do not hesitate to contact our office.

## 2019-09-19 DIAGNOSIS — G47.9 SLEEP DISTURBANCE: ICD-10-CM

## 2019-09-19 RX ORDER — TEMAZEPAM 30 MG/1
CAPSULE ORAL
Qty: 90 CAP | Refills: 1 | Status: SHIPPED
Start: 2019-09-19 | End: 2019-11-14

## 2019-09-26 ENCOUNTER — ANTICOAGULATION VISIT (OUTPATIENT)
Dept: VASCULAR LAB | Facility: MEDICAL CENTER | Age: 84
End: 2019-09-26
Attending: INTERNAL MEDICINE
Payer: MEDICARE

## 2019-09-26 VITALS — HEART RATE: 67 BPM | DIASTOLIC BLOOD PRESSURE: 77 MMHG | SYSTOLIC BLOOD PRESSURE: 120 MMHG

## 2019-09-26 DIAGNOSIS — I82.90 DEEP VEIN THROMBOSIS (DVT) OF NON-EXTREMITY VEIN, UNSPECIFIED CHRONICITY: ICD-10-CM

## 2019-09-26 DIAGNOSIS — G45.9 TIA (TRANSIENT ISCHEMIC ATTACK): ICD-10-CM

## 2019-09-26 DIAGNOSIS — I48.0 PAROXYSMAL ATRIAL FIBRILLATION (HCC): ICD-10-CM

## 2019-09-26 LAB
INR BLD: 2.9 (ref 0.9–1.2)
INR PPP: 2.9 (ref 2–3.5)

## 2019-09-26 PROCEDURE — 99211 OFF/OP EST MAY X REQ PHY/QHP: CPT | Performed by: NURSE PRACTITIONER

## 2019-09-26 PROCEDURE — 85610 PROTHROMBIN TIME: CPT

## 2019-09-26 NOTE — PROGRESS NOTES
Anticoagulation Summary  As of 2019    INR goal:   2.0-3.0   TTR:   71.6 % (4.1 y)   INR used for dosin.90 (2019)   Warfarin maintenance plan:   8 mg (4 mg x 2) every Sun, Tue, Thu; 6 mg (4 mg x 1.5) all other days   Weekly warfarin total:   48 mg   Plan last modified:   Conor Locke, PharmD (2019)   Next INR check:   10/31/2019   Priority:   Maintenance   Target end date:   Indefinite    Indications    DVT (deep venous thrombosis) (HCC) [I82.409]  Paroxysmal atrial fibrillation (HCC) [I48.0]  Hx of Stroke (Resolved) [I63.9]  Hx of TIA (transient ischemic attack) [G45.9]  Deep vein thrombosis (DVT) (HCC) (Resolved) [I82.409]  Atrial fibrillation (HCC) (Resolved) [I48.91]             Anticoagulation Episode Summary     INR check location:   Anticoagulation Clinic    Preferred lab:       Send INR reminders to:       Comments:         Anticoagulation Care Providers     Provider Role Specialty Phone number    Michael J Bloch, M.D. Referring Internal Medicine [phone]    [facility] Anticoagulation Services   [phone]        Anticoagulation Patient Findings      HPI:  Tristna Hester seen in clinic today for follow up on anticoagulation therapy in the presence of DVT, CVA, AF hx.   Denies any changes to current medical/health status since last appointment.   Denies any medication or diet changes.   No current symptoms of bleeding or thrombosis reported.    A/P:   INR therapeutic.   Continue current regimen.   BP recorded in vitals.    Follow up appointment in 5 week(s).    Next Appointment: Thrusday, Oct 31, 2019 at 8:15 am.    Thi WILLIAM

## 2019-09-27 ENCOUNTER — OFFICE VISIT (OUTPATIENT)
Dept: NEUROLOGY | Facility: MEDICAL CENTER | Age: 84
End: 2019-09-27
Payer: MEDICARE

## 2019-09-27 VITALS
SYSTOLIC BLOOD PRESSURE: 126 MMHG | DIASTOLIC BLOOD PRESSURE: 78 MMHG | WEIGHT: 183 LBS | HEIGHT: 71 IN | BODY MASS INDEX: 25.62 KG/M2 | TEMPERATURE: 97.9 F | HEART RATE: 86 BPM | OXYGEN SATURATION: 96 %

## 2019-09-27 DIAGNOSIS — M54.16 LUMBAR RADICULOPATHY: ICD-10-CM

## 2019-09-27 DIAGNOSIS — R26.89 BALANCE DISORDER: ICD-10-CM

## 2019-09-27 PROCEDURE — 99215 OFFICE O/P EST HI 40 MIN: CPT | Performed by: PSYCHIATRY & NEUROLOGY

## 2019-09-27 ASSESSMENT — ENCOUNTER SYMPTOMS
DIZZINESS: 1
FALLS: 0
TREMORS: 0
CONSTIPATION: 0
MEMORY LOSS: 0

## 2019-09-27 ASSESSMENT — PATIENT HEALTH QUESTIONNAIRE - PHQ9: CLINICAL INTERPRETATION OF PHQ2 SCORE: 0

## 2019-09-27 NOTE — PROGRESS NOTES
CC: Balance Disorder.      HPI:    Tristan Hester is a pleasant 84 y.o. male who presents today in Neurological follow up fir vertigo and a balance disorder.     He reports more frequent vertigo. He describes slight dizziness with fullness in the head. He notices it is worse with stress and anxiety and when he gets tired. Improves with rest or sudden changes in direction. The sensation is ongoing. He has tried vestibular therapy but found it to be ineffective.    Even without vertigo, his balance is an issue.   He denies any falls. He denies any major issues with memory.      Review of Systems   Gastrointestinal: Negative for constipation.   Genitourinary: Negative for dysuria and frequency.        Urinary incontinence from prior prostate cancer.   Musculoskeletal: Negative for falls.   Neurological: Positive for dizziness. Negative for tremors.   Psychiatric/Behavioral: Negative for memory loss.   All other ROS were negative.         Past Medical History:   Past Medical History:   Diagnosis Date   • DVT (deep venous thrombosis) ([facility]) 2016   • Pain 12-14-15    low back, hips, 2-3/10   • Mitral regurgitation 1/6/2014   • Carotid arterial disease ([facility]) 1/6/2014   • HLD (hyperlipidemia) 1/6/2014   • Stroke ([facility]) 2008    TIA, no residual   • Arrhythmia     Afib   • Arthritis     osteo, hips and knees   • Blood clotting disorder ([facility])     , left arm   • Bronchitis        • Cancer ([facility])     Prostate CA--2000   • Cataract     removed bilat   • Heart valve disease     MVR   • Hypertension    • Pneumonia        • TIA (transient ischemic attack)     2008   • Urinary bladder disorder     HAD MALE BLADDER SUSPENSION PROCEDURE DONE   • Urinary incontinence        Past Surgical History:   Past Surgical History:   Procedure Laterality Date   • LUMBAR FUSION POSTERIOR  12/28/2015    Procedure: LUMBAR FUSION POSTERIOR L3-5 with peek rods;  Surgeon: Michael Almonte M.D.;  Location: SURGERY [facility];   Service:    • LUMBAR DECOMPRESSION  2015    Procedure: LUMBAR DECOMPRESSION posterior redo decompression L3-5, with dural repair;  Surgeon: Michael Sarkar M.D.;  Location: SURGERY [facility];  Service:    • THORACOSCOPY  2015    Procedure: THORACOSCOPY with decortication VATS , side to be determined  ;  Surgeon: Elmira Holder M.D.;  Location: SURGERY [facility];  Service:    • LUMBAR LAMINECTOMY DISKECTOMY  10/8/2010    Performed by MICHAEL SARKAR at [facility]   • CERVICAL DISK AND FUSION ANTERIOR  10/26/2009    Performed by MICHAEL SARKAR at [facility]   • CAROTID ENDARTERECTOMY  7/10/08    Performed by LEIGH MORATAYA at [facility]   • BLADDER SLING MALE  2004   • PROSTATECTOMY, RADIAL  2000       Social History:   Social History     Socioeconomic History   • Marital status:      Spouse name: Not on file   • Number of children: Not on file   • Years of education: Not on file   • Highest education level: Not on file   Occupational History   • Not on file   Social Needs   • Financial resource strain: Not on file   • Food insecurity:     Worry: Not on file     Inability: Not on file   • Transportation needs:     Medical: Not on file     Non-medical: Not on file   Tobacco Use   • Smoking status: Former Smoker     Packs/day: 0.50     Years: 30.00     Pack years: 15.00     Types: Cigarettes, Pipe     Last attempt to quit: 1985     Years since quittin.8   • Smokeless tobacco: Never Used   Substance and Sexual Activity   • Alcohol use: Yes     Alcohol/week: 3.0 oz     Types: 3 Cans of beer, 3 Shots of liquor per week     Comment: 1 x week   • Drug use: No   • Sexual activity: Not Currently   Lifestyle   • Physical activity:     Days per week: Not on file     Minutes per session: Not on file   • Stress: Not on file   Relationships   • Social connections:     Talks on phone: Not on file     Gets together: Not on file     Attends Zoroastrianism  "service: Not on file     Active member of club or organization: Not on file     Attends meetings of clubs or organizations: Not on file     Relationship status: Not on file   • Intimate partner violence:     Fear of current or ex partner: Not on file     Emotionally abused: Not on file     Physically abused: Not on file     Forced sexual activity: Not on file   Other Topics Concern   • Not on file   Social History Narrative   • Not on file       Family History:   Family History   Problem Relation Age of Onset   • Other Brother         Aneurysm   • Cancer Brother         Seminal vascular cancer   • Autoimmune Disease Daughter         Lupus, RA   • No Known Problems Daughter    • No Known Problems Son    • No Known Problems Son    • Heart Attack Neg Hx        Allergies:   Allergies   Allergen Reactions   • Cantaloupe      Pt stated cantaloupe causes his throat to constrict   • Nkda [No Known Drug Allergy]    • Other Environmental      Seasonal, cats-hayfever       Physical Exam:     Ambulatory Vitals  Encounter Vitals  Temperature: 36.6 °C (97.9 °F)  Temp src: Temporal  Blood Pressure : 126/78  Pulse: 86  Pulse Oximetry: 96 %  Weight: 83 kg (183 lb)  Height: 180.3 cm (5' 11\")  BMI (Calculated): 25.52  Constitutional: Well-developed, well-nourished, good hygiene. Appears stated age.  Cardiovascular: RRR, with no murmurs, rubs or gallops. No carotid bruits.   Respiratory: Lungs CTA B/L, no W/R/R.   Abdomen: Soft Non-tender to Palpation. Non-distended.  Extremities: No peripheral edema, pedal pulses intact.   Skin: Warm, dry, intact. No rashes observed.  Eyes: Sclera anicteric  Neurologic:   Mental Status: Awake, alert.   Speech: Fluent with normal prosody.   Memory: Able to recall recent and remote events accurately.    Concentration: Attentive. Able to focus on history and follow multi-step commands.              Fund of Knowledge: Appropriate   Cranial Nerves:    CN II: PERRL. No afferent pupillary defect.    CN III, " IV, VI: Good eye closure, EOMI.     CN V: Facial sensation intact and symmetric.     CN VII: No facial asymmetry.     CN VIII: Hearing intact.     CN IX and X: Palate elevates symmetrically. Normal gag reflex.    CN XI: Symmetric shoulder shrug.     CN XII: Tongue midline.    Sensory: Intact light touch, vibration and temperature.    Coordination: No evidence of past-pointing on finger to nose testing, no dysdiadochokinesia. Heel to shin intact.             DTR's: 2+ throughout without clonus.    Babinski: Toes downgoing bilaterally.   Romberg: Negative.   Movements: No tremors observed.   Musculoskeletal:    Strength: 5/5 in upper and lower extremities bilaterally.   Gait: Broad based gait. Reduced arm swing.   Tone: Normal bulk and tone.   Joints: No swelling.       Assessment/Plan:  Balance disorder  Mr. Hester is an 84 yo M with pmhx of DVT and atrial fibrillation on coumadin, prostate cancer s/p prostatectomy, htn, hld, TIA's, with 4 years of balance issues first noted after prolonged hospitalization for pneumonia and empyema. He has had a negative ENT workup and brain MRI does not offer any structural explanation. There is no cerebellar atrophy and the ventricles are of a normal size. On his physical exam, there is direction changing nystagmus, and a slightly broad based gait. However, there is no appendicular dystaxia. Some reduction in arm swing. I discussed with him the possibility of Parkinsonism, however he does not exhibit all the features of Parkinson's disease. I recommended he follow up with Dr. Cook in our practice who can monitor him over time.     Plan:  1. Return to clinic in 1 year, sooner if any changes are noted.      Greater than 50% of this 40 minute face to face encounter was devoted to disease state counseling and coordination of care.  Please see above assessment and plan for discussion.       Carlene Nichols D.O., M.P.H  MS specialist.   Board Certified Neurologist.  Neurology Clerkship  Director, [facility].    Neurology,  [facility].   Tel: [phone]  Fax: [phone]

## 2019-10-03 ENCOUNTER — OFFICE VISIT (OUTPATIENT)
Dept: CARDIOLOGY | Facility: MEDICAL CENTER | Age: 84
End: 2019-10-03
Payer: MEDICARE

## 2019-10-03 VITALS
HEART RATE: 74 BPM | OXYGEN SATURATION: 92 % | SYSTOLIC BLOOD PRESSURE: 122 MMHG | DIASTOLIC BLOOD PRESSURE: 88 MMHG | HEIGHT: 71 IN | WEIGHT: 188.38 LBS | BODY MASS INDEX: 26.37 KG/M2

## 2019-10-03 DIAGNOSIS — I35.0 NONRHEUMATIC AORTIC VALVE STENOSIS: ICD-10-CM

## 2019-10-03 DIAGNOSIS — I48.0 PAROXYSMAL ATRIAL FIBRILLATION (HCC): ICD-10-CM

## 2019-10-03 LAB — EKG IMPRESSION: NORMAL

## 2019-10-03 PROCEDURE — 99204 OFFICE O/P NEW MOD 45 MIN: CPT | Performed by: INTERNAL MEDICINE

## 2019-10-03 PROCEDURE — 93000 ELECTROCARDIOGRAM COMPLETE: CPT | Performed by: INTERNAL MEDICINE

## 2019-10-03 NOTE — PROGRESS NOTES
Cardiology Initial Consultation Note    Date of note:    10/3/2019    Primary Care Provider: Michael J Bloch, M.D.  Referring Provider: Senthil Escoto M.D.     Patient Name: Tristan Hester   YOB: 1935  MRN:              [id number]    Chief Complaint: Aortic stenosis    Tristan Hester is a 84 y.o. male  patient presented today to discuss his aortic stenosis.  He is a patient of Dr. Saeed.  He has history of chronic atrial fibrillation, hypertension, dyslipidemia.  Had 3 back surgeries in the past, 2 TIAs, carotid endarterectomy.  He has known aortic stenosis, gradually worsening, most recent echocardiogram showed moderate to severe aortic stenosis.  He is very functional for his age.  Denies chest pain shortness of breath, lightheadedness, fainting.  Complains of mild fatigue and decreased stamina.  Compared to last summer he slowed down slightly but not enough to cause lifestyle changes.    ROS  Positive for back pain, arthritis.  All other systems reviewed and discussed using a comprehensive questionnaire and are negative.     Past medical history, family history, social history, allergies and labs are reviewed and updated as needed as documented below.    Past Medical History:   Diagnosis Date   • Arrhythmia     Afib   • Arthritis     osteo, hips and knees   • Blood clotting disorder ([facility])     , left arm   • Bronchitis        • Cancer ([facility])     Prostate CA--2000   • Carotid arterial disease ([facility]) 1/6/2014   • Cataract     removed bilat   • DVT (deep venous thrombosis) ([facility]) 2016   • Heart valve disease     MVR   • HLD (hyperlipidemia) 1/6/2014   • Hypertension    • Mitral regurgitation 1/6/2014   • Pain 12-14-15    low back, hips, 2-3/10   • Pneumonia        • Stroke ([facility]) 2008    TIA, no residual   • TIA (transient ischemic attack)     2008   • Urinary bladder disorder     HAD MALE BLADDER SUSPENSION PROCEDURE DONE   • Urinary incontinence          Past  Surgical History:   Procedure Laterality Date   • LUMBAR FUSION POSTERIOR  12/28/2015    Procedure: LUMBAR FUSION POSTERIOR L3-5 with peek rods;  Surgeon: Michael Sarkar M.D.;  Location: SURGERY [facility];  Service:    • LUMBAR DECOMPRESSION  12/28/2015    Procedure: LUMBAR DECOMPRESSION posterior redo decompression L3-5, with dural repair;  Surgeon: Michael Sarkar M.D.;  Location: SURGERY [facility];  Service:    • THORACOSCOPY  6/13/2015    Procedure: THORACOSCOPY with decortication VATS , side to be determined  ;  Surgeon: Elmira Holder M.D.;  Location: SURGERY [facility];  Service:    • LUMBAR LAMINECTOMY DISKECTOMY  10/8/2010    Performed by MICHAEL SARKAR at SURGERY [facility]   • CERVICAL DISK AND FUSION ANTERIOR  10/26/2009    Performed by MICHAEL SARKAR at SURGERY [facility]   • CAROTID ENDARTERECTOMY  7/10/08    Performed by LEIGH MORATAYA at SURGERY [facility]   • BLADDER SLING MALE  11/2004   • PROSTATECTOMY, RADIAL  11/2000         Current Outpatient Medications   Medication Sig Dispense Refill   • temazepam (RESTORIL) 30 MG capsule TAKE ONE CAPSULE BY MOUTH ONCE DAILY AT BEDTIME AS NEEDED FOR SLEEP 90 Cap 1   • gabapentin (NEURONTIN) 300 MG Cap TAKE 1 CAPSULE BY MOUTH THREE TIMES DAILY 270 Cap 3   • ezetimibe (ZETIA) 10 MG Tab Take 1 Tab by mouth every day. 90 Tab 0   • warfarin (COUMADIN) 4 MG Tab TAKE 1 & 1/2 TO 2 (ONE & ONE-HALF TO TWO) TABLETS BY MOUTH ONCE DAILY AS  DIRECTED  BY  [facility]  ANTICOAGULATION  SERVICES 200 Tab 1   • irbesartan (AVAPRO) 300 MG Tab Take 1 Tab by mouth every day. 30 Tab 11   • CARTIA  MG CAPSULE SR 24 HR TAKE 1 CAPSULE BY MOUTH ONCE DAILY 90 Cap 3   • atorvastatin (LIPITOR) 80 MG tablet Take 0.5 Tabs by mouth every evening. 90 Tab 3   • Multiple Vitamin (MULTI-VITAMINS) Tab Take  by mouth.     • acetaminophen (TYLENOL) 325 MG Tab Take 650 mg by mouth every four hours as needed.       No current facility-administered medications for this  visit.          Allergies   Allergen Reactions   • Cantaloupe      Pt stated cantaloupe causes his throat to constrict   • Nkda [No Known Drug Allergy]    • Other Environmental      Seasonal, cats-hayfever         Family History   Problem Relation Age of Onset   • Other Brother         Aneurysm   • Cancer Brother         Seminal vascular cancer   • Autoimmune Disease Daughter         Lupus, RA   • No Known Problems Daughter    • No Known Problems Son    • No Known Problems Son    • Heart Attack Neg Hx          Social History     Socioeconomic History   • Marital status:      Spouse name: Not on file   • Number of children: Not on file   • Years of education: Not on file   • Highest education level: Not on file   Occupational History   • Not on file   Social Needs   • Financial resource strain: Not on file   • Food insecurity:     Worry: Not on file     Inability: Not on file   • Transportation needs:     Medical: Not on file     Non-medical: Not on file   Tobacco Use   • Smoking status: Former Smoker     Packs/day: 0.50     Years: 30.00     Pack years: 15.00     Types: Cigarettes, Pipe     Last attempt to quit: 1985     Years since quittin.7   • Smokeless tobacco: Never Used   Substance and Sexual Activity   • Alcohol use: Yes     Alcohol/week: 3.0 oz     Types: 3 Cans of beer, 3 Shots of liquor per week     Comment: 1 x week   • Drug use: No   • Sexual activity: Not Currently   Lifestyle   • Physical activity:     Days per week: Not on file     Minutes per session: Not on file   • Stress: Not on file   Relationships   • Social connections:     Talks on phone: Not on file     Gets together: Not on file     Attends Oriental orthodox service: Not on file     Active member of club or organization: Not on file     Attends meetings of clubs or organizations: Not on file     Relationship status: Not on file   • Intimate partner violence:     Fear of current or ex partner: Not on file     Emotionally abused: Not on  "file     Physically abused: Not on file     Forced sexual activity: Not on file   Other Topics Concern   • Not on file   Social History Narrative   • Not on file         Physical Exam:  Ambulatory Vitals  /88 (BP Location: Left arm, Patient Position: Sitting, BP Cuff Size: Adult)   Pulse 74   Ht 1.803 m (5' 11\")   Wt 85.5 kg (188 lb 6.1 oz)   SpO2 92%    Oxygen Therapy:  Pulse Oximetry: 92 %  BP Readings from Last 4 Encounters:   10/03/19 122/88   09/27/19 126/78   09/26/19 120/77   08/27/19 101/67       Weight/BMI: Body mass index is 26.27 kg/m².  Wt Readings from Last 4 Encounters:   10/03/19 85.5 kg (188 lb 6.1 oz)   09/27/19 83 kg (183 lb)   08/27/19 86.6 kg (191 lb)   07/30/19 86.8 kg (191 lb 6.4 oz)       General: Well appearing and in no apparent distress  Head: atrumatic  Eyes: No conjunctival pallor   ENT: normal external appearance of nose and ears  Neck: JVD absent, carotid bruits absent  Lungs: respiratory sounds  normal, additional breath sounds absent  Heart: Regular rhythm,   No palpable thrills on palpation, 3 x 6 systolic murmur aortic area, no rubs,   Lower extremity edema absent.   Pedal pulses normal  Abdomen: soft, non tender, non distended.  Extremities/MSK: no clubbing, no cyanosis  Neurological: normal orientation, Gait normal   Psychiatric: Appropriate affect, intact judgement and insight  Skin: Warm extremities      Lab Data Review:  Lab Results   Component Value Date/Time    CHOLSTRLTOT 112 08/16/2019 05:34 AM    CHOLSTRLTOT 146 06/02/2008 11:00 PM    LDL 52 08/16/2019 05:34 AM    LDL 85 06/02/2008 11:00 PM    HDL 48 08/16/2019 05:34 AM    HDL 49 06/02/2008 11:00 PM    TRIGLYCERIDE 61 08/16/2019 05:34 AM    TRIGLYCERIDE 60 06/02/2008 11:00 PM       Lab Results   Component Value Date/Time    SODIUM 143 08/16/2019 05:34 AM    SODIUM 138 01/12/2016 12:20 PM    POTASSIUM 4.3 08/16/2019 05:34 AM    POTASSIUM 4.3 01/12/2016 12:20 PM    CHLORIDE 106 08/16/2019 05:34 AM    CHLORIDE 105 " 2016 12:20 PM    CO2 20 2019 05:34 AM    CO2 26 2016 12:20 PM    GLUCOSE 111 (H) 2019 05:34 AM    GLUCOSE 128 (H) 2016 12:20 PM    BUN 21 2019 05:34 AM    BUN 18 2016 12:20 PM    CREATININE 1.06 2019 05:34 AM    CREATININE 1.00 2016 12:20 PM    CREATININE 1.0 2008 03:43 PM    BUNCREATRAT 20 2019 05:34 AM     Lab Results   Component Value Date/Time    ALKPHOSPHAT 67 2019 05:34 AM    ALKPHOSPHAT 52 2016 04:25 AM    ASTSGOT 59 (H) 2019 05:34 AM    ASTSGOT 14 2016 04:25 AM    ALTSGPT 66 (H) 2019 05:34 AM    ALTSGPT 11 2016 04:25 AM    TBILIRUBIN 0.7 2019 05:34 AM    TBILIRUBIN 1.1 2016 04:25 AM      Lab Results   Component Value Date/Time    WBC 5.7 2019 05:34 AM    WBC 9.5 2016 11:20 AM       Cardiac Imaging and Procedures Review:    EKG dated 10/3/2019 : My personal interpretation is sinus rhythm, no conduction abnormality    Echo dated 2019:   Normal left ventricular size and systolic function. Moderate concentric   left ventricular hypertrophy. Grade II diastolic dysfunction. Normal   regional wall motion. Left ventricular ejection fraction is visually   estimated to be 55%.  Severely dilated left atrium.  Moderate mitral regurgitation.  Tricuspid aortic valve. Moderate aortic stenosis. Aortic valve area   calculated from the continuity equation is 1. 1 cm2 Vmax is  3.5  m/s.   Transvalvular gradients are - Peak: 50  mmHg, Mean: 33 mmHg. However   the dimensionless index is 0. 2, which would be consistent with severe   AS. No aortic insufficiency.  Ascending aorta is borderline dilated with a diameter of 3.9  cm.  Compared to the report of the study done 2018- there has been an   further increase in the AV gradient though the dimensionless index has   not changed.       Medical Decision Makin-year-old male patient with  1.  Degenerative moderate to severe aortic stenosis  2.   Paroxysmal atrial fibrillation  3.  Essential hypertension  4.  History of TIA, carotid endarterectomy  5.  Dyslipidemia  6.  Prior back surgeries    -Patient is minimally symptomatic.  His aortic stenosis is moderate to severe, reviewed his images personally.  I will obtain another echocardiogram next month that would be 6 months from last echocardiogram.  If it is severe we will pursue transcatheter aortic valve replacement in the next few months.  If stable continue to monitor every 6 months.  Explained in detail regarding pathophysiology of aortic stenosis, TAVR procedure.    Return in about 4 months (around 2/3/2020).    This note was dictated using Dragon speech recognition software.    Sony RAMIRES  Interventional cardiologist  [facility], [facility]  [address] [address]  Phone: [phone]  Fax: [phone]

## 2019-10-18 NOTE — ASSESSMENT & PLAN NOTE
Mr. Hester is an 82 yo M with pmhx of DVT and atrial fibrillation on coumadin, prostate cancer s/p prostatectomy, htn, hld, TIA's, with 4 years of balance issues first noted after prolonged hospitalization for pneumonia and empyema. He has had a negative ENT workup and brain MRI does not offer any structural explanation. There is no cerebellar atrophy and the ventricles are of a normal size. On his physical exam, there is direction changing nystagmus, and a slightly broad based gait. However, there is no appendicular dystaxia. Some reduction in arm swing. I discussed with him the possibility of Parkinsonism, however he does not exhibit all the features of Parkinson's disease. I recommended he follow up with Dr. Cook in our practice who can monitor him over time.     Plan:  1. Return to clinic in 1 year, sooner if any changes are noted.

## 2019-10-31 ENCOUNTER — ANTICOAGULATION VISIT (OUTPATIENT)
Dept: VASCULAR LAB | Facility: MEDICAL CENTER | Age: 84
End: 2019-10-31
Attending: INTERNAL MEDICINE
Payer: MEDICARE

## 2019-10-31 VITALS — DIASTOLIC BLOOD PRESSURE: 75 MMHG | SYSTOLIC BLOOD PRESSURE: 112 MMHG | HEART RATE: 76 BPM

## 2019-10-31 DIAGNOSIS — I82.90 DEEP VEIN THROMBOSIS (DVT) OF NON-EXTREMITY VEIN, UNSPECIFIED CHRONICITY: ICD-10-CM

## 2019-10-31 DIAGNOSIS — G45.9 TIA (TRANSIENT ISCHEMIC ATTACK): ICD-10-CM

## 2019-10-31 DIAGNOSIS — I48.0 PAROXYSMAL ATRIAL FIBRILLATION (HCC): ICD-10-CM

## 2019-10-31 LAB
INR BLD: 2.7 (ref 0.9–1.2)
INR PPP: 2.7 (ref 2–3.5)

## 2019-10-31 PROCEDURE — 99211 OFF/OP EST MAY X REQ PHY/QHP: CPT | Performed by: NURSE PRACTITIONER

## 2019-10-31 PROCEDURE — 85610 PROTHROMBIN TIME: CPT

## 2019-10-31 NOTE — PROGRESS NOTES
Anticoagulation Summary  As of 10/31/2019    INR goal:   2.0-3.0   TTR:   72.2 % (4.2 y)   INR used for dosin.70 (10/31/2019)   Warfarin maintenance plan:   8 mg (4 mg x 2) every Sun, Tue, Thu; 6 mg (4 mg x 1.5) all other days   Weekly warfarin total:   48 mg   Plan last modified:   Conor Locke, PharmD (2019)   Next INR check:   2019   Priority:   Maintenance   Target end date:   Indefinite    Indications    DVT (deep venous thrombosis) (HCC) [I82.409]  Paroxysmal atrial fibrillation (HCC) [I48.0]  Hx of Stroke (Resolved) [I63.9]  Hx of TIA (transient ischemic attack) [G45.9]  Deep vein thrombosis (DVT) (HCC) (Resolved) [I82.409]  Atrial fibrillation (HCC) (Resolved) [I48.91]             Anticoagulation Episode Summary     INR check location:   Anticoagulation Clinic    Preferred lab:       Send INR reminders to:       Comments:         Anticoagulation Care Providers     Provider Role Specialty Phone number    Michael J Bloch, M.D. Referring Internal Medicine [phone]    [facility]   [phone]        Anticoagulation Patient Findings      HPI:  Tristan Hester seen in clinic today for follow up on anticoagulation therapy in the presence of DVT, AF, TIA/CVA hx.   Denies any changes to current medical/health status since last appointment.   Denies any medication or diet changes.   No current symptoms of bleeding or thrombosis reported.    A/P:   INR therapeutic.   Continue current regimen.   BP recorded in vitals.    Follow up appointment in 6 week(s).    Next Appointment: Thursday, Dec 12, 2019 at 8:15 am.    Thi WILLIAM

## 2019-11-12 DIAGNOSIS — E78.5 DYSLIPIDEMIA: ICD-10-CM

## 2019-11-12 RX ORDER — EZETIMIBE 10 MG/1
TABLET ORAL
Qty: 90 TAB | Refills: 0 | Status: ON HOLD | OUTPATIENT
Start: 2019-11-12 | End: 2020-02-18

## 2019-11-12 NOTE — TELEPHONE ENCOUNTER
Chart review shows that Dr. Escoto wants to monitor lipids every 6 months.  No results found.  Spoke to patient who requested lab order be mailed.  Patient verbalized understanding that he needs to have labs done in order for refills to be approved.  Order entered; lab slip mailed to address on chart.

## 2019-11-14 ENCOUNTER — OFFICE VISIT (OUTPATIENT)
Dept: VASCULAR LAB | Facility: MEDICAL CENTER | Age: 84
End: 2019-11-14
Attending: INTERNAL MEDICINE
Payer: MEDICARE

## 2019-11-14 VITALS
BODY MASS INDEX: 25.76 KG/M2 | WEIGHT: 184 LBS | HEIGHT: 71 IN | SYSTOLIC BLOOD PRESSURE: 121 MMHG | HEART RATE: 72 BPM | DIASTOLIC BLOOD PRESSURE: 75 MMHG

## 2019-11-14 DIAGNOSIS — I10 ESSENTIAL HYPERTENSION: ICD-10-CM

## 2019-11-14 DIAGNOSIS — E78.5 DYSLIPIDEMIA: ICD-10-CM

## 2019-11-14 DIAGNOSIS — G47.9 SLEEP DISTURBANCE: ICD-10-CM

## 2019-11-14 DIAGNOSIS — I48.0 PAROXYSMAL ATRIAL FIBRILLATION (HCC): ICD-10-CM

## 2019-11-14 PROCEDURE — 99212 OFFICE O/P EST SF 10 MIN: CPT

## 2019-11-14 PROCEDURE — 99214 OFFICE O/P EST MOD 30 MIN: CPT | Performed by: INTERNAL MEDICINE

## 2019-11-14 RX ORDER — TEMAZEPAM 30 MG/1
CAPSULE ORAL
Qty: 90 CAP | Refills: 1 | Status: ON HOLD
Start: 2019-11-14 | End: 2020-03-02

## 2019-11-14 ASSESSMENT — ENCOUNTER SYMPTOMS
BLOOD IN STOOL: 0
SHORTNESS OF BREATH: 0
PALPITATIONS: 0
HEADACHES: 0
BACK PAIN: 1
MYALGIAS: 0
DIZZINESS: 0
NERVOUS/ANXIOUS: 0
FOCAL WEAKNESS: 0
COUGH: 0
LOSS OF CONSCIOUSNESS: 0
WEAKNESS: 0
DEPRESSION: 0
SENSORY CHANGE: 0
SPEECH CHANGE: 0

## 2019-11-14 NOTE — PROGRESS NOTES
VASCULAR FOLLOW UP VISIT  Subjective:   Tristan Hester is a 83 y.o. male who presents today 20 for vascular f/u  Chief Complaint   Patient presents with   • Follow-Up     HPI:   Patient here for f/u of HTN, dyslipidemia, carotid disease and valvular heart disease, and atrial fibrillation.  Minimal TELLEZ, no edema, no chest pain.   No lightheadedness  Still with balance issues - no change  Saw neurology and ENT - no specific diagnosis  No TIA or stroke symptoms.  No bleeding or bruising on warfarin  Continues to f/u in anticoagulation clinic  Not checking bp at home  No myaglias on statin and ezemibe  continues to follow up with urology    Social History     Tobacco Use   • Smoking status: Former Smoker     Packs/day: 0.50     Years: 30.00     Pack years: 15.00     Types: Cigarettes, Pipe     Last attempt to quit: 1985     Years since quittin.8   • Smokeless tobacco: Never Used   Substance Use Topics   • Alcohol use: Yes     Alcohol/week: 3.0 oz     Types: 3 Cans of beer, 3 Shots of liquor per week     Comment: 1 x week   • Drug use: No     DIET AND EXERCISE:  Weight Change: stable  Diet: common adult  Exercise: moderate regular exercise     Review of Systems   Constitutional: Negative for malaise/fatigue.   Respiratory: Negative for cough and shortness of breath.    Cardiovascular: Negative for chest pain, palpitations and leg swelling.   Gastrointestinal: Negative for blood in stool and melena.   Genitourinary: Negative for dysuria and hematuria.   Musculoskeletal: Positive for back pain. Negative for myalgias.   Neurological: Negative for dizziness, sensory change, speech change, focal weakness, loss of consciousness, weakness and headaches.   Psychiatric/Behavioral: Negative for depression. The patient is not nervous/anxious.       Objective:     Vitals:    19 1121   BP: 121/75   BP Location: Left arm   Patient Position: Sitting   BP Cuff Size: Adult   Pulse: 72   Weight: 83.5 kg (184 lb)  "  Height: 1.803 m (5' 11\")      Body mass index is 25.66 kg/m².  Physical Exam   Constitutional: He is oriented to person, place, and time. He appears well-developed and well-nourished.   Cardiovascular: Normal rate and intact distal pulses.   Murmur heard.  Pulmonary/Chest: Effort normal. No respiratory distress. He has no wheezes. He has no rales.   Musculoskeletal: Normal range of motion.         General: No edema.   Neurological: He is alert and oriented to person, place, and time. No cranial nerve deficit.   Psychiatric: He has a normal mood and affect. His behavior is normal.     Lab Results   Component Value Date    CHOLSTRLTOT 112 08/16/2019    LDL 52 08/16/2019    HDL 48 08/16/2019    TRIGLYCERIDE 61 08/16/2019      Lab Results   Component Value Date    PROTHROMBTM 25.6 (H) 12/30/2017    INR 2.70 10/31/2019         Lab Results   Component Value Date    SODIUM 143 08/16/2019    POTASSIUM 4.3 08/16/2019    CHLORIDE 106 08/16/2019    CO2 20 08/16/2019    GLUCOSE 111 (H) 08/16/2019    BUN 21 08/16/2019    CREATININE 1.06 08/16/2019    BUNCREATRAT 20 08/16/2019    IFAFRICA 75 08/16/2019    IFNOTAFR 65 08/16/2019      Lab Results   Component Value Date    TSH 1.400 01/25/2018      Lab Results   Component Value Date    WBC 5.7 08/16/2019    RBC 4.75 08/16/2019    HEMOGLOBIN 14.1 08/16/2019    HEMATOCRIT 42.7 08/16/2019    MCV 90 08/16/2019    MCH 29.7 08/16/2019    MCHC 33.0 08/16/2019      Blood work from 10-9-14: Glucose 102, GFR 68, cholesterol 127, trig 35, HDL 57, LDL-C 63, urine for MA/cr 3.4    Blood work from May 12, 2015, glucose 37, GFR 72, sodium 146, AST 19, ALT 12, hemoglobin 13.8, total cholesterol 125, temperature 66, initial 48, LDL 64, albumin creatinine ratio in urine 2.5    CXR 8/20/15: (comparison to 7/9/2015) :Bibasilar opacities, right greater than left, consistent with a combination of airspace disease and atelectasis. Minimal left basilar airspace opacities are new. A small right pleural " effusion is suspected, unchanged.    Blood work from February 2016-glucose 107, GFR 82, hemoglobin 14.0, total cholesterol 130, HL 51, LDL 62    Echocardiogram February 2016  Posterior mitral leaflet prolapse with eccentric moderate mitral regurgitation  Severely dilated left atrium  Mild concentric LVH  EF 60%  Mild aortic stenosis   right ventricular pressure estimated 37 mm    Carotid duplex May 2016:   1.  No evidence of re-stenosis in the right carotid bulb and internal    carotid artery after endarterectomy.    2.  Less than 50% left carotid bulb and internal carotid arterial stenosis.    3.  Normal bilateral subclavian and vertebral arterial exam.    4.  Compared to the prior study of 2/29/2016,  there has been no    significant change.     Blood work Oct 2016:  Glucose 106, GFR 77, LDL 68, nonHDL 86    Echo feb 2017:  Left ventricular ejection fraction is visually estimated to be 60%.   Grade I diastolic dysfunction.  Possibly severe mitral regurgitation.   Mild aortic stenosis.  Estimated right ventricular systolic pressure  is 25 mmHg.  Aortic root is 4 cm.   Compared to the images of the prior study done 2/29/16 there has been   no significant change.   Consider YOANDY for better evaluation of MR severity.    Blood work march 2017:  Glucose 94, GFR 59, Ldl-c 70, nonHDL 85    Carotid August 2017  Mild plaque of the right internal carotid artery with no significant    stenosis (<50%)   Mild plaque of the left internal carotid artery with no significant    stenosis (<50%).   No significant stenosis of the right or left subclavian artery.   No significant stenosis of the right or left vertebral artery.    MRI Brain Dec 2017:  MRI of the brain without contrast within normal limits for age with moderate white matter changes without demonstrable interval progression since the prior study.    Echocardiogram feb 2018:  Normal left ventricular size, wall thickness, and systolic function.   Left ventricular ejection  fraction is visually estimated to be 60%.   Normal regional wall motion. Diastolic function is difficult to assess   with valvular disease.  Mitral annular calcification. No mitral stenosis. Moderate mitral   regurgitation. ERO by PISA method is 0.2 sq cm. Both leaflet appear to   attach to the anterior lateral wall. Can not rule out parachute mitral   valve.  Calcified aortic valve leaflets. Moderate aortic stenosis.   Compared to the report of the study done 2/2017- the MR appears to be   less severe, but, there has been some progression of the AS.     Echocardiogram May 2019  Normal left ventricular size and systolic function. Moderate concentric   left ventricular hypertrophy. Grade II diastolic dysfunction. Normal   regional wall motion. Left ventricular ejection fraction is visually   estimated to be 55%.  Severely dilated left atrium.  Moderate mitral regurgitation.  Tricuspid aortic valve. Moderate aortic stenosis. Aortic valve area   calculated from the continuity equation is 1. 1 cm2 Vmax is  3.5  m/s.   Transvalvular gradients are - Peak: 50  mmHg, Mean: 33 mmHg. However   the dimensionless index is 0. 2, which would be consistent with severe   AS. No aortic insufficiency.  Ascending aorta is borderline dilated with a diameter of 3.9  cm.  Compared to the report of the study done 2/2018- there has been an   further increase in the AV gradient though the dimensionless index has   not changed.    Blood work from [facility] [place] April 2019  Hemoglobin 15.0, glucose 116, GFR 72, total cholesterol 120, triglycerides 55, HDL 52, LDL 57, albumin creatinine ratio 9.3, TSH 1.4, BNP 2167    Carotid duplex aug 2019:   Mild to moderate plaque within bilateral internal carotid arteries with    less that 50% stenosis.   Prominent heterogeneous plaque proximal right external carotid artery.     Medical Decision Making:  Today's Assessment / Status / Plan:     1. Sleep disturbance  temazepam (RESTORIL) 30 MG capsule   2.  Paroxysmal atrial fibrillation (HCC)  CBC WITHOUT DIFFERENTIAL    TSH   3. Dyslipidemia  Comp Metabolic Panel    Lipid Profile   4. Essential hypertension  Comp Metabolic Panel     Patient Type: Secondary Prevention    Etiology of Established CVD if Present:   1. TIA - likely cardioembolic from valve disease  2. Carotid atherosclerosis - stable on surveillance imaging  3. Valvular Heart Disease - currently asymptomatic  4. Atrial fibrillation with onset during acute illness, now chronic  5.  DVT after back surgery jan 2016    Lipid Management: Qualifies for Statin Therapy Based on 2013 ACC/AHA Guidelines: yes  Calculated 10-Year Risk of ASCVD: N/A  Currently on Statin: Yes  ACC/aha criteria for at least mod intensity statin  Per nla goal ldl <70 and nonHDL <100 - below threshold  Plan:  - Continue atorvastatin at current dose    - continue zetia  - Continue TLC  - Recheck fasting lipids in six months    Blood Pressure Management:  Acc/aha bp goal <130/80  BP under reasonable control at least in office on multiple visits  No significant albuminuria  Plan:  - Continue current BP meds  - Encouraged home BP monitoring    Glycemic Status: Prediabetic  IFG, mild and stable  - Continue lifestyle mod  - Follow fasting glucose    Anti-Platelet/Anti-Coagulant Tx: yes  - Continue indefinite anticoagulation with warfarin   - F/U with anticoagulation clinic   - limit nsaids    Smoking: continue complete avoidance    Physical Activity:  Encouraged more walking    Weight Management and Nutrition: Dietary plan was discussed with patient at this visit including c/w Med diet    Other:     1. TIA - likely cardioembolic from valve disease - continue BP control and anticoagulation  2. Carotid atherosclerosis - stable on surveillance imaging - continue surveillance imaging - every 2-3 years  3. Valvular Heart Disease - unclear if becoming symptomatic. BNP elevated. Await scheduled echo. Defer further w/u and management to cards  4.  Colonic Polyp on recent colonoscopy - repeat colonoscopy in 2019 negative per patient.   No further f/u needed  5. Insomnia - trialed zolpidem w/o success.   Continue temazepam. Patient understands addictive potential  6.  Atrial fibrillation - defer management of rhythm and rate to cards.  Continue anticoag as above  7.  DVT - postoperative.  No recurrence.  Continue indefinite anticoagulation given concomitant afib  8.  H/o prostate ca and multiple urological issues - defer f/u to urology  9.  Balance issues and neuropathy - defer f/u to ENT and neurology  10.  Continuing medical health - cancer screening as above. Has had shingles vac, continue yearly flu shot    We will partner with other providers in the management of established vascular disease and cardiometabolic risk factors.    Studies to Be Obtained:   Echo later this month as ordered by [facility]    Labs to Be Obtained: as above prior to next visit    Follow up in: 6 months    Michael J Bloch, M.D.    Cc:   MD CORA Jones MD

## 2019-11-15 LAB
CHOLEST SERPL-MCNC: 121 MG/DL (ref 100–199)
HDLC SERPL-MCNC: 54 MG/DL
LABORATORY COMMENT REPORT: NORMAL
LDLC SERPL CALC-MCNC: 56 MG/DL (ref 0–99)
TRIGL SERPL-MCNC: 53 MG/DL (ref 0–149)
VLDLC SERPL CALC-MCNC: 11 MG/DL (ref 5–40)

## 2019-11-16 LAB
ALBUMIN SERPL-MCNC: 4 G/DL (ref 3.5–4.7)
ALBUMIN/GLOB SERPL: 1.5 {RATIO} (ref 1.2–2.2)
ALP SERPL-CCNC: 68 IU/L (ref 39–117)
ALT SERPL-CCNC: 23 IU/L (ref 0–44)
AST SERPL-CCNC: 28 IU/L (ref 0–40)
BILIRUB SERPL-MCNC: 0.8 MG/DL (ref 0–1.2)
BUN SERPL-MCNC: 19 MG/DL (ref 8–27)
BUN/CREAT SERPL: 16 (ref 10–24)
CALCIUM SERPL-MCNC: 9.5 MG/DL (ref 8.6–10.2)
CHLORIDE SERPL-SCNC: 107 MMOL/L (ref 96–106)
CHOLEST SERPL-MCNC: 116 MG/DL (ref 100–199)
CO2 SERPL-SCNC: 21 MMOL/L (ref 20–29)
CREAT SERPL-MCNC: 1.19 MG/DL (ref 0.76–1.27)
ERYTHROCYTE [DISTWIDTH] IN BLOOD BY AUTOMATED COUNT: 16.1 % (ref 12.3–15.4)
GLOBULIN SER CALC-MCNC: 2.6 G/DL (ref 1.5–4.5)
GLUCOSE SERPL-MCNC: 102 MG/DL (ref 65–99)
HCT VFR BLD AUTO: 41.3 % (ref 37.5–51)
HDLC SERPL-MCNC: 57 MG/DL
HGB BLD-MCNC: 14.1 G/DL (ref 13–17.7)
LABORATORY COMMENT REPORT: NORMAL
LDLC SERPL CALC-MCNC: 49 MG/DL (ref 0–99)
MCH RBC QN AUTO: 30.1 PG (ref 26.6–33)
MCHC RBC AUTO-ENTMCNC: 34.1 G/DL (ref 31.5–35.7)
MCV RBC AUTO: 88 FL (ref 79–97)
NRBC BLD AUTO-RTO: ABNORMAL %
PLATELET # BLD AUTO: 181 X10E3/UL (ref 150–450)
POTASSIUM SERPL-SCNC: 4.4 MMOL/L (ref 3.5–5.2)
PROT SERPL-MCNC: 6.6 G/DL (ref 6–8.5)
RBC # BLD AUTO: 4.68 X10E6/UL (ref 4.14–5.8)
SODIUM SERPL-SCNC: 144 MMOL/L (ref 134–144)
TRIGL SERPL-MCNC: 49 MG/DL (ref 0–149)
TSH SERPL DL<=0.005 MIU/L-ACNC: 2.1 UIU/ML (ref 0.45–4.5)
VLDLC SERPL CALC-MCNC: 10 MG/DL (ref 5–40)
WBC # BLD AUTO: 5.5 X10E3/UL (ref 3.4–10.8)

## 2019-11-25 ENCOUNTER — HOSPITAL ENCOUNTER (OUTPATIENT)
Dept: CARDIOLOGY | Facility: MEDICAL CENTER | Age: 84
End: 2019-11-25
Attending: INTERNAL MEDICINE
Payer: MEDICARE

## 2019-11-25 DIAGNOSIS — I35.0 NONRHEUMATIC AORTIC VALVE STENOSIS: ICD-10-CM

## 2019-11-25 PROCEDURE — 93306 TTE W/DOPPLER COMPLETE: CPT

## 2019-11-26 LAB
LV EJECT FRACT  99904: 60
LV EJECT FRACT MOD 2C 99903: 50.15
LV EJECT FRACT MOD 4C 99902: 70.16
LV EJECT FRACT MOD BP 99901: 61.79

## 2019-11-26 PROCEDURE — 93306 TTE W/DOPPLER COMPLETE: CPT | Mod: 26 | Performed by: INTERNAL MEDICINE

## 2019-11-27 ENCOUNTER — TELEPHONE (OUTPATIENT)
Dept: CARDIOLOGY | Facility: MEDICAL CENTER | Age: 84
End: 2019-11-27

## 2019-11-27 NOTE — TELEPHONE ENCOUNTER
Result Notes for EC-ECHOCARDIOGRAM COMPLETE W/O CONT     Notes recorded by Sony Barrientos M.D. on 11/26/2019 at 2:31 PM PST  Please call patient and notify stable findings.     Spoke to patient and discussed results.  Patient verbalized understanding and was appreciative for the phone call.

## 2019-12-12 ENCOUNTER — ANTICOAGULATION VISIT (OUTPATIENT)
Dept: VASCULAR LAB | Facility: MEDICAL CENTER | Age: 84
End: 2019-12-12
Attending: INTERNAL MEDICINE
Payer: MEDICARE

## 2019-12-12 VITALS — DIASTOLIC BLOOD PRESSURE: 69 MMHG | HEART RATE: 77 BPM | SYSTOLIC BLOOD PRESSURE: 116 MMHG

## 2019-12-12 DIAGNOSIS — G45.9 TIA (TRANSIENT ISCHEMIC ATTACK): ICD-10-CM

## 2019-12-12 DIAGNOSIS — I48.0 PAROXYSMAL ATRIAL FIBRILLATION (HCC): ICD-10-CM

## 2019-12-12 LAB
INR BLD: 2.6 (ref 0.9–1.2)
INR PPP: 2.6 (ref 2–3.5)

## 2019-12-12 PROCEDURE — 99211 OFF/OP EST MAY X REQ PHY/QHP: CPT

## 2019-12-12 PROCEDURE — 85610 PROTHROMBIN TIME: CPT

## 2019-12-12 NOTE — PROGRESS NOTES
Anticoagulation Summary  As of 2019    INR goal:   2.0-3.0   TTR:   72.9 % (4.4 y)   INR used for dosin.60 (2019)   Warfarin maintenance plan:   8 mg (4 mg x 2) every Sun, Tue, u; 6 mg (4 mg x 1.5) all other days   Weekly warfarin total:   48 mg   Plan last modified:   Conor Locke, PharmD (2019)   Next INR check:   2020   Priority:   Maintenance   Target end date:   Indefinite    Indications    DVT (deep venous thrombosis) (HCC) [I82.409]  Paroxysmal atrial fibrillation (HCC) [I48.0]  Hx of Stroke (Resolved) [I63.9]  Hx of TIA (transient ischemic attack) [G45.9]  Deep vein thrombosis (DVT) (HCC) (Resolved) [I82.409]  Atrial fibrillation (HCC) (Resolved) [I48.91]             Anticoagulation Episode Summary     INR check location:   Anticoagulation Clinic    Preferred lab:       Send INR reminders to:       Comments:         Anticoagulation Care Providers     Provider Role Specialty Phone number    Michael J Bloch, M.D. Referring Internal Medicine [phone]    [facility]   [phone]        Anticoagulation Patient Findings  Patient Findings     Negatives:   Signs/symptoms of thrombosis, Signs/symptoms of bleeding, Laboratory test error suspected, Change in health, Change in alcohol use, Change in activity, Upcoming invasive procedure, Emergency department visit, Upcoming dental procedure, Missed doses, Extra doses, Change in medications, Change in diet/appetite, Hospital admission, Bruising, Other complaints              History of Present Illness: follow up appointment for chronic anticoagulation with the high risk medication, warfarin for DVT    Pt remains therapeutic today. Continue current dosing regimen.  Follow up in 8 weeks, to reduce the risk of adverse events related to this high risk medication, warfarin.    Yashira Boggs, Clinical Pharmacist

## 2019-12-27 ENCOUNTER — TELEPHONE (OUTPATIENT)
Dept: CARDIOLOGY | Facility: MEDICAL CENTER | Age: 84
End: 2019-12-27

## 2019-12-27 DIAGNOSIS — I35.0 NONRHEUMATIC AORTIC VALVE STENOSIS: ICD-10-CM

## 2019-12-27 DIAGNOSIS — I34.0 NON-RHEUMATIC MITRAL REGURGITATION: ICD-10-CM

## 2019-12-27 DIAGNOSIS — I34.0 MITRAL VALVE INSUFFICIENCY, UNSPECIFIED ETIOLOGY: ICD-10-CM

## 2019-12-27 NOTE — TELEPHONE ENCOUNTER
AK/lolita    Pt calling to discuss his candidacy for valve surgery and if he should be seeing AK again in the near future. Please call Tristan .

## 2019-12-28 NOTE — TELEPHONE ENCOUNTER
Per progress note from Dr. Barrientos 10/03 and echo results pt needs repeat echo and follow up in 6 months from last echo.     Discussed the above with pt and this was his understanding as well but realized he had not been scheduled. Echo ordered and pt provided with [facility] scheduled contact number to call to arrange. Follow up with Dr. Barrientos was arranged for 4/01. Pt confirmed

## 2020-01-03 ENCOUNTER — TELEPHONE (OUTPATIENT)
Dept: CARDIOLOGY | Facility: MEDICAL CENTER | Age: 85
End: 2020-01-03

## 2020-01-03 NOTE — TELEPHONE ENCOUNTER
Clearance letter composed and will be faxed back to [facility] at [phone] once hand signed with Dr. Barrientos.     Private car

## 2020-01-03 NOTE — TELEPHONE ENCOUNTER
Received clearance request from [facility] for pt to hold warfarin prior to planned procedure with Dr. Acharya.     Hx: AS, PAF on chronic oac, 2 TIAs, carotid endarterectomy, HTN    To AK: can patient hold warfarin 5 days prior to procedure? With out without bridging?

## 2020-01-10 DIAGNOSIS — Z79.01 CHRONIC ANTICOAGULATION: ICD-10-CM

## 2020-01-20 ENCOUNTER — OFFICE VISIT (OUTPATIENT)
Dept: CARDIOLOGY | Facility: MEDICAL CENTER | Age: 85
End: 2020-01-20
Payer: MEDICARE

## 2020-01-20 VITALS
OXYGEN SATURATION: 93 % | BODY MASS INDEX: 26.03 KG/M2 | SYSTOLIC BLOOD PRESSURE: 120 MMHG | HEART RATE: 76 BPM | DIASTOLIC BLOOD PRESSURE: 76 MMHG | HEIGHT: 71 IN | WEIGHT: 185.96 LBS

## 2020-01-20 DIAGNOSIS — I77.9 BILATERAL CAROTID ARTERY DISEASE, UNSPECIFIED TYPE (HCC): ICD-10-CM

## 2020-01-20 DIAGNOSIS — G45.9 TIA (TRANSIENT ISCHEMIC ATTACK): ICD-10-CM

## 2020-01-20 DIAGNOSIS — I48.0 PAROXYSMAL ATRIAL FIBRILLATION (HCC): ICD-10-CM

## 2020-01-20 DIAGNOSIS — R06.09 DOE (DYSPNEA ON EXERTION): ICD-10-CM

## 2020-01-20 DIAGNOSIS — E78.5 DYSLIPIDEMIA: ICD-10-CM

## 2020-01-20 DIAGNOSIS — Z48.812 POSTOP CAROTID ENDARTERECTOMY SURVEILLANCE, ENCOUNTER FOR: ICD-10-CM

## 2020-01-20 DIAGNOSIS — I10 ESSENTIAL HYPERTENSION, BENIGN: ICD-10-CM

## 2020-01-20 DIAGNOSIS — I35.0 NONRHEUMATIC AORTIC VALVE STENOSIS: ICD-10-CM

## 2020-01-20 DIAGNOSIS — I34.0 NON-RHEUMATIC MITRAL REGURGITATION: ICD-10-CM

## 2020-01-20 DIAGNOSIS — R53.83 OTHER FATIGUE: ICD-10-CM

## 2020-01-20 PROCEDURE — 99214 OFFICE O/P EST MOD 30 MIN: CPT | Performed by: INTERNAL MEDICINE

## 2020-01-20 NOTE — LETTER
[facility]   [address], [address]  [place] [address]  Phone: [phone]  Fax: [phone]              Tristan Hester  1935    Encounter Date: 1/20/2020    Maryana Anderson M.D.          PROGRESS NOTE:  Subjective:   Chief Complaint:   Chief Complaint   Patient presents with   • Shortness of Breath   • HTN (Controlled)   • Atrial Fibrillation       Tristan Hester is a 84 y.o. male who returns today for paroxysmal atrial fibrillation, AS,  hypertension, hyperlipidemia, aortic stenosis and mitral regurgitation.    Sees Dr. Bloch who referred to Dr. Saeed for MV regurgitation.  Then echo showed AV stenosis, being followed in TAVR clinic with AK.    Has mild chronic cough, prior lung surgery, PNA, then empyema.   This was the only time for afib, was placed on warfarin.  Prior TIAx2 in 2000, vision loss, then recurred in 2008.  Prior carotid endart with Dr. Calero on the right.  [id number] 5 with prior TIA but TIA was years before afib, not clear if related, could have been carotid disease.    Has carotid disease, mild, 2017.  Has HTN for a few decades, BP controlled on 2 meds.  Has HLP, on lipitor 80 mg and zetia, ldl 49.  Chronic kidney disease early stage III.    Was able to walk 1 mile with a cane, limited by some balance problems.  Now walking less than half that and a lot more TELLEZ.  Now missing work outs which is very unusual.    He is not limited by chest pain, pressure or tightness with activity.   No significant dyspnea on exertion,     No orthopnea or lower extremity swelling.   No significant palpitations (rarely).  Feels a frequent state of intermittent lightheadedness, associates it to balance problem.  No presyncope/syncope.   Has had vertigo.  No symptoms of leg claudication.   No new stroke/TIA like symptoms.    No family history of premature coronary artery disease.  Father had CHF at 91.  Former tobacco, quit 1985.  No history of  diabetes, occasional IFG.  No history of autoimmune disease such as lupus or rheumatoid arthritis.    Previously saw Dr. Saeed.    DATA REVIEWED by me:  ECG 10/3/2019  Sinus, rate 64    11/25/2019 Echo  Prior echo done 05/02/19.  No significant change.  Left ventricular ejection fraction is visually estimated to be 60%.  Moderate aortic stenosis.Vmax is 3.6 m/s.  Transvalvular gradients are - Peak: 53 mmHg, Mean: 38 mmHg.   Dimensionless index is 0.27.  Mild to moderate eccentric mitral regurgitation.  RVSP 35 mmHg.    Carotid US 8-5-19   Mild to moderate plaque within bilateral internal carotid arteries with    less that 50% stenosis.      Prominent heterogeneous plaque proximal right external carotid artery.    MRI 12-13-17  MRI of the brain without contrast within normal limits for age with moderate white matter changes without demonstrable interval progression since the prior study.    Most recent labs:     11/15/2019 hemoglobin 14.1, platelets 181, sodium 144, potassium 4.4, creatinine 1.19, GFR 56, LFTs normal, LDL 49, HDL 59, triglycerides 49, total cholesterol 116    Past Medical History:   Diagnosis Date   • Arrhythmia     Afib   • Arthritis     osteo, hips and knees   • Blood clotting disorder ([facility])     , left arm   • Bronchitis        • Cancer ([facility])     Prostate CA--2000   • Carotid arterial disease ([facility]) 1/6/2014   • Cataract     removed bilat   • DVT (deep venous thrombosis) ([facility]) 2016   • Heart valve disease     MVR   • HLD (hyperlipidemia) 1/6/2014   • Hypertension    • Mitral regurgitation 1/6/2014   • Pain 12-14-15    low back, hips, 2-3/10   • Pneumonia        • Stroke ([facility]) 2008    TIA, no residual   • TIA (transient ischemic attack)     2008   • Urinary bladder disorder     HAD MALE BLADDER SUSPENSION PROCEDURE DONE   • Urinary incontinence      Past Surgical History:   Procedure Laterality Date   • LUMBAR FUSION POSTERIOR  12/28/2015    Procedure: LUMBAR FUSION POSTERIOR  L3-5 with peek rods;  Surgeon: Michael Sarkar M.D.;  Location: SURGERY [facility];  Service:    • LUMBAR DECOMPRESSION  2015    Procedure: LUMBAR DECOMPRESSION posterior redo decompression L3-5, with dural repair;  Surgeon: Michael Sarkar M.D.;  Location: SURGERY [facility];  Service:    • THORACOSCOPY  2015    Procedure: THORACOSCOPY with decortication VATS , side to be determined  ;  Surgeon: Elmira Holder M.D.;  Location: SURGERY [facility];  Service:    • LUMBAR LAMINECTOMY DISKECTOMY  10/8/2010    Performed by MICHAEL SARKAR at [facility]   • CERVICAL DISK AND FUSION ANTERIOR  10/26/2009    Performed by MICHAEL SARKAR at [facility]   • CAROTID ENDARTERECTOMY  7/10/08    Performed by LEIGH MORATAYA at [facility]   • BLADDER SLING MALE  2004   • PROSTATECTOMY, RADIAL  2000     Family History   Problem Relation Age of Onset   • Heart Disease Father         CHF at 91.   • Other Brother         Aneurysm   • Cancer Brother         Seminal vascular cancer   • Autoimmune Disease Daughter         Lupus, RA   • No Known Problems Daughter    • No Known Problems Son    • No Known Problems Son    • Heart Attack Neg Hx      Social History     Socioeconomic History   • Marital status:      Spouse name: Not on file   • Number of children: Not on file   • Years of education: Not on file   • Highest education level: Not on file   Occupational History   • Not on file   Social Needs   • Financial resource strain: Not on file   • Food insecurity:     Worry: Not on file     Inability: Not on file   • Transportation needs:     Medical: Not on file     Non-medical: Not on file   Tobacco Use   • Smoking status: Former Smoker     Packs/day: 0.50     Years: 30.00     Pack years: 15.00     Types: Cigarettes, Pipe     Last attempt to quit: 1985     Years since quittin.0   • Smokeless tobacco: Never Used   Substance and Sexual Activity   • Alcohol use: Yes       Alcohol/week: 3.0 oz     Types: 3 Cans of beer, 3 Shots of liquor per week     Comment: 1 x week   • Drug use: No   • Sexual activity: Not Currently   Lifestyle   • Physical activity:     Days per week: Not on file     Minutes per session: Not on file   • Stress: Not on file   Relationships   • Social connections:     Talks on phone: Not on file     Gets together: Not on file     Attends [employer] service: Not on file     Active member of club or organization: Not on file     Attends meetings of clubs or organizations: Not on file     Relationship status: Not on file   • Intimate partner violence:     Fear of current or ex partner: Not on file     Emotionally abused: Not on file     Physically abused: Not on file     Forced sexual activity: Not on file   Other Topics Concern   • Not on file   Social History Narrative   • Not on file     Allergies   Allergen Reactions   • Nkda [No Known Drug Allergy]    • Other Environmental      Seasonal, cats-hayfever       Current Outpatient Medications   Medication Sig Dispense Refill   • atorvastatin (LIPITOR) 80 MG tablet TAKE 1/2 (ONE-HALF) TABLET BY MOUTH ONCE DAILY IN THE EVENING 45 Tab 1   • temazepam (RESTORIL) 30 MG capsule TAKE ONE CAPSULE BY MOUTH ONCE DAILY AT BEDTIME AS NEEDED FOR SLEEP 90 Cap 1   • ezetimibe (ZETIA) 10 MG Tab TAKE 1 TABLET BY MOUTH ONCE DAILY 90 Tab 0   • gabapentin (NEURONTIN) 300 MG Cap TAKE 1 CAPSULE BY MOUTH THREE TIMES DAILY 270 Cap 3   • warfarin (COUMADIN) 4 MG Tab TAKE 1 & 1/2 TO 2 (ONE & ONE-HALF TO TWO) TABLETS BY MOUTH ONCE DAILY AS  DIRECTED  BY  [facility] 200 Tab 1   • irbesartan (AVAPRO) 300 MG Tab Take 1 Tab by mouth every day. 30 Tab 11   • CARTIA  MG CAPSULE SR 24 HR TAKE 1 CAPSULE BY MOUTH ONCE DAILY 90 Cap 3   • Multiple Vitamin (MULTI-VITAMINS) Tab Take  by mouth.     • acetaminophen (TYLENOL) 325 MG Tab Take 650 mg by mouth every four hours as needed.       No current facility-administered  "medications for this visit.        ROS  All others systems reviewed and negative.     Objective:     /76 (BP Location: Left arm, Patient Position: Sitting, BP Cuff Size: Adult)   Pulse 76   Ht 1.803 m (5' 11\")   Wt 84.4 kg (185 lb 15.3 oz)   SpO2 93%  Body mass index is 25.94 kg/m².    Physical Exam   General: No acute distress. Well nourished.  HEENT: EOM grossly intact, no scleral icterus, no pharyngeal erythema.   Neck:  No JVD, no bruits, trachea midline  CVS: RRR. Normal S1, S2. 3/6 late peaking syst murmr. No LE edema.  2+ radial pulses, 2+ PT pulses  Resp: CTAB. No wheezing or crackles/rhonchi. Normal respiratory effort.  Abdomen: Soft, NT, no jen hepatomegaly.  MSK/Ext: No clubbing or cyanosis.  Skin: Warm and dry, no rashes.  Neurological: CN III-XII grossly intact. No focal deficits.   Psych: A&O x 3, appropriate affect, good judgement      Assessment:     1. Nonrheumatic aortic valve stenosis  EC-ECHOCARDIOGRAM COMPLETE W/O CONT   2. Non-rheumatic mitral regurgitation     3. Paroxysmal atrial fibrillation (HCC)  Holter Monitor / Event Recorder   4. Essential hypertension, benign     5. Bilateral carotid artery disease, unspecified type (HCC)     6. Postop carotid endarterectomy surveillance, encounter for     7. Hx of TIA (transient ischemic attack)     8. Dyslipidemia     9. Other fatigue     10. TELLEZ (dyspnea on exertion)         Medical Decision Making:  Today's Assessment / Status / Plan:       -He has definitely had a change in functional status, TELLEZ, fatigue, I think it is the AS but will exclude afib  -Echo vs [facility] for severe AS, will start with echo  -He is headed for [facility], could cross the valve  -No ASA due to warfarin  -Keep appt with Heather Fraire.  -He is planning a trip to [place] at end of Feb, we will address this.    Written instructions given today:    -Another echo trying to prove the aortic stenosis is severe, it seems worse than the mitral regurgitation.  -I will discuss with Dr." Iliana, please anticipate having the left heart angiogram in the near future  -48 hour heart monitor to show this is not a fib again.    Return Keep appt with DB in [place] 6 months.    It is my pleasure to participate in the care of Mr. Hester.  Please do not hesitate to contact me with questions or concerns.    Maryana Anderson MD, [facility]  Cardiologist [facility]    Please note that this dictation was created using voice recognition software. I have made every reasonable attempt to correct obvious errors, but it is possible there are errors of grammar and possibly content that I did not discover before finalizing the note.      Michael J Bloch, M.D.  [address]  [place] [address]  VIA In Basket     Sony Barrientos M.D.  [address]  [address]  [place] [address]  VIA In Basket

## 2020-01-20 NOTE — PROGRESS NOTES
Subjective:   Chief Complaint:   Chief Complaint   Patient presents with   • Shortness of Breath   • HTN (Controlled)   • Atrial Fibrillation       Tristan Hester is a 84 y.o. male who returns today for paroxysmal atrial fibrillation, AS,  hypertension, hyperlipidemia, aortic stenosis and mitral regurgitation.    Sees Dr. Bloch who referred to Dr. Saeed for MV regurgitation.  Then echo showed AV stenosis, being followed in TAVR clinic with AK.    Has mild chronic cough, prior lung surgery, PNA, then empyema.   This was the only time for afib, was placed on warfarin.  Prior TIAx2 in 2000, vision loss, then recurred in 2008.  Prior carotid endart with Dr. Calero on the right.  [id number] 5 with prior TIA but TIA was years before afib, not clear if related, could have been carotid disease.    Has carotid disease, mild, 2017.  Has HTN for a few decades, BP controlled on 2 meds.  Has HLP, on lipitor 80 mg and zetia, ldl 49.  Chronic kidney disease early stage III.    Was able to walk 1 mile with a cane, limited by some balance problems.  Now walking less than half that and a lot more TELLEZ.  Now missing work outs which is very unusual.    He is not limited by chest pain, pressure or tightness with activity.   No significant dyspnea on exertion,     No orthopnea or lower extremity swelling.   No significant palpitations (rarely).  Feels a frequent state of intermittent lightheadedness, associates it to balance problem.  No presyncope/syncope.   Has had vertigo.  No symptoms of leg claudication.   No new stroke/TIA like symptoms.    No family history of premature coronary artery disease.  Father had CHF at 91.  Former tobacco, quit 1985.  No history of diabetes, occasional IFG.  No history of autoimmune disease such as lupus or rheumatoid arthritis.    Previously saw Dr. Saeed.    DATA REVIEWED by me:  ECG 10/3/2019  Sinus, rate 64    11/25/2019 Echo  Prior echo done 05/02/19.  No significant change.  Left ventricular  ejection fraction is visually estimated to be 60%.  Moderate aortic stenosis.Vmax is 3.6 m/s.  Transvalvular gradients are - Peak: 53 mmHg, Mean: 38 mmHg.   Dimensionless index is 0.27.  Mild to moderate eccentric mitral regurgitation.  RVSP 35 mmHg.    Carotid US 8-5-19   Mild to moderate plaque within bilateral internal carotid arteries with    less that 50% stenosis.      Prominent heterogeneous plaque proximal right external carotid artery.    MRI 12-13-17  MRI of the brain without contrast within normal limits for age with moderate white matter changes without demonstrable interval progression since the prior study.    Most recent labs:     11/15/2019 hemoglobin 14.1, platelets 181, sodium 144, potassium 4.4, creatinine 1.19, GFR 56, LFTs normal, LDL 49, HDL 59, triglycerides 49, total cholesterol 116    Past Medical History:   Diagnosis Date   • Arrhythmia     Afib   • Arthritis     osteo, hips and knees   • Blood clotting disorder ([facility])     , left arm   • Bronchitis        • Cancer ([facility])     Prostate CA--2000   • Carotid arterial disease ([facility]) 1/6/2014   • Cataract     removed bilat   • DVT (deep venous thrombosis) ([facility]) 2016   • Heart valve disease     MVR   • HLD (hyperlipidemia) 1/6/2014   • Hypertension    • Mitral regurgitation 1/6/2014   • Pain 12-14-15    low back, hips, 2-3/10   • Pneumonia        • Stroke ([facility]) 2008    TIA, no residual   • TIA (transient ischemic attack)     2008   • Urinary bladder disorder     HAD MALE BLADDER SUSPENSION PROCEDURE DONE   • Urinary incontinence      Past Surgical History:   Procedure Laterality Date   • LUMBAR FUSION POSTERIOR  12/28/2015    Procedure: LUMBAR FUSION POSTERIOR L3-5 with peek rods;  Surgeon: Michael Almonte M.D.;  Location: SURGERY [facility];  Service:    • LUMBAR DECOMPRESSION  12/28/2015    Procedure: LUMBAR DECOMPRESSION posterior redo decompression L3-5, with dural repair;  Surgeon: Michael Almonte M.D.;  Location: SURGERY  [facility];  Service:    • THORACOSCOPY  2015    Procedure: THORACOSCOPY with decortication VATS , side to be determined  ;  Surgeon: Elmira Holder M.D.;  Location: SURGERY [facility];  Service:    • LUMBAR LAMINECTOMY DISKECTOMY  10/8/2010    Performed by PHYLICIA SARKAR at [facility]   • CERVICAL DISK AND FUSION ANTERIOR  10/26/2009    Performed by PHYLICIA SARKAR at [facility]   • CAROTID ENDARTERECTOMY  7/10/08    Performed by LEIGH MORATAYA at SURGERY [facility]   • BLADDER SLING MALE  2004   • PROSTATECTOMY, RADIAL  2000     Family History   Problem Relation Age of Onset   • Heart Disease Father         CHF at 91.   • Other Brother         Aneurysm   • Cancer Brother         Seminal vascular cancer   • Autoimmune Disease Daughter         Lupus, RA   • No Known Problems Daughter    • No Known Problems Son    • No Known Problems Son    • Heart Attack Neg Hx      Social History     Socioeconomic History   • Marital status:      Spouse name: Not on file   • Number of children: Not on file   • Years of education: Not on file   • Highest education level: Not on file   Occupational History   • Not on file   Social Needs   • Financial resource strain: Not on file   • Food insecurity:     Worry: Not on file     Inability: Not on file   • Transportation needs:     Medical: Not on file     Non-medical: Not on file   Tobacco Use   • Smoking status: Former Smoker     Packs/day: 0.50     Years: 30.00     Pack years: 15.00     Types: Cigarettes, Pipe     Last attempt to quit: 1985     Years since quittin.0   • Smokeless tobacco: Never Used   Substance and Sexual Activity   • Alcohol use: Yes     Alcohol/week: 3.0 oz     Types: 3 Cans of beer, 3 Shots of liquor per week     Comment: 1 x week   • Drug use: No   • Sexual activity: Not Currently   Lifestyle   • Physical activity:     Days per week: Not on file     Minutes per session: Not on file   • Stress: Not  "on file   Relationships   • Social connections:     Talks on phone: Not on file     Gets together: Not on file     Attends Religion service: Not on file     Active member of club or organization: Not on file     Attends meetings of clubs or organizations: Not on file     Relationship status: Not on file   • Intimate partner violence:     Fear of current or ex partner: Not on file     Emotionally abused: Not on file     Physically abused: Not on file     Forced sexual activity: Not on file   Other Topics Concern   • Not on file   Social History Narrative   • Not on file     Allergies   Allergen Reactions   • Nkda [No Known Drug Allergy]    • Other Environmental      Seasonal, cats-hayfever       Current Outpatient Medications   Medication Sig Dispense Refill   • atorvastatin (LIPITOR) 80 MG tablet TAKE 1/2 (ONE-HALF) TABLET BY MOUTH ONCE DAILY IN THE EVENING 45 Tab 1   • temazepam (RESTORIL) 30 MG capsule TAKE ONE CAPSULE BY MOUTH ONCE DAILY AT BEDTIME AS NEEDED FOR SLEEP 90 Cap 1   • ezetimibe (ZETIA) 10 MG Tab TAKE 1 TABLET BY MOUTH ONCE DAILY 90 Tab 0   • gabapentin (NEURONTIN) 300 MG Cap TAKE 1 CAPSULE BY MOUTH THREE TIMES DAILY 270 Cap 3   • warfarin (COUMADIN) 4 MG Tab TAKE 1 & 1/2 TO 2 (ONE & ONE-HALF TO TWO) TABLETS BY MOUTH ONCE DAILY AS  DIRECTED  BY  [facility] 200 Tab 1   • irbesartan (AVAPRO) 300 MG Tab Take 1 Tab by mouth every day. 30 Tab 11   • CARTIA  MG CAPSULE SR 24 HR TAKE 1 CAPSULE BY MOUTH ONCE DAILY 90 Cap 3   • Multiple Vitamin (MULTI-VITAMINS) Tab Take  by mouth.     • acetaminophen (TYLENOL) 325 MG Tab Take 650 mg by mouth every four hours as needed.       No current facility-administered medications for this visit.        ROS  All others systems reviewed and negative.     Objective:     /76 (BP Location: Left arm, Patient Position: Sitting, BP Cuff Size: Adult)   Pulse 76   Ht 1.803 m (5' 11\")   Wt 84.4 kg (185 lb 15.3 oz)   SpO2 93%  Body mass index " is 25.94 kg/m².    Physical Exam   General: No acute distress. Well nourished.  HEENT: EOM grossly intact, no scleral icterus, no pharyngeal erythema.   Neck:  No JVD, no bruits, trachea midline  CVS: RRR. Normal S1, S2. 3/6 late peaking syst murmr. No LE edema.  2+ radial pulses, 2+ PT pulses  Resp: CTAB. No wheezing or crackles/rhonchi. Normal respiratory effort.  Abdomen: Soft, NT, no jen hepatomegaly.  MSK/Ext: No clubbing or cyanosis.  Skin: Warm and dry, no rashes.  Neurological: CN III-XII grossly intact. No focal deficits.   Psych: A&O x 3, appropriate affect, good judgement      Assessment:     1. Nonrheumatic aortic valve stenosis  EC-ECHOCARDIOGRAM COMPLETE W/O CONT   2. Non-rheumatic mitral regurgitation     3. Paroxysmal atrial fibrillation (HCC)  Holter Monitor / Event Recorder   4. Essential hypertension, benign     5. Bilateral carotid artery disease, unspecified type (HCC)     6. Postop carotid endarterectomy surveillance, encounter for     7. Hx of TIA (transient ischemic attack)     8. Dyslipidemia     9. Other fatigue     10. TELLEZ (dyspnea on exertion)         Medical Decision Making:  Today's Assessment / Status / Plan:       -He has definitely had a change in functional status, TELLEZ, fatigue, I think it is the AS but will exclude afib  -Echo vs [facility] for severe AS, will start with echo  -He is headed for [facility], could cross the valve  -No ASA due to warfarin  -Keep appt with Heather Frarie.  -He is planning a trip to [place] at end of Feb, we will address this.    Written instructions given today:    -Another echo trying to prove the aortic stenosis is severe, it seems worse than the mitral regurgitation.  -I will discuss with Dr. Barrientos, please anticipate having the left heart angiogram in the near future  -48 hour heart monitor to show this is not a fib again.    Return Keep appt with BENSON in Feb, [place] 6 months.    It is my pleasure to participate in the care of Mr. Hester.  Please do not hesitate to  contact me with questions or concerns.    Maryana Anderson MD, [facility]  Cardiologist [facility]    Please note that this dictation was created using voice recognition software. I have made every reasonable attempt to correct obvious errors, but it is possible there are errors of grammar and possibly content that I did not discover before finalizing the note.

## 2020-01-21 NOTE — PATIENT INSTRUCTIONS
-Another echo trying to prove the aortic stenosis is severe, it seems worse than the mitral regurgitation.  -I will discuss with Dr. Barrientos, please anticipate having the left heart angiogram in the near future  -48 hour heart monitor to show this is not a fib again.

## 2020-01-22 ENCOUNTER — NON-PROVIDER VISIT (OUTPATIENT)
Dept: CARDIOLOGY | Facility: MEDICAL CENTER | Age: 85
End: 2020-01-22
Payer: MEDICARE

## 2020-01-22 DIAGNOSIS — I49.1 PAC (PREMATURE ATRIAL CONTRACTION): ICD-10-CM

## 2020-01-22 DIAGNOSIS — I48.0 PAROXYSMAL ATRIAL FIBRILLATION (HCC): ICD-10-CM

## 2020-01-22 PROCEDURE — 93224 XTRNL ECG REC UP TO 48 HRS: CPT | Performed by: INTERNAL MEDICINE

## 2020-01-27 ENCOUNTER — TELEPHONE (OUTPATIENT)
Dept: CARDIOLOGY | Facility: MEDICAL CENTER | Age: 85
End: 2020-01-27

## 2020-01-27 DIAGNOSIS — I48.0 PAROXYSMAL ATRIAL FIBRILLATION (HCC): ICD-10-CM

## 2020-01-27 LAB — EKG IMPRESSION: NORMAL

## 2020-01-27 NOTE — TELEPHONE ENCOUNTER
need FUTURE holter order only, LS ordered 48 hour holter, dx: PAF   Received: Today   Message Contents   Sidra Hurst, Med Ass't  Velia Multani R.N.     Ordered

## 2020-01-28 ENCOUNTER — APPOINTMENT (OUTPATIENT)
Dept: CARDIOLOGY | Facility: MEDICAL CENTER | Age: 85
End: 2020-01-28
Attending: INTERNAL MEDICINE
Payer: MEDICARE

## 2020-02-03 ENCOUNTER — HOSPITAL ENCOUNTER (OUTPATIENT)
Dept: CARDIOLOGY | Facility: MEDICAL CENTER | Age: 85
End: 2020-02-03
Attending: INTERNAL MEDICINE
Payer: MEDICARE

## 2020-02-03 DIAGNOSIS — I35.0 NONRHEUMATIC AORTIC VALVE STENOSIS: ICD-10-CM

## 2020-02-03 PROCEDURE — 93306 TTE W/DOPPLER COMPLETE: CPT

## 2020-02-04 ENCOUNTER — TELEPHONE (OUTPATIENT)
Dept: CARDIOLOGY | Facility: MEDICAL CENTER | Age: 85
End: 2020-02-04

## 2020-02-04 DIAGNOSIS — I35.0 SEVERE AORTIC STENOSIS: ICD-10-CM

## 2020-02-04 DIAGNOSIS — I48.0 PAROXYSMAL ATRIAL FIBRILLATION (HCC): ICD-10-CM

## 2020-02-04 DIAGNOSIS — R06.09 DOE (DYSPNEA ON EXERTION): ICD-10-CM

## 2020-02-04 DIAGNOSIS — I35.0 NONRHEUMATIC AORTIC VALVE STENOSIS: ICD-10-CM

## 2020-02-04 LAB
LV EJECT FRACT  99904: 55
LV EJECT FRACT MOD 2C 99903: 58.46
LV EJECT FRACT MOD 4C 99902: 59.3
LV EJECT FRACT MOD BP 99901: 56.85

## 2020-02-04 PROCEDURE — 93306 TTE W/DOPPLER COMPLETE: CPT | Mod: 26 | Performed by: INTERNAL MEDICINE

## 2020-02-04 RX ORDER — DILTIAZEM HYDROCHLORIDE 120 MG/1
120 CAPSULE, COATED, EXTENDED RELEASE ORAL DAILY
Qty: 14 CAP | Refills: 0 | Status: SHIPPED
Start: 2020-02-04 | End: 2020-02-06

## 2020-02-04 NOTE — TELEPHONE ENCOUNTER
Result Notes for EC-ECHOCARDIOGRAM COMPLETE W/O CONT     Notes recorded by Maryana Anderson M.D. on 2/4/2020 at 11:54 AM JIMENEZ Gunn,  Phone call result.  Former Dr. Saeed pt whom I saw, I am certain he has severe symptomatic AS, conflicting data on echo but can be concluded to be severe based on symptoms. Please let him know and get him in with TAVR team.  Thank you,  TAYE BROWNI to [facility]     Referral to valve program initiated     Contacted patient and discussed.  Patient agreeable.  Patient has no complaints at this time.     Patient states he and his wife will be traveling to [place] and then [place] so there may be some delay in contacting patient. Patient also expresses concern that while his is away he will run out of Cartia but cannot refill because it is too soon.  Explained can try to submit temp 14 day refill and if insurance will not cover, patient can call while in [place] and an Rx can be sent to pharmacy of choice.  Patient verbalizes understanding.

## 2020-02-05 ENCOUNTER — TELEPHONE (OUTPATIENT)
Dept: CARDIOLOGY | Facility: MEDICAL CENTER | Age: 85
End: 2020-02-05

## 2020-02-05 NOTE — TELEPHONE ENCOUNTER
Appointment switched for 2/6 from DB to SC for valve team to evaluate POC for AS. Spoke with pt and he is agreeable to switching provider visits. Pt to see SC 2/6 1345, pt aware of appt time change

## 2020-02-06 ENCOUNTER — ANTICOAGULATION VISIT (OUTPATIENT)
Dept: VASCULAR LAB | Facility: MEDICAL CENTER | Age: 85
End: 2020-02-06
Attending: INTERNAL MEDICINE
Payer: MEDICARE

## 2020-02-06 ENCOUNTER — OFFICE VISIT (OUTPATIENT)
Dept: CARDIOLOGY | Facility: MEDICAL CENTER | Age: 85
End: 2020-02-06
Payer: MEDICARE

## 2020-02-06 ENCOUNTER — TELEPHONE (OUTPATIENT)
Dept: CARDIOLOGY | Facility: MEDICAL CENTER | Age: 85
End: 2020-02-06

## 2020-02-06 VITALS
HEART RATE: 72 BPM | SYSTOLIC BLOOD PRESSURE: 108 MMHG | BODY MASS INDEX: 25.9 KG/M2 | RESPIRATION RATE: 16 BRPM | HEIGHT: 71 IN | WEIGHT: 185 LBS | OXYGEN SATURATION: 90 % | DIASTOLIC BLOOD PRESSURE: 64 MMHG

## 2020-02-06 VITALS — SYSTOLIC BLOOD PRESSURE: 116 MMHG | DIASTOLIC BLOOD PRESSURE: 78 MMHG | HEART RATE: 74 BPM

## 2020-02-06 DIAGNOSIS — I35.0 SEVERE AORTIC STENOSIS: ICD-10-CM

## 2020-02-06 DIAGNOSIS — E78.5 DYSLIPIDEMIA: ICD-10-CM

## 2020-02-06 DIAGNOSIS — G45.9 TIA (TRANSIENT ISCHEMIC ATTACK): ICD-10-CM

## 2020-02-06 DIAGNOSIS — I77.9 BILATERAL CAROTID ARTERY DISEASE, UNSPECIFIED TYPE (HCC): ICD-10-CM

## 2020-02-06 DIAGNOSIS — I10 ESSENTIAL HYPERTENSION, BENIGN: ICD-10-CM

## 2020-02-06 DIAGNOSIS — I82.90 DEEP VEIN THROMBOSIS (DVT) OF NON-EXTREMITY VEIN, UNSPECIFIED CHRONICITY: ICD-10-CM

## 2020-02-06 DIAGNOSIS — I48.0 PAROXYSMAL ATRIAL FIBRILLATION (HCC): ICD-10-CM

## 2020-02-06 DIAGNOSIS — I34.0 NONRHEUMATIC MITRAL VALVE REGURGITATION: ICD-10-CM

## 2020-02-06 DIAGNOSIS — I35.0 NONRHEUMATIC AORTIC VALVE STENOSIS: ICD-10-CM

## 2020-02-06 PROBLEM — R42 VERTIGO: Status: RESOLVED | Noted: 2017-07-14 | Resolved: 2020-02-06

## 2020-02-06 PROBLEM — R79.89 ELEVATED BRAIN NATRIURETIC PEPTIDE (BNP) LEVEL: Status: RESOLVED | Noted: 2019-01-30 | Resolved: 2020-02-06

## 2020-02-06 PROBLEM — Z48.812 POSTOP CAROTID ENDARTERECTOMY SURVEILLANCE, ENCOUNTER FOR: Status: RESOLVED | Noted: 2019-06-11 | Resolved: 2020-02-06

## 2020-02-06 LAB
EKG IMPRESSION: NORMAL
INR BLD: 5.1 (ref 0.9–1.2)
INR PPP: 5.1 (ref 2–3.5)

## 2020-02-06 PROCEDURE — 85610 PROTHROMBIN TIME: CPT

## 2020-02-06 PROCEDURE — 93000 ELECTROCARDIOGRAM COMPLETE: CPT | Performed by: INTERNAL MEDICINE

## 2020-02-06 PROCEDURE — 99214 OFFICE O/P EST MOD 30 MIN: CPT | Performed by: NURSE PRACTITIONER

## 2020-02-06 PROCEDURE — 99212 OFFICE O/P EST SF 10 MIN: CPT | Performed by: NURSE PRACTITIONER

## 2020-02-06 ASSESSMENT — ENCOUNTER SYMPTOMS
MYALGIAS: 0
PALPITATIONS: 0
COUGH: 0
DIZZINESS: 1
SHORTNESS OF BREATH: 1
PND: 0
FEVER: 0
CLAUDICATION: 0
ABDOMINAL PAIN: 0
ORTHOPNEA: 0

## 2020-02-06 NOTE — PROGRESS NOTES
Anticoagulation Summary  As of 2020    INR goal:   2.0-3.0   TTR:   71.0 % (4.5 y)   INR used for dosin.10! (2020)   Warfarin maintenance plan:   8 mg (4 mg x 2) every Sun; 6 mg (4 mg x 1.5) all other days   Weekly warfarin total:   44 mg   Plan last modified:   Thi Rosario, A.P.NBecca (2020)   Next INR check:   2020   Priority:   Maintenance   Target end date:   Indefinite    Indications    DVT (deep venous thrombosis) (HCC) [I82.409]  Paroxysmal atrial fibrillation (HCC) [I48.0]  Hx of Stroke (Resolved) [I63.9]  Hx of TIA (transient ischemic attack) [G45.9]  Deep vein thrombosis (DVT) (HCC) (Resolved) [I82.409]  Atrial fibrillation (HCC) (Resolved) [I48.91]             Anticoagulation Episode Summary     INR check location:   Anticoagulation Clinic    Preferred lab:       Send INR reminders to:       Comments:         Anticoagulation Care Providers     Provider Role Specialty Phone number    Michael J Bloch, M.D. Referring Internal Medicine [phone]    [facility]   [phone]        Anticoagulation Patient Findings      HPI:  Tristan Salazar Kacie seen in clinic today for follow up on anticoagulation therapy in the presence of AF, CVA, TIA, DVT.   He is under a tremendous amount of stress as his sister is at [place] having heart surgery with complications.   Appetite a bit decreased. Denies any medication changes.   No current symptoms of bleeding or thrombosis reported.    A/P:   INR supratherapeutic. Will HOLD two doses and began reduced regimen.  Pt traveling to [place] tomorrow and will be gone for 10 days. He declines a standing order to have his INR checked there. He will monitor extra closely for bleeding.  BP recorded in vitals.    Follow up appointment in 1.5 week(s).    Next Appointment:  at 10:30 am.    Thi Rosario APRDORIS                2020 ADDENDUM: CHARGES REVIEWED.  Yashira Boggs, Clinical Pharmacist, CDE, CACP

## 2020-02-06 NOTE — PROGRESS NOTES
Chief Complaint   Patient presents with   • Aortic Stenosis     Subjective:   Everardo Hester is a 84 y.o. male who presents today for follow up regarding his recent echocardiogram results and progressive symptoms of dizziness, shortness of breath, and exertional fatigue.    He is a patient of Dr. Anderson in our office.    Hx of PAF on coumadin, prostate CA, prior DVT, HLD, HTN, TIA, carotid disease with prior CEA in '08 (Abdias BRADFORD), and now with significant valvular disease with mod-severe AS and mild-mod MR.    He presents today stating his symptoms have progressed for the last month.    He has persistent dizziness, not necessarily positional.    He does have exertional dyspnea and fatigue as well.    He is previously active and does travel a lot with his wife.    He is pale today.    He tells me today his sister is at [place] having just had emergent double valve surgery with complications of worsening heart function and remains on what sounds like ECMO.    Past Medical History:   Diagnosis Date   • Arrhythmia     Afib   • Arthritis     osteo, hips and knees   • Blood clotting disorder ([facility])     , left arm   • Bronchitis        • Cancer (HCC)     Prostate CA--2000   • Carotid arterial disease ([facility]) 1/6/2014   • Cataract     removed bilat   • DVT (deep venous thrombosis) ([facility]) 2016   • Heart valve disease     MVR   • HLD (hyperlipidemia) 1/6/2014   • Hypertension    • Mitral regurgitation 1/6/2014   • Pain 12-14-15    low back, hips, 2-3/10   • Pneumonia        • Stroke ([facility]) 2008    TIA, no residual   • TIA (transient ischemic attack)     2008   • Urinary bladder disorder     HAD MALE BLADDER SUSPENSION PROCEDURE DONE   • Urinary incontinence      Past Surgical History:   Procedure Laterality Date   • LUMBAR FUSION POSTERIOR  12/28/2015    Procedure: LUMBAR FUSION POSTERIOR L3-5 with peek rods;  Surgeon: Michael Almonte M.D.;  Location: SURGERY [facility];  Service:    • LUMBAR  DECOMPRESSION  2015    Procedure: LUMBAR DECOMPRESSION posterior redo decompression L3-5, with dural repair;  Surgeon: Michael Sarkar M.D.;  [place] [facility]  Service:    • THORACOSCOPY  2015    Procedure: THORACOSCOPY with decortication VATS , side to be determined  ;  Surgeon: Elmira Holder M.D.;  [place] [facility] [place]  Service:    • LUMBAR LAMINECTOMY DISKECTOMY  10/8/2010    [place]HAEL SARKAR [place] [facility] [place]   • CERVICAL DISK AND FUSION ANTERIOR  10/26/2009    [place]ARKAR [place] [facility]   • CAROTID ENDARTERECTOMY  7/10/08    Performed [place] LEIGH MORATAYA [place] [facility]   • BLADDER SLING MALE  2004   • PROSTATECTOMY, RADIAL  2000     Family History   Problem Relation Age of Onset   • Heart Disease Father         CHF at 91.   • Other Brother         Aneurysm   • Cancer Brother         Seminal vascular cancer   • Autoimmune Disease Daughter         Lupus, RA   • No Known Problems Daughter    • No Known Problems Son    • No Known Problems Son    • Heart Attack Neg Hx      Social History     Socioeconomic History   • Marital status:      Spouse name: Not on file   • Number of children: Not on file   • Years of education: Not on file   • Highest education level: Not on file   Occupational History   • Not on file   Social Needs   • Financial resource strain: Not on file   • Food insecurity:     Worry: Not on file     Inability: Not on file   • Transportation needs:     Medical: Not on file     Non-medical: Not on file   Tobacco Use   • Smoking status: Former Smoker     Packs/day: 0.50     Years: 30.00     Pack years: 15.00     Types: Cigarettes, Pipe     Last attempt to quit: 1985     Years since quittin.1   • Smokeless tobacco: Never Used   Substance and Sexual Activity   • Alcohol use: Yes     Alcohol/week: 3.0 oz     Types: 3 Cans of beer, 3 Shots of liquor per week     Comment: 1 x week   • Drug  use: No   • Sexual activity: Not Currently   Lifestyle   • Physical activity:     Days per week: Not on file     Minutes per session: Not on file   • Stress: Not on file   Relationships   • Social connections:     Talks on phone: Not on file     Gets together: Not on file     Attends [employer] witness service: Not on file     Active member of club or organization: Not on file     Attends meetings of clubs or organizations: Not on file     Relationship status: Not on file   • Intimate partner violence:     Fear of current or ex partner: Not on file     Emotionally abused: Not on file     Physically abused: Not on file     Forced sexual activity: Not on file   Other Topics Concern   • Not on file   Social History Narrative   • Not on file     Allergies   Allergen Reactions   • Nkda [No Known Drug Allergy]    • Other Environmental      Seasonal, cats-hayfever     Outpatient Encounter Medications as of 2/6/2020   Medication Sig Dispense Refill   • atorvastatin (LIPITOR) 80 MG tablet TAKE 1/2 (ONE-HALF) TABLET BY MOUTH ONCE DAILY IN THE EVENING 45 Tab 1   • temazepam (RESTORIL) 30 MG capsule TAKE ONE CAPSULE BY MOUTH ONCE DAILY AT BEDTIME AS NEEDED FOR SLEEP 90 Cap 1   • ezetimibe (ZETIA) 10 MG Tab TAKE 1 TABLET BY MOUTH ONCE DAILY 90 Tab 0   • gabapentin (NEURONTIN) 300 MG Cap TAKE 1 CAPSULE BY MOUTH THREE TIMES DAILY 270 Cap 3   • warfarin (COUMADIN) 4 MG Tab TAKE 1 & 1/2 TO 2 (ONE & ONE-HALF TO TWO) TABLETS BY MOUTH ONCE DAILY AS  DIRECTED  BY  [facility]  ANTICOAGULATION  SERVICES 200 Tab 1   • irbesartan (AVAPRO) 300 MG Tab Take 1 Tab by mouth every day. 30 Tab 11   • CARTIA  MG CAPSULE SR 24 HR TAKE 1 CAPSULE BY MOUTH ONCE DAILY 90 Cap 3   • Multiple Vitamin (MULTI-VITAMINS) Tab Take  by mouth.     • acetaminophen (TYLENOL) 325 MG Tab Take 650 mg by mouth every four hours as needed.     • [DISCONTINUED] DILTIAZem CD (CARDIZEM CD) 120 MG CAPSULE SR 24 HR Take 1 Cap by mouth every day. (Patient not taking: Reported on  "2/6/2020) 14 Cap 0     No facility-administered encounter medications on file as of 2/6/2020.      Review of Systems   Constitutional: Positive for malaise/fatigue. Negative for fever.   Respiratory: Positive for shortness of breath. Negative for cough.    Cardiovascular: Negative for chest pain, palpitations, orthopnea, claudication, leg swelling and PND.   Gastrointestinal: Negative for abdominal pain.   Musculoskeletal: Negative for myalgias.   Neurological: Positive for dizziness.        Objective:   /64 (BP Location: Right arm, Patient Position: Sitting, BP Cuff Size: Adult)   Pulse 72   Resp 16   Ht 1.803 m (5' 11\")   Wt 83.9 kg (185 lb)   SpO2 90%   BMI 25.80 kg/m²     Physical Exam   Constitutional: He appears well-developed and well-nourished.   HENT:   Head: Normocephalic and atraumatic.   Eyes: EOM are normal.   Neck: No JVD present.   Cardiovascular: Normal rate, regular rhythm and intact distal pulses.   Murmur heard.   Systolic murmur is present with a grade of 3/6.  Pulmonary/Chest: Effort normal and breath sounds normal.   Musculoskeletal: Normal range of motion.         General: No edema.   Skin: Skin is warm and dry. There is pallor.   Psychiatric: He has a normal mood and affect.   Nursing note and vitals reviewed.      Assessment:     1. Severe aortic stenosis  CL-PRE-TRANSCATHETER AORTIC VALVE REPLACEMENT    EKG    CANCELED: EKG   2. Paroxysmal atrial fibrillation (HCC)  EKG   3. Nonrheumatic mitral valve regurgitation     4. Deep vein thrombosis (DVT) of non-extremity vein, unspecified chronicity     5. Nonrheumatic aortic valve stenosis     6. Essential hypertension, benign     7. Hx of TIA (transient ischemic attack)     8. Dyslipidemia     9. Bilateral carotid artery disease, unspecified type (HCC)       Medical Decision Making:  Today's Assessment / Status / Plan:     1. Mod-severe AS  -progressive symptoms of dizziness, fatigue, and shortness of breath  -recommended to not " travel to [place] until the MD performing his heart cath reviews his films and plan of care  -he is agreeable to heart cath to assess AV gradients and coronary disease first  -instructions given to patient,  to call tomorrow  -consent to be done with physician prior to procedure    2. PAF on coumadin  -INR supra-therapeutic  -cont cartia for rate control  -EKG shows SR today  -cont coumadin per anti-coag clinic    3. TIA with carotid disease  -no deficits  -mod carotid disease noted on last duplex in '19  -cont statin    4. HTN  -good control on cartia and irbesartan    5. HLD  -statin   -LDL goal <70 with vascular disease  -cont yearly labs     6. Prior DVT  -on coumadin for PAF  -follow clinically    FU in clinic in 3 months with AK or sooner depending on cath results    Patient verbalizes understanding and agrees with the plan of care.     Collaborating MD: Kathia BRADFORD

## 2020-02-07 ENCOUNTER — TELEPHONE (OUTPATIENT)
Dept: CARDIOLOGY | Facility: MEDICAL CENTER | Age: 85
End: 2020-02-07

## 2020-02-07 NOTE — TELEPHONE ENCOUNTER
Patient is scheduled on 2-21-20 for a Pre TAVR R&L hrt cath w/poss with Dr. Patel. Patient was told to hold coumadin for 5 days prior and to notify coumadin clinic. Patient to check in at 9:30 for an 11:30 procedure. H&P was done on 2-6-20 by Patrizia Sarmiento. Pre admit to call patient. Notified Carson BRISENO at coumadin that patient will be stopping coumadin on 2-15-20.

## 2020-02-07 NOTE — TELEPHONE ENCOUNTER
----- Message from JUDY Massey sent at 2/4/2020  3:57 PM PST -----  Regarding: VALVE NEW PATIENT  Gradients are very borderline to severe.     Janie, can you reschedule this patient to [place] instead of  this week to go over echo and plan of care. We can schedule cath then to get gradients of valve.    Viola, will wait for cath and then review to see if needs valve sooner than later with symptoms.     Baylee, can you hold cath spot in 1-2 weeks with AYDEE or AK please?    [place]  ----- Message -----  From: Maryana Anderson M.D.  Sent: 2/4/2020  11:54 AM PST  To: JUDY Massey, #    Velia,  Phone call result.  Former Dr. Saeed pt whom I saw, I am certain he has severe symptomatic AS, conflicting data on echo but can be concluded to be severe based on symptoms. Please let him know and get him in with TAVR team.  Thank you,  TAYE WHITMAN to [facility]

## 2020-02-15 DIAGNOSIS — E78.5 DYSLIPIDEMIA: ICD-10-CM

## 2020-02-17 ENCOUNTER — APPOINTMENT (OUTPATIENT)
Dept: RADIOLOGY | Facility: MEDICAL CENTER | Age: 85
DRG: 291 | End: 2020-02-17
Attending: EMERGENCY MEDICINE
Payer: MEDICARE

## 2020-02-17 ENCOUNTER — HOSPITAL ENCOUNTER (INPATIENT)
Facility: MEDICAL CENTER | Age: 85
LOS: 2 days | DRG: 291 | End: 2020-02-19
Attending: EMERGENCY MEDICINE | Admitting: INTERNAL MEDICINE
Payer: MEDICARE

## 2020-02-17 ENCOUNTER — APPOINTMENT (OUTPATIENT)
Dept: RADIOLOGY | Facility: MEDICAL CENTER | Age: 85
DRG: 291 | End: 2020-02-17
Payer: MEDICARE

## 2020-02-17 DIAGNOSIS — R07.9 CHEST PAIN, UNSPECIFIED TYPE: ICD-10-CM

## 2020-02-17 DIAGNOSIS — J81.0 ACUTE PULMONARY EDEMA (HCC): ICD-10-CM

## 2020-02-17 DIAGNOSIS — J90 PLEURAL EFFUSION: ICD-10-CM

## 2020-02-17 PROBLEM — R79.1 SUPRATHERAPEUTIC INR: Status: ACTIVE | Noted: 2020-02-17

## 2020-02-17 PROBLEM — R94.31 LONG QT INTERVAL: Status: ACTIVE | Noted: 2020-02-17

## 2020-02-17 PROBLEM — I50.9 ACUTE EXACERBATION OF CONGESTIVE HEART FAILURE (HCC): Status: ACTIVE | Noted: 2020-02-17

## 2020-02-17 PROBLEM — J96.01 ACUTE RESPIRATORY FAILURE WITH HYPOXIA (HCC): Status: ACTIVE | Noted: 2020-02-17

## 2020-02-17 LAB
ALBUMIN SERPL BCP-MCNC: 3.6 G/DL (ref 3.2–4.9)
ALBUMIN/GLOB SERPL: 1.2 G/DL
ALP SERPL-CCNC: 66 U/L (ref 30–99)
ALT SERPL-CCNC: 61 U/L (ref 2–50)
ANION GAP SERPL CALC-SCNC: 6 MMOL/L (ref 0–11.9)
APTT PPP: 47.8 SEC (ref 24.7–36)
AST SERPL-CCNC: 39 U/L (ref 12–45)
BASOPHILS # BLD AUTO: 0.7 % (ref 0–1.8)
BASOPHILS # BLD: 0.04 K/UL (ref 0–0.12)
BILIRUB SERPL-MCNC: 1.1 MG/DL (ref 0.1–1.5)
BUN SERPL-MCNC: 17 MG/DL (ref 8–22)
CALCIUM SERPL-MCNC: 9.1 MG/DL (ref 8.5–10.5)
CHLORIDE SERPL-SCNC: 109 MMOL/L (ref 96–112)
CO2 SERPL-SCNC: 24 MMOL/L (ref 20–33)
CREAT SERPL-MCNC: 0.99 MG/DL (ref 0.5–1.4)
EKG IMPRESSION: NORMAL
EOSINOPHIL # BLD AUTO: 0.11 K/UL (ref 0–0.51)
EOSINOPHIL NFR BLD: 2 % (ref 0–6.9)
ERYTHROCYTE [DISTWIDTH] IN BLOOD BY AUTOMATED COUNT: 50.1 FL (ref 35.9–50)
GLOBULIN SER CALC-MCNC: 3.1 G/DL (ref 1.9–3.5)
GLUCOSE SERPL-MCNC: 100 MG/DL (ref 65–99)
HCT VFR BLD AUTO: 41.1 % (ref 42–52)
HGB BLD-MCNC: 13.2 G/DL (ref 14–18)
IMM GRANULOCYTES # BLD AUTO: 0.02 K/UL (ref 0–0.11)
IMM GRANULOCYTES NFR BLD AUTO: 0.4 % (ref 0–0.9)
INR PPP: 3.45 (ref 0.87–1.13)
LYMPHOCYTES # BLD AUTO: 0.9 K/UL (ref 1–4.8)
LYMPHOCYTES NFR BLD: 16.2 % (ref 22–41)
MAGNESIUM SERPL-MCNC: 1.8 MG/DL (ref 1.5–2.5)
MCH RBC QN AUTO: 29.9 PG (ref 27–33)
MCHC RBC AUTO-ENTMCNC: 32.1 G/DL (ref 33.7–35.3)
MCV RBC AUTO: 93.2 FL (ref 81.4–97.8)
MONOCYTES # BLD AUTO: 0.56 K/UL (ref 0–0.85)
MONOCYTES NFR BLD AUTO: 10.1 % (ref 0–13.4)
NEUTROPHILS # BLD AUTO: 3.91 K/UL (ref 1.82–7.42)
NEUTROPHILS NFR BLD: 70.6 % (ref 44–72)
NRBC # BLD AUTO: 0 K/UL
NRBC BLD-RTO: 0 /100 WBC
NT-PROBNP SERPL IA-MCNC: ABNORMAL PG/ML (ref 0–125)
PLATELET # BLD AUTO: 201 K/UL (ref 164–446)
PMV BLD AUTO: 10.6 FL (ref 9–12.9)
POTASSIUM SERPL-SCNC: 4 MMOL/L (ref 3.6–5.5)
PROT SERPL-MCNC: 6.7 G/DL (ref 6–8.2)
PROTHROMBIN TIME: 36.1 SEC (ref 12–14.6)
RBC # BLD AUTO: 4.41 M/UL (ref 4.7–6.1)
SODIUM SERPL-SCNC: 139 MMOL/L (ref 135–145)
TROPONIN T SERPL-MCNC: 18 NG/L (ref 6–19)
WBC # BLD AUTO: 5.5 K/UL (ref 4.8–10.8)

## 2020-02-17 PROCEDURE — 700111 HCHG RX REV CODE 636 W/ 250 OVERRIDE (IP): Performed by: INTERNAL MEDICINE

## 2020-02-17 PROCEDURE — 84484 ASSAY OF TROPONIN QUANT: CPT

## 2020-02-17 PROCEDURE — 700102 HCHG RX REV CODE 250 W/ 637 OVERRIDE(OP): Performed by: EMERGENCY MEDICINE

## 2020-02-17 PROCEDURE — 85610 PROTHROMBIN TIME: CPT

## 2020-02-17 PROCEDURE — 700102 HCHG RX REV CODE 250 W/ 637 OVERRIDE(OP): Performed by: INTERNAL MEDICINE

## 2020-02-17 PROCEDURE — A9270 NON-COVERED ITEM OR SERVICE: HCPCS | Performed by: INTERNAL MEDICINE

## 2020-02-17 PROCEDURE — 700111 HCHG RX REV CODE 636 W/ 250 OVERRIDE (IP): Performed by: EMERGENCY MEDICINE

## 2020-02-17 PROCEDURE — 99223 1ST HOSP IP/OBS HIGH 75: CPT | Mod: AI | Performed by: INTERNAL MEDICINE

## 2020-02-17 PROCEDURE — 99285 EMERGENCY DEPT VISIT HI MDM: CPT

## 2020-02-17 PROCEDURE — 71045 X-RAY EXAM CHEST 1 VIEW: CPT

## 2020-02-17 PROCEDURE — 83735 ASSAY OF MAGNESIUM: CPT

## 2020-02-17 PROCEDURE — 96374 THER/PROPH/DIAG INJ IV PUSH: CPT

## 2020-02-17 PROCEDURE — A9270 NON-COVERED ITEM OR SERVICE: HCPCS | Performed by: EMERGENCY MEDICINE

## 2020-02-17 PROCEDURE — 99222 1ST HOSP IP/OBS MODERATE 55: CPT | Performed by: INTERNAL MEDICINE

## 2020-02-17 PROCEDURE — 93005 ELECTROCARDIOGRAM TRACING: CPT

## 2020-02-17 PROCEDURE — 93005 ELECTROCARDIOGRAM TRACING: CPT | Performed by: EMERGENCY MEDICINE

## 2020-02-17 PROCEDURE — 93970 EXTREMITY STUDY: CPT

## 2020-02-17 PROCEDURE — 80053 COMPREHEN METABOLIC PANEL: CPT

## 2020-02-17 PROCEDURE — 36415 COLL VENOUS BLD VENIPUNCTURE: CPT

## 2020-02-17 PROCEDURE — 83880 ASSAY OF NATRIURETIC PEPTIDE: CPT

## 2020-02-17 PROCEDURE — 85025 COMPLETE CBC W/AUTO DIFF WBC: CPT

## 2020-02-17 PROCEDURE — 85730 THROMBOPLASTIN TIME PARTIAL: CPT

## 2020-02-17 PROCEDURE — 770020 HCHG ROOM/CARE - TELE (206)

## 2020-02-17 RX ORDER — ATORVASTATIN CALCIUM 40 MG/1
40 TABLET, FILM COATED ORAL
Status: DISCONTINUED | OUTPATIENT
Start: 2020-02-17 | End: 2020-02-19 | Stop reason: HOSPADM

## 2020-02-17 RX ORDER — GABAPENTIN 300 MG/1
300 CAPSULE ORAL EVERY EVENING
Status: DISCONTINUED | OUTPATIENT
Start: 2020-02-17 | End: 2020-02-19 | Stop reason: HOSPADM

## 2020-02-17 RX ORDER — POLYETHYLENE GLYCOL 3350 17 G/17G
1 POWDER, FOR SOLUTION ORAL
Status: DISCONTINUED | OUTPATIENT
Start: 2020-02-17 | End: 2020-02-19 | Stop reason: HOSPADM

## 2020-02-17 RX ORDER — IRBESARTAN 150 MG/1
300 TABLET ORAL DAILY
Status: DISCONTINUED | OUTPATIENT
Start: 2020-02-17 | End: 2020-02-19 | Stop reason: HOSPADM

## 2020-02-17 RX ORDER — TEMAZEPAM 15 MG/1
30 CAPSULE ORAL
Status: DISCONTINUED | OUTPATIENT
Start: 2020-02-17 | End: 2020-02-19 | Stop reason: HOSPADM

## 2020-02-17 RX ORDER — GABAPENTIN 300 MG/1
600 CAPSULE ORAL EVERY MORNING
Status: DISCONTINUED | OUTPATIENT
Start: 2020-02-18 | End: 2020-02-19 | Stop reason: HOSPADM

## 2020-02-17 RX ORDER — OXYCODONE HYDROCHLORIDE 5 MG/1
5 TABLET ORAL
Status: DISCONTINUED | OUTPATIENT
Start: 2020-02-17 | End: 2020-02-19 | Stop reason: HOSPADM

## 2020-02-17 RX ORDER — AMOXICILLIN 250 MG
2 CAPSULE ORAL 2 TIMES DAILY
Status: DISCONTINUED | OUTPATIENT
Start: 2020-02-17 | End: 2020-02-19 | Stop reason: HOSPADM

## 2020-02-17 RX ORDER — OXYCODONE HYDROCHLORIDE 5 MG/1
2.5 TABLET ORAL
Status: DISCONTINUED | OUTPATIENT
Start: 2020-02-17 | End: 2020-02-19 | Stop reason: HOSPADM

## 2020-02-17 RX ORDER — POTASSIUM CHLORIDE 20 MEQ/1
40 TABLET, EXTENDED RELEASE ORAL DAILY
Status: DISCONTINUED | OUTPATIENT
Start: 2020-02-18 | End: 2020-02-19

## 2020-02-17 RX ORDER — MORPHINE SULFATE 4 MG/ML
2 INJECTION, SOLUTION INTRAMUSCULAR; INTRAVENOUS
Status: DISCONTINUED | OUTPATIENT
Start: 2020-02-17 | End: 2020-02-19 | Stop reason: HOSPADM

## 2020-02-17 RX ORDER — ACETAMINOPHEN 325 MG/1
650 TABLET ORAL EVERY 6 HOURS PRN
Status: DISCONTINUED | OUTPATIENT
Start: 2020-02-17 | End: 2020-02-19 | Stop reason: HOSPADM

## 2020-02-17 RX ORDER — BISACODYL 10 MG
10 SUPPOSITORY, RECTAL RECTAL
Status: DISCONTINUED | OUTPATIENT
Start: 2020-02-17 | End: 2020-02-19 | Stop reason: HOSPADM

## 2020-02-17 RX ORDER — EZETIMIBE 10 MG/1
10 TABLET ORAL
Status: DISCONTINUED | OUTPATIENT
Start: 2020-02-17 | End: 2020-02-19 | Stop reason: HOSPADM

## 2020-02-17 RX ORDER — NITROGLYCERIN 0.4 MG/1
0.4 TABLET SUBLINGUAL ONCE
Status: COMPLETED | OUTPATIENT
Start: 2020-02-17 | End: 2020-02-17

## 2020-02-17 RX ORDER — ONDANSETRON 2 MG/ML
4 INJECTION INTRAMUSCULAR; INTRAVENOUS EVERY 4 HOURS PRN
Status: DISCONTINUED | OUTPATIENT
Start: 2020-02-17 | End: 2020-02-17

## 2020-02-17 RX ORDER — WARFARIN SODIUM 4 MG/1
6-8 TABLET ORAL DAILY
Status: ON HOLD | COMMUNITY
End: 2020-02-19

## 2020-02-17 RX ORDER — ASPIRIN 325 MG
325 TABLET ORAL ONCE
Status: COMPLETED | OUTPATIENT
Start: 2020-02-17 | End: 2020-02-17

## 2020-02-17 RX ORDER — FUROSEMIDE 10 MG/ML
40 INJECTION INTRAMUSCULAR; INTRAVENOUS ONCE
Status: COMPLETED | OUTPATIENT
Start: 2020-02-17 | End: 2020-02-17

## 2020-02-17 RX ORDER — GABAPENTIN 300 MG/1
300-600 CAPSULE ORAL 2 TIMES DAILY
COMMUNITY
End: 2020-09-17

## 2020-02-17 RX ORDER — FUROSEMIDE 10 MG/ML
40 INJECTION INTRAMUSCULAR; INTRAVENOUS
Status: DISCONTINUED | OUTPATIENT
Start: 2020-02-17 | End: 2020-02-18

## 2020-02-17 RX ORDER — ONDANSETRON 4 MG/1
4 TABLET, ORALLY DISINTEGRATING ORAL EVERY 4 HOURS PRN
Status: DISCONTINUED | OUTPATIENT
Start: 2020-02-17 | End: 2020-02-17

## 2020-02-17 RX ORDER — DILTIAZEM HYDROCHLORIDE 120 MG/1
120 CAPSULE, COATED, EXTENDED RELEASE ORAL
Status: DISCONTINUED | OUTPATIENT
Start: 2020-02-18 | End: 2020-02-18

## 2020-02-17 RX ORDER — GABAPENTIN 300 MG/1
300-600 CAPSULE ORAL 2 TIMES DAILY
Status: DISCONTINUED | OUTPATIENT
Start: 2020-02-17 | End: 2020-02-17

## 2020-02-17 RX ADMIN — TEMAZEPAM 30 MG: 15 CAPSULE ORAL at 21:32

## 2020-02-17 RX ADMIN — EZETIMIBE 10 MG: 10 TABLET ORAL at 20:08

## 2020-02-17 RX ADMIN — GABAPENTIN 300 MG: 300 CAPSULE ORAL at 18:44

## 2020-02-17 RX ADMIN — NITROGLYCERIN 0.4 MG: 0.4 TABLET, ORALLY DISINTEGRATING SUBLINGUAL at 16:07

## 2020-02-17 RX ADMIN — ATORVASTATIN CALCIUM 40 MG: 40 TABLET, FILM COATED ORAL at 20:08

## 2020-02-17 RX ADMIN — ENOXAPARIN SODIUM 80 MG: 80 INJECTION, SOLUTION INTRAVENOUS; SUBCUTANEOUS at 20:08

## 2020-02-17 RX ADMIN — ASPIRIN 325 MG: 325 TABLET, FILM COATED ORAL at 16:07

## 2020-02-17 RX ADMIN — FUROSEMIDE 40 MG: 10 INJECTION, SOLUTION INTRAMUSCULAR; INTRAVENOUS at 18:22

## 2020-02-17 RX ADMIN — FUROSEMIDE 40 MG: 10 INJECTION, SOLUTION INTRAMUSCULAR; INTRAVENOUS at 16:08

## 2020-02-17 RX ADMIN — IRBESARTAN 300 MG: 150 TABLET, FILM COATED ORAL at 20:08

## 2020-02-17 SDOH — ECONOMIC STABILITY: FOOD INSECURITY: WITHIN THE PAST 12 MONTHS, YOU WORRIED THAT YOUR FOOD WOULD RUN OUT BEFORE YOU GOT MONEY TO BUY MORE.: NEVER TRUE

## 2020-02-17 SDOH — ECONOMIC STABILITY: FOOD INSECURITY: WITHIN THE PAST 12 MONTHS, THE FOOD YOU BOUGHT JUST DIDN'T LAST AND YOU DIDN'T HAVE MONEY TO GET MORE.: NEVER TRUE

## 2020-02-17 SDOH — ECONOMIC STABILITY: TRANSPORTATION INSECURITY
IN THE PAST 12 MONTHS, HAS LACK OF TRANSPORTATION KEPT YOU FROM MEETINGS, WORK, OR FROM GETTING THINGS NEEDED FOR DAILY LIVING?: NO

## 2020-02-17 SDOH — ECONOMIC STABILITY: TRANSPORTATION INSECURITY
IN THE PAST 12 MONTHS, HAS THE LACK OF TRANSPORTATION KEPT YOU FROM MEDICAL APPOINTMENTS OR FROM GETTING MEDICATIONS?: NO

## 2020-02-17 ASSESSMENT — LIFESTYLE VARIABLES
HAVE YOU EVER FELT YOU SHOULD CUT DOWN ON YOUR DRINKING: NO
TOTAL SCORE: 0
AVERAGE NUMBER OF DAYS PER WEEK YOU HAVE A DRINK CONTAINING ALCOHOL: 1
TOTAL SCORE: 0
DOES PATIENT WANT TO STOP DRINKING: NO
EVER_SMOKED: YES
EVER FELT BAD OR GUILTY ABOUT YOUR DRINKING: NO
CONSUMPTION TOTAL: NEGATIVE
ON A TYPICAL DAY WHEN YOU DRINK ALCOHOL HOW MANY DRINKS DO YOU HAVE: 2
HOW MANY TIMES IN THE PAST YEAR HAVE YOU HAD 5 OR MORE DRINKS IN A DAY: 0
ALCOHOL_USE: YES
SUBSTANCE_ABUSE: 0
HAVE PEOPLE ANNOYED YOU BY CRITICIZING YOUR DRINKING: NO
TOTAL SCORE: 0
EVER HAD A DRINK FIRST THING IN THE MORNING TO STEADY YOUR NERVES TO GET RID OF A HANGOVER: NO

## 2020-02-17 ASSESSMENT — ENCOUNTER SYMPTOMS
VOMITING: 0
HALLUCINATIONS: 0
BACK PAIN: 0
CHILLS: 0
PALPITATIONS: 0
COUGH: 0
FEVER: 0
SHORTNESS OF BREATH: 1
PHOTOPHOBIA: 0
BLURRED VISION: 0
SPEECH CHANGE: 0
NAUSEA: 0
HEADACHES: 0
POLYDIPSIA: 0
TREMORS: 0
SPUTUM PRODUCTION: 0
ORTHOPNEA: 0
WEIGHT LOSS: 0
FOCAL WEAKNESS: 0
NECK PAIN: 0
BRUISES/BLEEDS EASILY: 0
HEARTBURN: 0
FLANK PAIN: 0
HEMOPTYSIS: 0
DOUBLE VISION: 0
NERVOUS/ANXIOUS: 0

## 2020-02-17 ASSESSMENT — COGNITIVE AND FUNCTIONAL STATUS - GENERAL
SUGGESTED CMS G CODE MODIFIER DAILY ACTIVITY: CJ
MOVING TO AND FROM BED TO CHAIR: A LITTLE
DRESSING REGULAR LOWER BODY CLOTHING: A LITTLE
CLIMB 3 TO 5 STEPS WITH RAILING: A LITTLE
SUGGESTED CMS G CODE MODIFIER MOBILITY: CK
TOILETING: A LITTLE
STANDING UP FROM CHAIR USING ARMS: A LITTLE
HELP NEEDED FOR BATHING: A LITTLE
DAILY ACTIVITIY SCORE: 20
MOVING FROM LYING ON BACK TO SITTING ON SIDE OF FLAT BED: A LITTLE
MOBILITY SCORE: 19
WALKING IN HOSPITAL ROOM: A LITTLE
DRESSING REGULAR UPPER BODY CLOTHING: A LITTLE

## 2020-02-17 ASSESSMENT — COPD QUESTIONNAIRES
HAVE YOU SMOKED AT LEAST 100 CIGARETTES IN YOUR ENTIRE LIFE: YES
COPD SCREENING SCORE: 9
DO YOU EVER COUGH UP ANY MUCUS OR PHLEGM?: YES, A FEW DAYS A WEEK OR MONTH
DURING THE PAST 4 WEEKS HOW MUCH DID YOU FEEL SHORT OF BREATH: MOST  OR ALL OF THE TIME
IN THE PAST 12 MONTHS DO YOU DO LESS THAN YOU USED TO BECAUSE OF YOUR BREATHING PROBLEMS: STRONGLY AGREE

## 2020-02-17 ASSESSMENT — PATIENT HEALTH QUESTIONNAIRE - PHQ9
1. LITTLE INTEREST OR PLEASURE IN DOING THINGS: NOT AT ALL
SUM OF ALL RESPONSES TO PHQ9 QUESTIONS 1 AND 2: 0
2. FEELING DOWN, DEPRESSED, IRRITABLE, OR HOPELESS: NOT AT ALL

## 2020-02-17 NOTE — ED TRIAGE NOTES
Chief Complaint   Patient presents with   • Shortness of Breath     x 10 days worse during exertion. was diagnosed w/aortic valve stenosi.    • Chest Pain     yesterday describes as pressure.   • Ankle Swelling     bilateral ankle swelling x few days.     Educated on triage process. Instructed to notify staff for any worsening symptoms. Speaking in full sentences.

## 2020-02-17 NOTE — ED NOTES
Patient wheeled back to room. Placed on cardiac monitor, IV started. Awaiting ER physician assessment.

## 2020-02-17 NOTE — ED PROVIDER NOTES
ED Provider Note    Scribed for Marilyn Lu M.D. by Glenn Conley. 2/17/2020  1:49 PM    Primary care provider: Michael J Bloch, M.D.  Means of arrival: walk-in  History obtained from: patient  History limited by: none    CHIEF COMPLAINT  Chief Complaint   Patient presents with   • Shortness of Breath     x 10 days worse during exertion. was diagnosed w/aortic valve stenosi.    • Chest Pain     yesterday describes as pressure.   • Ankle Swelling     bilateral ankle swelling x few days.       HPI  Tristan Hester is a 84 y.o. male with a history of aortic stenosis who presents with complaints of shortness of breath acute onset about 1 month ago, worse over the last week or so. He notes that his symptoms are worsened with exertion. When his symptoms first began, he was trying to doing leg extensions at the gym which has been his regular routine for 30 years, but he was unable to due to shortness of breath. He is now having TELLEZ over short distances such as from the kitchen to the bedroom. He also typically bikes about 40 minutes a day, but he was only able to ride for 3-4 minutes a few weeks ago. He began developing chest pain 3 days ago which he describes as an aching pressure on the left. This has been intermittent, lasting about 30 minutes at a time. He does not have any symptoms right now. His last episode was a few hours ago this morning for about 10 minutes. The chest pain does not radiate throughout his chest. His shortness of breath does not worsen with the chest pain. He believes he may have some nausea with the chest pain, but he does not have any clammy skin or chills. He denies any orthopnea. He states he has been coughing for the last few month with production of clear sputum. No coughing of colored phlegm or blood.  Cough has gotten a bit worse over the last month since he's been short of breath.  He has been having occasional lightheadedness recently as well for about the past month. He denies any  syncope or falls. He has bilateral ankle swelling which began a few days ago as well. He did recently finish a road trip from [place], [place] to [place], [place]. They left on 2/7 and returned two days ago on 2/15. They did stop frequently to get up and walk. He denies any calf pain or swelling. He has been following up with Dr. Anderson (Cardiology). He has a history of DVT in 2015 following a spinal surgery, so he is currently anticoagulated on Coumadin. He has an angiogram scheduled for the end of the week. He originally stopped his Coumadin about 2 days ago for the upcoming procedure, but he accidentally took one dosage last night. He denies any history of MI or pleural effusion. He does have a previous history of carotid endarterectomy. He adds that he has also been under increased stress recently due to his sister recently dying after a oomplicated heart surgery at [place].     REVIEW OF SYSTEMS  See HPI for further details.  Positive for dyspnea on exertion, increased work of breathing, decreased exercise tolerance, lower extremity edema, increased cough, chest pain, lightheadedness.  Negative for hemoptysis, abdominal pain, nausea, vomiting, diaphoresis.  All other systems are negative.    PAST MEDICAL HISTORY  Past Medical History:   Diagnosis Date   • Arrhythmia     Afib   • Arthritis     osteo, hips and knees   • Blood clotting disorder (Formerly [facility])     , left arm   • Bronchitis        • Cancer (Formerly [facility])     Prostate CA--2000   • Carotid arterial disease (Formerly [facility]) 1/6/2014   • Cataract     removed bilat   • DVT (deep venous thrombosis) (Formerly [facility]) 2016   • Heart valve disease     MVR   • HLD (hyperlipidemia) 1/6/2014   • Hypertension    • Mitral regurgitation 1/6/2014   • Pain 12-14-15    low back, hips, 2-3/10   • Pneumonia        • Stroke (Formerly [facility]) 2008    TIA, no residual   • TIA (transient ischemic attack)     2008   • Urinary bladder disorder     HAD MALE BLADDER SUSPENSION PROCEDURE DONE   • Urinary incontinence         FAMILY HISTORY  Family History   Problem Relation Age of Onset   • Heart Disease Father         CHF at 91.   • Other Brother         Aneurysm   • Cancer Brother         Seminal vascular cancer   • Autoimmune Disease Daughter         Lupus, RA   • No Known Problems Daughter    • No Known Problems Son    • No Known Problems Son    • Heart Attack Neg Hx        SOCIAL HISTORY  Social History     Socioeconomic History   • Marital status:    Tobacco Use   • Smoking status: Former Smoker     Packs/day: 0.50     Years: 30.00     Pack years: 15.00     Types: Cigarettes, Pipe     Last attempt to quit: 1985     Years since quittin.1   • Smokeless tobacco: Never Used   Substance and Sexual Activity   • Alcohol use: Yes     Alcohol/week: 3.0 oz     Types: 3 Cans of beer, 3 Shots of liquor per week     Comment: 1 x week   • Drug use: No   • Sexual activity: Not Currently       SURGICAL HISTORY  Past Surgical History:   Procedure Laterality Date   • LUMBAR FUSION POSTERIOR  2015    Procedure: LUMBAR FUSION POSTERIOR L3-5 with peek rods;  Surgeon: Michael Sarkar M.D.;  Location: [facility];  Service:    • LUMBAR DECOMPRESSION  2015    Procedure: LUMBAR DECOMPRESSION posterior redo decompression L3-5, with dural repair;  Surgeon: Michael Sarkar M.D.;  Location: [facility];  Service:    • THORACOSCOPY  2015    Procedure: THORACOSCOPY with decortication VATS , side to be determined  ;  Surgeon: Elmira Holder M.D.;  Location: [facility];  Service:    • LUMBAR LAMINECTOMY DISKECTOMY  10/8/2010    Performed by MICHAEL SARKAR at [facility]   • CERVICAL DISK AND FUSION ANTERIOR  10/26/2009    Performed by MICHAEL SARKAR at [facility]   • CAROTID ENDARTERECTOMY  7/10/08    Performed by LEIGH MORATAYA at [facility]   • BLADDER SLING MALE  2004   • PROSTATECTOMY, RADIAL  2000       CURRENT MEDICATIONS  Home Medications      "Reviewed by Claudine Wen, Student (Nurse Apprentice) on 02/17/20 at 1820  Med List Status: Complete   Medication Last Dose Status   acetaminophen (TYLENOL) 325 MG Tab 2/17/2020 Active   atorvastatin (LIPITOR) 80 MG tablet 2/16/2020 Active   CARTIA  MG CAPSULE SR 24 HR 2/17/2020 Active   ezetimibe (ZETIA) 10 MG Tab 2/16/2020 Active   gabapentin (NEURONTIN) 300 MG Cap 2/17/2020 Active   irbesartan (AVAPRO) 300 MG Tab 2/16/2020 Active   Multiple Vitamin (MULTI-VITAMINS) Tab 2/17/2020 Active   temazepam (RESTORIL) 30 MG capsule 2/16/2020 Active   warfarin (COUMADIN) 4 MG Tab 2/16/2020 Active                ALLERGIES  Allergies   Allergen Reactions   • Nkda [No Known Drug Allergy]    • Other Environmental      Seasonal, cats-hayfever       PHYSICAL EXAM  VITAL SIGNS: /81   Pulse 77   Temp 36.4 °C (97.5 °F) (Temporal)   Resp 16   Ht 1.803 m (5' 11\")   Wt 83.9 kg (185 lb)   SpO2 100%   BMI 25.80 kg/m²    Constitutional: Well developed, well nourished; No acute distress; Non-toxic appearance.   HENT: Normocephalic, atraumatic; Bilateral external ears normal; Oropharynx with moist mucous membranes; No erythema or exudates in the posterior oropharynx.   Eyes: PERRL, EOMI, Conjunctiva normal. No discharge.   Neck:  Carotid endarterectomy scar on the right; no JVD; supple, nontender midline; No stridor; No nuchal rigidity.   Lymphatic: No cervical lymphadenopathy noted.   Cardiovascular: Regular rate and rhythm; subtle systolic murmur  Thorax & Lungs: Decreased breath sounds throughout, but otherwise clear without wheezing, rales or rhonchi. Nontender chest wall. No crepitus or subcutaneous air  Abdomen: Soft, nontender, bowel sounds normal. No obvious masses; No pulsatile masses; no rebound, guarding, or peritoneal signs.   Skin: Good color; warm and dry without rash or petechia.  Back: Nontender, No CVA tenderness.   Extremities: Distal radial, dorsalis pedis, posterior tibial pulses are equal " bilaterally; 2-3+ pitting edema bilateral lower extremities; Nontender calves or saphenous, No cyanosis, No clubbing.   Musculoskeletal: Good range of motion in all major joints. No tenderness to palpation or major deformities noted.   Neurologic: Alert & oriented x 4, clear speech      EKG  12-lead EKG interpreted by me as shown below.     LABS/RADIOLOGY/PROCEDURES  Results for orders placed or performed during the hospital encounter of 02/17/20   CBC with Differential   Result Value Ref Range    WBC 5.5 4.8 - 10.8 K/uL    RBC 4.41 (L) 4.70 - 6.10 M/uL    Hemoglobin 13.2 (L) 14.0 - 18.0 g/dL    Hematocrit 41.1 (L) 42.0 - 52.0 %    MCV 93.2 81.4 - 97.8 fL    MCH 29.9 27.0 - 33.0 pg    MCHC 32.1 (L) 33.7 - 35.3 g/dL    RDW 50.1 (H) 35.9 - 50.0 fL    Platelet Count 201 164 - 446 K/uL    MPV 10.6 9.0 - 12.9 fL    Neutrophils-Polys 70.60 44.00 - 72.00 %    Lymphocytes 16.20 (L) 22.00 - 41.00 %    Monocytes 10.10 0.00 - 13.40 %    Eosinophils 2.00 0.00 - 6.90 %    Basophils 0.70 0.00 - 1.80 %    Immature Granulocytes 0.40 0.00 - 0.90 %    Nucleated RBC 0.00 /100 WBC    Neutrophils (Absolute) 3.91 1.82 - 7.42 K/uL    Lymphs (Absolute) 0.90 (L) 1.00 - 4.80 K/uL    Monos (Absolute) 0.56 0.00 - 0.85 K/uL    Eos (Absolute) 0.11 0.00 - 0.51 K/uL    Baso (Absolute) 0.04 0.00 - 0.12 K/uL    Immature Granulocytes (abs) 0.02 0.00 - 0.11 K/uL    NRBC (Absolute) 0.00 K/uL   Complete Metabolic Panel (CMP)   Result Value Ref Range    Sodium 139 135 - 145 mmol/L    Potassium 4.0 3.6 - 5.5 mmol/L    Chloride 109 96 - 112 mmol/L    Co2 24 20 - 33 mmol/L    Anion Gap 6.0 0.0 - 11.9    Glucose 100 (H) 65 - 99 mg/dL    Bun 17 8 - 22 mg/dL    Creatinine 0.99 0.50 - 1.40 mg/dL    Calcium 9.1 8.5 - 10.5 mg/dL    AST(SGOT) 39 12 - 45 U/L    ALT(SGPT) 61 (H) 2 - 50 U/L    Alkaline Phosphatase 66 30 - 99 U/L    Total Bilirubin 1.1 0.1 - 1.5 mg/dL    Albumin 3.6 3.2 - 4.9 g/dL    Total Protein 6.7 6.0 - 8.2 g/dL    Globulin 3.1 1.9 - 3.5 g/dL     A-G Ratio 1.2 g/dL   Troponin   Result Value Ref Range    Troponin T 18 6 - 19 ng/L   ESTIMATED GFR   Result Value Ref Range    GFR If African American >60 >60 mL/min/1.73 m 2    GFR If Non African American >60 >60 mL/min/1.73 m 2   proBrain Natriuretic Peptide, NT   Result Value Ref Range    NT-proBNP 13880 (H) 0 - 125 pg/mL   PROTHROMBIN TIME (INR)   Result Value Ref Range    PT 36.1 (H) 12.0 - 14.6 sec    INR 3.45 (H) 0.87 - 1.13   APTT   Result Value Ref Range    APTT 47.8 (H) 24.7 - 36.0 sec   Magnesium   Result Value Ref Range    Magnesium 1.8 1.5 - 2.5 mg/dL   EKG (NOW)   Result Value Ref Range    Report       [facility] Emergency Dept.    Test Date:  2020  Pt Name:    ANTHONY JOSHI                 Department: ER  MRN:        [id number]                      Room:  Gender:     Male                         Technician: [id number]  :        [id number]                   Requested By:ER TRIAGE PROTOCOL  Order #:    [id number]                    Reading MD:    Measurements  Intervals                                Axis  Rate:       74                           P:          55  UT:         212                          QRS:        81  QRSD:       94                           T:          29  QT:         460  QTc:        511    Interpretive Statements  SINUS RHYTHM  BORDERLINE AV CONDUCTION DELAY  BORDERLINE RIGHT AXIS DEVIATION  MINIMAL ST DEPRESSION, ANTEROLATERAL LEADS  PROLONGED QT INTERVAL  Compared to ECG 2020 14:50:11  ST (T wave) deviation now present  Prolonged QT interval now present          US-EXTREMITY VENOUS LOWER BILAT   Final Result      DX-CHEST-PORTABLE (1 VIEW)   Final Result      Small right pleural effusion with overlying atelectasis/consolidation. Follow-up to radiographic resolution is recommended.      Cardiomegaly with is increased compared to the prior examination. Correlation with an echocardiogram is recommended.      Atherosclerotic plaque.                   COURSE &  MEDICAL DECISION MAKING  Pertinent Labs & Imaging studies reviewed. (See chart for details)    Reviewed patient's old medical records which showed the patient was recently seen in the vascular office on February 6th. He has a history of PAF, non-rheumatic mitral valve regurgitation, aortic valve stenosis, CAD, HTN, HLD. He is scheduled for an catheretization on 2/21/2020.     1:49 PM - Patient seen and examined at bedside. Discussed plan of care, including labs, imaging, consult with cardiology, and likely admission. Patient agrees to the plan of care.     4:02 PM [facility] cardiology.  Patient will also be treated with  mg, Nitro 0.4 mg and Lasix 40 mg.     4:09 PM I discussed the patient's case and the above findings with Dr. Bae (Cardiology) who agreed with the plan for admission for further evaluation. [facility] hospitalist.    4:27 PM I discussed the patient's case and the above findings with Dr. Romero (Hospitalist) who agrees to hospitalize the patient.      4:30 PM Patient was reevaluated at bedside. Discussed lab and radiology results with the patient and his wife and informed them of the plan for admission. Patient verbalizes understanding and agreement to this plan of care.          Patient presents to the ER complaining of decreased exercise tolerance, dyspnea on exertion, and increased work of breathing with any activity over the last month, worse over the last few weeks.  Over the last 3 days patient is also been experiencing some intermittent chest pain.  He describes the pain as a pressure sensation.  It comes and goes.  It is nonradiating.  Patient has history of aortic stenosis.  He is preparing for a TAVR.  He is set to undergo angiogram on February 21 to evaluate his coronary arteries in preparation for TAVR.  He has never had any fluid overload or pulmonary edema/CHF in the past.  The patient states that he has been going to the gym and doing the same exercises for many years.  Over the last  "month or so he is noticed that he cannot do the exercises he used to be able to do.  He used to build to ride the bike for 40 minutes at a time and now he can only ride the bike for 3 minutes at a time.  He also says he can barely walk from 1 room to another in his home, which is very unusual for him.  He recently traveled to [place] by vehicle.  He got out of the car multiple times during the trip down there to walk around.  He has a history of DVT and PE.  He had some lower extremity edema for the last few days.  He stopped taking his Coumadin and pressurization for the angiogram, but he said he \"accidentally took 1 yesterday.  INR is elevated today at 3.45.  Bilateral lower extremity ultrasound is negative for DVT.  With a supratherapeutic INR and no lower extremity DVT with findings of pleural effusion and interstitial edema on his chest x-ray, I think pulmonary embolism is unlikely.  Patient had a chest x-ray in December 2019.  Chest x-ray today reveals enlargement of the cardiac silhouette when compared to chest x-ray done just a few months ago.  EKG reveals some nonspecific T wave flattening, but does not look to do similar from previous.  No ST elevation or depression.  Patient has been having some chest pain on and off over the last couple days but no chest pain here in the ER.  He has known aortic stenosis.  I am concerned that his left ear is getting worse.  It certainly seems as though he is developed congestive heart failure.  He has a right pleural effusion.  He said he has had right pleural effusion in the past which at that time was attributed to empyema/pneumonia.  He is not had any fevers or chills.  He said he had coughing for the last 2 months but he is not coughing up any purulent material or blood.  White count is normal.  This time I do not think the pleural effusion is related to pneumonia, although that that is on the differential.  At this time I think his fluid overload is more likely " related to his cardiac issues.  I spoke to Dr. Bae, cardiologist on-call.  He is kindly come down to the ER to evaluate the patient.  He recommends hospitalize the patient overnight under the care of the internal medicine service and diuresing the patient.  He feels the patient will need another echocardiogram and we may need to expedite his angiogram as well.  Patient is not in any respiratory distress.  I think he is stable for the telemetry floor.  I spoke with the Dr. Romero, hospitalist on-call, and he will kindly evaluate the patient for hospitalization on his service.    DISPOSITION:  Patient will be hospitalized by Dr. Romero in guarded condition.    FINAL IMPRESSION  1. Chest pain, unspecified type Acute   2. Pleural effusion Acute   3. Acute pulmonary edema (HCC) Acute        This dictation has been created using voice recognition software. The accuracy of the dictation is limited by the abilities of the software. I expect there may be some errors of grammar and possibly content. I made every attempt to manually correct the errors within my dictation. However, errors related to voice recognition software may still exist and should be interpreted within the appropriate context.     Glenn JEFFERSON (Scribe), am scribing for, and in the presence of, Marilyn Lu M.D..    Electronically signed by: Glenn Conley (Sadia), 2/17/2020    Marilyn JEFFERSON M.D. personally performed the services described in this documentation, as scribed by Glenn Conley in my presence, and it is both accurate and complete. C    The note accurately reflects work and decisions made by me.  Marilyn Lu M.D.  2/17/2020  8:39 PM

## 2020-02-17 NOTE — ED NOTES
Med Rec completed per patient   Allergies reviewed  No ORAL antibiotics in last 14 days    Patient takes Warfarin   6 mg all last week  8 mg last night (2/16/2020)

## 2020-02-18 ENCOUNTER — APPOINTMENT (OUTPATIENT)
Dept: RADIOLOGY | Facility: MEDICAL CENTER | Age: 85
DRG: 291 | End: 2020-02-18
Attending: NURSE PRACTITIONER
Payer: MEDICARE

## 2020-02-18 PROBLEM — I77.9 CAROTID ARTERY DISEASE (HCC): Status: ACTIVE | Noted: 2020-02-18

## 2020-02-18 LAB
ANION GAP SERPL CALC-SCNC: 9 MMOL/L (ref 0–11.9)
BASOPHILS # BLD AUTO: 1 % (ref 0–1.8)
BASOPHILS # BLD: 0.05 K/UL (ref 0–0.12)
BUN SERPL-MCNC: 18 MG/DL (ref 8–22)
CALCIUM SERPL-MCNC: 8.8 MG/DL (ref 8.5–10.5)
CHLORIDE SERPL-SCNC: 106 MMOL/L (ref 96–112)
CO2 SERPL-SCNC: 28 MMOL/L (ref 20–33)
CREAT SERPL-MCNC: 0.98 MG/DL (ref 0.5–1.4)
EOSINOPHIL # BLD AUTO: 0.22 K/UL (ref 0–0.51)
EOSINOPHIL NFR BLD: 4.3 % (ref 0–6.9)
ERYTHROCYTE [DISTWIDTH] IN BLOOD BY AUTOMATED COUNT: 50.3 FL (ref 35.9–50)
GLUCOSE SERPL-MCNC: 90 MG/DL (ref 65–99)
HCT VFR BLD AUTO: 38.7 % (ref 42–52)
HGB BLD-MCNC: 12.5 G/DL (ref 14–18)
IMM GRANULOCYTES # BLD AUTO: 0.02 K/UL (ref 0–0.11)
IMM GRANULOCYTES NFR BLD AUTO: 0.4 % (ref 0–0.9)
LYMPHOCYTES # BLD AUTO: 1.34 K/UL (ref 1–4.8)
LYMPHOCYTES NFR BLD: 26.4 % (ref 22–41)
MCH RBC QN AUTO: 30 PG (ref 27–33)
MCHC RBC AUTO-ENTMCNC: 32.3 G/DL (ref 33.7–35.3)
MCV RBC AUTO: 92.8 FL (ref 81.4–97.8)
MONOCYTES # BLD AUTO: 0.65 K/UL (ref 0–0.85)
MONOCYTES NFR BLD AUTO: 12.8 % (ref 0–13.4)
NEUTROPHILS # BLD AUTO: 2.79 K/UL (ref 1.82–7.42)
NEUTROPHILS NFR BLD: 55.1 % (ref 44–72)
NRBC # BLD AUTO: 0 K/UL
NRBC BLD-RTO: 0 /100 WBC
NT-PROBNP SERPL IA-MCNC: ABNORMAL PG/ML (ref 0–125)
PLATELET # BLD AUTO: 176 K/UL (ref 164–446)
PMV BLD AUTO: 10.8 FL (ref 9–12.9)
POTASSIUM SERPL-SCNC: 3.6 MMOL/L (ref 3.6–5.5)
RBC # BLD AUTO: 4.17 M/UL (ref 4.7–6.1)
SODIUM SERPL-SCNC: 143 MMOL/L (ref 135–145)
WBC # BLD AUTO: 5.1 K/UL (ref 4.8–10.8)

## 2020-02-18 PROCEDURE — 71275 CT ANGIOGRAPHY CHEST: CPT

## 2020-02-18 PROCEDURE — 700117 HCHG RX CONTRAST REV CODE 255: Performed by: NURSE PRACTITIONER

## 2020-02-18 PROCEDURE — A9270 NON-COVERED ITEM OR SERVICE: HCPCS | Performed by: INTERNAL MEDICINE

## 2020-02-18 PROCEDURE — 99233 SBSQ HOSP IP/OBS HIGH 50: CPT | Performed by: FAMILY MEDICINE

## 2020-02-18 PROCEDURE — 700102 HCHG RX REV CODE 250 W/ 637 OVERRIDE(OP): Performed by: INTERNAL MEDICINE

## 2020-02-18 PROCEDURE — 700111 HCHG RX REV CODE 636 W/ 250 OVERRIDE (IP): Performed by: HOSPITALIST

## 2020-02-18 PROCEDURE — 36415 COLL VENOUS BLD VENIPUNCTURE: CPT

## 2020-02-18 PROCEDURE — 83880 ASSAY OF NATRIURETIC PEPTIDE: CPT

## 2020-02-18 PROCEDURE — 74174 CTA ABD&PLVS W/CONTRAST: CPT

## 2020-02-18 PROCEDURE — 80048 BASIC METABOLIC PNL TOTAL CA: CPT

## 2020-02-18 PROCEDURE — 85025 COMPLETE CBC W/AUTO DIFF WBC: CPT

## 2020-02-18 PROCEDURE — 700111 HCHG RX REV CODE 636 W/ 250 OVERRIDE (IP): Performed by: INTERNAL MEDICINE

## 2020-02-18 PROCEDURE — 99223 1ST HOSP IP/OBS HIGH 75: CPT | Performed by: THORACIC SURGERY (CARDIOTHORACIC VASCULAR SURGERY)

## 2020-02-18 PROCEDURE — 99232 SBSQ HOSP IP/OBS MODERATE 35: CPT | Performed by: INTERNAL MEDICINE

## 2020-02-18 PROCEDURE — 770020 HCHG ROOM/CARE - TELE (206)

## 2020-02-18 RX ORDER — FUROSEMIDE 10 MG/ML
40 INJECTION INTRAMUSCULAR; INTRAVENOUS
Status: DISCONTINUED | OUTPATIENT
Start: 2020-02-19 | End: 2020-02-19

## 2020-02-18 RX ORDER — MAGNESIUM SULFATE HEPTAHYDRATE 40 MG/ML
2 INJECTION, SOLUTION INTRAVENOUS ONCE
Status: COMPLETED | OUTPATIENT
Start: 2020-02-18 | End: 2020-02-18

## 2020-02-18 RX ADMIN — POTASSIUM CHLORIDE 40 MEQ: 1500 TABLET, EXTENDED RELEASE ORAL at 05:12

## 2020-02-18 RX ADMIN — FUROSEMIDE 40 MG: 10 INJECTION, SOLUTION INTRAMUSCULAR; INTRAVENOUS at 05:12

## 2020-02-18 RX ADMIN — IOHEXOL 90 ML: 350 INJECTION, SOLUTION INTRAVENOUS at 14:24

## 2020-02-18 RX ADMIN — GABAPENTIN 300 MG: 300 CAPSULE ORAL at 17:20

## 2020-02-18 RX ADMIN — GABAPENTIN 600 MG: 300 CAPSULE ORAL at 05:12

## 2020-02-18 RX ADMIN — ENOXAPARIN SODIUM 80 MG: 80 INJECTION, SOLUTION INTRAVENOUS; SUBCUTANEOUS at 05:12

## 2020-02-18 RX ADMIN — TEMAZEPAM 30 MG: 15 CAPSULE ORAL at 20:41

## 2020-02-18 RX ADMIN — MAGNESIUM SULFATE HEPTAHYDRATE 2 G: 40 INJECTION, SOLUTION INTRAVENOUS at 03:33

## 2020-02-18 RX ADMIN — ATORVASTATIN CALCIUM 40 MG: 40 TABLET, FILM COATED ORAL at 20:41

## 2020-02-18 RX ADMIN — EZETIMIBE 10 MG: 10 TABLET ORAL at 20:41

## 2020-02-18 ASSESSMENT — ENCOUNTER SYMPTOMS
CHEST TIGHTNESS: 0
NECK PAIN: 0
NAUSEA: 0
SHORTNESS OF BREATH: 0
COLOR CHANGE: 0
PALPITATIONS: 0
DIAPHORESIS: 0
FLANK PAIN: 0
SORE THROAT: 0
WEAKNESS: 1
FEVER: 0
TROUBLE SWALLOWING: 0
ABDOMINAL PAIN: 0
CONFUSION: 0
CHILLS: 0
AGITATION: 0
SPEECH CHANGE: 0
SENSORY CHANGE: 0
ABDOMINAL DISTENTION: 0
EYES NEGATIVE: 1
PSYCHIATRIC NEGATIVE: 1
WHEEZING: 0
HEARTBURN: 0
DIARRHEA: 0
MUSCULOSKELETAL NEGATIVE: 1
NERVOUS/ANXIOUS: 0
HEADACHES: 0
BLURRED VISION: 0
SHORTNESS OF BREATH: 1
NEUROLOGICAL NEGATIVE: 1
GASTROINTESTINAL NEGATIVE: 1
COUGH: 0
NUMBNESS: 0
DIZZINESS: 0
FOCAL WEAKNESS: 0
BLOOD IN STOOL: 0
VOMITING: 0
BACK PAIN: 0

## 2020-02-18 NOTE — PROGRESS NOTES
Pt arrived to T723-2 with ACLS RN. VSS. Pt reports no chest pain at this time, declines any SOB. Currently on 2L NC oxygen, does not wear oxygen at home. Pt a SBA to bathroom, steady on feet with cane. Tele box on, monitor room notified, pt in SR. Plan of care discussed with pt and wife, all questions answered. Bed locked and in lowest position, call light and belongings within reach. Will continue to monitor.

## 2020-02-18 NOTE — DISCHARGE PLANNING
"Patient is a 84 year old  man who lives with his spouse, Tori in [place], [place]. 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 Has SS and halfway. Has Medicare and AARP insurance.     PCP is Dr. Bloch. Has many specialists: states, \"I see everyone but a psychiatrist.\" Patient reports no regular issues with ADLs, uses cane on occasion when he is outside. No issues with iADLs. Pharmacy is [facility] on [address]. No past AOD or MH issues.     Has never been on home O2, states, \"This is regularly monitored\" by his doctor. He reports he has never been below 91. He reports he hopes he does not have to go home with O2, but if he does, he will. Patient is aware of surgery, hopes it will be sooner.     Upon D/C, spouse, Tori will transport home. He reports his wife started the [employer] 20 years ago in [place] and is being honored with a portrait she will sit for tomorrow. Patient talks about all the buildings they have saved in [place].    No current needs. Will monitor for possible home O2 closer to D/C.     Care Transition Team Assessment    Information Source  Orientation : Oriented x 4  Information Given By: Patient  Who is responsible for making decisions for patient? : Patient    Readmission Evaluation  Is this a readmission?: No    Elopement Risk  Legal Hold: No  Ambulatory or Self Mobile in Wheelchair: No-Not an Elopement Risk  Disoriented: No  Psychiatric Symptoms: None  History of Wandering: No  Elopement this Admit: No  Vocalizing Wanting to Leave: No  Displays Behaviors, Body Language Wanting to Leave: No-Not at Risk for Elopement  Elopement Risk: Not at Risk for Elopement    Interdisciplinary Discharge Planning  Patient or legal guardian wants to designate a caregiver (see row info): No    Discharge Preparedness  What is your plan after discharge?: Home with help  What are your discharge supports?: Spouse  Prior Functional Level: Ambulatory, Drives Self, " Independent with Activities of Daily Living, Independent with Medication Management, Uses Cane  Difficulity with ADLs: Walking  Difficulty with ADLs Comment: Uses cane outside  Difficulity with IADLs: None    Functional Assesment  Prior Functional Level: Ambulatory, Drives Self, Independent with Activities of Daily Living, Independent with Medication Management, Uses Cane    Finances  Financial Barriers to Discharge: No  Prescription Coverage: Yes    Vision / Hearing Impairment  Vision Impairment : Yes  Hearing Impairment : No         Advance Directive  Advance Directive?: DPOA for Health Care  Durable Power of  Name and Contact : Tori Hester    Domestic Abuse  Have you ever been the victim of abuse or violence?: No  Physical Abuse or Sexual Abuse: No  Verbal Abuse or Emotional Abuse: No  Possible Abuse Reported to:: Not Applicable    Psychological Assessment  History of Substance Abuse: None  History of Psychiatric Problems: No  Non-compliant with Treatment: No  Newly Diagnosed Illness: Yes    Discharge Risks or Barriers  Discharge risks or barriers?: No    Anticipated Discharge Information  Anticipated discharge disposition: Home  [place] [facility] [place] [address]  Discharge Contact Phone Number: [phone]

## 2020-02-18 NOTE — CONSULTS
"CARDIAC CONSULTATION    Requesting Provider:  Dr. Lu  Reason for consultation: Severe aortic stenosis    Impressions:  #.  Severe symptomatic aortic stenosis, with mild to moderate MR  #.  Acute decompensated diastolic heart failure related to valvular heart disease  #.  History of PAF as well as DVT-anticoagulated with Coumadin  #.  Prior TIA with history of CVA in   #.  Borderline prolonged QT interval    Recommendations:  Agree with diuresis    Expedited inpatient TAVR evaluation.  Hold Coumadin.  Will need cardiac catheterization and TAVR CT as well as evaluation by cardiac surgeon    Continue other home cardiovascular medications    Will follow    History: Tristan Hester is a 84 y.o. male with a past medical history of paroxysmal atrial fibrillation, valvular heart disease (Aortic stenosis) and Prior carotid endarterectomy who I have been consulted regarding heart failure.  The patient was recently seen in our office with symptoms of progressive aortic stenosis.  He was planned undergo cardiac catheterization this coming Friday and undergo TAVR evaluation.  In the interim he has had worsening symptoms which prompted him to seek medical attention today.  He is concerned that is health condition will lead him to missing the  of his late sister a week from Thursday.    He describes intolerable, class IV fatigue and dyspnea with exertion as well as lightheadedness.  The symptoms began approximately 1 month ago and have progressed rapidly over that time.  In the emergency room he was found to have elevated NT BNP and chest x-ray compatible with pulmonary edema.  He was given IV Lasix.    ROS:  All other systems reviewed and negative except as per the HPI    PE:  /81   Pulse 76   Temp 36.4 °C (97.5 °F) (Temporal)   Resp (!) 29   Ht 1.803 m (5' 11\")   Wt 83.9 kg (185 lb)   SpO2 95%   BMI 25.80 kg/m²   GEN: Well appearing  HEENT: Symmetric face. Anicteric sclerae. Moist mucus " membranes  NECK: +JVD. No lymphadenopathy  CARDIAC: Normal PMI, regular, normal S1, S2.  Harsh late peaking systolic ejection murmur  VASCULATURE: Delayed carotid upstroke   RESP: Bibasilar rales  ABD: Soft, non-tender, non-distended  EXT: 1+ edema, no clubbing or cyanosis  SKIN: Warm and dry  NEURO: No gross deficits  PSYCH: Appropriate affect, participates in conversation      Past Medical History:   Diagnosis Date   • Arrhythmia     Afib   • Arthritis     osteo, hips and knees   • Blood clotting disorder ([facility])     , left arm   • Bronchitis        • Cancer ([facility])     Prostate CA--2000   • Carotid arterial disease ([facility]) 1/6/2014   • Cataract     removed bilat   • DVT (deep venous thrombosis) ([facility]) 2016   • Heart valve disease     MVR   • HLD (hyperlipidemia) 1/6/2014   • Hypertension    • Mitral regurgitation 1/6/2014   • Pain 12-14-15    low back, hips, 2-3/10   • Pneumonia        • Stroke ([facility]) 2008    TIA, no residual   • TIA (transient ischemic attack)     2008   • Urinary bladder disorder     HAD MALE BLADDER SUSPENSION PROCEDURE DONE   • Urinary incontinence      Past Surgical History:   Procedure Laterality Date   • LUMBAR FUSION POSTERIOR  12/28/2015    Procedure: LUMBAR FUSION POSTERIOR L3-5 with peek rods;  Surgeon: Michael Sarkar M.D.;  Location: [facility];  Service:    • LUMBAR DECOMPRESSION  12/28/2015    Procedure: LUMBAR DECOMPRESSION posterior redo decompression L3-5, with dural repair;  Surgeon: Michael Sarkar M.D.;  Location: [facility];  Service:    • THORACOSCOPY  6/13/2015    Procedure: THORACOSCOPY with decortication VATS , side to be determined  ;  Surgeon: Elmira Holder M.D.;  Location: [facility];  Service:    • LUMBAR LAMINECTOMY DISKECTOMY  10/8/2010    Performed by MICHAEL SARKAR at [facility]   • CERVICAL DISK AND FUSION ANTERIOR  10/26/2009    Performed by MICHAEL SARKAR at [facility]   • CAROTID  ENDARTERECTOMY  7/10/08    Performed by LIEGH MORATAYA at SURGERY [facility]S   • BLADDER SLING MALE  11/2004   • PROSTATECTOMY, RADIAL  11/2000     Allergies   Allergen Reactions   • Nkda [No Known Drug Allergy]    • Other Environmental      Seasonal, cats-hayfever         Current Facility-Administered Medications:   •  senna-docusate (PERICOLACE or SENOKOT S) 8.6-50 MG per tablet 2 Tab, 2 Tab, Oral, BID **AND** polyethylene glycol/lytes (MIRALAX) PACKET 1 Packet, 1 Packet, Oral, QDAY PRN **AND** magnesium hydroxide (MILK OF MAGNESIA) suspension 30 mL, 30 mL, Oral, QDAY PRN **AND** bisacodyl (DULCOLAX) suppository 10 mg, 10 mg, Rectal, QDAY PRN, Pedrito Romero M.D.  •  Respiratory Therapy Consult, , Nebulization, Continuous RT, Pedrito Romero M.D.  •  ondansetron (ZOFRAN) syringe/vial injection 4 mg, 4 mg, Intravenous, Q4HRS PRN, Pedrito Romero M.D.  •  ondansetron (ZOFRAN ODT) dispertab 4 mg, 4 mg, Oral, Q4HRS PRN, Pedrito Romero M.D.  •  acetaminophen (TYLENOL) tablet 650 mg, 650 mg, Oral, Q6HRS PRN, Pedrito Romero M.D.  •  Notify provider if pain remains uncontrolled, , , CONTINUOUS **AND** Use the numeric rating scale (NRS-11) on regular floors and Critical-Care Pain Observation Tool (CPOT) on ICUs/Trauma to assess pain, , , CONTINUOUS **AND** Pulse Ox (Oximetry), , , CONTINUOUS **AND** Pharmacy Consult Request ...Pain Management Review 1 Each, 1 Each, Other, PHARMACY TO DOSE **AND** If patient difficult to arouse and/or has respiratory depression, stop any opiates that are currently infusing and call a Rapid Response., , , CONTINUOUS **AND** oxyCODONE immediate-release (ROXICODONE) tablet 2.5 mg, 2.5 mg, Oral, Q3HRS PRN **AND** oxyCODONE immediate-release (ROXICODONE) tablet 5 mg, 5 mg, Oral, Q3HRS PRN **AND** morphine (pf) 4 MG/ML injection 2 mg, 2 mg, Intravenous, Q3HRS PRN, Pedrito Romero M.D.  •  furosemide (LASIX) injection 40 mg, 40 mg, Intravenous, BID DIURETIC, Pedrito Romero,  M.D.  •  atorvastatin (LIPITOR) tablet 40 mg, 40 mg, Oral, QHS, Pedrito Romero M.D.  •  [START ON 2/18/2020] DILTIAZem CD (CARDIZEM CD) capsule 120 mg, 120 mg, Oral, Q DAY, Pedrito Romero M.D.  •  ezetimibe (ZETIA) tablet 10 mg, 10 mg, Oral, QHS, Pedrito Romero M.D.  •  irbesartan (AVAPRO) tablet 300 mg, 300 mg, Oral, DAILY, Pedrito Romero M.D.  •  temazepam (RESTORIL) capsule 30 mg, 30 mg, Oral, QHS PRN, Pedrito Romero M.D.  •  [START ON 2/18/2020] potassium chloride SA (Kdur) tablet 40 mEq, 40 mEq, Oral, DAILY, Pedrito Romero M.D.  •  enoxaparin (LOVENOX) inj 80 mg, 80 mg, Subcutaneous, Q12HRS, Pedrito Romero M.D.  •  [START ON 2/18/2020] gabapentin (NEURONTIN) capsule 600 mg, 600 mg, Oral, QAM **AND** gabapentin (NEURONTIN) capsule 300 mg, 300 mg, Oral, Q EVENING, Pedrito Romero M.D.    Current Outpatient Medications:   •  gabapentin (NEURONTIN) 300 MG Cap, Take 300-600 mg by mouth 2 Times a Day. 600 mg in the  mg in the PM, Disp: , Rfl:   •  warfarin (COUMADIN) 4 MG Tab, Take 6-8 mg by mouth every day. 6 mg all last week 8 mg last night (2/16/2020), Disp: , Rfl:   •  atorvastatin (LIPITOR) 80 MG tablet, TAKE 1/2 (ONE-HALF) TABLET BY MOUTH ONCE DAILY IN THE EVENING, Disp: 45 Tab, Rfl: 1  •  temazepam (RESTORIL) 30 MG capsule, TAKE ONE CAPSULE BY MOUTH ONCE DAILY AT BEDTIME AS NEEDED FOR SLEEP, Disp: 90 Cap, Rfl: 1  •  ezetimibe (ZETIA) 10 MG Tab, TAKE 1 TABLET BY MOUTH ONCE DAILY, Disp: 90 Tab, Rfl: 0  •  irbesartan (AVAPRO) 300 MG Tab, Take 1 Tab by mouth every day., Disp: 30 Tab, Rfl: 11  •  CARTIA  MG CAPSULE SR 24 HR, TAKE 1 CAPSULE BY MOUTH ONCE DAILY, Disp: 90 Cap, Rfl: 3  •  Multiple Vitamin (MULTI-VITAMINS) Tab, Take  by mouth., Disp: , Rfl:   •  acetaminophen (TYLENOL) 325 MG Tab, Take 650 mg by mouth every four hours as needed., Disp: , Rfl:   (Not in a hospital admission)     Social History     Socioeconomic History   • Marital status:      Spouse name: Not on  file   • Number of children: Not on file   • Years of education: Not on file   • Highest education level: Not on file   Occupational History   • Not on file   Social Needs   • Financial resource strain: Not on file   • Food insecurity     Worry: Not on file     Inability: Not on file   • Transportation needs     Medical: Not on file     Non-medical: Not on file   Tobacco Use   • Smoking status: Former Smoker     Packs/day: 0.50     Years: 30.00     Pack years: 15.00     Types: Cigarettes, Pipe     Last attempt to quit: 1985     Years since quittin.1   • Smokeless tobacco: Never Used   Substance and Sexual Activity   • Alcohol use: Yes     Alcohol/week: 3.0 oz     Types: 3 Cans of beer, 3 Shots of liquor per week     Comment: 1 x week   • Drug use: No   • Sexual activity: Not Currently   Lifestyle   • Physical activity     Days per week: Not on file     Minutes per session: Not on file   • Stress: Not on file   Relationships   • Social connections     Talks on phone: Not on file     Gets together: Not on file     Attends Yazidism service: Not on file     Active member of club or organization: Not on file     Attends meetings of clubs or organizations: Not on file     Relationship status: Not on file   • Intimate partner violence     Fear of current or ex partner: Not on file     Emotionally abused: Not on file     Physically abused: Not on file     Forced sexual activity: Not on file   Other Topics Concern   • Not on file   Social History Narrative   • Not on file       Family History   Problem Relation Age of Onset   • Heart Disease Father         CHF at 91.   • Other Brother         Aneurysm   • Cancer Brother         Seminal vascular cancer   • Autoimmune Disease Daughter         Lupus, RA   • No Known Problems Daughter    • No Known Problems Son    • No Known Problems Son    • Heart Attack Neg Hx           Studies  Lab Results   Component Value Date/Time    CHOLSTRLTOT 121 11/15/2019 04:49 AM     CHOLSTRLTOT 146 06/02/2008 11:00 PM    LDL 56 11/15/2019 04:49 AM    LDL 85 06/02/2008 11:00 PM    HDL 54 11/15/2019 04:49 AM    HDL 49 06/02/2008 11:00 PM    TRIGLYCERIDE 53 11/15/2019 04:49 AM    TRIGLYCERIDE 60 06/02/2008 11:00 PM       Lab Results   Component Value Date/Time    SODIUM 139 02/17/2020 12:49 PM    POTASSIUM 4.0 02/17/2020 12:49 PM    CHLORIDE 109 02/17/2020 12:49 PM    CO2 24 02/17/2020 12:49 PM    GLUCOSE 100 (H) 02/17/2020 12:49 PM    BUN 17 02/17/2020 12:49 PM    CREATININE 0.99 02/17/2020 12:49 PM    CREATININE 1.0 07/02/2008 03:43 PM    BUNCREATRAT 16 11/15/2019 04:48 AM     Lab Results   Component Value Date/Time    ALKPHOSPHAT 66 02/17/2020 12:49 PM    ASTSGOT 39 02/17/2020 12:49 PM    ALTSGPT 61 (H) 02/17/2020 12:49 PM    TBILIRUBIN 1.1 02/17/2020 12:49 PM      Lab Results   Component Value Date/Time    BNPBTYPENAT 694.6 (H) 07/19/2018 09:55 AM    BNPBTYPENAT 244 (H) 06/23/2015 01:41 AM       For this encounter I directly reviewed ECG tracings, Echo images and medical records    Case discussed with Dr. Lu

## 2020-02-18 NOTE — CARE PLAN
Problem: Infection  Goal: Will remain free from infection  Outcome: PROGRESSING AS EXPECTED  Intervention: Assess signs and symptoms of infection  Note: Patient shows no signs or symptoms of an infection     Problem: Knowledge Deficit  Goal: Knowledge of the prescribed therapeutic regimen will improve  Outcome: PROGRESSING AS EXPECTED  Intervention: Discuss information regarding therpeutic regimen and document in education  Note: Pt educated about disease process. Reason why medications are taken. And informed about treatment plan.

## 2020-02-18 NOTE — HEART FAILURE PROGRAM
Cardiovascular Nurse Navigator () Advanced Heart Failure Program HF Exacerbation Consult Note:     Patient was in the process of outpatient TAVR work up when he became symptomatic and admitted for HF exacerbation.    EF is 55% and diastolic dysfunction is grade III with restrictive pattern. The restrictive pattern is concerning prognostically. Patient also has severely dilated LA.     · HFpEF (55%)  · NYHA: III  · Precipitant of exacerbation: worsening valvular disease  · Consider for CardioMEMS commercial or GUIDE HF? No, expected to have TAVR  · Diuresis: IV furosemide 40 bid  · Diabetic or newly diagnosed DM?: no  · Atrial fibrillation?: yes  · Palliative consult has been ordered    Demographics:    · Insurance: Medicare and AARP  · Residence: [facility] Secondary Prevention Interventions:      · Smoking cessation advice or counseling is documented: per H&P, patient quit smoking 35 years ago  · A/C for afib: takes coumadin at home, INR was 3.45 yesterday    Daily Weights: ordered    I's and O's: ordered    [facility] Plan Notes: none    Therapy Notes: none    HFrEF Specific Device Therapy Screening Tool not indicated for new diagnosis or for HFpEF    Source: [employer]- [facility] Prevention Program Screening Tool  2013 ACC/ AHA Heart Failure Guidelines  Rev date: 12/2014    Follow up appointment:   • If discharged from acute care to home (exception hospice discharge), pt must have an appointment scheduled within 7 days of discharge (Cardiology, PCP, or DC Clinic).    • If discharged to Transitional Care Facility (LTAC, SNF, IRH), appointment should be made about a month out for after TCF.     Bedside Nursing Education:  Please provide HF booklet and repeated, ongoing education while administering medications, weighing patient, discussing management of symptoms, diet and need to follow up and act on changes. Please target education to the precipitant of the exacerbation.    Bedside Nursing Discharge:  When completing the  "after visit summary (discharge instructions) please select \"Cardiac Diagnosis, and Heart Failure\" in the special instructions section to populate the heart failure specific discharge instructions.     Referrals/Orders Placed:    Hospital Schedulers for HF f/u?  yes  Social Work   deferred  Registered Dietician  deferred  [facility] CP Program for patients with Medicaid, [employer], or [facility] coverage?  no  Outpatient Care Coordination for patients with Medicaid?  no    Many thanks, Licha, Cardiovascular Nurse Navigator, RN, [facility] [phone], & [employer] M-F (excluding holidays).          "

## 2020-02-18 NOTE — H&P
Hospital Medicine History & Physical Note    Date of Service  2/17/2020    Primary Care Physician  Michael J Bloch, M.D.    Consultants  Cardiology    Code Status  Full code    Chief Complaint  Shortness of breath, chest pain    History of Presenting Illness  84 y.o. male with history of aortic stenosis paroxysmal A. fib, DVT in 2015, TIA, carotid disease status post CEA in 2008, prostate cancer, who presented 2/17/2020 with complaints of dyspnea on exertion worsening in the last 1 month.  Additionally he reports chest pain in the last 2 days, which he described as a pressure in the left side of the chest, on and off, lasting 30 minutes, exacerbated by exertion, last episode was an hour ago before he came to the hospital and lasted 10 minutes.  He has dry cough or cough with small amount of clear sputum.  He complaining of worsening of ankle swelling that started a few days ago.  Patient denies dizziness, loss of consciousness, palpitation  Patient had coronary angiography and left heart catheterization scheduled as outpatient, in preparation for TAVR.  Patient had DVT ultrasound done in ER today, negative for DVT.  ERP consulted with cardiology.  Preliminary plan will be to diurese patient in the hospital for what appears to be CHF exacerbation and wait until INR goes down and possibly do heart catheterization in the hospital.    Review of Systems  Review of Systems   Constitutional: Negative for chills, fever and weight loss.   HENT: Negative for ear pain, hearing loss and tinnitus.    Eyes: Negative for blurred vision, double vision and photophobia.   Respiratory: Positive for shortness of breath. Negative for cough, hemoptysis and sputum production.    Cardiovascular: Positive for chest pain and leg swelling. Negative for palpitations and orthopnea.   Gastrointestinal: Negative for heartburn, nausea and vomiting.   Genitourinary: Negative for dysuria, flank pain, frequency and hematuria.   Musculoskeletal:  Positive for joint pain. Negative for back pain and neck pain.   Skin: Negative for itching and rash.   Neurological: Negative for tremors, speech change, focal weakness and headaches.   Endo/Heme/Allergies: Negative for environmental allergies and polydipsia. Does not bruise/bleed easily.   Psychiatric/Behavioral: Negative for hallucinations and substance abuse. The patient is not nervous/anxious.        Past Medical History   has a past medical history of Arrhythmia, Arthritis, Blood clotting disorder ([facility]), Bronchitis, Cancer ([facility]), Carotid arterial disease ([facility]) (1/6/2014), Cataract, DVT (deep venous thrombosis) ([facility]) (2016), Heart valve disease, HLD (hyperlipidemia) (1/6/2014), Hypertension, Mitral regurgitation (1/6/2014), Pain (12-14-15), Pneumonia, Stroke ([facility]) (2008), TIA (transient ischemic attack), Urinary bladder disorder, and Urinary incontinence. He also has no past medical history of Liver disease.    Surgical History   has a past surgical history that includes carotid endarterectomy (7/10/08); prostatectomy, radial (11/2000); bladder sling male (11/2004); cervical disk and fusion anterior (10/26/2009); lumbar laminectomy diskectomy (10/8/2010); thoracoscopy (6/13/2015); lumbar fusion posterior (12/28/2015); and lumbar decompression (12/28/2015).     Family History  Autoimmune Disease in his daughter; Cancer in his brother; Heart Disease in his father; No Known Problems in his daughter, son, and son; Other in his brother.     Social History  Patient denies smoking cigarettes.  Reports that he quit smoking about 35 years ago. His smoking use included cigarettes and pipe. He has a 15.00 pack-year smoking history. He has never used smokeless tobacco. He reports current alcohol use of about 3.0 oz of alcohol per week. He reports that he does not use drugs.    Allergies  Allergies   Allergen Reactions   • Nkda [No Known Drug Allergy]    • Other Environmental      Seasonal, cats-hayfever        Medications  Prior to Admission Medications   Prescriptions Last Dose Informant Patient Reported? Taking?   CARTIA  MG CAPSULE SR 24 HR 2/17/2020 at AM  No No   Sig: TAKE 1 CAPSULE BY MOUTH ONCE DAILY   Multiple Vitamin (MULTI-VITAMINS) Tab 2/17/2020 at AM Patient Yes No   Sig: Take  by mouth.   acetaminophen (TYLENOL) 325 MG Tab 2/17/2020 at AM Patient Yes No   Sig: Take 650 mg by mouth every four hours as needed.   atorvastatin (LIPITOR) 80 MG tablet 2/16/2020 at PM  No No   Sig: TAKE 1/2 (ONE-HALF) TABLET BY MOUTH ONCE DAILY IN THE EVENING   ezetimibe (ZETIA) 10 MG Tab 2/16/2020 at PM  No No   Sig: TAKE 1 TABLET BY MOUTH ONCE DAILY   gabapentin (NEURONTIN) 300 MG Cap 2/17/2020 at AM  Yes Yes   Sig: Take 300-600 mg by mouth 2 Times a Day. 600 mg in the AM  300 mg in the PM   irbesartan (AVAPRO) 300 MG Tab 2/16/2020 at PM  No No   Sig: Take 1 Tab by mouth every day.   temazepam (RESTORIL) 30 MG capsule 2/16/2020 at PM  No No   Sig: TAKE ONE CAPSULE BY MOUTH ONCE DAILY AT BEDTIME AS NEEDED FOR SLEEP   warfarin (COUMADIN) 4 MG Tab 2/16/2020 at PM  Yes Yes   Sig: Take 6-8 mg by mouth every day. 6 mg all last week  8 mg last night (2/16/2020)      Facility-Administered Medications: None       Physical Exam  Temp:  [36.4 °C (97.5 °F)] 36.4 °C (97.5 °F)  Pulse:  [70-77] 76  Resp:  [16-29] 29  BP: (117-131)/(80-91) 131/81  SpO2:  [89 %-100 %] 95 %    Physical Exam  Vitals signs and nursing note reviewed.   Constitutional:       General: He is not in acute distress.     Appearance: Normal appearance.   HENT:      Head: Normocephalic and atraumatic.      Nose: Nose normal.      Mouth/Throat:      Mouth: Mucous membranes are moist.   Eyes:      Extraocular Movements: Extraocular movements intact.      Pupils: Pupils are equal, round, and reactive to light.   Neck:      Musculoskeletal: Normal range of motion and neck supple.   Cardiovascular:      Rate and Rhythm: Normal rate and regular rhythm.      Heart  sounds: Murmur present.   Pulmonary:      Effort: Pulmonary effort is normal.      Breath sounds: Normal breath sounds.   Abdominal:      General: Abdomen is flat. There is no distension.      Tenderness: There is no abdominal tenderness.   Musculoskeletal: Normal range of motion.         General: No swelling or deformity.      Right lower leg: Edema present.      Left lower leg: Edema present.   Skin:     General: Skin is warm and dry.   Neurological:      General: No focal deficit present.      Mental Status: He is alert and oriented to person, place, and time.   Psychiatric:         Mood and Affect: Mood normal.         Behavior: Behavior normal.         Laboratory:  Recent Labs     02/17/20  1249   WBC 5.5   RBC 4.41*   HEMOGLOBIN 13.2*   HEMATOCRIT 41.1*   MCV 93.2   MCH 29.9   MCHC 32.1*   RDW 50.1*   PLATELETCT 201   MPV 10.6     Recent Labs     02/17/20  1249   SODIUM 139   POTASSIUM 4.0   CHLORIDE 109   CO2 24   GLUCOSE 100*   BUN 17   CREATININE 0.99   CALCIUM 9.1     Recent Labs     02/17/20  1249   ALTSGPT 61*   ASTSGOT 39   ALKPHOSPHAT 66   TBILIRUBIN 1.1   GLUCOSE 100*     Recent Labs     02/17/20  1249   APTT 47.8*   INR 3.45*     Recent Labs     02/17/20  1249   NTPROBNP 29825*         Recent Labs     02/17/20  1249   TROPONINT 18       Urinalysis:    No results found     Imaging:  US-EXTREMITY VENOUS LOWER BILAT   Final Result      DX-CHEST-PORTABLE (1 VIEW)   Final Result      Small right pleural effusion with overlying atelectasis/consolidation. Follow-up to radiographic resolution is recommended.      Cardiomegaly with is increased compared to the prior examination. Correlation with an echocardiogram is recommended.      Atherosclerotic plaque.                     Assessment/Plan:  I anticipate this patient will require at least two midnights for appropriate medical management, necessitating inpatient admission.    Acute respiratory failure with hypoxia (HCC)  Assessment & Plan  Secondary to CHF  exacerbation.  Chest x-ray showed right-sided atelectasis/consolidation.  History of right-sided decortication for empyema in 2015  Patient has history of DVT and was on warfarin.  We will not order CT PE study as patient is on anticoagulation and it will be continued and diagnosis would not   Continue with IV diuresis  Wean down oxygen as tolerated  Incentive spirometry  If failed to improve on diuretics, consider CT of the chest and thoracocentesis    Acute exacerbation of congestive heart failure (HCC)  Assessment & Plan  Probably secondary to decompensating aortic stenosis  - Heart failure protocol  - Diuresing with Lasix IV.  Will need to monitor hemodynamic closely as patient is preload dependent  -continue Avapro, diltiazem  Daily weight  Monitor input and output      DVT (deep venous thrombosis) (HCC)- (present on admission)  Assessment & Plan  In 2015, unclear if provoked or not  Repeat venous lower extremity ultrasound 2/17--neg for DVT  Patient was on warfarin.  We will hold warfarin for possible angiography in the next 1 to 2 days and we will bridge with Lovenox    Paroxysmal atrial fibrillation (HCC)- (present on admission)  Assessment & Plan  Heart rate is under control  Continue calcium channel blocker  Anticoagulation for stroke prevention: Holding warfarin, start on enoxaparin    Bilateral carotid artery disease (CMS-HCC): Mild bilateral in 2017- (present on admission)  Assessment & Plan  Continue Lipitor    Essential hypertension, benign- (present on admission)  Assessment & Plan  Blood pressure is controlled.  Continue home outpatient medications    Supratherapeutic INR  Assessment & Plan  INR 3.45  We will be holding warfarin and bridging with Lovenox    Long QT interval  Assessment & Plan  Avoid medications causing QT prolongation  Monitor and replace electrolytes    Chest pain  Assessment & Plan  Secondary to above.  EKG showed ST depression in anterolateral leads.  Troponin is  not elevated  Monitor on telemetry    Insomnia- (present on admission)  Assessment & Plan  Continue temazepam as needed      VTE prophylaxis: Enoxaparin

## 2020-02-18 NOTE — ASSESSMENT & PLAN NOTE
In 2015, unclear if provoked or not  Repeat venous lower extremity ultrasound 2/17--neg for DVT  Patient was on warfarin.  We will hold warfarin for possible angiography in the next 1 to 2 days and we will bridge with Lovenox

## 2020-02-18 NOTE — PROGRESS NOTES
Received report from MARGARITA Lim. Assumed care of patient at 0700. Patient A&Ox 4, speaking in full sentences, follows commands and responds appropriately to questions. On 2L NC, no signs of respiratory distress. Respirations are even and unlabored. Patient states 0/10 pain at this time. POC discussed and agreed upon with patient. Call light and belongings within reach. Bed in lowest locked position. Upper side rails raised.  Fall risk precautions in place. Hourly rounding. Will continue to monitor.

## 2020-02-18 NOTE — CONSULTS
REFERRING PHYSICIAN: Christopher Bae MD.     CONSULTING PHYSICIAN: Martin Horton MD, FACS.    CHIEF COMPLAINT: Shortness of breath     HISTORY OF PRESENT ILLNESS: The patient is a 84 y.o. male with history of paroxysmal atrial fibrillation, DVT, TIA, PAD s/p carotid endarterectomy, prostate cancer, hypertension, hyperlipidemia and known aortic stenosis. He states he had worsening shortness of breath and chest pressure over the last few days. He also states he had lower extremity edema and dizziness with position changes. He denies syncope or orthopnea. He was admitted with heart failure, proBNP 31,116 and being treated with diuretic therapy. He will be undergoing cardiac catheterization today.     PAST MEDICAL HISTORY:   Past Medical History:   Diagnosis Date   • Arrhythmia     Afib   • Arthritis     osteo, hips and knees   • Blood clotting disorder ([facility])     , left arm   • Bronchitis        • Cancer ([facility])     Prostate CA--2000   • Carotid arterial disease ([facility]) 1/6/2014   • Cataract     removed bilat   • DVT (deep venous thrombosis) ([facility]) 2016   • Heart valve disease     MVR   • HLD (hyperlipidemia) 1/6/2014   • Hypertension    • Mitral regurgitation 1/6/2014   • Pain 12-14-15    low back, hips, 2-3/10   • Pneumonia        • Stroke ([facility]) 2008    TIA, no residual   • TIA (transient ischemic attack)     2008   • Urinary bladder disorder     HAD MALE BLADDER SUSPENSION PROCEDURE DONE   • Urinary incontinence        PAST SURGICAL HISTORY:   Past Surgical History:   Procedure Laterality Date   • LUMBAR FUSION POSTERIOR  12/28/2015    Procedure: LUMBAR FUSION POSTERIOR L3-5 with peek rods;  Surgeon: Michael Almonte M.D.;  Location: SURGERY [facility];  Service:    • LUMBAR DECOMPRESSION  12/28/2015    Procedure: LUMBAR DECOMPRESSION posterior redo decompression L3-5, with dural repair;  Surgeon: Michael Almonte M.D.;  Location: SURGERY [facility];  Service:    • THORACOSCOPY  6/13/2015     Procedure: THORACOSCOPY with decortication VATS , side to be determined  ;  Surgeon: Elmira Holder M.D.;  Location: [facility];  Service:    • LUMBAR LAMINECTOMY DISKECTOMY  10/8/2010    Performed by PHYLICIA SARKAR at [facility]   • CERVICAL DISK AND FUSION ANTERIOR  10/26/2009    Performed by PHYLICIA SARKAR at [facility]   • CAROTID ENDARTERECTOMY  7/10/08    Performed by LEIGH MORATAYA at [facility]   • BLADDER SLING MALE  2004   • PROSTATECTOMY, RADIAL  2000       FAMILY HISTORY:   Family History   Problem Relation Age of Onset   • Heart Disease Father         CHF at 91.   • Other Brother         Aneurysm   • Cancer Brother         Seminal vascular cancer   • Autoimmune Disease Daughter         Lupus, RA   • No Known Problems Daughter    • No Known Problems Son    • No Known Problems Son    • Heart Attack Neg Hx         SOCIAL HISTORY:   Social History     Socioeconomic History   • Marital status:      Spouse name: Not on file   • Number of children: Not on file   • Years of education: Not on file   • Highest education level: Not on file   Occupational History   • Not on file   Social Needs   • Financial resource strain: Not on file   • Food insecurity     Worry: Never true     Inability: Never true   • Transportation needs     Medical: No     Non-medical: No   Tobacco Use   • Smoking status: Former Smoker     Packs/day: 0.50     Years: 30.00     Pack years: 15.00     Types: Cigarettes, Pipe     Last attempt to quit: 1985     Years since quittin.1   • Smokeless tobacco: Never Used   Substance and Sexual Activity   • Alcohol use: Yes     Alcohol/week: 3.0 oz     Types: 3 Cans of beer, 3 Shots of liquor per week     Comment: 1 x week   • Drug use: No   • Sexual activity: Not Currently   Lifestyle   • Physical activity     Days per week: Not on file     Minutes per session: Not on file   • Stress: Not on file   Relationships   • Social  connections     Talks on phone: Not on file     Gets together: Not on file     Attends Evangelical service: Not on file     Active member of club or organization: Not on file     Attends meetings of clubs or organizations: Not on file     Relationship status: Not on file   • Intimate partner violence     Fear of current or ex partner: Not on file     Emotionally abused: Not on file     Physically abused: Not on file     Forced sexual activity: Not on file   Other Topics Concern   • Not on file   Social History Narrative   • Not on file       ALLERGIES:   Allergies   Allergen Reactions   • Nkda [No Known Drug Allergy]    • Other Environmental      Seasonal, cats-hayfever        CURRENT MEDICATIONS:     Current Facility-Administered Medications:   •  [START ON 2/19/2020] furosemide (LASIX) injection 40 mg, 40 mg, Intravenous, Q DAY, Patrick Hernandez A.P.R.N.  •  senna-docusate (PERICOLACE or SENOKOT S) 8.6-50 MG per tablet 2 Tab, 2 Tab, Oral, BID **AND** polyethylene glycol/lytes (MIRALAX) PACKET 1 Packet, 1 Packet, Oral, QDAY PRN **AND** magnesium hydroxide (MILK OF MAGNESIA) suspension 30 mL, 30 mL, Oral, QDAY PRN **AND** bisacodyl (DULCOLAX) suppository 10 mg, 10 mg, Rectal, QDAY PRN, Pedrito Romero M.D.  •  Respiratory Therapy Consult, , Nebulization, Continuous RT, Pedrito Romero M.D.  •  acetaminophen (TYLENOL) tablet 650 mg, 650 mg, Oral, Q6HRS PRN, Pedrito Romero M.D.  •  Notify provider if pain remains uncontrolled, , , CONTINUOUS **AND** Use the numeric rating scale (NRS-11) on regular floors and Critical-Care Pain Observation Tool (CPOT) on ICUs/Trauma to assess pain, , , CONTINUOUS **AND** Pulse Ox (Oximetry), , , CONTINUOUS **AND** Pharmacy Consult Request ...Pain Management Review 1 Each, 1 Each, Other, PHARMACY TO DOSE **AND** If patient difficult to arouse and/or has respiratory depression, stop any opiates that are currently infusing and call a Rapid Response., , , CONTINUOUS **AND** oxyCODONE  immediate-release (ROXICODONE) tablet 2.5 mg, 2.5 mg, Oral, Q3HRS PRN **AND** oxyCODONE immediate-release (ROXICODONE) tablet 5 mg, 5 mg, Oral, Q3HRS PRN **AND** morphine (pf) 4 MG/ML injection 2 mg, 2 mg, Intravenous, Q3HRS PRN, Pedrito Romero M.D.  •  atorvastatin (LIPITOR) tablet 40 mg, 40 mg, Oral, QHS, Pedrito Romero M.D., 40 mg at 02/17/20 2008  •  ezetimibe (ZETIA) tablet 10 mg, 10 mg, Oral, QHS, Pedrito Romero M.D., 10 mg at 02/17/20 2008  •  irbesartan (AVAPRO) tablet 300 mg, 300 mg, Oral, DAILY, Pedrito Romero M.D., 300 mg at 02/17/20 2008  •  temazepam (RESTORIL) capsule 30 mg, 30 mg, Oral, QHS PRN, Pedrito Romero M.D., 30 mg at 02/17/20 2132  •  potassium chloride SA (Kdur) tablet 40 mEq, 40 mEq, Oral, DAILY, Pedrito Romero M.D., 40 mEq at 02/18/20 0512  •  gabapentin (NEURONTIN) capsule 600 mg, 600 mg, Oral, QAM, 600 mg at 02/18/20 0512 **AND** gabapentin (NEURONTIN) capsule 300 mg, 300 mg, Oral, Q EVENING, Pedrito Romero M.D., 300 mg at 02/17/20 1844     LABS REVIEWED:  Lab Results   Component Value Date/Time    SODIUM 143 02/18/2020 04:14 AM    POTASSIUM 3.6 02/18/2020 04:14 AM    CHLORIDE 106 02/18/2020 04:14 AM    CO2 28 02/18/2020 04:14 AM    GLUCOSE 90 02/18/2020 04:14 AM    BUN 18 02/18/2020 04:14 AM    CREATININE 0.98 02/18/2020 04:14 AM    CREATININE 1.0 07/02/2008 03:43 PM    BUNCREATRAT 16 11/15/2019 04:48 AM      Lab Results   Component Value Date/Time    PROTHROMBTM 36.1 (H) 02/17/2020 12:49 PM    INR 3.45 (H) 02/17/2020 12:49 PM      Lab Results   Component Value Date/Time    WBC 5.1 02/18/2020 04:14 AM    RBC 4.17 (L) 02/18/2020 04:14 AM    HEMOGLOBIN 12.5 (L) 02/18/2020 04:14 AM    HEMATOCRIT 38.7 (L) 02/18/2020 04:14 AM    MCV 92.8 02/18/2020 04:14 AM    MCH 30.0 02/18/2020 04:14 AM    MCHC 32.3 (L) 02/18/2020 04:14 AM    MPV 10.8 02/18/2020 04:14 AM    NEUTSPOLYS 55.10 02/18/2020 04:14 AM    LYMPHOCYTES 26.40 02/18/2020 04:14 AM    MONOCYTES 12.80 02/18/2020 04:14  "AM    EOSINOPHILS 4.30 02/18/2020 04:14 AM    BASOPHILS 1.00 02/18/2020 04:14 AM    HYPOCHROMIA 1+ 06/19/2015 05:20 AM        IMAGING REVIEWED AND INTERPRETED:    ECHOCARDIOGRAM [facility] 2/4/2020:  Left ventricular ejection fraction is visually estimated to be 55-60%.  Mild concentric left ventricular hypertrophy.  Grade III diastolic dysfunction (restrictive pattern).  Severely dilated left atrium.  Moderate to severe aortic stenosis: V max 3.8 m/s, MG 38 mmHg, ENRIKE 0.7 cm2, DI 0.2.  Moderate mitral annular calcification with at least moderate eccentric mitral regurgitation.  Moderate tricuspid regurgitation.  Right ventricular systolic pressure is estimated to be 55  mmHg.    ANGIOGRAM:   pending    REVIEW OF SYSTEMS:   Review of Systems   Constitutional: Positive for malaise/fatigue.   HENT: Negative.    Eyes: Negative.    Respiratory: Positive for shortness of breath.    Cardiovascular: Positive for leg swelling.   Gastrointestinal: Negative.    Genitourinary: Negative.    Musculoskeletal: Negative.    Skin: Negative.    Neurological: Negative.    Endo/Heme/Allergies: Negative.    Psychiatric/Behavioral: Negative.        PHYSICAL EXAMINATION:   Physical Exam  CONSTITUTIONAL:  BP (!) 91/58   Pulse 75   Temp 36.2 °C (97.2 °F) (Temporal)   Resp 18   Ht 1.803 m (5' 11\")   Wt 86 kg (189 lb 9.5 oz)   SpO2 94%     General appearance: Frail, no apparent distress, normal development  HEENT:  Anicteric sclerae, moist conjunctivae, moist mucous membranes, good dentition   NECK:  Supple without jugular venous distention, without lymphadenopathy    SKIN:   Normal skin turgor, no stasis dermatitis or ulcers noted.  RESPIRATORY:  Clear to auscultation bilaterally, respirations are regular, symmetrical and unlabored.   CARDIAC:  Regular rate and rhythm, grade 3/6 systolic murmur. No carotid bruits. Peripheral pulses palpable.   GASTROINTESTINAL:  Soft, non-distended, non-tender with bowel sounds present, no " hepatosplenomegaly  EXTREMITIES:  No clubbing, cyanosis, or changes consistent with peripheral vascular disease. Bilateral lower extremity edema 1+.  NEUROLOGIC/ PSYCHIATRIC: Alert and oriented times three. Mood and affect normal  MUSCULOSKELETAL:  Normal gait and station    IMPRESSION:  Severe aortic stenosis (calcific/degenrative)    PLAN:  I recommend TAVR not SAVR.    The procedure, its risks, benefits, potential complications and alternative treatments were discussed with the patient in detail including the risks should he decide not to undergo my recommended treatment. All of his questions were answered to his satisfaction and he is willing to proceed with the operation. The risks include death, stroke,  infection: to include a rare bacterial infection related to the use of the heart/lung machine, clovis-operative myocardial infarction, dysrhythmias, diaphragmatic paralysis, chest wall paresthesia, tracheostomy, kidney or other organ failure, possible return to the operating room for bleeding, bleeding requiring transfusion with its attendant risks including AIDS or hepatitis, dehiscence of surgical incisions, respiratory complications including the need for prolonged ventilator support, Protamine or other drug reaction, peripheral neuropathy, loss of limb, and miscount of surgical items. The operative mortality risk is approximately 5-7%. The STS mortality risk score is 4.5% and the morbidity and mortality risk score is 18.3%. The scores were discussed with patient.    Thank you for this very challenging consultation and participation in the patient’s care.  I will keep you apprised of all future developments.          Sincerely,       Martin Horton MD, FACS.        I,  Martin Horton MD, FACS performed a substantial portion of the EM visit face-to-face with the same patient on the same date of service with, STEWART Mixon. I was personally involved in reviewing and interpreting the films and conducted  elements of the history and physical exam. I performed all of the medical decision making for the patient.

## 2020-02-18 NOTE — ASSESSMENT & PLAN NOTE
Probably secondary to decompensating aortic stenosis  - Heart failure protocol  - Diuresing with Lasix IV.  Will need to monitor hemodynamic closely as patient is preload dependent  -continue Avapro, diltiazem  Daily weight  Monitor input and output

## 2020-02-18 NOTE — PROGRESS NOTES
Hospital Medicine Daily Progress Note    Date of Service  2/18/2020    Chief Complaint  84 y.o. male admitted 2/17/2020 with Respiratory failure    Hospital Course  Admitted with respiratory failure, with known history of severe AS    Interval Problem Update  Resp failure - O2 2 lpm NC  HTN - sbp 100-120  AS - plan for [facility]  A. Fib - currently SR    Consultants/Specialty  Cardiology    Code Status  Full    Disposition  TBD    Review of Systems  Review of Systems   Constitutional: Positive for malaise/fatigue. Negative for chills, diaphoresis and fever.   HENT: Negative for hearing loss and sore throat.    Eyes: Negative for blurred vision.   Respiratory: Positive for shortness of breath. Negative for cough and wheezing.    Cardiovascular: Negative for chest pain, palpitations and leg swelling.   Gastrointestinal: Negative for abdominal pain, diarrhea, heartburn, nausea and vomiting.   Genitourinary: Negative for dysuria, flank pain and hematuria.   Musculoskeletal: Negative for back pain and neck pain.   Skin: Negative for rash.   Neurological: Positive for weakness. Negative for dizziness, sensory change, speech change, focal weakness and headaches.   Psychiatric/Behavioral: The patient is not nervous/anxious.         Physical Exam  Temp:  [36.1 °C (96.9 °F)-36.6 °C (97.8 °F)] 36.2 °C (97.2 °F)  Pulse:  [70-80] 75  Resp:  [15-29] 18  BP: ()/() 91/58  SpO2:  [89 %-100 %] 94 %    Physical Exam  HENT:      Head: Normocephalic and atraumatic.      Nose: No congestion.      Mouth/Throat:      Mouth: Mucous membranes are moist.   Eyes:      Conjunctiva/sclera: Conjunctivae normal.      Pupils: Pupils are equal, round, and reactive to light.   Cardiovascular:      Rate and Rhythm: Normal rate and regular rhythm.      Heart sounds: Murmur present.   Pulmonary:      Effort: Accessory muscle usage present.      Breath sounds: Rales present.   Abdominal:      General: Bowel sounds are normal. There is no  distension.      Palpations: Abdomen is soft.      Tenderness: There is no abdominal tenderness. There is no guarding or rebound.   Musculoskeletal:      Right lower leg: No edema.      Left lower leg: No edema.   Skin:     General: Skin is warm and dry.   Neurological:      General: No focal deficit present.      Mental Status: He is alert and oriented to person, place, and time.      Cranial Nerves: No cranial nerve deficit.         Fluids    Intake/Output Summary (Last 24 hours) at 2/18/2020 1142  Last data filed at 2/18/2020 0800  Gross per 24 hour   Intake --   Output 4225 ml   Net -4225 ml       Laboratory  Recent Labs     02/17/20  1249 02/18/20  0414   WBC 5.5 5.1   RBC 4.41* 4.17*   HEMOGLOBIN 13.2* 12.5*   HEMATOCRIT 41.1* 38.7*   MCV 93.2 92.8   MCH 29.9 30.0   MCHC 32.1* 32.3*   RDW 50.1* 50.3*   PLATELETCT 201 176   MPV 10.6 10.8     Recent Labs     02/17/20  1249 02/18/20  0414   SODIUM 139 143   POTASSIUM 4.0 3.6   CHLORIDE 109 106   CO2 24 28   GLUCOSE 100* 90   BUN 17 18   CREATININE 0.99 0.98   CALCIUM 9.1 8.8     Recent Labs     02/17/20  1249   APTT 47.8*   INR 3.45*               Imaging  US-EXTREMITY VENOUS LOWER BILAT   Final Result      DX-CHEST-PORTABLE (1 VIEW)   Final Result      Small right pleural effusion with overlying atelectasis/consolidation. Follow-up to radiographic resolution is recommended.      Cardiomegaly with is increased compared to the prior examination. Correlation with an echocardiogram is recommended.      Atherosclerotic plaque.               CT-CTA CHEST WITH & W/O-POST PROCESS    (Results Pending)   CTA ABDOMEN PELVIS W & W/O POST PROCESS    (Results Pending)   US-CAROTID DOPPLER BILAT    (Results Pending)        Assessment/Plan  * Nonrheumatic aortic valve stenosis- (present on admission)  Assessment & Plan  Severe  IV lasix to diurese  Plan for [facility]    Acute respiratory failure with hypoxia (HCC)- (present on admission)  Assessment & Plan  IV Lasix  RT  protocol    Chest pain- (present on admission)  Assessment & Plan  Troponin negative    Carotid artery disease (HCC)- (present on admission)  Assessment & Plan  Lipitor  For carotid US    Supratherapeutic INR- (present on admission)  Assessment & Plan  Holding Coumadin  Follow INR    Long QT interval- (present on admission)  Assessment & Plan  Telemetry monitoring    Paroxysmal atrial fibrillation (HCC)- (present on admission)  Assessment & Plan  Hold Cardizem and Coumadin    Essential hypertension, benign- (present on admission)  Assessment & Plan  Irbesartan    Insomnia- (present on admission)  Assessment & Plan  Temazepam as needed    Dyslipidemia- (present on admission)  Assessment & Plan  Lipitor       VTE prophylaxis: SCD

## 2020-02-18 NOTE — RESPIRATORY CARE
COPD EDUCATION by COPD CLINICAL EDUCATOR  2/18/2020 at 6:48 AM by Kelley Mendez, YSABEL     Patient reviewed by COPD education team. Patient does not have a history or diagnosis of COPD and is a non-smoker, therefore does not qualify for the COPD program.

## 2020-02-18 NOTE — PROGRESS NOTES
Cardiology Follow Up Progress Note    Date of Service  2/18/2020    Attending Physician  Bart Luna M.D.    Chief Complaint   SOB and chest pain     Cardiology consult  CHF    HPI  Everardo Hester is a 84 y.o. male admitted 2/17/2020 with SOB and chest pain. Past medical history of PAF, aortic stenosis, and carotid disease with prior endarterectomy.  He was seen by valve clinic and scheduled for cardiac catheterization this Friday for TAVR evaluation. In the interim, developed SOB and fatigue.     History of PAF on coumadin, prostate CA, prior DVT, hyperlipidemia, hypertension, TIA and CVA, and carotid disease with prior CEA in '08 ([facility]).     Interim Events  2/18/2020: patient resting in bed, stated he is feeling much better after diuretics. Denies any chest pain, SOB, dizziness or palpitations.  On 2l of oxygen currently     SR overnight with 3.2sec pause.  Review of Systems  Review of Systems   Constitutional: Negative for chills, diaphoresis and fever.   HENT: Negative for nosebleeds and trouble swallowing.    Respiratory: Negative for cough, chest tightness and shortness of breath.    Cardiovascular: Negative for chest pain, palpitations and leg swelling.   Gastrointestinal: Negative for abdominal distention, abdominal pain and blood in stool.   Genitourinary: Negative for hematuria.   Skin: Negative for color change.   Neurological: Negative for dizziness, syncope and numbness.   Psychiatric/Behavioral: Negative for agitation and confusion. The patient is not nervous/anxious.        Vital signs in last 24 hours  Temp:  [36.1 °C (96.9 °F)-36.6 °C (97.8 °F)] 36.4 °C (97.6 °F)  Pulse:  [70-80] 75  Resp:  [15-29] 16  BP: (108-140)/() 120/81  SpO2:  [89 %-100 %] 94 %    Physical Exam  Physical Exam  Vitals signs and nursing note reviewed.   Constitutional:       Appearance: Normal appearance.   HENT:      Head: Normocephalic and atraumatic.   Eyes:      Pupils: Pupils are equal, round, and  reactive to light.   Neck:      Musculoskeletal: Normal range of motion.   Cardiovascular:      Rate and Rhythm: Normal rate and regular rhythm.      Heart sounds: Murmur present.   Pulmonary:      Effort: Pulmonary effort is normal.      Breath sounds: Normal breath sounds.   Abdominal:      General: Abdomen is flat.   Skin:     General: Skin is warm and dry.   Neurological:      General: No focal deficit present.      Mental Status: He is alert and oriented to person, place, and time.   Psychiatric:         Mood and Affect: Mood normal.         Behavior: Behavior normal.         Thought Content: Thought content normal.         Judgment: Judgment normal.         Lab Review  Lab Results   Component Value Date/Time    WBC 5.1 02/18/2020 04:14 AM    RBC 4.17 (L) 02/18/2020 04:14 AM    HEMOGLOBIN 12.5 (L) 02/18/2020 04:14 AM    HEMATOCRIT 38.7 (L) 02/18/2020 04:14 AM    MCV 92.8 02/18/2020 04:14 AM    MCH 30.0 02/18/2020 04:14 AM    MCHC 32.3 (L) 02/18/2020 04:14 AM    MPV 10.8 02/18/2020 04:14 AM      Lab Results   Component Value Date/Time    SODIUM 143 02/18/2020 04:14 AM    POTASSIUM 3.6 02/18/2020 04:14 AM    CHLORIDE 106 02/18/2020 04:14 AM    CO2 28 02/18/2020 04:14 AM    GLUCOSE 90 02/18/2020 04:14 AM    BUN 18 02/18/2020 04:14 AM    CREATININE 0.98 02/18/2020 04:14 AM    CREATININE 1.0 07/02/2008 03:43 PM    BUNCREATRAT 16 11/15/2019 04:48 AM      Lab Results   Component Value Date/Time    ASTSGOT 39 02/17/2020 12:49 PM    ALTSGPT 61 (H) 02/17/2020 12:49 PM     Lab Results   Component Value Date/Time    CHOLSTRLTOT 121 11/15/2019 04:49 AM    CHOLSTRLTOT 146 06/02/2008 11:00 PM    LDL 56 11/15/2019 04:49 AM    LDL 85 06/02/2008 11:00 PM    HDL 54 11/15/2019 04:49 AM    HDL 49 06/02/2008 11:00 PM    TRIGLYCERIDE 53 11/15/2019 04:49 AM    TRIGLYCERIDE 60 06/02/2008 11:00 PM    TROPONINT 18 02/17/2020 12:49 PM       Recent Labs     02/17/20  1249 02/18/20  0414   NTPROBNP 83552* 07212*       Cardiac Imaging and  Procedures Review      Echocardiogram:   2/3/2020  Compared to the images of the prior study done on 11/25/2019,   progression of MR, TR, elevated RVSP, AS is moderate to severe,   probably severe.  Left ventricular ejection fraction is visually estimated to be 55-60%.  Mild concentric left ventricular hypertrophy.  Grade III diastolic dysfunction (restrictive pattern).  Severely dilated left atrium.  Moderate to severe aortic stenosis: V max 3.8 m/s, MG 38 mmHg, ENRIKE 0.7   cm2, DI 0.2.  Moderate mitral annular calcification with at least moderate eccentric    mitral regurgitation.  Moderate tricuspid regurgitation.  Right ventricular systolic pressure is estimated to be 55  mmHg.    Cardiac Catheterization:  Pending     Imaging    Vascular Laboratory  2/17/2020   No superficial or deep venous thrombosis.    Limited evalution of posterior tibial and peroneal veins.    Assessment/Plan  No new Assessment & Plan notes have been filed under this hospital service since the last note was generated.  Service: Cardiology    1. Severe AS:  - ECHO showed gradient 38mmHg   - need [facility]avr [facility] with [facility].  - CTS consulted   - ordered CTA chest and abdomen/pelvis    2. Acute decompensated diastolic heart failure:   valvular heart disease  - switched furosemide 40mg qd IV   - strict I and O, daily stand up weight    3. Paroxysmal atrial fibrillation:  - in NSR  - last INR 3.45, pending today. Hold off cath till INR is less than 2. Has outpatient cath scheduled on Friday   - d/c diltiazem due to bradycardia     4. Hypertension:  - stable   - continue irbesartan 300mg qd     5. Prior TIA and CVA 2008:  - continue statin therapy     Future Appointments   Date Time Provider Department Center   2/18/2020 10:30 AM [facility] EXAM 4 VMED None   2/21/2020 11:30 AM [facility] CATH LAB 4 CLOT None   3/24/2020 10:15 AM [facility] EXAM 9 ECHO [facility]   4/1/2020  8:00 AM Sony Barrientos M.D. CB None   5/12/2020 10:00 AM Michael J Bloch,  M.D. VMED None       Thank you for allowing me to participate in the care of this patient.  I will continue to follow this patient    Please contact me with any questions.    STEWART Cobos   [facility]

## 2020-02-18 NOTE — PROGRESS NOTES
2 RN skin check complete with MARGARITA [place].  Devices in place glasses, nasal cannula oxygen.  Skin assessed under devices yes, intact.  Confirmed pressure ulcers found on N/A.  New potential pressure ulcers noted on N/A. Wound consult placed N/A.    Sacrum red/pink, blanching.  Bilateral LE red, pink, blanching, dry/flaky.    The following interventions in place patient turns self side to side, pillows in use for support/positioning, silicone oxygen tubing, gray foams on oxygen, barrier cream, moisturizer as needed.

## 2020-02-18 NOTE — PROGRESS NOTES
Assumed care of patient A&O x4. Patient resting in bed with no signs of labored breathing, on 2L NC. Tele monitor in place, cardiac rhythm being monitored. Call light within reach, bed in lowest position. Patient was updated with plan for the night. Will continue to monitor.

## 2020-02-19 ENCOUNTER — APPOINTMENT (OUTPATIENT)
Dept: CARDIOLOGY | Facility: MEDICAL CENTER | Age: 85
DRG: 291 | End: 2020-02-19
Attending: NURSE PRACTITIONER
Payer: MEDICARE

## 2020-02-19 ENCOUNTER — TELEPHONE (OUTPATIENT)
Dept: CARDIOLOGY | Facility: MEDICAL CENTER | Age: 85
End: 2020-02-19

## 2020-02-19 VITALS
HEIGHT: 71 IN | BODY MASS INDEX: 25.06 KG/M2 | OXYGEN SATURATION: 91 % | TEMPERATURE: 97.1 F | DIASTOLIC BLOOD PRESSURE: 65 MMHG | HEART RATE: 75 BPM | WEIGHT: 179.01 LBS | RESPIRATION RATE: 18 BRPM | SYSTOLIC BLOOD PRESSURE: 95 MMHG

## 2020-02-19 LAB
ANION GAP SERPL CALC-SCNC: 7 MMOL/L (ref 0–11.9)
BUN SERPL-MCNC: 22 MG/DL (ref 8–22)
CALCIUM SERPL-MCNC: 8.8 MG/DL (ref 8.5–10.5)
CHLORIDE SERPL-SCNC: 104 MMOL/L (ref 96–112)
CO2 SERPL-SCNC: 30 MMOL/L (ref 20–33)
CREAT SERPL-MCNC: 0.98 MG/DL (ref 0.5–1.4)
ERYTHROCYTE [DISTWIDTH] IN BLOOD BY AUTOMATED COUNT: 50.4 FL (ref 35.9–50)
GLUCOSE SERPL-MCNC: 99 MG/DL (ref 65–99)
HCT VFR BLD AUTO: 40.2 % (ref 42–52)
HGB BLD-MCNC: 12.7 G/DL (ref 14–18)
INR PPP: 2.18 (ref 0.87–1.13)
LV EJECT FRACT  99904: 50
LV EJECT FRACT MOD 2C 99903: 38.28
LV EJECT FRACT MOD 4C 99902: 53.99
LV EJECT FRACT MOD BP 99901: 45.42
MCH RBC QN AUTO: 29.7 PG (ref 27–33)
MCHC RBC AUTO-ENTMCNC: 31.6 G/DL (ref 33.7–35.3)
MCV RBC AUTO: 93.9 FL (ref 81.4–97.8)
NT-PROBNP SERPL IA-MCNC: ABNORMAL PG/ML (ref 0–125)
PLATELET # BLD AUTO: 171 K/UL (ref 164–446)
PMV BLD AUTO: 10.7 FL (ref 9–12.9)
POTASSIUM SERPL-SCNC: 4 MMOL/L (ref 3.6–5.5)
PROTHROMBIN TIME: 25 SEC (ref 12–14.6)
RBC # BLD AUTO: 4.28 M/UL (ref 4.7–6.1)
SODIUM SERPL-SCNC: 141 MMOL/L (ref 135–145)
WBC # BLD AUTO: 5.3 K/UL (ref 4.8–10.8)

## 2020-02-19 PROCEDURE — 700111 HCHG RX REV CODE 636 W/ 250 OVERRIDE (IP): Performed by: NURSE PRACTITIONER

## 2020-02-19 PROCEDURE — 99239 HOSP IP/OBS DSCHRG MGMT >30: CPT | Performed by: FAMILY MEDICINE

## 2020-02-19 PROCEDURE — 85027 COMPLETE CBC AUTOMATED: CPT

## 2020-02-19 PROCEDURE — 99232 SBSQ HOSP IP/OBS MODERATE 35: CPT | Performed by: INTERNAL MEDICINE

## 2020-02-19 PROCEDURE — 93306 TTE W/DOPPLER COMPLETE: CPT

## 2020-02-19 PROCEDURE — 96374 THER/PROPH/DIAG INJ IV PUSH: CPT

## 2020-02-19 PROCEDURE — 83880 ASSAY OF NATRIURETIC PEPTIDE: CPT

## 2020-02-19 PROCEDURE — 99285 EMERGENCY DEPT VISIT HI MDM: CPT

## 2020-02-19 PROCEDURE — 36415 COLL VENOUS BLD VENIPUNCTURE: CPT

## 2020-02-19 PROCEDURE — 99223 1ST HOSP IP/OBS HIGH 75: CPT | Performed by: INTERNAL MEDICINE

## 2020-02-19 PROCEDURE — 85610 PROTHROMBIN TIME: CPT

## 2020-02-19 PROCEDURE — 93880 EXTRACRANIAL BILAT STUDY: CPT

## 2020-02-19 PROCEDURE — 93306 TTE W/DOPPLER COMPLETE: CPT | Mod: 26 | Performed by: INTERNAL MEDICINE

## 2020-02-19 PROCEDURE — A9270 NON-COVERED ITEM OR SERVICE: HCPCS | Performed by: INTERNAL MEDICINE

## 2020-02-19 PROCEDURE — 80048 BASIC METABOLIC PNL TOTAL CA: CPT

## 2020-02-19 PROCEDURE — 700102 HCHG RX REV CODE 250 W/ 637 OVERRIDE(OP): Performed by: INTERNAL MEDICINE

## 2020-02-19 RX ORDER — FUROSEMIDE 20 MG/1
20 TABLET ORAL DAILY
Qty: 30 TAB | Refills: 0 | Status: ON HOLD
Start: 2020-02-20 | End: 2020-03-03

## 2020-02-19 RX ORDER — FUROSEMIDE 20 MG/1
20 TABLET ORAL
Status: DISCONTINUED | OUTPATIENT
Start: 2020-02-20 | End: 2020-02-19 | Stop reason: HOSPADM

## 2020-02-19 RX ORDER — EZETIMIBE 10 MG/1
TABLET ORAL
Qty: 90 TAB | Refills: 1 | Status: ON HOLD
Start: 2020-02-19 | End: 2020-03-02

## 2020-02-19 RX ORDER — POTASSIUM CHLORIDE 20 MEQ/1
20 TABLET, EXTENDED RELEASE ORAL DAILY
Status: DISCONTINUED | OUTPATIENT
Start: 2020-02-20 | End: 2020-02-19 | Stop reason: HOSPADM

## 2020-02-19 RX ADMIN — GABAPENTIN 600 MG: 300 CAPSULE ORAL at 05:37

## 2020-02-19 RX ADMIN — POTASSIUM CHLORIDE 40 MEQ: 1500 TABLET, EXTENDED RELEASE ORAL at 05:37

## 2020-02-19 RX ADMIN — FUROSEMIDE 40 MG: 10 INJECTION, SOLUTION INTRAMUSCULAR; INTRAVENOUS at 05:37

## 2020-02-19 NOTE — DISCHARGE INSTRUCTIONS
Discharge Instructions    Discharged to home by car with relative. Discharged via wheelchair, hospital escort: Yes.  Special equipment needed: Cane    Be sure to schedule a follow-up appointment with your primary care doctor or any specialists as instructed.     Discharge Plan:   Diet Plan: Discussed  Activity Level: Discussed  Confirmed Follow up Appointment: Appointment Scheduled  Confirmed Symptoms Management: Discussed  Medication Reconciliation Updated: Yes  Influenza Vaccine Indication: Not indicated: Previously immunized this influenza season and > 8 years of age    I understand that a diet low in cholesterol, fat, and sodium is recommended for good health. Unless I have been given specific instructions below for another diet, I accept this instruction as my diet prescription.   Other diet: heart healthy    Special Instructions:   HF Patient Discharge Instructions  · Monitor your weight daily, and maintain a weight chart, to track your weight changes.   · Activity as tolerated, unless your Doctor has ordered otherwise. Other activity order: as tolerated.  · Follow a low fat, low cholesterol, low salt diet unless instructed otherwise by your Doctor. Read the labels on the back of food products and track your intake of fat, cholesterol and salt.   · Fluid Restriction No. If a Fluid Restriction has been ordered by your Doctor, measure fluids with a measuring cup to ensure that you are not exceeding the restriction.   · No smoking.  · Oxygen No. If your Doctor has ordered that you wear Oxygen at home, it is important to wear it as ordered.  · Did you receive an explanation from staff on the importance of taking each of your medications and why it is necessary to keep taking them unless your doctor says to stop? Yes  · Were all of your questions answered about how to manage your heart failure and what to do if you have increased signs and symptoms after you go home? Yes  · Do you feel like your heart failure care  team involved you in the care treatment plan and allowed you to make decisions regarding your care while in the hospital and addressed any discharge needs you might have? Yes    See the educational handout provided at discharge for more information on monitoring your daily weight, activity and diet. This also explains more about Heart Failure, symptoms of a flare-up and some of the tests that you have undergone.     Warning Signs of a Flare-Up include:  · Swelling in the ankles or lower legs.  · Shortness of breath, while at rest, or while doing normal activities.   · Shortness of breath at night when in bed, or coughing in bed.   · Requiring more pillows to sleep at night, or needing to sit up at night to sleep.  · Feeling weak, dizzy or fatigued.     When to call your Doctor:  · Call [facility] seven days a week from 8:00 a.m. to 8:00 p.m. for medical questions [phone].  · Call your Primary Care Physician or Cardiologist if:   1. You experience any pain radiating to your jaw or neck.  2. You have any difficulty breathing.  3. You experience weight gain of 3 lbs in a day or 5 lbs in a week.   4. You feel any palpitations or irregular heartbeats.  5. You become dizzy or lose consciousness.   If you have had an angiogram or had a pacemaker or AICD placed, and experience:  1. Bleeding, drainage or swelling at the surgical / puncture site.  2. Fever greater than 100.0 F  3. Shock from internal defibrillator.  4. Cool and / or numb extremities.      · Is patient discharged on Warfarin / Coumadin?   No     Depression / Suicide Risk    As you are discharged from this [facility], it is important to learn how to keep safe from harming yourself.    Recognize the warning signs:  · Abrupt changes in personality, positive or negative- including increase in energy   · Giving away possessions  · Change in eating patterns- significant weight changes-  positive or negative  · Change in sleeping  patterns- unable to sleep or sleeping all the time   · Unwillingness or inability to communicate  · Depression  · Unusual sadness, discouragement and loneliness  · Talk of wanting to die  · Neglect of personal appearance   · Rebelliousness- reckless behavior  · Withdrawal from people/activities they love  · Confusion- inability to concentrate     If you or a loved one observes any of these behaviors or has concerns about self-harm, here's what you can do:  · Talk about it- your feelings and reasons for harming yourself  · Remove any means that you might use to hurt yourself (examples: pills, rope, extension cords, firearm)  · Get professional help from the community (Mental Health, Substance Abuse, psychological counseling)  · Do not be alone:Call your Safe Contact- someone whom you trust who will be there for you.  · Call your local CRISIS HOTLINE 190-9673 or 027-915-9006  · Call your local Children's Mobile Crisis Response Team [place] (587) 607-1892 or www.[facility]  · Call the toll free National Suicide Prevention Hotlines   · National Suicide Prevention Lifeline 942-250-BHRS (8888)  · National Hope Line Network 800-SUICIDE (653-9883)

## 2020-02-19 NOTE — TELEPHONE ENCOUNTER
[place]        Patient's wife called and said [place] told the patient not to travel to [place]. She said they need a letter to give to the travel agency, [employer] to get a refund. She can be reached at [phone].

## 2020-02-19 NOTE — DISCHARGE SUMMARY
Discharge Summary    CHIEF COMPLAINT ON ADMISSION  Chief Complaint   Patient presents with   • Shortness of Breath     x 10 days worse during exertion. was diagnosed w/aortic valve stenosi.    • Chest Pain     yesterday describes as pressure.   • Ankle Swelling     bilateral ankle swelling x few days.       Reason for Admission  SOB     Admission Date  2/17/2020    CODE STATUS  Full Code    HPI & HOSPITAL COURSE  This is a 84 y.o. male here with him overload and respiratory failure secondary to severe aortic stenosis which is known.  Patient was being prepared for possible TAVR.  Already started work-up for this.  He was admitted and diuresed with IV Lasix.  He required O2 supplementation initially but has been weaned off at this point.  He has known history of paroxysmal atrial fibrillation, his Coumadin was held for planned left heart catheterization as outpatient.  His Cardizem was held secondary to episodes of bradycardia.  Cardiology has cleared him for discharge at this point.       Therefore, he is discharged in good and stable condition to home with close outpatient follow-up.    The patient met 2-midnight criteria for an inpatient stay at the time of discharge.    Discharge Date  2/19/2020    FOLLOW UP ITEMS POST DISCHARGE  Follow-up with cardiology as scheduled    DISCHARGE DIAGNOSES  Principal Problem:    Nonrheumatic aortic valve stenosis POA: Yes  Active Problems:    Chest pain POA: Yes    Acute respiratory failure with hypoxia (HCC) POA: Yes    Essential hypertension, benign POA: Yes    Paroxysmal atrial fibrillation (HCC) POA: Yes      Overview: Started coumadin, bridge w/lovenox and diltiazem      Follow up with cardiology outpatient    Long QT interval POA: Yes    Supratherapeutic INR POA: Yes    Carotid artery disease (HCC) POA: Yes    Dyslipidemia POA: Yes    Insomnia POA: Yes  Resolved Problems:    * No resolved hospital problems. *      FOLLOW UP  Future Appointments   Date Time Provider  Department Center   2/21/2020 11:30 AM [facility] CATH LAB 4 CLOT None   2/27/2020  8:15 AM JUDY Massey [facility] None   3/24/2020 10:15 AM [facility] EXAM 9 ECHO [facility] [address]   5/12/2020 10:00 AM Michael J Bloch, M.D. VMED None     No follow-up provider specified.    MEDICATIONS ON DISCHARGE     Medication List      START taking these medications      Instructions   furosemide 20 MG Tabs  Start taking on:  February 20, 2020  Commonly known as:  LASIX   Take 1 Tab by mouth every day.  Dose:  20 mg        CONTINUE taking these medications      Instructions   acetaminophen 325 MG Tabs  Commonly known as:  TYLENOL   Take 650 mg by mouth every four hours as needed.  Dose:  650 mg     atorvastatin 80 MG tablet  Commonly known as:  LIPITOR   TAKE 1/2 (ONE-HALF) TABLET BY MOUTH ONCE DAILY IN THE EVENING     ezetimibe 10 MG Tabs  Commonly known as:  ZETIA   TAKE 1 TABLET BY MOUTH ONCE DAILY     gabapentin 300 MG Caps  Commonly known as:  NEURONTIN   Take 300-600 mg by mouth 2 Times a Day. 600 mg in the AM  300 mg in the PM  Dose:  300-600 mg     irbesartan 300 MG Tabs  Commonly known as:  AVAPRO   Take 1 Tab by mouth every day.  Dose:  300 mg     temazepam 30 MG capsule  Commonly known as:  RESTORIL   TAKE ONE CAPSULE BY MOUTH ONCE DAILY AT BEDTIME AS NEEDED FOR SLEEP        STOP taking these medications    Cartia  MG Cp24  Generic drug:  DILTIAZem CD     Multi-Vitamins Tabs     warfarin 4 MG Tabs  Commonly known as:  COUMADIN            Allergies  Allergies   Allergen Reactions   • Nkda [No Known Drug Allergy]    • Other Environmental      Seasonal, cats-hayfever       DIET  Orders Placed This Encounter   Procedures   • Diet Order 2 Gram Sodium, Cardiac     Standing Status:   Standing     Number of Occurrences:   1     Order Specific Question:   Diet:     Answer:   2 Gram Sodium [7]     Order Specific Question:   Diet:     Answer:   Cardiac [6]       ACTIVITY  As tolerated.  Weight bearing as  tolerated    CONSULTATIONS  Cardiology - [facility]  Cardiac surgery - [facility]    PROCEDURES  None    LABORATORY  Lab Results   Component Value Date    SODIUM 141 02/19/2020    POTASSIUM 4.0 02/19/2020    CHLORIDE 104 02/19/2020    CO2 30 02/19/2020    GLUCOSE 99 02/19/2020    BUN 22 02/19/2020    CREATININE 0.98 02/19/2020    CREATININE 1.0 07/02/2008        Lab Results   Component Value Date    WBC 5.3 02/19/2020    HEMOGLOBIN 12.7 (L) 02/19/2020    HEMATOCRIT 40.2 (L) 02/19/2020    PLATELETCT 171 02/19/2020        Total time of the discharge process exceeds 45 minutes.

## 2020-02-19 NOTE — CARE PLAN
Patient discharged home with spouse.  Patient has an appointment for TAVR work up on 2/21.  Patient had echo done prior to discharge; (cardiology dept stated he could discharge prior to results).  Patient was given HF booklet and educated him on signs and symptoms to be aware of worsening condition.  Patient's spouse stated she would be buying a scale and blood pressure cuff today to keep vitals monitored.  Patient and spouse verbalized understanding and all questions were answered.

## 2020-02-19 NOTE — CARE PLAN
Problem: Safety  Goal: Will remain free from injury  Outcome: PROGRESSING AS EXPECTED  Intervention: Educate patient and significant other/support system about adaptive mobility strategies and safe transfers  Note: Pt mobility assessed at beginning of shift. Pt is up self with his cane. Fall precautions in place. Non-slip socks on. Bed in lowest locked position. Call light within reach. Pt educated to call for assistance and verbalizes understanding.      Problem: Knowledge Deficit  Goal: Knowledge of the prescribed therapeutic regimen will improve  Outcome: PROGRESSING AS EXPECTED  Intervention: Discuss information regarding therpeutic regimen and document in education  Note: Pt educated about disease process. Reason why medications are taken. And informed about treatment plan.

## 2020-02-19 NOTE — PROGRESS NOTES
"CARDIOLOGY FOLLOW UP    Impressions:  #.  Severe symptomatic aortic stenosis  #.  Acute decompensated diastolic heart failure due to valvular heart disease, now euvolemic  #.  Paroxysmal atrial fibrillation currently in sinus rhythm.  #.  History of hypertension  #.  History of TIA and CVA in 2008    Recommendations:  He has completed much of the pre-TAVR evaluation and is suitable for discharge today.  He has an outpatient cardiac catheterization already planned for Friday.  He will continue to hold Coumadin until after the cardiac catheterization at which point he could start Lovenox for bridging until TAVR.    Repeat basic metabolic panel the day of the heart catheterization    He will continue on atorvastatin, Zetia, Lasix, irbesartan    Diltiazem was stopped during this admission due to asymptomatic bradycardia    He will start Lasix 20 mg daily at discharge and avoid sodium intake.  I also counseled him about measuring daily weights.    Will sign off. Please call with questions. Thank you.     SUBJECTIVE/INTERIM EVENTS:  Feeling well after diuresis.  He is not having any symptoms which originally led him to present to the hospital.    EXAM:  /74   Pulse 76   Temp 36.3 °C (97.3 °F) (Temporal)   Resp 18   Ht 1.803 m (5' 11\")   Wt 81.2 kg (179 lb 0.2 oz)   SpO2 92%   BMI 24.97 kg/m²   Gen: Well appearing  HEENT: Symmetric face. Anicteric sclerae. Moist mucus membranes  NECK: No JVD. No lymphadenopathy  CARDIAC: Normal PMI, regular, normal S1, S2.  Late peaking systolic ejection murmur  VASCULATURE: Normal carotid amplitude without bruit. Peripheral pulses normal  RESP: Clear to auscultation bilaterally  ABD: Soft, non-tender, non-distended  EXT: No edema, no clubbing or cyanosis  SKIN: Warm and dry  NEURO: No gross deficits  PSYCH: Appropriate affect, participates in conversation    Studies  Lab Results   Component Value Date/Time    SODIUM 141 02/19/2020 04:18 AM    POTASSIUM 4.0 02/19/2020 04:18 AM "    CHLORIDE 104 02/19/2020 04:18 AM    CO2 30 02/19/2020 04:18 AM    GLUCOSE 99 02/19/2020 04:18 AM    BUN 22 02/19/2020 04:18 AM    CREATININE 0.98 02/19/2020 04:18 AM    CREATININE 1.0 07/02/2008 03:43 PM    BUNCREATRAT 16 11/15/2019 04:48 AM       For this encounter I directly reviewed medical records      Current Facility-Administered Medications:   •  [START ON 2/20/2020] potassium chloride SA (Kdur) tablet 20 mEq, 20 mEq, Oral, DAILY, Bart Luna M.D.  •  [START ON 2/20/2020] furosemide (LASIX) tablet 20 mg, 20 mg, Oral, Q DAY, Christopher Bae M.D.  •  senna-docusate (PERICOLACE or SENOKOT S) 8.6-50 MG per tablet 2 Tab, 2 Tab, Oral, BID **AND** polyethylene glycol/lytes (MIRALAX) PACKET 1 Packet, 1 Packet, Oral, QDAY PRN **AND** magnesium hydroxide (MILK OF MAGNESIA) suspension 30 mL, 30 mL, Oral, QDAY PRN **AND** bisacodyl (DULCOLAX) suppository 10 mg, 10 mg, Rectal, QDAY PRN, Pedrito Romero M.D.  •  Respiratory Therapy Consult, , Nebulization, Continuous RT, Pedrito Romero M.D.  •  acetaminophen (TYLENOL) tablet 650 mg, 650 mg, Oral, Q6HRS PRN, Pedrito Romero M.D.  •  Notify provider if pain remains uncontrolled, , , CONTINUOUS **AND** Use the numeric rating scale (NRS-11) on regular floors and Critical-Care Pain Observation Tool (CPOT) on ICUs/Trauma to assess pain, , , CONTINUOUS **AND** Pulse Ox (Oximetry), , , CONTINUOUS **AND** Pharmacy Consult Request ...Pain Management Review 1 Each, 1 Each, Other, PHARMACY TO DOSE **AND** If patient difficult to arouse and/or has respiratory depression, stop any opiates that are currently infusing and call a Rapid Response., , , CONTINUOUS **AND** oxyCODONE immediate-release (ROXICODONE) tablet 2.5 mg, 2.5 mg, Oral, Q3HRS PRN **AND** oxyCODONE immediate-release (ROXICODONE) tablet 5 mg, 5 mg, Oral, Q3HRS PRN **AND** morphine (pf) 4 MG/ML injection 2 mg, 2 mg, Intravenous, Q3HRS PRN, Pedrito Romero M.D.  •  atorvastatin (LIPITOR) tablet 40 mg, 40 mg,  Oral, QHS, Pedrito Romero M.D., 40 mg at 02/18/20 2041  •  ezetimibe (ZETIA) tablet 10 mg, 10 mg, Oral, QHS, Pedrito Romero M.D., 10 mg at 02/18/20 2041  •  irbesartan (AVAPRO) tablet 300 mg, 300 mg, Oral, DAILY, Pedrito Romero M.D., Stopped at 02/18/20 1800  •  temazepam (RESTORIL) capsule 30 mg, 30 mg, Oral, QHS PRN, Pedrito Romero M.D., 30 mg at 02/18/20 2041  •  gabapentin (NEURONTIN) capsule 600 mg, 600 mg, Oral, QAM, 600 mg at 02/19/20 0537 **AND** gabapentin (NEURONTIN) capsule 300 mg, 300 mg, Oral, Q EVENING, Pedrito Romero M.D., 300 mg at 02/18/20 1720    Case discussed with

## 2020-02-19 NOTE — PROGRESS NOTES
11:00   Patient ambulating in hallway on room air.  Patient stating 91%.   At rest patient stating 94% on room air.

## 2020-02-19 NOTE — PROGRESS NOTES
Assumed care of patient A&O x4. Patient resting in bed with no signs of labored breathing, on 1L NC. Tele monitor in place, cardiac rhythm being monitored. Call light within reach, bed in lowest position. Patient was updated with plan for the night. Will continue to monitor.

## 2020-02-21 ENCOUNTER — APPOINTMENT (OUTPATIENT)
Dept: CARDIOLOGY | Facility: MEDICAL CENTER | Age: 85
End: 2020-02-21
Attending: NURSE PRACTITIONER
Payer: MEDICARE

## 2020-02-21 ENCOUNTER — HOSPITAL ENCOUNTER (OUTPATIENT)
Facility: MEDICAL CENTER | Age: 85
End: 2020-02-21
Attending: INTERNAL MEDICINE | Admitting: INTERNAL MEDICINE
Payer: MEDICARE

## 2020-02-21 VITALS
BODY MASS INDEX: 25.06 KG/M2 | DIASTOLIC BLOOD PRESSURE: 61 MMHG | HEART RATE: 80 BPM | TEMPERATURE: 96.8 F | WEIGHT: 179.01 LBS | HEIGHT: 71 IN | SYSTOLIC BLOOD PRESSURE: 95 MMHG | OXYGEN SATURATION: 92 % | RESPIRATION RATE: 20 BRPM

## 2020-02-21 DIAGNOSIS — I35.0 SEVERE AORTIC STENOSIS: ICD-10-CM

## 2020-02-21 PROBLEM — I42.0 DILATED CARDIOMYOPATHY (HCC): Status: ACTIVE | Noted: 2020-02-21

## 2020-02-21 LAB — INR BLD: 1.2

## 2020-02-21 PROCEDURE — 99152 MOD SED SAME PHYS/QHP 5/>YRS: CPT | Performed by: INTERNAL MEDICINE

## 2020-02-21 PROCEDURE — 85610 PROTHROMBIN TIME: CPT

## 2020-02-21 PROCEDURE — 700117 HCHG RX CONTRAST REV CODE 255: Performed by: INTERNAL MEDICINE

## 2020-02-21 PROCEDURE — 93567 NJX CAR CTH SPRVLV AORTGRPHY: CPT | Performed by: INTERNAL MEDICINE

## 2020-02-21 PROCEDURE — 700101 HCHG RX REV CODE 250

## 2020-02-21 PROCEDURE — 700111 HCHG RX REV CODE 636 W/ 250 OVERRIDE (IP)

## 2020-02-21 PROCEDURE — 93460 R&L HRT ART/VENTRICLE ANGIO: CPT

## 2020-02-21 PROCEDURE — 700105 HCHG RX REV CODE 258: Performed by: INTERNAL MEDICINE

## 2020-02-21 PROCEDURE — G0278 ILIAC ART ANGIO,CARDIAC CATH: HCPCS | Performed by: INTERNAL MEDICINE

## 2020-02-21 PROCEDURE — 160002 HCHG RECOVERY MINUTES (STAT)

## 2020-02-21 PROCEDURE — 93460 R&L HRT ART/VENTRICLE ANGIO: CPT | Mod: 26 | Performed by: INTERNAL MEDICINE

## 2020-02-21 RX ORDER — LIDOCAINE HYDROCHLORIDE 20 MG/ML
INJECTION, SOLUTION INFILTRATION; PERINEURAL
Status: COMPLETED
Start: 2020-02-21 | End: 2020-02-21

## 2020-02-21 RX ORDER — HEPARIN SODIUM 200 [USP'U]/100ML
INJECTION, SOLUTION INTRAVENOUS
Status: COMPLETED
Start: 2020-02-21 | End: 2020-02-21

## 2020-02-21 RX ORDER — VERAPAMIL HYDROCHLORIDE 2.5 MG/ML
INJECTION, SOLUTION INTRAVENOUS
Status: COMPLETED
Start: 2020-02-21 | End: 2020-02-21

## 2020-02-21 RX ORDER — MIDAZOLAM HYDROCHLORIDE 1 MG/ML
INJECTION INTRAMUSCULAR; INTRAVENOUS
Status: COMPLETED
Start: 2020-02-21 | End: 2020-02-21

## 2020-02-21 RX ORDER — HEPARIN SODIUM,PORCINE 1000/ML
VIAL (ML) INJECTION
Status: COMPLETED
Start: 2020-02-21 | End: 2020-02-21

## 2020-02-21 RX ORDER — SODIUM CHLORIDE 9 MG/ML
INJECTION, SOLUTION INTRAVENOUS CONTINUOUS
Status: DISCONTINUED | OUTPATIENT
Start: 2020-02-21 | End: 2020-02-21 | Stop reason: HOSPADM

## 2020-02-21 RX ADMIN — IOHEXOL 115 ML: 350 INJECTION, SOLUTION INTRAVENOUS at 17:15

## 2020-02-21 RX ADMIN — NITROGLYCERIN 10 ML: 20 INJECTION INTRAVENOUS at 16:02

## 2020-02-21 RX ADMIN — HEPARIN SODIUM: 200 INJECTION, SOLUTION INTRAVENOUS at 15:30

## 2020-02-21 RX ADMIN — SODIUM CHLORIDE: 9 INJECTION, SOLUTION INTRAVENOUS at 10:26

## 2020-02-21 RX ADMIN — LIDOCAINE HYDROCHLORIDE: 20 INJECTION, SOLUTION INFILTRATION; PERINEURAL at 16:02

## 2020-02-21 RX ADMIN — VERAPAMIL HYDROCHLORIDE 2.5 MG: 2.5 INJECTION, SOLUTION INTRAVENOUS at 16:02

## 2020-02-21 RX ADMIN — HEPARIN SODIUM: 1000 INJECTION, SOLUTION INTRAVENOUS; SUBCUTANEOUS at 16:02

## 2020-02-21 RX ADMIN — MIDAZOLAM HYDROCHLORIDE 1.5 MG: 1 INJECTION, SOLUTION INTRAMUSCULAR; INTRAVENOUS at 15:45

## 2020-02-21 RX ADMIN — FENTANYL CITRATE 75 MCG: 50 INJECTION, SOLUTION INTRAMUSCULAR; INTRAVENOUS at 15:45

## 2020-02-21 NOTE — CONSULTS
Reason for Consult:  Asked by Dr Bae for inpatient structural heart consultation for this patient with severe aortic stenosis  Patient's PCP: Michael J Bloch, M.D.    CC:   Chief Complaint   Patient presents with   • Shortness of Breath     x 10 days worse during exertion. was diagnosed w/aortic valve stenosi.    • Chest Pain     yesterday describes as pressure.   • Ankle Swelling     bilateral ankle swelling x few days.       HPI: 84-year-old male patient with prior history of hypertension, paroxysmal atrial fibrillation, dyslipidemia was seen by me before for moderate to severe aortic stenosis.  He presented with progressive shortness of breath, pedal edema, moderate to severe symptoms.  Associated with fatigue.  His symptoms rapidly improved with diuresis.  Echocardiogram is consistent with paradoxical low flow low gradient aortic stenosis.    Medications / Drug list prior to admission:  No current facility-administered medications on file prior to encounter.      Current Outpatient Medications on File Prior to Encounter   Medication Sig Dispense Refill   • gabapentin (NEURONTIN) 300 MG Cap Take 300-600 mg by mouth 2 Times a Day. 600 mg in the AM  300 mg in the PM     • ezetimibe (ZETIA) 10 MG Tab TAKE 1 TABLET BY MOUTH ONCE DAILY 90 Tab 1   • atorvastatin (LIPITOR) 80 MG tablet TAKE 1/2 (ONE-HALF) TABLET BY MOUTH ONCE DAILY IN THE EVENING 45 Tab 1   • temazepam (RESTORIL) 30 MG capsule TAKE ONE CAPSULE BY MOUTH ONCE DAILY AT BEDTIME AS NEEDED FOR SLEEP 90 Cap 1   • irbesartan (AVAPRO) 300 MG Tab Take 1 Tab by mouth every day. 30 Tab 11   • acetaminophen (TYLENOL) 325 MG Tab Take 650 mg by mouth every four hours as needed.         Current list of administered Medications:  No current facility-administered medications for this encounter.     Current Outpatient Medications:   •  furosemide (LASIX) 20 MG Tab, Take 1 Tab by mouth every day., Disp: 30 Tab, Rfl: 0  •  gabapentin (NEURONTIN) 300 MG Cap, Take 300-600  mg by mouth 2 Times a Day. 600 mg in the  mg in the PM, Disp: , Rfl:   •  ezetimibe (ZETIA) 10 MG Tab, TAKE 1 TABLET BY MOUTH ONCE DAILY, Disp: 90 Tab, Rfl: 1  •  atorvastatin (LIPITOR) 80 MG tablet, TAKE 1/2 (ONE-HALF) TABLET BY MOUTH ONCE DAILY IN THE EVENING, Disp: 45 Tab, Rfl: 1  •  temazepam (RESTORIL) 30 MG capsule, TAKE ONE CAPSULE BY MOUTH ONCE DAILY AT BEDTIME AS NEEDED FOR SLEEP, Disp: 90 Cap, Rfl: 1  •  irbesartan (AVAPRO) 300 MG Tab, Take 1 Tab by mouth every day., Disp: 30 Tab, Rfl: 11  •  acetaminophen (TYLENOL) 325 MG Tab, Take 650 mg by mouth every four hours as needed., Disp: , Rfl:     Past Medical History:   Diagnosis Date   • Arrhythmia     Afib   • Arthritis     osteo, hips and knees   • Blood clotting disorder (HCC)     , left arm   • Bronchitis        • Cancer (HCC)     Prostate CA--2000   • Carotid arterial disease (HCC) 1/6/2014   • Cataract     removed bilat   • DVT (deep venous thrombosis) ([facility]) 2016   • Heart valve disease     MVR   • HLD (hyperlipidemia) 1/6/2014   • Hypertension    • Mitral regurgitation 1/6/2014   • Pain 12-14-15    low back, hips, 2-3/10   • Pneumonia        • Stroke ([facility]) 2008    TIA, no residual   • TIA (transient ischemic attack)     2008   • Urinary bladder disorder     HAD MALE BLADDER SUSPENSION PROCEDURE DONE   • Urinary incontinence        Past Surgical History:   Procedure Laterality Date   • LUMBAR FUSION POSTERIOR  12/28/2015    Procedure: LUMBAR FUSION POSTERIOR L3-5 with peek rods;  Surgeon: Michael Almonte M.D.;  Location: [facility];  Service:    • LUMBAR DECOMPRESSION  12/28/2015    Procedure: LUMBAR DECOMPRESSION posterior redo decompression L3-5, with dural repair;  Surgeon: Michael Almonte M.D.;  Location: [facility];  Service:    • THORACOSCOPY  6/13/2015    Procedure: THORACOSCOPY with decortication VATS , side to be determined  ;  Surgeon: Elmira Holder M.D.;  Location: [facility];   Service:    • LUMBAR LAMINECTOMY DISKECTOMY  10/8/2010    Performed by PHYLICIA SARKAR at [facility]S   • CERVICAL DISK AND FUSION ANTERIOR  10/26/2009    Performed by PHYLICIA SARKAR at [facility]   • CAROTID ENDARTERECTOMY  7/10/08    Performed by LEIGH MORATAYA at [facility]   • BLADDER SLING MALE  2004   • PROSTATECTOMY, RADIAL  2000       Family History   Problem Relation Age of Onset   • Heart Disease Father         CHF at 91.   • Other Brother         Aneurysm   • Cancer Brother         Seminal vascular cancer   • Autoimmune Disease Daughter         Lupus, RA   • No Known Problems Daughter    • No Known Problems Son    • No Known Problems Son    • Heart Attack Neg Hx        Social History     Socioeconomic History   • Marital status:      Spouse name: Not on file   • Number of children: Not on file   • Years of education: Not on file   • Highest education level: Not on file   Occupational History   • Not on file   Social Needs   • Financial resource strain: Not on file   • Food insecurity     Worry: Never true     Inability: Never true   • Transportation needs     Medical: No     Non-medical: No   Tobacco Use   • Smoking status: Former Smoker     Packs/day: 0.50     Years: 30.00     Pack years: 15.00     Types: Cigarettes, Pipe     Last attempt to quit: 1985     Years since quittin.1   • Smokeless tobacco: Never Used   Substance and Sexual Activity   • Alcohol use: Yes     Alcohol/week: 3.0 oz     Types: 3 Cans of beer, 3 Shots of liquor per week     Comment: 1 x week   • Drug use: No   • Sexual activity: Not Currently   Lifestyle   • Physical activity     Days per week: Not on file     Minutes per session: Not on file   • Stress: Not on file   Relationships   • Social connections     Talks on phone: Not on file     Gets together: Not on file     Attends Druze service: Not on file     Active member of club or organization: Not on file     Attends  meetings of clubs or organizations: Not on file     Relationship status: Not on file   • Intimate partner violence     Fear of current or ex partner: Not on file     Emotionally abused: Not on file     Physically abused: Not on file     Forced sexual activity: Not on file   Other Topics Concern   • Not on file   Social History Narrative   • Not on file       ALLERGIES:  Allergies   Allergen Reactions   • Nkda [No Known Drug Allergy]    • Other Environmental      Seasonal, cats-hayfever       Review of systems:  A detailed review of symptoms was reviewed with patient. This is reviewed in H&P and PMH. ALL OTHERS reviewed and negative    Physical exam:  No data found.  General: No acute distress.   EYES: no jaundice  HEENT: OP clear   Neck: No bruits No JVD.   CVS:   RRR. S1 + S2.  3 x 6 systolic murmur aortic area  Resp: CTAB. No wheezing or crackles/rhonchi.  Abdomen: Soft, NT, ND,  Skin: Grossly nothing acute no obvious rashes  Neurological: Alert, Moves all extremities, no cranial nerve defects on limited exam  Extremities: Pulse 2+ in b/l LE.  no edema. No cyanosis.       Data:  Laboratory studies:  No results found for this or any previous visit (from the past 24 hour(s)).    Imaging:  EC-ECHOCARDIOGRAM COMPLETE W/O CONT   Final Result      US-CAROTID DOPPLER BILAT   Final Result      CTA ABDOMEN PELVIS W & W/O POST PROCESS   Final Result         1. Tricuspid aortic valve with moderate calcifications.      Annulus area: 548 mm?   Annulus diameter: 28.4 x 25.0 mm   Diameter at the level of the sinuses: 34.0 x 34.8 x 35.9 mm      Vertical distances from the aortic annulus to the coronary origins are below 10 mm, especially the right.      2. 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 Hazy groundglass opacities in the  bilateral lungs with interlobular septal thickening could relate to pulmonary edema. Patchy bibasilar opacities, likely atelectasis.      CT-CTA CHEST WITH & W/O-POST PROCESS   Final Result         1. Tricuspid aortic valve with moderate calcifications.      Annulus area: 548 mm?   Annulus diameter: 28.4 x 25.0 mm   Diameter at the level of the sinuses: 34.0 x 34.8 x 35.9 mm      Vertical distances from the aortic annulus to the coronary origins are below 10 mm, especially the right.      2. 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 Hazy groundglass opacities in the bilateral lungs with interlobular septal thickening could relate to pulmonary edema. Patchy bibasilar opacities, likely atelectasis.      US-EXTREMITY VENOUS LOWER BILAT   Final Result      DX-CHEST-PORTABLE (1 VIEW)   Final Result      Small right pleural effusion with overlying atelectasis/consolidation. Follow-up to radiographic resolution is recommended.      Cardiomegaly with is increased compared to the prior examination. Correlation with an echocardiogram is recommended.      Atherosclerotic plaque.                 Echocardiogram 2020:  Ejection fraction 55%.  Velocity across the aortic valve is 3.3, however it is decreased from prior echo earlier this month, dimensionless index is 0.15  Consistent with paradoxical low flow low gradient severe aortic stenosis.      EKG : personally reviewed by me sinus rhythm, minimal ST depression anterolateral leads.    All pertinent features of laboratory and imaging reviewed including primary images where applicable    Medical Decision Makin-year-old male patient with  1.  Degenerative paradoxical low-flow low gradient severe aortic stenosis, NYHA class III stage C  2.  Paroxysmal atrial fibrillation  3.   Essential hypertension  4.  History of TIA, carotid endarterectomy  5.  Dyslipidemia  6.  Prior back surgeries    Patient has significant heart failure symptoms during hospitalization.  His aortic valve is severely calcified, dimensionless index is 0.15, very low LVOT velocity of 0.6 consistent with paradoxical low flow low gradient severe aortic stenosis.  I strongly feel patient would benefit from aortic valve replacement.  We will proceed with pre-TAVR work-up, coronary angiogram.  We will also schedule him for a dobutamine stress echocardiogram to prove severity of aortic stenosis.  He will be scheduled for TAVR as an outpatient.      It is my pleasure to participate in the care of Mr. Hester.  Please do not hesitate to contact me with questions or concerns.    Sony Barrientos M.D.    2/20/2020

## 2020-02-21 NOTE — OR NURSING
Patient to have procedure completed at 1500 due to having eaten breakfast at 0800. Patient to spend time with wife during the interim. MD and cath lab staff on board with POC. Cath lab supervisor, Lenora, spoke with patient at bedside. Patient instructed not to eat prior to procedure. No PIV placed prior to patient leaving unit via foot with volunteer. Patient to return to Pre-op at 1400.

## 2020-02-22 NOTE — DISCHARGE INSTRUCTIONS
ACTIVITY: Rest and take it easy for the first 24 hours.  A responsible adult is recommended to remain with you during that time.  It is normal to feel sleepy.  We encourage you to not do anything that requires balance, judgment or coordination.    MILD FLU-LIKE SYMPTOMS ARE NORMAL. YOU MAY EXPERIENCE GENERALIZED MUSCLE ACHES, THROAT IRRITATION, HEADACHE AND/OR SOME NAUSEA.    FOR 24 HOURS DO NOT:  Drive, operate machinery or run household appliances.  Drink beer or alcoholic beverages.   Make important decisions or sign legal documents.    SPECIAL INSTRUCTIONS:     POST ANGIOGRAM  General Care Instructions  • Maintain a bandage over the incision site for 24 hours.  It's normal to find a small bruise or dime-sized lump at the insertion site. This should disappear within a few weeks.  • Do not apply lotions or powders to the site.  Do not immerse the catheter insertion site in water (bathtub/swimming) for five days. It is ok to shower 24 hours after the procedure.  • You may resume your normal diet immediately; on the day of your procedure, drink 6-10 glasses of water to help flush the contrast liquid out of your system.  • If the doctor inserted the catheter in through your groin:  o Walking short distances on a flat surface is OK. Limit going up/down stairs for the first 2 days.  o DO NOT do yard work, drive, squat, lift heavy objects, or play sports for 2 days; or until your health care provider tells you it is OK.  • If the doctor inserted the catheter in your arm:  o For 5 days, DO NOT lift anything heavier than 10 pounds (approximately a gallon of milk). DO NOT do any heavy pushing, pulling, or twisting.    Medications  • If your current medications need to be changed, you will be provided with an updated list of your medications prior to discharge.  • If you take warfarin (Coumadin), resume taking your usual dose the evening after the procedure.  • DO NOT STOP taking prescribed blood thinning (anti-platelet)  medications unless instructed by your cardiologist.  These medications include:  o Aspirin, Clopidogrel (Plavix), Ticagrelor (Brilinta), or Prasugrel (Effient)   • If you take one of the following anticoagulants, RESUME 24 HOURS after your procedure:  o Apixiban (Eliquis), Rivaroxaban (Xarelto), Dabigatran (Pradaxa), Edoxaban (Savaysa)  • If you take metformin (Glucophage), RESUME 48 HOURS after your procedure.    When to call your healthcare provider  Call your cardiologist right away at [phone] if you have any of the following:  ?  Problems/Concerns taking any of your prescribed heart medicines.  ?  The insertion site has increasing pain, swelling, redness, bleeding, or drainage.  ?  Your arm or leg below where the insertion site changes color, is cool, or is numb.  ?  You have chest pain or shortness of breath that does not go away with rest.  ?  Your pulse feels irregular -- very slow (less than 60 beats/minute) or very fast (over 100 beats/minute).  ?  You have dizziness, fainting, or you are very tired.  ?  You are coughing up blood or yellow or green mucus.  ?  You have chills or a fever over 101°F (38.3°C).   ?  If there is bleeding at the catheter insertion site, apply pressure for 10 minutes.  If bleeding persists, call 911, and continue to hold pressure until advanced medical support arrives.      DIET: To avoid nausea, slowly advance diet as tolerated, avoiding spicy or greasy foods for the first day.  Add more substantial food to your diet according to your physician's instructions.  INCREASE FLUIDS AND FIBER TO AVOID CONSTIPATION.    SURGICAL DRESSING/BATHING: leave dressing in place for 24 hours, may shower once removed    FOLLOW-UP APPOINTMENT:  A follow-up appointment should be arranged with your doctor; call to schedule.    You should CALL YOUR PHYSICIAN if you develop:  Fever greater than 101 degrees F.  Pain not relieved by medication, or persistent nausea or vomiting.  Excessive bleeding  (blood soaking through dressing) or unexpected drainage from the wound.  Extreme redness or swelling around the incision site, drainage of pus or foul smelling drainage.  Inability to urinate or empty your bladder within 8 hours.  Problems with breathing or chest pain.    You should call 911 if you develop problems with breathing or chest pain.  If you are unable to contact your doctor or surgical center, you should go to the nearest emergency room or urgent care center.  Physician's telephone #: [phone]    If any questions arise, call your doctor.  If your doctor is not available, please feel free to call the Surgical Center at [phone].  The Center is open Monday through Friday from 7AM to 7PM.  You can also call the HEALTH HOTLINE open 24 hours/day, 7 days/week and speak to a nurse at [phone], or toll free at [phone].    A registered nurse may call you a few days after your surgery to see how you are doing after your procedure.    MEDICATIONS: Resume taking daily medication.  Take prescribed pain medication with food.  If no medication is prescribed, you may take non-aspirin pain medication if needed.  PAIN MEDICATION CAN BE VERY CONSTIPATING.  Take a stool softener or laxative such as senokot, pericolace, or milk of magnesia if needed.      If your physician has prescribed pain medication that includes Acetaminophen (Tylenol), do not take additional Acetaminophen (Tylenol) while taking the prescribed medication.    Depression / Suicide Risk    As you are discharged from this [facility] facility, it is important to learn how to keep safe from harming yourself.    Recognize the warning signs:  · Abrupt changes in personality, positive or negative- including increase in energy   · Giving away possessions  · Change in eating patterns- significant weight changes-  positive or negative  · Change in sleeping patterns- unable to sleep or sleeping all the time   · Unwillingness or inability to  communicate  · Depression  · Unusual sadness, discouragement and loneliness  · Talk of wanting to die  · Neglect of personal appearance   · Rebelliousness- reckless behavior  · Withdrawal from people/activities they love  · Confusion- inability to concentrate     If you or a loved one observes any of these behaviors or has concerns about self-harm, here's what you can do:  · Talk about it- your feelings and reasons for harming yourself  · Remove any means that you might use to hurt yourself (examples: pills, rope, extension cords, firearm)  · Get professional help from the community (Mental Health, Substance Abuse, psychological counseling)  · Do not be alone:Call your Safe Contact- someone whom you trust who will be there for you.  · Call your local CRISIS HOTLINE 081-2573 or 705-248-5615  · Call your local Children's Mobile Crisis Response Team [place] (534) 993-2706 or www.TE2  · Call the toll free National Suicide Prevention Hotlines   · National Suicide Prevention Lifeline 069-731-TZKD (2238)  · National Hope Line Network 800-SUICIDE (355-5697)

## 2020-02-22 NOTE — PROCEDURES
DATE OF SERVICE:  02/21/2020    REFERRING PHYSICIAN:  Christopher Bae MD    PROCEDURES:  1.  Right heart catheterization.  2.  Left heart catheterization.  3.  Coronary angiography.  4.  Left ventriculogram.  5.  Ascending aortogram.  6.  Descending aortogram.    PREPROCEDURE DIAGNOSES:    1.  Low flow, low gradient severe aortic stenosis.    POSTPROCEDURE DIAGNOSES:  1.  Low flow, low gradient severe aortic stenosis with peak to peak gradient   of 40 mmHg, mean gradient of 34 mmHg, calculated ENRIKE of 0.81 cm2.  2.  Nonobstructive coronary artery disease.  3.  Dilated left ventricular systolic function with reduced left ventricular   systolic function, ejection fraction of 30%.  Global hypokinesis.  4.  Elevated left ventricular end-diastolic pressure.  5.  Mildly dilated ascending aorta.  6.  Large distal abdominal aortic and bilateral iliofemoral system with   tortuosity of the iliofemoral system, right greater than left.    INDICATION:  The patient is an 84-year-old male with aortic stenosis,   cardiomyopathy, and history of TIA.  He was recently admitted to [facility] [place] and is under consideration for TAVR.  He was scheduled for   cardiac catheterization.    DESCRIPTION OF PROCEDURE:  After informed consent was signed by the   patient, the patient was brought to the cardiac catheterization laboratory.  He   was prepped and draped in the usual sterile manner.  The right brachial area   was anesthetized with 2% Xylocaine.  A 6-Turkmen IV was placed in the right   brachial vein over an existing IV.  A [place]-Andrea catheter was positioned at the  pulmonary artery.  Thermodilution cardiac outputs were obtained.  Right heart  pressure measurements were obtained.  The [place]-Andrea catheter was then   removed.  The right wrist area was anesthetized with 2% Xylocaine.  A 6-Turkmen  sheath was inserted into the right radial artery using modified Seldinger   technique.  Intra-arterial verapamil and nitroglycerin were  given.  IV heparin  was given.  An AL1 catheter was used to cross the aortic valve with a   straight wire and exchanged over an exchange length wire for a dual-lumen   pigtail catheter, which was positioned into the left ventricle.  Left   ventricular and aortic simultaneous measurements were obtained.  Left   ventriculogram was performed.  This catheter was removed and exchanged for   a 6-Maltese long pigtail catheter, which was positioned at the descending aorta.   Descending aortogram was performed.  This catheter was exchanged for a   4-Maltese JL 3.5 catheter, which was positioned into the left main coronary   artery.  Coronary angiography was performed.  This catheter was exchanged for   a JR4 catheter, which was positioned into the right coronary artery.  Coronary  angiography was performed.  The patient tolerated the procedure well.  At the  end of the procedure, all catheters and sheaths were removed.  Hemoband was   placed on the right wrist.  He was transferred to PPU in stable condition.    HEMODYNAMIC DATA:  Hemodynamic data shows right heart pressures of RA 12 mmHg,  RV 70/50 mmHg with RVEDP of 18 mmHg, PA 70/30 with mean of 50 mmHg, pulmonary  wedge 30 mmHg.  Cardiac output by thermodilution 4.67.  Cardiac index 2.32.    Left heart pressure /70 mmHg with mean of 80 mmHg and /10 mmHg   with LVEDP of 20 mmHg.    AORTIC VALVE:  Peak to peak gradient 40 mmHg, mean gradient 34 mmHg,   calculated ENRIKE 0.81 cm2.    LEFT VENTRICULOGRAM:  A 10 mL of contrast was delivered for 3 seconds.    Ejection fraction was estimated to be 30%.  There was global hypokinesis   noted.    ASCENDING AORTOGRAM:  A 10 mL of contrast was delivered for 1.5 seconds.    There was mildly dilated ascending aorta noted.    DESCENDING AORTOGRAM:  A 10 mL of contrast was delivered for 1.5 seconds.    Large distal aorta and bilateral iliofemoral system continuous tortuosity,   right greater than left.    ANGIOGRAM:  1.  Left main  coronary artery:  Left main coronary artery is a long,   large-caliber vessel free of disease.  2.  Left anterior descending artery:  Left anterior descending artery is a   long, large-caliber vessel, which wraps around the apex.  Proximal to mid   portion of the vessel, there are mild calcification and luminal irregularities   of 20-30%.  There are 2 moderate caliber diagonal branches also containing   luminal irregularities of 20-30%.  3.  Left circumflex artery:  Left circumflex artery is a nondominant, moderate   to large caliber vessel with proximal to mid mild-to-moderate calcification   with luminal irregularities of 30-40%.  There is a small first obtuse marginal   branch, moderate length large caliber second obtuse marginal branch, and   distal moderate caliber obtuse marginal branch noted free of disease.  4.  Right coronary artery:  Right coronary is a dominant, moderate-caliber   vessel with diffuse luminal irregularities of 10-20%.  Distally, there are   moderate caliber posterior descending artery and 2 posterior lateral branches   with luminal irregularities of 10-20%.    IMPRESSION:  1.  Low flow, low gradient severe aortic stenosis with peak to peak gradient   of 40 mmHg, mean gradient of 34 mmHg, calculated ENRIKE of 0.81 cm2.  2.  Nonobstructive coronary artery disease.  3.  Dilated left ventricular systolic function with reduced left ventricular   systolic function, ejection fraction of 30%.  Global hypokinesis.  4.  Elevated left ventricular end-diastolic pressure.  5.  Mildly dilated ascending aorta.  6.  Large distal abdominal aortic and bilateral iliofemoral system with   tortuosity of the iliofemoral system, right greater than left.    RECOMMENDATIONS:  Recommend to proceed with TAVR.    SEDATION TIME: The patient's sedation was managed by myself with continuous   face to face time with the patient for 15 minutes from 15:30 to 15:45.           ____________________________________     TELMA ARREOLA  MD WILL STEVENS / TERRI    DD:  02/21/2020 16:46:37  DT:  02/21/2020 17:02:07    D#:  [id number]  Job#:  [id number]     Former smoker

## 2020-02-22 NOTE — OR NURSING
1702 Pt over from cath lab post cardiac angiogram. Right wrist with TR band in place, site CDI and no signs of bleeding. Right brachial site CDI soft. Pt awake and alert, denies pain and nausea, VSS. Family at bedside.  1710 Tolerating orals  1722 Pt voided 200 mls   1725 2mls of air removed from TR band, site CDI and no signs of bleeding  1740 2mls of air removed from TR band, site CDI and no signs of bleeding  1755 2mls of air removed from TR band, site CDI and no signs of bleeding  1810 3mls of air removed from TR band, site CDI and no signs of bleeding  1825 3mls of air removed from TR band, site CDI and no signs of bleeding  1835 Discharge instructions provided to pt and spouse. Discussed diet, activity, follow up, prescriptions and symptom management. Pt and  spouse state understanding. Pt and spouse state all questions have been answered. Copy of discharge provided to pt.  1840 Final 2 mls of air removed from TR band, gauze and tegaderm placed.    1845 Criteria met. Right radial and Right Brachial site CDI, soft and no signs of bleeding.  1905 Pt wheeled off of unit with all belongings by RN without incident.

## 2020-02-26 ENCOUNTER — DOCUMENTATION (OUTPATIENT)
Dept: CARDIOLOGY | Facility: MEDICAL CENTER | Age: 85
End: 2020-02-26

## 2020-02-26 DIAGNOSIS — I35.0 SEVERE AORTIC STENOSIS: ICD-10-CM

## 2020-02-26 DIAGNOSIS — Z00.6 EXAMINATION OF PARTICIPANT IN CLINICAL TRIAL: ICD-10-CM

## 2020-02-26 NOTE — PROGRESS NOTES
Valve Conference Heart Team Plan of Care: 2/26/2020    Valve candidate: Yes  With Possible open heart: Yes  Access: TF right iliac  Valve Size: 26 S3 ultra  Cerebral Protection: none  Anesthesia: GA  Unit: Tele  Incidental findings: none  Clearance needed prior to procedure: none      Plan of care: Proceed with TAVR as planned 3/2/20.      Will review details and heart team recommendations at scheduled follow up clinic visit.

## 2020-02-27 ENCOUNTER — OFFICE VISIT (OUTPATIENT)
Dept: CARDIOLOGY | Facility: MEDICAL CENTER | Age: 85
End: 2020-02-27
Payer: MEDICARE

## 2020-02-27 ENCOUNTER — DOCUMENTATION (OUTPATIENT)
Dept: CARDIOLOGY | Facility: MEDICAL CENTER | Age: 85
End: 2020-02-27

## 2020-02-27 ENCOUNTER — HOSPITAL ENCOUNTER (OUTPATIENT)
Dept: LAB | Facility: MEDICAL CENTER | Age: 85
End: 2020-02-27
Attending: NURSE PRACTITIONER
Payer: MEDICARE

## 2020-02-27 VITALS
WEIGHT: 179 LBS | HEIGHT: 71 IN | BODY MASS INDEX: 25.06 KG/M2 | OXYGEN SATURATION: 95 % | DIASTOLIC BLOOD PRESSURE: 82 MMHG | HEART RATE: 79 BPM | SYSTOLIC BLOOD PRESSURE: 102 MMHG

## 2020-02-27 DIAGNOSIS — I77.9 CAROTID ARTERY DISEASE, UNSPECIFIED LATERALITY, UNSPECIFIED TYPE (HCC): ICD-10-CM

## 2020-02-27 DIAGNOSIS — I10 ESSENTIAL HYPERTENSION, BENIGN: ICD-10-CM

## 2020-02-27 DIAGNOSIS — G45.9 TIA (TRANSIENT ISCHEMIC ATTACK): ICD-10-CM

## 2020-02-27 DIAGNOSIS — I50.33 ACUTE ON CHRONIC DIASTOLIC HEART FAILURE DUE TO VALVULAR DISEASE (HCC): ICD-10-CM

## 2020-02-27 DIAGNOSIS — I42.0 DILATED CARDIOMYOPATHY (HCC): ICD-10-CM

## 2020-02-27 DIAGNOSIS — I48.0 PAROXYSMAL ATRIAL FIBRILLATION (HCC): ICD-10-CM

## 2020-02-27 DIAGNOSIS — I38 ACUTE ON CHRONIC DIASTOLIC HEART FAILURE DUE TO VALVULAR DISEASE (HCC): ICD-10-CM

## 2020-02-27 DIAGNOSIS — I82.4Z9 ACUTE DEEP VEIN THROMBOSIS (DVT) OF DISTAL VEIN OF LOWER EXTREMITY, UNSPECIFIED LATERALITY (HCC): ICD-10-CM

## 2020-02-27 DIAGNOSIS — I35.0 NONRHEUMATIC AORTIC VALVE STENOSIS: ICD-10-CM

## 2020-02-27 DIAGNOSIS — I35.0 SEVERE AORTIC STENOSIS: ICD-10-CM

## 2020-02-27 DIAGNOSIS — E78.5 DYSLIPIDEMIA: ICD-10-CM

## 2020-02-27 PROBLEM — R94.31 LONG QT INTERVAL: Status: RESOLVED | Noted: 2020-02-17 | Resolved: 2020-02-27

## 2020-02-27 PROBLEM — R07.9 CHEST PAIN: Status: RESOLVED | Noted: 2020-02-17 | Resolved: 2020-02-27

## 2020-02-27 PROBLEM — R79.1 SUPRATHERAPEUTIC INR: Status: RESOLVED | Noted: 2020-02-17 | Resolved: 2020-02-27

## 2020-02-27 PROBLEM — J96.01 ACUTE RESPIRATORY FAILURE WITH HYPOXIA (HCC): Status: RESOLVED | Noted: 2020-02-17 | Resolved: 2020-02-27

## 2020-02-27 LAB
ALBUMIN SERPL BCP-MCNC: 4.2 G/DL (ref 3.2–4.9)
ALBUMIN/GLOB SERPL: 1.3 G/DL
ALP SERPL-CCNC: 66 U/L (ref 30–99)
ALT SERPL-CCNC: 20 U/L (ref 2–50)
ANION GAP SERPL CALC-SCNC: 11 MMOL/L (ref 0–11.9)
AST SERPL-CCNC: 25 U/L (ref 12–45)
BILIRUB SERPL-MCNC: 1.1 MG/DL (ref 0.1–1.5)
BUN SERPL-MCNC: 27 MG/DL (ref 8–22)
CALCIUM SERPL-MCNC: 9.6 MG/DL (ref 8.5–10.5)
CHLORIDE SERPL-SCNC: 102 MMOL/L (ref 96–112)
CO2 SERPL-SCNC: 25 MMOL/L (ref 20–33)
CREAT SERPL-MCNC: 1.04 MG/DL (ref 0.5–1.4)
EKG IMPRESSION: NORMAL
ERYTHROCYTE [DISTWIDTH] IN BLOOD BY AUTOMATED COUNT: 49.8 FL (ref 35.9–50)
GLOBULIN SER CALC-MCNC: 3.2 G/DL (ref 1.9–3.5)
GLUCOSE SERPL-MCNC: 100 MG/DL (ref 65–99)
HCT VFR BLD AUTO: 46.3 % (ref 42–52)
HGB BLD-MCNC: 14.9 G/DL (ref 14–18)
MCH RBC QN AUTO: 29.9 PG (ref 27–33)
MCHC RBC AUTO-ENTMCNC: 32.2 G/DL (ref 33.7–35.3)
MCV RBC AUTO: 93 FL (ref 81.4–97.8)
NT-PROBNP SERPL IA-MCNC: ABNORMAL PG/ML (ref 0–125)
PLATELET # BLD AUTO: 194 K/UL (ref 164–446)
PMV BLD AUTO: 10.8 FL (ref 9–12.9)
POTASSIUM SERPL-SCNC: 4.7 MMOL/L (ref 3.6–5.5)
PROT SERPL-MCNC: 7.4 G/DL (ref 6–8.2)
RBC # BLD AUTO: 4.98 M/UL (ref 4.7–6.1)
SODIUM SERPL-SCNC: 138 MMOL/L (ref 135–145)
WBC # BLD AUTO: 5.4 K/UL (ref 4.8–10.8)

## 2020-02-27 PROCEDURE — 80053 COMPREHEN METABOLIC PANEL: CPT

## 2020-02-27 PROCEDURE — 83880 ASSAY OF NATRIURETIC PEPTIDE: CPT

## 2020-02-27 PROCEDURE — 85027 COMPLETE CBC AUTOMATED: CPT

## 2020-02-27 PROCEDURE — 93000 ELECTROCARDIOGRAM COMPLETE: CPT | Performed by: INTERNAL MEDICINE

## 2020-02-27 PROCEDURE — 99214 OFFICE O/P EST MOD 30 MIN: CPT | Performed by: NURSE PRACTITIONER

## 2020-02-27 PROCEDURE — 36415 COLL VENOUS BLD VENIPUNCTURE: CPT

## 2020-02-27 RX ORDER — WARFARIN SODIUM 5 MG/1
5 TABLET ORAL DAILY
Qty: 30 TAB | Refills: 3 | Status: ON HOLD | COMMUNITY
Start: 2020-02-27 | End: 2020-03-02

## 2020-02-27 ASSESSMENT — ENCOUNTER SYMPTOMS
FEVER: 0
PND: 0
MYALGIAS: 0
SHORTNESS OF BREATH: 0
PALPITATIONS: 0
ORTHOPNEA: 0
CLAUDICATION: 0
COUGH: 0
DIZZINESS: 0
ABDOMINAL PAIN: 0

## 2020-02-27 NOTE — PROGRESS NOTES
Chief Complaint   Patient presents with   • Atrial Fibrillation     Subjective:   Everardo Hester is a 84 y.o. male who presents today for hospital follow up for worsening aortic stenosis in the setting of acute heart failure related to valvular dysfunction.    He is a patient of Dr. Anderson in our office.    Hx of one remote event of PAF on coumadin, prostate CA, prior DVT, HLD, HTN, TIA, carotid disease with prior CEA in '08 (Abdias BRADFORD), and now with significant valvular disease with mod-severe AS and mild-mod MR with acute on chronic heart failure.    He presents today stating his symptoms continue to progress but since being started on diuretic therapy they have remained stable since his hospital discharge.    He has persistent dizziness, not necessarily positional.    He does have exertional dyspnea and fatigue as well.    He is previously active and does travel a lot with his wife. He is using a cane today which is new since I last saw him, but he admits he uses it frequently for his neuropathy and balance.    He is pale.    His sister just passed away and he has her  today. She had heart valve issues as well.    Past Medical History:   Diagnosis Date   • Arrhythmia     Afib   • Arthritis     osteo, hips and knees   • Blood clotting disorder ([facility])     , left arm   • Bronchitis        • Cancer ([facility])     Prostate CA--   • Carotid arterial disease ([facility]) 2014   • Cataract     removed bilat   • DVT (deep venous thrombosis) ([facility])    • Heart valve disease     MVR   • HLD (hyperlipidemia) 2014   • Hypertension    • Mitral regurgitation 2014   • Pain 12-14-15    low back, hips, -3/10   • Pneumonia        • Stroke ([facility])     TIA, no residual   • TIA (transient ischemic attack)        • Urinary bladder disorder     HAD MALE BLADDER SUSPENSION PROCEDURE DONE   • Urinary incontinence      Past Surgical History:   Procedure Laterality Date   • LUMBAR FUSION POSTERIOR   2015    Procedure: LUMBAR FUSION POSTERIOR L3-5 with peek rods;  Surgeon: Michael Sarkar M.D.;  Location: SURGERY [facility];  Service:    • LUMBAR DECOMPRESSION  2015    Procedure: LUMBAR DECOMPRESSION posterior redo decompression L3-5, with dural repair;  Surgeon: Michael Sarkar M.D.;  Location: SURGERY [facility];  Service:    • THORACOSCOPY  2015    Procedure: THORACOSCOPY with decortication VATS , side to be determined  ;  Surgeon: Elmira Holder M.D.;  Location: SURGERY [facility];  Service:    • LUMBAR LAMINECTOMY DISKECTOMY  10/8/2010    Performed by MICHAEL SARKAR at [facility]   • CERVICAL DISK AND FUSION ANTERIOR  10/26/2009    Performed by MICHAEL SARKAR at [facility]   • CAROTID ENDARTERECTOMY  7/10/08    Performed by LEIGH MORATAYA at [facility]   • BLADDER SLING MALE  2004   • PROSTATECTOMY, RADIAL  2000     Family History   Problem Relation Age of Onset   • Heart Disease Father         CHF at 91.   • Other Brother         Aneurysm   • Cancer Brother         Seminal vascular cancer   • Autoimmune Disease Daughter         Lupus, RA   • No Known Problems Daughter    • No Known Problems Son    • No Known Problems Son    • Heart Attack Neg Hx      Social History     Socioeconomic History   • Marital status:      Spouse name: Not on file   • Number of children: Not on file   • Years of education: Not on file   • Highest education level: Not on file   Occupational History   • Not on file   Social Needs   • Financial resource strain: Not on file   • Food insecurity     Worry: Never true     Inability: Never true   • Transportation needs     Medical: No     Non-medical: No   Tobacco Use   • Smoking status: Former Smoker     Packs/day: 0.50     Years: 30.00     Pack years: 15.00     Types: Cigarettes, Pipe     Last attempt to quit: 1985     Years since quittin.1   • Smokeless tobacco: Never Used   Substance and Sexual  Activity   • Alcohol use: Yes     Alcohol/week: 3.0 oz     Types: 3 Cans of beer, 3 Shots of liquor per week     Comment: 1 x week   • Drug use: No   • Sexual activity: Not Currently   Lifestyle   • Physical activity     Days per week: Not on file     Minutes per session: Not on file   • Stress: Not on file   Relationships   • Social connections     Talks on phone: Not on file     Gets together: Not on file     Attends Jew service: Not on file     Active member of club or organization: Not on file     Attends meetings of clubs or organizations: Not on file     Relationship status: Not on file   • Intimate partner violence     Fear of current or ex partner: Not on file     Emotionally abused: Not on file     Physically abused: Not on file     Forced sexual activity: Not on file   Other Topics Concern   • Not on file   Social History Narrative   • Not on file     Allergies   Allergen Reactions   • Nkda [No Known Drug Allergy]    • Other Environmental      Seasonal, cats-hayfever     Outpatient Encounter Medications as of 2/27/2020   Medication Sig Dispense Refill   • warfarin (COUMADIN) 5 MG Tab Take 1 Tab by mouth every day. 30 Tab 3   • aspirin EC (ECOTRIN) 81 MG Tablet Delayed Response Take 1 Tab by mouth every day. 30 Tab 11   • ezetimibe (ZETIA) 10 MG Tab TAKE 1 TABLET BY MOUTH ONCE DAILY 90 Tab 1   • furosemide (LASIX) 20 MG Tab Take 1 Tab by mouth every day. 30 Tab 0   • gabapentin (NEURONTIN) 300 MG Cap Take 300-600 mg by mouth 2 Times a Day. 600 mg in the AM  300 mg in the PM     • atorvastatin (LIPITOR) 80 MG tablet TAKE 1/2 (ONE-HALF) TABLET BY MOUTH ONCE DAILY IN THE EVENING 45 Tab 1   • temazepam (RESTORIL) 30 MG capsule TAKE ONE CAPSULE BY MOUTH ONCE DAILY AT BEDTIME AS NEEDED FOR SLEEP 90 Cap 1   • irbesartan (AVAPRO) 300 MG Tab Take 1 Tab by mouth every day. 30 Tab 11   • acetaminophen (TYLENOL) 325 MG Tab Take 650 mg by mouth every four hours as needed.       No facility-administered encounter  "medications on file as of 2/27/2020.      Review of Systems   Constitutional: Positive for malaise/fatigue. Negative for fever.   Respiratory: Negative for cough and shortness of breath.    Cardiovascular: Negative for chest pain, palpitations, orthopnea, claudication, leg swelling and PND.   Gastrointestinal: Negative for abdominal pain.   Musculoskeletal: Negative for myalgias.   Neurological: Negative for dizziness.        Objective:   /82 (BP Location: Left arm, Patient Position: Sitting, BP Cuff Size: Adult)   Pulse 79   Ht 1.803 m (5' 11\")   Wt 81.2 kg (179 lb)   SpO2 95%   BMI 24.97 kg/m²     Physical Exam   Constitutional: He appears well-developed and well-nourished.   HENT:   Head: Normocephalic and atraumatic.   Eyes: EOM are normal.   Neck: No JVD present.   Cardiovascular: Normal rate, regular rhythm and intact distal pulses.   Murmur heard.   Systolic murmur is present with a grade of 3/6.  Pulmonary/Chest: Effort normal and breath sounds normal.   Musculoskeletal: Normal range of motion.         General: No edema.      Comments: Cane for mobility    Skin: Skin is warm and dry. There is pallor.   Psychiatric: He has a normal mood and affect.   Nursing note and vitals reviewed.      Assessment:     1. Paroxysmal atrial fibrillation (HCC)  EKG   2. Severe aortic stenosis  Comp Metabolic Panel    CBC WITHOUT DIFFERENTIAL    proBrain Natriuretic Peptide, NT   3. Acute on chronic diastolic heart failure due to valvular disease (HCC)  proBrain Natriuretic Peptide, NT   4. Nonrheumatic aortic valve stenosis     5. Hx of TIA (transient ischemic attack)     6. Essential hypertension, benign     7. Carotid artery disease, unspecified laterality, unspecified type (HCC)     8. Dyslipidemia     9. Dilated cardiomyopathy (HCC)     10. Acute deep vein thrombosis (DVT) of distal vein of lower extremity, unspecified laterality ([facility])       Medical Decision Making:  Today's Assessment / Status / Plan:     1. " Mod-severe AS with diastolic heart failure related to valvular dysfunction  -progressive symptoms of dizziness, fatigue, and shortness of breath and also recent hospitalization for heart failure two weeks ago  -his symptom have stabilized since discharge, now on diuretic therapy  -he has cancelled his trip to [place]  -we will obtain pre-admit lab work today  -start ASA 81 mg tomorrow AM    2. PAF and prior DVT on coumadin  -EKG shows SR today  -cont coumadin per anti-coag clinic  -no further DVTs since '08    3. TIA with carotid disease  -no deficits  -mod carotid disease noted on last duplex in '19  -cont statin    4. HTN  -good control on lasix and irbesartan    5. HLD  -statin   -LDL goal <70 with vascular disease  -cont yearly labs     6. Prior DVT  -on coumadin for PAF  -follow clinically    Patient was told to check in Monday for TAVR, we will call him tomorrow with review of his lab work. Recommend HOLD coumadin starting tonight until TAVR.    FU in clinic in 1 week post TAVR with SC; pre-admit labs today    Patient verbalizes understanding and agrees with the plan of care.     Collaborating MD: Barb BRADFORD

## 2020-02-27 NOTE — PROGRESS NOTES
Valve Program Functional Assessment: 2020 pre-op    KCCQ12   1a) Showering/bathin  1b) Walking 1 block on ground: 4  1c) Hurrying or joggin  2) Swellin  3) Fatigue: 3  4) Shortness of breath: 3  5) Sleep sitting up: 5  6) Limited enjoyment of life: 3  7) Spend the rest of your life with HF: 2  8a) Hobbies, recreational activities:4  8b) Working or doing household chores:3  8c) Visiting family or friends: 3    5 meter walk test  1) __6.48____ s/5m  2) __5.34____ s/5m  3) __6.23____ s/5m     Strength   1) _36_____ kg  2) _36_____ kg  3) _36_____ kg    YU ADLs  Patient independently preforms...   - Bathing: Yes   - Dressing: Yes   - Toileting: Yes   - Transferring: Yes   - Continence: Yes   - Feeding: Yes     Living Situation  Patient lives:  with spouse    Mobility Aids   Patient uses: cane    Patients post operative goal: he would like to be able to re-book his trip to [place] with his wife once he feels better.       Met with patient and wife Pat during New TAVR consult.     Reviewed patient TAVR education packet explaining that information is provided regarding preparation for TAVR the night prior, what to expect during the hospital stay, average LOS, and what to look out for post TAVR discharge.Explained that patient will require SBE prophylactic antibiotic prior to any procedures or surgery, including dental post TAVR. Patient states understanding and agrees to carry SBE antibiotic wallet card at all times.    Explained that patient should not eat or drink anything past midnight . Encouraged patient to wear something clean and comfortable, easy to get on/off to check in Monday morning at 0530. Patient may have friends and family in the pre-operational area until patient is taken to the operating room, at which time family and friends will be asked to wait on floor 1 [facility] surgical waiting area. On completion of TAVR heart team will update family. Once update is given there is some  time before family/friends may visit patient in their assigned room. Length of stay on average is one night, however patient may stay a couple days depending on specific needs at that time. Patient given printed instructions sheet with all above stated instructions.Patient states understanding of all material and education presented today and has no further questions at this time. Encouraged patient again, to contact me with any questions or concerns during this work-up process. Patient states understanding and thankful.

## 2020-03-02 ENCOUNTER — HOSPITAL ENCOUNTER (INPATIENT)
Facility: MEDICAL CENTER | Age: 85
LOS: 2 days | DRG: 266 | End: 2020-03-04
Attending: INTERNAL MEDICINE | Admitting: INTERNAL MEDICINE
Payer: MEDICARE

## 2020-03-02 ENCOUNTER — ANESTHESIA (OUTPATIENT)
Dept: SURGERY | Facility: MEDICAL CENTER | Age: 85
DRG: 266 | End: 2020-03-02
Payer: MEDICARE

## 2020-03-02 ENCOUNTER — ANESTHESIA EVENT (OUTPATIENT)
Dept: SURGERY | Facility: MEDICAL CENTER | Age: 85
DRG: 266 | End: 2020-03-02
Payer: MEDICARE

## 2020-03-02 ENCOUNTER — APPOINTMENT (OUTPATIENT)
Dept: RADIOLOGY | Facility: MEDICAL CENTER | Age: 85
DRG: 266 | End: 2020-03-02
Attending: NURSE PRACTITIONER
Payer: MEDICARE

## 2020-03-02 ENCOUNTER — APPOINTMENT (OUTPATIENT)
Dept: RADIOLOGY | Facility: MEDICAL CENTER | Age: 85
DRG: 266 | End: 2020-03-02
Attending: SURGERY
Payer: MEDICARE

## 2020-03-02 ENCOUNTER — TELEPHONE (OUTPATIENT)
Dept: CARDIOLOGY | Facility: MEDICAL CENTER | Age: 85
End: 2020-03-02

## 2020-03-02 ENCOUNTER — APPOINTMENT (OUTPATIENT)
Dept: CARDIOLOGY | Facility: MEDICAL CENTER | Age: 85
DRG: 266 | End: 2020-03-02
Attending: ANESTHESIOLOGY
Payer: MEDICARE

## 2020-03-02 DIAGNOSIS — I48.0 PAROXYSMAL ATRIAL FIBRILLATION (HCC): ICD-10-CM

## 2020-03-02 DIAGNOSIS — E78.5 DYSLIPIDEMIA: ICD-10-CM

## 2020-03-02 DIAGNOSIS — R26.89 BALANCE DISORDER: ICD-10-CM

## 2020-03-02 DIAGNOSIS — I10 ESSENTIAL HYPERTENSION, BENIGN: ICD-10-CM

## 2020-03-02 DIAGNOSIS — I50.33 ACUTE ON CHRONIC DIASTOLIC HEART FAILURE DUE TO VALVULAR DISEASE (HCC): ICD-10-CM

## 2020-03-02 DIAGNOSIS — M19.90 ARTHRITIS: ICD-10-CM

## 2020-03-02 DIAGNOSIS — C80.1 CANCER (HCC): ICD-10-CM

## 2020-03-02 DIAGNOSIS — I35.0 SEVERE AORTIC STENOSIS: ICD-10-CM

## 2020-03-02 DIAGNOSIS — I38 ACUTE ON CHRONIC DIASTOLIC HEART FAILURE DUE TO VALVULAR DISEASE (HCC): ICD-10-CM

## 2020-03-02 DIAGNOSIS — I42.0 DILATED CARDIOMYOPATHY (HCC): ICD-10-CM

## 2020-03-02 DIAGNOSIS — G45.9 TIA (TRANSIENT ISCHEMIC ATTACK): ICD-10-CM

## 2020-03-02 DIAGNOSIS — Z95.2 S/P TAVR (TRANSCATHETER AORTIC VALVE REPLACEMENT): ICD-10-CM

## 2020-03-02 LAB
ABO + RH BLD: NORMAL
ABO GROUP BLD: NORMAL
ACT BLD: 136 SEC (ref 74–137)
ACT BLD: 219 SEC (ref 74–137)
ACT BLD: 230 SEC (ref 74–137)
ACT BLD: 301 SEC (ref 74–137)
ACTION RANGE TRIGGERED IACRT: NO
ACTION RANGE TRIGGERED IACRT: NO
ALBUMIN SERPL BCP-MCNC: 3.1 G/DL (ref 3.2–4.9)
ALBUMIN/GLOB SERPL: 1.3 G/DL
ALP SERPL-CCNC: 52 U/L (ref 30–99)
ALT SERPL-CCNC: 11 U/L (ref 2–50)
ANION GAP SERPL CALC-SCNC: 9 MMOL/L (ref 0–11.9)
AST SERPL-CCNC: 19 U/L (ref 12–45)
BASE EXCESS BLDA CALC-SCNC: 0 MMOL/L (ref -4–3)
BASE EXCESS BLDA CALC-SCNC: 0 MMOL/L (ref -4–3)
BILIRUB SERPL-MCNC: 0.9 MG/DL (ref 0.1–1.5)
BLD GP AB SCN SERPL QL: NORMAL
BODY TEMPERATURE: ABNORMAL DEGREES
BODY TEMPERATURE: ABNORMAL DEGREES
BUN SERPL-MCNC: 21 MG/DL (ref 8–22)
CA-I BLD ISE-SCNC: 1.19 MMOL/L (ref 1.1–1.3)
CA-I BLD ISE-SCNC: 1.23 MMOL/L (ref 1.1–1.3)
CALCIUM SERPL-MCNC: 8.1 MG/DL (ref 8.5–10.5)
CHLORIDE SERPL-SCNC: 108 MMOL/L (ref 96–112)
CO2 BLDA-SCNC: 26 MMOL/L (ref 20–33)
CO2 BLDA-SCNC: 28 MMOL/L (ref 20–33)
CO2 SERPL-SCNC: 23 MMOL/L (ref 20–33)
CREAT SERPL-MCNC: 0.83 MG/DL (ref 0.5–1.4)
EKG IMPRESSION: NORMAL
ERYTHROCYTE [DISTWIDTH] IN BLOOD BY AUTOMATED COUNT: 49 FL (ref 35.9–50)
GLOBULIN SER CALC-MCNC: 2.3 G/DL (ref 1.9–3.5)
GLUCOSE BLD-MCNC: 103 MG/DL (ref 65–99)
GLUCOSE BLD-MCNC: 105 MG/DL (ref 65–99)
GLUCOSE SERPL-MCNC: 134 MG/DL (ref 65–99)
HCO3 BLDA-SCNC: 25 MMOL/L (ref 17–25)
HCO3 BLDA-SCNC: 26.3 MMOL/L (ref 17–25)
HCT VFR BLD AUTO: 38.4 % (ref 42–52)
HCT VFR BLD CALC: 33 % (ref 42–52)
HCT VFR BLD CALC: 36 % (ref 42–52)
HGB BLD-MCNC: 11.2 G/DL (ref 14–18)
HGB BLD-MCNC: 12.1 G/DL (ref 14–18)
HGB BLD-MCNC: 12.2 G/DL (ref 14–18)
HOROWITZ INDEX BLDA+IHG-RTO: 163 MM[HG]
HOROWITZ INDEX BLDA+IHG-RTO: 229 MM[HG]
INR PPP: 1.31 (ref 0.87–1.13)
INST. QUALIFIED PATIENT IIQPT: YES
INST. QUALIFIED PATIENT IIQPT: YES
MCH RBC QN AUTO: 28.9 PG (ref 27–33)
MCHC RBC AUTO-ENTMCNC: 31.5 G/DL (ref 33.7–35.3)
MCV RBC AUTO: 91.6 FL (ref 81.4–97.8)
NT-PROBNP SERPL IA-MCNC: ABNORMAL PG/ML (ref 0–125)
O2/TOTAL GAS SETTING VFR VENT: 100 %
O2/TOTAL GAS SETTING VFR VENT: 100 %
PCO2 BLDA: 42.8 MMHG (ref 26–37)
PCO2 BLDA: 48.4 MMHG (ref 26–37)
PCO2 TEMP ADJ BLDA: 42.8 MMHG (ref 26–37)
PCO2 TEMP ADJ BLDA: 48.4 MMHG (ref 26–37)
PH BLDA: 7.34 [PH] (ref 7.4–7.5)
PH BLDA: 7.37 [PH] (ref 7.4–7.5)
PH TEMP ADJ BLDA: 7.34 [PH] (ref 7.4–7.5)
PH TEMP ADJ BLDA: 7.37 [PH] (ref 7.4–7.5)
PLATELET # BLD AUTO: 166 K/UL (ref 164–446)
PMV BLD AUTO: 11.1 FL (ref 9–12.9)
PO2 BLDA: 163 MMHG (ref 64–87)
PO2 BLDA: 229 MMHG (ref 64–87)
PO2 TEMP ADJ BLDA: 163 MMHG (ref 64–87)
PO2 TEMP ADJ BLDA: 229 MMHG (ref 64–87)
POTASSIUM BLD-SCNC: 3.5 MMOL/L (ref 3.6–5.5)
POTASSIUM BLD-SCNC: 3.7 MMOL/L (ref 3.6–5.5)
POTASSIUM SERPL-SCNC: 3.9 MMOL/L (ref 3.6–5.5)
PROT SERPL-MCNC: 5.4 G/DL (ref 6–8.2)
PROTHROMBIN TIME: 16.7 SEC (ref 12–14.6)
RBC # BLD AUTO: 4.19 M/UL (ref 4.7–6.1)
RH BLD: NORMAL
SAO2 % BLDA: 100 % (ref 93–99)
SAO2 % BLDA: 99 % (ref 93–99)
SODIUM BLD-SCNC: 141 MMOL/L (ref 135–145)
SODIUM BLD-SCNC: 143 MMOL/L (ref 135–145)
SODIUM SERPL-SCNC: 140 MMOL/L (ref 135–145)
SPECIMEN DRAWN FROM PATIENT: ABNORMAL
SPECIMEN DRAWN FROM PATIENT: ABNORMAL
WBC # BLD AUTO: 5.4 K/UL (ref 4.8–10.8)

## 2020-03-02 PROCEDURE — 86850 RBC ANTIBODY SCREEN: CPT

## 2020-03-02 PROCEDURE — A9270 NON-COVERED ITEM OR SERVICE: HCPCS | Performed by: NURSE PRACTITIONER

## 2020-03-02 PROCEDURE — 500869 HCHG NEEDLE, THINWALL 19GA (COOK): Performed by: SURGERY

## 2020-03-02 PROCEDURE — 700105 HCHG RX REV CODE 258: Performed by: INTERNAL MEDICINE

## 2020-03-02 PROCEDURE — 83880 ASSAY OF NATRIURETIC PEPTIDE: CPT

## 2020-03-02 PROCEDURE — 503001 HCHG PERFUSION: Performed by: INTERNAL MEDICINE

## 2020-03-02 PROCEDURE — 93005 ELECTROCARDIOGRAM TRACING: CPT | Performed by: NURSE PRACTITIONER

## 2020-03-02 PROCEDURE — 160035 HCHG PACU - 1ST 60 MINS PHASE I: Performed by: SURGERY

## 2020-03-02 PROCEDURE — 160036 HCHG PACU - EA ADDL 30 MINS PHASE I: Performed by: SURGERY

## 2020-03-02 PROCEDURE — 160002 HCHG RECOVERY MINUTES (STAT): Performed by: SURGERY

## 2020-03-02 PROCEDURE — 700111 HCHG RX REV CODE 636 W/ 250 OVERRIDE (IP): Performed by: INTERNAL MEDICINE

## 2020-03-02 PROCEDURE — 84295 ASSAY OF SERUM SODIUM: CPT | Mod: 91

## 2020-03-02 PROCEDURE — C1883 ADAPT/EXT, PACING/NEURO LEAD: HCPCS | Performed by: INTERNAL MEDICINE

## 2020-03-02 PROCEDURE — 02RF38Z REPLACEMENT OF AORTIC VALVE WITH ZOOPLASTIC TISSUE, PERCUTANEOUS APPROACH: ICD-10-PCS | Performed by: THORACIC SURGERY (CARDIOTHORACIC VASCULAR SURGERY)

## 2020-03-02 PROCEDURE — 160009 HCHG ANES TIME/MIN: Performed by: SURGERY

## 2020-03-02 PROCEDURE — 82803 BLOOD GASES ANY COMBINATION: CPT | Mod: 91

## 2020-03-02 PROCEDURE — 160048 HCHG OR STATISTICAL LEVEL 1-5: Performed by: INTERNAL MEDICINE

## 2020-03-02 PROCEDURE — 36415 COLL VENOUS BLD VENIPUNCTURE: CPT

## 2020-03-02 PROCEDURE — 85014 HEMATOCRIT: CPT

## 2020-03-02 PROCEDURE — C1894 INTRO/SHEATH, NON-LASER: HCPCS | Performed by: SURGERY

## 2020-03-02 PROCEDURE — 501837 HCHG SUTURE CV: Performed by: SURGERY

## 2020-03-02 PROCEDURE — 110372 HCHG SHELL REV 278: Performed by: INTERNAL MEDICINE

## 2020-03-02 PROCEDURE — 33361 REPLACE AORTIC VALVE PERQ: CPT | Mod: 62,Q0 | Performed by: INTERNAL MEDICINE

## 2020-03-02 PROCEDURE — 160041 HCHG SURGERY MINUTES - EA ADDL 1 MIN LEVEL 4: Performed by: SURGERY

## 2020-03-02 PROCEDURE — 86901 BLOOD TYPING SEROLOGIC RH(D): CPT

## 2020-03-02 PROCEDURE — A6402 STERILE GAUZE <= 16 SQ IN: HCPCS | Performed by: INTERNAL MEDICINE

## 2020-03-02 PROCEDURE — 700101 HCHG RX REV CODE 250: Performed by: INTERNAL MEDICINE

## 2020-03-02 PROCEDURE — C1725 CATH, TRANSLUMIN NON-LASER: HCPCS | Performed by: SURGERY

## 2020-03-02 PROCEDURE — 03HY32Z INSERTION OF MONITORING DEVICE INTO UPPER ARTERY, PERCUTANEOUS APPROACH: ICD-10-PCS | Performed by: ANESTHESIOLOGY

## 2020-03-02 PROCEDURE — 700111 HCHG RX REV CODE 636 W/ 250 OVERRIDE (IP): Performed by: ANESTHESIOLOGY

## 2020-03-02 PROCEDURE — 503000 HCHG SUTURE, OHS: Performed by: INTERNAL MEDICINE

## 2020-03-02 PROCEDURE — C1769 GUIDE WIRE: HCPCS | Performed by: INTERNAL MEDICINE

## 2020-03-02 PROCEDURE — 700111 HCHG RX REV CODE 636 W/ 250 OVERRIDE (IP)

## 2020-03-02 PROCEDURE — 700101 HCHG RX REV CODE 250: Performed by: ANESTHESIOLOGY

## 2020-03-02 PROCEDURE — 502000 HCHG MISC OR IMPLANTS RC 0278: Performed by: INTERNAL MEDICINE

## 2020-03-02 PROCEDURE — 93926 LOWER EXTREMITY STUDY: CPT | Mod: LT

## 2020-03-02 PROCEDURE — 85347 COAGULATION TIME ACTIVATED: CPT

## 2020-03-02 PROCEDURE — 160035 HCHG PACU - 1ST 60 MINS PHASE I: Performed by: INTERNAL MEDICINE

## 2020-03-02 PROCEDURE — 700105 HCHG RX REV CODE 258: Performed by: SURGERY

## 2020-03-02 PROCEDURE — 700105 HCHG RX REV CODE 258: Performed by: ANESTHESIOLOGY

## 2020-03-02 PROCEDURE — 500166 HCHG CATH, ANGIO: Performed by: SURGERY

## 2020-03-02 PROCEDURE — B41D1ZZ FLUOROSCOPY OF AORTA AND BILATERAL LOWER EXTREMITY ARTERIES USING LOW OSMOLAR CONTRAST: ICD-10-PCS | Performed by: INTERNAL MEDICINE

## 2020-03-02 PROCEDURE — 160009 HCHG ANES TIME/MIN: Performed by: INTERNAL MEDICINE

## 2020-03-02 PROCEDURE — B41C1ZZ FLUOROSCOPY OF PELVIC ARTERIES USING LOW OSMOLAR CONTRAST: ICD-10-PCS | Performed by: SURGERY

## 2020-03-02 PROCEDURE — 700117 HCHG RX CONTRAST REV CODE 255: Performed by: INTERNAL MEDICINE

## 2020-03-02 PROCEDURE — 93355 ECHO TRANSESOPHAGEAL (TEE): CPT

## 2020-03-02 PROCEDURE — 160036 HCHG PACU - EA ADDL 30 MINS PHASE I: Performed by: INTERNAL MEDICINE

## 2020-03-02 PROCEDURE — 93010 ELECTROCARDIOGRAM REPORT: CPT | Performed by: INTERNAL MEDICINE

## 2020-03-02 PROCEDURE — 700105 HCHG RX REV CODE 258: Performed by: NURSE PRACTITIONER

## 2020-03-02 PROCEDURE — 84132 ASSAY OF SERUM POTASSIUM: CPT

## 2020-03-02 PROCEDURE — 700101 HCHG RX REV CODE 250: Performed by: SURGERY

## 2020-03-02 PROCEDURE — C1725 CATH, TRANSLUMIN NON-LASER: HCPCS | Performed by: INTERNAL MEDICINE

## 2020-03-02 PROCEDURE — 501838 HCHG SUTURE GENERAL: Performed by: SURGERY

## 2020-03-02 PROCEDURE — 80053 COMPREHEN METABOLIC PANEL: CPT | Mod: 91

## 2020-03-02 PROCEDURE — 160048 HCHG OR STATISTICAL LEVEL 1-5: Performed by: SURGERY

## 2020-03-02 PROCEDURE — 770020 HCHG ROOM/CARE - TELE (206)

## 2020-03-02 PROCEDURE — B24BZZ4 ULTRASONOGRAPHY OF HEART WITH AORTA, TRANSESOPHAGEAL: ICD-10-PCS | Performed by: ANESTHESIOLOGY

## 2020-03-02 PROCEDURE — 160031 HCHG SURGERY MINUTES - 1ST 30 MINS LEVEL 5: Performed by: INTERNAL MEDICINE

## 2020-03-02 PROCEDURE — C1894 INTRO/SHEATH, NON-LASER: HCPCS | Performed by: INTERNAL MEDICINE

## 2020-03-02 PROCEDURE — C1769 GUIDE WIRE: HCPCS | Performed by: SURGERY

## 2020-03-02 PROCEDURE — 85027 COMPLETE CBC AUTOMATED: CPT | Mod: 91

## 2020-03-02 PROCEDURE — 501445 HCHG STAPLER, SKIN DISP: Performed by: SURGERY

## 2020-03-02 PROCEDURE — 700102 HCHG RX REV CODE 250 W/ 637 OVERRIDE(OP): Performed by: NURSE PRACTITIONER

## 2020-03-02 PROCEDURE — 86900 BLOOD TYPING SEROLOGIC ABO: CPT

## 2020-03-02 PROCEDURE — 37221 PR REVASCULARIZE ILIAC ARTERY,ANGIOPLASTY/ST*: CPT | Mod: 59,LT | Performed by: INTERNAL MEDICINE

## 2020-03-02 PROCEDURE — 160042 HCHG SURGERY MINUTES - EA ADDL 1 MIN LEVEL 5: Performed by: INTERNAL MEDICINE

## 2020-03-02 PROCEDURE — A6402 STERILE GAUZE <= 16 SQ IN: HCPCS | Performed by: SURGERY

## 2020-03-02 PROCEDURE — 33361 REPLACE AORTIC VALVE PERQ: CPT | Mod: 62,Q0 | Performed by: THORACIC SURGERY (CARDIOTHORACIC VASCULAR SURGERY)

## 2020-03-02 PROCEDURE — 71045 X-RAY EXAM CHEST 1 VIEW: CPT

## 2020-03-02 PROCEDURE — 700111 HCHG RX REV CODE 636 W/ 250 OVERRIDE (IP): Performed by: SURGERY

## 2020-03-02 PROCEDURE — 700117 HCHG RX CONTRAST REV CODE 255: Performed by: SURGERY

## 2020-03-02 PROCEDURE — 500229 HCHG CATH, GLIDE STR TAPER 5FR 100: Performed by: INTERNAL MEDICINE

## 2020-03-02 PROCEDURE — 82947 ASSAY GLUCOSE BLOOD QUANT: CPT

## 2020-03-02 PROCEDURE — 75630 X-RAY AORTA LEG ARTERIES: CPT | Mod: 26,59 | Performed by: INTERNAL MEDICINE

## 2020-03-02 PROCEDURE — 700102 HCHG RX REV CODE 250 W/ 637 OVERRIDE(OP): Performed by: ANESTHESIOLOGY

## 2020-03-02 PROCEDURE — 04BL0ZZ EXCISION OF LEFT FEMORAL ARTERY, OPEN APPROACH: ICD-10-PCS | Performed by: SURGERY

## 2020-03-02 PROCEDURE — 047J3DZ DILATION OF LEFT EXTERNAL ILIAC ARTERY WITH INTRALUMINAL DEVICE, PERCUTANEOUS APPROACH: ICD-10-PCS | Performed by: INTERNAL MEDICINE

## 2020-03-02 PROCEDURE — 160002 HCHG RECOVERY MINUTES (STAT): Performed by: INTERNAL MEDICINE

## 2020-03-02 PROCEDURE — 500002 HCHG ADHESIVE, DERMABOND: Performed by: INTERNAL MEDICINE

## 2020-03-02 PROCEDURE — 82330 ASSAY OF CALCIUM: CPT

## 2020-03-02 PROCEDURE — 502240 HCHG MISC OR SUPPLY RC 0272: Performed by: INTERNAL MEDICINE

## 2020-03-02 PROCEDURE — 85610 PROTHROMBIN TIME: CPT

## 2020-03-02 PROCEDURE — A9270 NON-COVERED ITEM OR SERVICE: HCPCS | Performed by: ANESTHESIOLOGY

## 2020-03-02 PROCEDURE — 160029 HCHG SURGERY MINUTES - 1ST 30 MINS LEVEL 4: Performed by: SURGERY

## 2020-03-02 DEVICE — KIT TRANSCATHETER HEART VALVE  SAPIEN-3 29MM: Type: IMPLANTABLE DEVICE | Status: FUNCTIONAL

## 2020-03-02 DEVICE — IMPLANTABLE DEVICE: Type: IMPLANTABLE DEVICE | Status: FUNCTIONAL

## 2020-03-02 RX ORDER — TEMAZEPAM 30 MG/1
30 CAPSULE ORAL
COMMUNITY
End: 2020-04-09

## 2020-03-02 RX ORDER — SODIUM CHLORIDE 9 MG/ML
500 INJECTION, SOLUTION INTRAVENOUS ONCE
Status: COMPLETED | OUTPATIENT
Start: 2020-03-03 | End: 2020-03-03

## 2020-03-02 RX ORDER — OXYCODONE HCL 5 MG/5 ML
10 SOLUTION, ORAL ORAL
Status: DISCONTINUED | OUTPATIENT
Start: 2020-03-02 | End: 2020-03-02 | Stop reason: HOSPADM

## 2020-03-02 RX ORDER — MEPERIDINE HYDROCHLORIDE 25 MG/ML
6.25 INJECTION INTRAMUSCULAR; INTRAVENOUS; SUBCUTANEOUS
Status: DISCONTINUED | OUTPATIENT
Start: 2020-03-02 | End: 2020-03-02 | Stop reason: HOSPADM

## 2020-03-02 RX ORDER — WARFARIN SODIUM 6 MG/1
6 TABLET ORAL ONCE
Status: COMPLETED | OUTPATIENT
Start: 2020-03-02 | End: 2020-03-03

## 2020-03-02 RX ORDER — POLYETHYLENE GLYCOL 3350 17 G/17G
1 POWDER, FOR SOLUTION ORAL
Status: DISCONTINUED | OUTPATIENT
Start: 2020-03-02 | End: 2020-03-04 | Stop reason: HOSPADM

## 2020-03-02 RX ORDER — AMOXICILLIN 250 MG
2 CAPSULE ORAL 2 TIMES DAILY PRN
Status: DISCONTINUED | OUTPATIENT
Start: 2020-03-02 | End: 2020-03-04 | Stop reason: HOSPADM

## 2020-03-02 RX ORDER — GABAPENTIN 300 MG/1
300 CAPSULE ORAL EVERY EVENING
Status: DISCONTINUED | OUTPATIENT
Start: 2020-03-02 | End: 2020-03-04 | Stop reason: HOSPADM

## 2020-03-02 RX ORDER — SODIUM CHLORIDE, SODIUM LACTATE, POTASSIUM CHLORIDE, CALCIUM CHLORIDE 600; 310; 30; 20 MG/100ML; MG/100ML; MG/100ML; MG/100ML
INJECTION, SOLUTION INTRAVENOUS CONTINUOUS
Status: DISCONTINUED | OUTPATIENT
Start: 2020-03-02 | End: 2020-03-02 | Stop reason: HOSPADM

## 2020-03-02 RX ORDER — WARFARIN SODIUM 4 MG/1
6-8 TABLET ORAL DAILY
Status: ON HOLD | COMMUNITY
End: 2020-03-04

## 2020-03-02 RX ORDER — HEPARIN SODIUM,PORCINE 1000/ML
VIAL (ML) INJECTION PRN
Status: DISCONTINUED | OUTPATIENT
Start: 2020-03-02 | End: 2020-03-02 | Stop reason: SURG

## 2020-03-02 RX ORDER — ATORVASTATIN CALCIUM 80 MG/1
40 TABLET, FILM COATED ORAL NIGHTLY
COMMUNITY
End: 2020-06-17

## 2020-03-02 RX ORDER — ONDANSETRON 2 MG/ML
4 INJECTION INTRAMUSCULAR; INTRAVENOUS
Status: DISCONTINUED | OUTPATIENT
Start: 2020-03-02 | End: 2020-03-02 | Stop reason: HOSPADM

## 2020-03-02 RX ORDER — HALOPERIDOL 5 MG/ML
1 INJECTION INTRAMUSCULAR
Status: DISCONTINUED | OUTPATIENT
Start: 2020-03-02 | End: 2020-03-02 | Stop reason: HOSPADM

## 2020-03-02 RX ORDER — HYDROMORPHONE HYDROCHLORIDE 1 MG/ML
0.2 INJECTION, SOLUTION INTRAMUSCULAR; INTRAVENOUS; SUBCUTANEOUS
Status: DISCONTINUED | OUTPATIENT
Start: 2020-03-02 | End: 2020-03-02 | Stop reason: HOSPADM

## 2020-03-02 RX ORDER — HYDROMORPHONE HYDROCHLORIDE 1 MG/ML
0.1 INJECTION, SOLUTION INTRAMUSCULAR; INTRAVENOUS; SUBCUTANEOUS
Status: DISCONTINUED | OUTPATIENT
Start: 2020-03-02 | End: 2020-03-02 | Stop reason: HOSPADM

## 2020-03-02 RX ORDER — PHENYLEPHRINE HCL IN 0.9% NACL 0.5 MG/5ML
SYRINGE (ML) INTRAVENOUS PRN
Status: DISCONTINUED | OUTPATIENT
Start: 2020-03-02 | End: 2020-03-02 | Stop reason: SURG

## 2020-03-02 RX ORDER — SODIUM CHLORIDE 9 MG/ML
INJECTION, SOLUTION INTRAVENOUS CONTINUOUS
Status: DISPENSED | OUTPATIENT
Start: 2020-03-02 | End: 2020-03-03

## 2020-03-02 RX ORDER — PHENYLEPHRINE HYDROCHLORIDE 10 MG/ML
INJECTION, SOLUTION INTRAMUSCULAR; INTRAVENOUS; SUBCUTANEOUS PRN
Status: DISCONTINUED | OUTPATIENT
Start: 2020-03-02 | End: 2020-03-02 | Stop reason: SURG

## 2020-03-02 RX ORDER — DIPHENHYDRAMINE HYDROCHLORIDE 50 MG/ML
25 INJECTION INTRAMUSCULAR; INTRAVENOUS EVERY 6 HOURS PRN
Status: DISCONTINUED | OUTPATIENT
Start: 2020-03-02 | End: 2020-03-04 | Stop reason: HOSPADM

## 2020-03-02 RX ORDER — HYDROMORPHONE HYDROCHLORIDE 1 MG/ML
0.4 INJECTION, SOLUTION INTRAMUSCULAR; INTRAVENOUS; SUBCUTANEOUS
Status: DISCONTINUED | OUTPATIENT
Start: 2020-03-02 | End: 2020-03-02 | Stop reason: HOSPADM

## 2020-03-02 RX ORDER — DIPHENHYDRAMINE HYDROCHLORIDE 50 MG/ML
12.5 INJECTION INTRAMUSCULAR; INTRAVENOUS
Status: DISCONTINUED | OUTPATIENT
Start: 2020-03-02 | End: 2020-03-02 | Stop reason: HOSPADM

## 2020-03-02 RX ORDER — ROCURONIUM BROMIDE 10 MG/ML
INJECTION, SOLUTION INTRAVENOUS PRN
Status: DISCONTINUED | OUTPATIENT
Start: 2020-03-02 | End: 2020-03-02 | Stop reason: SURG

## 2020-03-02 RX ORDER — OXYCODONE HCL 5 MG/5 ML
5 SOLUTION, ORAL ORAL
Status: DISCONTINUED | OUTPATIENT
Start: 2020-03-02 | End: 2020-03-02 | Stop reason: HOSPADM

## 2020-03-02 RX ORDER — CEFAZOLIN SODIUM 1 G/3ML
INJECTION, POWDER, FOR SOLUTION INTRAMUSCULAR; INTRAVENOUS PRN
Status: DISCONTINUED | OUTPATIENT
Start: 2020-03-02 | End: 2020-03-02 | Stop reason: SURG

## 2020-03-02 RX ORDER — OXYCODONE HCL 5 MG/5 ML
SOLUTION, ORAL ORAL
Status: DISPENSED
Start: 2020-03-02 | End: 2020-03-03

## 2020-03-02 RX ORDER — EZETIMIBE 10 MG/1
10 TABLET ORAL DAILY
Status: DISCONTINUED | OUTPATIENT
Start: 2020-03-02 | End: 2020-03-04 | Stop reason: HOSPADM

## 2020-03-02 RX ORDER — ONDANSETRON 2 MG/ML
4 INJECTION INTRAMUSCULAR; INTRAVENOUS EVERY 4 HOURS PRN
Status: DISCONTINUED | OUTPATIENT
Start: 2020-03-02 | End: 2020-03-04 | Stop reason: HOSPADM

## 2020-03-02 RX ORDER — OXYCODONE HYDROCHLORIDE 5 MG/1
5-10 TABLET ORAL
Status: DISCONTINUED | OUTPATIENT
Start: 2020-03-02 | End: 2020-03-04 | Stop reason: HOSPADM

## 2020-03-02 RX ORDER — FUROSEMIDE 10 MG/ML
40 INJECTION INTRAMUSCULAR; INTRAVENOUS
Status: DISCONTINUED | OUTPATIENT
Start: 2020-03-03 | End: 2020-03-03

## 2020-03-02 RX ORDER — POTASSIUM CHLORIDE 20 MEQ/1
20 TABLET, EXTENDED RELEASE ORAL DAILY
Status: DISCONTINUED | OUTPATIENT
Start: 2020-03-03 | End: 2020-03-03

## 2020-03-02 RX ORDER — BISACODYL 10 MG
10 SUPPOSITORY, RECTAL RECTAL
Status: DISCONTINUED | OUTPATIENT
Start: 2020-03-02 | End: 2020-03-04 | Stop reason: HOSPADM

## 2020-03-02 RX ORDER — OXYCODONE HCL 5 MG/5 ML
10 SOLUTION, ORAL ORAL
Status: COMPLETED | OUTPATIENT
Start: 2020-03-02 | End: 2020-03-02

## 2020-03-02 RX ORDER — GABAPENTIN 300 MG/1
600 CAPSULE ORAL EVERY MORNING
Status: DISCONTINUED | OUTPATIENT
Start: 2020-03-03 | End: 2020-03-04 | Stop reason: HOSPADM

## 2020-03-02 RX ORDER — OXYCODONE HCL 5 MG/5 ML
5 SOLUTION, ORAL ORAL
Status: COMPLETED | OUTPATIENT
Start: 2020-03-02 | End: 2020-03-02

## 2020-03-02 RX ORDER — PROTAMINE SULFATE 10 MG/ML
INJECTION, SOLUTION INTRAVENOUS PRN
Status: DISCONTINUED | OUTPATIENT
Start: 2020-03-02 | End: 2020-03-02 | Stop reason: SURG

## 2020-03-02 RX ORDER — IRBESARTAN 300 MG/1
300 TABLET ORAL NIGHTLY
Status: ON HOLD | COMMUNITY
End: 2020-03-03

## 2020-03-02 RX ORDER — TEMAZEPAM 15 MG/1
30 CAPSULE ORAL
Status: DISCONTINUED | OUTPATIENT
Start: 2020-03-02 | End: 2020-03-04 | Stop reason: HOSPADM

## 2020-03-02 RX ORDER — ATORVASTATIN CALCIUM 40 MG/1
40 TABLET, FILM COATED ORAL NIGHTLY
Status: DISCONTINUED | OUTPATIENT
Start: 2020-03-02 | End: 2020-03-04 | Stop reason: HOSPADM

## 2020-03-02 RX ORDER — LIDOCAINE HYDROCHLORIDE 20 MG/ML
INJECTION, SOLUTION INFILTRATION; PERINEURAL
Status: DISCONTINUED | OUTPATIENT
Start: 2020-03-02 | End: 2020-03-02 | Stop reason: HOSPADM

## 2020-03-02 RX ORDER — ACETAMINOPHEN 325 MG/1
650 TABLET ORAL EVERY 6 HOURS PRN
Status: DISCONTINUED | OUTPATIENT
Start: 2020-03-02 | End: 2020-03-04 | Stop reason: HOSPADM

## 2020-03-02 RX ORDER — GABAPENTIN 300 MG/1
300-600 CAPSULE ORAL 2 TIMES DAILY
Status: DISCONTINUED | OUTPATIENT
Start: 2020-03-02 | End: 2020-03-02

## 2020-03-02 RX ORDER — EZETIMIBE 10 MG/1
10 TABLET ORAL DAILY
COMMUNITY
End: 2020-08-19

## 2020-03-02 RX ORDER — ONDANSETRON 2 MG/ML
INJECTION INTRAMUSCULAR; INTRAVENOUS PRN
Status: DISCONTINUED | OUTPATIENT
Start: 2020-03-02 | End: 2020-03-02 | Stop reason: SURG

## 2020-03-02 RX ORDER — HYDRALAZINE HYDROCHLORIDE 20 MG/ML
10 INJECTION INTRAMUSCULAR; INTRAVENOUS
Status: DISCONTINUED | OUTPATIENT
Start: 2020-03-02 | End: 2020-03-04 | Stop reason: HOSPADM

## 2020-03-02 RX ORDER — DIPHENHYDRAMINE HCL 25 MG
25 TABLET ORAL NIGHTLY PRN
Status: DISCONTINUED | OUTPATIENT
Start: 2020-03-02 | End: 2020-03-04 | Stop reason: HOSPADM

## 2020-03-02 RX ORDER — DEXAMETHASONE SODIUM PHOSPHATE 4 MG/ML
INJECTION, SOLUTION INTRA-ARTICULAR; INTRALESIONAL; INTRAMUSCULAR; INTRAVENOUS; SOFT TISSUE PRN
Status: DISCONTINUED | OUTPATIENT
Start: 2020-03-02 | End: 2020-03-02 | Stop reason: SURG

## 2020-03-02 RX ORDER — BUPIVACAINE HYDROCHLORIDE AND EPINEPHRINE 5; 5 MG/ML; UG/ML
INJECTION, SOLUTION EPIDURAL; INTRACAUDAL; PERINEURAL
Status: DISCONTINUED | OUTPATIENT
Start: 2020-03-02 | End: 2020-03-02 | Stop reason: HOSPADM

## 2020-03-02 RX ORDER — BUPIVACAINE HYDROCHLORIDE 2.5 MG/ML
INJECTION, SOLUTION INFILTRATION; PERINEURAL
Status: DISCONTINUED | OUTPATIENT
Start: 2020-03-02 | End: 2020-03-02 | Stop reason: HOSPADM

## 2020-03-02 RX ORDER — SODIUM CHLORIDE, SODIUM LACTATE, POTASSIUM CHLORIDE, CALCIUM CHLORIDE 600; 310; 30; 20 MG/100ML; MG/100ML; MG/100ML; MG/100ML
INJECTION, SOLUTION INTRAVENOUS CONTINUOUS
Status: ACTIVE | OUTPATIENT
Start: 2020-03-02 | End: 2020-03-02

## 2020-03-02 RX ADMIN — CEFAZOLIN 2 G: 330 INJECTION, POWDER, FOR SOLUTION INTRAMUSCULAR; INTRAVENOUS at 14:01

## 2020-03-02 RX ADMIN — OXYCODONE HYDROCHLORIDE 5 MG: 5 SOLUTION ORAL at 10:32

## 2020-03-02 RX ADMIN — PROPOFOL 150 MG: 10 INJECTION, EMULSION INTRAVENOUS at 13:55

## 2020-03-02 RX ADMIN — Medication 100 MCG: at 16:41

## 2020-03-02 RX ADMIN — FENTANYL CITRATE 25 MCG: 0.05 INJECTION, SOLUTION INTRAMUSCULAR; INTRAVENOUS at 10:50

## 2020-03-02 RX ADMIN — FENTANYL CITRATE 50 MCG: 0.05 INJECTION, SOLUTION INTRAMUSCULAR; INTRAVENOUS at 17:30

## 2020-03-02 RX ADMIN — HEPARIN SODIUM 5000 UNITS: 1000 INJECTION, SOLUTION INTRAVENOUS; SUBCUTANEOUS at 09:09

## 2020-03-02 RX ADMIN — HEPARIN SODIUM 5000 UNITS: 1000 INJECTION, SOLUTION INTRAVENOUS; SUBCUTANEOUS at 14:19

## 2020-03-02 RX ADMIN — EPHEDRINE SULFATE 10 MG: 50 INJECTION INTRAMUSCULAR; INTRAVENOUS; SUBCUTANEOUS at 14:55

## 2020-03-02 RX ADMIN — SODIUM CHLORIDE, POTASSIUM CHLORIDE, SODIUM LACTATE AND CALCIUM CHLORIDE: 600; 310; 30; 20 INJECTION, SOLUTION INTRAVENOUS at 06:26

## 2020-03-02 RX ADMIN — Medication 100 MCG: at 16:27

## 2020-03-02 RX ADMIN — DEXAMETHASONE SODIUM PHOSPHATE 4 MG: 4 INJECTION, SOLUTION INTRA-ARTICULAR; INTRALESIONAL; INTRAMUSCULAR; INTRAVENOUS; SOFT TISSUE at 08:37

## 2020-03-02 RX ADMIN — FENTANYL CITRATE 25 MCG: 0.05 INJECTION, SOLUTION INTRAMUSCULAR; INTRAVENOUS at 10:46

## 2020-03-02 RX ADMIN — PROTAMINE SULFATE 30 MG: 10 INJECTION, SOLUTION INTRAVENOUS at 09:35

## 2020-03-02 RX ADMIN — GABAPENTIN 300 MG: 300 CAPSULE ORAL at 20:58

## 2020-03-02 RX ADMIN — FENTANYL CITRATE 100 MCG: 50 INJECTION, SOLUTION INTRAMUSCULAR; INTRAVENOUS at 13:55

## 2020-03-02 RX ADMIN — FENTANYL CITRATE 50 MCG: 0.05 INJECTION, SOLUTION INTRAMUSCULAR; INTRAVENOUS at 17:45

## 2020-03-02 RX ADMIN — ONDANSETRON 4 MG: 2 INJECTION INTRAMUSCULAR; INTRAVENOUS at 08:40

## 2020-03-02 RX ADMIN — PHENYLEPHRINE HYDROCHLORIDE 100 MCG: 10 INJECTION INTRAVENOUS at 08:25

## 2020-03-02 RX ADMIN — PROTAMINE SULFATE 30 MG: 10 INJECTION, SOLUTION INTRAVENOUS at 16:56

## 2020-03-02 RX ADMIN — HEPARIN SODIUM 10000 UNITS: 1000 INJECTION, SOLUTION INTRAVENOUS; SUBCUTANEOUS at 08:27

## 2020-03-02 RX ADMIN — TEMAZEPAM 30 MG: 15 CAPSULE ORAL at 20:58

## 2020-03-02 RX ADMIN — EPHEDRINE SULFATE 10 MG: 50 INJECTION INTRAMUSCULAR; INTRAVENOUS; SUBCUTANEOUS at 14:01

## 2020-03-02 RX ADMIN — FENTANYL CITRATE 25 MCG: 0.05 INJECTION, SOLUTION INTRAMUSCULAR; INTRAVENOUS at 12:00

## 2020-03-02 RX ADMIN — PROPOFOL 120 MG: 10 INJECTION, EMULSION INTRAVENOUS at 07:40

## 2020-03-02 RX ADMIN — LIDOCAINE HYDROCHLORIDE 0.5 ML: 10 INJECTION, SOLUTION EPIDURAL; INFILTRATION; INTRACAUDAL; PERINEURAL at 06:26

## 2020-03-02 RX ADMIN — SODIUM CHLORIDE: 9 INJECTION, SOLUTION INTRAVENOUS at 20:37

## 2020-03-02 RX ADMIN — CEFAZOLIN 2 G: 330 INJECTION, POWDER, FOR SOLUTION INTRAMUSCULAR; INTRAVENOUS at 07:46

## 2020-03-02 RX ADMIN — ATORVASTATIN CALCIUM 40 MG: 40 TABLET, FILM COATED ORAL at 20:58

## 2020-03-02 RX ADMIN — SODIUM CHLORIDE, POTASSIUM CHLORIDE, SODIUM LACTATE AND CALCIUM CHLORIDE: 600; 310; 30; 20 INJECTION, SOLUTION INTRAVENOUS at 15:44

## 2020-03-02 RX ADMIN — EPHEDRINE SULFATE 10 MG: 50 INJECTION INTRAMUSCULAR; INTRAVENOUS; SUBCUTANEOUS at 14:46

## 2020-03-02 RX ADMIN — EPHEDRINE SULFATE 10 MG: 50 INJECTION INTRAMUSCULAR; INTRAVENOUS; SUBCUTANEOUS at 15:42

## 2020-03-02 RX ADMIN — HEPARIN SODIUM 2000 UNITS: 1000 INJECTION, SOLUTION INTRAVENOUS; SUBCUTANEOUS at 15:15

## 2020-03-02 RX ADMIN — OXYCODONE HYDROCHLORIDE 10 MG: 5 SOLUTION ORAL at 17:30

## 2020-03-02 RX ADMIN — SODIUM CHLORIDE, POTASSIUM CHLORIDE, SODIUM LACTATE AND CALCIUM CHLORIDE: 600; 310; 30; 20 INJECTION, SOLUTION INTRAVENOUS at 10:47

## 2020-03-02 RX ADMIN — SODIUM CHLORIDE 500 ML: 9 INJECTION, SOLUTION INTRAVENOUS at 23:58

## 2020-03-02 RX ADMIN — HEPARIN SODIUM 3000 UNITS: 1000 INJECTION, SOLUTION INTRAVENOUS; SUBCUTANEOUS at 09:19

## 2020-03-02 RX ADMIN — ROCURONIUM BROMIDE 50 MG: 10 INJECTION, SOLUTION INTRAVENOUS at 07:40

## 2020-03-02 RX ADMIN — EPHEDRINE SULFATE 10 MG: 50 INJECTION INTRAMUSCULAR; INTRAVENOUS; SUBCUTANEOUS at 14:33

## 2020-03-02 RX ADMIN — SODIUM CHLORIDE, POTASSIUM CHLORIDE, SODIUM LACTATE AND CALCIUM CHLORIDE: 600; 310; 30; 20 INJECTION, SOLUTION INTRAVENOUS at 15:45

## 2020-03-02 ASSESSMENT — LIFESTYLE VARIABLES
DOES PATIENT WANT TO STOP DRINKING: NO
TOTAL SCORE: 0
HOW MANY TIMES IN THE PAST YEAR HAVE YOU HAD 5 OR MORE DRINKS IN A DAY: 1
AVERAGE NUMBER OF DAYS PER WEEK YOU HAVE A DRINK CONTAINING ALCOHOL: 0
EVER FELT BAD OR GUILTY ABOUT YOUR DRINKING: NO
EVER HAD A DRINK FIRST THING IN THE MORNING TO STEADY YOUR NERVES TO GET RID OF A HANGOVER: NO
HAVE YOU EVER FELT YOU SHOULD CUT DOWN ON YOUR DRINKING: NO
ALCOHOL_USE: YES
EVER_SMOKED: YES
ON A TYPICAL DAY WHEN YOU DRINK ALCOHOL HOW MANY DRINKS DO YOU HAVE: 1
HAVE PEOPLE ANNOYED YOU BY CRITICIZING YOUR DRINKING: NO
CONSUMPTION TOTAL: POSITIVE

## 2020-03-02 ASSESSMENT — COGNITIVE AND FUNCTIONAL STATUS - GENERAL
MOBILITY SCORE: 22
SUGGESTED CMS G CODE MODIFIER DAILY ACTIVITY: CH
CLIMB 3 TO 5 STEPS WITH RAILING: A LITTLE
SUGGESTED CMS G CODE MODIFIER MOBILITY: CJ
DAILY ACTIVITIY SCORE: 24
WALKING IN HOSPITAL ROOM: A LITTLE

## 2020-03-02 ASSESSMENT — PATIENT HEALTH QUESTIONNAIRE - PHQ9
SUM OF ALL RESPONSES TO PHQ9 QUESTIONS 1 AND 2: 0
SUM OF ALL RESPONSES TO PHQ9 QUESTIONS 1 AND 2: 0
1. LITTLE INTEREST OR PLEASURE IN DOING THINGS: NOT AT ALL
2. FEELING DOWN, DEPRESSED, IRRITABLE, OR HOPELESS: NOT AT ALL
2. FEELING DOWN, DEPRESSED, IRRITABLE, OR HOPELESS: NOT AT ALL

## 2020-03-02 ASSESSMENT — ENCOUNTER SYMPTOMS
SENSORY CHANGE: 1
FEVER: 0
PALPITATIONS: 0
RESPIRATORY NEGATIVE: 1

## 2020-03-02 ASSESSMENT — PAIN SCALES - GENERAL: PAIN_LEVEL: 5

## 2020-03-02 ASSESSMENT — FIBROSIS 4 INDEX: FIB4 SCORE: 2.39

## 2020-03-02 NOTE — PROGRESS NOTES
Med rec updated and complete  Allergies reviewed  Interviewed pt with wife at bedside with permission from pt.  Pt reports that he takes TYLENOL 325MG 3 tablets every morning.  Pt reports no antibiotics in the last 2 weeks  Pt reports no vitamins.

## 2020-03-02 NOTE — ANESTHESIA POSTPROCEDURE EVALUATION
Patient: Tristan Hester    Procedure Summary     Date:  03/02/20 Room / Location:  [facility] 19 / SURGERY [facility]    Anesthesia Start:  0728 Anesthesia Stop:  0952    Procedures:       REPLACEMENT, AORTIC VALVE, TRANSCATHETER - (Groin)      ECHOCARDIOGRAM, TRANSESOPHAGEAL (Mouth) Diagnosis:  (SEVERE AORTIC STENOSIS)    Surgeon:  Sony Barrientos M.D. Responsible Provider:  Basilio Sanchez M.D.    Anesthesia Type:  general ASA Status:  4          Final Anesthesia Type: general  Last vitals  BP   Blood Pressure : 119/75, Arterial BP: 130/63    Temp   36.1 °C (97 °F)    Pulse   Pulse: 68   Resp   17    SpO2   93 %      Anesthesia Post Evaluation    Patient location during evaluation: PACU  Patient participation: complete - patient participated  Level of consciousness: awake and alert  Pain score: 5    Airway patency: patent  Anesthetic complications: no  Cardiovascular status: hemodynamically stable  Respiratory status: acceptable  Hydration status: euvolemic    PONV: none           Nurse Pain Score: 5 (NPRS)    Patient with femoral artery occlusion post procedure.  Needs reintervention by vascular team later today.

## 2020-03-02 NOTE — ANESTHESIA PREPROCEDURE EVALUATION
"Bring back TAVR for femoral artery occlusion.     Relevant Problems   NEURO   (+) Hx of TIA (transient ischemic attack)      CARDIAC   (+) Acute deep vein thrombosis (DVT) of distal vein of lower extremity (HCC)   (+) Carotid artery disease (HCC)   (+) Essential hypertension, benign   (+) Paroxysmal atrial fibrillation (HCC)      Other   (+) Arthritis     /80   Pulse 74   Temp 36.1 °C (97 °F) (Temporal)   Resp 20   Ht 1.803 m (5' 11\")   Wt 80.6 kg (177 lb 11.1 oz)   SpO2 96%   BMI 24.78 kg/m²       Physical Exam    Airway   Mallampati: II  TM distance: >3 FB  Neck ROM: full       Cardiovascular - normal exam  Rhythm: regular  Rate: normal  (-) murmur     Dental - normal exam         Pulmonary - normal exam  Breath sounds clear to auscultation     Abdominal    Neurological - normal exam                 Anesthesia Plan    ASA 4- EMERGENT   ASA physical status emergent criteria: compromised vital organ, limb or tissue    Plan - general       Airway plan will be LMA        Induction: intravenous    Postoperative Plan: Postoperative administration of opioids is intended.    Pertinent diagnostic labs and testing reviewed    Informed Consent:    Anesthetic plan and risks discussed with patient.    Use of blood products discussed with: patient whom consented to blood products.         "

## 2020-03-02 NOTE — ANESTHESIA PREPROCEDURE EVALUATION
Relevant Problems   NEURO   (+) Hx of TIA (transient ischemic attack)      CARDIAC   (+) Acute deep vein thrombosis (DVT) of distal vein of lower extremity (HCC)   (+) Carotid artery disease (HCC)   (+) Essential hypertension, benign   (+) Paroxysmal atrial fibrillation (HCC)   (+) Severe aortic stenosis      Other   (+) Arthritis     Vitals:    03/02/20 0559   BP: 105/76   Pulse:    Resp:    Temp:    SpO2:          Physical Exam    Airway   Mallampati: II  TM distance: >3 FB  Neck ROM: full       Cardiovascular - normal exam  Rhythm: regular  Rate: normal  (-) murmur     Dental - normal exam         Pulmonary - normal exam  Breath sounds clear to auscultation     Abdominal    Neurological - normal exam                 Anesthesia Plan    ASA 4   ASA physical status 4 criteria: severe valve dysfunction    Plan - general       Airway plan will be ETT        Induction: intravenous    Postoperative Plan: Postoperative administration of opioids is intended.    Pertinent diagnostic labs and testing reviewed    Informed Consent:    Anesthetic plan and risks discussed with patient.    Use of blood products discussed with: patient whom consented to blood products.

## 2020-03-02 NOTE — TELEPHONE ENCOUNTER
----- Message from Maryana Anderson M.D. sent at 2/26/2020  4:39 PM PST -----  Regarding: Holter results  I do not know why I cannot do a result note.  Nothing concerning on his Holter monitor, please let him know.  Thank you!  ----- Message -----  From: Elaine Garcia, Med Ass't  Sent: 2/26/2020   4:26 PM PST  To: Maryana Anderson M.D., #

## 2020-03-02 NOTE — OR SURGEON
Immediate Post OP Note    PreOp Diagnosis: Severe symptomatic aortic stenosis, mitral regurgitation, chronic left ventricular systolic and diastolic failure, congestive heart failure (NYHA class II-III), h/o CVA    PostOp Diagnosis: Same    Procedure(s):  TAVR (29 mm S3 Forbes pericardial valve)  LEFT ILIAC ARTERY ENDOVASCULAR STENTING  ECHOCARDIOGRAM, TRANSESOPHAGEAL    Surgeon(s):  DAGOBERTO Cooper M.D.    Anesthesiologist/Type of Anesthesia:  Anesthesiologist: Basilio Sanchez M.D.  Anesthesia Technician: Rodney Ozuna/General    Surgical Staff:  Circulator: Renetta Gabriel R.N.; Dong Murray R.N.  Perfusionist: Tori Quinteros  Scrub Person: Rashard Louis  Cath Lab Tech: Iglesia Lynch    Specimens removed if any: None    Estimated Blood Loss: Minimal    Findings: Severe aortic stenosis    Complications: Bilateral iliac artery dissections        3/2/2020 9:53 AM Armida Costa M.D.

## 2020-03-02 NOTE — ANESTHESIA TIME REPORT
Anesthesia Start and Stop Event Times     Date Time Event    3/2/2020 0709 Ready for Procedure     0728 Anesthesia Start     0952 Anesthesia Stop        Responsible Staff  03/02/20    Name Role Begin End    Basilio Sanchez M.D. Anesth 0728 0952        Preop Diagnosis (Free Text):  Pre-op Diagnosis     SEVERE AORTIC STENOSIS        Preop Diagnosis (Codes):    Post op Diagnosis  Severe aortic stenosis      Premium Reason  Non-Premium    Comments: art line, yaun

## 2020-03-02 NOTE — OP REPORT
DATE OF SERVICE:  03/02/2020    REFERRING PHYSICIAN:  Christopher Bae MD    PREOPERATIVE DIAGNOSES:  Severe symptomatic aortic stenosis (low flow, low   gradient), mitral regurgitation, chronic left ventricular systolic and   diastolic failure, congestive heart failure (New York Heart Association class   II-III), history of cerebrovascular accident.      POSTOPERATIVE DIAGNOSES:  Severe symptomatic aortic stenosis (low flow, low   gradient), mitral regurgitation, chronic left ventricular systolic and   diastolic failure, congestive heart failure (New York Heart Association class   II-III), history of cerebrovascular accident.      PROCEDURES:  Transcatheter aortic valve replacement (29 mm S3 Forbes   pericardial valve), left iliac artery endovascular stenting and intraoperative   transesophageal echocardiography.      SURGEONS:  Sony Barrientos MD and Armida Costa MD    FIRST ASSISTANT:  Martín Sawant DO     ANESTHESIOLOGIST:  Basilio Sanchez MD    ANESTHESIA:  General endotracheal.    ESTIMATED BLOOD LOSS:  Minimal.    COMPLICATIONS:  Bilateral iliac artery dissections.      INDICATIONS:  The patient is an 84-year-old male with severe symptomatic   aortic stenosis.  Echocardiography was consistent with low flow, low gradient   severe aortic stenosis.    DESCRIPTION OF PROCEDURE:  The patient was brought to the operating room and   placed on the operating room table in the supine position.  After successful   induction of general anesthesia and endotracheal intubation, the patient was   prepped and draped in the usual sterile fashion.  Dr. Sawant was the first   assistant during the operation.  In collaboration with Dr. Barrientos,   bilateral femoral ultrasound-guided arteriovenous access was obtained.  There   was some difficulty advancing the wires bilaterally due to the tortuosity of   the iliac arteries.  There were limited iliac artery dissections bilaterally.    A 1 cm incision was made  in the left groin and 2 Perclose sutures were   deployed.  A 16-Beninese eSheath was then placed in the left common femoral   artery and its tip was positioned in the abdominal aorta.  Dr. Barrientos had   already positioned a temporary transvenous pacemaker in the right ventricle   and a pigtail catheter in the right coronary sinus through the right femoral   approach.  The deployment angle was determined.  A deployment catheter with a   29 mm S3 Forbes pericardial valve at its tip was positioned across the aortic   annulus.  I inflated the balloon during valve implantation.  The deployment   catheter was removed.  Intraoperative transesophageal echocardiography did not   show any paravalvular or central leaks.  A distal aortogram showed occlusion   of the left external iliac artery and the dissection still present in the   right iliac artery, but with good distal flow.  Dr. Barrientos proceeded to   place 2 stents endovascularly in the left iliac artery and he will be dictating this portion of the   operation.  This established flow to the left leg.  Wires and catheters were   removed.  Pressure was held at both groins to control hemostasis.  The   Perclose sutures in the left groin were tied down.  The remainder of the   operation will be dictated by Dr. Barrientos.  Other than the iliac   dissections, there were no other complications.  The patient tolerated the   procedure well and left the operating room in stable condition.       ____________________________________     Armida MURRAY    DD:  03/02/2020 10:06:08  DT:  03/02/2020 10:55:02    D#:  [id number]  Job#:  [id number]

## 2020-03-02 NOTE — ANESTHESIA PROCEDURE NOTES
Airway  Date/Time: 3/2/2020 7:42 AM  Performed by: Basilio Sanchez M.D.  Authorized by: Basilio Sanchez M.D.     Location:  OR  Urgency:  Elective  Difficult Airway: No    Indications for Airway Management:  Anesthesia  Spontaneous Ventilation: absent    Sedation Level:  Deep  Preoxygenated: Yes    Patient Position:  Sniffing  Mask Difficulty Assessment:  2 - vent by mask + OA or adjuvant +/- NMBA  Final Airway Type:  Endotracheal airway  Final Endotracheal Airway:  ETT  Cuffed: Yes    Technique Used for Successful ETT Placement:  Direct laryngoscopy  Insertion Site:  Oral  Blade Type:  Mary  Laryngoscope Blade/Videolaryngoscope Blade Size:  3  ETT Size (mm):  7.0  Measured from:  Teeth  ETT to Teeth (cm):  25  Placement Verified by: auscultation and capnometry    Cormack-Lehane Classification:  Grade III - view of epiglottis only  Number of Attempts at Approach:  1

## 2020-03-02 NOTE — ANESTHESIA PROCEDURE NOTES
Airway  Date/Time: 3/2/2020 2:00 PM  Performed by: Basilio Sanchez M.D.  Authorized by: Basilio Sanchez M.D.     Location:  OR  Urgency:  Elective  Difficult Airway: No    Indications for Airway Management:  Anesthesia  Spontaneous Ventilation: absent    Sedation Level:  Deep  Preoxygenated: Yes    MILS Maintained Throughout: No    Mask Difficulty Assessment:  0 - not attempted  Final Airway Type:  Supraglottic airway  Final Supraglottic Airway:  Standard LMA  SGA Size:  4  Number of Attempts at Approach:  1

## 2020-03-02 NOTE — OP REPORT
DATE OF PROCEDURE: 3/2/2020    REFERRING PHYSICIAN:     PROCEDURES performed:  1. Transcatheter aortic valve replacement.  2. Insertion and removal of temporary pacer wire    PRE PROCEDURAL DIAGNOSIS:  1. Severe symptomatic aortic stenosis, NYHA III.    Operators:  CT Surgeon: Dr.Romanaus  Interventional cardiologist: Dr. Barrientos    DESCRIPTION OF PROCEDURE:  After informed consent was signed by the   patient, the patient was brought into the operating room, was prepped and draped in   usual sterile manner.    General anesthesia and   Transesophagealechocardiogram was provided by .    Primary Arterial access:   left femoral artery was cannulated using modified Seldinger technique under ultrasound guidance, a 6 Cypriot sheath was inserted.  Abdominal aortogram with bilateral iliofemoral runoff was performed by placing pigtail in descending abdominal aorta and confirmed common femoral arterial access. 2 Perclose sutures were placed, arteriotomy was serially dilated,16 Fr sheath was inserted in the femoral artery over a stiff Lunderquist wire.    Secondary arterial access:   right femoral artery was cannulated with 6 Cypriot sheath.  A pigtail catheter was advanced through 6 Cypriot sheath, aortic root angiogram was performed to determine coplanar view.    Venous Access:   left femoral vein was cannulated with 6 Cypriot sheath, a temporary pacer wire was advanced to RV apex in usual fashion.    Through Forbes self-expandable sheath, AL-1 catheter was advanced to aortic root, straight wire was used to cross the aortic valve, a stiff Amplatz wire was placed in the LV apex and AL-1 catheter was withdrawn. 29 mm Forbes Kaylen 3 michael was prepped in usual fashion, orientation was confirmed.  Sapient 3 valve was slowly advanced over a stiff Amplatz wire to aortic position.  Rapid pacing was achieved using temporary pacer wire, aortic root angiogram was performed and after confirming good positioning, valve  was slowly deployed under fluoroscopic guidance.  Post procedure echocardiogram showed no significant paravalvular leak, good valve positioning.The primary arterial access site was closed with the PreClose system.     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  Postprocedure, I did abdominal aortogram with bilateral iliac runoff.  Right external iliac artery dissection was non-flow-limiting, left external artery focal dissection was flow-limiting with no flow distally.  Then I try to obtain access again on the left side but was unsuccessful to wire across the dissection plane.  Then I advanced a Omni Flush catheter, crossed over into the left common iliac artery, able to advance a wire across the dissection plane into the true lumen.  45 cm 6 [place] Islander sheath was placed in the right groin with the tip in left common iliac artery.  The spot where the focal dissection was dilated using a 6 x 40 mm balloon, 8 x 60 mm balloon expandable stent was placed in left external iliac artery.  This was postdilated to 10.5 mm using 10 mm balloon.  At the end there was excellent flow into left lower extremity.  Right iliac artery angiogram showed non-flow-limiting dissection in right external iliac artery with good flow into the distal vessels.  Then sheath was removed from right common femoral artery, manual pressure was applied.    The patient tolerated the procedure well. At the end of procedure, all pacemaker wire,   pigtail catheters and sheaths were removed. The patient was then extubated and transferred to PACU in stable condition.    Complications: Iliac artery dissection requiring stent placement.  Specimens: none  Estimated blood loss: <50cc      IMPRESSION:  Successful transcatheter aortic valve replacement using 29 mm Forbes silva 3 valve    RECOMMENDATION:   Medical therapy with addition of Plavix for 3  months.    Sony Barrientos  Interventional cardiologist

## 2020-03-02 NOTE — ANESTHESIA PROCEDURE NOTES
Arterial Line  Performed by: Basilio Sanchez M.D.  Authorized by: Basilio Sanchez M.D.     Start Time:  3/2/2020 7:40 AM  End Time:  3/2/2020 7:42 AM  Localization: surface landmarks    Patient Location:  OR  Indication: continuous blood pressure monitoring    Catheter Size:  20 G  Seldinger Technique?: Yes    Laterality:  Right  Site:  Radial artery  Line Secured:  Antimicrobial disc, tape and transparent dressing  Events: patient tolerated procedure well with no complications

## 2020-03-02 NOTE — CONSULTS
Date of Service  3/2/2020    Reason For Consult  Left leg ischemia    Requesting Physician  Sony Barrientos MD    Consulting Physician  Alpa Tillman M.D.    Primary Care Physician  Michael J Bloch, M.D.    Chief Complaint  Left foot numbess    History of Present Illness  84 y.o. male who presented 3/2/2020 for TAVR.  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  The left external iliac artery was treated with a balloon expandable stent and post dilated to 10.5mm.  The patient then had closure of the left femoral artery, but in recovery, no pulses were identified in the left leg. I was asked to intervene.     Review of Systems  Review of Systems   Constitutional: Negative for fever.   Respiratory: Negative.    Cardiovascular: Negative for chest pain, palpitations and leg swelling.   Neurological: Positive for sensory change.        Numbness left foot   All other systems reviewed and are negative.      Past Medical History  Past Medical History:   Diagnosis Date   • Aortic valve stenosis    • Arrhythmia     Afib   • Arthritis     osteo, hips and knees   • Blood clotting disorder ([facility])     , left arm   • Bronchitis        • Cancer ([facility])     Prostate CA--2000   • Carotid arterial disease ([facility]) 1/6/2014   • Cataract     removed bilat   • DVT (deep venous thrombosis) ([facility]) 2016   • Heart valve disease     MVR   • HLD (hyperlipidemia) 1/6/2014   • Hypertension    • Mitral regurgitation 1/6/2014   • Pain 12-14-15    low back, hips, 2-3/10   • Pneumonia        • Stroke ([facility]) 2008    TIA, no residual   • TIA (transient ischemic attack)     2008   • Urinary bladder disorder     HAD MALE BLADDER SUSPENSION PROCEDURE DONE   • Urinary incontinence     stress incontinence       Surgical History  Past Surgical History:   Procedure Laterality Date   • LUMBAR FUSION POSTERIOR  12/28/2015    Procedure: LUMBAR FUSION POSTERIOR L3-5 with peek  rods;  Surgeon: Michael Sarkar M.D.;  Location: SURGERY [facility];  Service:    • LUMBAR DECOMPRESSION  2015    Procedure: LUMBAR DECOMPRESSION posterior redo decompression L3-5, with dural repair;  Surgeon: Michael Sarkar M.D.;  Location: [facility];  Service:    • THORACOSCOPY  2015    Procedure: THORACOSCOPY with decortication VATS , side to be determined  ;  Surgeon: Elmira Holder M.D.;  Location: SURGERY [facility];  Service:    • LUMBAR LAMINECTOMY DISKECTOMY  10/8/2010    Performed by MICHAEL SARKAR at [facility]   • CERVICAL DISK AND FUSION ANTERIOR  10/26/2009    Performed by MICHAEL SARKAR at [facility]   • CAROTID ENDARTERECTOMY  7/10/08    Performed by LEIGH MORATAYA at [facility]   • BLADDER SLING MALE  2004   • PROSTATECTOMY, RADIAL  2000        Family History  Family History   Problem Relation Age of Onset   • Heart Disease Father         CHF at 91.   • Other Brother         Aneurysm   • Cancer Brother         Seminal vascular cancer   • Autoimmune Disease Daughter         Lupus, RA   • No Known Problems Daughter    • No Known Problems Son    • No Known Problems Son    • Heart Attack Neg Hx         Social History  Social History     Socioeconomic History   • Marital status:      Spouse name: Not on file   • Number of children: Not on file   • Years of education: Not on file   • Highest education level: Not on file   Occupational History   • Not on file   Social Needs   • Financial resource strain: Not on file   • Food insecurity     Worry: Never true     Inability: Never true   • Transportation needs     Medical: No     Non-medical: No   Tobacco Use   • Smoking status: Former Smoker     Packs/day: 0.50     Years: 30.00     Pack years: 15.00     Types: Cigarettes, Pipe     Last attempt to quit: 1985     Years since quittin.1   • Smokeless tobacco: Never Used   Substance and Sexual Activity   • Alcohol use: Yes      Alcohol/week: 3.0 oz     Types: 3 Cans of beer, 3 Shots of liquor per week     Comment: 1 x week   • Drug use: No   • Sexual activity: Not Currently   Lifestyle   • Physical activity     Days per week: Not on file     Minutes per session: Not on file   • Stress: Not on file   Relationships   • Social connections     Talks on phone: Not on file     Gets together: Not on file     Attends Worship service: Not on file     Active member of club or organization: Not on file     Attends meetings of clubs or organizations: Not on file     Relationship status: Not on file   • Intimate partner violence     Fear of current or ex partner: Not on file     Emotionally abused: Not on file     Physically abused: Not on file     Forced sexual activity: Not on file   Other Topics Concern   • Not on file   Social History Narrative   • Not on file       Medications  Prior to Admission Medications   Prescriptions Last Dose Informant Patient Reported? Taking?   atorvastatin (LIPITOR) 80 MG tablet 3/1/2020 at 2200 Patient Yes Yes   Sig: Take 40 mg by mouth every evening.   ezetimibe (ZETIA) 10 MG Tab 3/1/2020 at 0700 Patient Yes Yes   Sig: Take 10 mg by mouth every day.   furosemide (LASIX) 20 MG Tab 3/1/2020 at 0700 Patient No No   Sig: Take 1 Tab by mouth every day.   gabapentin (NEURONTIN) 300 MG Cap 3/1/2020 at 2200 Patient Yes No   Sig: Take 300-600 mg by mouth 2 Times a Day. 600 mg in the AM  300 mg in the PM   irbesartan (AVAPRO) 300 MG Tab 3/1/2020 at 2200 Patient Yes Yes   Sig: Take 300 mg by mouth every evening.   temazepam (RESTORIL) 30 MG capsule 3/1/2020 at 2200 Patient Yes Yes   Sig: Take 30 mg by mouth every bedtime.   warfarin (COUMADIN) 4 MG Tab 2/26/2020 at HOLD Patient Yes Yes   Sig: Take 6-8 mg by mouth every day. Pt takes 6MG on Mon, Wed, Fri, and Sat  8MG on Sun, Tue, Thur      Facility-Administered Medications: None       Current Facility-Administered Medications   Medication Dose Route Frequency Provider Last  Rate Last Dose   • lactated ringers infusion   Intravenous Continuous Sony Barrientos M.D. 10 mL/hr at 03/02/20 0626     • lactated ringers infusion   Intravenous Continuous Basilio Sanchez M.D. 50 mL/hr at 03/02/20 1047     • fentaNYL (SUBLIMAZE) injection 25 mcg  25 mcg Intravenous Q2 MIN PRN Basilio Sanchez M.D.   25 mcg at 03/02/20 1200    Or   • fentaNYL (SUBLIMAZE) injection 50 mcg  50 mcg Intravenous Q2 MIN PRN aBsilio Sanchez M.D.       • HYDROmorphone pf (DILAUDID) injection 0.1 mg  0.1 mg Intravenous Q5 MIN PRDORIS Sanchez M.D.        Or   • HYDROmorphone pf (DILAUDID) injection 0.2 mg  0.2 mg Intravenous Q5 MIN PRDORIS Sanchez M.D.        Or   • HYDROmorphone pf (DILAUDID) injection 0.4 mg  0.4 mg Intravenous Q5 MIN PRDORIS Sanchez M.D.       • meperidine (DEMEROL) injection 6.25 mg  6.25 mg Intravenous Q5 MIN PRN Basilio Sanchez M.D.       • ondansetron (ZOFRAN) syringe/vial injection 4 mg  4 mg Intravenous Once PRN Basilio Sanchez M.D.       • haloperidol lactate (HALDOL) injection 1 mg  1 mg Intravenous Q15 MIN PRDORIS Sanchez M.D.       • diphenhydrAMINE (BENADRYL) injection 12.5 mg  12.5 mg Intravenous Q15 MIN PRDORIS Sanchez M.D.       • albuterol (PROVENTIL) 2.5mg/0.5ml nebulizer solution 2.5 mg  2.5 mg Inhalation Q10 MIN PRN Basilio Sanchez M.D.       • atorvastatin (LIPITOR) tablet 40 mg  40 mg Oral Nightly Patrizia Sarmiento, A.P.R.N.       • ezetimibe (ZETIA) tablet 10 mg  10 mg Oral DAILY Patrizia Sarmiento, A.P.R.N.       • gabapentin (NEURONTIN) capsule 300-600 mg  300-600 mg Oral BID Patrizia Sarmiento, A.P.R.N.       • temazepam (RESTORIL) capsule 30 mg  30 mg Oral QHS YOHAN MasseyRBeccaN.           Allergies  Allergies   Allergen Reactions   • Other Environmental Runny Nose and Itching     Seasonal, cats-hayfever         Physical Exam  Temp:  [35.6 °C (96 °F)-36.6 °C (97.9 °F)] 36.1 °C (97 °F)  Pulse:  [61-78] 68  Resp:  [11-18] 17  BP: (105-133)/(66-82) 119/75  SpO2:  [93  %-100 %] 93 %    Pulse/Extremity Exam:    Femorals:        Right: palpable       Left absent    Pedal Pulses:       Right DP palpable       Right PT palpable       Left DP absent       Left PT absent    General appearance: NAD, conversing appropriately  Psych: Normal affect, mood, judgement  Neuro: CN II-XII grossly intact.   Neck: full range of motion  Lungs: No inspiratory stridor or wheezing  CV: RRR  Abdomen: Soft, NT/ND  Skin: No rashes    Labs Reviewed Today:  Recent Labs     03/02/20  0955   WBC 5.4   RBC 4.19*   HEMOGLOBIN 12.1*   HEMATOCRIT 38.4*   MCV 91.6   MCH 28.9   MCHC 31.5*   RDW 49.0   PLATELETCT 166   MPV 11.1     Recent Labs     03/02/20  0955   SODIUM 140   POTASSIUM 3.9   CHLORIDE 108   CO2 23   GLUCOSE 134*   BUN 21   CREATININE 0.83   CALCIUM 8.1*     Recent Labs     03/02/20  0955   ALTSGPT 11   ASTSGOT 19   ALKPHOSPHAT 52   TBILIRUBIN 0.9   GLUCOSE 134*     Recent Labs     03/02/20  0625   INR 1.31*       Assessment/Plan & Medical Decision-Making    I suspect this problem is related to the Perclose device.  After discussion with Dr. Barrientos, I will plan to image both iliac arteries and perform a pelvic angiogram, as well as repair the left common femoral artery.  At the very worst, a femoral to femoral artery bypass might be required to restore flow to the left lower extremity.      Alpa Tillman MD  Vascular Surgeon  [facility]  Office: [phone]

## 2020-03-03 ENCOUNTER — APPOINTMENT (OUTPATIENT)
Dept: RADIOLOGY | Facility: MEDICAL CENTER | Age: 85
DRG: 266 | End: 2020-03-03
Attending: NURSE PRACTITIONER
Payer: MEDICARE

## 2020-03-03 ENCOUNTER — APPOINTMENT (OUTPATIENT)
Dept: CARDIOLOGY | Facility: MEDICAL CENTER | Age: 85
DRG: 266 | End: 2020-03-03
Attending: NURSE PRACTITIONER
Payer: MEDICARE

## 2020-03-03 PROBLEM — I50.32 CHRONIC DIASTOLIC HEART FAILURE DUE TO VALVULAR DISEASE (HCC): Status: ACTIVE | Noted: 2020-02-27

## 2020-03-03 PROBLEM — I42.0 DILATED CARDIOMYOPATHY (HCC): Status: RESOLVED | Noted: 2020-02-21 | Resolved: 2020-03-03

## 2020-03-03 LAB
ALBUMIN SERPL BCP-MCNC: 3.1 G/DL (ref 3.2–4.9)
ALBUMIN/GLOB SERPL: 1.3 G/DL
ALP SERPL-CCNC: 52 U/L (ref 30–99)
ALT SERPL-CCNC: 10 U/L (ref 2–50)
ANION GAP SERPL CALC-SCNC: 8 MMOL/L (ref 0–11.9)
AST SERPL-CCNC: 15 U/L (ref 12–45)
BILIRUB SERPL-MCNC: 0.9 MG/DL (ref 0.1–1.5)
BUN SERPL-MCNC: 20 MG/DL (ref 8–22)
CALCIUM SERPL-MCNC: 8.2 MG/DL (ref 8.5–10.5)
CHLORIDE SERPL-SCNC: 106 MMOL/L (ref 96–112)
CO2 SERPL-SCNC: 25 MMOL/L (ref 20–33)
CREAT SERPL-MCNC: 0.88 MG/DL (ref 0.5–1.4)
EKG IMPRESSION: NORMAL
ERYTHROCYTE [DISTWIDTH] IN BLOOD BY AUTOMATED COUNT: 49.6 FL (ref 35.9–50)
GLOBULIN SER CALC-MCNC: 2.4 G/DL (ref 1.9–3.5)
GLUCOSE SERPL-MCNC: 220 MG/DL (ref 65–99)
HCT VFR BLD AUTO: 36.4 % (ref 42–52)
HGB BLD-MCNC: 11.4 G/DL (ref 14–18)
INR PPP: 1.35 (ref 0.87–1.13)
LV EJECT FRACT  99904: 55
LV EJECT FRACT  99904: 60
LV EJECT FRACT MOD 2C 99903: 63.56
LV EJECT FRACT MOD 4C 99902: 55.69
LV EJECT FRACT MOD BP 99901: 59.24
MCH RBC QN AUTO: 28.9 PG (ref 27–33)
MCHC RBC AUTO-ENTMCNC: 31.3 G/DL (ref 33.7–35.3)
MCV RBC AUTO: 92.2 FL (ref 81.4–97.8)
NT-PROBNP SERPL IA-MCNC: 8211 PG/ML (ref 0–125)
PLATELET # BLD AUTO: 137 K/UL (ref 164–446)
PMV BLD AUTO: 10.8 FL (ref 9–12.9)
POTASSIUM SERPL-SCNC: 4.1 MMOL/L (ref 3.6–5.5)
PROT SERPL-MCNC: 5.5 G/DL (ref 6–8.2)
PROTHROMBIN TIME: 17 SEC (ref 12–14.6)
RBC # BLD AUTO: 3.95 M/UL (ref 4.7–6.1)
SODIUM SERPL-SCNC: 139 MMOL/L (ref 135–145)
WBC # BLD AUTO: 8.1 K/UL (ref 4.8–10.8)

## 2020-03-03 PROCEDURE — 93306 TTE W/DOPPLER COMPLETE: CPT | Mod: 26 | Performed by: INTERNAL MEDICINE

## 2020-03-03 PROCEDURE — 93005 ELECTROCARDIOGRAM TRACING: CPT | Performed by: NURSE PRACTITIONER

## 2020-03-03 PROCEDURE — 99232 SBSQ HOSP IP/OBS MODERATE 35: CPT | Performed by: INTERNAL MEDICINE

## 2020-03-03 PROCEDURE — 97161 PT EVAL LOW COMPLEX 20 MIN: CPT

## 2020-03-03 PROCEDURE — 770020 HCHG ROOM/CARE - TELE (206)

## 2020-03-03 PROCEDURE — A9270 NON-COVERED ITEM OR SERVICE: HCPCS | Performed by: NURSE PRACTITIONER

## 2020-03-03 PROCEDURE — 97535 SELF CARE MNGMENT TRAINING: CPT

## 2020-03-03 PROCEDURE — 51798 US URINE CAPACITY MEASURE: CPT

## 2020-03-03 PROCEDURE — A9270 NON-COVERED ITEM OR SERVICE: HCPCS | Performed by: INTERNAL MEDICINE

## 2020-03-03 PROCEDURE — 71045 X-RAY EXAM CHEST 1 VIEW: CPT

## 2020-03-03 PROCEDURE — 93010 ELECTROCARDIOGRAM REPORT: CPT | Performed by: INTERNAL MEDICINE

## 2020-03-03 PROCEDURE — 700102 HCHG RX REV CODE 250 W/ 637 OVERRIDE(OP): Performed by: INTERNAL MEDICINE

## 2020-03-03 PROCEDURE — 700102 HCHG RX REV CODE 250 W/ 637 OVERRIDE(OP): Performed by: NURSE PRACTITIONER

## 2020-03-03 PROCEDURE — 93306 TTE W/DOPPLER COMPLETE: CPT

## 2020-03-03 RX ORDER — WARFARIN SODIUM 6 MG/1
6 TABLET ORAL
Status: DISCONTINUED | OUTPATIENT
Start: 2020-03-04 | End: 2020-03-04 | Stop reason: HOSPADM

## 2020-03-03 RX ORDER — WARFARIN SODIUM 4 MG/1
8 TABLET ORAL
Status: DISCONTINUED | OUTPATIENT
Start: 2020-03-03 | End: 2020-03-04 | Stop reason: HOSPADM

## 2020-03-03 RX ORDER — ASPIRIN 81 MG/1
81 TABLET ORAL DAILY
Qty: 90 TAB | Refills: 3 | Status: SHIPPED | OUTPATIENT
Start: 2020-03-04 | End: 2021-02-04

## 2020-03-03 RX ORDER — WARFARIN SODIUM 10 MG/1
10 TABLET ORAL
Status: COMPLETED | OUTPATIENT
Start: 2020-03-03 | End: 2020-03-03

## 2020-03-03 RX ADMIN — ASPIRIN 81 MG: 81 TABLET, COATED ORAL at 06:14

## 2020-03-03 RX ADMIN — WARFARIN SODIUM 10 MG: 10 TABLET ORAL at 17:33

## 2020-03-03 RX ADMIN — WARFARIN SODIUM 6 MG: 6 TABLET ORAL at 01:49

## 2020-03-03 RX ADMIN — ATORVASTATIN CALCIUM 40 MG: 40 TABLET, FILM COATED ORAL at 21:43

## 2020-03-03 RX ADMIN — GABAPENTIN 300 MG: 300 CAPSULE ORAL at 17:32

## 2020-03-03 RX ADMIN — POTASSIUM CHLORIDE 20 MEQ: 1500 TABLET, EXTENDED RELEASE ORAL at 06:14

## 2020-03-03 RX ADMIN — WARFARIN SODIUM 8 MG: 4 TABLET ORAL at 17:33

## 2020-03-03 RX ADMIN — ACETAMINOPHEN 650 MG: 325 TABLET, FILM COATED ORAL at 15:34

## 2020-03-03 RX ADMIN — TEMAZEPAM 30 MG: 15 CAPSULE ORAL at 21:43

## 2020-03-03 RX ADMIN — EZETIMIBE 10 MG: 10 TABLET ORAL at 06:14

## 2020-03-03 ASSESSMENT — ENCOUNTER SYMPTOMS
DIZZINESS: 0
ABDOMINAL PAIN: 0
PALPITATIONS: 0
ARTHRALGIAS: 0
COLOR CHANGE: 0
FATIGUE: 0
CHEST TIGHTNESS: 0

## 2020-03-03 ASSESSMENT — COGNITIVE AND FUNCTIONAL STATUS - GENERAL
TURNING FROM BACK TO SIDE WHILE IN FLAT BAD: A LITTLE
MOVING FROM LYING ON BACK TO SITTING ON SIDE OF FLAT BED: A LITTLE
MOVING TO AND FROM BED TO CHAIR: A LITTLE
CLIMB 3 TO 5 STEPS WITH RAILING: A LITTLE
STANDING UP FROM CHAIR USING ARMS: A LITTLE
MOBILITY SCORE: 18
WALKING IN HOSPITAL ROOM: A LITTLE
SUGGESTED CMS G CODE MODIFIER MOBILITY: CK

## 2020-03-03 ASSESSMENT — GAIT ASSESSMENTS
ASSISTIVE DEVICE: SINGLE POINT CANE
GAIT LEVEL OF ASSIST: SUPERVISED
DISTANCE (FEET): 150

## 2020-03-03 NOTE — OR SURGEON
OP Note    PreOp Diagnosis: Left lower extremity ischemia    PostOp Diagnosis: Left commmon    Procedure(s):  Repair of left femoral  Artery Dissection - Wound Class: Clean  Pelvic angiogram    Surgeon(s):  Alpa Tillman M.D.    Anesthesiologist/Type of Anesthesia:  Anesthesiologist: Basilio Sanchez M.D./General    Surgical Staff:  Circulator: Renetta Gabriel R.N.  Relief Circulator: Mely Mazariegos RDONELL  Relief Scrub: Brigette Moss  Scrub Person: Rashard Tee [place]: Danuta Chambers R.N.  Radiology Technologist: Pebbles Pate    Specimens removed if any:arterial intima    Estimated Blood Loss: 300cc    Findings: Dissection stopped at CFA plaque causing occlusion    Complications: none    [id number]    3/2/2020 5:44 PM Alpa Tillman M.D.

## 2020-03-03 NOTE — ANESTHESIA TIME REPORT
Anesthesia Start and Stop Event Times     Date Time Event    3/2/2020 1352 Ready for Procedure     1352 Anesthesia Start     1712 Anesthesia Stop        Responsible Staff  03/02/20    Name Role Begin End    Basilio Sanchez M.D. Anesth 1352 1712        Preop Diagnosis (Free Text):  Pre-op Diagnosis     Left leg ischemia        Preop Diagnosis (Codes):    Post op Diagnosis  Dissection of artery of lower extremity      Premium Reason  A. 3PM - 7AM    Comments:

## 2020-03-03 NOTE — RESPIRATORY CARE
COPD EDUCATION by COPD CLINICAL EDUCATOR  3/3/2020 at 6:14 AM by Kelley Mendez, YSABEL     Patient reviewed by COPD education team. Patient does not have a history or diagnosis of COPD and is a non-smoker, therefore does not qualify for the COPD program.

## 2020-03-03 NOTE — PROGRESS NOTES
Report received. Patient oriented x4. Pain level 2 out of 10 back pain, per patient from laying down. Denies SOB. Groin sites CD&I, bilaterally. Left foot cool but palpable pulse, right foot pulse present. Right foot numbness, md notified, no new orders. Post op vitals in place.   Fall risk interventions in place, bed in low position, and bed alarm on.

## 2020-03-03 NOTE — HEART FAILURE PROGRAM
"Patient has appeared on the Acute HF Report. He is was admitted yesterday for TAVR and required subsequent repair of left femoral artery dissection.    At this time, the only notes available are Op Reports which do not mention HF exacerbation as a current problem. It is possible that patient is appearing on HF report due to problems list:        IV furosemide was ordered for this morning but it was held and the comment says \"per Dr. Patel\"      We will need further guidance from providers on whether or not HF exacerbation has become a problem on this admission.    Many thanks, Licha, Cardiovascular Nurse Navigator, RN, CHFN [phone], & [employer] M-F (excluding holidays).    Per STEWART Sarmiento's note on 3/3, patient is not currently in acute HF.  Therefore, a 7 calendar day HF follow up appointment and HF discharge checklist are not indicated at this time.   Many thanks, Licha, Cardiovascular Nurse Navigator, RN, CHFN [phone], & [employer] M-F (excluding holidays).   Last edited by Licha Lynn R.N. on 03/04/20 at 0700           "

## 2020-03-03 NOTE — ANESTHESIA QCDR
2019 [facility] Clinical Data Registry (for Quality Improvement)     Postoperative nausea/vomiting risk protocol (Adult = 18 yrs and Pediatric 3-17 yrs)- (430 and 463)  General inhalation anesthetic (NOT TIVA) with PONV risk factors: Yes  Provision of anti-emetic therapy with at least 2 different classes of agents: Yes   Patient DID NOT receive anti-emetic therapy and reason is documented in Medical Record:  N/A    Multimodal Pain Management- (477)  Non-emergent surgery AND patient age >= 18: No  Use of Multimodal Pain Management, two or more drugs and/or interventions, NOT including systemic opioids:   Exception: Documented allergy to multiple classes of analgesics:     Smoking Abstinence (404)  Patient is current smoker (cigarette, pipe, e-cig, marijuanna): No  Elective Surgery:   Abstinence instructions provided prior to day of surgery:   Patient abstained from smoking on day of surgery:     Pre-Op Beta-Blocker in Isolated CABG (44)  Isolated CABG AND patient age >= 18: No  Beta-blocker admin within 24 hours of surgical incision:   Exception:of medical reason(s) for not administering beta blocker within 24 hours prior to surgical incision (e.g., not  indicated,other medical reason):     PACU assessment of acute postoperative pain prior to Anesthesia Care End- Applies to Patients Age = 18- (ABG7)  Initial PACU pain score is which of the following: < 7/10  Patient unable to report pain score: N/A    Post-anesthetic transfer of care checklist/protocol to PACU/ICU- (426 and 427)  Upon conclusion of case, patient transferred to which of the following locations: PACU/Non-ICU  Use of transfer checklist/protocol: Yes  Exclusion: Service Performed in Patient Hospital Room (and thus did not require transfer): N/A  Unplanned admission to ICU related to anesthesia service up through end of PACU care- (MD51)  Unplanned admission to ICU (not initially anticipated at anesthesia start time): No

## 2020-03-03 NOTE — CARE PLAN
Problem: Communication  Goal: The ability to communicate needs accurately and effectively will improve  Outcome: PROGRESSING AS EXPECTED  Intervention: [place] patient and significant other/support system to call light to alert staff of needs  Flowsheets (Taken 3/3/2020 5517)  Oriented to:: All of the Following : Location of Bathroom, Visiting Policy, Unit Routine, Call Light and Bedside Controls, Bedside Rail Policy, Smoking Policy, Rights and Responsibilities, Bedside Report, and Patient Education Notebook     Problem: Safety  Goal: Will remain free from falls  Outcome: PROGRESSING AS EXPECTED  Intervention: Implement fall precautions  Flowsheets (Taken 3/2/2020 2210 by Melly Ko RBeccaNBecca)  Environmental Precautions:   Treaded Slipper Socks on Patient   Personal Belongings, Wastebasket, Call Bell etc. in Easy Reach   Transferred to Stronger Side   Report Given to Other Health Care Providers Regarding Fall Risk   Mobility Assessed & Appropriate Sign Placed   Communication Sign for Patients & Families   Bed in Low Position

## 2020-03-03 NOTE — PROGRESS NOTES
2 RN skin check complete.   Devices in place oxygen,scdd.  Skin assessed under devices intact .  Confirmed pressure ulcers found on n/a.  New potential pressure ulcers noted on n/a. Wound consult placed n/a.  The following interventions in place gray foams   scattered bruising on arms, bottom pink but blanching

## 2020-03-03 NOTE — THERAPY
"Physical Therapy Cardiac Rehab Evaluation completed.   Gait: Level Of Assist: Supervised (close supervision) with Single Point Cane      Plan of Care: Patient with no further skilled PT needs in the acute care setting at this time  Discharge Recommendations: Equipment: patient has SPC and FWW. Post-acute therapy: Currently anticipate no further skilled therapy needs once patient is discharged from the inpatient setting.    See \"Rehab Therapy-Acute\" Patient Summary Report for complete documentation.    Patient is a pleasant 85 YO male that presented with symptomatic severe aortic stenosis and is s/p TAVR. He ambulated approximately 150ft with SPC and close supervision, patient reported mild dizziness throughout session, no other symptoms. HDR (BP/HR respectively): supine: 89/54, 71; sittin/54, 77; standin/52, 82; standing post ambulation: 105/59, 86, RN aware. Provided patient education regarding phase 1 cardiac rehab, home exercise program and appropriate activity progression, self monitoring via RPE scale/talk test/HR, cardiac failure signs/symptoms, and benefits of outpatient cardiac rehab; patient receptive to education and demonstrated understanding. Anticipate patient will be able to return home following medical DC. Patient will not be actively followed for physical therapy services at this time, however may be seen if requested by physician for 1 more visit within 30 days to address any discharge or equipment needs.    "

## 2020-03-03 NOTE — CARE PLAN
Problem: Fluid Volume:  Goal: Will maintain balanced intake and output  Intervention: Monitor, educate, and encourage compliance with therapeutic intake of liquids  Note: Monitoring I & O     Problem: Communication  Goal: The ability to communicate needs accurately and effectively will improve  Intervention: Educate patient and significant other/support system about the plan of care, procedures, treatments, medications and allow for questions  Flowsheets (Taken 3/2/2020 5028)  Pt & Family Have Been Educated on Methods Available to Report Concerns Related to Care, Treatment, Services, and Patient Safety Issues: Yes

## 2020-03-03 NOTE — PROGRESS NOTES
"Bedside report received, assumed pt care. Pt sitting up in bed, on RA at 98%, no signs of distress and only mild complaints of pain 2/10, soreness to bilateral groin sites and lower back, pt declines interventions stating \"it's tolerable.\" Pt is AOx4, updated on POC and questions answered. Bilateral groin sites assessed, CDI and soft. Pulses palpitated in BLE. Tele monitor on, safety precautions in place, call light in reach. Pt verbalized no additional needs at this time.   "

## 2020-03-03 NOTE — PROGRESS NOTES
Cardiology Follow Up Progress Note    Date of Service  3/3/2020    Attending Physician  Sony Barrientos M.D.    Chief Complaint   Elective TAVR     HPI  Everardo Hester is a 84 y.o. male admitted 3/2/2020 for elective TAVR. 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 He was treated intra-operatively post TAVR with balloon expandable stent but unfortunately in PACU, lost pulses to the left leg and vascular surgery was consulted for urgent repair of the left femoral artery dissection. He has recovered well from both of these procedures.    Interim Events  3/3: POD 1, walking with minimal pain. Site doing well. Sitting up in bed with wife at bedside. No complaints.    Review of Systems  Review of Systems   Constitutional: Negative for fatigue.   Respiratory: Negative for chest tightness.    Cardiovascular: Negative for chest pain, palpitations and leg swelling.   Gastrointestinal: Negative for abdominal pain.   Musculoskeletal: Negative for arthralgias.        Soreness in L groin at incision site   Skin: Negative for color change.   Neurological: Negative for dizziness.       Vital signs in last 24 hours  Temp:  [36.1 °C (97 °F)-37.2 °C (99 °F)] 36.6 °C (97.8 °F)  Pulse:  [71-77] 73  Resp:  [8-18] 18  BP: ()/(48-68) 91/50  SpO2:  [91 %-98 %] 98 %    Physical Exam  Physical Exam  Vitals signs and nursing note reviewed. Exam conducted with a chaperone present.   Constitutional:       General: He is not in acute distress.     Appearance: Normal appearance. He is normal weight. He is not ill-appearing or diaphoretic.   HENT:      Head: Normocephalic and atraumatic.   Eyes:      Extraocular Movements: Extraocular movements intact.   Neck:      Musculoskeletal: Normal range of motion.   Cardiovascular:      Rate and Rhythm: Normal rate and regular rhythm.      Pulses: Normal pulses.      Heart sounds: Normal heart sounds.   Pulmonary:       Effort: Pulmonary effort is normal.      Breath sounds: Normal breath sounds.   Abdominal:      General: Abdomen is flat.      Palpations: Abdomen is soft.   Skin:     Capillary Refill: Capillary refill takes less than 2 seconds.   Neurological:      General: No focal deficit present.      Mental Status: He is alert and oriented to person, place, and time. Mental status is at baseline.   Psychiatric:         Mood and Affect: Mood normal.         Behavior: Behavior normal.         Thought Content: Thought content normal.         Judgment: Judgment normal.         Lab Review  Lab Results   Component Value Date/Time    WBC 8.1 03/02/2020 11:58 PM    RBC 3.95 (L) 03/02/2020 11:58 PM    HEMOGLOBIN 11.4 (L) 03/02/2020 11:58 PM    HEMATOCRIT 36.4 (L) 03/02/2020 11:58 PM    MCV 92.2 03/02/2020 11:58 PM    MCH 28.9 03/02/2020 11:58 PM    MCHC 31.3 (L) 03/02/2020 11:58 PM    MPV 10.8 03/02/2020 11:58 PM      Lab Results   Component Value Date/Time    SODIUM 139 03/02/2020 11:58 PM    POTASSIUM 4.1 03/02/2020 11:58 PM    CHLORIDE 106 03/02/2020 11:58 PM    CO2 25 03/02/2020 11:58 PM    GLUCOSE 220 (H) 03/02/2020 11:58 PM    BUN 20 03/02/2020 11:58 PM    CREATININE 0.88 03/02/2020 11:58 PM    CREATININE 1.0 07/02/2008 03:43 PM    BUNCREATRAT 16 11/15/2019 04:48 AM      Lab Results   Component Value Date/Time    ASTSGOT 15 03/02/2020 11:58 PM    ALTSGPT 10 03/02/2020 11:58 PM     Lab Results   Component Value Date/Time    CHOLSTRLTOT 121 11/15/2019 04:49 AM    CHOLSTRLTOT 146 06/02/2008 11:00 PM    LDL 56 11/15/2019 04:49 AM    LDL 85 06/02/2008 11:00 PM    HDL 54 11/15/2019 04:49 AM    HDL 49 06/02/2008 11:00 PM    TRIGLYCERIDE 53 11/15/2019 04:49 AM    TRIGLYCERIDE 60 06/02/2008 11:00 PM    TROPONINT 18 02/17/2020 12:49 PM       Recent Labs     03/02/20  0955 03/02/20  2358   NTPROBNP 96474* 8211*       Cardiac Imaging and Procedures Review  EKG:  My personal interpretation of the EKG dated 3/3/2020 is SR rate of 70 with  PAC    Echocardiogram:  3/3/2020:  Prior echo on 2/3/20, there is now a TAVR valve present.  Left ventricular ejection fraction is visually estimated to be 60%.  Grade II diastolic dysfunction.  Known TAVR aortic valve that is functioning normally with normal   transvalvular gradients.  Estimated right ventricular systolic pressure  is 30 mmHg.    Imaging  Chest X-Ray:  3/3/20: improvements in atelectasis, no active concerns     Assessment/Plan  1. S/P TAVR  -doing well from TAVR despite complication   -up walking  -BP low normal, dc diuretic therapy, follow  -cont asa 81 mg alongside coumadin for PAF hx  -anticipate dc home tomorrow  -groin care per vascular surgery    2. Chronic diastolic heart failure related to valvular disease  -stable, euvolemic on exam  -BNP trending down  -BP soft, hold diuretic therapy during admission    3. S/P femoral artery repair due to complication of aortic dissection during TAVR  -doing well  -groin CDI with minimal pain  -followed by vascular surgery    4. PAF and prior DVT  -stable, no afib on monitor  -cont with coumadin per pharmacy  -INR doesn't need to be therapeutic prior to dc  -arrange coumadin clinic apt for Friday please    5. Mild carotid disease with prior TIA  -no deficits  -cont asa, statin, zetia  -follow clinically    6. HTN  -BP soft  -hold medications for now    7. HLD  -statin, zetia  -LDL goal <70 with vascular disease  -follow lipid panel outpatient    Thank you for allowing me to participate in the care of this patient.  I will continue to follow this patient    Please contact me with any questions.    YOHAN MasseyRKD.   Cardiologist, [facility]  [phone]

## 2020-03-03 NOTE — PROGRESS NOTES
Inpatient Anticoagulation Service Note    Date: 3/3/2020    Reason for Anticoagulation: Atrial Fibrillation, Aortic Mechanical Valve Replacement     Target INR: 2.0 to 3.0         Hemoglobin Value: (!) 11.4  Hematocrit Value: (!) 36.4  Lab Platelet Value: (!) 137    INR from last 7 days     Date/Time INR Value    03/02/20 2358  (!) 1.35    03/02/20 0625  (!) 1.31        Dose from last 7 days     Date/Time Dose (mg)    03/03/20 1445  10    03/02/20 2210  6        Average Dose (mg): (6mg MWF and 8mg all other days)  Significant Interactions: Aspirin, Statin  Bridge Therapy: No     Reversal Agent Administered: Not Applicable  Comments: Patient is s/p TAVR with h/o afib; complicated by dissection of bilateral external iliac arteries, requiring surgical repair.  H/H stable, new start aspirin continued.   No s/sx of bleeding per chart review.  Will give bolus dose tonight and then resume home regimen thereafter.    Plan:  Warfarin 10mg.  INR in AM.  Education Material Provided?: No(chronic therapy)  Pharmacist suggested discharge dosing: Resume home regimen of 6mg MWF and 8mg all other days with follow up within 5 days of discharge.     Max De La Rosa, JerzyD

## 2020-03-03 NOTE — PROGRESS NOTES
Progress Note    84 y.o. male who presented 3/2/2020 for TAVR.  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  The left external iliac artery was treated with a balloon expandable stent and post dilated to 10.5mm.  The patient then had closure of the left femoral artery, but in recovery, no pulses were identified in the left leg.    He underwent urgent repair of the left femoral artery dissection last night.    AVSS  NAD  Extremities warm no pain, no doppler available, numbness resolved    Appears to be doing well on aspirin and statin.     We can see patient in Clinic in 2 weeks for groin incision wound check.    Thank you for allowing us to participate in the care of your patient      Lizett Mendoza PA-C for NVV  Vascular Surgeon  [facility]  Office: [phone]

## 2020-03-03 NOTE — PROGRESS NOTES
Inpatient Anticoagulation Service Note    Date: 3/2/2020    Reason for Anticoagulation: Atrial Fibrillation, Aortic Mechanical Valve Replacement (TAVR), History of DVT, History of TIA  Target INR: 2.0 to 3.0    Hemoglobin Value: (!) 12.1  Hematocrit Value: (!) 38.4  Lab Platelet Value: 166    INR from last 7 days     Date/Time INR Value    03/02/20 0625  (!) 1.31        Dose from last 7 days     Date/Time Dose (mg)    03/02/20 2210  48        Average Dose (mg): 6.85 - 6 mg Mon, Wed, Fri, Sat, 8 mg all other days (held since 2/26)  Significant Interactions: Aspirin, Statin  Bridge Therapy: No    Reversal Agent Administered: Not Applicable    Comments: Pt with history of afib. TAVR performed, complicated by dissection of bilateral external iliac arteries, second surgery performed to to repair dissection. H/H a little low, plts WNL.    Plan:  Continue home dose with 6 mg tonight, INR in morning  Education Material Provided?: No(chronic therapy)    Pharmacist suggested discharge dosing: will have to be determined once INR is in therapeutic range as last 4 INRs have been inconsistent.     Arturo Rivera, PharmD

## 2020-03-04 VITALS
WEIGHT: 187.17 LBS | OXYGEN SATURATION: 90 % | BODY MASS INDEX: 26.2 KG/M2 | SYSTOLIC BLOOD PRESSURE: 117 MMHG | DIASTOLIC BLOOD PRESSURE: 68 MMHG | TEMPERATURE: 98.7 F | HEIGHT: 71 IN | RESPIRATION RATE: 18 BRPM | HEART RATE: 87 BPM

## 2020-03-04 PROBLEM — N39.41 URGE INCONTINENCE OF URINE: Status: ACTIVE | Noted: 2020-03-04

## 2020-03-04 LAB
ALBUMIN SERPL BCP-MCNC: 2.9 G/DL (ref 3.2–4.9)
ALBUMIN/GLOB SERPL: 1.2 G/DL
ALP SERPL-CCNC: 42 U/L (ref 30–99)
ALT SERPL-CCNC: 8 U/L (ref 2–50)
ANION GAP SERPL CALC-SCNC: 6 MMOL/L (ref 0–11.9)
AST SERPL-CCNC: 15 U/L (ref 12–45)
BILIRUB SERPL-MCNC: 0.7 MG/DL (ref 0.1–1.5)
BUN SERPL-MCNC: 19 MG/DL (ref 8–22)
CALCIUM SERPL-MCNC: 8.3 MG/DL (ref 8.5–10.5)
CHLORIDE SERPL-SCNC: 109 MMOL/L (ref 96–112)
CO2 SERPL-SCNC: 25 MMOL/L (ref 20–33)
CREAT SERPL-MCNC: 0.79 MG/DL (ref 0.5–1.4)
EKG IMPRESSION: NORMAL
ERYTHROCYTE [DISTWIDTH] IN BLOOD BY AUTOMATED COUNT: 50.3 FL (ref 35.9–50)
GLOBULIN SER CALC-MCNC: 2.4 G/DL (ref 1.9–3.5)
GLUCOSE SERPL-MCNC: 103 MG/DL (ref 65–99)
HCT VFR BLD AUTO: 31.7 % (ref 42–52)
HGB BLD-MCNC: 10.2 G/DL (ref 14–18)
INR PPP: 1.8 (ref 0.87–1.13)
MCH RBC QN AUTO: 29.7 PG (ref 27–33)
MCHC RBC AUTO-ENTMCNC: 32.2 G/DL (ref 33.7–35.3)
MCV RBC AUTO: 92.4 FL (ref 81.4–97.8)
NT-PROBNP SERPL IA-MCNC: 3295 PG/ML (ref 0–125)
PLATELET # BLD AUTO: 95 K/UL (ref 164–446)
PMV BLD AUTO: 11.4 FL (ref 9–12.9)
POTASSIUM SERPL-SCNC: 4 MMOL/L (ref 3.6–5.5)
PROT SERPL-MCNC: 5.3 G/DL (ref 6–8.2)
PROTHROMBIN TIME: 21.4 SEC (ref 12–14.6)
RBC # BLD AUTO: 3.43 M/UL (ref 4.7–6.1)
SODIUM SERPL-SCNC: 140 MMOL/L (ref 135–145)
WBC # BLD AUTO: 9.2 K/UL (ref 4.8–10.8)

## 2020-03-04 PROCEDURE — 93005 ELECTROCARDIOGRAM TRACING: CPT | Performed by: NURSE PRACTITIONER

## 2020-03-04 PROCEDURE — 93010 ELECTROCARDIOGRAM REPORT: CPT | Performed by: INTERNAL MEDICINE

## 2020-03-04 PROCEDURE — 83880 ASSAY OF NATRIURETIC PEPTIDE: CPT

## 2020-03-04 PROCEDURE — 36415 COLL VENOUS BLD VENIPUNCTURE: CPT

## 2020-03-04 PROCEDURE — 85610 PROTHROMBIN TIME: CPT

## 2020-03-04 PROCEDURE — A9270 NON-COVERED ITEM OR SERVICE: HCPCS | Performed by: NURSE PRACTITIONER

## 2020-03-04 PROCEDURE — 700102 HCHG RX REV CODE 250 W/ 637 OVERRIDE(OP): Performed by: SURGERY

## 2020-03-04 PROCEDURE — 700102 HCHG RX REV CODE 250 W/ 637 OVERRIDE(OP): Performed by: NURSE PRACTITIONER

## 2020-03-04 PROCEDURE — 80053 COMPREHEN METABOLIC PANEL: CPT

## 2020-03-04 PROCEDURE — A9270 NON-COVERED ITEM OR SERVICE: HCPCS | Performed by: SURGERY

## 2020-03-04 PROCEDURE — 85027 COMPLETE CBC AUTOMATED: CPT

## 2020-03-04 RX ORDER — WARFARIN SODIUM 4 MG/1
4 TABLET ORAL
Qty: 30 TAB | Refills: 3 | COMMUNITY
Start: 2020-03-05 | End: 2020-12-16 | Stop reason: SDUPTHER

## 2020-03-04 RX ORDER — WARFARIN SODIUM 6 MG/1
6 TABLET ORAL
Qty: 30 TAB | Refills: 3 | COMMUNITY
Start: 2020-03-04 | End: 2020-11-05

## 2020-03-04 RX ADMIN — EZETIMIBE 10 MG: 10 TABLET ORAL at 04:58

## 2020-03-04 RX ADMIN — GABAPENTIN 600 MG: 300 CAPSULE ORAL at 04:58

## 2020-03-04 RX ADMIN — OXYCODONE HYDROCHLORIDE 5 MG: 5 TABLET ORAL at 12:18

## 2020-03-04 RX ADMIN — ASPIRIN 81 MG: 81 TABLET, COATED ORAL at 04:58

## 2020-03-04 ASSESSMENT — FIBROSIS 4 INDEX: FIB4 SCORE: 4.14

## 2020-03-04 ASSESSMENT — PATIENT HEALTH QUESTIONNAIRE - PHQ9
1. LITTLE INTEREST OR PLEASURE IN DOING THINGS: NOT AT ALL
2. FEELING DOWN, DEPRESSED, IRRITABLE, OR HOPELESS: NOT AT ALL
SUM OF ALL RESPONSES TO PHQ9 QUESTIONS 1 AND 2: 0

## 2020-03-04 ASSESSMENT — PAIN SCALES - GENERAL: PAIN_LEVEL: 4

## 2020-03-04 NOTE — DISCHARGE SUMMARY
PRIMARY DISCHARGE DIAGNOSIS: Status post transcatheter aortic valve replacement.    PROCEDURES:    1. Successful transcatheter aortic valve replacement (TAVR) with #29 Forbes Kaylen 3 ultra valve, trransfemoral approach under general anesthesia on 3/2/2020 with artery dissection requiring iliac artery stent placement  2. Intraoperative transesophageal echocardiogram showing: Intraoperative YOANDY during TAVR.  Baseline images show normal   biventricular systolic function with EF = 55%.  Calcified aortic valve   leaflets.  Severe aortic stenosis. There is lateral P2 mitral leaflet   flail with ruptured chordae present and severe associated mitral   regurgitation with pulmonary vein flow reversal.  Post-deployment   images show preserved biventricular function. A 29 mm Forbes Kaylen   Ultra stented bioprosthetic valve is seen in the aortic position with   normal leaflet motion, no paravalvular leak, mean gradient of 5 mm Hg   and calculated effective orifice of 2.2 cm2. Findings communicated at   the time of exam.   3. Echocardiogram on 3/3/2020 showing:  Prior echo on 2/3/20, there is now a TAVR valve present.  Left ventricular ejection fraction is visually estimated to be 60%.  Grade II diastolic dysfunction.  Known TAVR aortic valve that is functioning normally with normal   transvalvular gradients.  Estimated right ventricular systolic pressure  is 30 mmHg.  4. CXR on 3/4/2020 showing Stable chest appearance compared with 3/2.   5. EKG on 3/4/2020 showing SR rate of 86 with borderline AV conduction delay    HOSPITAL COURSE: The patient is a pleasant 84 year old male with severe symptomatic aortic stenosis, chronic diastolic heart failure, anemia, frailty with hypoalbuminemia, HTN, PAF on chronic anticoagulation, prior DVT, HLD, TIA, chronic back pain, and urinary incontinence. Due to the patient's symptoms, the patient underwent successful TAVR described as above but did have an acute aortic dissection in the left  iliac artery, a balloon expandable stent was placed intra-operatively with success. Post-procedure, the patient did well but did have some decreased pulses on the left groin noticed by the PACU nurse, therefore STAT US was ordered and found that the previous dissection was not resolved, vascular surgery was consulted and patient was transported back to the OR for femoral artery repair with good success. No complications in the post-operative setting from this procedure occurred. They didn't require IV diuresis during their stay.They were able to ambulate without difficulty. He did use a condom catheter for incontinence while in the hospital. He remained slightly anemic prior to discharge with no hemodynamic changes or bleeding, will watch labs outpatient. His prevana wound dressing from aortic dissection repair was clean, dry, and intact with no concerns. No further events were noted during their stay. They are now off oxygen and are to be discharged to home with his wife.    DISCHARGE MEDICATIONS:      Medication List      START taking these medications      Instructions   aspirin 81 MG EC tablet   Take 1 Tab by mouth every day.  Dose:  81 mg        CHANGE how you take these medications      Instructions   * warfarin 6 MG Tabs  What changed:    · medication strength  · how much to take  · when to take this  · additional instructions  Commonly known as:  COUMADIN  Notes to patient:  Take this medication Monday, Wednesday, Friday.    Take 1 Tab by mouth.  Dose:  6 mg     * warfarin 4 MG Tabs  Start taking on:  March 5, 2020  What changed:  You were already taking a medication with the same name, and this prescription was added. Make sure you understand how and when to take each.  Commonly known as:  COUMADIN   Take 2 Tabs by mouth.  Dose:  8 mg         * This list has 2 medication(s) that are the same as other medications prescribed for you. Read the directions carefully, and ask your doctor or other care provider to  review them with you.            CONTINUE taking these medications      Instructions   atorvastatin 80 MG tablet  Commonly known as:  LIPITOR   Take 40 mg by mouth every evening.  Dose:  40 mg     ezetimibe 10 MG Tabs  Commonly known as:  ZETIA   Take 10 mg by mouth every day.  Dose:  10 mg     gabapentin 300 MG Caps  Commonly known as:  NEURONTIN   Take 300-600 mg by mouth 2 Times a Day. 600 mg in the AM  300 mg in the PM  Dose:  300-600 mg     temazepam 30 MG capsule  Commonly known as:  RESTORIL   Take 30 mg by mouth every bedtime.  Dose:  30 mg        STOP taking these medications    furosemide 20 MG Tabs  Commonly known as:  LASIX     irbesartan 300 MG Tabs  Commonly known as:  AVAPRO          DISCHARGE INSTRUCTIONS: They are given discharge instructions on potential post-operative complications and symptoms to watch out for. Their groin sites were checked and were clean, dry, and intact. Patient or family to notify us for any complications noted on the discharge instructions. They will follow up with myself, Patrizia WILLIAM, on Friday in our cardiology office. They will get a repeat CBC and INR with the anti-coagulation clinic before their follow up appointment. They will then follow up with Dr. Barrientos with a repeat echocardiogram in one month for post TAVR assessment.      FOLLOW UP  Future Appointments   Date Time Provider Department Center   3/6/2020 12:45 PM YONATAN Massey. [facility] None   3/6/2020  1:30 PM [facility] EXAM 5 [facility] None   4/8/2020  2:40 PM Michael J Bloch, M.D. [facility] None   5/12/2020 10:00 AM Michael J Bloch, M.D. [facility] None

## 2020-03-04 NOTE — CARE PLAN
Problem: Communication  Goal: The ability to communicate needs accurately and effectively will improve  Outcome: PROGRESSING AS EXPECTED   NOTE: Pt updated on POC.       Problem: Safety  Goal: Will remain free from injury  Outcome: PROGRESSING AS EXPECTED   NOTE: Pt calls appropriately for ambulation and assistance.

## 2020-03-04 NOTE — PROGRESS NOTES
Paged Vascular MD for incision care directions. Pt to change dressing on left femoral site every other day with gauze and Tegaderm. Pt ok to shower with Tegaderm and gauze over incision site.

## 2020-03-04 NOTE — ANESTHESIA POSTPROCEDURE EVALUATION
Patient: Tristan Hester    Procedure Summary     Date:  03/02/20 Room / Location:  [facility] 12 / SURGERY [facility]    Anesthesia Start:  1352 Anesthesia Stop:  1712    Procedure:  Repair of left femoral  Artery Dissection (Left Groin) Diagnosis:  (Left leg ischemia)    Surgeon:  Alpa Tillman M.D. Responsible Provider:  Basilio Sanchez M.D.    Anesthesia Type:  general ASA Status:  4 - Emergent          Final Anesthesia Type: general  Last vitals  BP   Blood Pressure : 117/68, Arterial BP: 110/48    Temp   37.1 °C (98.7 °F)    Pulse   Pulse: 87   Resp   18    SpO2   90 %      Anesthesia Post Evaluation    Patient location during evaluation: PACU  Patient participation: complete - patient participated  Level of consciousness: awake and alert  Pain score: 4    Airway patency: patent  Anesthetic complications: no  Cardiovascular status: hemodynamically stable  Respiratory status: acceptable  Hydration status: euvolemic    PONV: none           Nurse Pain Score: 4 (NPRS)

## 2020-03-04 NOTE — PROGRESS NOTES
Pt a&o x 4, discharged home with relative per order. Pt received discharge instructions, upcoming appointments with MD scheduled and verified. Peripheral IV and tele box removed, and all belongings accounted for.

## 2020-03-04 NOTE — PROGRESS NOTES
Progress Note    84 y.o. male who presented 3/2/2020 for TAVR.  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  The left external iliac artery was treated with a balloon expandable stent and post dilated to 10.5mm.  The patient then had closure of the left femoral artery, but in recovery, no pulses were identified in the left leg.    He underwent urgent repair of the left femoral artery dissection.    AVSS  NAD  Extremities warm no pain numbness resolved  Incision clean, dry, well approximated    Appears to be doing well on aspirin and statin. Now started on coumadin.    We can see patient in Clinic in 2 weeks for groin incision wound check if needed.    Call with questions.    Thank you for allowing us to participate in the care of your patient      Lizett Mendoza PA-C for NVV  Vascular Surgeon  [facility]  Office: [phone]

## 2020-03-04 NOTE — OP REPORT
DATE OF SERVICE:  03/02/2020    PREOPERATIVE DIAGNOSIS:  Left lower extremity ischemia.    POSTPROCEDURE DIAGNOSIS:  Left common femoral artery dissection.    PROCEDURES:  1. Repair of left common femoral artery dissection.  2. Pelvic angiogram.    SURGEON:  Alpa Tillman MD.    ANESTHESIOLOGIST:  Basilio Sanchez MD.    INDICATIONS:  The patient is an 84-year-old gentleman who underwent ___ repair   earlier today.  Through problems associated with access, the left common   femoral artery appears occluded.  Lower extremity angiography and Doppler   signals indicate nearly absent flow to the left foot.  The patient was taken   to the operating room with the understanding that identification of prior   dissection was seen in the external iliac artery.  I believe I can fix through   this groin incision.  Risks and benefits were explained and include bleeding,   infection, arteriovenous thrombosis, and the remote possibility of a   [id number]-[id number] artery bypass to restore flow to the left lower extremity.    Risks and benefits were explained in detail to the patient as well as his   wife.  They understand and agree to proceed.    DESCRIPTION OF PROCEDURE:  The patient was taken to the operating room and   placed in supine position.  After adequate general anesthesia, the abdomen,   both thighs and both groins were prepped in the usual sterile fashion with   Chloraseptic prep, cloth towels and paper drapes.  A transverse incision was   made above the inguinal crease over the left common femoral artery.  I could   appreciate a left femoral pulse, and therefore, I presumed the Perclose device   had occluded the common femoral artery as I have experienced in the past.  I   dissected down to the common femoral artery and obtained proximal to distal   control, encircling the circumflex vessels as well as the more distal common   femoral artery.  The patient was given systemic heparin and this was allowed   to circulate.   Proximal and distal control was obtained and no clot was   identified.  At this exposure, I did not identify the Perclose device,   although there was substantial surrounding scar tissue making dissection   difficult.    Using an entry needle, I placed a 6-Russian sheath and advanced this into the   left external iliac artery.  I then used this as well as a J wire to place an   Omniflush catheter in the distal aorta.  Using hand injection technique, a   pelvic angiogram was performed.  This identified no evidence of right iliac   artery dissection at any level.  The left common and external iliac artery   were identified and the previously placed stent was seen.  Stagnant blood flow   was obtained at the left common femoral artery with a patent stent.    I then controlled the common femoral artery above the circumflex vessels.  I   opened the common femoral artery longitudinally and there was obvious   dissection flap extending to an area of calcified plaque in the common femoral   artery.  Taking great care, I identified the intimal flap and tacked this up   using 6-0 Prolene suture.  I then repaired the common femoral artery defect   with 6-0 Prolene suture in a running fashion.  Unfortunately, flow into the   more distal lower extremity was still altered and there was no evidence of   good flow into the more distal lower extremity.    I chose a different approach and laterally, I opened the common femoral   artery, dissecting down to the superficial femoral and profunda femoris   bifurcation and encircling both of these with vessel loops.  The artery was   opened laterally and I identified further dissection plane into the more   distal common femoral artery again, appearing to stop at the focal plaque of   the common femoral artery.  I then removed the intimal defect and chose to   tack down the intimal flap on the posterior aspect of the artery with   interrupted 6-0 Prolene suture and a short segment of running  6-0 Prolene   suture to be sure the intima would stay in place.  This repair performed, I   then repaired the defect that was required in the lateral common femoral   artery with 6-0 Prolene suture.  Flow through the common femoral artery was   allowed into the superficial femoral and profunda femoris artery.  A more   normal arterial Doppler signal was obtained.  Hemostasis was achieved with the   administration of protamine, Surgiflo and patience.  The wound was irrigated   with copious amounts of saline and closed with interrupted 3-0 Vicryl in 2   layers followed by running 2-0 Vicryl followed by 4-0 Monocryl.  There were no   apparent complications.  Sponge, needle and instrument counts were correct   x2, and in the recovery room, the patient had a palpable pulse.             ____________________________________     MD CARLOS Wooten / TERRI    DD:  03/03/2020 21:08:02  DT:  03/04/2020 00:48:22    D#:  [id number]  Job#:  [id number]

## 2020-03-04 NOTE — PROGRESS NOTES
Report received. Patient oriented x4. Pain level 2 out of 10 soreness on groin sites. Pedal pulses present. Warm to touch. Patient ambulated with cane steady gait.  Denies SOB. Fall risk interventions in place, bed in low position, and bed alarm on. Assessment completed.

## 2020-03-04 NOTE — CARE PLAN
Problem: Communication  Goal: The ability to communicate needs accurately and effectively will improve  Intervention: Educate patient and significant other/support system about the plan of care, procedures, treatments, medications and allow for questions  Flowsheets (Taken 3/3/2020 0120)  Pt & Family Have Been Educated on Methods Available to Report Concerns Related to Care, Treatment, Services, and Patient Safety Issues: Yes     Problem: Venous Thromboembolism (VTW)/Deep Vein Thrombosis (DVT) Prevention:  Goal: Patient will participate in Venous Thrombosis (VTE)/Deep Vein Thrombosis (DVT)Prevention Measures  Flowsheets (Taken 3/3/2020 6256)  Mechanical Prophylaxis:   TIFFANIE Hose (Graduated Compression Stockings)   SCDs, Sequential Compression Device

## 2020-03-04 NOTE — PROGRESS NOTES
Report received at bedside from NOC RN, pt care assumed, tele box on. Pt aaox4, no signs of distress noted at this time. Patient resting comfortably in bed. POC discussed with pt and verbalizes no questions. Pt denies any additional needs at this time. Bed in lowest position,call light within reach.

## 2020-03-04 NOTE — DISCHARGE INSTRUCTIONS
Discharge Instructions    Discharged to home by car with relative. Discharged via wheelchair, hospital escort: Yes.  Special equipment needed: Not Applicable    Be sure to schedule a follow-up appointment with your primary care doctor or any specialists as instructed.     Discharge Plan:   Influenza Vaccine Indication: Not indicated: Previously immunized this influenza season and > 8 years of age    I understand that a diet low in cholesterol, fat, and sodium is recommended for good health. Unless I have been given specific instructions below for another diet, I accept this instruction as my diet prescription.   Other diet: Cardiac      Post-TAVR discharge instructions. Vascular surgery instructions for groin care on left side. Make sure patient has follow up with coumadin clinic Friday for INR and then vascular surgery in 2 weeks for groin check.    Special Instructions: TAVR Instructions, Groin Site Care       · Is patient discharged on Warfarin / Coumadin?   Yes      You are receiving the drug warfarin. Please understand the importance of monitoring warfarin with scheduled PT/INR blood draws.  Follow-up with Coumadin Clinic Friday March 6, 2020 for PT/INR check.     IMPORTANT: HOW TO USE THIS INFORMATION:  This is a summary and does NOT have all possible information about this product. This information does not assure that this product is safe, effective, or appropriate for you. This information is not individual medical advice and does not substitute for the advice of your health care professional. Always ask your health care professional for complete information about this product and your specific health needs.      WARFARIN - ORAL (WARF-uh-rin)      COMMON BRAND NAME(S): Coumadin      WARNING:  Warfarin can cause very serious (possibly fatal) bleeding. This is more likely to occur when you first start taking this medication or if you take too much warfarin. To decrease your risk for bleeding, your doctor or  "other health care provider will monitor you closely and check your lab results (INR test) to make sure you are not taking too much warfarin. Keep all medical and laboratory appointments. Tell your doctor right away if you notice any signs of serious bleeding. See also Side Effects section.      USES:  This medication is used to treat blood clots (such as in deep vein thrombosis-DVT or pulmonary embolus-PE) and/or to prevent new clots from forming in your body. Preventing harmful blood clots helps to reduce the risk of a stroke or heart attack. Conditions that increase your risk of developing blood clots include a certain type of irregular heart rhythm (atrial fibrillation), heart valve replacement, recent heart attack, and certain surgeries (such as hip/knee replacement). Warfarin is commonly called a \"blood thinner,\" but the more correct term is \"anticoagulant.\" It helps to keep blood flowing smoothly in your body by decreasing the amount of certain substances (clotting proteins) in your blood.      HOW TO USE:  Read the Medication Guide provided by your pharmacist before you start taking warfarin and each time you get a refill. If you have any questions, ask your doctor or pharmacist. Take this medication by mouth with or without food as directed by your doctor or other health care professional, usually once a day. It is very important to take it exactly as directed. Do not increase the dose, take it more frequently, or stop using it unless directed by your doctor. Dosage is based on your medical condition, laboratory tests (such as INR), and response to treatment. Your doctor or other health care provider will monitor you closely while you are taking this medication to determine the right dose for you. Use this medication regularly to get the most benefit from it. To help you remember, take it at the same time each day. It is important to eat a balanced, consistent diet while taking warfarin. Some foods can " affect how warfarin works in your body and may affect your treatment and dose. Avoid sudden large increases or decreases in your intake of foods high in vitamin K (such as broccoli, cauliflower, cabbage, brussels sprouts, kale, spinach, and other green leafy vegetables, liver, green tea, certain vitamin supplements). If you are trying to lose weight, check with your doctor before you try to go on a diet. Cranberry products may also affect how your warfarin works. Limit the amount of cranberry juice (16 ounces/480 milliliters a day) or other cranberry products you may drink or eat.      SIDE EFFECTS:  Nausea, loss of appetite, or stomach/abdominal pain may occur. If any of these effects persist or worsen, tell your doctor or pharmacist promptly. Remember that your doctor has prescribed this medication because he or she has judged that the benefit to you is greater than the risk of side effects. Many people using this medication do not have serious side effects. This medication can cause serious bleeding if it affects your blood clotting proteins too much (shown by unusually high INR lab results). Even if your doctor stops your medication, this risk of bleeding can continue for up to a week. Tell your doctor right away if you have any signs of serious bleeding, including: unusual pain/swelling/discomfort, unusual/easy bruising, prolonged bleeding from cuts or gums, persistent/frequent nosebleeds, unusually heavy/prolonged menstrual flow, pink/dark urine, coughing up blood, vomit that is bloody or looks like coffee grounds, severe headache, dizziness/fainting, unusual or persistent tiredness/weakness, bloody/black/tarry stools, chest pain, shortness of breath, difficulty swallowing. Tell your doctor right away if any of these unlikely but serious side effects occur: persistent nausea/vomiting, severe stomach/abdominal pain, yellowing eyes/skin. This drug rarely has caused very serious (possibly fatal) problems if its  effects lead to small blood clots (usually at the beginning of treatment). This can lead to severe skin/tissue damage that may require surgery or amputation if left untreated. Patients with certain blood conditions (protein C or S deficiency) may be at greater risk. Get medical help right away if any of these rare but serious side effects occur: painful/red/purplish patches on the skin (such as on the toe, breast, abdomen), change in the amount of urine, vision changes, confusion, slurred speech, weakness on one side of the body. A very serious allergic reaction to this drug is rare. However, get medical help right away if you notice any symptoms of a serious allergic reaction, including: rash, itching/swelling (especially of the face/tongue/throat), severe dizziness, trouble breathing. This is not a complete list of possible side effects. If you notice other effects not listed above, contact your doctor or pharmacist. In the [place] - Call your doctor for medical advice about side effects. You may report side effects to FDA at 0-033-PHX-2498. In [place] - Call your doctor for medical advice about side effects. You may report side effects to [facility] [place] at [phone].      PRECAUTIONS:  Before taking warfarin, tell your doctor or pharmacist if you are allergic to it; or if you have any other allergies. This product may contain inactive ingredients, which can cause allergic reactions or other problems. Talk to your pharmacist for more details. Before using this medication, tell your doctor or pharmacist your medical history, especially of: blood disorders (such as anemia, hemophilia), bleeding problems (such as bleeding of the stomach/intestines, bleeding in the brain), blood vessel disorders (such as aneurysms), recent major injury/surgery, liver disease, alcohol use, mental/mood disorders (including memory problems), frequent falls/injuries. It is important that all your doctors and dentists know that you take  warfarin. Before having surgery or any medical/dental procedures, tell your doctor or dentist that you are taking this medication and about all the products you use (including prescription drugs, nonprescription drugs, and herbal products). Avoid getting injections into the muscles. If you must have an injection into a muscle (for example, a flu shot), it should be given in the arm. This way, it will be easier to check for bleeding and/or apply pressure bandages. This medication may cause stomach bleeding. Daily use of alcohol while using this medicine will increase your risk for stomach bleeding and may also affect how this medication works. Limit or avoid alcoholic beverages. If you have not been eating well, if you have an illness or infection that causes fever, vomiting, or diarrhea for more than 2 days, or if you start using any antibiotic medications, contact your doctor or pharmacist immediately because these conditions can affect how warfarin works. This medication can cause heavy bleeding. To lower the chance of getting cut, bruised, or injured, use great caution with sharp objects like safety razors and nail cutters. Use an electric razor when shaving and a soft toothbrush when brushing your teeth. Avoid activities such as contact sports. If you fall or injure yourself, especially if you hit your head, call your doctor immediately. Your doctor may need to check you. The Food & Drug Administration has stated that generic warfarin products are interchangeable. However, consult your doctor or pharmacist before switching warfarin products. Be careful not to take more than one medication that contains warfarin unless specifically directed by the doctor or health care provider who is monitoring your warfarin treatment. Older adults may be at greater risk for bleeding while using this drug. This medication is not recommended for use during pregnancy because of serious (possibly fatal) harm to an unborn baby.  "Discuss the use of reliable forms of birth control with your doctor. If you become pregnant or think you may be pregnant, tell your doctor immediately. If you are planning pregnancy, discuss a plan for managing your condition with your doctor before you become pregnant. Your doctor may switch the type of medication you use during pregnancy. Very small amounts of this medication may pass into breast milk but is unlikely to harm a nursing infant. Consult your doctor before breast-feeding.      DRUG INTERACTIONS:  Drug interactions may change how your medications work or increase your risk for serious side effects. This document does not contain all possible drug interactions. Keep a list of all the products you use (including prescription/nonprescription drugs and herbal products) and share it with your doctor and pharmacist. Do not start, stop, or change the dosage of any medicines without your doctor's approval. Warfarin interacts with many prescription, nonprescription, vitamin, and herbal products. This includes medications that are applied to the skin or inside the vagina or rectum. The interactions with warfarin usually result in an increase or decrease in the \"blood-thinning\" (anticoagulant) effect. Your doctor or other health care professional should closely monitor you to prevent serious bleeding or clotting problems. While taking warfarin, it is very important to tell your doctor or pharmacist of any changes in medications, vitamins, or herbal products that you are taking. Some products that may interact with this drug include: capecitabine, imatinib, mifepristone. Aspirin, aspirin-like drugs (salicylates), and nonsteroidal anti-inflammatory drugs (NSAIDs such as ibuprofen, naproxen, celecoxib) may have effects similar to warfarin. These drugs may increase the risk of bleeding problems if taken during treatment with warfarin. Carefully check all prescription/nonprescription product labels (including drugs " applied to the skin such as pain-relieving creams) since the products may contain NSAIDs or salicylates. Talk to your doctor about using a different medication (such as acetaminophen) to treat pain/fever. Low-dose aspirin and related drugs (such as clopidogrel, ticlopidine) should be continued if prescribed by your doctor for specific medical reasons such as heart attack or stroke prevention. Consult your doctor or pharmacist for more details. Many herbal products interact with warfarin. Tell your doctor before taking any herbal products, especially bromelains, coenzyme Q10, cranberry, danshen, dong quai, fenugreek, garlic, ginkgo biloba, ginseng, and Jonas's wort, among others. This medication may interfere with a certain laboratory test to measure theophylline levels, possibly causing false test results. Make sure laboratory personnel and all your doctors know you use this drug.      OVERDOSE:  If overdose is suspected, contact a poison control center or emergency room immediately.  residents can call the [employer] Poison Hotline at 1-346.826.3896. [place] residents can call a provincial poison control center. Symptoms of overdose may include: bloody/black/tarry stools, pink/dark urine, unusual/prolonged bleeding.      NOTES:  Do not share this medication with others. Laboratory and/or medical tests (such as INR, complete blood count) must be performed periodically to monitor your progress or check for side effects. Consult your doctor for more details.      MISSED DOSE:  For the best possible benefit, do not miss any doses. If you do miss a dose and remember on the same day, take it as soon as you remember. If you remember on the next day, skip the missed dose and resume your usual dosing schedule. Do not double the dose to catch up because this could increase your risk for bleeding. Keep a record of missed doses to give to your doctor or pharmacist. Contact your doctor or pharmacist if you miss 2 or more  doses in a row.      STORAGE:  Store at room temperature away from light and moisture. Do not store in the bathroom. Keep all medications away from children and pets. Do not flush medications down the toilet or pour them into a drain unless instructed to do so. Properly discard this product when it is  or no longer needed. Consult your pharmacist or local waste disposal company for more details about how to safely discard your product.      MEDICAL ALERT:  Your condition and medication can cause complications in a medical emergency. For information about enrolling in MedicAlert, call 1-933.777.4633 ([place]) or [phone] ([place]).      Information last revised 2010 Copyright(c)  [employer]           Transcatheter Aortic Valve Replacement (TAVR)    General Instructions:  1. Take Medication as directed.  You will likely need to take aspirin and another blood thinner (antiplatelet medication) for a period.  You'll need to take the aspirin for the rest of your life.  Be sure your doctor knows about all other medicines you take, including over-the-counter medicines and dietary supplements.    Incision Care Instructions:  1. Look and feel the site(s) of your TAVR TODAY so you can recognize changes that should be called to your doctor (see below).  2. It's normal for your incision to be bruised, itchy, or sore while it's healing.  Your incision may take a week or more to heal.  3. Bruising may occur.  Some of the discoloration may travel down the leg.  As it resolves, the color should change from blue to green to yellow-brown.  4. A small, round lump under the TAVR site may remain for up to 6 weeks.  5. Wash your incision site every day with warm water and soap.  Gently pat it dry.  Don't put powder, lotion, or ointment on the incision until it's healed.    Activity:  1. No driving for one week  2. If you must take a long car ride (not as a ), stop every hour and walk around the  car.  3. No lifting or pulling objects over 5 pounds for 4-5 days.  4. Warm showers or baths are permitted after the bandage is removed.  5. Walk regularly.  One of the best ways to get stronger is to walk.  Walk a little more each day.  Take someone with you until you feel OK to walk alone.    When to call your health care provider:  1. You develop a fever.  2. Redness, swelling, bleeding, warmth, or fluid draining at the incision site.  3. Bruising appears to be new or does not look like it is getting better.  4. The small, round lump in the groin in the area of the TAVR site increases in size.    Seek immediate medical help if you have any of the following symptoms:  1. You experience any leg numbness, aching, or discomfort  2. Chest pain or trouble breathing (call 911)  3. Sudden numbness or weakness in your face, arms, or legs (call 911)  4. Bowel movement that is bright red  5. Dizziness or fainting  6. Weight gain of more than 2 pounds in 24 hours or more than 5 pounds in 1 week  7. Swelling in your hands, feet, or ankles  8. Shortness of breath that doesn't get better when you rest  9. Pain that gets worse or doesn't go away  10. Fast or irregular pulse          Groin Site Care  Refer to this sheet in the next few weeks. These instructions provide you with information on caring for yourself after your procedure. Your caregiver may also give you more specific instructions. Your treatment has been planned according to current medical practices, but problems sometimes occur. Call your caregiver if you have any problems or questions after your procedure.  HOME CARE INSTRUCTIONS  · You may shower 24 hours after the procedure. Remove the bandage (dressing) and gently wash the site with plain soap and water. Gently pat the site dry.  · Do not apply powder or lotion to the site.  · Do not sit in a bathtub, swimming pool, or whirlpool for 5 to 7 days.  · No bending, squatting, or lifting anything over 10 pounds (4.5  kg) as directed by your caregiver.  · Inspect the site at least twice daily.  · Do not drive home if you are discharged the same day of the procedure. Have someone else drive you.  · You may drive 24 hours after the procedure unless otherwise instructed by your caregiver.  What to expect:  · Any bruising will usually fade within 1 to 2 weeks.  · Blood that collects in the tissue (hematoma) may be painful to the touch. It should usually decrease in size and tenderness within 1 to 2 weeks.  SEEK IMMEDIATE MEDICAL CARE IF:  · You have unusual pain at the groin site or down the affected leg.  · You have redness, warmth, swelling, or pain at the groin site.  · You have drainage (other than a small amount of blood on the dressing).  · You have chills.  · You have a fever or persistent symptoms for more than 72 hours.  · You have a fever and your symptoms suddenly get worse.  · Your leg becomes pale, cool, tingly, or numb.  · You have heavy bleeding from the site. Hold pressure on the site.  Document Released: 01/20/2012 Document Revised: 03/11/2013 Document Reviewed: 01/20/2012  [employer] Patient Information ©2014 [employer].    Home Care with Left Femoral Incision Site  -Shower with Tegaderm and gauze in place. Change dressing every other day with gauze and Tegaderm as well..     Aspirin and Your Heart   Aspirin is a medicine that affects the way blood clots. Aspirin can be used to help reduce the risk of blood clots, heart attacks, and other heart-related problems.   SHOULD I TAKE ASPIRIN?  Your health care provider will help you determine whether it is safe and beneficial for you to take aspirin daily. Taking aspirin daily may be beneficial if you:  · Have had a heart attack or chest pain.  · Have undergone open heart surgery such as coronary artery bypass surgery (CABG).  · Have had coronary angioplasty.  · Have experienced a stroke or transient ischemic attack (TIA).  · Have peripheral vascular disease  (PVD).  · Have chronic heart rhythm problems such as atrial fibrillation.  ARE THERE ANY RISKS OF TAKING ASPIRIN DAILY?  Daily use of aspirin can increase your risk of side effects. Some of these include:  · Bleeding. Bleeding problems can be minor or serious. An example of a minor problem is a cut that does not stop bleeding. An example of a more serious problem is stomach bleeding or bleeding into the brain. Your risk of bleeding is increased if you are also taking non-steroidal anti-inflammatory medicine (NSAIDs).  · Increased bruising.  · Upset stomach.  · An allergic reaction. People who have nasal polyps have an increased risk of developing an aspirin allergy.  WHAT ARE SOME GUIDELINES I SHOULD FOLLOW WHEN TAKING ASPIRIN?   · Take aspirin only as directed by your health care provider. Make sure you understand how much you should take and what form you should take. The two forms of aspirin are:  ¨ Non-enteric-coated. This type of aspirin does not have a coating and is absorbed quickly. Non-enteric-coated aspirin is usually recommended for people with chest pain. This type of aspirin also comes in a chewable form.  ¨ Enteric-coated. This type of aspirin has a special coating that releases the medicine very slowly. Enteric-coated aspirin causes less stomach upset than non-enteric-coated aspirin. This type of aspirin should not be chewed or crushed.  · Drink alcohol in moderation. Drinking alcohol increases your risk of bleeding.  WHEN SHOULD I SEEK MEDICAL CARE?   · You have unusual bleeding or bruising.  · You have stomach pain.  · You have an allergic reaction. Symptoms of an allergic reaction include:  ¨ Hives.  ¨ Itchy skin.  ¨ Swelling of the lips, tongue, or face.  · You have ringing in your ears.  WHEN SHOULD I SEEK IMMEDIATE MEDICAL CARE?   · Your bowel movements are bloody, dark red, or black in color.  · You vomit or cough up blood.  · You have blood in your urine.  · You cough, wheeze, or feel short of  breath.  If you have any of the following symptoms, this is an emergency. Do not wait to see if the pain will go away. Get medical help at once. Call your local emergency services (911 in the [place] Do not drive yourself to the hospital.  · You have severe chest pain, especially if the pain is crushing or pressure-like and spreads to the arms, back, neck, or jaw.   · You have stroke-like symptoms, such as:    ¨ Loss of vision.    ¨ Difficulty talking.    ¨ Numbness or weakness on one side of your body.    ¨ Numbness or weakness in your arm or leg.    ¨ Not thinking clearly or feeling confused.       This information is not intended to replace advice given to you by your health care provider. Make sure you discuss any questions you have with your health care provider.     Document Released: 11/30/2009 Document Revised: 01/08/2016 Document Reviewed: 03/25/2015  [employer] Interactive Patient Education ©2016 [employer]      Depression / Suicide Risk    As you are discharged from this [facility] facility, it is important to learn how to keep safe from harming yourself.    Recognize the warning signs:  · Abrupt changes in personality, positive or negative- including increase in energy   · Giving away possessions  · Change in eating patterns- significant weight changes-  positive or negative  · Change in sleeping patterns- unable to sleep or sleeping all the time   · Unwillingness or inability to communicate  · Depression  · Unusual sadness, discouragement and loneliness  · Talk of wanting to die  · Neglect of personal appearance   · Rebelliousness- reckless behavior  · Withdrawal from people/activities they love  · Confusion- inability to concentrate     If you or a loved one observes any of these behaviors or has concerns about self-harm, here's what you can do:  · Talk about it- your feelings and reasons for harming yourself  · Remove any means that you might use to hurt yourself (examples: pills, rope, extension  cords, firearm)  · Get professional help from the community (Mental Health, Substance Abuse, psychological counseling)  · Do not be alone:Call your Safe Contact- someone whom you trust who will be there for you.  · Call your local CRISIS HOTLINE 841-6807 or 536-210-6785  · Call your local Children's Mobile Crisis Response Team [place] (383) 575-0335 or www.[employer]  · Call the toll free National Suicide Prevention Hotlines   · National Suicide Prevention Lifeline 206-736-RYIM (2439)  · National Hope Line Network 800-SUICIDE (398-9913)

## 2020-03-06 ENCOUNTER — OFFICE VISIT (OUTPATIENT)
Dept: CARDIOLOGY | Facility: MEDICAL CENTER | Age: 85
End: 2020-03-06
Payer: MEDICARE

## 2020-03-06 ENCOUNTER — ANTICOAGULATION VISIT (OUTPATIENT)
Dept: VASCULAR LAB | Facility: MEDICAL CENTER | Age: 85
End: 2020-03-06
Attending: INTERNAL MEDICINE
Payer: MEDICARE

## 2020-03-06 VITALS
WEIGHT: 185.08 LBS | SYSTOLIC BLOOD PRESSURE: 108 MMHG | RESPIRATION RATE: 16 BRPM | DIASTOLIC BLOOD PRESSURE: 54 MMHG | HEIGHT: 71 IN | OXYGEN SATURATION: 95 % | BODY MASS INDEX: 25.91 KG/M2 | HEART RATE: 72 BPM

## 2020-03-06 DIAGNOSIS — I82.4Z9 ACUTE DEEP VEIN THROMBOSIS (DVT) OF DISTAL VEIN OF LOWER EXTREMITY, UNSPECIFIED LATERALITY (HCC): ICD-10-CM

## 2020-03-06 DIAGNOSIS — Z95.3 STATUS POST TRANSCATHETER AORTIC VALVE REPLACEMENT (TAVR) USING BIOPROSTHESIS: ICD-10-CM

## 2020-03-06 DIAGNOSIS — I38 CHRONIC DIASTOLIC HEART FAILURE DUE TO VALVULAR DISEASE (HCC): ICD-10-CM

## 2020-03-06 DIAGNOSIS — I10 ESSENTIAL HYPERTENSION, BENIGN: ICD-10-CM

## 2020-03-06 DIAGNOSIS — Z95.2 S/P TAVR (TRANSCATHETER AORTIC VALVE REPLACEMENT): ICD-10-CM

## 2020-03-06 DIAGNOSIS — I48.0 PAROXYSMAL ATRIAL FIBRILLATION (HCC): ICD-10-CM

## 2020-03-06 DIAGNOSIS — I77.9 CAROTID ARTERY DISEASE, UNSPECIFIED LATERALITY, UNSPECIFIED TYPE (HCC): ICD-10-CM

## 2020-03-06 DIAGNOSIS — G45.9 TIA (TRANSIENT ISCHEMIC ATTACK): ICD-10-CM

## 2020-03-06 DIAGNOSIS — I50.32 CHRONIC DIASTOLIC HEART FAILURE DUE TO VALVULAR DISEASE (HCC): ICD-10-CM

## 2020-03-06 DIAGNOSIS — I48.91 ATRIAL FIBRILLATION, UNSPECIFIED TYPE (HCC): ICD-10-CM

## 2020-03-06 LAB
EKG IMPRESSION: NORMAL
INR BLD: 2.6 (ref 0.9–1.2)
INR PPP: 2.6 (ref 2–3.5)

## 2020-03-06 PROCEDURE — 93000 ELECTROCARDIOGRAM COMPLETE: CPT | Performed by: INTERNAL MEDICINE

## 2020-03-06 PROCEDURE — 99211 OFF/OP EST MAY X REQ PHY/QHP: CPT

## 2020-03-06 PROCEDURE — 99214 OFFICE O/P EST MOD 30 MIN: CPT | Performed by: NURSE PRACTITIONER

## 2020-03-06 PROCEDURE — 85610 PROTHROMBIN TIME: CPT

## 2020-03-06 RX ORDER — FUROSEMIDE 20 MG/1
20 TABLET ORAL DAILY
Qty: 30 TAB | Refills: 11 | COMMUNITY
Start: 2020-03-06 | End: 2020-04-16 | Stop reason: SDUPTHER

## 2020-03-06 ASSESSMENT — ENCOUNTER SYMPTOMS
CLAUDICATION: 0
PALPITATIONS: 0
COUGH: 0
FEVER: 0
DIZZINESS: 0
ABDOMINAL PAIN: 0
MYALGIAS: 0
ORTHOPNEA: 0
PND: 0
SHORTNESS OF BREATH: 0

## 2020-03-06 ASSESSMENT — FIBROSIS 4 INDEX: FIB4 SCORE: 4.63

## 2020-03-06 NOTE — PROGRESS NOTES
Chief Complaint   Patient presents with   • Follow-Up     1 week post TAVR     Subjective:   Everardo Hester is a 84 y.o. male who presents today for hospital follow up S/P TAVR.    He is a patient of Dr. Anderson in our office.    Hx of one remote event of PAF on coumadin, prostate CA, prior DVT, HLD, HTN, TIA, carotid disease with prior CEA in '08 (Abdias BRADFORD), anemia, and now S/P TAVR with post-operative complication of aortic dissection requiring femoral artery repair.    He presents today with his wife. He is feeling tired but did go for a walk today.     He has some mild dizziness alongside some LE edema more in his left leg.    His groin is soft, mildly tender, recent dressing change today per his wife.    He does remain pale, pending CBC today.    Past Medical History:   Diagnosis Date   • Aortic valve stenosis    • Arrhythmia     Afib   • Arthritis     osteo, hips and knees   • Blood clotting disorder (HCC)     , left arm   • Bronchitis        • Cancer (HCC)     Prostate CA--2000   • Carotid arterial disease ([facility]) 1/6/2014   • Cataract     removed bilat   • DVT (deep venous thrombosis) ([facility]) 2016   • Heart valve disease     MVR   • HLD (hyperlipidemia) 1/6/2014   • Hypertension    • Mitral regurgitation 1/6/2014   • Pain 12-14-15    low back, hips, 2-3/10   • Pneumonia        • Stroke ([facility]) 2008    TIA, no residual   • TIA (transient ischemic attack)     2008   • Urinary bladder disorder     HAD MALE BLADDER SUSPENSION PROCEDURE DONE   • Urinary incontinence     stress incontinence     Past Surgical History:   Procedure Laterality Date   • TRANSCATHETER AORTIC VALVE REPLACEMENT  3/2/2020    Procedure: REPLACEMENT, AORTIC VALVE, TRANSCATHETER -;  Surgeon: Sony Barrientos M.D.;  Location: SURGERY [facility];  Service: Cardiac   • YOANDY  3/2/2020    Procedure: ECHOCARDIOGRAM, TRANSESOPHAGEAL;  Surgeon: Sony Barrientos M.D.;  Location: SURGERY [facility];  Service: Cardiac    • FEMORAL ENDARTERECTOMY Left 3/2/2020    Procedure: Repair of left femoral  Artery Dissection;  Surgeon: Alpa Tillman M.D.;  Location: SURGERY [facility];  Service: General   • LUMBAR FUSION POSTERIOR  12/28/2015    Procedure: LUMBAR FUSION POSTERIOR L3-5 with peek rods;  Surgeon: Phylicia Almonte M.D.;  Location: SURGERY [facility];  Service:    • LUMBAR DECOMPRESSION  12/28/2015    Procedure: LUMBAR DECOMPRESSION posterior redo decompression L3-5, with dural repair;  Surgeon: Phylicia Almonte M.D.;  Location: [facility];  Service:    • THORACOSCOPY  6/13/2015    Procedure: THORACOSCOPY with decortication VATS , side to be determined  ;  Surgeon: Elmira Holder M.D.;  Location: [facility];  Service:    • LUMBAR LAMINECTOMY DISKECTOMY  10/8/2010    Performed by PHYLICIA ALMONTE at [facility]   • CERVICAL DISK AND FUSION ANTERIOR  10/26/2009    Performed by PHYLICIA ALMONTE at [facility]   • CAROTID ENDARTERECTOMY  7/10/08    Performed by LEIGH MORATAYA at [facility]   • BLADDER SLING MALE  11/2004   • PROSTATECTOMY, RADIAL  11/2000     Family History   Problem Relation Age of Onset   • Heart Disease Father         CHF at 91.   • Other Brother         Aneurysm   • Cancer Brother         Seminal vascular cancer   • Autoimmune Disease Daughter         Lupus, RA   • No Known Problems Daughter    • No Known Problems Son    • No Known Problems Son    • Heart Attack Neg Hx      Social History     Socioeconomic History   • Marital status:      Spouse name: Not on file   • Number of children: Not on file   • Years of education: Not on file   • Highest education level: Not on file   Occupational History   • Not on file   Social Needs   • Financial resource strain: Not on file   • Food insecurity     Worry: Never true     Inability: Never true   • Transportation needs     Medical: No     Non-medical: No   Tobacco Use   • Smoking status: Former  Smoker     Packs/day: 0.50     Years: 30.00     Pack years: 15.00     Types: Cigarettes, Pipe     Last attempt to quit: 1985     Years since quittin.2   • Smokeless tobacco: Never Used   Substance and Sexual Activity   • Alcohol use: Yes     Alcohol/week: 3.0 oz     Types: 3 Cans of beer, 3 Shots of liquor per week     Comment: 1 x week   • Drug use: No   • Sexual activity: Not Currently   Lifestyle   • Physical activity     Days per week: Not on file     Minutes per session: Not on file   • Stress: Not on file   Relationships   • Social connections     Talks on phone: Not on file     Gets together: Not on file     Attends Orthodox service: Not on file     Active member of club or organization: Not on file     Attends meetings of clubs or organizations: Not on file     Relationship status: Not on file   • Intimate partner violence     Fear of current or ex partner: Not on file     Emotionally abused: Not on file     Physically abused: Not on file     Forced sexual activity: Not on file   Other Topics Concern   • Not on file   Social History Narrative   • Not on file     Allergies   Allergen Reactions   • Other Environmental Runny Nose and Itching     Seasonal, cats-hayfever     Outpatient Encounter Medications as of 3/6/2020   Medication Sig Dispense Refill   • furosemide (LASIX) 20 MG Tab Take 1 Tab by mouth every day. 30 Tab 11   • warfarin (COUMADIN) 6 MG Tab Take 1 Tab by mouth. 30 Tab 3   • warfarin (COUMADIN) 4 MG Tab Take 2 Tabs by mouth. 30 Tab 3   • aspirin EC 81 MG EC tablet Take 1 Tab by mouth every day. 90 Tab 3   • atorvastatin (LIPITOR) 80 MG tablet Take 40 mg by mouth every evening.     • ezetimibe (ZETIA) 10 MG Tab Take 10 mg by mouth every day.     • temazepam (RESTORIL) 30 MG capsule Take 30 mg by mouth every bedtime.     • gabapentin (NEURONTIN) 300 MG Cap Take 300-600 mg by mouth 2 Times a Day. 600 mg in the AM  300 mg in the PM       No facility-administered encounter medications on  "file as of 3/6/2020.      Review of Systems   Constitutional: Positive for malaise/fatigue. Negative for fever.   Respiratory: Negative for cough and shortness of breath.    Cardiovascular: Positive for leg swelling. Negative for chest pain, palpitations, orthopnea, claudication and PND.        L>R LE edema   Gastrointestinal: Negative for abdominal pain.   Musculoskeletal: Negative for myalgias.   Neurological: Negative for dizziness.        Objective:   /54 (BP Location: Left arm, Patient Position: Sitting, BP Cuff Size: Adult)   Pulse 72   Resp 16   Ht 1.803 m (5' 11\")   Wt 84 kg (185 lb 1.2 oz)   SpO2 95%   BMI 25.81 kg/m²     Physical Exam   Constitutional: He is oriented to person, place, and time. He appears well-developed and well-nourished.   HENT:   Head: Normocephalic and atraumatic.   Eyes: EOM are normal.   Neck: No JVD present.   Cardiovascular: Normal rate, regular rhythm and intact distal pulses.   Pulmonary/Chest: Effort normal and breath sounds normal.   Musculoskeletal: Normal range of motion.         General: No edema.      Comments: Cane for mobility    Neurological: He is alert and oriented to person, place, and time.   Skin: Skin is warm and dry. There is pallor.   Psychiatric: He has a normal mood and affect.   Nursing note and vitals reviewed.      Assessment:     1. Status post transcatheter aortic valve replacement (TAVR) using bioprosthesis  EKG   2. Atrial fibrillation, unspecified type (HCC)  EKG   3. S/P TAVR (transcatheter aortic valve replacement)     4. 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)     5. Paroxysmal atrial fibrillation (HCC)     6. Hx of TIA (transient ischemic attack)     7. Essential hypertension, benign     8. Carotid artery disease, unspecified laterality, unspecified type (HCC)     9. Acute deep vein thrombosis (DVT) of distal vein of lower extremity, unspecified laterality (HCC)       Medical Decision Making:  Today's Assessment / " Status / Plan:     1. S/P TAVR with post-operative complication of aortic dissection with femoral artery repair  -feeling good but fatigued with some mild edema and dizziness  -repeat CBC today  -EKG stable post-op SR with PAC's, HR good  -groin stable and CDI, vascular surgeon apt next week  -cont asa 81 mg for TAVR    2. PAF and prior DVT on coumadin  -EKG shows SR today with PAC's  -cont coumadin per anti-coag clinic  -no further DVTs since '08    3. TIA with carotid disease  -no deficits  -mod carotid disease noted on last duplex in '19  -cont statin and ASA    4. HTN  -good control on no medications, low normal  -watch at home with start of lasix    5. HLD  -statin   -LDL goal <70 with vascular disease  -cont yearly labs     6. Prior DVT  -on coumadin for PAF  -follow clinically    FU in clinic in 1 week with vascular surgeon; 4 weeks with AK with echo; cardiac rehab in 3 weeks if ok with vascular surgeon    Pending CBC and INR check today    Patient verbalizes understanding and agrees with the plan of care.     Collaborating MD: Amanda BRADFORD

## 2020-03-06 NOTE — PROGRESS NOTES
Anticoagulation Summary  As of 3/6/2020    INR goal:   2.0-3.0   TTR:   70.5 % (4.5 y)   INR used for dosin.60 (3/6/2020)   Warfarin maintenance plan:   8 mg (4 mg x 2) every Sun; 6 mg (4 mg x 1.5) all other days   Weekly warfarin total:   44 mg   Plan last modified:   ADDISON Mckinley (2020)   Next INR check:   2020   Priority:   Maintenance   Target end date:   Indefinite    Indications    Paroxysmal atrial fibrillation (HCC) [I48.0]  Hx of Stroke (Resolved) [I63.9]  Hx of TIA (transient ischemic attack) [G45.9]  Deep vein thrombosis (DVT) (HCC) (Resolved) [I82.409]  Atrial fibrillation (HCC) (Resolved) [I48.91]             Anticoagulation Episode Summary     INR check location:   Anticoagulation Clinic    Preferred lab:       Send INR reminders to:       Comments:         Anticoagulation Care Providers     Provider Role Specialty Phone number    Michael J Bloch, M.D. Referring Internal Medicine [phone]    [facility]   [phone]            Anticoagulation Patient Findings      HPI:  Tristan Hester seen in clinic today, on anticoagulation therapy with warfarin for paroxysmal Afib, history of TIA, and now s/p TAVR  Changes to current medical/health status since last appt: Patient is now on aspirin for the TAVR   Denies signs/symptoms of bleeding and/or thrombosis since the last appt.    Denies any interval changes to diet  Denies any interval changes to medications since last appt.   Denies any complications or cost restrictions with current therapy.   Verified current warfarin dosing schedule.    A/P   INR  -therapeutic.   2.6 Patient has been on warfarin in the past and has been followed by heart and vascular clinic for a history of paroxysmal Afib and stroke/TIA. Patient recently had a TAVR where there was a complication with dissection of bilateral external iliac arteries. Post procedure the patient had decreased pulses on the left groin per nurse practitioner  notes. A stat US was performed and it was found the previous dissection had not resolved so patient went back to the OR for femoral artery repair which according to surgery notes was successful.  Patient was discharged from the hospital on 03/04 and  on 03/06 in clinic patient had a therapeutic INR of 2.6. Patient instructed to continue the home warfarin dosing. Due to TAVR patient is on aspirin as well now.     Follow up appointment with Dr. Bloch which has already been scheduled.     Tawanda Sosa, PharmD

## 2020-03-14 NOTE — DOCUMENTATION QUERY
[facility]                                                                       Query Response Note      PATIENT:               ANTHONY JOSHI  ACCT #:                  [id number]  MRN:                     [id number]  :                      1935  ADMIT DATE:       3/2/2020 5:36 AM  DISCH DATE:        3/4/2020 3:57 PM  RESPONDING  PROVIDER #:        [id number]           QUERY TEXT:    Anemia has been documented in the discharge summary after surgical repair of the left femoral artery.  The  is unable to determine the specificity of this condition based on current provider documentation.  Your help is needed to correctly identify the type of anemia present during this admission.  Please specify the underlying cause of the anemia.    NOTE:  If the appropriate response is not listed below, please respond with a new note.              The patient's Clinical Indicators include:  per discharge summary:  HOSPITAL COURSE: The patient is a pleasant 84 year old male with severe symptomatic aortic stenosis, chronic diastolic heart failure, anemia, frailty with hypoalbuminemia, HTN, PAF on chronic anticoagulation, prior DVT, HLD, TIA, chronic back pain, and urinary incontinence. Due to the patient's symptoms, the patient underwent successful TAVR described as above but did have an acute aortic dissection in the left iliac artery, a balloon expandable stent was placed intra-operatively with success. Post-procedure, the patient did well but did have some decreased pulses on the left groin noticed by the PACU nurse, therefore STAT US was ordered and found that the previous dissection was not resolved, vascular surgery was consulted and patient was transported back to the OR for femoral artery repair with good success.  He remained slightly anemic prior to discharge with no hemodynamic changes or bleeding, will watch labs outpatient.    per 3/3 progress  note:  Hemoglobin Value: (!) 11.4  Hematocrit Value: (!) 36.4  Lab Platelet Value: (!) 137    Patient takes warfarin  - 6mg on Mon-Wed-Fri & 8mg on Sun-Tue-Thurs  Options provided:   -- Blood loss anemia, acute   -- Blood loss anemia, chronic   -- Chronic anemia   -- Iron deficiency anemia   -- Macrocytic anemia   -- Microcytic anemia   -- Normocytic anemia   -- Postoperative blood loss anemia   -- Pernicious anemia   -- Unable to determine      Query created by: Luz Marina Abbasi on 3/13/2020 11:36 AM    RESPONSE TEXT:    Blood loss anemia, acute          Electronically signed by:  TERESA DUTTA MD 3/14/2020 12:49 PM

## 2020-04-07 ENCOUNTER — ANTICOAGULATION VISIT (OUTPATIENT)
Dept: VASCULAR LAB | Facility: MEDICAL CENTER | Age: 85
End: 2020-04-07
Attending: INTERNAL MEDICINE
Payer: MEDICARE

## 2020-04-07 DIAGNOSIS — I48.0 PAROXYSMAL ATRIAL FIBRILLATION (HCC): ICD-10-CM

## 2020-04-07 DIAGNOSIS — G45.9 TIA (TRANSIENT ISCHEMIC ATTACK): ICD-10-CM

## 2020-04-07 LAB — INR PPP: 4.1 (ref 2–3.5)

## 2020-04-07 PROCEDURE — 99212 OFFICE O/P EST SF 10 MIN: CPT

## 2020-04-07 PROCEDURE — 85610 PROTHROMBIN TIME: CPT

## 2020-04-07 NOTE — PROGRESS NOTES
OP Anticoagulation Service Note    Date: 2020    Anticoagulation Summary  As of 2020    INR goal:   2.0-3.0   TTR:   69.7 % (4.6 y)   INR used for dosin.10! (2020)   Warfarin maintenance plan:   8 mg (4 mg x 2) every Sun; 6 mg (4 mg x 1.5) all other days   Weekly warfarin total:   44 mg   Plan last modified:   ADDISON Mckinley (2020)   Next INR check:   2020   Priority:   Maintenance   Target end date:   Indefinite    Indications    Paroxysmal atrial fibrillation (HCC) [I48.0]  Hx of Stroke (Resolved) [I63.9]  Hx of TIA (transient ischemic attack) [G45.9]  Deep vein thrombosis (DVT) (HCC) (Resolved) [I82.409]  Atrial fibrillation (HCC) (Resolved) [I48.91]             Anticoagulation Episode Summary     INR check location:   Anticoagulation Clinic    Preferred lab:       Send INR reminders to:       Comments:         Anticoagulation Care Providers     Provider Role Specialty Phone number    Michael J Bloch, M.D. Referring Internal Medicine [phone]    [facility]   [phone]        Anticoagulation Patient Findings      HPI:   Tristan Hester seen in clinic today, they are here today for a INR check on anticoagulation therapy     The reason for today's visit is to prevent morbidity and mortality from a blood clot or stroke and to reduce the risk of bleeding while on a anticoagulant.     Dose the patient in the medical group have a [facility] primary care provider and proper insurance?  Michael J Bloch, M.D.  [facility] [id number] [phone]      Additional education provided today regarding reducing bleed risk and dietary constraints:  About how vitamin K and foods work with warfarin and the bleeding risk on a anticoagulant     Any upcoming procedures:   none    Confirmed warfarin dosing regimen  Interval Changes with foods rich in vitamin K: No  Interval Changes in ETOH:   No  Interval Changes in smoking status:  No  Interval Changes in  medication:  No   Cost restriction:  No  S/S of bleeding or bruising:  No  Signs/symptoms  thrombosis since the last appt:  No  Bleed risk is:  moderate,       Assessment:   INR  supra-therapeutic.     Medications reviewed and updated--    3 vitals included with today's appt :  (BP, HR, weight, ht, RR)   There were no vitals filed for this visit.      Plan:  Hold today then continue weekly warfarin dose as noted      Follow up:  Follow up appointment in 2 week(s)       Other info:  Pt educated to contact our clinic with any changes in medications or s/s of bleeding or thrombosis    CHEST guidelines recommend frequent INR monitoring at regular intervals (a few days up to a max of 12 weeks) to ensure they are on the proper dose of warfarin and not having any complications from therapy.  INRs can dramatically change over a short time period due to diet, medications, and medical conditions.     Conor Locke, PharmD, MS, BCACP, LCC    This note was created using voice recognition software (Dragon). The accuracy of the dictation is limited by the abilities of the software. I have reviewed the note prior to signing, however some errors in grammar and context are still possible. If you have any questions related to this note please do not hesitate to contact our office.

## 2020-04-08 ENCOUNTER — APPOINTMENT (OUTPATIENT)
Dept: CARDIOLOGY | Facility: MEDICAL CENTER | Age: 85
End: 2020-04-08
Attending: INTERNAL MEDICINE
Payer: MEDICARE

## 2020-04-08 ENCOUNTER — OFFICE VISIT (OUTPATIENT)
Dept: VASCULAR LAB | Facility: MEDICAL CENTER | Age: 85
End: 2020-04-08
Attending: INTERNAL MEDICINE
Payer: MEDICARE

## 2020-04-08 VITALS
HEART RATE: 91 BPM | DIASTOLIC BLOOD PRESSURE: 71 MMHG | WEIGHT: 180.1 LBS | SYSTOLIC BLOOD PRESSURE: 124 MMHG | BODY MASS INDEX: 25.21 KG/M2 | HEIGHT: 71 IN

## 2020-04-08 DIAGNOSIS — Z95.3 STATUS POST TRANSCATHETER AORTIC VALVE REPLACEMENT (TAVR) USING BIOPROSTHESIS: ICD-10-CM

## 2020-04-08 DIAGNOSIS — I70.209 FEMORAL ARTERY OCCLUSION (HCC): ICD-10-CM

## 2020-04-08 DIAGNOSIS — I48.0 PAROXYSMAL ATRIAL FIBRILLATION (HCC): ICD-10-CM

## 2020-04-08 DIAGNOSIS — I82.90 DEEP VEIN THROMBOSIS (DVT) OF NON-EXTREMITY VEIN, UNSPECIFIED CHRONICITY: ICD-10-CM

## 2020-04-08 DIAGNOSIS — E78.5 HYPERLIPIDEMIA, UNSPECIFIED HYPERLIPIDEMIA TYPE: ICD-10-CM

## 2020-04-08 DIAGNOSIS — Z79.01 CHRONIC ANTICOAGULATION: ICD-10-CM

## 2020-04-08 DIAGNOSIS — G45.9 TIA (TRANSIENT ISCHEMIC ATTACK): ICD-10-CM

## 2020-04-08 DIAGNOSIS — I38 VALVULAR HEART DISEASE: ICD-10-CM

## 2020-04-08 DIAGNOSIS — I10 ESSENTIAL HYPERTENSION: ICD-10-CM

## 2020-04-08 LAB
LV EJECT FRACT  99904: 50
LV EJECT FRACT MOD 2C 99903: 34.75
LV EJECT FRACT MOD 4C 99902: 47.93
LV EJECT FRACT MOD BP 99901: 40.55

## 2020-04-08 PROCEDURE — 93306 TTE W/DOPPLER COMPLETE: CPT

## 2020-04-08 PROCEDURE — 93306 TTE W/DOPPLER COMPLETE: CPT | Mod: 26 | Performed by: INTERNAL MEDICINE

## 2020-04-08 PROCEDURE — 99212 OFFICE O/P EST SF 10 MIN: CPT | Mod: 25

## 2020-04-08 PROCEDURE — 99214 OFFICE O/P EST MOD 30 MIN: CPT | Performed by: INTERNAL MEDICINE

## 2020-04-08 ASSESSMENT — ENCOUNTER SYMPTOMS
MYALGIAS: 0
LOSS OF CONSCIOUSNESS: 0
COUGH: 0
DEPRESSION: 0
NERVOUS/ANXIOUS: 0
HEADACHES: 0
WEAKNESS: 0
WEIGHT LOSS: 0
PALPITATIONS: 0
SHORTNESS OF BREATH: 1
BLOOD IN STOOL: 0
SPEECH CHANGE: 0
BACK PAIN: 1
DIZZINESS: 0
FOCAL WEAKNESS: 0

## 2020-04-08 ASSESSMENT — FIBROSIS 4 INDEX: FIB4 SCORE: 4.63

## 2020-04-08 NOTE — PROGRESS NOTES
VASCULAR FOLLOW UP VISIT  Subjective:   Tristan Hester is a 83 y.o. male who presents today 20 for vascular f/u  Chief Complaint   Patient presents with   • Follow-Up     HPI:   Patient here for f/u of HTN, dyslipidemia, carotid disease and valvular heart disease, and atrial fibrillation.  BP at home mostly 110s/80s  Had some low bp after surgery  BP meds on hold including furosemide  Had tavr without complications except for femoral artery occlusion that required repair by dr barney  No claudication or rest pain since  He is uncertain if he has a follow-up appointment with Dr. Barney or not  Denies leg swelling  Continues to have dyspnea on exertion but better than prior to his procedure  No bleeding on the combination of aspirin and warfarin  No myalgias on current lipid-lowering therapy  No TIA or stroke symptoms  No lightheadedness  Still with balance issues - no change    Social History     Tobacco Use   • Smoking status: Former Smoker     Packs/day: 0.50     Years: 30.00     Pack years: 15.00     Types: Cigarettes, Pipe     Last attempt to quit: 1985     Years since quittin.2   • Smokeless tobacco: Never Used   Substance Use Topics   • Alcohol use: Yes     Alcohol/week: 3.0 oz     Types: 3 Cans of beer, 3 Shots of liquor per week     Comment: 1 x week   • Drug use: No     DIET AND EXERCISE:  Weight Change: down 7-8 pounds  Diet: common adult  Exercise: moderate regular exercise     Review of Systems   Constitutional: Negative for malaise/fatigue and weight loss.   Respiratory: Positive for shortness of breath. Negative for cough.    Cardiovascular: Negative for chest pain, palpitations and leg swelling.   Gastrointestinal: Negative for blood in stool and melena.   Genitourinary: Negative for hematuria.   Musculoskeletal: Positive for back pain. Negative for myalgias.   Neurological: Negative for dizziness, speech change, focal weakness, loss of consciousness, weakness and headaches.  "  Psychiatric/Behavioral: Negative for depression. The patient is not nervous/anxious.       Objective:     Vitals:    04/08/20 1450   BP: 124/71   BP Location: Left arm   Patient Position: Sitting   BP Cuff Size: Adult   Pulse: 91   Weight: 81.7 kg (180 lb 1.6 oz)   Height: 1.8 m (5' 10.87\")      Body mass index is 25.21 kg/m².  Physical Exam   Constitutional: He is oriented to person, place, and time. He appears well-developed and well-nourished.   Cardiovascular: Normal rate and intact distal pulses.   Murmur heard.  Pulmonary/Chest: Effort normal. No respiratory distress. He has no wheezes. He has no rales.   Musculoskeletal: Normal range of motion.         General: No edema.   Neurological: He is alert and oriented to person, place, and time. No cranial nerve deficit.   Psychiatric: He has a normal mood and affect. His behavior is normal.     Lab Results   Component Value Date    CHOLSTRLTOT 121 11/15/2019    LDL 56 11/15/2019    HDL 54 11/15/2019    TRIGLYCERIDE 53 11/15/2019      Lab Results   Component Value Date    PROTHROMBTM 21.4 (H) 03/04/2020    INR 4.10 04/07/2020         Lab Results   Component Value Date    SODIUM 140 03/04/2020    POTASSIUM 4.0 03/04/2020    CHLORIDE 109 03/04/2020    CO2 25 03/04/2020    GLUCOSE 103 (H) 03/04/2020    BUN 19 03/04/2020    CREATININE 0.79 03/04/2020    BUNCREATRAT 16 11/15/2019    IFAFRICA >60 03/04/2020    IFNOTAFR >60 03/04/2020      Lab Results   Component Value Date    TSH 2.100 11/15/2019      Lab Results   Component Value Date    WBC 9.2 03/04/2020    RBC 3.43 (L) 03/04/2020    HEMOGLOBIN 10.2 (L) 03/04/2020    HEMATOCRIT 31.7 (L) 03/04/2020    MCV 92.4 03/04/2020    MCH 29.7 03/04/2020    MCHC 32.2 (L) 03/04/2020    MPV 11.4 03/04/2020      Blood work from 10-9-14: Glucose 102, GFR 68, cholesterol 127, trig 35, HDL 57, LDL-C 63, urine for MA/cr 3.4    Blood work from May 12, 2015, glucose 37, GFR 72, sodium 146, AST 19, ALT 12, hemoglobin 13.8, total " cholesterol 125, temperature 66, initial 48, LDL 64, albumin creatinine ratio in urine 2.5    CXR 8/20/15: (comparison to 7/9/2015) :Bibasilar opacities, right greater than left, consistent with a combination of airspace disease and atelectasis. Minimal left basilar airspace opacities are new. A small right pleural effusion is suspected, unchanged.    Blood work from February 2016-glucose 107, GFR 82, hemoglobin 14.0, total cholesterol 130, HL 51, LDL 62    Echocardiogram February 2016  Posterior mitral leaflet prolapse with eccentric moderate mitral regurgitation  Severely dilated left atrium  Mild concentric LVH  EF 60%  Mild aortic stenosis   right ventricular pressure estimated 37 mm    Carotid duplex May 2016:   1.  No evidence of re-stenosis in the right carotid bulb and internal    carotid artery after endarterectomy.    2.  Less than 50% left carotid bulb and internal carotid arterial stenosis.    3.  Normal bilateral subclavian and vertebral arterial exam.    4.  Compared to the prior study of 2/29/2016,  there has been no    significant change.     Blood work Oct 2016:  Glucose 106, GFR 77, LDL 68, nonHDL 86    Echo feb 2017:  Left ventricular ejection fraction is visually estimated to be 60%.   Grade I diastolic dysfunction.  Possibly severe mitral regurgitation.   Mild aortic stenosis.  Estimated right ventricular systolic pressure  is 25 mmHg.  Aortic root is 4 cm.   Compared to the images of the prior study done 2/29/16 there has been   no significant change.   Consider YOANDY for better evaluation of MR severity.    Blood work march 2017:  Glucose 94, GFR 59, Ldl-c 70, nonHDL 85    Carotid August 2017  Mild plaque of the right internal carotid artery with no significant    stenosis (<50%)   Mild plaque of the left internal carotid artery with no significant    stenosis (<50%).   No significant stenosis of the right or left subclavian artery.   No significant stenosis of the right or left vertebral  artery.    MRI Brain Dec 2017:  MRI of the brain without contrast within normal limits for age with moderate white matter changes without demonstrable interval progression since the prior study.    Echocardiogram feb 2018:  Normal left ventricular size, wall thickness, and systolic function.   Left ventricular ejection fraction is visually estimated to be 60%.   Normal regional wall motion. Diastolic function is difficult to assess   with valvular disease.  Mitral annular calcification. No mitral stenosis. Moderate mitral   regurgitation. ERO by PISA method is 0.2 sq cm. Both leaflet appear to   attach to the anterior lateral wall. Can not rule out parachute mitral   valve.  Calcified aortic valve leaflets. Moderate aortic stenosis.   Compared to the report of the study done 2/2017- the MR appears to be   less severe, but, there has been some progression of the AS.     Echocardiogram May 2019  Normal left ventricular size and systolic function. Moderate concentric   left ventricular hypertrophy. Grade II diastolic dysfunction. Normal   regional wall motion. Left ventricular ejection fraction is visually   estimated to be 55%.  Severely dilated left atrium.  Moderate mitral regurgitation.  Tricuspid aortic valve. Moderate aortic stenosis. Aortic valve area   calculated from the continuity equation is 1. 1 cm2 Vmax is  3.5  m/s.   Transvalvular gradients are - Peak: 50  mmHg, Mean: 33 mmHg. However   the dimensionless index is 0. 2, which would be consistent with severe   AS. No aortic insufficiency.  Ascending aorta is borderline dilated with a diameter of 3.9  cm.  Compared to the report of the study done 2/2018- there has been an   further increase in the AV gradient though the dimensionless index has   not changed.    Blood work from [facility] [place] April 2019  Hemoglobin 15.0, glucose 116, GFR 72, total cholesterol 120, triglycerides 55, HDL 52, LDL 57, albumin creatinine ratio 9.3, TSH 1.4, BNP 2167    Carotid duplex  aug 2019:   Mild to moderate plaque within bilateral internal carotid arteries with    less that 50% stenosis.   Prominent heterogeneous plaque proximal right external carotid artery.    Echocardiogram February 2020  Low normal left ventricular systolic function. Left ventricular   ejection fraction is visually estimated to be 50%.   Grade II diastolic dysfunction.  Normal inferior vena cava size without inspiratory collapse.  Moderate aortic stenosis. Transvalvular gradients are - Peak: 47 mmHg,   Mean: 27 mmHg.    Carotid duplex February 2020   Moderate stenosis of the right internal carotid artery (50-69%), similar to    prior study.    No hemodynamically significant stenosis.     Echocardiogram March 2020  Prior echo on 2/3/20, there is now a TAVR valve present.  Left ventricular ejection fraction is visually estimated to be 60%.  Grade II diastolic dysfunction.  Known TAVR aortic valve that is functioning normally with normal   transvalvular gradients.  Estimated right ventricular systolic pressure  is 30 mmHg.     Arterial duplex March 2020   Acute occluded thrombus indicated in the left common femoral artery with no    flow.     Retrograde collateral  flow in the proximal profunda femoral artery.    Results called to MARGARITA Acharya at 1326 hrs.   Attempts to reach physcians unsuccessful.  Pt's physcians aware of findings    according to the RN.    Medical Decision Making:  Today's Assessment / Status / Plan:     1. Paroxysmal atrial fibrillation (HCC)     2. TIA (transient ischemic attack)     3. Deep vein thrombosis (DVT) of non-extremity vein, unspecified chronicity     4. Chronic anticoagulation     5. Hyperlipidemia, unspecified hyperlipidemia type  Comp Metabolic Panel    Lipid Profile    LIPOPROTEIN A   6. Essential hypertension  Comp Metabolic Panel   7. Valvular heart disease  CBC WITHOUT DIFFERENTIAL   8. Femoral artery occlusion (HCC)       Patient Type: Secondary Prevention    Etiology of  Established CVD if Present:   1. TIA - likely cardioembolic from valve disease  2. Carotid atherosclerosis -  3. Valvular Heart Disease  4. Atrial fibrillation with onset during acute illness, now chronic  5.  DVT after back surgery jan 2016  6.  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    Lipid Management: Qualifies for Statin Therapy Based on 2013 ACC/AHA Guidelines: yes  Calculated 10-Year Risk of ASCVD: N/A  Currently on Statin: Yes  ACC/aha criteria for at least mod intensity statin  Per nla goal ldl <70 and nonHDL <100 - below threshold on most recent blood work  Plan:  - Continue atorvastatin at current dose    - continue zetia  - Continue TLC  - Recheck fasting lipids prior to next visit    Blood Pressure Management:  Acc/aha bp goal <130/80  BP under reasonable control both at home and in the office despite stopping antihypertensive medications  Blood pressure will likely begin to increase as he continues to recover  No significant albuminuria in the past  Plan:  -Hold blood pressure medications for now  - Encouraged home BP monitoring  -Call if consistently greater than 130/80 -will consider restarting ARB at that time    Glycemic Status: Prediabetic  IFG, mild and stable  - Continue lifestyle mod  - Follow fasting glucose    Anti-Platelet/Anti-Coagulant Tx: yes  - Continue indefinite anticoagulation with warfarin plus low-dose aspirin per cardiology  - F/U with anticoagulation clinic   - limit nsaids    Smoking: continue complete avoidance    Physical Activity:  Encouraged more walking    Weight Management and Nutrition: Dietary plan was discussed with patient at this visit including c/w Med diet    Other:     1. TIA - likely cardioembolic from valve disease - continue BP control and anticoagulation  2. Carotid atherosclerosis -now with moderate bilateral stenosis.  Recheck carotid duplex in 1 year  3. Valvular Heart Disease -now status post TAVR March 2020.  Still with unrepaired  mitral disease.  Hard to say if remains symptomatic.  Await follow-up echocardiogram.  No evidence of volume overload on exam currently.  Call if shortness of breath worsens or develops leg swelling.  Otherwise defer management to cardiology  4. Colonic Polyp on recent colonoscopy - repeat colonoscopy in 2019 negative per patient.   No further f/u needed  5. Insomnia - trialed zolpidem w/o success.   Continue temazepam. Patient understands addictive potential  6.  Atrial fibrillation - defer management of rhythm and rate to cards.  Continue anticoag as above  7.  DVT - postoperative.  No recurrence.  Continue indefinite anticoagulation given concomitant afib  8.  H/o prostate ca and multiple urological issues - defer f/u to urology  9.  Balance issues and neuropathy - defer f/u to ENT and neurology  10.  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  Needs surveillance duplex about 3 months post procedure.  Likely will be performed at vascular surgeons office.  If not we can certainly obtain.  Report any claudication, rest pain, or ulceration immediately  11.  Continuing medical health - cancer screening as above. Has had shingles vac, continue yearly flu shot    We will partner with other providers in the management of established vascular disease and cardiometabolic risk factors.    Studies to Be Obtained:    1) echo today per cardiology  2) left lower extremity arterial duplex around June 2020 -likely in vascular surgeons office  3) carotid duplex February 2021    Labs to Be Obtained: as above prior to next visit    Follow up in: 6 weeks as scheduled    Michael J Bloch, M.D.    Cc:   MD CORA Jones MD J Albright

## 2020-04-09 DIAGNOSIS — G47.9 SLEEP DISTURBANCE: ICD-10-CM

## 2020-04-09 RX ORDER — TEMAZEPAM 30 MG/1
30 CAPSULE ORAL NIGHTLY PRN
Qty: 90 CAP | Refills: 1 | Status: SHIPPED
Start: 2020-04-09 | End: 2020-10-06

## 2020-04-14 ENCOUNTER — TELEPHONE (OUTPATIENT)
Dept: CARDIOLOGY | Facility: MEDICAL CENTER | Age: 85
End: 2020-04-14

## 2020-04-14 DIAGNOSIS — I50.33 ACUTE ON CHRONIC DIASTOLIC HEART FAILURE DUE TO VALVULAR DISEASE (HCC): ICD-10-CM

## 2020-04-14 DIAGNOSIS — Z95.3 STATUS POST TRANSCATHETER AORTIC VALVE REPLACEMENT (TAVR) USING BIOPROSTHESIS: ICD-10-CM

## 2020-04-14 DIAGNOSIS — I50.32 CHRONIC DIASTOLIC HEART FAILURE DUE TO VALVULAR DISEASE (HCC): ICD-10-CM

## 2020-04-14 DIAGNOSIS — I38 ACUTE ON CHRONIC DIASTOLIC HEART FAILURE DUE TO VALVULAR DISEASE (HCC): ICD-10-CM

## 2020-04-14 DIAGNOSIS — I38 CHRONIC DIASTOLIC HEART FAILURE DUE TO VALVULAR DISEASE (HCC): ICD-10-CM

## 2020-04-14 NOTE — TELEPHONE ENCOUNTER
Spoke with patient who reports feeling well overall.  Only complaint is that he feels his SOB has worsened slightly.  He is able to walk about 200 yards before needing to stop to rest.    Patient has not completed lab work yet.  He will call [phone] to find a [facility] that is open during this time.    Patient states that his water intake could be better.  Due to stress incontinence, he keeps his hydration to a minimum but he tries to be conscious of his water intake.    Advised patient that this information would be routed to SC for recommendations.  Asked patient if he was video capabilities on a computer or smart phone for a possible virtual appoiontment, and he stated he doesn't have video capabilities.    Information routed to SC for advice.

## 2020-04-14 NOTE — TELEPHONE ENCOUNTER
SC/prince    Pt calling to report experiencing occasional SOB without any physical exertion, it goes away and happens again, for the last 2 1/2 weeks.   Also, when going for a walk, using a cane, he can walk up to 1 mile, stops several times due to being SOB.    Please call Tristan mckeon

## 2020-04-15 ENCOUNTER — HOSPITAL ENCOUNTER (OUTPATIENT)
Dept: LAB | Facility: MEDICAL CENTER | Age: 85
End: 2020-04-15
Attending: INTERNAL MEDICINE
Payer: MEDICARE

## 2020-04-15 DIAGNOSIS — E78.5 HYPERLIPIDEMIA, UNSPECIFIED HYPERLIPIDEMIA TYPE: ICD-10-CM

## 2020-04-15 DIAGNOSIS — I10 ESSENTIAL HYPERTENSION: ICD-10-CM

## 2020-04-15 DIAGNOSIS — I38 VALVULAR HEART DISEASE: ICD-10-CM

## 2020-04-15 LAB
ALBUMIN SERPL BCP-MCNC: 3.7 G/DL (ref 3.2–4.9)
ALBUMIN/GLOB SERPL: 1.2 G/DL
ALP SERPL-CCNC: 72 U/L (ref 30–99)
ALT SERPL-CCNC: 15 U/L (ref 2–50)
ANION GAP SERPL CALC-SCNC: 14 MMOL/L (ref 7–16)
AST SERPL-CCNC: 25 U/L (ref 12–45)
BILIRUB SERPL-MCNC: 1.2 MG/DL (ref 0.1–1.5)
BUN SERPL-MCNC: 18 MG/DL (ref 8–22)
CALCIUM SERPL-MCNC: 9 MG/DL (ref 8.5–10.5)
CHLORIDE SERPL-SCNC: 101 MMOL/L (ref 96–112)
CHOLEST SERPL-MCNC: 96 MG/DL (ref 100–199)
CO2 SERPL-SCNC: 24 MMOL/L (ref 20–33)
CREAT SERPL-MCNC: 0.96 MG/DL (ref 0.5–1.4)
ERYTHROCYTE [DISTWIDTH] IN BLOOD BY AUTOMATED COUNT: 51.7 FL (ref 35.9–50)
GLOBULIN SER CALC-MCNC: 3.2 G/DL (ref 1.9–3.5)
GLUCOSE SERPL-MCNC: 95 MG/DL (ref 65–99)
HCT VFR BLD AUTO: 38.8 % (ref 42–52)
HDLC SERPL-MCNC: 41 MG/DL
HGB BLD-MCNC: 12.3 G/DL (ref 14–18)
LDLC SERPL CALC-MCNC: 45 MG/DL
MCH RBC QN AUTO: 28.4 PG (ref 27–33)
MCHC RBC AUTO-ENTMCNC: 31.7 G/DL (ref 33.7–35.3)
MCV RBC AUTO: 89.6 FL (ref 81.4–97.8)
PLATELET # BLD AUTO: 182 K/UL (ref 164–446)
PMV BLD AUTO: 11.8 FL (ref 9–12.9)
POTASSIUM SERPL-SCNC: 4.2 MMOL/L (ref 3.6–5.5)
PROT SERPL-MCNC: 6.9 G/DL (ref 6–8.2)
RBC # BLD AUTO: 4.33 M/UL (ref 4.7–6.1)
SODIUM SERPL-SCNC: 139 MMOL/L (ref 135–145)
TRIGL SERPL-MCNC: 52 MG/DL (ref 0–149)
WBC # BLD AUTO: 5.8 K/UL (ref 4.8–10.8)

## 2020-04-15 PROCEDURE — 80053 COMPREHEN METABOLIC PANEL: CPT

## 2020-04-15 PROCEDURE — 36415 COLL VENOUS BLD VENIPUNCTURE: CPT

## 2020-04-15 PROCEDURE — 80061 LIPID PANEL: CPT

## 2020-04-15 PROCEDURE — 83695 ASSAY OF LIPOPROTEIN(A): CPT

## 2020-04-15 PROCEDURE — 85027 COMPLETE CBC AUTOMATED: CPT

## 2020-04-15 NOTE — TELEPHONE ENCOUNTER
"Patrizia WILLIAM would recommend that pt be scheduled for a virtual or phone visit with Dr. Barrientos in the next few days. HF meds may need adjustments/ restarted per STEWART.     Contacted pt and s/w his wife Tori. She reports that pt is unavailable to talk as he is out getting his lab work completed. Explained recommendations and offered a virtual visit offering to send step by step instructions on preparing for the virtual visit. Wife states, \"we've already had this conversation before\" and they are not interested in doing visit virtually and only want to do a telephone call.    Wife states she is very worried about him. She reports that he is \"very wobbly, and easily out of breath\". Reassured that echo looked good but that possible medication adjustment could help. Pt scheduled to see Dr. Barrientos next available, Friday 4/17 at 9:20am. Wife instructed to obtain vital signs from home as able just prior to appt. Wife confirms she can have BP, HR and weight available before visit.     KEVINI to Dr. Barrientos  "

## 2020-04-16 RX ORDER — FUROSEMIDE 20 MG/1
20 TABLET ORAL DAILY
Qty: 30 TAB | Refills: 11 | Status: SHIPPED | OUTPATIENT
Start: 2020-04-16 | End: 2021-07-22 | Stop reason: SDUPTHER

## 2020-04-16 NOTE — TELEPHONE ENCOUNTER
Dr. Barrientos called pt today and ordered lasix 20mg for shortness of breath and says that the patient does not need tomorrows phone visit. This visit was cancelled.     Will contact pt on Monday, in 4 days, to check in on symptoms and blood pressure readings.

## 2020-04-17 ENCOUNTER — APPOINTMENT (OUTPATIENT)
Dept: CARDIOLOGY | Facility: MEDICAL CENTER | Age: 85
End: 2020-04-17
Payer: MEDICARE

## 2020-04-17 LAB — LPA SERPL-MCNC: <6 MG/DL

## 2020-04-21 ENCOUNTER — ANTICOAGULATION VISIT (OUTPATIENT)
Dept: VASCULAR LAB | Facility: MEDICAL CENTER | Age: 85
End: 2020-04-21
Attending: INTERNAL MEDICINE
Payer: MEDICARE

## 2020-04-21 DIAGNOSIS — I48.0 PAROXYSMAL ATRIAL FIBRILLATION (HCC): ICD-10-CM

## 2020-04-21 DIAGNOSIS — G45.9 TIA (TRANSIENT ISCHEMIC ATTACK): ICD-10-CM

## 2020-04-21 LAB — INR PPP: 4.2 (ref 2–3.5)

## 2020-04-21 PROCEDURE — 99212 OFFICE O/P EST SF 10 MIN: CPT

## 2020-04-21 PROCEDURE — 85610 PROTHROMBIN TIME: CPT

## 2020-04-21 NOTE — PROGRESS NOTES
OP Anticoagulation Service Note    Date: 2020    Anticoagulation Summary  As of 2020    INR goal:   2.0-3.0   TTR:   69.1 % (4.7 y)   INR used for dosin.20! (2020)   Warfarin maintenance plan:   4 mg (4 mg x 1) every Wed, Fri; 8 mg (4 mg x 2) every Sun; 6 mg (4 mg x 1.5) all other days   Weekly warfarin total:   40 mg   Plan last modified:   Conor Locke, PharmD (2020)   Next INR check:   2020   Priority:   Maintenance   Target end date:   Indefinite    Indications    Paroxysmal atrial fibrillation (HCC) [I48.0]  Hx of Stroke (Resolved) [I63.9]  Hx of TIA (transient ischemic attack) [G45.9]  Deep vein thrombosis (DVT) (HCC) (Resolved) [I82.409]  Atrial fibrillation (HCC) (Resolved) [I48.91]             Anticoagulation Episode Summary     INR check location:   Anticoagulation Clinic    Preferred lab:       Send INR reminders to:       Comments:         Anticoagulation Care Providers     Provider Role Specialty Phone number    Michael J Bloch, M.D. Referring Internal Medicine [phone]    [facility]   [phone]        Anticoagulation Patient Findings      HPI:   Tristan Hester seen in clinic today, they are here today for a INR check on anticoagulation therapy     The reason for today's visit is to prevent morbidity and mortality from a blood clot or stroke and to reduce the risk of bleeding while on a anticoagulant.     Dose the patient in the medical group have a [facility] primary care provider and proper insurance?  Michael J Bloch, M.D.  [address] [facility] [id number] [phone]      Additional education provided today regarding reducing bleed risk and dietary constraints:  About how vitamin K and foods work with warfarin and the bleeding risk on a anticoagulant     Any upcoming procedures:   none    Confirmed warfarin dosing regimen  Interval Changes with foods rich in vitamin K: No  Interval Changes in ETOH:   No  Interval Changes in smoking  status:  No  Interval Changes in medication:  No   Cost restriction:  No  S/S of bleeding or bruising:  No  Signs/symptoms  thrombosis since the last appt:  No  Bleed risk is:  moderate,       Assessment:   INR  supra-therapeutic.     Medications reviewed and updated--    3 vitals included with today's appt :  (BP, HR, weight, ht, RR)   There were no vitals filed for this visit.      Plan:  Hold x 1 then Decrease weekly warfarin dose as noted      Follow up:  Follow up appointment in 2 week(s)       Other info:  Pt educated to contact our clinic with any changes in medications or s/s of bleeding or thrombosis    CHEST guidelines recommend frequent INR monitoring at regular intervals (a few days up to a max of 12 weeks) to ensure they are on the proper dose of warfarin and not having any complications from therapy.  INRs can dramatically change over a short time period due to diet, medications, and medical conditions.     Conor Locke, PharmD, MS, BCACP, LCC    This note was created using voice recognition software (Dragon). The accuracy of the dictation is limited by the abilities of the software. I have reviewed the note prior to signing, however some errors in grammar and context are still possible. If you have any questions related to this note please do not hesitate to contact our office.

## 2020-04-27 NOTE — TELEPHONE ENCOUNTER
"Contacted pt again to follow up and pt reports that the lasix made \"a miraculous difference\" and really improved his symptoms. His fatigue, dizziness and balance have improved significantly. He continues to have TELLEZ and has to take breaks between activities but this has also improved some.     He took lasix 2 tablets a day for 5 days and then 1 tab lasix daily since then. He did skip yesterdays dose as he drove to [place] with his wife but had no change in symptoms. Advised pt to increase dietary potassium as he is not taking oral potassium tablets and listed foods high in potassium. He is scheduled to follow up 6/3/2020 with Dr. KOKO WHITMAN to Dr. Barrientos and to confirm what labs may be needed prior to follow up  "

## 2020-05-05 ENCOUNTER — ANTICOAGULATION VISIT (OUTPATIENT)
Dept: VASCULAR LAB | Facility: MEDICAL CENTER | Age: 85
End: 2020-05-05
Attending: INTERNAL MEDICINE
Payer: MEDICARE

## 2020-05-05 DIAGNOSIS — I48.0 PAROXYSMAL ATRIAL FIBRILLATION (HCC): ICD-10-CM

## 2020-05-05 DIAGNOSIS — G45.9 TIA (TRANSIENT ISCHEMIC ATTACK): ICD-10-CM

## 2020-05-05 LAB — INR PPP: 2.6 (ref 2–3.5)

## 2020-05-05 PROCEDURE — 85610 PROTHROMBIN TIME: CPT

## 2020-05-05 PROCEDURE — 99211 OFF/OP EST MAY X REQ PHY/QHP: CPT

## 2020-05-05 NOTE — PROGRESS NOTES
Anticoagulation Summary  As of 2020    INR goal:   2.0-3.0   TTR:   68.7 % (4.7 y)   INR used for dosin.60 (2020)   Warfarin maintenance plan:   4 mg (4 mg x 1) every Wed, Fri; 8 mg (4 mg x 2) every Sun; 6 mg (4 mg x 1.5) all other days   Weekly warfarin total:   40 mg   Plan last modified:   Conor Locke, PharmD (2020)   Next INR check:   2020   Priority:   Maintenance   Target end date:   Indefinite    Indications    Paroxysmal atrial fibrillation (HCC) [I48.0]  Hx of Stroke (Resolved) [I63.9]  Hx of TIA (transient ischemic attack) [G45.9]  Deep vein thrombosis (DVT) (HCC) (Resolved) [I82.409]  Atrial fibrillation (HCC) (Resolved) [I48.91]             Anticoagulation Episode Summary     INR check location:   Anticoagulation Clinic    Preferred lab:       Send INR reminders to:       Comments:         Anticoagulation Care Providers     Provider Role Specialty Phone number    Michael J Bloch, M.D. Referring Internal Medicine [phone]    [facility]   [phone]        Anticoagulation Patient Findings      HPI:  Tristan Hester seen today, on anticoagulation therapy with warfarin for PAF.   Changes to current medical/health status since last appt: none  Denies signs/symptoms of bleeding and/or thrombosis since the last appt.    Denies any interval changes to diet  Denies any interval changes to medications since last appt.   Denies any complications or cost restrictions with current therapy.   Confirmed dosing regimen.     A/P   INR  therapeutic.   Pt is to continue with current warfarin dosing regimen.     Follow up appointment in 1 week with vascular and 3 weeks with our anticoagulation clinic.  No need to obtain INR in 1 week, unless physician requests a current INR for that day.     Satya Parra, PharmD

## 2020-05-07 NOTE — TELEPHONE ENCOUNTER
Contacted pt to see how and was feeling and make sure he received lab orders. Pt reports feeling well overall, some continued TELLEZ but much less dizziness. He did receive lab orders and already has an appt with the lab scheduled. He will follow up with Dr. Barrientos 6/03.

## 2020-05-12 ENCOUNTER — OFFICE VISIT (OUTPATIENT)
Dept: VASCULAR LAB | Facility: MEDICAL CENTER | Age: 85
End: 2020-05-12
Attending: INTERNAL MEDICINE
Payer: MEDICARE

## 2020-05-12 DIAGNOSIS — G45.9 TIA (TRANSIENT ISCHEMIC ATTACK): ICD-10-CM

## 2020-05-12 DIAGNOSIS — I10 ESSENTIAL HYPERTENSION: ICD-10-CM

## 2020-05-12 DIAGNOSIS — T14.8XXA VASCULAR INJURY: ICD-10-CM

## 2020-05-12 DIAGNOSIS — I38 VALVULAR HEART DISEASE: ICD-10-CM

## 2020-05-12 DIAGNOSIS — I48.0 PAROXYSMAL ATRIAL FIBRILLATION (HCC): ICD-10-CM

## 2020-05-12 DIAGNOSIS — I73.9 PAD (PERIPHERAL ARTERY DISEASE) (HCC): ICD-10-CM

## 2020-05-12 NOTE — PROGRESS NOTES
Telephone Appointment Visit   As a means of avoiding spread of COVID-19, this visit is being conducted by telephone. This telephone visit was initiated by the patient and they verbally consented.    Time at start of call: 1012a    Reason for Call:  Follow up of vascular disease    Patient Comments / History:   Had TELLEZ - called cards and back on furosemide with resolution of symptoms  Still on warfarin and asa -   BP up a bit 120-135/65-80  No other bp meds    Labs / Images Reviewed   echo    Assessment and Plan:     1. Paroxysmal atrial fibrillation (HCC)    2. TIA (transient ischemic attack)    3. Essential hypertension    4. Valvular heart disease    5. Vascular injury  - US-AORTA/ILIACS DUPLEX LIMITED; Future  - US-EXTREMITY ARTERY LOWER UNILAT W/JANICE (COMBO) LEFT; Future    6. PAD (peripheral artery disease) (HCC)  - US-AORTA/ILIACS DUPLEX LIMITED; Future  - US-EXTREMITY ARTERY LOWER UNILAT W/JANICE (COMBO) LEFT; Future    Will get arterial duplex to assess arterial injury   Otherwise continue current meds  Consider restarting arb in future  Recheck echo and blood work per cards    Follow-up: 3 months  Time at end of call: [id number]  Total Time Spent: 11-20 minutes    Michael J Bloch, M.D.

## 2020-05-18 ENCOUNTER — APPOINTMENT (OUTPATIENT)
Dept: RADIOLOGY | Facility: MEDICAL CENTER | Age: 85
End: 2020-05-18
Attending: INTERNAL MEDICINE
Payer: MEDICARE

## 2020-05-19 ENCOUNTER — HOSPITAL ENCOUNTER (OUTPATIENT)
Dept: RADIOLOGY | Facility: MEDICAL CENTER | Age: 85
End: 2020-05-19
Attending: INTERNAL MEDICINE
Payer: MEDICARE

## 2020-05-19 ENCOUNTER — TELEPHONE (OUTPATIENT)
Dept: VASCULAR LAB | Facility: MEDICAL CENTER | Age: 85
End: 2020-05-19

## 2020-05-19 DIAGNOSIS — I73.9 PAD (PERIPHERAL ARTERY DISEASE) (HCC): ICD-10-CM

## 2020-05-19 DIAGNOSIS — T14.8XXA VASCULAR INJURY: ICD-10-CM

## 2020-05-19 PROCEDURE — 93922 UPR/L XTREMITY ART 2 LEVELS: CPT

## 2020-05-19 PROCEDURE — 93978 VASCULAR STUDY: CPT

## 2020-05-20 NOTE — TELEPHONE ENCOUNTER
Iliac stent patent.  Normal leida  Repeat iliac artery duplex in one year.    Michael Bloch, MD  [facility]

## 2020-05-21 ENCOUNTER — TELEPHONE (OUTPATIENT)
Dept: VASCULAR LAB | Facility: MEDICAL CENTER | Age: 85
End: 2020-05-21

## 2020-05-21 NOTE — TELEPHONE ENCOUNTER
Spoke with the pt regarding the results of k aortoiliac stent and LE arterial duplex.  Let him know that the stent appears open and the blood flow past it looks fine.     Next  left iliac artery duplex in one year.   CHEYENNE Salazar.

## 2020-05-26 ENCOUNTER — APPOINTMENT (OUTPATIENT)
Dept: VASCULAR LAB | Facility: MEDICAL CENTER | Age: 85
End: 2020-05-26
Attending: INTERNAL MEDICINE
Payer: MEDICARE

## 2020-05-28 ENCOUNTER — ANTICOAGULATION VISIT (OUTPATIENT)
Dept: VASCULAR LAB | Facility: MEDICAL CENTER | Age: 85
End: 2020-05-28
Attending: INTERNAL MEDICINE
Payer: MEDICARE

## 2020-05-28 DIAGNOSIS — I48.0 PAROXYSMAL ATRIAL FIBRILLATION (HCC): ICD-10-CM

## 2020-05-28 DIAGNOSIS — G45.9 TIA (TRANSIENT ISCHEMIC ATTACK): ICD-10-CM

## 2020-05-28 LAB — INR PPP: 2.6 (ref 2–3.5)

## 2020-05-28 PROCEDURE — 99211 OFF/OP EST MAY X REQ PHY/QHP: CPT | Performed by: NURSE PRACTITIONER

## 2020-05-28 PROCEDURE — 85610 PROTHROMBIN TIME: CPT

## 2020-05-28 NOTE — PROGRESS NOTES
Anticoagulation Summary  As of 2020    INR goal:   2.0-3.0   TTR:   69.2 % (4.8 y)   INR used for dosin.60 (2020)   Warfarin maintenance plan:   4 mg (4 mg x 1) every Fri; 6 mg (4 mg x 1.5) all other days   Weekly warfarin total:   40 mg   Plan last modified:   Thi Rosario ABeccaPBeccaNBecca (2020)   Next INR check:   2020   Priority:   Maintenance   Target end date:   Indefinite    Indications    Paroxysmal atrial fibrillation (HCC) [I48.0]  Hx of Stroke (Resolved) [I63.9]  Hx of TIA (transient ischemic attack) [G45.9]  Deep vein thrombosis (DVT) (HCC) (Resolved) [I82.409]  Atrial fibrillation (HCC) (Resolved) [I48.91]             Anticoagulation Episode Summary     INR check location:   Anticoagulation Clinic    Preferred lab:       Send INR reminders to:       Comments:         Anticoagulation Care Providers     Provider Role Specialty Phone number    Michael J Bloch, M.D. Referring Internal Medicine [phone]    [facility]   [phone]        Anticoagulation Patient Findings      HPI:  Tristan Hester seen in clinic today for follow up on anticoagulation therapy in the presence of AF, DVT, TIA/CVA hx.   Denies any changes to current medical/health status since last appointment.   Denies any medication or diet changes.   No current symptoms of bleeding or thrombosis reported.    A/P:   INR therapeutic.   Continue current regime (but will spread doses out more evenly).     Follow up appointment in 5 week(s) per pt's request.    Next Appointment:  at 3:00 pm.    Thi WILLIAM

## 2020-05-29 ENCOUNTER — HOSPITAL ENCOUNTER (OUTPATIENT)
Dept: LAB | Facility: MEDICAL CENTER | Age: 85
End: 2020-05-29
Attending: NURSE PRACTITIONER
Payer: MEDICARE

## 2020-05-29 ENCOUNTER — HOSPITAL ENCOUNTER (OUTPATIENT)
Dept: LAB | Facility: MEDICAL CENTER | Age: 85
End: 2020-05-29
Attending: INTERNAL MEDICINE
Payer: MEDICARE

## 2020-05-29 DIAGNOSIS — I48.0 PAROXYSMAL ATRIAL FIBRILLATION (HCC): ICD-10-CM

## 2020-05-29 DIAGNOSIS — I50.32 CHRONIC DIASTOLIC HEART FAILURE DUE TO VALVULAR DISEASE (HCC): ICD-10-CM

## 2020-05-29 DIAGNOSIS — I38 CHRONIC DIASTOLIC HEART FAILURE DUE TO VALVULAR DISEASE (HCC): ICD-10-CM

## 2020-05-29 DIAGNOSIS — Z95.3 STATUS POST TRANSCATHETER AORTIC VALVE REPLACEMENT (TAVR) USING BIOPROSTHESIS: ICD-10-CM

## 2020-05-29 DIAGNOSIS — I50.33 ACUTE ON CHRONIC DIASTOLIC HEART FAILURE DUE TO VALVULAR DISEASE (HCC): ICD-10-CM

## 2020-05-29 DIAGNOSIS — E78.5 DYSLIPIDEMIA: ICD-10-CM

## 2020-05-29 DIAGNOSIS — I38 ACUTE ON CHRONIC DIASTOLIC HEART FAILURE DUE TO VALVULAR DISEASE (HCC): ICD-10-CM

## 2020-05-29 LAB
ANISOCYTOSIS BLD QL SMEAR: NORMAL
BASO STIPL BLD QL SMEAR: NORMAL
ERYTHROCYTE [DISTWIDTH] IN BLOOD BY AUTOMATED COUNT: 56.7 FL (ref 35.9–50)
HCT VFR BLD AUTO: 44.7 % (ref 42–52)
HGB BLD-MCNC: 13.8 G/DL (ref 14–18)
MACROCYTES BLD QL SMEAR: NORMAL
MCH RBC QN AUTO: 27.7 PG (ref 27–33)
MCHC RBC AUTO-ENTMCNC: 30.9 G/DL (ref 33.7–35.3)
MCV RBC AUTO: 89.6 FL (ref 81.4–97.8)
MICROCYTES BLD QL SMEAR: NORMAL
MORPHOLOGY BLD-IMP: NORMAL
OVALOCYTES BLD QL SMEAR: NORMAL
PLATELET # BLD AUTO: 129 K/UL (ref 164–446)
PLATELET BLD QL SMEAR: NORMAL
PMV BLD AUTO: 12.2 FL (ref 9–12.9)
POIKILOCYTOSIS BLD QL SMEAR: NORMAL
POLYCHROMASIA BLD QL SMEAR: NORMAL
RBC # BLD AUTO: 4.99 M/UL (ref 4.7–6.1)
RBC BLD AUTO: PRESENT
WBC # BLD AUTO: 6.6 K/UL (ref 4.8–10.8)

## 2020-05-29 PROCEDURE — 80048 BASIC METABOLIC PNL TOTAL CA: CPT

## 2020-05-29 PROCEDURE — 80061 LIPID PANEL: CPT

## 2020-05-29 PROCEDURE — 83880 ASSAY OF NATRIURETIC PEPTIDE: CPT

## 2020-05-29 PROCEDURE — 85027 COMPLETE CBC AUTOMATED: CPT

## 2020-05-29 PROCEDURE — 36415 COLL VENOUS BLD VENIPUNCTURE: CPT

## 2020-05-30 LAB
ANION GAP SERPL CALC-SCNC: 12 MMOL/L (ref 7–16)
BUN SERPL-MCNC: 21 MG/DL (ref 8–22)
CALCIUM SERPL-MCNC: 9.8 MG/DL (ref 8.5–10.5)
CHLORIDE SERPL-SCNC: 106 MMOL/L (ref 96–112)
CHOLEST SERPL-MCNC: 109 MG/DL (ref 100–199)
CO2 SERPL-SCNC: 23 MMOL/L (ref 20–33)
CREAT SERPL-MCNC: 0.87 MG/DL (ref 0.5–1.4)
GLUCOSE SERPL-MCNC: 118 MG/DL (ref 65–99)
HDLC SERPL-MCNC: 49 MG/DL
LDLC SERPL CALC-MCNC: 51 MG/DL
NT-PROBNP SERPL IA-MCNC: ABNORMAL PG/ML (ref 0–125)
POTASSIUM SERPL-SCNC: 4.4 MMOL/L (ref 3.6–5.5)
SODIUM SERPL-SCNC: 141 MMOL/L (ref 135–145)
TRIGL SERPL-MCNC: 47 MG/DL (ref 0–149)

## 2020-06-03 ENCOUNTER — DOCUMENTATION (OUTPATIENT)
Dept: CARDIOLOGY | Facility: MEDICAL CENTER | Age: 85
End: 2020-06-03

## 2020-06-03 ENCOUNTER — OFFICE VISIT (OUTPATIENT)
Dept: CARDIOLOGY | Facility: MEDICAL CENTER | Age: 85
End: 2020-06-03
Payer: MEDICARE

## 2020-06-03 VITALS
RESPIRATION RATE: 14 BRPM | DIASTOLIC BLOOD PRESSURE: 84 MMHG | WEIGHT: 173.72 LBS | HEART RATE: 72 BPM | OXYGEN SATURATION: 97 % | HEIGHT: 71 IN | SYSTOLIC BLOOD PRESSURE: 128 MMHG | BODY MASS INDEX: 24.32 KG/M2

## 2020-06-03 DIAGNOSIS — I50.22 CHRONIC SYSTOLIC CONGESTIVE HEART FAILURE (HCC): ICD-10-CM

## 2020-06-03 PROCEDURE — 99214 OFFICE O/P EST MOD 30 MIN: CPT | Performed by: INTERNAL MEDICINE

## 2020-06-03 RX ORDER — METOPROLOL SUCCINATE 25 MG/1
25 TABLET, EXTENDED RELEASE ORAL DAILY
Qty: 30 TAB | Refills: 11 | Status: SHIPPED | OUTPATIENT
Start: 2020-06-03 | End: 2021-03-25 | Stop reason: SDUPTHER

## 2020-06-03 RX ORDER — LISINOPRIL 5 MG/1
5 TABLET ORAL DAILY
Qty: 30 TAB | Refills: 11 | Status: SHIPPED | OUTPATIENT
Start: 2020-06-03 | End: 2021-03-30 | Stop reason: SDUPTHER

## 2020-06-03 ASSESSMENT — FIBROSIS 4 INDEX: FIB4 SCORE: 4.15

## 2020-06-03 NOTE — PROGRESS NOTES
Cardiology Follow-up Consultation Note    Date of note:    6/3/2020    Primary Care Provider: Michael J Bloch, M.D.    Name:             Tristan Hester   YOB: 1935  MRN:               [id number]    CC: Follow-up heart failure, mitral regurgitation, status post TAVR    Patient ID/HPI:   84-year-old male patient with history of severe aortic stenosis status post transcatheter aortic valve replacement, chronic systolic heart failure, paroxysmal atrial fibrillation, hypertension, moderate to severe mitral regurgitation here for follow-up.  He had problems with heart failure, shortness of breath post TAVR.  Currently feels well.  Walks about a mile every other day.  Denies shortness of breath.  Complains of lightheadedness.      ROS  All other systems reviewed and negative.    Past Medical History:   Diagnosis Date   • Aortic valve stenosis    • Arrhythmia     Afib   • Arthritis     osteo, hips and knees   • Blood clotting disorder ([facility])     , left arm   • Bronchitis        • Cancer ([facility])     Prostate CA--2000   • Carotid arterial disease ([facility]) 1/6/2014   • Cataract     removed bilat   • DVT (deep venous thrombosis) ([facility]) 2016   • Heart valve disease     MVR   • HLD (hyperlipidemia) 1/6/2014   • Hypertension    • Mitral regurgitation 1/6/2014   • Pain 12-14-15    low back, hips, 2-3/10   • Pneumonia        • Stroke ([facility]) 2008    TIA, no residual   • TIA (transient ischemic attack)     2008   • Urinary bladder disorder     HAD MALE BLADDER SUSPENSION PROCEDURE DONE   • Urinary incontinence     stress incontinence         Past Surgical History:   Procedure Laterality Date   • TRANSCATHETER AORTIC VALVE REPLACEMENT  3/2/2020    Procedure: REPLACEMENT, AORTIC VALVE, TRANSCATHETER -;  Surgeon: Sony Barrientos M.D.;  Location: SURGERY [facility];  Service: Cardiac   • YOANDY  3/2/2020    Procedure: ECHOCARDIOGRAM, TRANSESOPHAGEAL;  Surgeon: Sony Barrientos M.D.;   Location: SURGERY [facility];  Service: Cardiac   • FEMORAL ENDARTERECTOMY Left 3/2/2020    Procedure: Repair of left femoral  Artery Dissection;  Surgeon: Alpa Tillman M.D.;  Location: SURGERY [facility];  Service: General   • LUMBAR FUSION POSTERIOR  12/28/2015    Procedure: LUMBAR FUSION POSTERIOR L3-5 with peek rods;  Surgeon: Phylicia Almonte M.D.;  Location: SURGERY [facility];  Service:    • LUMBAR DECOMPRESSION  12/28/2015    Procedure: LUMBAR DECOMPRESSION posterior redo decompression L3-5, with dural repair;  Surgeon: Phylicia Almonte M.D.;  Location: SURGERY [facility];  Service:    • THORACOSCOPY  6/13/2015    Procedure: THORACOSCOPY with decortication VATS , side to be determined  ;  Surgeon: Elmira Holder M.D.;  Location: [facility];  Service:    • LUMBAR LAMINECTOMY DISKECTOMY  10/8/2010    Performed by PHYLICIA ALMONTE at [facility]   • CERVICAL DISK AND FUSION ANTERIOR  10/26/2009    Performed by PHYLICIA ALMONTE at [facility]   • CAROTID ENDARTERECTOMY  7/10/08    Performed by LEIGH MORATAYA at [facility]   • BLADDER SLING MALE  11/2004   • PROSTATECTOMY, RADIAL  11/2000         Current Outpatient Medications   Medication Sig Dispense Refill   • metoprolol SR (TOPROL XL) 25 MG TABLET SR 24 HR Take 1 Tab by mouth every day. 30 Tab 11   • lisinopril (PRINIVIL) 5 MG Tab Take 1 Tab by mouth every day. 30 Tab 11   • furosemide (LASIX) 20 MG Tab Take 1 Tab by mouth every day. 30 Tab 11   • temazepam (RESTORIL) 30 MG capsule Take 1 Cap by mouth at bedtime as needed for up to 180 days. 90 Cap 1   • warfarin (COUMADIN) 6 MG Tab Take 1 Tab by mouth. 30 Tab 3   • warfarin (COUMADIN) 4 MG Tab Take 4 mg by mouth. Indications: Per pt takes once a week on Friday 30 Tab 3   • aspirin EC 81 MG EC tablet Take 1 Tab by mouth every day. 90 Tab 3   • atorvastatin (LIPITOR) 80 MG tablet Take 40 mg by mouth every evening.     • ezetimibe (ZETIA) 10 MG Tab  Take 10 mg by mouth every day.     • gabapentin (NEURONTIN) 300 MG Cap Take 300-600 mg by mouth 2 Times a Day. 600 mg in the AM  300 mg in the PM       No current facility-administered medications for this visit.          Allergies   Allergen Reactions   • Other Environmental Runny Nose and Itching     Seasonal, cats-hayfever         Family History   Problem Relation Age of Onset   • Heart Disease Father         CHF at 91.   • Other Brother         Aneurysm   • Cancer Brother         Seminal vascular cancer   • Autoimmune Disease Daughter         Lupus, RA   • No Known Problems Daughter    • No Known Problems Son    • No Known Problems Son    • Heart Attack Neg Hx          Social History     Socioeconomic History   • Marital status:      Spouse name: Not on file   • Number of children: Not on file   • Years of education: Not on file   • Highest education level: Not on file   Occupational History   • Not on file   Social Needs   • Financial resource strain: Not on file   • Food insecurity     Worry: Never true     Inability: Never true   • Transportation needs     Medical: No     Non-medical: No   Tobacco Use   • Smoking status: Former Smoker     Packs/day: 0.50     Years: 30.00     Pack years: 15.00     Types: Cigarettes, Pipe     Last attempt to quit: 1985     Years since quittin.4   • Smokeless tobacco: Never Used   Substance and Sexual Activity   • Alcohol use: Yes     Alcohol/week: 3.0 oz     Types: 3 Cans of beer, 3 Shots of liquor per week     Comment: 1 x week   • Drug use: No   • Sexual activity: Not Currently   Lifestyle   • Physical activity     Days per week: Not on file     Minutes per session: Not on file   • Stress: Not on file   Relationships   • Social connections     Talks on phone: Not on file     Gets together: Not on file     Attends [employer] service: Not on file     Active member of club or organization: Not on file     Attends meetings of clubs or organizations: Not on file      "Relationship status: Not on file   • Intimate partner violence     Fear of current or ex partner: Not on file     Emotionally abused: Not on file     Physically abused: Not on file     Forced sexual activity: Not on file   Other Topics Concern   • Not on file   Social History Narrative   • Not on file         Physical Exam:  Ambulatory Vitals  /84 (BP Location: Right arm, Patient Position: Sitting, BP Cuff Size: Adult)   Pulse 72   Resp 14   Ht 1.8 m (5' 10.87\")   Wt 78.8 kg (173 lb 11.6 oz)   SpO2 97%    Oxygen Therapy:  Pulse Oximetry: 97 %  BP Readings from Last 4 Encounters:   06/03/20 128/84   04/08/20 124/71   03/06/20 108/54   03/04/20 117/68       Weight/BMI: Body mass index is 24.32 kg/m².  Wt Readings from Last 4 Encounters:   06/03/20 78.8 kg (173 lb 11.6 oz)   04/08/20 81.7 kg (180 lb 1.6 oz)   03/06/20 84 kg (185 lb 1.2 oz)   03/04/20 84.9 kg (187 lb 2.7 oz)       General: Well appearing and in no apparent distress  Head: atrumatic  Eyes: No conjunctival pallor   ENT: normal external appearance of nose and ears  Neck: JVD absent, carotid bruits absent  Lungs: respiratory sounds  normal, additional breath sounds absent  Heart: Regular rhythm,   No palpable thrills on palpation, 2 x 6 systolic murmur apex, no rubs,   Lower extremity edema absent.   Pedal pulses normal  Abdomen: soft, non tender, non distended.  Extremities/MSK: no clubbing, no cyanosis  Neurological: normal orientation, Gait normal   Psychiatric: Appropriate affect, intact judgement and insight  Skin: Warm extremities        Lab Data Review:  Lab Results   Component Value Date/Time    CHOLSTRLTOT 109 05/29/2020 07:40 AM    LDL 51 05/29/2020 07:40 AM    HDL 49 05/29/2020 07:40 AM    TRIGLYCERIDE 47 05/29/2020 07:40 AM       Lab Results   Component Value Date/Time    SODIUM 141 05/29/2020 07:40 AM    POTASSIUM 4.4 05/29/2020 07:40 AM    CHLORIDE 106 05/29/2020 07:40 AM    CO2 23 05/29/2020 07:40 AM    GLUCOSE 118 (H) 05/29/2020 " 07:40 AM    BUN 21 05/29/2020 07:40 AM    CREATININE 0.87 05/29/2020 07:40 AM    CREATININE 1.0 07/02/2008 03:43 PM    BUNCREATRAT 16 11/15/2019 04:48 AM     Lab Results   Component Value Date/Time    ALKPHOSPHAT 72 04/15/2020 11:36 AM    ASTSGOT 25 04/15/2020 11:36 AM    ALTSGPT 15 04/15/2020 11:36 AM    TBILIRUBIN 1.2 04/15/2020 11:36 AM      Lab Results   Component Value Date/Time    WBC 6.6 05/29/2020 07:41 AM     Impression and Plan:  84-year-old male patient with  1.  Status post TAVR  2.  Chronic systolic heart failure  3.  Mild LV dysfunction, ejection fraction about 40 to 45%  4.  Essential hypertension  5.  Moderate to severe mitral regurgitation  6.  Paroxysmal atrial fibrillation  7.  History of DVT,  8.  History of TIA    He feels well clinically, on exam he appears to be euvolemic.  However his proBNP is significantly elevated compared to his prior reading.  I will start him on medical therapy for his mild LV dysfunction.  I read echocardiogram ejection fraction 50% but it is in 40 to 45% range.  Start metoprolol succinate 25 mg daily, lisinopril 5 mg daily.  Continue Lasix, I advised him to take 2 days in a row and give him 1 day break.  Continue aspirin, Coumadin.  I cautioned him starting metoprolol, lisinopril can make his lightheadedness worse, fall precautions are discussed.  Recheck proBNP in a month, follow-up and echocardiogram in couple of months.  If we go in right direction will continue on medical therapy, otherwise he will need MitraClip to help with his mitral regurgitation.    Return in about 2 months (around 8/3/2020).      Sony RAMIRES  Interventional cardiologist  [facility], [address]  [address] [place] [address]  [place] [address]  Phone: [phone]  Fax: [phone]

## 2020-06-17 DIAGNOSIS — E78.5 HYPERLIPIDEMIA, UNSPECIFIED HYPERLIPIDEMIA TYPE: ICD-10-CM

## 2020-06-17 RX ORDER — ATORVASTATIN CALCIUM 80 MG/1
TABLET, FILM COATED ORAL
Qty: 45 TAB | Refills: 1 | Status: SHIPPED | OUTPATIENT
Start: 2020-06-17 | End: 2020-12-15

## 2020-07-02 ENCOUNTER — HOSPITAL ENCOUNTER (OUTPATIENT)
Dept: LAB | Facility: MEDICAL CENTER | Age: 85
End: 2020-07-02
Attending: INTERNAL MEDICINE
Payer: MEDICARE

## 2020-07-02 ENCOUNTER — ANTICOAGULATION VISIT (OUTPATIENT)
Dept: VASCULAR LAB | Facility: MEDICAL CENTER | Age: 85
End: 2020-07-02
Attending: INTERNAL MEDICINE
Payer: MEDICARE

## 2020-07-02 DIAGNOSIS — I50.22 CHRONIC SYSTOLIC CONGESTIVE HEART FAILURE (HCC): ICD-10-CM

## 2020-07-02 DIAGNOSIS — G45.9 TIA (TRANSIENT ISCHEMIC ATTACK): ICD-10-CM

## 2020-07-02 DIAGNOSIS — I48.0 PAROXYSMAL ATRIAL FIBRILLATION (HCC): ICD-10-CM

## 2020-07-02 LAB
INR PPP: 2.4 (ref 2–3.5)
NT-PROBNP SERPL IA-MCNC: 9275 PG/ML (ref 0–125)

## 2020-07-02 PROCEDURE — 85610 PROTHROMBIN TIME: CPT

## 2020-07-02 PROCEDURE — 36415 COLL VENOUS BLD VENIPUNCTURE: CPT

## 2020-07-02 PROCEDURE — 83880 ASSAY OF NATRIURETIC PEPTIDE: CPT

## 2020-07-02 PROCEDURE — 99211 OFF/OP EST MAY X REQ PHY/QHP: CPT

## 2020-07-02 NOTE — PROGRESS NOTES
Anticoagulation Summary  As of 2020    INR goal:   2.0-3.0   TTR:   69.8 % (4.9 y)   INR used for dosin.40 (2020)   Warfarin maintenance plan:   4 mg (4 mg x 1) every Fri; 6 mg (4 mg x 1.5) all other days   Weekly warfarin total:   40 mg   Plan last modified:   ADDISON Mckinley (2020)   Next INR check:   2020   Priority:   Maintenance   Target end date:   Indefinite    Indications    Paroxysmal atrial fibrillation (HCC) [I48.0]  Hx of Stroke (Resolved) [I63.9]  Hx of TIA (transient ischemic attack) [G45.9]  Deep vein thrombosis (DVT) (HCC) (Resolved) [I82.409]  Atrial fibrillation (HCC) (Resolved) [I48.91]             Anticoagulation Episode Summary     INR check location:   Anticoagulation Clinic    Preferred lab:       Send INR reminders to:       Comments:         Anticoagulation Care Providers     Provider Role Specialty Phone number    Michael J Bloch, M.D. Referring Internal Medicine [phone]    [facility]   [phone]              Anticoagulation Patient Findings  Patient Findings     Negatives:   Signs/symptoms of thrombosis, Signs/symptoms of bleeding, Laboratory test error suspected, Change in health, Change in alcohol use, Change in activity, Upcoming invasive procedure, Emergency department visit, Upcoming dental procedure, Missed doses, Extra doses, Change in medications, Change in diet/appetite, Hospital admission, Bruising, Other complaints          HPI:  Tristan Hester seen in clinic today, on anticoagulation therapy with warfarin for atrial fib, stroke/tia, dvt  Changes to current medical/health status since last appt: none  Denies signs/symptoms of bleeding and/or thrombosis since the last appt.    Denies any interval changes to diet  Denies any interval changes to medications since last appt.   Denies any complications or cost restrictions with current therapy.   Verified current warfarin dosing schedule.   Unable to check BP since this  was a drive-up visit    A/P   INR  therapeutic.   Pt is to continue with current warfarin dosing regimen.    Follow up appointment in 6 week(s).    Karrie aJmeson, PharmD

## 2020-07-07 ENCOUNTER — TELEPHONE (OUTPATIENT)
Dept: CARDIOLOGY | Facility: MEDICAL CENTER | Age: 85
End: 2020-07-07

## 2020-07-07 NOTE — PROGRESS NOTES
Valve Program Functional Assessment: 6/3/20 1 month post op     [id number]   1a) Showering/bathin  1b) Walking 1 block on ground: 5  1c) Hurrying or jogging:3  2) Swellin  3) Fatigue: 7  4) Shortness of breath: 7  5) Sleep sitting up: 5  6) Limited enjoyment of life: 5  7) Spend the rest of your life with HF: 5  8a) Hobbies, recreational activities:5  8b) Working or doing household chores:5  8c) Visiting family or friends: 5    5 meter walk test  1) __5.25____ s/5m  2) __5.36____ s/5m  3) __5.22____ s/5m     Strength   1) _38_____ kg  2) _36_____ kg  3) _36_____ kg    YU ADLs  Patient independently preforms...   - Bathing: Yes   - Dressing: Yes   - Toileting: Yes   - Transferring: Yes   - Continence: Yes   - Feeding: Yes     Living Situation  Patient lives:with spouse    Mobility Aids   Patient uses: cane

## 2020-07-27 ENCOUNTER — TELEPHONE (OUTPATIENT)
Dept: CARDIOLOGY | Facility: MEDICAL CENTER | Age: 85
End: 2020-07-27

## 2020-07-27 RX ORDER — AMOXICILLIN 500 MG/1
2000 CAPSULE ORAL
Qty: 12 CAP | Refills: 0 | Status: SHIPPED | OUTPATIENT
Start: 2020-07-27 | End: 2022-09-14

## 2020-07-27 NOTE — TELEPHONE ENCOUNTER
JESSE Wagoner from Dr. Edward Zheng called to advise patient is in the chair at a dental appt and needs to know if the Pt needs pre meds for a prophilaxis. Please call Rizwana back at [phone].      Thank you,  Mireille HAMPTON

## 2020-07-27 NOTE — PROGRESS NOTES
OCCUPATIONAL THERAPY TREATMENT NOTE - INPATIENT     Room Number: 648  Session: 1  Number of Visits to Meet Established Goals: Trial    Presenting Problem: pneumonia, sepsis, fall, SHWETHA, somnolence    History related to current admission: Patient is a 83y/o Outcome: NO ANSWER // BUSY SIGNAL    WebIZ Checked & Epic Updated:  yes    [facility] Verified: no    Attempt # 1ST         ASSESSMENT  AM-PAC ‘6-Clicks’ Inpatient Daily Activity Short Form  How much help from another person does the patient currently need…  -   Putting on and taking off regular lower body clothing?: Total  -   Bathing (including washing, rinsing, drying)?: Tot these and other functional areas. Patient would benefit from continued OT services during hospital stay to further address these deficits and assist patient in returning to prior level of function.       OT Discharge Recommendations: Long term care  OT [facility]

## 2020-07-27 NOTE — TELEPHONE ENCOUNTER
Message   Received: Today   Message Contents   DAGOBERTO Cooper R.N.    Caller: Unspecified (Today,  9:36 AM)               That's strange but that's fine to send Rx.     -------------------------------------------------------------------------------------    Contacted pt and s/w wife as pt was not available. Explained the antibiotic prophylaxis after TAVR and verified that pt does not have a known allergy to PCNs. Wife states that she takes pre-med prior to dental work for a joint replacement so she understands the protocol and will explain to pt when he gets home. She takes amoxicillin as well. rx for amoxicillin sent to preferred pharmacy. Wife notified that future refill needs will go through Dr. Zheng.

## 2020-07-27 NOTE — TELEPHONE ENCOUNTER
S/w Rizwana and advised that with pts hx of TAVR that he will need antibiotic prior to dental work for IE prophylaxis. Advised that pts are given AHA wallet cards which has a chart of recommended antibiotics but that amoxicillin 2g 30-60min prior to dental work is preferred for pts without PRN allergy (no medication allergies noted in pts chart).       Explained that Dr. Barrientos prefers the dentist to prescribe prophylaxis, however, per Rizwana their office policy is that the cardiologist prescribes the first antibiotic RX and then they will prescribed from there out.     To Dr. Barrientos

## 2020-07-28 ENCOUNTER — OFFICE VISIT (OUTPATIENT)
Dept: CARDIOLOGY | Facility: MEDICAL CENTER | Age: 85
End: 2020-07-28
Payer: MEDICARE

## 2020-07-28 VITALS
HEIGHT: 71 IN | WEIGHT: 174.38 LBS | OXYGEN SATURATION: 92 % | BODY MASS INDEX: 24.41 KG/M2 | DIASTOLIC BLOOD PRESSURE: 70 MMHG | SYSTOLIC BLOOD PRESSURE: 136 MMHG | HEART RATE: 62 BPM

## 2020-07-28 DIAGNOSIS — I34.0 NONRHEUMATIC MITRAL VALVE REGURGITATION: ICD-10-CM

## 2020-07-28 DIAGNOSIS — I10 ESSENTIAL HYPERTENSION, BENIGN: ICD-10-CM

## 2020-07-28 DIAGNOSIS — R06.09 DOE (DYSPNEA ON EXERTION): ICD-10-CM

## 2020-07-28 DIAGNOSIS — I50.22 CHRONIC SYSTOLIC CONGESTIVE HEART FAILURE (HCC): ICD-10-CM

## 2020-07-28 DIAGNOSIS — I35.0 SEVERE AORTIC STENOSIS: ICD-10-CM

## 2020-07-28 DIAGNOSIS — I48.0 PAROXYSMAL ATRIAL FIBRILLATION (HCC): ICD-10-CM

## 2020-07-28 DIAGNOSIS — E78.5 DYSLIPIDEMIA: ICD-10-CM

## 2020-07-28 DIAGNOSIS — I77.9 BILATERAL CAROTID ARTERY DISEASE, UNSPECIFIED TYPE (HCC): ICD-10-CM

## 2020-07-28 DIAGNOSIS — I42.0 DILATED CARDIOMYOPATHY (HCC): ICD-10-CM

## 2020-07-28 DIAGNOSIS — G45.9 TIA (TRANSIENT ISCHEMIC ATTACK): ICD-10-CM

## 2020-07-28 DIAGNOSIS — I82.4Z9 ACUTE DEEP VEIN THROMBOSIS (DVT) OF DISTAL VEIN OF LOWER EXTREMITY, UNSPECIFIED LATERALITY (HCC): ICD-10-CM

## 2020-07-28 DIAGNOSIS — I49.1 PAC (PREMATURE ATRIAL CONTRACTION): ICD-10-CM

## 2020-07-28 DIAGNOSIS — Z95.3 STATUS POST TRANSCATHETER AORTIC VALVE REPLACEMENT (TAVR) USING BIOPROSTHESIS: ICD-10-CM

## 2020-07-28 PROCEDURE — 99214 OFFICE O/P EST MOD 30 MIN: CPT | Performed by: INTERNAL MEDICINE

## 2020-07-28 RX ORDER — SPIRONOLACTONE 25 MG/1
25 TABLET ORAL DAILY
Qty: 90 TAB | Refills: 3 | Status: SHIPPED | OUTPATIENT
Start: 2020-07-28 | End: 2020-07-28 | Stop reason: SDUPTHER

## 2020-07-28 RX ORDER — SPIRONOLACTONE 25 MG/1
12.5 TABLET ORAL DAILY
Qty: 50 TAB | Refills: 3 | Status: SHIPPED | OUTPATIENT
Start: 2020-07-28 | End: 2021-03-03

## 2020-07-28 RX ORDER — CALCIPOTRIENE 50 UG/G
CREAM TOPICAL
COMMUNITY
Start: 2020-06-24 | End: 2020-09-24

## 2020-07-28 RX ORDER — FLUOROURACIL 50 MG/G
CREAM TOPICAL
COMMUNITY
Start: 2020-06-24 | End: 2020-09-24

## 2020-07-28 ASSESSMENT — FIBROSIS 4 INDEX: FIB4 SCORE: 4.2

## 2020-07-28 NOTE — PROGRESS NOTES
"Subjective:   Chief Complaint:   Chief Complaint   Patient presents with   • Follow-Up     Nonrheumatic Aortic Valve Stenoisis, AFIB       \"Everardo\" Tristan Hester is a 85 y.o. male who returns today for paroxysmal atrial fibrillation, severe AS, TAVR,  hypertension, hyperlipidemia, and mitral regurgitation, non ischemic CMO.    Sees Dr. Bloch who referred to Dr. Saeed for MV regurgitation.  Noted to have severe AS, low flow, low gradient, EF 30%.  R/C with ENRIKE 0.81 cm2, EF 30%.  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  Then had an arterial clot removed by Dr. Tillman.  Follow-up EF 40-45%.  Still has mod to severe MR.    Has mild chronic cough, prior lung surgery, PNA, then empyema.   This was the only time for afib, was placed on warfarin.  Has severely dilated left atrium.  Prior TIAx2 in 2000, vision loss, then recurred in 2008.  Prior carotid endart with Dr. Calero on the right.  [id number] with prior TIA but TIA was years before afib, not clear if related, could have been carotid disease.    Has carotid disease, mild, 2017.  Has Distal abd aortic and bilateral iliofemoral tortuosity on [facility], again had left iliac artery endovascular stenting done by Dr. Costa at time of TAVR given a small dissection.  Has HTN for a few decades, BP controlled on 2 meds.  Has HLP, on lipitor 80 mg and zetia, ldl 51.  Chronic kidney disease early stage III.    Since his procedure, he remains on warfarin but not with aspirin.  Metoprolol succ was added for low EF.    He is at the gym again, does floor exercise and cardio, takes it easy.  His TELLEZ is better, and less lightheadedness (not new).  Feels a frequent state of intermittent lightheadedness, associates it to balance problem.    He is not limited by chest pain, pressure or tightness with activity.     Did " have sharp chest ache for 5 min but resolved.  No orthopnea or lower extremity swelling.   No significant palpitations (rarely).  No presyncope/syncope.   Has had vertigo.  No symptoms of leg claudication.   No new stroke/TIA like symptoms.    No family history of premature coronary artery disease.  Father had CHF at 91.  Former tobacco, quit .  No history of diabetes, occasional IFG.  No history of autoimmune disease such as lupus or rheumatoid arthritis.    His stepdaughter is Deena Yadav, 1 of our GI doctors.  I see his wife Tori also.  Tori is noting some mild cognitive delay which seems to predate the TAVR.  Brother  of cancer, sister  of open valentines 2020, joao, open heart surgery and this is probably contributing.  He is the oldest of 4 and the only relative still living.    DATA REVIEWED by me:  EKG 3/6/2020  Sinus, PACs    ECG 10/3/2019  Sinus, rate 64    Echo 2020  Compared to the images of the study done on 20 there has been no   changes.  Low normal left ventricular ejection fraction.  normal functioning TAVR valve.  EF reported as 50%, more likely 40-45%.  Severely dilated left atrium  Mod to severe MR.     Echo 2019  Prior echo done 19.  No significant change.  Left ventricular ejection fraction is visually estimated to be 60%.  Moderate aortic stenosis.Vmax is 3.6 m/s.  Transvalvular gradients are - Peak: 53 mmHg, Mean: 38 mmHg.   Dimensionless index is 0.27.  Mild to moderate eccentric mitral regurgitation.  RVSP 35 mmHg.    Vascular procedure 3-2-2020  Dissection stopped at CFA plaque causing occlusion  Repair of left femoral  Artery Dissection - Wound Class: Clean  Pelvic angiogram    TAVR and endovascular procedure 3/2/2020  Transcatheter aortic valve replacement (29 mm S3 Forbes   pericardial valve), left iliac artery endovascular stenting and intraoperative   transesophageal echocardiography.      R/[facility] 2020  1.  Low flow, low gradient  severe aortic stenosis with peak to peak gradient   of 40 mmHg, mean gradient of 34 mmHg, calculated ENRIKE of 0.81 cm2.  2.  Nonobstructive coronary artery disease.  3.  Dilated left ventricular systolic function with reduced left ventricular   systolic function, ejection fraction of 30%.  Global hypokinesis.  4.  Elevated left ventricular end-diastolic pressure.  5.  Mildly dilated ascending aorta.  6.  Large distal abdominal aortic and bilateral iliofemoral system with   tortuosity of the iliofemoral system, right greater than left.      CTA chest and abdomen 2/8/2020  1. Tricuspid aortic valve with moderate calcifications.  Annulus area: 548 mm?  Annulus diameter: 28.4 x 25.0 mm  Diameter at the level of the sinuses: 34.0 x 34.8 x 35.9 mm  Vertical distances from the aortic annulus to the coronary origins are below 10 mm, especially the right.  2. Access evaluation:  Minimal diameter on the right is at the ostium of right common iliac artery: 9.8 mm  There is a focal dissection at the origin of the left common iliac artery with minimal lumen diameter, measuring 6.2 mm  3. Hazy groundglass opacities in the bilateral lungs with interlobular septal thickening could relate to pulmonary edema. Patchy bibasilar opacities, likely atelectasis.    Abdominal ultrasound 5/19/2020   No evidence of aortic aneurysm. Abdominal aorta is ectatic. Atherosclerotic    plaque.    No stenosis of the proximal segment of the major visceral arteries.    Patent bilateral iliac arteries. Left external iliac stent is patent without    evidence of stenosis. Tortuous bilateral iliac arteries    JANICE 5/19/2020  There is no evidence of major arterial disease demonstrated.     Carotid US 8-5-19   Mild to moderate plaque within bilateral internal carotid arteries with    less that 50% stenosis.   Prominent heterogeneous plaque proximal right external carotid artery.    MRI 12-13-17  MRI of the brain without contrast within normal limits for age with  moderate white matter changes without demonstrable interval progression since the prior study.    Most recent labs:     Lab Results   Component Value Date/Time    HEMOGLOBIN 13.8 (L) 05/29/2020 07:41 AM    HEMATOCRIT 44.7 05/29/2020 07:41 AM    MCV 89.6 05/29/2020 07:41 AM    INR 2.40 07/02/2020    TSH 2.100 11/15/2019 04:48 AM      Lab Results   Component Value Date/Time    SODIUM 141 05/29/2020 07:40 AM    POTASSIUM 4.4 05/29/2020 07:40 AM    CHLORIDE 106 05/29/2020 07:40 AM    CO2 23 05/29/2020 07:40 AM    GLUCOSE 118 (H) 05/29/2020 07:40 AM    BUN 21 05/29/2020 07:40 AM    CREATININE 0.87 05/29/2020 07:40 AM    CREATININE 1.0 07/02/2008 03:43 PM      Lab Results   Component Value Date/Time    ASTSGOT 25 04/15/2020 11:36 AM    ALTSGPT 15 04/15/2020 11:36 AM    ALBUMIN 3.7 04/15/2020 11:36 AM      Lab Results   Component Value Date/Time    CHOLSTRLTOT 109 05/29/2020 07:40 AM    LDL 51 05/29/2020 07:40 AM    HDL 49 05/29/2020 07:40 AM    TRIGLYCERIDE 47 05/29/2020 07:40 AM       11/15/2019 hemoglobin 14.1, platelets 181, sodium 144, potassium 4.4, creatinine 1.19, GFR 56, LFTs normal, LDL 49, HDL 59, triglycerides 49, total cholesterol 116    Past Medical History:   Diagnosis Date   • Aortic valve stenosis    • Arrhythmia     Afib   • Arthritis     osteo, hips and knees   • Blood clotting disorder ([facility])     , left arm   • Bronchitis        • Cancer ([facility])     Prostate CA--2000   • Carotid arterial disease ([facility]) 1/6/2014   • Cataract     removed bilat   • DVT (deep venous thrombosis) ([facility]) 2016   • Heart valve disease     MVR   • HLD (hyperlipidemia) 1/6/2014   • Hypertension    • Mitral regurgitation 1/6/2014   • Pain 12-14-15    low back, hips, 2-3/10   • Pneumonia        • Stroke ([facility]) 2008    TIA, no residual   • TIA (transient ischemic attack)     2008   • Urinary bladder disorder     HAD MALE BLADDER SUSPENSION PROCEDURE DONE   • Urinary incontinence     stress incontinence     Past  Surgical History:   Procedure Laterality Date   • TRANSCATHETER AORTIC VALVE REPLACEMENT  3/2/2020    Procedure: REPLACEMENT, AORTIC VALVE, TRANSCATHETER -;  Surgeon: Sony Barrientos M.D.;  Location: SURGERY [facility];  Service: Cardiac   • YOANDY  3/2/2020    Procedure: ECHOCARDIOGRAM, TRANSESOPHAGEAL;  Surgeon: Sony Barrientos M.D.;  Location: [facility];  Service: Cardiac   • FEMORAL ENDARTERECTOMY Left 3/2/2020    Procedure: Repair of left femoral  Artery Dissection;  Surgeon: Alpa Tillman M.D.;  Location: [facility];  Service: General   • LUMBAR FUSION POSTERIOR  12/28/2015    Procedure: LUMBAR FUSION POSTERIOR L3-5 with peek rods;  Surgeon: Phylicia Almonte M.D.;  Location: [facility];  Service:    • LUMBAR DECOMPRESSION  12/28/2015    Procedure: LUMBAR DECOMPRESSION posterior redo decompression L3-5, with dural repair;  Surgeon: Phylicia Almonte M.D.;  Location: [facility];  Service:    • THORACOSCOPY  6/13/2015    Procedure: THORACOSCOPY with decortication VATS , side to be determined  ;  Surgeon: Elmira Holder M.D.;  Location: [facility];  Service:    • LUMBAR LAMINECTOMY DISKECTOMY  10/8/2010    Performed by PHYLICIA ALMONTE at [facility]   • CERVICAL DISK AND FUSION ANTERIOR  10/26/2009    Performed by PHYLICIA ALMONTE at [facility]   • CAROTID ENDARTERECTOMY  7/10/08    Performed by LEIGH MORATAYA at [facility]   • BLADDER SLING MALE  11/2004   • PROSTATECTOMY, RADIAL  11/2000     Family History   Problem Relation Age of Onset   • Heart Disease Father         CHF at 91.   • Other Brother         Aneurysm   • Cancer Brother         Seminal vascular cancer   • Autoimmune Disease Daughter         Lupus, RA   • No Known Problems Daughter    • No Known Problems Son    • No Known Problems Son    • Heart Attack Neg Hx      Social History     Socioeconomic History   • Marital status:      Spouse  name: Not on file   • Number of children: Not on file   • Years of education: Not on file   • Highest education level: Not on file   Occupational History   • Not on file   Social Needs   • Financial resource strain: Not on file   • Food insecurity     Worry: Never true     Inability: Never true   • Transportation needs     Medical: No     Non-medical: No   Tobacco Use   • Smoking status: Former Smoker     Packs/day: 0.50     Years: 30.00     Pack years: 15.00     Types: Cigarettes, Pipe     Last attempt to quit: 1985     Years since quittin.5   • Smokeless tobacco: Never Used   Substance and Sexual Activity   • Alcohol use: Yes     Alcohol/week: 3.0 oz     Types: 3 Cans of beer, 3 Shots of liquor per week     Comment: 1 x week   • Drug use: No   • Sexual activity: Not Currently   Lifestyle   • Physical activity     Days per week: Not on file     Minutes per session: Not on file   • Stress: Not on file   Relationships   • Social connections     Talks on phone: Not on file     Gets together: Not on file     Attends Scientologist service: Not on file     Active member of club or organization: Not on file     Attends meetings of clubs or organizations: Not on file     Relationship status: Not on file   • Intimate partner violence     Fear of current or ex partner: Not on file     Emotionally abused: Not on file     Physically abused: Not on file     Forced sexual activity: Not on file   Other Topics Concern   • Not on file   Social History Narrative   • Not on file     Allergies   Allergen Reactions   • Other Environmental Runny Nose and Itching     Seasonal, cats-hayfever       Current Outpatient Medications   Medication Sig Dispense Refill   • calcipotriene (DOVONEX) 0.005 % Cream      • fluorouracil (EFUDEX) 5 % cream      • spironolactone (ALDACTONE) 25 MG Tab Take 0.5 Tabs by mouth every day. 50 Tab 3   • amoxicillin (AMOXIL) 500 MG Cap Take 4 Caps by mouth Once PRN (30-60min prior to dental work) for up to 1  "dose. 12 Cap 0   • atorvastatin (LIPITOR) 80 MG tablet TAKE 1/2 (ONE-HALF) TABLET BY MOUTH ONCE DAILY IN THE EVENING 45 Tab 1   • metoprolol SR (TOPROL XL) 25 MG TABLET SR 24 HR Take 1 Tab by mouth every day. 30 Tab 11   • lisinopril (PRINIVIL) 5 MG Tab Take 1 Tab by mouth every day. 30 Tab 11   • furosemide (LASIX) 20 MG Tab Take 1 Tab by mouth every day. 30 Tab 11   • temazepam (RESTORIL) 30 MG capsule Take 1 Cap by mouth at bedtime as needed for up to 180 days. 90 Cap 1   • warfarin (COUMADIN) 6 MG Tab Take 1 Tab by mouth. 30 Tab 3   • warfarin (COUMADIN) 4 MG Tab Take 4 mg by mouth. Indications: Per pt takes once a week on Friday 30 Tab 3   • aspirin EC 81 MG EC tablet Take 1 Tab by mouth every day. 90 Tab 3   • ezetimibe (ZETIA) 10 MG Tab Take 10 mg by mouth every day.     • gabapentin (NEURONTIN) 300 MG Cap Take 300-600 mg by mouth 2 Times a Day. 600 mg in the AM  300 mg in the PM       No current facility-administered medications for this visit.        ROS    All others systems reviewed and negative.     Objective:     /70 (BP Location: Left arm, Patient Position: Sitting, BP Cuff Size: Adult)   Pulse 62   Ht 1.803 m (5' 11\")   Wt 79.1 kg (174 lb 6.1 oz)   SpO2 92%  Body mass index is 24.32 kg/m².    Physical Exam   General: No acute distress. Well nourished.  HEENT: EOM grossly intact, no scleral icterus, no pharyngeal erythema.   Neck:  No JVD, no bruits, trachea midline  CVS: RRR. Normal S1, S2. 2/6 early peaking syst murmur RSB, 3/6 holo sys murmur apex. No LE edema.  2+ radial pulses, 2+ PT pulses  Resp: CTAB. No wheezing or crackles/rhonchi. Normal respiratory effort.  Abdomen: Soft, NT, no jen hepatomegaly.  MSK/Ext: No clubbing or cyanosis.  Skin: Warm and dry, no rashes.  Neurological: CN III-XII grossly intact. No focal deficits.   Psych: A&O x 3, appropriate affect, good judgement    Physical exam performed today and unchanged, except what is noted, compared to 1-.    Assessment: "     1. Chronic systolic congestive heart failure (HCC)  Basic Metabolic Panel    EC-ECHOCARDIOGRAM LTD W/O CONT   2. Dilated cardiomyopathy (HCC)     3. Status post transcatheter aortic valve replacement (TAVR) using bioprosthesis     4. Paroxysmal atrial fibrillation (HCC)     5. Hx of TIA (transient ischemic attack)     6. Essential hypertension, benign     7. Acute deep vein thrombosis (DVT) of distal vein of lower extremity, unspecified laterality ([facility])     8. Severe aortic stenosis     9. Dyslipidemia     10. Nonrheumatic mitral valve regurgitation  EC-ECHOCARDIOGRAM LTD W/O CONT   11. Bilateral carotid artery disease, unspecified type ([facility])     12. TELLEZ (dyspnea on exertion)     13. PAC (premature atrial contraction)     14. Endoleak post (EVAR) endovascular aneurysm repair, subsequent encounter         Medical Decision Making:  Today's Assessment / Status / Plan:     -His EF has improved to 40-45% which is encouraging but hopefully will cont to improve on CHF meds  -Nonobstructive coronary disease, on statin/zetia  -Vascular disease has been addressed, has an endovascular stent/repair, still on ASA with warfarin, will defer stopping ASA to Valve and vascular teams  -Remains on warfarin for paroxysmal atrial fibrillation, chads 2 vascular score of 5 including prior TIA which predated A. fib  -Blood pressure controlled  -Cholesterol controlled  -His mod to severe MR is the next project, will reassess after continuing diuresis  -He is willing to try spironolactone for low EF and for MR  -BMP 1 week  -limited echo for MR just prior to FU in Valve clinic  -Gets teeth cleaned regularly, takes Amoxicillin 4,000 mg.  -Seeing Dr. Barrientos in 2 weeks  -See me in 5 months    Written instructions given today:  -Start taking spironolactone 12.5 mg in the morning (a mild diuretic that helps the heart pump improve), works with furosemide. Needs close follow up of kidney function and potassium  -Take furosemide 20 mg every  morning  -Start writing down your weight every morning, same scale, same circumstances  -Get non fasting blood work in 5-7 days to monitor kidney function and potassium  -Limited echo 1-2 weeks just prior to seeing Dr. Barrientos to look at the mitral valve    Return in about 5 months (around 12/28/2020).    It is my pleasure to participate in the care of Mr. Hester.  Please do not hesitate to contact me with questions or concerns.    Maryana Anderson MD, [facility]  Cardiologist [facility]    Please note that this dictation was created using voice recognition software. I have made every reasonable attempt to correct obvious errors, but it is possible there are errors of grammar and possibly content that I did not discover before finalizing the note.

## 2020-07-28 NOTE — LETTER
"     [facility]   [address], [address]  [place] [address]  Phone: [phone]  Fax: [phone]              Tristan Hester  1935    Encounter Date: 7/28/2020    Maryana Anderson M.D.          PROGRESS NOTE:  Subjective:   Chief Complaint:   Chief Complaint   Patient presents with   • Follow-Up     Nonrheumatic Aortic Valve Stenoisis, AFIB       \"Everardo\" Tristan Hester is a 85 y.o. male who returns today for paroxysmal atrial fibrillation, severe AS, TAVR,  hypertension, hyperlipidemia, and mitral regurgitation, non ischemic CMO.    Sees Dr. Bloch who referred to Dr. Saeed for MV regurgitation.  Noted to have severe AS, low flow, low gradient, EF 30%.  R/C with ENRIKE 0.81 cm2, EF 30%.  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  Then had an arterial clot removed by Dr. Tillman.  Follow-up EF 40-45%.  Still has mod to severe MR.    Has mild chronic cough, prior lung surgery, PNA, then empyema.   This was the only time for afib, was placed on warfarin.  Has severely dilated left atrium.  Prior TIAx2 in 2000, vision loss, then recurred in 2008.  Prior carotid endart with Dr. Calero on the right.  [id number] with prior TIA but TIA was years before afib, not clear if related, could have been carotid disease.    Has carotid disease, mild, 2017.  Has Distal abd aortic and bilateral iliofemoral tortuosity on [facility], again had left iliac artery endovascular stenting done by Dr. Costa at time of TAVR given a small dissection.  Has HTN for a few decades, BP controlled on 2 meds.  Has HLP, on lipitor 80 mg and zetia, ldl 51.  Chronic kidney disease early stage III.    Since his procedure, he remains on warfarin but not with aspirin.  Metoprolol succ was added for low EF.    He is at the gym again, does " floor exercise and cardio, takes it easy.  His TELLEZ is better, and less lightheadedness (not new).  Feels a frequent state of intermittent lightheadedness, associates it to balance problem.    He is not limited by chest pain, pressure or tightness with activity.     Did have sharp chest ache for 5 min but resolved.  No orthopnea or lower extremity swelling.   No significant palpitations (rarely).  No presyncope/syncope.   Has had vertigo.  No symptoms of leg claudication.   No new stroke/TIA like symptoms.    No family history of premature coronary artery disease.  Father had CHF at 91.  Former tobacco, quit .  No history of diabetes, occasional IFG.  No history of autoimmune disease such as lupus or rheumatoid arthritis.    His stepdaughter is Deena Yadav, 1 of our GI doctors.  I see his wife Tori also.  Tori is noting some mild cognitive delay which seems to predate the TAVR.  Brother  of cancer, sister  of open valentines 2020, joao, open heart surgery and this is probably contributing.  He is the oldest of 4 and the only relative still living.    DATA REVIEWED by me:  EKG 3/6/2020  Sinus, PACs    ECG 10/3/2019  Sinus, rate 64    Echo 2020  Compared to the images of the study done on 20 there has been no   changes.  Low normal left ventricular ejection fraction.  normal functioning TAVR valve.  EF reported as 50%, more likely 40-45%.  Severely dilated left atrium  Mod to severe MR.     Echo 2019  Prior echo done 19.  No significant change.  Left ventricular ejection fraction is visually estimated to be 60%.  Moderate aortic stenosis.Vmax is 3.6 m/s.  Transvalvular gradients are - Peak: 53 mmHg, Mean: 38 mmHg.   Dimensionless index is 0.27.  Mild to moderate eccentric mitral regurgitation.  RVSP 35 mmHg.    Vascular procedure 3-2-2020  Dissection stopped at CFA plaque causing occlusion  Repair of left femoral  Artery Dissection - Wound Class: Clean  Pelvic  angiogram    TAVR and endovascular procedure 3/2/2020  Transcatheter aortic valve replacement (29 mm S3 Forbes   pericardial valve), left iliac artery endovascular stenting and intraoperative   transesophageal echocardiography.      [facility] 2-  1.  Low flow, low gradient severe aortic stenosis with peak to peak gradient   of 40 mmHg, mean gradient of 34 mmHg, calculated ENRIKE of 0.81 cm2.  2.  Nonobstructive coronary artery disease.  3.  Dilated left ventricular systolic function with reduced left ventricular   systolic function, ejection fraction of 30%.  Global hypokinesis.  4.  Elevated left ventricular end-diastolic pressure.  5.  Mildly dilated ascending aorta.  6.  Large distal abdominal aortic and bilateral iliofemoral system with   tortuosity of the iliofemoral system, right greater than left.      CTA chest and abdomen 2/8/2020  1. Tricuspid aortic valve with moderate calcifications.  Annulus area: 548 mm?  Annulus diameter: 28.4 x 25.0 mm  Diameter at the level of the sinuses: 34.0 x 34.8 x 35.9 mm  Vertical distances from the aortic annulus to the coronary origins are below 10 mm, especially the right.  2. Access evaluation:  Minimal diameter on the right is at the ostium of right common iliac artery: 9.8 mm  There is a focal dissection at the origin of the left common iliac artery with minimal lumen diameter, measuring 6.2 mm  3. Hazy groundglass opacities in the bilateral lungs with interlobular septal thickening could relate to pulmonary edema. Patchy bibasilar opacities, likely atelectasis.    Abdominal ultrasound 5/19/2020   No evidence of aortic aneurysm. Abdominal aorta is ectatic. Atherosclerotic    plaque.    No stenosis of the proximal segment of the major visceral arteries.    Patent bilateral iliac arteries. Left external iliac stent is patent without    evidence of stenosis. Tortuous bilateral iliac arteries    JANICE 5/19/2020  There is no evidence of major arterial disease demonstrated.         Carotid US 8-5-19   Mild to moderate plaque within bilateral internal carotid arteries with    less that 50% stenosis.   Prominent heterogeneous plaque proximal right external carotid artery.    MRI 12-13-17  MRI of the brain without contrast within normal limits for age with moderate white matter changes without demonstrable interval progression since the prior study.    Most recent labs:     Lab Results   Component Value Date/Time    HEMOGLOBIN 13.8 (L) 05/29/2020 07:41 AM    HEMATOCRIT 44.7 05/29/2020 07:41 AM    MCV 89.6 05/29/2020 07:41 AM    INR 2.40 07/02/2020    TSH 2.100 11/15/2019 04:48 AM      Lab Results   Component Value Date/Time    SODIUM 141 05/29/2020 07:40 AM    POTASSIUM 4.4 05/29/2020 07:40 AM    CHLORIDE 106 05/29/2020 07:40 AM    CO2 23 05/29/2020 07:40 AM    GLUCOSE 118 (H) 05/29/2020 07:40 AM    BUN 21 05/29/2020 07:40 AM    CREATININE 0.87 05/29/2020 07:40 AM    CREATININE 1.0 07/02/2008 03:43 PM      Lab Results   Component Value Date/Time    ASTSGOT 25 04/15/2020 11:36 AM    ALTSGPT 15 04/15/2020 11:36 AM    ALBUMIN 3.7 04/15/2020 11:36 AM      Lab Results   Component Value Date/Time    CHOLSTRLTOT 109 05/29/2020 07:40 AM    LDL 51 05/29/2020 07:40 AM    HDL 49 05/29/2020 07:40 AM    TRIGLYCERIDE 47 05/29/2020 07:40 AM       11/15/2019 hemoglobin 14.1, platelets 181, sodium 144, potassium 4.4, creatinine 1.19, GFR 56, LFTs normal, LDL 49, HDL 59, triglycerides 49, total cholesterol 116    Past Medical History:   Diagnosis Date   • Aortic valve stenosis    • Arrhythmia     Afib   • Arthritis     osteo, hips and knees   • Blood clotting disorder (HCC)     , left arm   • Bronchitis        • Cancer (HCC)     Prostate CA--2000   • Carotid arterial disease (HCC) 1/6/2014   • Cataract     removed bilat   • DVT (deep venous thrombosis) ([facility]) 2016   • Heart valve disease     MVR   • HLD (hyperlipidemia) 1/6/2014   • Hypertension    • Mitral regurgitation 1/6/2014   • Pain 12-14-15     low back, hips, 2-3/10   • Pneumonia        • Stroke (HCC) 2008    TIA, no residual   • TIA (transient ischemic attack)     2008   • Urinary bladder disorder     HAD MALE BLADDER SUSPENSION PROCEDURE DONE   • Urinary incontinence     stress incontinence     Past Surgical History:   Procedure Laterality Date   • TRANSCATHETER AORTIC VALVE REPLACEMENT  3/2/2020    Procedure: REPLACEMENT, AORTIC VALVE, TRANSCATHETER -;  Surgeon: Sony Barrientos M.D.;  Location: SURGERY [facility];  Service: Cardiac   • YOANDY  3/2/2020    Procedure: ECHOCARDIOGRAM, TRANSESOPHAGEAL;  Surgeon: Sony Barrientos M.D.;  Location: SURGERY [facility];  Service: Cardiac   • FEMORAL ENDARTERECTOMY Left 3/2/2020    Procedure: Repair of left femoral  Artery Dissection;  Surgeon: Alpa Tillman M.D.;  Location: [facility];  Service: General   • LUMBAR FUSION POSTERIOR  12/28/2015    Procedure: LUMBAR FUSION POSTERIOR L3-5 with peek rods;  Surgeon: Phylicia Almonte M.D.;  Location: [facility];  Service:    • LUMBAR DECOMPRESSION  12/28/2015    Procedure: LUMBAR DECOMPRESSION posterior redo decompression L3-5, with dural repair;  Surgeon: Phylicia Almonte M.D.;  Location: [facility];  Service:    • THORACOSCOPY  6/13/2015    Procedure: THORACOSCOPY with decortication VATS , side to be determined  ;  Surgeon: Elmira Holder M.D.;  Location: [facility];  Service:    • LUMBAR LAMINECTOMY DISKECTOMY  10/8/2010    Performed by PHYLICIA ALMONTE at [facility]   • CERVICAL DISK AND FUSION ANTERIOR  10/26/2009    Performed by PHYLICIA ALMONTE at [facility]   • CAROTID ENDARTERECTOMY  7/10/08    Performed by LEIGH MORATAYA at [facility]   • BLADDER SLING MALE  11/2004   • PROSTATECTOMY, RADIAL  11/2000     Family History   Problem Relation Age of Onset   • Heart Disease Father         CHF at 91.   • Other Brother         Aneurysm   • Cancer Brother          Seminal vascular cancer   • Autoimmune Disease Daughter         Lupus, RA   • No Known Problems Daughter    • No Known Problems Son    • No Known Problems Son    • Heart Attack Neg Hx      Social History     Socioeconomic History   • Marital status:      Spouse name: Not on file   • Number of children: Not on file   • Years of education: Not on file   • Highest education level: Not on file   Occupational History   • Not on file   Social Needs   • Financial resource strain: Not on file   • Food insecurity     Worry: Never true     Inability: Never true   • Transportation needs     Medical: No     Non-medical: No   Tobacco Use   • Smoking status: Former Smoker     Packs/day: 0.50     Years: 30.00     Pack years: 15.00     Types: Cigarettes, Pipe     Last attempt to quit: 1985     Years since quittin.5   • Smokeless tobacco: Never Used   Substance and Sexual Activity   • Alcohol use: Yes     Alcohol/week: 3.0 oz     Types: 3 Cans of beer, 3 Shots of liquor per week     Comment: 1 x week   • Drug use: No   • Sexual activity: Not Currently   Lifestyle   • Physical activity     Days per week: Not on file     Minutes per session: Not on file   • Stress: Not on file   Relationships   • Social connections     Talks on phone: Not on file     Gets together: Not on file     Attends Mandaeism service: Not on file     Active member of club or organization: Not on file     Attends meetings of clubs or organizations: Not on file     Relationship status: Not on file   • Intimate partner violence     Fear of current or ex partner: Not on file     Emotionally abused: Not on file     Physically abused: Not on file     Forced sexual activity: Not on file   Other Topics Concern   • Not on file   Social History Narrative   • Not on file     Allergies   Allergen Reactions   • Other Environmental Runny Nose and Itching     Seasonal, cats-hayfever       Current Outpatient Medications   Medication Sig Dispense Refill   •  "calcipotriene (DOVONEX) 0.005 % Cream      • fluorouracil (EFUDEX) 5 % cream      • spironolactone (ALDACTONE) 25 MG Tab Take 0.5 Tabs by mouth every day. 50 Tab 3   • amoxicillin (AMOXIL) 500 MG Cap Take 4 Caps by mouth Once PRN (30-60min prior to dental work) for up to 1 dose. 12 Cap 0   • atorvastatin (LIPITOR) 80 MG tablet TAKE 1/2 (ONE-HALF) TABLET BY MOUTH ONCE DAILY IN THE EVENING 45 Tab 1   • metoprolol SR (TOPROL XL) 25 MG TABLET SR 24 HR Take 1 Tab by mouth every day. 30 Tab 11   • lisinopril (PRINIVIL) 5 MG Tab Take 1 Tab by mouth every day. 30 Tab 11   • furosemide (LASIX) 20 MG Tab Take 1 Tab by mouth every day. 30 Tab 11   • temazepam (RESTORIL) 30 MG capsule Take 1 Cap by mouth at bedtime as needed for up to 180 days. 90 Cap 1   • warfarin (COUMADIN) 6 MG Tab Take 1 Tab by mouth. 30 Tab 3   • warfarin (COUMADIN) 4 MG Tab Take 4 mg by mouth. Indications: Per pt takes once a week on Friday 30 Tab 3   • aspirin EC 81 MG EC tablet Take 1 Tab by mouth every day. 90 Tab 3   • ezetimibe (ZETIA) 10 MG Tab Take 10 mg by mouth every day.     • gabapentin (NEURONTIN) 300 MG Cap Take 300-600 mg by mouth 2 Times a Day. 600 mg in the AM  300 mg in the PM       No current facility-administered medications for this visit.        ROS    All others systems reviewed and negative.     Objective:     /70 (BP Location: Left arm, Patient Position: Sitting, BP Cuff Size: Adult)   Pulse 62   Ht 1.803 m (5' 11\")   Wt 79.1 kg (174 lb 6.1 oz)   SpO2 92%  Body mass index is 24.32 kg/m².    Physical Exam   General: No acute distress. Well nourished.  HEENT: EOM grossly intact, no scleral icterus, no pharyngeal erythema.   Neck:  No JVD, no bruits, trachea midline  CVS: RRR. Normal S1, S2. 2/6 early peaking syst murmur RSB, 3/6 holo sys murmur apex. No LE edema.  2+ radial pulses, 2+ PT pulses  Resp: CTAB. No wheezing or crackles/rhonchi. Normal respiratory effort.  Abdomen: Soft, NT, no jen hepatomegaly.  MSK/Ext: No " clubbing or cyanosis.  Skin: Warm and dry, no rashes.  Neurological: CN III-XII grossly intact. No focal deficits.   Psych: A&O x 3, appropriate affect, good judgement    Physical exam performed today and unchanged, except what is noted, compared to 1-.    Assessment:     1. Chronic systolic congestive heart failure (HCC)  Basic Metabolic Panel    EC-ECHOCARDIOGRAM LTD W/O CONT   2. Dilated cardiomyopathy (HCC)     3. Status post transcatheter aortic valve replacement (TAVR) using bioprosthesis     4. Paroxysmal atrial fibrillation (HCC)     5. Hx of TIA (transient ischemic attack)     6. Essential hypertension, benign     7. Acute deep vein thrombosis (DVT) of distal vein of lower extremity, unspecified laterality ([facility])     8. Severe aortic stenosis     9. Dyslipidemia     10. Nonrheumatic mitral valve regurgitation  EC-ECHOCARDIOGRAM LTD W/O CONT   11. Bilateral carotid artery disease, unspecified type ([facility])     12. TELLEZ (dyspnea on exertion)     13. PAC (premature atrial contraction)     14. Endoleak post (EVAR) endovascular aneurysm repair, subsequent encounter         Medical Decision Making:  Today's Assessment / Status / Plan:     -His EF has improved to 40-45% which is encouraging but hopefully will cont to improve on CHF meds  -Nonobstructive coronary disease, on statin/zetia  -Vascular disease has been addressed, has an endovascular stent/repair, still on ASA with warfarin, will defer stopping ASA to Valve and vascular teams  -Remains on warfarin for paroxysmal atrial fibrillation, chads 2 vascular score of 5 including prior TIA which predated A. fib  -Blood pressure controlled  -Cholesterol controlled  -His mod to severe MR is the next project, will reassess after continuing diuresis  -He is willing to try spironolactone for low EF and for MR  -BMP 1 week  -limited echo for MR just prior to FU in Valve clinic  -Gets teeth cleaned regularly, takes Amoxicillin 4,000 mg.  -Seeing Dr. Barrientos in 2  weeks  -See me in 5 months    Written instructions given today:  -Start taking spironolactone 12.5 mg in the morning (a mild diuretic that helps the heart pump improve), works with furosemide. Needs close follow up of kidney function and potassium  -Take furosemide 20 mg every morning  -Start writing down your weight every morning, same scale, same circumstances  -Get non fasting blood work in 5-7 days to monitor kidney function and potassium  -Limited echo 1-2 weeks just prior to seeing Dr. Barrientos to look at the mitral valve    Return in about 5 months (around 12/28/2020).    It is my pleasure to participate in the care of Mr. Hester.  Please do not hesitate to contact me with questions or concerns.    Maryana Anderson MD, [facility]  Cardiologist [facility]    Please note that this dictation was created using voice recognition software. I have made every reasonable attempt to correct obvious errors, but it is possible there are errors of grammar and possibly content that I did not discover before finalizing the note.      Michael J Bloch, M.D.  [address] [facility]  [place] [address]  VIA In Basket     Alpa Tillman M.D.  [address]  [place] [address]  VIA Facsimile: [phone]     Sony Barrientos M.D.  [address]  [address]  [place] [address]  VIA In Basket

## 2020-08-07 ENCOUNTER — HOSPITAL ENCOUNTER (OUTPATIENT)
Dept: CARDIOLOGY | Facility: MEDICAL CENTER | Age: 85
End: 2020-08-07
Attending: INTERNAL MEDICINE
Payer: MEDICARE

## 2020-08-07 DIAGNOSIS — I50.22 CHRONIC SYSTOLIC CONGESTIVE HEART FAILURE (HCC): ICD-10-CM

## 2020-08-07 DIAGNOSIS — I34.0 NONRHEUMATIC MITRAL VALVE REGURGITATION: ICD-10-CM

## 2020-08-07 PROCEDURE — 93308 TTE F-UP OR LMTD: CPT

## 2020-08-08 ENCOUNTER — HOSPITAL ENCOUNTER (OUTPATIENT)
Dept: LAB | Facility: MEDICAL CENTER | Age: 85
End: 2020-08-08
Attending: INTERNAL MEDICINE
Payer: MEDICARE

## 2020-08-08 DIAGNOSIS — I50.22 CHRONIC SYSTOLIC CONGESTIVE HEART FAILURE (HCC): ICD-10-CM

## 2020-08-08 LAB
ANION GAP SERPL CALC-SCNC: 12 MMOL/L (ref 7–16)
BUN SERPL-MCNC: 29 MG/DL (ref 8–22)
CALCIUM SERPL-MCNC: 8.9 MG/DL (ref 8.5–10.5)
CHLORIDE SERPL-SCNC: 98 MMOL/L (ref 96–112)
CO2 SERPL-SCNC: 23 MMOL/L (ref 20–33)
CREAT SERPL-MCNC: 1.02 MG/DL (ref 0.5–1.4)
GLUCOSE SERPL-MCNC: 86 MG/DL (ref 65–99)
POTASSIUM SERPL-SCNC: 5.2 MMOL/L (ref 3.6–5.5)
SODIUM SERPL-SCNC: 133 MMOL/L (ref 135–145)

## 2020-08-08 PROCEDURE — 80048 BASIC METABOLIC PNL TOTAL CA: CPT

## 2020-08-08 PROCEDURE — 36415 COLL VENOUS BLD VENIPUNCTURE: CPT

## 2020-08-09 LAB
LV EJECT FRACT  99904: 55
LV EJECT FRACT MOD 2C 99903: 58.83
LV EJECT FRACT MOD 4C 99902: 50.12
LV EJECT FRACT MOD BP 99901: 48.72

## 2020-08-09 PROCEDURE — 93308 TTE F-UP OR LMTD: CPT | Mod: 26 | Performed by: INTERNAL MEDICINE

## 2020-08-09 PROCEDURE — 93321 DOPPLER ECHO F-UP/LMTD STD: CPT | Mod: 26 | Performed by: INTERNAL MEDICINE

## 2020-08-09 PROCEDURE — 93325 DOPPLER ECHO COLOR FLOW MAPG: CPT | Mod: 26 | Performed by: INTERNAL MEDICINE

## 2020-08-11 ENCOUNTER — OFFICE VISIT (OUTPATIENT)
Dept: CARDIOLOGY | Facility: MEDICAL CENTER | Age: 85
End: 2020-08-11
Payer: MEDICARE

## 2020-08-11 VITALS
WEIGHT: 173.2 LBS | RESPIRATION RATE: 12 BRPM | HEIGHT: 71 IN | SYSTOLIC BLOOD PRESSURE: 110 MMHG | BODY MASS INDEX: 24.25 KG/M2 | HEART RATE: 60 BPM | OXYGEN SATURATION: 94 % | DIASTOLIC BLOOD PRESSURE: 84 MMHG

## 2020-08-11 DIAGNOSIS — I34.0 NONRHEUMATIC MITRAL VALVE REGURGITATION: ICD-10-CM

## 2020-08-11 DIAGNOSIS — Z95.2 S/P TAVR (TRANSCATHETER AORTIC VALVE REPLACEMENT): ICD-10-CM

## 2020-08-11 PROCEDURE — 99214 OFFICE O/P EST MOD 30 MIN: CPT | Performed by: INTERNAL MEDICINE

## 2020-08-11 ASSESSMENT — FIBROSIS 4 INDEX: FIB4 SCORE: 4.2

## 2020-08-11 NOTE — PROGRESS NOTES
Cardiology Follow-up Consultation Note    Date of note:    8/11/2020    Primary Care Provider: Michael J Bloch, M.D.    Name:             Tristan Hester   YOB: 1935  MRN:               [id number]    CC: Follow-up valvular heart disease status post TAVR    Patient ID/HPI:   85-year-old male patient with history of severe aortic stenosis status post TAVR in March 2020, chronic diastolic heart failure, moderate to severe mitral regurgitation, paroxysmal atrial fibrillation, hypertension here for follow-up.  After the TAVR procedure he had some decline in his LV function, congestive heart failure.  He was initiated on medical therapy.  He was seen by Dr. Anderson in clinic.  He continues to do well from cardiac standpoint.  Denies chest pain, shortness of breath, leg swelling.  He has been suffering from back pain last couple of weeks.  He had back pain in the past, surgeries done in the past.  Taking medications regularly, denies significant side effects, dizziness, fainting spells etc.  Uses cane, no recent falls.      ROS  All other systems reviewed and negative.    Past Medical History:   Diagnosis Date   • Aortic valve stenosis    • Arrhythmia     Afib   • Arthritis     osteo, hips and knees   • Blood clotting disorder ([facility])     , left arm   • Bronchitis        • Cancer ([facility])     Prostate CA--2000   • Carotid arterial disease ([facility]) 1/6/2014   • Cataract     removed bilat   • DVT (deep venous thrombosis) ([facility]) 2016   • Heart valve disease     MVR   • HLD (hyperlipidemia) 1/6/2014   • Hypertension    • Mitral regurgitation 1/6/2014   • Pain 12-14-15    low back, hips, 2-3/10   • Pneumonia        • Stroke ([facility]) 2008    TIA, no residual   • TIA (transient ischemic attack)     2008   • Urinary bladder disorder     HAD MALE BLADDER SUSPENSION PROCEDURE DONE   • Urinary incontinence     stress incontinence         Past Surgical History:   Procedure Laterality Date   •  TRANSCATHETER AORTIC VALVE REPLACEMENT  3/2/2020    Procedure: REPLACEMENT, AORTIC VALVE, TRANSCATHETER -;  Surgeon: Sony Barrientos M.D.;  Location: SURGERY [facility];  Service: Cardiac   • YOANDY  3/2/2020    Procedure: ECHOCARDIOGRAM, TRANSESOPHAGEAL;  Surgeon: Sony Barrientos M.D.;  Location: [facility];  Service: Cardiac   • FEMORAL ENDARTERECTOMY Left 3/2/2020    Procedure: Repair of left femoral  Artery Dissection;  Surgeon: Alpa Tillman M.D.;  Location: [facility];  Service: General   • LUMBAR FUSION POSTERIOR  12/28/2015    Procedure: LUMBAR FUSION POSTERIOR L3-5 with peek rods;  Surgeon: Phylicia Almonte M.D.;  Location: [facility];  Service:    • LUMBAR DECOMPRESSION  12/28/2015    Procedure: LUMBAR DECOMPRESSION posterior redo decompression L3-5, with dural repair;  Surgeon: Phylicia Almonte M.D.;  Location: [facility];  Service:    • THORACOSCOPY  6/13/2015    Procedure: THORACOSCOPY with decortication VATS , side to be determined  ;  Surgeon: Elmira Holder M.D.;  Location: [facility];  Service:    • LUMBAR LAMINECTOMY DISKECTOMY  10/8/2010    Performed by PHYLICIA ALMONTE at [facility]   • CERVICAL DISK AND FUSION ANTERIOR  10/26/2009    Performed by PHYLICIA ALMONTE at [facility]   • CAROTID ENDARTERECTOMY  7/10/08    Performed by LEIGH MORATAYA at [facility]   • BLADDER SLING MALE  11/2004   • PROSTATECTOMY, RADIAL  11/2000         Current Outpatient Medications   Medication Sig Dispense Refill   • calcipotriene (DOVONEX) 0.005 % Cream      • fluorouracil (EFUDEX) 5 % cream      • spironolactone (ALDACTONE) 25 MG Tab Take 0.5 Tabs by mouth every day. 50 Tab 3   • amoxicillin (AMOXIL) 500 MG Cap Take 4 Caps by mouth Once PRN (30-60min prior to dental work) for up to 1 dose. 12 Cap 0   • atorvastatin (LIPITOR) 80 MG tablet TAKE 1/2 (ONE-HALF) TABLET BY MOUTH ONCE DAILY IN THE EVENING 45 Tab 1    • metoprolol SR (TOPROL XL) 25 MG TABLET SR 24 HR Take 1 Tab by mouth every day. 30 Tab 11   • lisinopril (PRINIVIL) 5 MG Tab Take 1 Tab by mouth every day. 30 Tab 11   • furosemide (LASIX) 20 MG Tab Take 1 Tab by mouth every day. 30 Tab 11   • temazepam (RESTORIL) 30 MG capsule Take 1 Cap by mouth at bedtime as needed for up to 180 days. 90 Cap 1   • warfarin (COUMADIN) 6 MG Tab Take 1 Tab by mouth. 30 Tab 3   • warfarin (COUMADIN) 4 MG Tab Take 4 mg by mouth. Indications: Per pt takes once a week on Friday 30 Tab 3   • aspirin EC 81 MG EC tablet Take 1 Tab by mouth every day. 90 Tab 3   • ezetimibe (ZETIA) 10 MG Tab Take 10 mg by mouth every day.     • gabapentin (NEURONTIN) 300 MG Cap Take 300-600 mg by mouth 2 Times a Day. 600 mg in the AM  300 mg in the PM       No current facility-administered medications for this visit.          Allergies   Allergen Reactions   • Other Environmental Runny Nose and Itching     Seasonal, cats-hayfever         Family History   Problem Relation Age of Onset   • Heart Disease Father         CHF at 91.   • Other Brother         Aneurysm   • Cancer Brother         Seminal vascular cancer   • Autoimmune Disease Daughter         Lupus, RA   • No Known Problems Daughter    • No Known Problems Son    • No Known Problems Son    • Heart Attack Neg Hx          Social History     Socioeconomic History   • Marital status:      Spouse name: Not on file   • Number of children: Not on file   • Years of education: Not on file   • Highest education level: Not on file   Occupational History   • Not on file   Social Needs   • Financial resource strain: Not on file   • Food insecurity     Worry: Never true     Inability: Never true   • Transportation needs     Medical: No     Non-medical: No   Tobacco Use   • Smoking status: Former Smoker     Packs/day: 0.50     Years: 30.00     Pack years: 15.00     Types: Cigarettes, Pipe     Quit date: 1985     Years since quittin.6   •  "Smokeless tobacco: Never Used   Substance and Sexual Activity   • Alcohol use: Yes     Alcohol/week: 3.0 oz     Types: 3 Cans of beer, 3 Shots of liquor per week     Comment: 1 x week   • Drug use: No   • Sexual activity: Not Currently   Lifestyle   • Physical activity     Days per week: Not on file     Minutes per session: Not on file   • Stress: Not on file   Relationships   • Social connections     Talks on phone: Not on file     Gets together: Not on file     Attends Tenriism service: Not on file     Active member of club or organization: Not on file     Attends meetings of clubs or organizations: Not on file     Relationship status: Not on file   • Intimate partner violence     Fear of current or ex partner: Not on file     Emotionally abused: Not on file     Physically abused: Not on file     Forced sexual activity: Not on file   Other Topics Concern   • Not on file   Social History Narrative   • Not on file         Physical Exam:  Ambulatory Vitals  /84 (BP Location: Left arm, Patient Position: Sitting, BP Cuff Size: Adult)   Pulse 60   Resp 12   Ht 1.803 m (5' 11\")   Wt 78.6 kg (173 lb 3.2 oz)   SpO2 94%    Oxygen Therapy:  Pulse Oximetry: 94 %  BP Readings from Last 4 Encounters:   08/11/20 110/84   07/28/20 136/70   06/03/20 128/84   04/08/20 124/71       Weight/BMI: Body mass index is 24.16 kg/m².  Wt Readings from Last 4 Encounters:   08/11/20 78.6 kg (173 lb 3.2 oz)   07/28/20 79.1 kg (174 lb 6.1 oz)   06/03/20 78.8 kg (173 lb 11.6 oz)   04/08/20 81.7 kg (180 lb 1.6 oz)       General: Well appearing and in no apparent distress  Head: atrumatic  Eyes: No conjunctival pallor   ENT: normal external appearance of nose and ears  Neck: JVD absent, carotid bruits absent  Lungs: respiratory sounds  normal, additional breath sounds absent  Heart: Regular rhythm,   No palpable thrills on palpation, 2 x 6 holosystolic murmur apical area, no rubs,   Lower extremity edema absent.   Pedal pulses " normal  Abdomen: soft, non tender, non distended.  Extremities/MSK: no clubbing, no cyanosis  Neurological: normal orientation, Gait normal   Psychiatric: Appropriate affect, intact judgement and insight  Skin: Warm extremities        Lab Data Review:  Lab Results   Component Value Date/Time    CHOLSTRLTOT 109 05/29/2020 07:40 AM    LDL 51 05/29/2020 07:40 AM    HDL 49 05/29/2020 07:40 AM    TRIGLYCERIDE 47 05/29/2020 07:40 AM       Lab Results   Component Value Date/Time    SODIUM 133 (L) 08/08/2020 11:12 AM    POTASSIUM 5.2 08/08/2020 11:12 AM    CHLORIDE 98 08/08/2020 11:12 AM    CO2 23 08/08/2020 11:12 AM    GLUCOSE 86 08/08/2020 11:12 AM    BUN 29 (H) 08/08/2020 11:12 AM    CREATININE 1.02 08/08/2020 11:12 AM    CREATININE 1.0 07/02/2008 03:43 PM    BUNCREATRAT 16 11/15/2019 04:48 AM     Lab Results   Component Value Date/Time    ALKPHOSPHAT 72 04/15/2020 11:36 AM    ASTSGOT 25 04/15/2020 11:36 AM    ALTSGPT 15 04/15/2020 11:36 AM    TBILIRUBIN 1.2 04/15/2020 11:36 AM      Lab Results   Component Value Date/Time    WBC 6.6 05/29/2020 07:41 AM     Echo 8/7/20  CONCLUSIONS  Limited to reassess MR after TAVR.  Mild asymmetric septal hypertrophy with prominent septum, 1.3 cm.  Left ventricular ejection fraction is visually estimated to be 55%.  Possible hypokinesis of the basal to mid inferolateral wall.  Mild mitral annular calcification.  Moderate eccentric mitral regurgitation.  Well seated TAVR, gradients not assessed on this study, no significant   paravalvular leak.    Cath 2/21/20  POSTPROCEDURE DIAGNOSES:  1.  Low flow, low gradient severe aortic stenosis with peak to peak gradient   of 40 mmHg, mean gradient of 34 mmHg, calculated ENRIKE of 0.81 cm2.  2.  Nonobstructive coronary artery disease.  3.  Dilated left ventricular systolic function with reduced left ventricular   systolic function, ejection fraction of 30%.  Global hypokinesis.  4.  Elevated left ventricular end-diastolic pressure.  5.  Mildly  dilated ascending aorta.  6.  Large distal abdominal aortic and bilateral iliofemoral system with   tortuosity of the iliofemoral system, right greater than left.      Impression and Plan:  84-year-old male patient with  1.  Status post TAVR  2.  Chronic systolic heart failure, NYHA class II stage C  3.  Mild LV dysfunction, improved  4.  Essential hypertension  5.  Moderate mitral regurgitation  6.  Paroxysmal atrial fibrillation  7.  History of DVT,  8.  History of TIA    Most recent echocardiogram shows improvement in his ejection fraction to 55%, his mitral regurgitation definitely decreased now in moderate range.  Clinically he is doing well with no significant symptoms of heart failure.  I do recommend continue medical therapy at this time, continue with beta-blocker, lisinopril, spironolactone, low-dose Lasix.  Continue anticoagulation, baby aspirin.  Fall precautions are discussed.  Rhythm is regular today on exam.  Repeat echocardiogram, follow-up with me next spring that would be 1 year from the TAVR, continue regular follow-ups with Dr. Anderson.    Return in about 7 months (around 3/11/2021).      Sony RAMIRES  Interventional cardiologist  [facility], [address]  [address] [address]  Phone: [phone]  Fax: [phone]

## 2020-08-13 ENCOUNTER — OFFICE VISIT (OUTPATIENT)
Dept: VASCULAR LAB | Facility: MEDICAL CENTER | Age: 85
End: 2020-08-13
Attending: INTERNAL MEDICINE
Payer: MEDICARE

## 2020-08-13 VITALS — HEART RATE: 55 BPM | SYSTOLIC BLOOD PRESSURE: 134 MMHG | DIASTOLIC BLOOD PRESSURE: 87 MMHG

## 2020-08-13 DIAGNOSIS — E78.5 HYPERLIPIDEMIA, UNSPECIFIED HYPERLIPIDEMIA TYPE: ICD-10-CM

## 2020-08-13 DIAGNOSIS — I10 ESSENTIAL HYPERTENSION: ICD-10-CM

## 2020-08-13 DIAGNOSIS — I38 VALVULAR HEART DISEASE: ICD-10-CM

## 2020-08-13 DIAGNOSIS — G45.9 TIA (TRANSIENT ISCHEMIC ATTACK): ICD-10-CM

## 2020-08-13 DIAGNOSIS — I48.0 PAROXYSMAL ATRIAL FIBRILLATION (HCC): ICD-10-CM

## 2020-08-13 LAB
INR BLD: 2 (ref 0.9–1.2)
INR PPP: 2 (ref 2–3.5)

## 2020-08-13 PROCEDURE — 99442 PR PHYSICIAN TELEPHONE EVALUATION 11-20 MIN: CPT | Mod: CR | Performed by: INTERNAL MEDICINE

## 2020-08-13 PROCEDURE — 85610 PROTHROMBIN TIME: CPT

## 2020-08-13 PROCEDURE — 99211 OFF/OP EST MAY X REQ PHY/QHP: CPT | Performed by: NURSE PRACTITIONER

## 2020-08-13 NOTE — PROGRESS NOTES
Anticoagulation Summary  As of 2020    INR goal:  2.0-3.0   TTR:  70.5 % (5 y)   INR used for dosin.00 (2020)   Warfarin maintenance plan:  4 mg (4 mg x 1) every Fri; 6 mg (4 mg x 1.5) all other days   Weekly warfarin total:  40 mg   Plan last modified:  YOSELIN MckinleyPDONELL (2020)   Next INR check:  2020   Priority:  Maintenance   Target end date:  Indefinite    Indications    Paroxysmal atrial fibrillation (HCC) [I48.0]  Hx of Stroke (Resolved) [I63.9]  Hx of TIA (transient ischemic attack) [G45.9]  Deep vein thrombosis (DVT) (HCC) (Resolved) [I82.409]  Atrial fibrillation (HCC) (Resolved) [I48.91]             Anticoagulation Episode Summary     INR check location:  Anticoagulation Clinic    Preferred lab:      Send INR reminders to:      Comments:        Anticoagulation Care Providers     Provider Role Specialty Phone number    Michael J Bloch, M.D. Referring Internal Medicine [phone]    [facility]agulation [facility]   [phone]        Anticoagulation Patient Findings      HPI:  Tristan Hester seen in clinic today for follow up on anticoagulation therapy in the presence of AF, DVT, CVA/TIA hx.   Denies any changes to current medical/health status since last appointment.   Denies any medication or diet changes.   No current symptoms of bleeding or thrombosis reported.    A/P:   INR therapeutic.   Continue current regimen.   BP recorded in vitals.    Follow up appointment in 6 week(s).    Next Appointment: Thursday, 2020 at 10:00 am.    Thi WILLIAM

## 2020-08-13 NOTE — PROGRESS NOTES
Telephone Appointment Visit   As a means of avoiding spread of COVID-19, this visit is being conducted by telephone. This telephone visit was initiated by the patient and they verbally consented.    Time at start of call: 1027a    Reason for Call:  Follow up of vascular disease    Patient Comments / History:   Back has been a problem for him - Has appt to see neurosurgery  No bleeding  Had INR today  Since starting on furosemide has lost weight and leg swelling is better  BP most 130s/80s      Labs / Images Reviewed   Iliac artery and echo    Assessment and Plan:     1. Paroxysmal atrial fibrillation (HCC)    2. TIA (transient ischemic attack)    3. Hyperlipidemia, unspecified hyperlipidemia type    4. Essential hypertension    5. Valvular heart disease    Aortoiliac duplex   Sounds more euvolemic  BMP look good   continue current meds  Consider restarting arb in future, especially if bp remains >130/80  Recheck echo per cards  Blood work prior to f/u visit with me in 6 mo    Follow-up: 6  months  Time at end of call: 5500o  Total Time Spent: 11-20 minutes    Michael J Bloch, M.D.

## 2020-08-19 ENCOUNTER — TELEPHONE (OUTPATIENT)
Dept: CARDIOLOGY | Facility: MEDICAL CENTER | Age: 85
End: 2020-08-19

## 2020-08-19 RX ORDER — EZETIMIBE 10 MG/1
TABLET ORAL
Qty: 90 TAB | Refills: 0 | Status: SHIPPED | OUTPATIENT
Start: 2020-08-19 | End: 2020-11-16

## 2020-08-19 NOTE — TELEPHONE ENCOUNTER
AK    Pt's Zetia Rx request is pending under [facility] since 8/16 & is asking if he can get this sent in.

## 2020-09-17 DIAGNOSIS — T14.8XXA VASCULAR INJURY: ICD-10-CM

## 2020-09-17 RX ORDER — GABAPENTIN 300 MG/1
300 CAPSULE ORAL 3 TIMES DAILY
Qty: 270 CAP | Refills: 3 | Status: SHIPPED | OUTPATIENT
Start: 2020-09-17 | End: 2021-09-23

## 2020-09-24 ENCOUNTER — ANTICOAGULATION VISIT (OUTPATIENT)
Dept: VASCULAR LAB | Facility: MEDICAL CENTER | Age: 85
End: 2020-09-24
Attending: INTERNAL MEDICINE
Payer: MEDICARE

## 2020-09-24 VITALS — DIASTOLIC BLOOD PRESSURE: 75 MMHG | HEART RATE: 51 BPM | SYSTOLIC BLOOD PRESSURE: 130 MMHG

## 2020-09-24 DIAGNOSIS — I48.0 PAROXYSMAL ATRIAL FIBRILLATION (HCC): ICD-10-CM

## 2020-09-24 DIAGNOSIS — G45.9 TIA (TRANSIENT ISCHEMIC ATTACK): ICD-10-CM

## 2020-09-24 LAB
INR BLD: 2.1 (ref 0.9–1.2)
INR PPP: 2.1 (ref 2–3.5)

## 2020-09-24 PROCEDURE — 99211 OFF/OP EST MAY X REQ PHY/QHP: CPT

## 2020-09-24 PROCEDURE — 85610 PROTHROMBIN TIME: CPT

## 2020-09-24 NOTE — PROGRESS NOTES
Anticoagulation Summary  As of 2020    INR goal:  2.0-3.0   TTR:  71.1 % (5.1 y)   INR used for dosin.10 (2020)   Warfarin maintenance plan:  4 mg (4 mg x 1) every Fri; 6 mg (4 mg x 1.5) all other days   Weekly warfarin total:  40 mg   Plan last modified:  ADDISON Mckinley (2020)   Next INR check:  2020   Priority:  Maintenance   Target end date:  Indefinite    Indications    Paroxysmal atrial fibrillation (HCC) [I48.0]  Hx of Stroke (Resolved) [I63.9]  Hx of TIA (transient ischemic attack) [G45.9]  Deep vein thrombosis (DVT) (HCC) (Resolved) [I82.409]  Atrial fibrillation (HCC) (Resolved) [I48.91]             Anticoagulation Episode Summary     INR check location:  Anticoagulation Clinic    Preferred lab:      Send INR reminders to:      Comments:        Anticoagulation Care Providers     Provider Role Specialty Phone number    Michael J Bloch, M.D. Referring Internal Medicine [phone]    [facility] Anticoagulation Services   [phone]        Anticoagulation Patient Findings      HPI:  Tristan Hester seen in clinic today, on anticoagulation therapy with warfarin for PAF, hx of stroke and DVT  Changes to current medical/health status since last appt: None  Denies signs/symptoms of bleeding and/or thrombosis since the last appt.    Denies any interval changes to diet.  Denies any interval changes to medications since last appt.   Denies any complications or cost restrictions with current therapy.   BP recorded in vitals.    A/P   INR therapeutic.   Instructed pt to continue on with current regimen.    Follow up appointment in 6 week(s) per pt.    Maxime Cruz PharmD

## 2020-10-26 DIAGNOSIS — G47.00 INSOMNIA, UNSPECIFIED TYPE: ICD-10-CM

## 2020-10-26 RX ORDER — TEMAZEPAM 30 MG/1
30 CAPSULE ORAL NIGHTLY PRN
Qty: 30 CAP | Refills: 5 | Status: SHIPPED
Start: 2020-10-26 | End: 2021-02-04

## 2020-10-26 RX ORDER — TEMAZEPAM 30 MG/1
30 CAPSULE ORAL DAILY
COMMUNITY
End: 2020-10-26

## 2020-11-05 ENCOUNTER — ANTICOAGULATION VISIT (OUTPATIENT)
Dept: VASCULAR LAB | Facility: MEDICAL CENTER | Age: 85
End: 2020-11-05
Attending: INTERNAL MEDICINE
Payer: MEDICARE

## 2020-11-05 VITALS — SYSTOLIC BLOOD PRESSURE: 128 MMHG | HEART RATE: 60 BPM | DIASTOLIC BLOOD PRESSURE: 73 MMHG

## 2020-11-05 DIAGNOSIS — G45.9 TIA (TRANSIENT ISCHEMIC ATTACK): ICD-10-CM

## 2020-11-05 DIAGNOSIS — I48.0 PAROXYSMAL ATRIAL FIBRILLATION (HCC): ICD-10-CM

## 2020-11-05 LAB — INR PPP: 2.2 (ref 2–3.5)

## 2020-11-05 PROCEDURE — 85610 PROTHROMBIN TIME: CPT

## 2020-11-05 PROCEDURE — 99211 OFF/OP EST MAY X REQ PHY/QHP: CPT | Performed by: NURSE PRACTITIONER

## 2020-11-05 NOTE — PROGRESS NOTES
Anticoagulation Summary  As of 2020    INR goal:  2.0-3.0   TTR:  71.8 % (5.2 y)   INR used for dosin.20 (2020)   Warfarin maintenance plan:  4 mg (4 mg x 1) every Fri; 6 mg (4 mg x 1.5) all other days   Weekly warfarin total:  40 mg   Plan last modified:  Thi Rosario ABeccaPBeccaNBecca (2020)   Next INR check:  2020   Priority:  Maintenance   Target end date:  Indefinite    Indications    Paroxysmal atrial fibrillation (HCC) [I48.0]  Hx of Stroke (Resolved) [I63.9]  Hx of TIA (transient ischemic attack) [G45.9]  Deep vein thrombosis (DVT) (HCC) (Resolved) [I82.409]  Atrial fibrillation (HCC) (Resolved) [I48.91]             Anticoagulation Episode Summary     INR check location:  Anticoagulation Clinic    Preferred lab:      Send INR reminders to:      Comments:        Anticoagulation Care Providers     Provider Role Specialty Phone number    Michael J Bloch, M.D. Referring Internal Medicine [phone]    [facility] Anticoagulation [facility]   [phone]        Anticoagulation Patient Findings      HPI:  Tristan Hester seen in clinic today for follow up on anticoagulation therapy in the presence of AF, CVA, TIA, DVT hx.   Denies any changes to current medical/health status since last appointment.   Denies any medication or diet changes.   No current symptoms of bleeding or thrombosis reported.    A/P:   INR therapeutic.   Continue current regimen.   BP recorded in vitals.    Follow up appointment in 6 week(s).    Next Appointment: Thursday, Dec 17, 2020 at 10:00 am.    Thi WILLIAM

## 2020-11-06 LAB — INR BLD: 2.2 (ref 0.9–1.2)

## 2020-11-13 DIAGNOSIS — E78.5 DYSLIPIDEMIA: ICD-10-CM

## 2020-11-16 RX ORDER — EZETIMIBE 10 MG/1
TABLET ORAL
Qty: 90 TAB | Refills: 1 | Status: SHIPPED | OUTPATIENT
Start: 2020-11-16 | End: 2021-05-21

## 2020-12-16 DIAGNOSIS — Z79.01 CHRONIC ANTICOAGULATION: ICD-10-CM

## 2020-12-16 RX ORDER — WARFARIN SODIUM 4 MG/1
TABLET ORAL
Qty: 135 TAB | Refills: 0 | Status: SHIPPED | OUTPATIENT
Start: 2020-12-16 | End: 2021-04-22 | Stop reason: SDUPTHER

## 2020-12-17 ENCOUNTER — TELEPHONE (OUTPATIENT)
Dept: VASCULAR LAB | Facility: MEDICAL CENTER | Age: 85
End: 2020-12-17

## 2020-12-17 ENCOUNTER — ANTICOAGULATION VISIT (OUTPATIENT)
Dept: VASCULAR LAB | Facility: MEDICAL CENTER | Age: 85
End: 2020-12-17
Attending: INTERNAL MEDICINE
Payer: MEDICARE

## 2020-12-17 DIAGNOSIS — G45.9 TIA (TRANSIENT ISCHEMIC ATTACK): ICD-10-CM

## 2020-12-17 DIAGNOSIS — I65.29 STENOSIS OF CAROTID ARTERY, UNSPECIFIED LATERALITY: ICD-10-CM

## 2020-12-17 DIAGNOSIS — Z98.890 HISTORY OF CEA (CAROTID ENDARTERECTOMY): ICD-10-CM

## 2020-12-17 DIAGNOSIS — I48.0 PAROXYSMAL ATRIAL FIBRILLATION (HCC): ICD-10-CM

## 2020-12-17 LAB
INR BLD: 2.7 (ref 0.9–1.2)
INR PPP: 2.7 (ref 2–3.5)

## 2020-12-17 PROCEDURE — 85610 PROTHROMBIN TIME: CPT

## 2020-12-17 PROCEDURE — 99211 OFF/OP EST MAY X REQ PHY/QHP: CPT | Performed by: NURSE PRACTITIONER

## 2020-12-17 NOTE — PROGRESS NOTES
Anticoagulation Summary  As of 2020    INR goal:  2.0-3.0   TTR:  72.4 % (5.3 y)   INR used for dosin.70 (2020)   Warfarin maintenance plan:  4 mg (4 mg x 1) every Fri; 6 mg (4 mg x 1.5) all other days   Weekly warfarin total:  40 mg   Plan last modified:  hTi Rosario ABeccaPBeccaNBecca (2020)   Next INR check:  2021   Priority:  Maintenance   Target end date:  Indefinite    Indications    Paroxysmal atrial fibrillation (HCC) [I48.0]  Hx of Stroke (Resolved) [I63.9]  Hx of TIA (transient ischemic attack) [G45.9]  Deep vein thrombosis (DVT) (HCC) (Resolved) [I82.409]  Atrial fibrillation (HCC) (Resolved) [I48.91]             Anticoagulation Episode Summary     INR check location:  Anticoagulation Clinic    Preferred lab:      Send INR reminders to:      Comments:        Anticoagulation Care Providers     Provider Role Specialty Phone number    Michael J Bloch, M.D. Referring Internal Medicine [phone]    [facility] Anticoagulation Services   [phone]        Anticoagulation Patient Findings      HPI:  Tristan Hester seen in clinic today for follow up on anticoagulation therapy in the presence of DVT, TIA/CVA, AF.   Denies any changes to current medical/health status since last appointment.   Denies any medication or diet changes.   No current symptoms of bleeding or thrombosis reported.    A/P:   INR therapeutic.   Continue current regimen.     Follow up appointment in 7 week(s).    Next Appointment:  at 10:20 am with Dr Bloch.    Thi WILLIAM

## 2021-01-12 DIAGNOSIS — Z23 NEED FOR VACCINATION: ICD-10-CM

## 2021-01-21 ENCOUNTER — IMMUNIZATION (OUTPATIENT)
Dept: FAMILY PLANNING/WOMEN'S HEALTH CLINIC | Facility: IMMUNIZATION CENTER | Age: 86
End: 2021-01-21
Attending: INTERNAL MEDICINE
Payer: MEDICARE

## 2021-01-21 DIAGNOSIS — Z23 NEED FOR VACCINATION: ICD-10-CM

## 2021-01-21 DIAGNOSIS — Z23 ENCOUNTER FOR VACCINATION: Primary | ICD-10-CM

## 2021-01-21 PROCEDURE — 0001A PFIZER SARS-COV-2 VACCINE: CPT

## 2021-01-21 PROCEDURE — 91300 PFIZER SARS-COV-2 VACCINE: CPT

## 2021-02-04 ENCOUNTER — ANTICOAGULATION VISIT (OUTPATIENT)
Dept: VASCULAR LAB | Facility: MEDICAL CENTER | Age: 86
End: 2021-02-04
Attending: INTERNAL MEDICINE
Payer: MEDICARE

## 2021-02-04 ENCOUNTER — TELEPHONE (OUTPATIENT)
Dept: VASCULAR LAB | Facility: MEDICAL CENTER | Age: 86
End: 2021-02-04

## 2021-02-04 VITALS
BODY MASS INDEX: 25.2 KG/M2 | DIASTOLIC BLOOD PRESSURE: 63 MMHG | SYSTOLIC BLOOD PRESSURE: 115 MMHG | HEIGHT: 71 IN | HEART RATE: 59 BPM | WEIGHT: 180 LBS

## 2021-02-04 VITALS — HEIGHT: 71 IN | BODY MASS INDEX: 25.2 KG/M2 | WEIGHT: 180 LBS

## 2021-02-04 DIAGNOSIS — I65.29 STENOSIS OF CAROTID ARTERY, UNSPECIFIED LATERALITY: ICD-10-CM

## 2021-02-04 DIAGNOSIS — I73.9 PAD (PERIPHERAL ARTERY DISEASE) (HCC): ICD-10-CM

## 2021-02-04 DIAGNOSIS — E78.5 HYPERLIPIDEMIA, UNSPECIFIED HYPERLIPIDEMIA TYPE: ICD-10-CM

## 2021-02-04 DIAGNOSIS — I10 ESSENTIAL HYPERTENSION: ICD-10-CM

## 2021-02-04 DIAGNOSIS — I48.0 PAROXYSMAL ATRIAL FIBRILLATION (HCC): ICD-10-CM

## 2021-02-04 DIAGNOSIS — G45.9 TIA (TRANSIENT ISCHEMIC ATTACK): ICD-10-CM

## 2021-02-04 DIAGNOSIS — G47.00 INSOMNIA, UNSPECIFIED TYPE: ICD-10-CM

## 2021-02-04 DIAGNOSIS — I38 VALVULAR HEART DISEASE: ICD-10-CM

## 2021-02-04 LAB — INR PPP: 1.7 (ref 2–3.5)

## 2021-02-04 PROCEDURE — 85610 PROTHROMBIN TIME: CPT | Performed by: INTERNAL MEDICINE

## 2021-02-04 PROCEDURE — 99213 OFFICE O/P EST LOW 20 MIN: CPT

## 2021-02-04 PROCEDURE — 99214 OFFICE O/P EST MOD 30 MIN: CPT | Performed by: INTERNAL MEDICINE

## 2021-02-04 RX ORDER — TEMAZEPAM 30 MG/1
30 CAPSULE ORAL NIGHTLY PRN
Qty: 30 CAP | Refills: 5 | Status: SHIPPED
Start: 2021-02-04 | End: 2021-08-03

## 2021-02-04 ASSESSMENT — FIBROSIS 4 INDEX
FIB4 SCORE: 4.2
FIB4 SCORE: 4.2

## 2021-02-04 NOTE — NON-PROVIDER
Anticoagulation Summary  As of 2021    INR goal:  2.0-3.0   TTR:  72.3 % (5.5 y)   INR used for dosin.70 (2021)   Warfarin maintenance plan:  4 mg (4 mg x 1) every Fri; 6 mg (4 mg x 1.5) all other days   Weekly warfarin total:  40 mg   Plan last modified:  ADDISON Mckinley (2020)   Next INR check:  2021   Priority:  Maintenance   Target end date:  Indefinite    Indications    Paroxysmal atrial fibrillation (HCC) [I48.0]  Hx of Stroke (Resolved) [I63.9]  Hx of TIA (transient ischemic attack) [G45.9]  Deep vein thrombosis (DVT) (HCC) (Resolved) [I82.409]  Atrial fibrillation (HCC) (Resolved) [I48.91]             Anticoagulation Episode Summary     INR check location:  Anticoagulation Clinic    Preferred lab:      Send INR reminders to:      Comments:        Anticoagulation Care Providers     Provider Role Specialty Phone number    Michael J Bloch, M.D. Referring Internal Medicine [phone]    [facility] Anticoagulation Services   [phone]        Anticoagulation Patient Findings      HPI:  Tristan Hester seen in clinic today, on anticoagulation therapy with warfarin for hx of stroke  Pt on antiplatelet therapy, defer to vascular visit today.   Changes to current medical/health status since last appt: none  Denies signs/symptoms of bleeding and/or thrombosis since the last appt.    Denies any interval changes to diet  Denies any interval changes to medications since last appt.   Denies any complications or cost restrictions with current therapy.   Confirmed dosing regimen.     A/P   INR  SUB-therapeutic.   Unknown cause of decreased INR.   Bolus 8 mg x2 days then Pt is to continue with current warfarin dosing regimen.     Follow up appointment in 1 week(s).    Satya Parra, PharmD

## 2021-02-04 NOTE — PROGRESS NOTES
"VASCULAR FOLLOW UP VISIT  Subjective:   Tristan Hester is a 89 y.o. male who presents today 22 for vascular f/u  Chief Complaint   Patient presents with   • Follow-Up     HPI:   Patient here for f/u of HTN, dyslipidemia, carotid disease and valvular heart disease, and atrial fibrillation.  BP at home consistently less than 130/80  Back on his usual blood pressure medications  No dyspnea exertion or leg swelling  No groin pain or claudication  Remains on warfarin and aspirin-has quite a bit of bruising  No myalgias on current lipid-lowering therapy  No TIA or stroke symptoms  No lightheadedness  Still with balance issues - no change  Has echo and carotid duplex scheduled    Social History     Tobacco Use   • Smoking status: Former Smoker     Packs/day: 0.50     Years: 30.00     Pack years: 15.00     Types: Cigarettes, Pipe     Quit date: 1985     Years since quittin.1   • Smokeless tobacco: Never Used   Substance Use Topics   • Alcohol use: Yes     Alcohol/week: 3.0 oz     Types: 3 Cans of beer, 3 Shots of liquor per week     Comment: 1 x week   • Drug use: No     DIET AND EXERCISE:  Weight Change: Stabilized  Diet: Relatively low sodium  Exercise: moderate regular exercise     Objective:     Vitals:    21 1021   BP: 115/63   BP Location: Left arm   Patient Position: Sitting   BP Cuff Size: Adult   Pulse: (!) 59   Weight: 81.6 kg (180 lb)   Height: 1.803 m (5' 11\")      Body mass index is 25.1 kg/m².  Physical Exam   Constitutional: He is oriented to person, place, and time. He appears well-developed and well-nourished.   Cardiovascular: Normal rate and intact distal pulses.   Murmur heard.  Pulmonary/Chest: Effort normal. No respiratory distress. He has no wheezes. He has no rales.   Musculoskeletal: Normal range of motion.         General: No edema.   Neurological: He is alert and oriented to person, place, and time. No cranial nerve deficit.   Psychiatric: He has a normal mood and affect. " His behavior is normal.     Lab Results   Component Value Date    CHOLSTRLTOT 109 05/29/2020    LDL 51 05/29/2020    HDL 49 05/29/2020    TRIGLYCERIDE 47 05/29/2020      Lab Results   Component Value Date    PROTHROMBTM 21.4 (H) 03/04/2020    INR 1.70 02/04/2021         Lab Results   Component Value Date    SODIUM 133 (L) 08/08/2020    POTASSIUM 5.2 08/08/2020    CHLORIDE 98 08/08/2020    CO2 23 08/08/2020    GLUCOSE 86 08/08/2020    BUN 29 (H) 08/08/2020    CREATININE 1.02 08/08/2020    BUNCREATRAT 16 11/15/2019    IFAFRICA >60 08/08/2020    IFNOTAFR >60 08/08/2020      Lab Results   Component Value Date    TSH 2.100 11/15/2019      Lab Results   Component Value Date    WBC 6.6 05/29/2020    RBC 4.99 05/29/2020    HEMOGLOBIN 13.8 (L) 05/29/2020    HEMATOCRIT 44.7 05/29/2020    MCV 89.6 05/29/2020    MCH 27.7 05/29/2020    MCHC 30.9 (L) 05/29/2020    MPV 12.2 05/29/2020      Blood work from 10-9-14: Glucose 102, GFR 68, cholesterol 127, trig 35, HDL 57, LDL-C 63, urine for MA/cr 3.4    Blood work from May 12, 2015, glucose 37, GFR 72, sodium 146, AST 19, ALT 12, hemoglobin 13.8, total cholesterol 125, temperature 66, initial 48, LDL 64, albumin creatinine ratio in urine 2.5    CXR 8/20/15: (comparison to 7/9/2015) :Bibasilar opacities, right greater than left, consistent with a combination of airspace disease and atelectasis. Minimal left basilar airspace opacities are new. A small right pleural effusion is suspected, unchanged.    Blood work from February 2016-glucose 107, GFR 82, hemoglobin 14.0, total cholesterol 130, HL 51, LDL 62    Echocardiogram February 2016  Posterior mitral leaflet prolapse with eccentric moderate mitral regurgitation  Severely dilated left atrium  Mild concentric LVH  EF 60%  Mild aortic stenosis   right ventricular pressure estimated 37 mm    Carotid duplex May 2016:   1.  No evidence of re-stenosis in the right carotid bulb and internal    carotid artery after endarterectomy.    2.   Less than 50% left carotid bulb and internal carotid arterial stenosis.    3.  Normal bilateral subclavian and vertebral arterial exam.    4.  Compared to the prior study of 2/29/2016,  there has been no    significant change.     Blood work Oct 2016:  Glucose 106, GFR 77, LDL 68, nonHDL 86    Echo feb 2017:  Left ventricular ejection fraction is visually estimated to be 60%.   Grade I diastolic dysfunction.  Possibly severe mitral regurgitation.   Mild aortic stenosis.  Estimated right ventricular systolic pressure  is 25 mmHg.  Aortic root is 4 cm.   Compared to the images of the prior study done 2/29/16 there has been   no significant change.   Consider YOANDY for better evaluation of MR severity.    Blood work march 2017:  Glucose 94, GFR 59, Ldl-c 70, nonHDL 85    Carotid August 2017  Mild plaque of the right internal carotid artery with no significant    stenosis (<50%)   Mild plaque of the left internal carotid artery with no significant    stenosis (<50%).   No significant stenosis of the right or left subclavian artery.   No significant stenosis of the right or left vertebral artery.    MRI Brain Dec 2017:  MRI of the brain without contrast within normal limits for age with moderate white matter changes without demonstrable interval progression since the prior study.    Echocardiogram feb 2018:  Normal left ventricular size, wall thickness, and systolic function.   Left ventricular ejection fraction is visually estimated to be 60%.   Normal regional wall motion. Diastolic function is difficult to assess   with valvular disease.  Mitral annular calcification. No mitral stenosis. Moderate mitral   regurgitation. ERO by PISA method is 0.2 sq cm. Both leaflet appear to   attach to the anterior lateral wall. Can not rule out parachute mitral   valve.  Calcified aortic valve leaflets. Moderate aortic stenosis.   Compared to the report of the study done 2/2017- the MR appears to be   less severe, but, there has been  some progression of the AS.     Echocardiogram May 2019  Normal left ventricular size and systolic function. Moderate concentric   left ventricular hypertrophy. Grade II diastolic dysfunction. Normal   regional wall motion. Left ventricular ejection fraction is visually   estimated to be 55%.  Severely dilated left atrium.  Moderate mitral regurgitation.  Tricuspid aortic valve. Moderate aortic stenosis. Aortic valve area   calculated from the continuity equation is 1. 1 cm2 Vmax is  3.5  m/s.   Transvalvular gradients are - Peak: 50  mmHg, Mean: 33 mmHg. However   the dimensionless index is 0. 2, which would be consistent with severe   AS. No aortic insufficiency.  Ascending aorta is borderline dilated with a diameter of 3.9  cm.  Compared to the report of the study done 2/2018- there has been an   further increase in the AV gradient though the dimensionless index has   not changed.    Blood work from [facility] April 2019  Hemoglobin 15.0, glucose 116, GFR 72, total cholesterol 120, triglycerides 55, HDL 52, LDL 57, albumin creatinine ratio 9.3, TSH 1.4, BNP 2167    Carotid duplex aug 2019:   Mild to moderate plaque within bilateral internal carotid arteries with    less that 50% stenosis.   Prominent heterogeneous plaque proximal right external carotid artery.    Echocardiogram February 2020  Low normal left ventricular systolic function. Left ventricular   ejection fraction is visually estimated to be 50%.   Grade II diastolic dysfunction.  Normal inferior vena cava size without inspiratory collapse.  Moderate aortic stenosis. Transvalvular gradients are - Peak: 47 mmHg,   Mean: 27 mmHg.    Carotid duplex February 2020   Moderate stenosis of the right internal carotid artery (50-69%), similar to    prior study.    No hemodynamically significant stenosis.     Echocardiogram March 2020  Prior echo on 2/3/20, there is now a TAVR valve present.  Left ventricular ejection fraction is visually estimated to be  60%.  Grade II diastolic dysfunction.  Known TAVR aortic valve that is functioning normally with normal   transvalvular gradients.  Estimated right ventricular systolic pressure  is 30 mmHg.     Arterial duplex March 2020   Acute occluded thrombus indicated in the left common femoral artery with no    flow.     Retrograde collateral  flow in the proximal profunda femoral artery.    Results called to MARGARITA Acharya at 1326 hrs.   Attempts to reach physcians unsuccessful.  Pt's physcians aware of findings    according to the RN.    Aortoiliac duplex May 2020   No evidence of aortic aneurysm. Abdominal aorta is ectatic. Atherosclerotic    plaque.    No stenosis of the proximal segment of the major visceral arteries.    Patent bilateral iliac arteries. Left external iliac stent is patent without    evidence of stenosis. Tortuous bilateral iliac arteries    JANICE May 2020  Right 1.16  Left 1.24  Triphasic throughout    Echocardiogram aug 2020  Limited to reassess MR after TAVR.  Mild asymmetric septal hypertrophy with prominent septum, 1.3 cm.  Left ventricular ejection fraction is visually estimated to be 55%.  Possible hypokinesis of the basal to mid inferolateral wall.  Mild mitral annular calcification.  Moderate eccentric mitral regurgitation.  Well seated TAVR, gradients not assessed on this study, no significant   paravalvular leak.  Compared to prior echo 4/8/20, EF appears normal, MR appears moderate.    Medical Decision Making:  Today's Assessment / Status / Plan:     1. Paroxysmal atrial fibrillation (HCC)  POCT Protime    CBC WITHOUT DIFFERENTIAL   2. TIA (transient ischemic attack)  POCT Protime    CBC WITHOUT DIFFERENTIAL   3. Stenosis of carotid artery, unspecified laterality  POCT Protime   4. Hyperlipidemia, unspecified hyperlipidemia type  POCT Protime    Lipid Profile   5. Essential hypertension  POCT Protime    Comp Metabolic Panel    MICROALBUMIN CREAT RATIO URINE   6. Valvular heart disease  POCT  Protime   7. PAD (peripheral artery disease) (HCC)  POCT Protime   8. Insomnia, unspecified type  temazepam (RESTORIL) 30 MG capsule     Patient Type: Secondary Prevention    Etiology of Established CVD if Present:   1. TIA - likely cardioembolic from valve disease  2. Carotid atherosclerosis -  3. Valvular Heart Disease  4. Atrial fibrillation with onset during acute illness, now chronic  5.  DVT after back surgery jan 2016  6.  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    Lipid Management: Qualifies for Statin Therapy Based on 2013 ACC/AHA Guidelines: yes  Calculated 10-Year Risk of ASCVD: N/A  Currently on Statin: Yes  ACC/aha criteria for at least mod intensity statin  Per nla goal ldl <70 and nonHDL <100 - below threshold on most recent blood work, but it has been sometime  Plan:  - Continue atorvastatin at current dose    - continue zetia  - Continue TLC  - Recheck fasting lipids     Blood Pressure Management:  Acc/aha bp goal <130/80  BP under reasonable control both at home  No significant albuminuria in the past  Plan:  -Continue current blood pressure medications  -Continue lifestyle modification  - Encouraged home BP monitoring  -Call if consistently greater than 130/80    Glycemic Status: Prediabetic  IFG, mild and stable  - Continue lifestyle mod  - Follow fasting glucose    Anti-Platelet/Anti-Coagulant Tx: yes  - Continue indefinite anticoagulation with warfarin  -Message sent to cardiology to see if we can stop aspirin at this point  - F/U with anticoagulation clinic as scheduled -POC INR today  - limit nsaids    Smoking: continue complete avoidance    Physical Activity:  Encouraged more walking    Weight Management and Nutrition: Dietary plan was discussed with patient at this visit including c/w Med diet    Other:     1. TIA - likely cardioembolic from valve disease - continue BP control and anticoagulation  2. Carotid atherosclerosis -now with moderate bilateral stenosis.   Recheck surveillance carotid duplex as scheduled later this month  3. Valvular Heart Disease -now status post TAVR March 2020.  Still with unrepaired mitral disease -improved on most recent echo.  Appears asymptomatic at present.  Does not appear to be volume overloaded.  Await repeat echocardiogram scheduled for later this month   4. Colonic Polyp on recent colonoscopy - repeat colonoscopy in 2019 negative per patient.   No further f/u needed  5. Insomnia - trialed zolpidem w/o success.   Continue temazepam. Patient understands addictive potential.  6-month refill given today  6.  Atrial fibrillation - defer management of rhythm and rate to cards.  Continue anticoag as above  7.  DVT - postoperative.  No recurrence.  Continue indefinite anticoagulation given concomitant afib  8.  H/o prostate ca and multiple urological issues - defer f/u to urology  9.  Balance issues and neuropathy - defer f/u to ENT and neurology  10.  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  Good arterial flow and patent stent on noninvasive study.  .  Report any claudication, rest pain, or ulceration immediately.  Will obtain repeat arterial duplex 1 year from previous  11.  Continuing medical health - cancer screening as above. Has had shingles vac, continue yearly flu shot.  Get second Covid vaccine as scheduled    We will partner with other providers in the management of established vascular disease and cardiometabolic risk factors.    Studies to Be Obtained:    1) echo February 2021  2) aortoiliac duplex May 2021  3) carotid duplex February 2021    Labs to Be Obtained: as above prior to next visit    Follow up in: 3 months    Michael J Bloch, M.D.    Cc:   MD CORA Jones MD J Albright

## 2021-02-05 ENCOUNTER — TELEPHONE (OUTPATIENT)
Dept: VASCULAR LAB | Facility: MEDICAL CENTER | Age: 86
End: 2021-02-05

## 2021-02-05 NOTE — TELEPHONE ENCOUNTER
Case discussed with cardiology who agrees that it is reasonable to stop aspirin given poor tolerance of combination of aspirin and warfarin with significant bruising  We will ask medical assistant to call patient and have him drop aspirin  Follow-up as scheduled    Michael Bloch, MD  Vascular Medicine

## 2021-02-05 NOTE — TELEPHONE ENCOUNTER
Notified pt  ----- Message from Michael J Bloch, M.D. sent at 2/4/2021  4:23 PM PST -----  Regarding: RE: aspirin  Please call patient and tell him that I spoke with his cardiologist and we think it is reasonable for him to stop his aspirin given the significant bruising that he has    Thanks  ----- Message -----  From: Sony Barrientos M.D.  Sent: 2/4/2021  12:19 PM PST  To: Michael J Bloch, M.D.  Subject: RE: aspirin                                      It is okay but we just need to document he is not tolerating aspirin and Coumadin because according to guidelines we have to give both.  ----- Message -----  From: Michael J Bloch, M.D.  Sent: 2/4/2021  10:37 AM PST  To: Sony Barrientos M.D.  Subject: aspirin                                          Dr A  Patient with afib and tavr.  on indefinite anticoagulation with warfarin  Ok if we drop his aspirin at this point ?- lots of bruising.    Bloch

## 2021-02-08 ENCOUNTER — TELEPHONE (OUTPATIENT)
Dept: VASCULAR LAB | Facility: MEDICAL CENTER | Age: 86
End: 2021-02-08

## 2021-02-08 ENCOUNTER — HOSPITAL ENCOUNTER (OUTPATIENT)
Dept: RADIOLOGY | Facility: MEDICAL CENTER | Age: 86
End: 2021-02-08
Attending: NURSE PRACTITIONER
Payer: MEDICARE

## 2021-02-08 DIAGNOSIS — I65.29 STENOSIS OF CAROTID ARTERY, UNSPECIFIED LATERALITY: ICD-10-CM

## 2021-02-08 DIAGNOSIS — Z98.890 HISTORY OF CEA (CAROTID ENDARTERECTOMY): ICD-10-CM

## 2021-02-08 PROCEDURE — 93880 EXTRACRANIAL BILAT STUDY: CPT

## 2021-02-09 NOTE — TELEPHONE ENCOUNTER
Patient status post CEA in 2008    Carotid duplex shows no evidence of significant restenosis  Mild contralateral disease    Continue current care  Repeat carotid duplex in 2 years    Michael Bloch, MD  Vascular Medicine

## 2021-02-11 ENCOUNTER — IMMUNIZATION (OUTPATIENT)
Dept: FAMILY PLANNING/WOMEN'S HEALTH CLINIC | Facility: IMMUNIZATION CENTER | Age: 86
End: 2021-02-11
Attending: INTERNAL MEDICINE
Payer: MEDICARE

## 2021-02-11 ENCOUNTER — ANTICOAGULATION VISIT (OUTPATIENT)
Dept: VASCULAR LAB | Facility: MEDICAL CENTER | Age: 86
End: 2021-02-11
Attending: INTERNAL MEDICINE
Payer: MEDICARE

## 2021-02-11 VITALS — HEART RATE: 57 BPM | SYSTOLIC BLOOD PRESSURE: 127 MMHG | DIASTOLIC BLOOD PRESSURE: 82 MMHG

## 2021-02-11 DIAGNOSIS — Z23 ENCOUNTER FOR VACCINATION: Primary | ICD-10-CM

## 2021-02-11 DIAGNOSIS — I48.0 PAROXYSMAL ATRIAL FIBRILLATION (HCC): ICD-10-CM

## 2021-02-11 DIAGNOSIS — G45.9 TIA (TRANSIENT ISCHEMIC ATTACK): ICD-10-CM

## 2021-02-11 LAB — INR PPP: 2.3 (ref 2–3.5)

## 2021-02-11 PROCEDURE — 99212 OFFICE O/P EST SF 10 MIN: CPT | Performed by: NURSE PRACTITIONER

## 2021-02-11 PROCEDURE — 91300 PFIZER SARS-COV-2 VACCINE: CPT | Performed by: INTERNAL MEDICINE

## 2021-02-11 PROCEDURE — 85610 PROTHROMBIN TIME: CPT

## 2021-02-11 PROCEDURE — 0002A PFIZER SARS-COV-2 VACCINE: CPT | Performed by: INTERNAL MEDICINE

## 2021-02-11 NOTE — PROGRESS NOTES
Anticoagulation Summary  As of 2021    INR goal:  2.0-3.0   TTR:  72.2 % (5.5 y)   INR used for dosin.30 (2021)   Warfarin maintenance plan:  6 mg (4 mg x 1.5) every day   Weekly warfarin total:  42 mg   Plan last modified:  Thi Rosario A.PBeccaNBecca (2021)   Next INR check:  2021   Priority:  Maintenance   Target end date:  Indefinite    Indications    Paroxysmal atrial fibrillation (HCC) [I48.0]  Hx of Stroke (Resolved) [I63.9]  Hx of TIA (transient ischemic attack) [G45.9]  Deep vein thrombosis (DVT) (HCC) (Resolved) [I82.409]  Atrial fibrillation (HCC) (Resolved) [I48.91]             Anticoagulation Episode Summary     INR check location:  Anticoagulation Clinic    Preferred lab:      Send INR reminders to:      Comments:        Anticoagulation Care Providers     Provider Role Specialty Phone number    Michael J Bloch, M.D. Referring Internal Medicine [phone]    [facility]   [phone]        Anticoagulation Patient Findings      HPI:  Tristan Martin Hester seen in clinic today for follow up on anticoagulation therapy in the presence of AF, TIA/CVA.   Denies any changes to current medical/health status since last appointment.   He stopped asa as directed by vascular and cards.  Denies any diet changes.   No current symptoms of bleeding or thrombosis reported.    Pt on antiplatelet therapy - none.    A/P:   INR therapeutic. INR up from 1.7 with loading doses. Will increase regimen to account for loading doses.   BP recorded in vitals.    Follow up appointment in 2 week(s).    Next Appointment: Thursday, 2021 at 10:45 am.    Thi WILLIAM

## 2021-02-12 LAB — INR BLD: 2.3 (ref 0.9–1.2)

## 2021-02-17 DIAGNOSIS — Z79.01 CHRONIC ANTICOAGULATION: ICD-10-CM

## 2021-02-25 ENCOUNTER — ANTICOAGULATION VISIT (OUTPATIENT)
Dept: VASCULAR LAB | Facility: MEDICAL CENTER | Age: 86
End: 2021-02-25
Attending: INTERNAL MEDICINE
Payer: MEDICARE

## 2021-02-25 VITALS — HEART RATE: 58 BPM | DIASTOLIC BLOOD PRESSURE: 81 MMHG | SYSTOLIC BLOOD PRESSURE: 136 MMHG

## 2021-02-25 DIAGNOSIS — G45.9 TIA (TRANSIENT ISCHEMIC ATTACK): ICD-10-CM

## 2021-02-25 DIAGNOSIS — I48.0 PAROXYSMAL ATRIAL FIBRILLATION (HCC): ICD-10-CM

## 2021-02-25 LAB
INR BLD: 1.8 (ref 0.9–1.2)
INR PPP: 1.8 (ref 2–3.5)

## 2021-02-25 PROCEDURE — 85610 PROTHROMBIN TIME: CPT

## 2021-02-25 PROCEDURE — 99212 OFFICE O/P EST SF 10 MIN: CPT | Performed by: NURSE PRACTITIONER

## 2021-02-25 NOTE — PROGRESS NOTES
Anticoagulation Summary  As of 2021    INR goal:  2.0-3.0   TTR:  72.2 % (5.5 y)   INR used for dosin.80 (2021)   Warfarin maintenance plan:  6 mg (4 mg x 1.5) every day   Weekly warfarin total:  42 mg   Plan last modified:  YOSELIN MckinleyPDONELL (2021)   Next INR check:  3/11/2021   Priority:  Maintenance   Target end date:  Indefinite    Indications    Paroxysmal atrial fibrillation (HCC) [I48.0]  Hx of Stroke (Resolved) [I63.9]  Hx of TIA (transient ischemic attack) [G45.9]  Deep vein thrombosis (DVT) (HCC) (Resolved) [I82.409]  Atrial fibrillation (HCC) (Resolved) [I48.91]             Anticoagulation Episode Summary     INR check location:  Anticoagulation Clinic    Preferred lab:      Send INR reminders to:      Comments:        Anticoagulation Care Providers     Provider Role Specialty Phone number    Michael J Bloch, M.D. Referring Internal Medicine [phone]    [facility]   [phone]        Anticoagulation Patient Findings      HPI:  Tristan Hester seen in clinic today for follow up on anticoagulation therapy in the presence of AF, CVA/TIA, DVT hx.   Denies any changes to current medical/health status since last appointment.   Denies any medication or diet changes.   No current symptoms of bleeding or thrombosis reported.    Pt on antiplatelet therapy - none.    A/P:   INR subtherapeutic. No recent VTEs/CVA.  Will increase tonight then continue current regimen.   BP recorded in vitals.    Follow up appointment in 2 week(s).    Next Appointment:  at 10:45 am.    Thi WILLIAM

## 2021-03-02 ENCOUNTER — HOSPITAL ENCOUNTER (OUTPATIENT)
Dept: CARDIOLOGY | Facility: MEDICAL CENTER | Age: 86
End: 2021-03-02
Attending: INTERNAL MEDICINE
Payer: MEDICARE

## 2021-03-02 DIAGNOSIS — Z95.2 S/P TAVR (TRANSCATHETER AORTIC VALVE REPLACEMENT): ICD-10-CM

## 2021-03-02 PROCEDURE — 93306 TTE W/DOPPLER COMPLETE: CPT

## 2021-03-03 ENCOUNTER — OFFICE VISIT (OUTPATIENT)
Dept: CARDIOLOGY | Facility: MEDICAL CENTER | Age: 86
End: 2021-03-03
Payer: MEDICARE

## 2021-03-03 ENCOUNTER — DOCUMENTATION (OUTPATIENT)
Dept: CARDIOLOGY | Facility: MEDICAL CENTER | Age: 86
End: 2021-03-03

## 2021-03-03 VITALS
RESPIRATION RATE: 14 BRPM | DIASTOLIC BLOOD PRESSURE: 68 MMHG | HEART RATE: 58 BPM | OXYGEN SATURATION: 95 % | SYSTOLIC BLOOD PRESSURE: 104 MMHG | BODY MASS INDEX: 25.27 KG/M2 | WEIGHT: 180.5 LBS | HEIGHT: 71 IN

## 2021-03-03 DIAGNOSIS — Z95.2 S/P TAVR (TRANSCATHETER AORTIC VALVE REPLACEMENT): ICD-10-CM

## 2021-03-03 DIAGNOSIS — N17.9 AKI (ACUTE KIDNEY INJURY) (HCC): ICD-10-CM

## 2021-03-03 DIAGNOSIS — I10 ESSENTIAL HYPERTENSION, BENIGN: ICD-10-CM

## 2021-03-03 LAB
ALBUMIN SERPL-MCNC: 4 G/DL (ref 3.6–4.6)
ALBUMIN/CREAT UR: 4 MG/G CREAT (ref 0–29)
ALBUMIN/GLOB SERPL: 1.6 {RATIO} (ref 1.2–2.2)
ALP SERPL-CCNC: 59 IU/L (ref 39–117)
ALT SERPL-CCNC: 30 IU/L (ref 0–44)
AST SERPL-CCNC: 28 IU/L (ref 0–40)
BILIRUB SERPL-MCNC: 1 MG/DL (ref 0–1.2)
BUN SERPL-MCNC: 41 MG/DL (ref 8–27)
BUN/CREAT SERPL: 24 (ref 10–24)
CALCIUM SERPL-MCNC: 9.2 MG/DL (ref 8.6–10.2)
CHLORIDE SERPL-SCNC: 103 MMOL/L (ref 96–106)
CHOLEST SERPL-MCNC: 114 MG/DL (ref 100–199)
CO2 SERPL-SCNC: 23 MMOL/L (ref 20–29)
CREAT SERPL-MCNC: 1.74 MG/DL (ref 0.76–1.27)
CREAT UR-MCNC: 86.3 MG/DL
ERYTHROCYTE [DISTWIDTH] IN BLOOD BY AUTOMATED COUNT: 13.7 % (ref 11.6–15.4)
GLOBULIN SER CALC-MCNC: 2.5 G/DL (ref 1.5–4.5)
GLUCOSE SERPL-MCNC: 94 MG/DL (ref 65–99)
HCT VFR BLD AUTO: 42.6 % (ref 37.5–51)
HDLC SERPL-MCNC: 54 MG/DL
HGB BLD-MCNC: 14.2 G/DL (ref 13–17.7)
LABORATORY COMMENT REPORT: NORMAL
LDLC SERPL CALC-MCNC: 48 MG/DL (ref 0–99)
LV EJECT FRACT  99904: 60
LV EJECT FRACT MOD 2C 99903: 54.06
LV EJECT FRACT MOD 4C 99902: 61.68
LV EJECT FRACT MOD BP 99901: 58.86
MCH RBC QN AUTO: 30.6 PG (ref 26.6–33)
MCHC RBC AUTO-ENTMCNC: 33.3 G/DL (ref 31.5–35.7)
MCV RBC AUTO: 92 FL (ref 79–97)
MICROALBUMIN UR-MCNC: 3.4 UG/ML
NRBC BLD AUTO-RTO: NORMAL %
PLATELET # BLD AUTO: 174 X10E3/UL (ref 150–450)
POTASSIUM SERPL-SCNC: 5.5 MMOL/L (ref 3.5–5.2)
PROT SERPL-MCNC: 6.5 G/DL (ref 6–8.5)
RBC # BLD AUTO: 4.64 X10E6/UL (ref 4.14–5.8)
SODIUM SERPL-SCNC: 139 MMOL/L (ref 134–144)
TRIGL SERPL-MCNC: 51 MG/DL (ref 0–149)
VLDLC SERPL CALC-MCNC: 12 MG/DL (ref 5–40)
WBC # BLD AUTO: 5.9 X10E3/UL (ref 3.4–10.8)

## 2021-03-03 PROCEDURE — 93306 TTE W/DOPPLER COMPLETE: CPT | Mod: 26 | Performed by: INTERNAL MEDICINE

## 2021-03-03 PROCEDURE — 99214 OFFICE O/P EST MOD 30 MIN: CPT | Performed by: INTERNAL MEDICINE

## 2021-03-03 ASSESSMENT — FIBROSIS 4 INDEX: FIB4 SCORE: 2.47

## 2021-03-03 NOTE — PROGRESS NOTES
Cardiology Follow-up Consultation Note    Date of note:    3/3/2021    Primary Care Provider: Michael J Bloch, M.D.    Name:             Tristan Hester   YOB: 1935  MRN:               [id number]    CC: 1 year follow-up after TAVR, hypertension, paroxysmal atrial fibrillation    Patient ID/HPI:   85-year-old male patient history of severe aortic stenosis status post TAVR here for 1 year follow-up.  He is feeling remarkably well, however still gets shortness of breath with exertion predominantly due to back pain.  He walks about a mile every day, he has to stop few times when he walks a mile.  But he is happy with the energy that he has, he can able to do things around the house that he wants to.  Denies any falls, dizziness, lightheadedness.  He does not drink enough water.  Denies swelling in his feet.      ROS  Arthritis, back pain, loss of balance.  All other systems reviewed and negative.    Past Medical History:   Diagnosis Date   • Aortic valve stenosis    • Arrhythmia     Afib   • Arthritis     osteo, hips and knees   • Blood clotting disorder ([facility])     , left arm   • Bronchitis        • Cancer ([facility])     Prostate CA--2000   • Carotid arterial disease ([facility]) 1/6/2014   • Cataract     removed bilat   • DVT (deep venous thrombosis) ([facility]) 2016   • Heart valve disease     MVR   • HLD (hyperlipidemia) 1/6/2014   • Hypertension    • Mitral regurgitation 1/6/2014   • Pain 12-14-15    low back, hips, 2-3/10   • Pneumonia        • Stroke ([facility]) 2008    TIA, no residual   • TIA (transient ischemic attack)     2008   • Urinary bladder disorder     HAD MALE BLADDER SUSPENSION PROCEDURE DONE   • Urinary incontinence     stress incontinence         Past Surgical History:   Procedure Laterality Date   • TRANSCATHETER AORTIC VALVE REPLACEMENT  3/2/2020    Procedure: REPLACEMENT, AORTIC VALVE, TRANSCATHETER -;  Surgeon: Sony Barrientos M.D.;  Location: SURGERY [facility];   Service: Cardiac   • YOANDY  3/2/2020    Procedure: ECHOCARDIOGRAM, TRANSESOPHAGEAL;  Surgeon: Sony Barrientos M.D.;  Location: SURGERY [facility];  Service: Cardiac   • FEMORAL ENDARTERECTOMY Left 3/2/2020    Procedure: Repair of left femoral  Artery Dissection;  Surgeon: Alpa Tillman M.D.;  Location: SURGERY [facility];  Service: General   • LUMBAR FUSION POSTERIOR  12/28/2015    Procedure: LUMBAR FUSION POSTERIOR L3-5 with peek rods;  Surgeon: Phylicia Almonte M.D.;  Location: SURGERY [facility];  Service:    • LUMBAR DECOMPRESSION  12/28/2015    Procedure: LUMBAR DECOMPRESSION posterior redo decompression L3-5, with dural repair;  Surgeon: Phylicia Almonte M.D.;  Location: [facility];  Service:    • THORACOSCOPY  6/13/2015    Procedure: THORACOSCOPY with decortication VATS , side to be determined  ;  Surgeon: Elmira Holder M.D.;  Location: [facility];  Service:    • LUMBAR LAMINECTOMY DISKECTOMY  10/8/2010    Performed by PHYLICIA ALMONTE at [facility]   • CERVICAL DISK AND FUSION ANTERIOR  10/26/2009    Performed by PHYLICIA ALMONTE at [facility]   • CAROTID ENDARTERECTOMY  7/10/08    Performed by LEIGH MORATAYA at [facility]   • BLADDER SLING MALE  11/2004   • PROSTATECTOMY, RADIAL  11/2000         Current Outpatient Medications   Medication Sig Dispense Refill   • temazepam (RESTORIL) 30 MG capsule Take 1 Cap by mouth at bedtime as needed for up to 180 days. 1 po each night as needed 30 Cap 5   • warfarin (COUMADIN) 4 MG Tab Take one to one and one-half tablets by mouth daily or as directed by anticoagulation clinic  Indications: Per pt takes once a week on Friday 135 Tab 0   • atorvastatin (LIPITOR) 80 MG tablet TAKE 1/2 (ONE-HALF) TABLET BY MOUTH ONCE DAILY IN THE EVENING 45 Tab 6   • ezetimibe (ZETIA) 10 MG Tab Take 1 tablet by mouth once daily 90 Tab 1   • gabapentin (NEURONTIN) 300 MG Cap Take 1 Cap by mouth 3 times a day. 270  Cap 3   • amoxicillin (AMOXIL) 500 MG Cap Take 4 Caps by mouth Once PRN (30-60min prior to dental work) for up to 1 dose. 12 Cap 0   • metoprolol SR (TOPROL XL) 25 MG TABLET SR 24 HR Take 1 Tab by mouth every day. 30 Tab 11   • lisinopril (PRINIVIL) 5 MG Tab Take 1 Tab by mouth every day. 30 Tab 11   • furosemide (LASIX) 20 MG Tab Take 1 Tab by mouth every day. 30 Tab 11     No current facility-administered medications for this visit.         Allergies   Allergen Reactions   • Other Environmental Runny Nose and Itching     Seasonal, cats-hayfever         Family History   Problem Relation Age of Onset   • Heart Disease Father         CHF at 91.   • Other Brother         Aneurysm   • Cancer Brother         Seminal vascular cancer   • Autoimmune Disease Daughter         Lupus, RA   • No Known Problems Daughter    • No Known Problems Son    • No Known Problems Son    • Heart Attack Neg Hx          Social History     Socioeconomic History   • Marital status:      Spouse name: Not on file   • Number of children: Not on file   • Years of education: Not on file   • Highest education level: Not on file   Occupational History   • Not on file   Tobacco Use   • Smoking status: Former Smoker     Packs/day: 0.50     Years: 30.00     Pack years: 15.00     Types: Cigarettes, Pipe     Quit date: 1985     Years since quittin.1   • Smokeless tobacco: Never Used   Substance and Sexual Activity   • Alcohol use: Yes     Alcohol/week: 3.0 oz     Types: 3 Cans of beer, 3 Shots of liquor per week     Comment: 1 x week   • Drug use: No   • Sexual activity: Not Currently   Other Topics Concern   • Not on file   Social History Narrative   • Not on file     Social Determinants of Health     Financial Resource Strain:    • Difficulty of Paying Living Expenses:    Food Insecurity:    • Worried About Running Out of Food in the Last Year:    • Ran Out of Food in the Last Year:    Transportation Needs:    • Lack of Transportation  "(Medical):    • Lack of Transportation (Non-Medical):    Physical Activity:    • Days of Exercise per Week:    • Minutes of Exercise per Session:    Stress:    • Feeling of Stress :    Social Connections:    • Frequency of Communication with Friends and Family:    • Frequency of Social Gatherings with Friends and Family:    • Attends Rastafarian Services:    • Active Member of Clubs or Organizations:    • Attends Club or Organization Meetings:    • Marital Status:    Intimate Partner Violence:    • Fear of Current or Ex-Partner:    • Emotionally Abused:    • Physically Abused:    • Sexually Abused:          Physical Exam:  Ambulatory Vitals  /68 (BP Location: Left arm, Patient Position: Sitting, BP Cuff Size: Adult)   Pulse (!) 58   Resp 14   Ht 1.803 m (5' 11\")   Wt 81.9 kg (180 lb 8 oz)   SpO2 95%    Oxygen Therapy:  Pulse Oximetry: 95 %  BP Readings from Last 4 Encounters:   03/03/21 104/68   02/25/21 136/81   02/11/21 127/82   02/04/21 115/63       Weight/BMI: Body mass index is 25.17 kg/m².  Wt Readings from Last 4 Encounters:   03/03/21 81.9 kg (180 lb 8 oz)   02/04/21 81.6 kg (180 lb)   02/04/21 81.6 kg (180 lb)   08/11/20 78.6 kg (173 lb 3.2 oz)     General: Well appearing and in no apparent distress  Head: atrumatic  Eyes: No conjunctival pallor   ENT: normal external appearance of nose and ears  Neck: JVD absent, carotid bruits absent  Lungs: respiratory sounds  normal, additional breath sounds absent  Heart: Regular rhythm,   No palpable thrills on palpation, murmurs absent, no rubs,   Lower extremity edema absent.   Pedal pulses normal  Abdomen: soft, non tender, non distended.  Extremities/MSK: no clubbing, no cyanosis  Neurological: normal orientation, Gait normal   Psychiatric: Appropriate affect, intact judgement and insight  Skin: Warm extremities        Lab Data Review:  Lab Results   Component Value Date/Time    CHOLSTRLTOT 114 03/02/2021 05:55 AM    CHOLSTRLTOT 109 05/29/2020 07:40 AM    " LDL 51 05/29/2020 07:40 AM    HDL 54 03/02/2021 05:55 AM    HDL 49 05/29/2020 07:40 AM    TRIGLYCERIDE 51 03/02/2021 05:55 AM    TRIGLYCERIDE 47 05/29/2020 07:40 AM       Lab Results   Component Value Date/Time    SODIUM 139 03/02/2021 05:55 AM    SODIUM 133 (L) 08/08/2020 11:12 AM    POTASSIUM 5.5 (H) 03/02/2021 05:55 AM    POTASSIUM 5.2 08/08/2020 11:12 AM    CHLORIDE 103 03/02/2021 05:55 AM    CHLORIDE 98 08/08/2020 11:12 AM    CO2 23 03/02/2021 05:55 AM    CO2 23 08/08/2020 11:12 AM    GLUCOSE 94 03/02/2021 05:55 AM    GLUCOSE 86 08/08/2020 11:12 AM    BUN 41 (H) 03/02/2021 05:55 AM    BUN 29 (H) 08/08/2020 11:12 AM    CREATININE 1.74 (H) 03/02/2021 05:55 AM    CREATININE 1.02 08/08/2020 11:12 AM    CREATININE 1.0 07/02/2008 03:43 PM    BUNCREATRAT 24 03/02/2021 05:55 AM     Lab Results   Component Value Date/Time    ALKPHOSPHAT 59 03/02/2021 05:55 AM    ALKPHOSPHAT 72 04/15/2020 11:36 AM    ASTSGOT 28 03/02/2021 05:55 AM    ASTSGOT 25 04/15/2020 11:36 AM    ALTSGPT 30 03/02/2021 05:55 AM    ALTSGPT 15 04/15/2020 11:36 AM    TBILIRUBIN 1.0 03/02/2021 05:55 AM    TBILIRUBIN 1.2 04/15/2020 11:36 AM      Lab Results   Component Value Date/Time    WBC 5.9 03/02/2021 05:55 AM    WBC 6.6 05/29/2020 07:41 AM     Echo 8/7/20  CONCLUSIONS  Limited to reassess MR after TAVR.  Mild asymmetric septal hypertrophy with prominent septum, 1.3 cm.  Left ventricular ejection fraction is visually estimated to be 55%.  Possible hypokinesis of the basal to mid inferolateral wall.  Mild mitral annular calcification.  Moderate eccentric mitral regurgitation.  Well seated TAVR, gradients not assessed on this study, no significant   paravalvular leak.     Cath 2/21/20  POSTPROCEDURE DIAGNOSES:  1.  Low flow, low gradient severe aortic stenosis with peak to peak gradient   of 40 mmHg, mean gradient of 34 mmHg, calculated ENRIKE of 0.81 cm2.  2.  Nonobstructive coronary artery disease.  3.  Dilated left ventricular systolic function with  reduced left ventricular   systolic function, ejection fraction of 30%.  Global hypokinesis.  4.  Elevated left ventricular end-diastolic pressure.  5.  Mildly dilated ascending aorta.  6.  Large distal abdominal aortic and bilateral iliofemoral system with   tortuosity of the iliofemoral system, right greater than left.     Echo 3/2/21  CONCLUSIONS  Prior echo on 8/7/20.Left ventricular ejection fraction is visually   estimated to be 60%.  Normal diastolic function.  Known TAVR aortic valve that is functioning normally with normal   transvalvular gradients. Vmax is 1.85 m/s.  Moderate eccentric mitral regurgitation.     Impression and Plan:  84-year-old male patient with  1.  Status post TAVR  2.  Chronic  heart failure, NYHA class II stage C  3.  Mild LV dysfunction, improved  4.  Essential hypertension  5.  Moderate mitral regurgitation  6.  Paroxysmal atrial fibrillation  7.  History of DVT,  8.  History of TIA    He is stable from cardiovascular standpoint.  1 year echocardiogram shows normal TAVR valve function, moderate mitral regurgitation.  Aspirin has been stopped due to excessive bleeding risk.  He is on warfarin for anticoagulation.  Labs reviewed from yesterday, he has a creatinine of 1.7, potassium of 5.5, likely due to dehydration.  We will stop spironolactone, his ejection fraction improved, he does not need it anymore.  Advised him to stop lisinopril, Lasix for now.  Advised him to drink plenty of fluids, repeat BMP in 1 week.  He could resume lisinopril if creatinine improves, after that take Lasix as needed, not every day.  Continue metoprolol succinate.  Continue Lipitor, Zetia managed by Dr. Bloch, consider decreasing it slightly given his age.  His mitral regurgitation still in moderate range, although transthoracic echo underestimates it.  He is not interested in pursuing MitraClip at this time.    Return in about 6 months (around 9/3/2021).      Sony RAMIRES  Interventional  cardiologist  [facility], [address]  [address] [place] [id number]  Phone: [phone]  Fax: [phone]

## 2021-03-05 NOTE — PROGRESS NOTES
Valve Program Functional Assessment: 3/3/21 1 year post-op    [id number]   1a) Showering/bathin  1b) Walking 1 block on ground: 5  1c) Hurrying or joggin  2) Swellin  3) Fatigue: 6  4) Shortness of breath: 6  5) Sleep sitting up: 5  6) Limited enjoyment of life: 5  7) Spend the rest of your life with HF: 4  8a) Hobbies, recreational activities:4  8b) Working or doing household chores:4  8c) Visiting family or friends: 5    5 meter walk test  1) __5.99____ s/5m  2) __6.16____ s/5m  3) __4.80____ s/5m     Strength   1) _28_____ kg  2) _29_____ kg  3) _26_____ kg    YU ADLs  Patient independently preforms...   - Bathing: Yes   - Dressing: Yes   - Toileting: Yes   - Transferring: Yes   - Continence: Yes   - Feeding: Yes     Living Situation  Patient lives:  with spouse    Mobility Aids   Patient uses: cane

## 2021-03-11 ENCOUNTER — ANTICOAGULATION VISIT (OUTPATIENT)
Dept: VASCULAR LAB | Facility: MEDICAL CENTER | Age: 86
End: 2021-03-11
Attending: INTERNAL MEDICINE
Payer: MEDICARE

## 2021-03-11 VITALS — SYSTOLIC BLOOD PRESSURE: 140 MMHG | DIASTOLIC BLOOD PRESSURE: 77 MMHG | HEART RATE: 53 BPM

## 2021-03-11 DIAGNOSIS — G45.9 TIA (TRANSIENT ISCHEMIC ATTACK): ICD-10-CM

## 2021-03-11 DIAGNOSIS — I48.0 PAROXYSMAL ATRIAL FIBRILLATION (HCC): ICD-10-CM

## 2021-03-11 LAB — INR PPP: 1.6 (ref 2–3.5)

## 2021-03-11 PROCEDURE — 85610 PROTHROMBIN TIME: CPT

## 2021-03-11 PROCEDURE — 99212 OFFICE O/P EST SF 10 MIN: CPT | Performed by: NURSE PRACTITIONER

## 2021-03-11 NOTE — PROGRESS NOTES
Anticoagulation Summary  As of 3/11/2021    INR goal:  2.0-3.0   TTR:  71.7 % (5.5 y)   INR used for dosin.60 (3/11/2021)   Warfarin maintenance plan:  6 mg (4 mg x 1.5) every day   Weekly warfarin total:  42 mg   Plan last modified:  Thi Rosario A.P.NBecca (2021)   Next INR check:  3/18/2021   Priority:  Maintenance   Target end date:  Indefinite    Indications    Paroxysmal atrial fibrillation (HCC) [I48.0]  Hx of Stroke (Resolved) [I63.9]  Hx of TIA (transient ischemic attack) [G45.9]  Deep vein thrombosis (DVT) (HCC) (Resolved) [I82.409]  Atrial fibrillation (HCC) (Resolved) [I48.91]             Anticoagulation Episode Summary     INR check location:  Anticoagulation Clinic    Preferred lab:      Send INR reminders to:      Comments:        Anticoagulation Care Providers     Provider Role Specialty Phone number    Michael J Bloch, M.D. Referring Internal Medicine [phone]    [facility]   [phone]        Anticoagulation Patient Findings      HPI:  Tristan Hester seen in clinic today for follow up on anticoagulation therapy in the presence of DVT, AF, CVA/TIA hx.   Denies any changes to current medical/health status since last appointment.   Recent creatinine of 1.74. Currently holding spironolactone, lisinopril and furosemide until directed to restart by cards.  Denies any diet changes.   No current symptoms of bleeding or thrombosis reported.    Pt on antiplatelet therapy - none    A/P:   INR subtherapeutic. No recent TIAs. Will increase regimen.   BP recorded in vitals.    Pt educated to contact our clinic with any changes in medications or s/s of bleeding or thrombosis. Pt is aware to seek immediate medical attention for falls, head injury or deep cuts    Follow up appointment in 1 week(s).    Next Appointment:  at 11:15 am.    Thi WILLIAM

## 2021-03-15 ENCOUNTER — HOSPITAL ENCOUNTER (OUTPATIENT)
Dept: LAB | Facility: MEDICAL CENTER | Age: 86
End: 2021-03-15
Attending: INTERNAL MEDICINE
Payer: MEDICARE

## 2021-03-15 DIAGNOSIS — N17.9 AKI (ACUTE KIDNEY INJURY) (HCC): ICD-10-CM

## 2021-03-15 LAB
ANION GAP SERPL CALC-SCNC: 7 MMOL/L (ref 7–16)
BUN SERPL-MCNC: 22 MG/DL (ref 8–22)
CALCIUM SERPL-MCNC: 9 MG/DL (ref 8.5–10.5)
CHLORIDE SERPL-SCNC: 101 MMOL/L (ref 96–112)
CO2 SERPL-SCNC: 26 MMOL/L (ref 20–33)
CREAT SERPL-MCNC: 1.02 MG/DL (ref 0.5–1.4)
GLUCOSE SERPL-MCNC: 96 MG/DL (ref 65–99)
POTASSIUM SERPL-SCNC: 4.6 MMOL/L (ref 3.6–5.5)
SODIUM SERPL-SCNC: 134 MMOL/L (ref 135–145)

## 2021-03-15 PROCEDURE — 36415 COLL VENOUS BLD VENIPUNCTURE: CPT

## 2021-03-15 PROCEDURE — 80048 BASIC METABOLIC PNL TOTAL CA: CPT

## 2021-03-16 ENCOUNTER — TELEPHONE (OUTPATIENT)
Dept: CARDIOLOGY | Facility: MEDICAL CENTER | Age: 86
End: 2021-03-16

## 2021-03-16 LAB — INR BLD: 1.6 (ref 0.9–1.2)

## 2021-03-16 NOTE — TELEPHONE ENCOUNTER
Spoke to patient's wife Tori because patient was resting. Notified her of lab results and recommendations. She wrote the recommendations down and will let the patient know when he wakes up.

## 2021-03-16 NOTE — TELEPHONE ENCOUNTER
----- Message from Sony Barrientos M.D. sent at 3/16/2021  8:47 AM PDT -----  Good his creatinine is improved. Have him restart lisinopril and have him take lasix as needed for swelling or weight gain , not every day.  ----- Message -----  From: Chrissie Rasmussen R.N.  Sent: 3/16/2021   8:14 AM PDT  To: Sony Barrientos M.D.    To Dr. Barrientos: FU with  7/22/21

## 2021-03-18 ENCOUNTER — ANTICOAGULATION VISIT (OUTPATIENT)
Dept: VASCULAR LAB | Facility: MEDICAL CENTER | Age: 86
End: 2021-03-18
Attending: INTERNAL MEDICINE
Payer: MEDICARE

## 2021-03-18 VITALS — DIASTOLIC BLOOD PRESSURE: 72 MMHG | SYSTOLIC BLOOD PRESSURE: 116 MMHG | HEART RATE: 54 BPM

## 2021-03-18 DIAGNOSIS — G45.9 TIA (TRANSIENT ISCHEMIC ATTACK): ICD-10-CM

## 2021-03-18 DIAGNOSIS — I48.0 PAROXYSMAL ATRIAL FIBRILLATION (HCC): ICD-10-CM

## 2021-03-18 LAB
INR BLD: 2.1 (ref 0.9–1.2)
INR PPP: 2.1 (ref 2–3.5)

## 2021-03-18 PROCEDURE — 99211 OFF/OP EST MAY X REQ PHY/QHP: CPT

## 2021-03-18 PROCEDURE — 85610 PROTHROMBIN TIME: CPT

## 2021-03-18 NOTE — PROGRESS NOTES
Anticoagulation Summary  As of 3/18/2021    INR goal:  2.0-3.0   TTR:  71.5 % (5.6 y)   INR used for dosin.10 (3/18/2021)   Warfarin maintenance plan:  6 mg (4 mg x 1.5) every day   Weekly warfarin total:  42 mg   Plan last modified:  ADDISON Mckinley (2021)   Next INR check:  2021   Priority:  Maintenance   Target end date:  Indefinite    Indications    Paroxysmal atrial fibrillation (HCC) [I48.0]  Hx of Stroke (Resolved) [I63.9]  Hx of TIA (transient ischemic attack) [G45.9]  Deep vein thrombosis (DVT) (HCC) (Resolved) [I82.409]  Atrial fibrillation (HCC) (Resolved) [I48.91]             Anticoagulation Episode Summary     INR check location:  Anticoagulation Clinic    Preferred lab:      Send INR reminders to:      Comments:        Anticoagulation Care Providers     Provider Role Specialty Phone number    Michael J Bloch, M.D. Referring Internal Medicine [phone]    [facility]   [phone]        Anticoagulation Patient Findings          History of Present Illness: follow up appointment for chronic anticoagulation with the high risk medication, warfarin for DV/Stroke    Last INR was out of range, dosage adjusted: pt is back at goal. Continue current dosing regimen.  Follow up in 3 weeks, to reduce the risk of adverse events related to this high risk medication, warfarin.    Yashira Boggs, Clinical Pharmacist

## 2021-03-22 DIAGNOSIS — I48.0 PAROXYSMAL ATRIAL FIBRILLATION (HCC): ICD-10-CM

## 2021-03-22 RX ORDER — METOPROLOL SUCCINATE 25 MG/1
25 TABLET, EXTENDED RELEASE ORAL DAILY
Qty: 90 TABLET | Refills: 3 | OUTPATIENT
Start: 2021-03-22

## 2021-03-25 ENCOUNTER — TELEPHONE (OUTPATIENT)
Dept: CARDIOLOGY | Facility: MEDICAL CENTER | Age: 86
End: 2021-03-25

## 2021-03-25 RX ORDER — METOPROLOL SUCCINATE 25 MG/1
25 TABLET, EXTENDED RELEASE ORAL DAILY
Qty: 90 TABLET | Refills: 3 | Status: SHIPPED | OUTPATIENT
Start: 2021-03-25 | End: 2022-03-14

## 2021-03-25 NOTE — TELEPHONE ENCOUNTER
AK    Patient called and stated that [facility] pharmacy states he has no refills left. He needs a refill of his metoprolol SR (TOPROL XL) 25 MG TABLET SR 24 HR. Please send to pharmacy on file.    Thank you,    Nevaeh MENA

## 2021-03-30 DIAGNOSIS — I10 ESSENTIAL HYPERTENSION, BENIGN: ICD-10-CM

## 2021-03-30 RX ORDER — LISINOPRIL 5 MG/1
5 TABLET ORAL DAILY
Qty: 90 TABLET | Refills: 3 | Status: SHIPPED | OUTPATIENT
Start: 2021-03-30 | End: 2021-11-09

## 2021-04-01 ENCOUNTER — TELEPHONE (OUTPATIENT)
Dept: VASCULAR LAB | Facility: MEDICAL CENTER | Age: 86
End: 2021-04-01

## 2021-04-01 DIAGNOSIS — S35.513D: ICD-10-CM

## 2021-04-08 ENCOUNTER — ANTICOAGULATION VISIT (OUTPATIENT)
Dept: VASCULAR LAB | Facility: MEDICAL CENTER | Age: 86
End: 2021-04-08
Attending: INTERNAL MEDICINE
Payer: MEDICARE

## 2021-04-08 VITALS — HEART RATE: 53 BPM | DIASTOLIC BLOOD PRESSURE: 86 MMHG | SYSTOLIC BLOOD PRESSURE: 124 MMHG

## 2021-04-08 DIAGNOSIS — G45.9 TIA (TRANSIENT ISCHEMIC ATTACK): ICD-10-CM

## 2021-04-08 DIAGNOSIS — I48.0 PAROXYSMAL ATRIAL FIBRILLATION (HCC): ICD-10-CM

## 2021-04-08 LAB
INR BLD: 1.6 (ref 0.9–1.2)
INR PPP: 1.6 (ref 2–3.5)

## 2021-04-08 PROCEDURE — 99212 OFFICE O/P EST SF 10 MIN: CPT

## 2021-04-08 PROCEDURE — 85610 PROTHROMBIN TIME: CPT

## 2021-04-08 NOTE — PROGRESS NOTES
Anticoagulation Summary  As of 2021    INR goal:  2.0-3.0   TTR:  71.0 % (5.6 y)   INR used for dosin.60 (2021)   Warfarin maintenance plan:  8 mg (4 mg x 2) every Sun, Thu; 6 mg (4 mg x 1.5) all other days   Weekly warfarin total:  46 mg   Plan last modified:  Maxime Cruz PharmD (2021)   Next INR check:  2021   Priority:  Maintenance   Target end date:  Indefinite    Indications    Paroxysmal atrial fibrillation (HCC) [I48.0]  Hx of Stroke (Resolved) [I63.9]  Hx of TIA (transient ischemic attack) [G45.9]  Deep vein thrombosis (DVT) (HCC) (Resolved) [I82.409]  Atrial fibrillation (HCC) (Resolved) [I48.91]             Anticoagulation Episode Summary     INR check location:  Anticoagulation Clinic    Preferred lab:      Send INR reminders to:      Comments:        Anticoagulation Care Providers     Provider Role Specialty Phone number    Michael J Bloch, M.D. Referring Internal Medicine [phone]    [facility]   [phone]        Anticoagulation Patient Findings      HPI:  Tristan Hester seen in clinic today, on anticoagulation therapy with warfarin for PAF, hx of stroke and DVT  Changes to current medical/health status since last appt: None  Denies signs/symptoms of bleeding and/or thrombosis since the last appt.    Denies any interval changes to diet.  Denies any interval changes to medications since last appt.   Denies any complications or cost restrictions with current therapy.   BP deferred.    Pt is not on antiplatelet therapy.    A/P   INR  SUB-therapeutic.   Instructed pt to begin newly increased regimen of 8 mg Sun/Thurs and 6 mg ROW.  Remote event hx - will forego bridging at this time.    Follow up appointment in 2 week(s) per pt.    Maxime Cruz PharmD

## 2021-04-13 ENCOUNTER — TELEPHONE (OUTPATIENT)
Dept: VASCULAR LAB | Facility: MEDICAL CENTER | Age: 86
End: 2021-04-13

## 2021-04-13 ENCOUNTER — HOSPITAL ENCOUNTER (OUTPATIENT)
Dept: RADIOLOGY | Facility: MEDICAL CENTER | Age: 86
End: 2021-04-13
Attending: NURSE PRACTITIONER
Payer: MEDICARE

## 2021-04-13 DIAGNOSIS — S35.513D: ICD-10-CM

## 2021-04-13 PROCEDURE — 93978 VASCULAR STUDY: CPT

## 2021-04-14 NOTE — TELEPHONE ENCOUNTER
Aortoiliac duplex with good flow - stent apparently patent.  Will recheck in 2 years.    Michael Bloch, MD  [facility]

## 2021-04-22 ENCOUNTER — TELEPHONE (OUTPATIENT)
Dept: MEDICAL GROUP | Facility: MEDICAL CENTER | Age: 86
End: 2021-04-22

## 2021-04-22 ENCOUNTER — ANTICOAGULATION MONITORING (OUTPATIENT)
Dept: MEDICAL GROUP | Facility: PHYSICIAN GROUP | Age: 86
End: 2021-04-22

## 2021-04-22 ENCOUNTER — ANTICOAGULATION VISIT (OUTPATIENT)
Dept: VASCULAR LAB | Facility: MEDICAL CENTER | Age: 86
End: 2021-04-22
Attending: INTERNAL MEDICINE
Payer: MEDICARE

## 2021-04-22 VITALS — SYSTOLIC BLOOD PRESSURE: 151 MMHG | DIASTOLIC BLOOD PRESSURE: 79 MMHG | HEART RATE: 57 BPM

## 2021-04-22 DIAGNOSIS — I48.0 PAROXYSMAL ATRIAL FIBRILLATION (HCC): ICD-10-CM

## 2021-04-22 DIAGNOSIS — G45.9 TIA (TRANSIENT ISCHEMIC ATTACK): ICD-10-CM

## 2021-04-22 DIAGNOSIS — Z79.01 CHRONIC ANTICOAGULATION: ICD-10-CM

## 2021-04-22 LAB — INR PPP: 2.3 (ref 2–3.5)

## 2021-04-22 PROCEDURE — 99211 OFF/OP EST MAY X REQ PHY/QHP: CPT | Performed by: NURSE PRACTITIONER

## 2021-04-22 PROCEDURE — 85610 PROTHROMBIN TIME: CPT

## 2021-04-22 RX ORDER — WARFARIN SODIUM 4 MG/1
TABLET ORAL
Qty: 160 TABLET | Refills: 1 | Status: SHIPPED | OUTPATIENT
Start: 2021-04-22 | End: 2021-11-09 | Stop reason: SDUPTHER

## 2021-04-22 NOTE — TELEPHONE ENCOUNTER
Spoke with [facility], clarified pt to take 1.5 to 2 tablets by mouth daily as directed by coumadin clinic.     Quita Sim, PharmD    ----- Message from Karissa Nunn sent at 4/22/2021  3:30 PM PDT -----  Regarding: [facility] Call  Licha with [facility] pharmacy wants to verification on warfarin instructions. Instructions say 1.5 to 2 mg a day but there is a note that says pt takes once a week on Friday.    Phone # [phone]

## 2021-04-22 NOTE — PROGRESS NOTES
Anticoagulation Summary  As of 2021    INR goal:  2.0-3.0   TTR:  70.8 % (5.7 y)   INR used for dosin.30 (2021)   Warfarin maintenance plan:  8 mg (4 mg x 2) every Sun, Thu; 6 mg (4 mg x 1.5) all other days   Weekly warfarin total:  46 mg   Plan last modified:  Jerzy CleaningD (2021)   Next INR check:  2021   Priority:  Maintenance   Target end date:  Indefinite    Indications    Paroxysmal atrial fibrillation (HCC) [I48.0]  Hx of Stroke (Resolved) [I63.9]  Hx of TIA (transient ischemic attack) [G45.9]  Deep vein thrombosis (DVT) (HCC) (Resolved) [I82.409]  Atrial fibrillation (HCC) (Resolved) [I48.91]             Anticoagulation Episode Summary     INR check location:  Anticoagulation Clinic    Preferred lab:      Send INR reminders to:      Comments:        Anticoagulation Care Providers     Provider Role Specialty Phone number    Michael J Bloch, M.D. Referring Internal Medicine [phone]    [facility]   [phone]        Anticoagulation Patient Findings      HPI:  Tristan Hester seen in clinic today for follow up on anticoagulation therapy in the presence of AF, TIA, CVA, DVT hx.   Denies any changes to current medical/health status since last appointment.   Denies any medication or diet changes.   No current symptoms of bleeding or thrombosis reported.    Pt is NOT on antiplatelet therapy.    A/P:   INR therapeutic.   Continue current regimen.   BP recorded in vitals. /79. He will recheck his BP later today. Denies any HAs or vision changess    Pt educated to contact our clinic with any changes in medications or s/s of bleeding or thrombosis. Pt is aware to seek immediate medical attention for falls, head injury or deep cuts    Follow up appointment in 2 week(s).    Next Appointment: Thursday, May 6, 2021 at 11:00 am.    Thi WILLIAM

## 2021-04-22 NOTE — PROGRESS NOTES
Received a call from [facility] about the Sig for warfarin.  Another pharmacist dealt with the issue.    Conor Locke, PharmD, MS, BCACP, [facility]  Phone: [phone], Fax: [phone]    This note was created using voice recognition software (Dragon). The accuracy of the dictation is limited by the abilities of the software. I have reviewed the note prior to signing, however some errors in grammar and context are still possible. If you have any questions related to this note please do not hesitate to contact our office.

## 2021-04-23 LAB — INR BLD: 2.3 (ref 0.9–1.2)

## 2021-05-06 ENCOUNTER — OFFICE VISIT (OUTPATIENT)
Dept: VASCULAR LAB | Facility: MEDICAL CENTER | Age: 86
End: 2021-05-06
Attending: INTERNAL MEDICINE
Payer: MEDICARE

## 2021-05-06 ENCOUNTER — ANTICOAGULATION MONITORING (OUTPATIENT)
Dept: VASCULAR LAB | Facility: MEDICAL CENTER | Age: 86
End: 2021-05-06

## 2021-05-06 VITALS
BODY MASS INDEX: 25.62 KG/M2 | SYSTOLIC BLOOD PRESSURE: 126 MMHG | DIASTOLIC BLOOD PRESSURE: 74 MMHG | HEART RATE: 53 BPM | WEIGHT: 183 LBS | HEIGHT: 71 IN

## 2021-05-06 DIAGNOSIS — I10 ESSENTIAL HYPERTENSION: ICD-10-CM

## 2021-05-06 DIAGNOSIS — S35.513D: ICD-10-CM

## 2021-05-06 DIAGNOSIS — G45.9 TIA (TRANSIENT ISCHEMIC ATTACK): ICD-10-CM

## 2021-05-06 DIAGNOSIS — Z79.01 CHRONIC ANTICOAGULATION: ICD-10-CM

## 2021-05-06 DIAGNOSIS — I65.29 STENOSIS OF CAROTID ARTERY, UNSPECIFIED LATERALITY: ICD-10-CM

## 2021-05-06 DIAGNOSIS — I48.0 PAROXYSMAL ATRIAL FIBRILLATION (HCC): ICD-10-CM

## 2021-05-06 DIAGNOSIS — I38 VALVULAR HEART DISEASE: ICD-10-CM

## 2021-05-06 DIAGNOSIS — E78.5 HYPERLIPIDEMIA, UNSPECIFIED HYPERLIPIDEMIA TYPE: ICD-10-CM

## 2021-05-06 LAB — INR PPP: 2.4 (ref 2–3.5)

## 2021-05-06 PROCEDURE — 85610 PROTHROMBIN TIME: CPT

## 2021-05-06 PROCEDURE — 99214 OFFICE O/P EST MOD 30 MIN: CPT | Performed by: INTERNAL MEDICINE

## 2021-05-06 PROCEDURE — 99213 OFFICE O/P EST LOW 20 MIN: CPT

## 2021-05-06 ASSESSMENT — FIBROSIS 4 INDEX: FIB4 SCORE: 2.47

## 2021-05-06 NOTE — PROGRESS NOTES
Anticoagulation Summary  As of 5/6/2021    INR goal:  2.0-3.0   TTR:  70.8 % (5.7 y)   INR used for dosing:     Plan last modified:  Jerzy NelsonD (4/22/2021)   Next INR check:     Target end date:  Indefinite    Indications    Paroxysmal atrial fibrillation (HCC) [I48.0]  Hx of Stroke (Resolved) [I63.9]  Hx of TIA (transient ischemic attack) [G45.9]  Deep vein thrombosis (DVT) (HCC) (Resolved) [I82.409]  Atrial fibrillation (HCC) (Resolved) [I48.91]             Anticoagulation Episode Summary     INR check location:  Anticoagulation Clinic    Preferred lab:      Send INR reminders to:      Comments:        Anticoagulation Care Providers     Provider Role Specialty Phone number    Michael J Bloch, M.D. Referring Internal Medicine [phone]    [facility]   [phone]                Anticoagulation Patient Findings      HPI:  Tristan Hester seen in clinic today, on anticoagulation therapy with warfarin for AF, TIA, CVA, DVT hx.   Changes to current medical/health status since last appt: Denies  Denies signs/symptoms of bleeding and/or thrombosis since the last appt.    Denies any interval changes to diet  Denies any interval changes to medications since last appt.   Denies any complications or cost restrictions with current therapy.   Verified current warfarin dosing schedule.   BP recorded in vitals during MD visit  Pt NOT on antiplatelet therapy       A/P   INR  remains-therapeutic.   Pt is to continue with current warfarin dosing regimen.    Follow up appointment in 4 week(s).    Leo Petit, JerzyD

## 2021-05-06 NOTE — PROGRESS NOTES
"VASCULAR FOLLOW UP VISIT  Subjective:   Tristan Hester is a 89 y.o. male who presents today 22 for vascular f/u  Chief Complaint   Patient presents with   • Follow-Up     HPI:   Patient here for f/u of HTN, dyslipidemia, carotid disease and valvular heart disease, and atrial fibrillation.  Doing PT for hip  BP at home usually <130/80  Back on his usual blood pressure medications - low dose lisinoprll and metoprolol  Using furosemide only as needed  No dyspnea exertion or leg swelling  No groin pain or claudication  Remains on warfarin   Off asa  Still with a lot of bruising  No myalgias on current lipid-lowering therapy  No TIA or stroke symptoms  No lightheadedness  Still with balance issues - no change  Had imaging per below    Social History     Tobacco Use   • Smoking status: Former Smoker     Packs/day: 0.50     Years: 30.00     Pack years: 15.00     Types: Cigarettes, Pipe     Quit date: 1985     Years since quittin.3   • Smokeless tobacco: Never Used   Substance Use Topics   • Alcohol use: Yes     Alcohol/week: 3.0 oz     Types: 3 Cans of beer, 3 Shots of liquor per week     Comment: 1 x week   • Drug use: No     DIET AND EXERCISE:  Weight Change: Stabilized  Diet: Relatively low sodium  Exercise: intermittent exercise     Objective:     Vitals:    21 1056 21 1101   BP: 144/80 126/74   BP Location: Left arm Left arm   Patient Position: Sitting Sitting   BP Cuff Size: Adult Adult   Pulse: (!) 54 (!) 53   Weight: 83 kg (183 lb)    Height: 1.803 m (5' 11\")       Body mass index is 25.52 kg/m².  Physical Exam   Constitutional: He is oriented to person, place, and time. He appears well-developed and well-nourished.   Cardiovascular: Normal rate and intact distal pulses.   Murmur heard.  Pulmonary/Chest: Effort normal. No respiratory distress. He has no wheezes. He has no rales.   Musculoskeletal:         General: No edema. Normal range of motion.   Neurological: He is alert and " oriented to person, place, and time. No cranial nerve deficit.   Psychiatric: He has a normal mood and affect. His behavior is normal.     Lab Results   Component Value Date    CHOLSTRLTOT 114 03/02/2021    CHOLSTRLTOT 109 05/29/2020    LDL 51 05/29/2020    HDL 54 03/02/2021    HDL 49 05/29/2020    TRIGLYCERIDE 51 03/02/2021    TRIGLYCERIDE 47 05/29/2020      Lab Results   Component Value Date    PROTHROMBTM 21.4 (H) 03/04/2020    INR 2.30 04/22/2021         Lab Results   Component Value Date    SODIUM 134 (L) 03/15/2021    POTASSIUM 4.6 03/15/2021    CHLORIDE 101 03/15/2021    CO2 26 03/15/2021    GLUCOSE 96 03/15/2021    BUN 22 03/15/2021    CREATININE 1.02 03/15/2021    BUNCREATRAT 24 03/02/2021    IFAFRICA >60 03/15/2021    IFNOTAFR >60 03/15/2021      Lab Results   Component Value Date    TSH 2.100 11/15/2019      Lab Results   Component Value Date    WBC 5.9 03/02/2021    WBC 6.6 05/29/2020    RBC 4.64 03/02/2021    RBC 4.99 05/29/2020    HEMOGLOBIN 14.2 03/02/2021    HEMOGLOBIN 13.8 (L) 05/29/2020    HEMATOCRIT 42.6 03/02/2021    HEMATOCRIT 44.7 05/29/2020    MCV 92 03/02/2021    MCV 89.6 05/29/2020    MCH 30.6 03/02/2021    MCH 27.7 05/29/2020    MCHC 33.3 03/02/2021    MCHC 30.9 (L) 05/29/2020    MPV 12.2 05/29/2020      Blood work from 10-9-14: Glucose 102, GFR 68, cholesterol 127, trig 35, HDL 57, LDL-C 63, urine for MA/cr 3.4    Blood work from May 12, 2015, glucose 37, GFR 72, sodium 146, AST 19, ALT 12, hemoglobin 13.8, total cholesterol 125, temperature 66, initial 48, LDL 64, albumin creatinine ratio in urine 2.5    CXR 8/20/15: (comparison to 7/9/2015) :Bibasilar opacities, right greater than left, consistent with a combination of airspace disease and atelectasis. Minimal left basilar airspace opacities are new. A small right pleural effusion is suspected, unchanged.    Blood work from February 2016-glucose 107, GFR 82, hemoglobin 14.0, total cholesterol 130, HL 51, LDL 62    Echocardiogram February  2016  Posterior mitral leaflet prolapse with eccentric moderate mitral regurgitation  Severely dilated left atrium  Mild concentric LVH  EF 60%  Mild aortic stenosis   right ventricular pressure estimated 37 mm    Carotid duplex May 2016:   1.  No evidence of re-stenosis in the right carotid bulb and internal    carotid artery after endarterectomy.    2.  Less than 50% left carotid bulb and internal carotid arterial stenosis.    3.  Normal bilateral subclavian and vertebral arterial exam.    4.  Compared to the prior study of 2/29/2016,  there has been no    significant change.     Blood work Oct 2016:  Glucose 106, GFR 77, LDL 68, nonHDL 86    Echo feb 2017:  Left ventricular ejection fraction is visually estimated to be 60%.   Grade I diastolic dysfunction.  Possibly severe mitral regurgitation.   Mild aortic stenosis.  Estimated right ventricular systolic pressure  is 25 mmHg.  Aortic root is 4 cm.   Compared to the images of the prior study done 2/29/16 there has been   no significant change.   Consider YOANDY for better evaluation of MR severity.    Blood work march 2017:  Glucose 94, GFR 59, Ldl-c 70, nonHDL 85    Carotid August 2017  Mild plaque of the right internal carotid artery with no significant    stenosis (<50%)   Mild plaque of the left internal carotid artery with no significant    stenosis (<50%).   No significant stenosis of the right or left subclavian artery.   No significant stenosis of the right or left vertebral artery.    MRI Brain Dec 2017:  MRI of the brain without contrast within normal limits for age with moderate white matter changes without demonstrable interval progression since the prior study.    Echocardiogram feb 2018:  Normal left ventricular size, wall thickness, and systolic function.   Left ventricular ejection fraction is visually estimated to be 60%.   Normal regional wall motion. Diastolic function is difficult to assess   with valvular disease.  Mitral annular calcification.  No mitral stenosis. Moderate mitral   regurgitation. ERO by PISA method is 0.2 sq cm. Both leaflet appear to   attach to the anterior lateral wall. Can not rule out parachute mitral   valve.  Calcified aortic valve leaflets. Moderate aortic stenosis.   Compared to the report of the study done 2/2017- the MR appears to be   less severe, but, there has been some progression of the AS.     Echocardiogram May 2019  Normal left ventricular size and systolic function. Moderate concentric   left ventricular hypertrophy. Grade II diastolic dysfunction. Normal   regional wall motion. Left ventricular ejection fraction is visually   estimated to be 55%.  Severely dilated left atrium.  Moderate mitral regurgitation.  Tricuspid aortic valve. Moderate aortic stenosis. Aortic valve area   calculated from the continuity equation is 1. 1 cm2 Vmax is  3.5  m/s.   Transvalvular gradients are - Peak: 50  mmHg, Mean: 33 mmHg. However   the dimensionless index is 0. 2, which would be consistent with severe   AS. No aortic insufficiency.  Ascending aorta is borderline dilated with a diameter of 3.9  cm.  Compared to the report of the study done 2/2018- there has been an   further increase in the AV gradient though the dimensionless index has   not changed.    Blood work from [facility] [place] April 2019  Hemoglobin 15.0, glucose 116, GFR 72, total cholesterol 120, triglycerides 55, HDL 52, LDL 57, albumin creatinine ratio 9.3, TSH 1.4, BNP 2167    Carotid duplex aug 2019:   Mild to moderate plaque within bilateral internal carotid arteries with    less that 50% stenosis.   Prominent heterogeneous plaque proximal right external carotid artery.    Echocardiogram February 2020  Low normal left ventricular systolic function. Left ventricular   ejection fraction is visually estimated to be 50%.   Grade II diastolic dysfunction.  Normal inferior vena cava size without inspiratory collapse.  Moderate aortic stenosis. Transvalvular gradients are - Peak:  47 mmHg,   Mean: 27 mmHg.    Carotid duplex February 2020   Moderate stenosis of the right internal carotid artery (50-69%), similar to    prior study.    No hemodynamically significant stenosis.     Echocardiogram March 2020  Prior echo on 2/3/20, there is now a TAVR valve present.  Left ventricular ejection fraction is visually estimated to be 60%.  Grade II diastolic dysfunction.  Known TAVR aortic valve that is functioning normally with normal   transvalvular gradients.  Estimated right ventricular systolic pressure  is 30 mmHg.     Arterial duplex March 2020   Acute occluded thrombus indicated in the left common femoral artery with no    flow.     Retrograde collateral  flow in the proximal profunda femoral artery.    Results called to MARGARITA Acharya at 1326 hrs.   Attempts to reach physcians unsuccessful.  Pt's physcians aware of findings    according to the RN.    Aortoiliac duplex May 2020   No evidence of aortic aneurysm. Abdominal aorta is ectatic. Atherosclerotic    plaque.    No stenosis of the proximal segment of the major visceral arteries.    Patent bilateral iliac arteries. Left external iliac stent is patent without    evidence of stenosis. Tortuous bilateral iliac arteries    JANICE May 2020  Right 1.16  Left 1.24  Triphasic throughout    Echocardiogram aug 2020  Limited to reassess MR after TAVR.  Mild asymmetric septal hypertrophy with prominent septum, 1.3 cm.  Left ventricular ejection fraction is visually estimated to be 55%.  Possible hypokinesis of the basal to mid inferolateral wall.  Mild mitral annular calcification.  Moderate eccentric mitral regurgitation.  Well seated TAVR, gradients not assessed on this study, no significant   paravalvular leak.  Compared to prior echo 4/8/20, EF appears normal, MR appears moderate.    Carotid duplex February 2021   No evidence for RIGHT carotid stenosis status post endarterectomy.   Mild LEFT internal carotid artery stenosis.     Echocardiogram March  2021  Prior echo on 8/7/20.Left ventricular ejection fraction is visually   estimated to be 60%.  Normal diastolic function.  Known TAVR aortic valve that is functioning normally with normal   transvalvular gradients. Vmax is 1.85 m/s.  Moderate eccentric mitral regurgitation.    Aortoiliac duplex April 2021   No evidence of aortic aneurysm.    No evidence of pseudoaneurysm or injury to the bilateral iliac arteries.     No stenosis of the proximal segment of the major visceral arteries.    Waveforms and velocities of the bilateral iliac arteries are normal.    Common iliac diameter (cm):   Right- 1.63 x 1.72         Left-   1.70 x 1.72          Medical Decision Making:  Today's Assessment / Status / Plan:     1. Paroxysmal atrial fibrillation (HCC)  CBC WITHOUT DIFFERENTIAL   2. TIA (transient ischemic attack)     3. Chronic anticoagulation     4. Iliac artery injury, unspecified laterality, subsequent encounter     5. Stenosis of carotid artery, unspecified laterality     6. Hyperlipidemia, unspecified hyperlipidemia type  Lipid Profile    Comp Metabolic Panel    TSH   7. Essential hypertension  Comp Metabolic Panel   8. Valvular heart disease       Patient Type: Secondary Prevention    Etiology of Established CVD if Present:   1. TIA - likely cardioembolic from valve disease  2. Carotid atherosclerosis -status post CEA  3. Valvular Heart Disease  4. Atrial fibrillation with onset during acute illness, now chronic  5.  DVT after back surgery jan 2016  6.  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    Lipid Management: Qualifies for Statin Therapy Based on 2013 ACC/AHA Guidelines: yes  Calculated 10-Year Risk of ASCVD: N/A  Currently on Statin: Yes  ACC/aha criteria for at least mod intensity statin  Per nla goal ldl <70 and nonHDL <100 - below threshold on most recent blood work,  Plan:  - Continue atorvastatin at current dose    - continue zetia  - Continue TLC  - Recheck fasting lipids prior  to next visit    Blood Pressure Management:  Acc/aha bp goal <130/80  BP under reasonable control both at home and in the office  No significant albuminuria in the past  Plan:  -Continue current blood pressure medications  -Continue lifestyle modification  - Encouraged home BP monitoring  -Call if consistently greater than 130/80    Glycemic Status: Prediabetic  IFG, mild and stable  - Continue lifestyle mod  - Follow fasting glucose    Anti-Platelet/Anti-Coagulant Tx: yes  - Continue indefinite anticoagulation with warfarin  -Continue to hold aspirin-cardiology in agreement   - F/U with anticoagulation clinic as scheduled -POC INR today  - limit nsaids    Smoking: continue complete avoidance    Physical Activity:  Encouraged more walking    Weight Management and Nutrition: Dietary plan was discussed with patient at this visit including c/w Med diet    Other:     1. TIA - likely cardioembolic from valve disease - continue BP control and anticoagulation  2. Carotid atherosclerosis status post CEA- .  Only mild restenosis on repeat duplex.  Remains asymptomatic.  Repeat duplex in 2 years  3. Valvular Heart Disease -now status post TAVR March 2020.  Still with unrepaired mitral disease -improved on most recent echo.  Appears asymptomatic at present.  Does not appear to be volume overloaded.  Await repeat echocardiogram at discretion of cardiology  4. Colonic Polyp on recent colonoscopy - repeat colonoscopy in 2019 negative per patient.   No further f/u needed  5. Insomnia - trialed zolpidem w/o success.   Continue temazepam. Patient understands addictive potential.  6-month refill given today  6.  Atrial fibrillation - defer management of rhythm and rate to cards.  Continue anticoag as above  7.  DVT - postoperative.  No recurrence.  Continue indefinite anticoagulation given concomitant afib  8.  H/o prostate ca and multiple urological issues - defer f/u to urology  9.  Balance issues and neuropathy -this seems  recalcitrant to physical therapy or any intervention that we have tried previously.  Defer f/u to ENT and neurology  10.  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  Good arterial flow and patent stent on noninvasive study.  .  Report any claudication, rest pain, or ulceration immediately.  Will obtain repeat arterial duplex 2 year from previous  11.  Continuing medical health - cancer screening as above. Has had shingles vac, continue yearly flu shot.  Has had Covid vaccination    We will partner with other providers in the management of established vascular disease and cardiometabolic risk factors.    Studies to Be Obtained:    1) echo -timing to be determined by cardiology  2) aortoiliac duplex April 2023  3) carotid duplex February 2023    Labs to Be Obtained: as above prior to next visit    Follow up in: 6 months    Michael J Bloch, M.D.    Cc:   MD CORA Jones MD J Albright

## 2021-05-21 DIAGNOSIS — E78.5 DYSLIPIDEMIA: ICD-10-CM

## 2021-05-21 RX ORDER — EZETIMIBE 10 MG/1
TABLET ORAL
Qty: 90 TABLET | Refills: 3 | Status: SHIPPED | OUTPATIENT
Start: 2021-05-21 | End: 2022-05-26

## 2021-06-03 ENCOUNTER — ANTICOAGULATION VISIT (OUTPATIENT)
Dept: VASCULAR LAB | Facility: MEDICAL CENTER | Age: 86
End: 2021-06-03
Attending: INTERNAL MEDICINE
Payer: MEDICARE

## 2021-06-03 VITALS — DIASTOLIC BLOOD PRESSURE: 68 MMHG | SYSTOLIC BLOOD PRESSURE: 135 MMHG | HEART RATE: 57 BPM

## 2021-06-03 DIAGNOSIS — I48.0 PAROXYSMAL ATRIAL FIBRILLATION (HCC): ICD-10-CM

## 2021-06-03 DIAGNOSIS — G45.9 TIA (TRANSIENT ISCHEMIC ATTACK): ICD-10-CM

## 2021-06-03 LAB — INR PPP: 3.2 (ref 2–3.5)

## 2021-06-03 PROCEDURE — 85610 PROTHROMBIN TIME: CPT

## 2021-06-03 PROCEDURE — 99212 OFFICE O/P EST SF 10 MIN: CPT

## 2021-06-03 NOTE — PROGRESS NOTES
Anticoagulation Summary  As of 6/3/2021    INR goal:  2.0-3.0   TTR:  71.0 % (5.8 y)   INR used for dosing:  3.20 (6/3/2021)   Warfarin maintenance plan:  8 mg (4 mg x 2) every Sun, Thu; 6 mg (4 mg x 1.5) all other days   Weekly warfarin total:  46 mg   Plan last modified:  Conor Locke, PharmD (4/22/2021)   Next INR check:  7/1/2021   Priority:  Maintenance   Target end date:  Indefinite    Indications    Paroxysmal atrial fibrillation (HCC) [I48.0]  Hx of Stroke (Resolved) [I63.9]  Hx of TIA (transient ischemic attack) [G45.9]  Deep vein thrombosis (DVT) (HCC) (Resolved) [I82.409]  Atrial fibrillation (HCC) (Resolved) [I48.91]             Anticoagulation Episode Summary     INR check location:  Anticoagulation Clinic    Preferred lab:      Send INR reminders to:      Comments:        Anticoagulation Care Providers     Provider Role Specialty Phone number    Michael J Bloch, M.D. Referring Internal Medicine [phone]    [facility]   [phone]        Anticoagulation Patient Findings      HPI:  Tristan Hester seen in clinic today, on anticoagulation therapy with warfarin for AF, and hx of TIA, DVT and CVA  Changes to current medical/health status since last appt: none  Denies signs/symptoms of bleeding and/or thrombosis since the last appt.    Denies any interval changes to diet, BUT did report having a beer last night.   Denies any interval changes to medications since last appt.   Denies any complications or cost restrictions with current therapy.   BP recorded in vitals. Running a little high. Patient will monitor at home and follow up with PCP.  Confirmed current dosing regimen.     Patient is not on antiplatelet therapy.     A/P   INR is SUPRA-therapeutic today at 3.2.   Patient will reduce dose to 6mg TONIGHT, as a one time adjustment, and then resume his current dosing regimen.  Patient will follow up again in 4 weeks (per pt preference).     Next appt: Thursday, July 1,  2021  11am    Ludmila Zapata  Pharm D Student  Amina BertrandD

## 2021-06-18 ENCOUNTER — OFFICE VISIT (OUTPATIENT)
Dept: URGENT CARE | Facility: CLINIC | Age: 86
End: 2021-06-18
Payer: MEDICARE

## 2021-06-18 ENCOUNTER — ANTICOAGULATION VISIT (OUTPATIENT)
Dept: VASCULAR LAB | Facility: MEDICAL CENTER | Age: 86
End: 2021-06-18
Attending: INTERNAL MEDICINE
Payer: MEDICARE

## 2021-06-18 VITALS
OXYGEN SATURATION: 94 % | RESPIRATION RATE: 16 BRPM | WEIGHT: 190.6 LBS | SYSTOLIC BLOOD PRESSURE: 126 MMHG | HEART RATE: 64 BPM | TEMPERATURE: 98.1 F | HEIGHT: 71 IN | BODY MASS INDEX: 26.68 KG/M2 | DIASTOLIC BLOOD PRESSURE: 78 MMHG

## 2021-06-18 DIAGNOSIS — G45.9 TIA (TRANSIENT ISCHEMIC ATTACK): ICD-10-CM

## 2021-06-18 DIAGNOSIS — I48.0 PAROXYSMAL ATRIAL FIBRILLATION (HCC): ICD-10-CM

## 2021-06-18 DIAGNOSIS — Z51.89 ENCOUNTER FOR WOUND CARE: ICD-10-CM

## 2021-06-18 DIAGNOSIS — T14.8XXA MULTIPLE SKIN TEARS: ICD-10-CM

## 2021-06-18 DIAGNOSIS — Z79.01 CHRONIC ANTICOAGULATION: ICD-10-CM

## 2021-06-18 LAB
INR BLD: 2.3 (ref 0.9–1.2)
INR PPP: 2.3 (ref 2–3.5)

## 2021-06-18 PROCEDURE — 99214 OFFICE O/P EST MOD 30 MIN: CPT | Performed by: NURSE PRACTITIONER

## 2021-06-18 PROCEDURE — 99211 OFF/OP EST MAY X REQ PHY/QHP: CPT

## 2021-06-18 PROCEDURE — 85610 PROTHROMBIN TIME: CPT

## 2021-06-18 ASSESSMENT — ENCOUNTER SYMPTOMS
HEADACHES: 0
BRUISES/BLEEDS EASILY: 1
CHILLS: 0
FEVER: 0

## 2021-06-18 ASSESSMENT — FIBROSIS 4 INDEX: FIB4 SCORE: 2.5

## 2021-06-18 NOTE — PROGRESS NOTES
Anticoagulation Summary  As of 2021    INR goal:  2.0-3.0   TTR:  71.1 % (5.8 y)   INR used for dosin.30 (2021)   Warfarin maintenance plan:  8 mg (4 mg x 2) every Sun, Thu; 6 mg (4 mg x 1.5) all other days   Weekly warfarin total:  46 mg   Plan last modified:  Conor Locke, PharmD (2021)   Next INR check:  2021   Priority:  Maintenance   Target end date:  Indefinite    Indications    Paroxysmal atrial fibrillation (HCC) [I48.0]  Hx of Stroke (Resolved) [I63.9]  Hx of TIA (transient ischemic attack) [G45.9]  Deep vein thrombosis (DVT) (HCC) (Resolved) [I82.409]  Atrial fibrillation (HCC) (Resolved) [I48.91]             Anticoagulation Episode Summary     INR check location:  Anticoagulation Clinic    Preferred lab:      Send INR reminders to:      Comments:        Anticoagulation Care Providers     Provider Role Specialty Phone number    Michael J Bloch, M.D. Referring Internal Medicine [phone]    [facility]   [phone]        Anticoagulation Patient Findings      HPI:  Tristan Hester seen in clinic today for follow up on anticoagulation therapy in the presence of Afib/CVA and DVT.   Denies any changes to current medical/health status since last appointment.   Denies any medication or diet changes.   Verified dose with patient.  BP recorded in vitals.    Patient recently returned from a vacation trip, he states that while he was there he easily had lacerations to his skin every time he bumped into something which caused bleeding, he currently has the wounds wrapped in gauze. He complains that whenever he tries to take off the gauze it re-opens his wounds.   INR today therapeutic at 2.3, due to hx of Afib and stroke, patient instructed to continue on regimen. He was advised to seek medical attention to address this issue. He is willing to stop at the [facility] Urgent care on his way home.    Pt is not on antiplatelet therapy     A/P:   INR is therapeutic.    Continue current regimen.     Pt educated to contact our clinic with any changes in medications or s/s of bleeding or thrombosis. Pt is aware to seek immediate medical attention for falls, head injury or deep cuts    Follow up appointment in 2 week(s).    Alexia Evans, Pharm.D, BC-ACP, BC-ADM

## 2021-06-18 NOTE — PROGRESS NOTES
Tristan Hester is a 86 y.o. male who presents for Dressing Change (x2 weeks ago )      HPI this new problem.  Tristan is an 86-year-old male patient presents with wounds to both of his arms.  He was on a river cruise and sustained skin tears to his arms.  He is on warfarin therapy.  The cruise nurse bandaged his arm several times while he was on the cruise.  He got home yesterday.  He was told to follow-up with his doctor.  He could not get an appointment.  The current dressings have been on for 3 to 4 days.  He reports that initially his skin bled a lot and that the nurse had a hard time getting the bleeding to stop.  He denies fever, chills, arm pain.  No other aggravating or alleviating factors.    Review of Systems   Constitutional: Negative for chills and fever.   Neurological: Negative for headaches.   Endo/Heme/Allergies: Bruises/bleeds easily.       Allergies   Allergen Reactions   • Other Environmental Runny Nose and Itching     Seasonal, cats-hayfever     Past Medical History:   Diagnosis Date   • Aortic valve stenosis    • Arrhythmia     Afib   • Arthritis     osteo, hips and knees   • Blood clotting disorder ([facility])     , left arm   • Bronchitis        • Cancer ([facility])     Prostate CA--2000   • Carotid arterial disease ([facility]) 1/6/2014   • Cataract     removed bilat   • DVT (deep venous thrombosis) ([facility]) 2016   • Heart valve disease     MVR   • HLD (hyperlipidemia) 1/6/2014   • Hypertension    • Mitral regurgitation 1/6/2014   • Pain 12-14-15    low back, hips, 2-3/10   • Pneumonia        • Stroke ([facility]) 2008    TIA, no residual   • TIA (transient ischemic attack)     2008   • Urinary bladder disorder     HAD MALE BLADDER SUSPENSION PROCEDURE DONE   • Urinary incontinence     stress incontinence     Past Surgical History:   Procedure Laterality Date   • TRANSCATHETER AORTIC VALVE REPLACEMENT  3/2/2020    Procedure: REPLACEMENT, AORTIC VALVE, TRANSCATHETER -;  Surgeon: Sony  DAGOBERTO Barrientos;  Location: SURGERY [facility];  Service: Cardiac   • YOANDY  3/2/2020    Procedure: ECHOCARDIOGRAM, TRANSESOPHAGEAL;  Surgeon: Sony Barrientos M.D.;  Location: SURGERY [facility];  Service: Cardiac   • FEMORAL ENDARTERECTOMY Left 3/2/2020    Procedure: Repair of left femoral  Artery Dissection;  Surgeon: Alpa Tillman M.D.;  Location: SURGERY [facility];  Service: General   • LUMBAR FUSION POSTERIOR  2015    Procedure: LUMBAR FUSION POSTERIOR L3-5 with peek rods;  Surgeon: Phylicia Almonte M.D.;  Location: SURGERY [facility];  Service:    • LUMBAR DECOMPRESSION  2015    Procedure: LUMBAR DECOMPRESSION posterior redo decompression L3-5, with dural repair;  Surgeon: Phylicia Almonte M.D.;  Location: [facility];  Service:    • THORACOSCOPY  2015    Procedure: THORACOSCOPY with decortication VATS , side to be determined  ;  Surgeon: Elmira Holder M.D.;  Location: [facility];  Service:    • LUMBAR LAMINECTOMY DISKECTOMY  10/8/2010    Performed by PHYLICIA ALMONTE at [facility]   • CERVICAL DISK AND FUSION ANTERIOR  10/26/2009    Performed by PHYLICIA ALMONTE at [facility]   • CAROTID ENDARTERECTOMY  7/10/08    Performed by LEIGH MORATAYA at [facility]   • BLADDER SLING MALE  2004   • PROSTATECTOMY, RADIAL  2000     Family History   Problem Relation Age of Onset   • Heart Disease Father         CHF at 91.   • Other Brother         Aneurysm   • Cancer Brother         Seminal vascular cancer   • Autoimmune Disease Daughter         Lupus, RA   • No Known Problems Daughter    • No Known Problems Son    • No Known Problems Son    • Heart Attack Neg Hx      Social History     Tobacco Use   • Smoking status: Former Smoker     Packs/day: 0.50     Years: 30.00     Pack years: 15.00     Types: Cigarettes, Pipe     Quit date: 1985     Years since quittin.4   • Smokeless tobacco: Never Used   Substance  Use Topics   • Alcohol use: Yes     Alcohol/week: 3.0 oz     Types: 3 Cans of beer, 3 Shots of liquor per week     Comment: 1 x week     Patient Active Problem List   Diagnosis   • Hx of TIA (transient ischemic attack)   • Arthritis   • Hx of Prostate Cancer   • Essential hypertension, benign   • Paroxysmal atrial fibrillation (HCC)   • Dyslipidemia   • Insomnia   • Lumbar stenosis   • Lower back pain   • Balance disorder   • S/P TAVR (transcatheter aortic valve replacement)   • Carotid artery disease (HCC)   • 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)   • Acute deep vein thrombosis (DVT) of distal vein of lower extremity (HCC)   • Urge incontinence of urine   • History of CEA (carotid endarterectomy)   • Iliac artery injury, unspecified laterality, subsequent encounter     Current Outpatient Medications on File Prior to Visit   Medication Sig Dispense Refill   • ezetimibe (ZETIA) 10 MG Tab Take 1 tablet by mouth once daily 90 tablet 3   • warfarin (COUMADIN) 4 MG Tab Take 1.5-2 tabs by mouth daily or as directed by anticoagulation clinic  Indications: Per pt takes once a week on Friday 160 tablet 1   • lisinopril (PRINIVIL) 5 MG Tab Take 1 tablet by mouth every day. 90 tablet 3   • metoprolol SR (TOPROL XL) 25 MG TABLET SR 24 HR Take 1 tablet by mouth every day. 90 tablet 3   • temazepam (RESTORIL) 30 MG capsule Take 1 Cap by mouth at bedtime as needed for up to 180 days. 1 po each night as needed 30 Cap 5   • atorvastatin (LIPITOR) 80 MG tablet TAKE 1/2 (ONE-HALF) TABLET BY MOUTH ONCE DAILY IN THE EVENING 45 Tab 6   • gabapentin (NEURONTIN) 300 MG Cap Take 1 Cap by mouth 3 times a day. 270 Cap 3   • furosemide (LASIX) 20 MG Tab Take 1 Tab by mouth every day. 30 Tab 11   • amoxicillin (AMOXIL) 500 MG Cap Take 4 Caps by mouth Once PRN (30-60min prior to dental work) for up to 1 dose. (Patient not taking: Reported on 6/18/2021) 12 Cap 0     No current facility-administered medications on file  "prior to visit.          Objective:     /78 (BP Location: Right arm, Patient Position: Sitting, BP Cuff Size: Adult long)   Pulse 64   Temp 36.7 °C (98.1 °F) (Temporal)   Resp 16   Ht 1.803 m (5' 11\")   Wt 86.5 kg (190 lb 9.6 oz)   SpO2 94%   BMI 26.58 kg/m²     Physical Exam  Vitals reviewed.   Constitutional:       Appearance: He is normal weight.   Cardiovascular:      Rate and Rhythm: Normal rate.      Pulses: Normal pulses.           Radial pulses are 2+ on the right side and 2+ on the left side.   Pulmonary:      Effort: Pulmonary effort is normal.   Skin:     General: Skin is warm and dry.      Capillary Refill: Capillary refill takes less than 2 seconds.             Comments: No edema   Presents with 4 dressings that are dried onto wounds. ( telfa and coban). Dressings were carefully removed by provider after using saline to loosen dressing.    Neurological:      Mental Status: He is alert and oriented to person, place, and time.   Psychiatric:         Mood and Affect: Mood normal.         Thought Content: Thought content normal.     Wound care done by provider.     Assessment /Associated Orders:      1. Multiple skin tears     2. Chronic anticoagulation           Medical Decision Making:    Pt is clinically stable at today's acute urgent care visit.  No acute distress noted. Appropriate for outpatient management at this time.   Acute problem today with uncertain prognosis.    The patient is alerted to watch for any signs of infection (redness, pus, pain, increased swelling or fever) and call if such occurs. Home wound care instructions are provided.   Remove dressings in 3 days. Recommended soaking them off. Reapply prn   Resume all prior  RX medications. Take as prescribed.     Advised to follow-up with the primary care provider for recheck, reevaluation, and consideration of further management if necessary.   Discussed management options (risks,benefits, and alternatives to treatment). " Expressed understanding and the treatment plan was agreed upon. Questions were encouraged and answered       Return to urgent care prn if new or worsening sx or if there is no improvement in condition prn . Red flags discussed and indications to immediately call 911 or present to the Emergency Department.     I personally reviewed prior external notes and test results pertinent to today's visit.  I have independently reviewed and interpreted all diagnostics ordered during this urgent care acute visit.   Time spent evaluating this patient was at least 30 minutes and includes preparing for visit, counseling/education, exam and evaluation, obtaining history, independent interpretation, ordering lab/test/procedures,medication management and documentation.

## 2021-06-29 ENCOUNTER — ANTICOAGULATION VISIT (OUTPATIENT)
Dept: VASCULAR LAB | Facility: MEDICAL CENTER | Age: 86
End: 2021-06-29
Attending: INTERNAL MEDICINE
Payer: MEDICARE

## 2021-06-29 VITALS
HEART RATE: 52 BPM | SYSTOLIC BLOOD PRESSURE: 125 MMHG | DIASTOLIC BLOOD PRESSURE: 77 MMHG | BODY MASS INDEX: 26.5 KG/M2 | WEIGHT: 190 LBS

## 2021-06-29 DIAGNOSIS — I48.0 PAROXYSMAL ATRIAL FIBRILLATION (HCC): ICD-10-CM

## 2021-06-29 DIAGNOSIS — G45.9 TIA (TRANSIENT ISCHEMIC ATTACK): ICD-10-CM

## 2021-06-29 LAB — INR PPP: 3.6 (ref 2–3.5)

## 2021-06-29 PROCEDURE — 99212 OFFICE O/P EST SF 10 MIN: CPT

## 2021-06-29 PROCEDURE — 85610 PROTHROMBIN TIME: CPT

## 2021-06-29 ASSESSMENT — FIBROSIS 4 INDEX: FIB4 SCORE: 2.5

## 2021-06-29 NOTE — PROGRESS NOTES
OP Anticoagulation Service Note    Date: 6/29/2021    Anticoagulation Summary  As of 6/29/2021    INR goal:  2.0-3.0   TTR:  71.0 % (5.8 y)   INR used for dosing:  3.60 (6/29/2021)   Warfarin maintenance plan:  6 mg (4 mg x 1.5) every day   Weekly warfarin total:  42 mg   Plan last modified:  Conor Locke, PharmD (6/29/2021)   Next INR check:  7/13/2021   Priority:  Maintenance   Target end date:  Indefinite    Indications    Paroxysmal atrial fibrillation (HCC) [I48.0]  Hx of Stroke (Resolved) [I63.9]  Hx of TIA (transient ischemic attack) [G45.9]  Deep vein thrombosis (DVT) (HCC) (Resolved) [I82.409]  Atrial fibrillation (HCC) (Resolved) [I48.91]             Anticoagulation Episode Summary     INR check location:  Anticoagulation Clinic    Preferred lab:      Send INR reminders to:      Comments:        Anticoagulation Care Providers     Provider Role Specialty Phone number    Michael J Bloch, M.D. [facility] Internal Medicine [phone]    [facility] Anticoagulation Services   [phone]        Anticoagulation Patient Findings  Patient Findings     Positives:  Bruising    Negatives:  Signs/symptoms of thrombosis, Signs/symptoms of bleeding, Laboratory test error suspected, Change in health, Change in alcohol use, Change in activity, Upcoming invasive procedure, Emergency department visit, Upcoming dental procedure, Missed doses, Extra doses, Change in medications, Change in diet/appetite, Hospital admission, Other complaints          HPI:     Tristan Hester is in the Anticoagulation Clinic today.     The reason for today's visit is to prevent morbidity and mortality from a blood clot and/or stroke and to reduce the risk of bleeding while on a anticoagulant.     PCP:  Michael J Bloch, M.D.  [address] [facility] [id number] [phone]    3 vitals included with today's appt-unless patient declined:  (BP, HR, weight, ht, RR)   Vitals:    06/29/21 1057   BP: 125/77   Pulse: (!) 52   Weight: 86.2 kg (190 lb)          Assessment:     • INR  supra-therapeutic.   • Is pt on antiplatelet therapy: no  • Is pt on NSAID: no      Current Outpatient Medications:   •  ezetimibe, Take 1 tablet by mouth once daily  •  warfarin, Take 1.5-2 tabs by mouth daily or as directed by anticoagulation clinic  Indications: Per pt takes once a week on Friday  •  lisinopril, 5 mg, Oral, DAILY  •  metoprolol SR, 25 mg, Oral, DAILY  •  temazepam, 30 mg, Oral, HS PRN  •  atorvastatin, TAKE 1/2 (ONE-HALF) TABLET BY MOUTH ONCE DAILY IN THE EVENING  •  gabapentin, 300 mg, Oral, TID  •  amoxicillin, 2,000 mg, Oral, Once PRN (Patient not taking: Reported on 6/18/2021)  •  furosemide, 20 mg, Oral, DAILY    Plan:     • Decrease weekly warfarin dose as noted above.       Follow-up:     • Test in 2 week(s).    • This decision was made using shared decision making with the pt and benefits vs risks were discussed.      Additional information discussed with patient:     • Pt educated to contact our clinic with any changes in medications or s/s of bleeding or thrombosis.  • Education was provided today regarding tips to reduce their bleed risk and dietary constraints while on a anticoagulant.    National recommendations regarding anticoagulation therapy:     The CHEST guidelines recommends frequent monitoring at regular intervals for any patient on a anticoagulant, to ensure the patient is on the proper dose, and to monitor that they are not having any complications from therapy.       Conor Locke, PharmD, MS, BCACP, [facility]  Phone [phone] fax [phone]    This note was created using voice recognition software (Dragon). The accuracy of the dictation is limited by the abilities of the software. I have reviewed the note prior to signing, however some errors in grammar and context are still possible. If you have any questions related to this note please do not hesitate to contact our office.

## 2021-07-13 ENCOUNTER — ANTICOAGULATION VISIT (OUTPATIENT)
Dept: VASCULAR LAB | Facility: MEDICAL CENTER | Age: 86
End: 2021-07-13
Attending: INTERNAL MEDICINE
Payer: MEDICARE

## 2021-07-13 VITALS
WEIGHT: 190 LBS | SYSTOLIC BLOOD PRESSURE: 144 MMHG | BODY MASS INDEX: 26.5 KG/M2 | HEART RATE: 52 BPM | DIASTOLIC BLOOD PRESSURE: 83 MMHG

## 2021-07-13 DIAGNOSIS — G45.9 TIA (TRANSIENT ISCHEMIC ATTACK): ICD-10-CM

## 2021-07-13 DIAGNOSIS — I48.0 PAROXYSMAL ATRIAL FIBRILLATION (HCC): ICD-10-CM

## 2021-07-13 LAB — INR PPP: 2.6 (ref 2–3.5)

## 2021-07-13 PROCEDURE — 99211 OFF/OP EST MAY X REQ PHY/QHP: CPT

## 2021-07-13 PROCEDURE — 85610 PROTHROMBIN TIME: CPT

## 2021-07-13 ASSESSMENT — FIBROSIS 4 INDEX: FIB4 SCORE: 2.5

## 2021-07-13 NOTE — PROGRESS NOTES
OP Anticoagulation Service Note    Date: 2021    Anticoagulation Summary  As of 2021    INR goal:  2.0-3.0   TTR:  70.8 % (5.9 y)   INR used for dosin.60 (2021)   Warfarin maintenance plan:  6 mg (4 mg x 1.5) every day   Weekly warfarin total:  42 mg   Plan last modified:  Conor Locke, PharmD (2021)   Next INR check:  8/3/2021   Priority:  Maintenance   Target end date:  Indefinite    Indications    Paroxysmal atrial fibrillation (HCC) [I48.0]  Hx of Stroke (Resolved) [I63.9]  Hx of TIA (transient ischemic attack) [G45.9]  Deep vein thrombosis (DVT) (HCC) (Resolved) [I82.409]  Atrial fibrillation (HCC) (Resolved) [I48.91]             Anticoagulation Episode Summary     INR check location:  Anticoagulation Clinic    Preferred lab:      Send INR reminders to:      Comments:        Anticoagulation Care Providers     Provider Role Specialty Phone number    Michael J Bloch, M.D. [facility] Internal Medicine [phone]    [facility] Anticoagulation Services   [phone]        Anticoagulation Patient Findings  Patient Findings     Negatives:  Signs/symptoms of thrombosis, Signs/symptoms of bleeding, Laboratory test error suspected, Change in health, Change in alcohol use, Change in activity, Upcoming invasive procedure, Emergency department visit, Upcoming dental procedure, Missed doses, Extra doses, Change in medications, Change in diet/appetite, Hospital admission, Bruising, Other complaints          HPI:     Tristan Hester is in the Anticoagulation Clinic today.     The reason for today's visit is to prevent morbidity and mortality from a blood clot and/or stroke and to reduce the risk of bleeding while on a anticoagulant.     PCP:  Michael J Bloch, M.D.  [address] [facility] [id number] [phone]    3 vitals included with today's appt-unless patient declined:  (BP, HR, weight, ht, RR)   Vitals:    21 1057   BP: 144/83   Pulse: (!) 52   Weight: 86.2 kg (190 lb)          Assessment:     • INR  therapeutic.   • Is pt on antiplatelet therapy: no  • Is pt on NSAID: no      Current Outpatient Medications:   •  ezetimibe, Take 1 tablet by mouth once daily  •  warfarin, Take 1.5-2 tabs by mouth daily or as directed by anticoagulation clinic  Indications: Per pt takes once a week on Friday  •  lisinopril, 5 mg, Oral, DAILY  •  metoprolol SR, 25 mg, Oral, DAILY  •  temazepam, 30 mg, Oral, HS PRN  •  atorvastatin, TAKE 1/2 (ONE-HALF) TABLET BY MOUTH ONCE DAILY IN THE EVENING  •  gabapentin, 300 mg, Oral, TID  •  amoxicillin, 2,000 mg, Oral, Once PRN (Patient not taking: Reported on 6/18/2021)  •  furosemide, 20 mg, Oral, DAILY    Plan:     • Continue the same warfarin dose, as noted above.       Follow-up:     • Test in 3 week(s).    • This decision was made using shared decision making with the pt and benefits vs risks were discussed.      Additional information discussed with patient:     • Pt educated to contact our clinic with any changes in medications or s/s of bleeding or thrombosis.  • Education was provided today regarding tips to reduce their bleed risk and dietary constraints while on a anticoagulant.    National recommendations regarding anticoagulation therapy:     The CHEST guidelines recommends frequent monitoring at regular intervals for any patient on a anticoagulant, to ensure the patient is on the proper dose, and to monitor that they are not having any complications from therapy.       Conor Locke, PharmD, MS, BCACP, [facility]  Phone [phone] fax [phone]    This note was created using voice recognition software (Dragon). The accuracy of the dictation is limited by the abilities of the software. I have reviewed the note prior to signing, however some errors in grammar and context are still possible. If you have any questions related to this note please do not hesitate to contact our office.

## 2021-07-20 LAB
ALBUMIN SERPL-MCNC: 4 G/DL (ref 3.6–4.6)
ALBUMIN/GLOB SERPL: 1.6 {RATIO} (ref 1.2–2.2)
ALP SERPL-CCNC: 71 IU/L (ref 48–121)
ALT SERPL-CCNC: 16 IU/L (ref 0–44)
AST SERPL-CCNC: 27 IU/L (ref 0–40)
BILIRUB SERPL-MCNC: 0.9 MG/DL (ref 0–1.2)
BUN SERPL-MCNC: 26 MG/DL (ref 8–27)
BUN/CREAT SERPL: 25 (ref 10–24)
CALCIUM SERPL-MCNC: 9.2 MG/DL (ref 8.6–10.2)
CHLORIDE SERPL-SCNC: 104 MMOL/L (ref 96–106)
CHOLEST SERPL-MCNC: 113 MG/DL (ref 100–199)
CO2 SERPL-SCNC: 23 MMOL/L (ref 20–29)
CREAT SERPL-MCNC: 1.04 MG/DL (ref 0.76–1.27)
ERYTHROCYTE [DISTWIDTH] IN BLOOD BY AUTOMATED COUNT: 13 % (ref 11.6–15.4)
GLOBULIN SER CALC-MCNC: 2.5 G/DL (ref 1.5–4.5)
GLUCOSE SERPL-MCNC: 85 MG/DL (ref 65–99)
HCT VFR BLD AUTO: 42.5 % (ref 37.5–51)
HDLC SERPL-MCNC: 46 MG/DL
HGB BLD-MCNC: 13.8 G/DL (ref 13–17.7)
LABORATORY COMMENT REPORT: NORMAL
LDLC SERPL CALC-MCNC: 53 MG/DL (ref 0–99)
MCH RBC QN AUTO: 29.7 PG (ref 26.6–33)
MCHC RBC AUTO-ENTMCNC: 32.5 G/DL (ref 31.5–35.7)
MCV RBC AUTO: 91 FL (ref 79–97)
NRBC BLD AUTO-RTO: NORMAL %
PLATELET # BLD AUTO: 165 X10E3/UL (ref 150–450)
POTASSIUM SERPL-SCNC: 4.7 MMOL/L (ref 3.5–5.2)
PROT SERPL-MCNC: 6.5 G/DL (ref 6–8.5)
RBC # BLD AUTO: 4.65 X10E6/UL (ref 4.14–5.8)
SODIUM SERPL-SCNC: 141 MMOL/L (ref 134–144)
TRIGL SERPL-MCNC: 65 MG/DL (ref 0–149)
TSH SERPL DL<=0.005 MIU/L-ACNC: 1.79 UIU/ML (ref 0.45–4.5)
VLDLC SERPL CALC-MCNC: 14 MG/DL (ref 5–40)
WBC # BLD AUTO: 6.9 X10E3/UL (ref 3.4–10.8)

## 2021-07-20 NOTE — PROGRESS NOTES
"Subjective:   Chief Complaint:   Chief Complaint   Patient presents with   • Aortic Stenosis   • Hypertension       \"Everardo\" Tristan Hester is a 86 y.o. male who returns today for paroxysmal atrial fibrillation, severe AS/TAVR,  hypertension, hyperlipidemia, and moderate mitral regurgitation, non ischemic CMO with recovered EF.    Sees Dr. Bloch who referred to Dr. Saeed for MV regurgitation.  Noted to have severe AS, low flow, low gradient, EF 30%.  R/LHC with ENRIKE 0.81 cm2, EF 30%.  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  Then had an arterial clot removed by Dr. Tillman.  Follow-up EF 40-45% then eventually back to 60%.  Still has mod MR, was previously moderate to severe.  Saw Dr. Barrientos 3/3/2021, aspirin stopped, warfarin only.  Spironolactone discontinued.  Patient not interested in MitraClip.    Has mild chronic cough, prior lung surgery, PNA, then empyema.   This was the only time for afib, was placed on warfarin.    Has severely dilated left atrium.  Prior TIAx2 in 2000, vision loss, then recurred in 2008.  Prior carotid endart with Dr. Calero on the right.  [id number] with prior TIA but TIA was years before afib, not clear if related, could have been carotid disease.    Has carotid disease, mild, 2017.  Has Distal abd aortic and bilateral iliofemoral tortuosity on [facility], again had left iliac artery endovascular stenting done by Dr. Costa at time of TAVR given a small dissection.  Has HTN for a few decades, BP controlled on 2 meds.  Has HLP, on lipitor 40 mg and zetia, ldl 53.  Chronic kidney disease early stage III.    Has had some lightheadedness at times.  No near syncope.    Limited by balance issues, using a cane.  Still working out at gym 3 times per week.  He is not limited by chest pain, pressure or tightness with activity.     No " orthopnea or lower extremity swelling.   No significant palpitations (rarely).  No symptoms of leg claudication.   No new stroke/TIA like symptoms.    No family history of premature coronary artery disease.  Father had CHF at 91.  Former tobacco, quit .  No history of diabetes, occasional IFG.  No history of autoimmune disease such as lupus or rheumatoid arthritis.    His stepdaughter is Deena Yadav, 1 of our GI doctors.  I see his wife Tori also.  Tori is noting some mild cognitive delay which seems to predate the TAVR.  Brother  of cancer, sister  of open valentines 2020, joao, open heart surgery and this is probably contributing.  He is the oldest of 4 and the only relative still living.    They are vaccinated.    DATA REVIEWED by me:  EKG 3/6/2020  Sinus, PACs    ECG 10/3/2019  Sinus, rate 64    Echocardiogram 3/3/2021  Prior echo on 20.Left ventricular ejection fraction is visually   estimated to be 60%.  Normal diastolic function.  Known TAVR aortic valve that is functioning normally with normal   transvalvular gradients. Vmax is 1.85 m/s.  Moderate eccentric mitral regurgitation.    Echocardiogram 2020  Limited to reassess MR after TAVR.  Mild asymmetric septal hypertrophy with prominent septum, 1.3 cm.  Left ventricular ejection fraction is visually estimated to be 55%.  Possible hypokinesis of the basal to mid inferolateral wall.  Mild mitral annular calcification.  Moderate eccentric mitral regurgitation.  Well seated TAVR, gradients not assessed on this study, no significant   paravalvular leak.     Compared to prior echo 20, EF appears normal, MR appears moderate.    Echo 2020  Compared to the images of the study done on 20 there has been no   changes.  Low normal left ventricular ejection fraction.  normal functioning TAVR valve.  EF reported as 50%, more likely 40-45%.  Severely dilated left atrium  Mod to severe MR.     Echo 2019  Prior echo  done 05/02/19.  No significant change.  Left ventricular ejection fraction is visually estimated to be 60%.  Moderate aortic stenosis.Vmax is 3.6 m/s.  Transvalvular gradients are - Peak: 53 mmHg, Mean: 38 mmHg.   Dimensionless index is 0.27.  Mild to moderate eccentric mitral regurgitation.  RVSP 35 mmHg.    Vascular procedure 3-2-2020  Dissection stopped at CFA plaque causing occlusion  Repair of left femoral  Artery Dissection - Wound Class: Clean  Pelvic angiogram    TAVR and endovascular procedure 3/2/2020  Transcatheter aortic valve replacement (29 mm S3 Forbes   pericardial valve), left iliac artery endovascular stenting and intraoperative   transesophageal echocardiography.      [facility] 2-  1.  Low flow, low gradient severe aortic stenosis with peak to peak gradient   of 40 mmHg, mean gradient of 34 mmHg, calculated ENRIKE of 0.81 cm2.  2.  Nonobstructive coronary artery disease.  3.  Dilated left ventricular systolic function with reduced left ventricular   systolic function, ejection fraction of 30%.  Global hypokinesis.  4.  Elevated left ventricular end-diastolic pressure.  5.  Mildly dilated ascending aorta.  6.  Large distal abdominal aortic and bilateral iliofemoral system with   tortuosity of the iliofemoral system, right greater than left.      CTA chest and abdomen 2/8/2020  1. Tricuspid aortic valve with moderate calcifications.  Annulus area: 548 mm?  Annulus diameter: 28.4 x 25.0 mm  Diameter at the level of the sinuses: 34.0 x 34.8 x 35.9 mm  Vertical distances from the aortic annulus to the coronary origins are below 10 mm, especially the right.  2. Access evaluation:  Minimal diameter on the right is at the ostium of right common iliac artery: 9.8 mm  There is a focal dissection at the origin of the left common iliac artery with minimal lumen diameter, measuring 6.2 mm  3. Hazy groundglass opacities in the bilateral lungs with interlobular septal thickening could relate to pulmonary edema.  Patchy bibasilar opacities, likely atelectasis.    Abdominal ultrasound 5/19/2020   No evidence of aortic aneurysm. Abdominal aorta is ectatic. Atherosclerotic    plaque.    No stenosis of the proximal segment of the major visceral arteries.    Patent bilateral iliac arteries. Left external iliac stent is patent without    evidence of stenosis. Tortuous bilateral iliac arteries    JANICE 5/19/2020  There is no evidence of major arterial disease demonstrated.     Carotid US 8-5-19   Mild to moderate plaque within bilateral internal carotid arteries with    less that 50% stenosis.   Prominent heterogeneous plaque proximal right external carotid artery.    MRI 12-13-17  MRI of the brain without contrast within normal limits for age with moderate white matter changes without demonstrable interval progression since the prior study.    Most recent labs:         Lab Results   Component Value Date/Time    HEMOGLOBIN 13.8 07/19/2021 04:11 AM    HEMOGLOBIN 13.8 (L) 05/29/2020 07:41 AM    HEMATOCRIT 42.5 07/19/2021 04:11 AM    HEMATOCRIT 44.7 05/29/2020 07:41 AM    MCV 91 07/19/2021 04:11 AM    MCV 89.6 05/29/2020 07:41 AM    INR 2.60 07/13/2021 12:00 AM    TSH 1.790 07/19/2021 04:11 AM      Lab Results   Component Value Date/Time    SODIUM 141 07/19/2021 04:11 AM    SODIUM 134 (L) 03/15/2021 09:48 AM    POTASSIUM 4.7 07/19/2021 04:11 AM    POTASSIUM 4.6 03/15/2021 09:48 AM    CHLORIDE 104 07/19/2021 04:11 AM    CHLORIDE 101 03/15/2021 09:48 AM    CO2 23 07/19/2021 04:11 AM    CO2 26 03/15/2021 09:48 AM    GLUCOSE 85 07/19/2021 04:11 AM    GLUCOSE 96 03/15/2021 09:48 AM    BUN 26 07/19/2021 04:11 AM    BUN 22 03/15/2021 09:48 AM    CREATININE 1.04 07/19/2021 04:11 AM    CREATININE 1.02 03/15/2021 09:48 AM    CREATININE 1.0 07/02/2008 03:43 PM      Lab Results   Component Value Date/Time    ASTSGOT 27 07/19/2021 04:11 AM    ASTSGOT 25 04/15/2020 11:36 AM    ALTSGPT 16 07/19/2021 04:11 AM    ALTSGPT 15 04/15/2020 11:36 AM     ALBUMIN 4.0 07/19/2021 04:11 AM    ALBUMIN 3.7 04/15/2020 11:36 AM      Lab Results   Component Value Date/Time    CHOLSTRLTOT 113 07/19/2021 04:11 AM    CHOLSTRLTOT 109 05/29/2020 07:40 AM    LDL 51 05/29/2020 07:40 AM    HDL 46 07/19/2021 04:11 AM    HDL 49 05/29/2020 07:40 AM    TRIGLYCERIDE 65 07/19/2021 04:11 AM    TRIGLYCERIDE 47 05/29/2020 07:40 AM       11/15/2019 hemoglobin 14.1, platelets 181, sodium 144, potassium 4.4, creatinine 1.19, GFR 56, LFTs normal, LDL 49, HDL 59, triglycerides 49, total cholesterol 116    Past Medical History:   Diagnosis Date   • Aortic valve stenosis    • Arrhythmia     Afib   • Arthritis     osteo, hips and knees   • Blood clotting disorder ([facility])     , left arm   • Bronchitis        • Cancer ([facility])     Prostate CA--2000   • Carotid arterial disease ([facility]) 1/6/2014   • Cataract     removed bilat   • DVT (deep venous thrombosis) ([facility]) 2016   • Heart valve disease     MVR   • HLD (hyperlipidemia) 1/6/2014   • Hypertension    • Mitral regurgitation 1/6/2014   • Pain 12-14-15    low back, hips, 2-3/10   • Pneumonia        • Stroke ([facility]) 2008    TIA, no residual   • TIA (transient ischemic attack)     2008   • Urinary bladder disorder     HAD MALE BLADDER SUSPENSION PROCEDURE DONE   • Urinary incontinence     stress incontinence     Past Surgical History:   Procedure Laterality Date   • TRANSCATHETER AORTIC VALVE REPLACEMENT  3/2/2020    Procedure: REPLACEMENT, AORTIC VALVE, TRANSCATHETER -;  Surgeon: Sony Barrientos M.D.;  Location: [facility];  Service: Cardiac   • YOANDY  3/2/2020    Procedure: ECHOCARDIOGRAM, TRANSESOPHAGEAL;  Surgeon: Sony Barrientos M.D.;  Location: SURGERY [facility];  Service: Cardiac   • FEMORAL ENDARTERECTOMY Left 3/2/2020    Procedure: Repair of left femoral  Artery Dissection;  Surgeon: Alpa Tillman M.D.;  Location: [facility];  Service: General   • LUMBAR FUSION POSTERIOR  12/28/2015     Procedure: LUMBAR FUSION POSTERIOR L3-5 with peek rods;  Surgeon: Michael Sarkar M.D.;  Location: SURGERY [facility];  Service:    • LUMBAR DECOMPRESSION  2015    Procedure: LUMBAR DECOMPRESSION posterior redo decompression L3-5, with dural repair;  Surgeon: Michael Sarkar M.D.;  Location: SURGERY [facility];  Service:    • THORACOSCOPY  2015    Procedure: THORACOSCOPY with decortication VATS , side to be determined  ;  Surgeon: Elmira Holder M.D.;  Location: SURGERY [facility];  Service:    • LUMBAR LAMINECTOMY DISKECTOMY  10/8/2010    Performed by MICHAEL SARKAR at [facility]   • CERVICAL DISK AND FUSION ANTERIOR  10/26/2009    Performed by MICHAEL SARKAR at [facility]   • CAROTID ENDARTERECTOMY  7/10/08    Performed by LEIGH MORATAYA at [facility]   • BLADDER SLING MALE  2004   • PROSTATECTOMY, RADIAL  2000     Family History   Problem Relation Age of Onset   • Heart Disease Father         CHF at 91.   • Other Brother         Aneurysm   • Cancer Brother         Seminal vascular cancer   • Autoimmune Disease Daughter         Lupus, RA   • No Known Problems Daughter    • No Known Problems Son    • No Known Problems Son    • Heart Attack Neg Hx      Social History     Socioeconomic History   • Marital status:      Spouse name: Not on file   • Number of children: Not on file   • Years of education: Not on file   • Highest education level: Not on file   Occupational History   • Not on file   Tobacco Use   • Smoking status: Former Smoker     Packs/day: 0.50     Years: 30.00     Pack years: 15.00     Types: Cigarettes, Pipe     Quit date: 1985     Years since quittin.5   • Smokeless tobacco: Never Used   Vaping Use   • Vaping Use: Never used   Substance and Sexual Activity   • Alcohol use: Yes     Alcohol/week: 3.0 oz     Types: 3 Cans of beer, 3 Shots of liquor per week     Comment: 1 x week   • Drug use: No   • Sexual activity: Not  Currently   Other Topics Concern   • Not on file   Social History Narrative   • Not on file     Social Determinants of Health     Financial Resource Strain:    • Difficulty of Paying Living Expenses:    Food Insecurity:    • Worried About Running Out of Food in the Last Year:    • Ran Out of Food in the Last Year:    Transportation Needs:    • Lack of Transportation (Medical):    • Lack of Transportation (Non-Medical):    Physical Activity:    • Days of Exercise per Week:    • Minutes of Exercise per Session:    Stress:    • Feeling of Stress :    Social Connections:    • Frequency of Communication with Friends and Family:    • Frequency of Social Gatherings with Friends and Family:    • Attends [employer] Services:    • Active Member of Clubs or Organizations:    • Attends Club or Organization Meetings:    • Marital Status:    Intimate Partner Violence:    • Fear of Current or Ex-Partner:    • Emotionally Abused:    • Physically Abused:    • Sexually Abused:      Allergies   Allergen Reactions   • Other Environmental Runny Nose and Itching     Seasonal, cats-hayfever       Current Outpatient Medications   Medication Sig Dispense Refill   • furosemide (LASIX) 20 MG Tab Take 1 tablet by mouth 1 time a day as needed.     • ezetimibe (ZETIA) 10 MG Tab Take 1 tablet by mouth once daily 90 tablet 3   • warfarin (COUMADIN) 4 MG Tab Take 1.5-2 tabs by mouth daily or as directed by anticoagulation clinic  Indications: Per pt takes once a week on Friday 160 tablet 1   • lisinopril (PRINIVIL) 5 MG Tab Take 1 tablet by mouth every day. 90 tablet 3   • metoprolol SR (TOPROL XL) 25 MG TABLET SR 24 HR Take 1 tablet by mouth every day. 90 tablet 3   • temazepam (RESTORIL) 30 MG capsule Take 1 Cap by mouth at bedtime as needed for up to 180 days. 1 po each night as needed 30 Cap 5   • atorvastatin (LIPITOR) 80 MG tablet TAKE 1/2 (ONE-HALF) TABLET BY MOUTH ONCE DAILY IN THE EVENING 45 Tab 6   • gabapentin (NEURONTIN) 300 MG Cap Take  "1 Cap by mouth 3 times a day. 270 Cap 3   • amoxicillin (AMOXIL) 500 MG Cap Take 4 Caps by mouth Once PRN (30-60min prior to dental work) for up to 1 dose. 12 Cap 0     No current facility-administered medications for this visit.       ROS    All others systems reviewed and negative.     Objective:     /62 (BP Location: Left arm, Patient Position: Sitting, BP Cuff Size: Adult)   Pulse 67   Ht 1.803 m (5' 11\")   Wt 82.9 kg (182 lb 12.8 oz)   SpO2 96%  Body mass index is 25.5 kg/m².    Physical Exam   General: No acute distress. Well nourished.  HEENT: EOM grossly intact, no scleral icterus, no pharyngeal erythema.   Neck:  No JVD, no bruits, trachea midline  CVS: RRR. Normal S1, S2. 2/6 early peaking syst murmur RSB, 2/6 holo sys murmur apex. No LE edema.  2+ radial pulses, 2+ PT pulses  Resp: CTAB. No wheezing or crackles/rhonchi. Normal respiratory effort.  Abdomen: Soft, NT, no jen hepatomegaly.  MSK/Ext: No clubbing or cyanosis. Walks with cane.  Skin: Warm and dry, no rashes.  Neurological: CN III-XII grossly intact. No focal deficits.   Psych: A&O x 3, appropriate affect, good judgement    Physical exam performed today and unchanged, except what is noted, compared to 7-    Assessment:     1. Paroxysmal atrial fibrillation (HCC)     2. S/P TAVR (transcatheter aortic valve replacement)     3. Nonrheumatic mitral valve regurgitation     4. Essential hypertension, benign     5. Chronic systolic congestive heart failure (HCC)     6. Dilated cardiomyopathy (HCC)     7. Hx of TIA (transient ischemic attack)     8. Severe aortic stenosis     9. Bilateral carotid artery disease, unspecified type (HCC)     10. TELLEZ (dyspnea on exertion)     11. PAC (premature atrial contraction)     12. 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 Deep vein thrombosis (DVT) of non-extremity vein, unspecified chronicity     14. Non-rheumatic mitral regurgitation     15. Other fatigue     16. " Postop carotid endarterectomy surveillance, encounter for         Medical Decision Making:  Today's Assessment / Status / Plan:     -His EF has recovered to 60%, spironolactone was discontinued and Lasix is as needed, has not been needing it  -Nonobstructive coronary disease, on statin/zetia for primary prevention for MI, secondary prevention for known vascular disease  -Vascular disease has been addressed, has an endovascular stent/repair, aspirin and statin only  -Remains on warfarin for paroxysmal atrial fibrillation, chads 2 vascular score of 6 including prior TIA which predated A. fib  -Blood pressure controlled  -Cholesterol controlled  -His mod MR seems to be improved from prior moderate to severe, no plans for mitral clip  -Gets teeth cleaned regularly, takes Amoxicillin 4,000 mg.  -Seeing Dr. Barrientos annually  -See me in 1 year    Written instructions given today:  -See if you can capture your blood pressure during 1 of those moments where you had to sit down because you feel lightheaded.    -Talk to Dr. Walsh about zetia, we will defer to him.        Return in about 1 year (around 7/22/2022).    It is my pleasure to participate in the care of Mr. Hester.  Please do not hesitate to contact me with questions or concerns.    Maryana Anderson MD, [facility]  Cardiologist [facility]    Please note that this dictation was created using voice recognition software. I have made every reasonable attempt to correct obvious errors, but it is possible there are errors of grammar and possibly content that I did not discover before finalizing the note.

## 2021-07-22 ENCOUNTER — OFFICE VISIT (OUTPATIENT)
Dept: CARDIOLOGY | Facility: MEDICAL CENTER | Age: 86
End: 2021-07-22
Payer: MEDICARE

## 2021-07-22 VITALS
HEIGHT: 71 IN | WEIGHT: 182.8 LBS | DIASTOLIC BLOOD PRESSURE: 62 MMHG | OXYGEN SATURATION: 96 % | SYSTOLIC BLOOD PRESSURE: 124 MMHG | BODY MASS INDEX: 25.59 KG/M2 | HEART RATE: 67 BPM

## 2021-07-22 DIAGNOSIS — I42.0 DILATED CARDIOMYOPATHY (HCC): ICD-10-CM

## 2021-07-22 DIAGNOSIS — I10 ESSENTIAL HYPERTENSION, BENIGN: ICD-10-CM

## 2021-07-22 DIAGNOSIS — R53.83 OTHER FATIGUE: ICD-10-CM

## 2021-07-22 DIAGNOSIS — I34.0 NON-RHEUMATIC MITRAL REGURGITATION: ICD-10-CM

## 2021-07-22 DIAGNOSIS — I82.90 DEEP VEIN THROMBOSIS (DVT) OF NON-EXTREMITY VEIN, UNSPECIFIED CHRONICITY: ICD-10-CM

## 2021-07-22 DIAGNOSIS — Z95.2 S/P TAVR (TRANSCATHETER AORTIC VALVE REPLACEMENT): ICD-10-CM

## 2021-07-22 DIAGNOSIS — R06.09 DOE (DYSPNEA ON EXERTION): ICD-10-CM

## 2021-07-22 DIAGNOSIS — I48.0 PAROXYSMAL ATRIAL FIBRILLATION (HCC): ICD-10-CM

## 2021-07-22 DIAGNOSIS — Z48.812 POSTOP CAROTID ENDARTERECTOMY SURVEILLANCE, ENCOUNTER FOR: ICD-10-CM

## 2021-07-22 DIAGNOSIS — I34.0 NONRHEUMATIC MITRAL VALVE REGURGITATION: ICD-10-CM

## 2021-07-22 DIAGNOSIS — I50.22 CHRONIC SYSTOLIC CONGESTIVE HEART FAILURE (HCC): ICD-10-CM

## 2021-07-22 DIAGNOSIS — I50.32 CHRONIC DIASTOLIC HEART FAILURE DUE TO VALVULAR DISEASE (HCC): ICD-10-CM

## 2021-07-22 DIAGNOSIS — I35.0 SEVERE AORTIC STENOSIS: ICD-10-CM

## 2021-07-22 DIAGNOSIS — G45.9 TIA (TRANSIENT ISCHEMIC ATTACK): ICD-10-CM

## 2021-07-22 DIAGNOSIS — I38 CHRONIC DIASTOLIC HEART FAILURE DUE TO VALVULAR DISEASE (HCC): ICD-10-CM

## 2021-07-22 DIAGNOSIS — I49.1 PAC (PREMATURE ATRIAL CONTRACTION): ICD-10-CM

## 2021-07-22 DIAGNOSIS — I77.9 BILATERAL CAROTID ARTERY DISEASE, UNSPECIFIED TYPE (HCC): ICD-10-CM

## 2021-07-22 PROCEDURE — 99214 OFFICE O/P EST MOD 30 MIN: CPT | Performed by: INTERNAL MEDICINE

## 2021-07-22 RX ORDER — FUROSEMIDE 20 MG/1
20 TABLET ORAL
COMMUNITY
Start: 2021-07-22 | End: 2022-11-08

## 2021-07-22 ASSESSMENT — FIBROSIS 4 INDEX: FIB4 SCORE: 3.48

## 2021-07-22 NOTE — LETTER
"     [facility]   [address], [address]  [place] [address]  Phone: [phone]  Fax: [phone]              Tristan Hester  1935    Encounter Date: 7/22/2021    Maryana Anderson M.D.          PROGRESS NOTE:  Subjective:   Chief Complaint:   Chief Complaint   Patient presents with   • Aortic Stenosis   • Hypertension       \"Everardo\" Tristan Hester is a 86 y.o. male who returns today for paroxysmal atrial fibrillation, severe AS/TAVR,  hypertension, hyperlipidemia, and moderate mitral regurgitation, non ischemic CMO with recovered EF.    Sees Dr. Bloch who referred to Dr. Saeed for MV regurgitation.  Noted to have severe AS, low flow, low gradient, EF 30%.  R/[facility] with ENRIKE 0.81 cm2, EF 30%.  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  Then had an arterial clot removed by Dr. Tillman.  Follow-up EF 40-45% then eventually back to 60%.  Still has mod MR, was previously moderate to severe.  Saw Dr. Barrientos 3/3/2021, aspirin stopped, warfarin only.  Spironolactone discontinued.  Patient not interested in MitraClip.    Has mild chronic cough, prior lung surgery, PNA, then empyema.   This was the only time for afib, was placed on warfarin.    Has severely dilated left atrium.  Prior TIAx2 in 2000, vision loss, then recurred in 2008.  Prior carotid endart with Dr. Calero on the right.  [id number] with prior TIA but TIA was years before afib, not clear if related, could have been carotid disease.    Has carotid disease, mild, 2017.  Has Distal abd aortic and bilateral iliofemoral tortuosity on [facility], again had left iliac artery endovascular stenting done by Dr. Costa at time of TAVR given a small dissection.  Has HTN for a few decades, BP controlled on 2 meds.  Has HLP, on lipitor 40 mg and zetia, ldl " 53.  Chronic kidney disease early stage III.    Has had some lightheadedness at times.  No near syncope.    Limited by balance issues, using a cane.  Still working out at gym 3 times per week.  He is not limited by chest pain, pressure or tightness with activity.     No orthopnea or lower extremity swelling.   No significant palpitations (rarely).  No symptoms of leg claudication.   No new stroke/TIA like symptoms.    No family history of premature coronary artery disease.  Father had CHF at 91.  Former tobacco, quit .  No history of diabetes, occasional IFG.  No history of autoimmune disease such as lupus or rheumatoid arthritis.    His stepdaughter is Deena Yadav, 1 of our GI doctors.  I see his wife Tori also.  Tori is noting some mild cognitive delay which seems to predate the TAVR.  Brother  of cancer, sister  of open valentines 2020, joao, open heart surgery and this is probably contributing.  He is the oldest of 4 and the only relative still living.    They are vaccinated.    DATA REVIEWED by me:  EKG 3/6/2020  Sinus, PACs    ECG 10/3/2019  Sinus, rate 64    Echocardiogram 3/3/2021  Prior echo on 20.Left ventricular ejection fraction is visually   estimated to be 60%.  Normal diastolic function.  Known TAVR aortic valve that is functioning normally with normal   transvalvular gradients. Vmax is 1.85 m/s.  Moderate eccentric mitral regurgitation.    Echocardiogram 2020  Limited to reassess MR after TAVR.  Mild asymmetric septal hypertrophy with prominent septum, 1.3 cm.  Left ventricular ejection fraction is visually estimated to be 55%.  Possible hypokinesis of the basal to mid inferolateral wall.  Mild mitral annular calcification.  Moderate eccentric mitral regurgitation.  Well seated TAVR, gradients not assessed on this study, no significant   paravalvular leak.     Compared to prior echo 20, EF appears normal, MR appears moderate.    Echo 2020  Compared to the  images of the study done on 03/03/20 there has been no   changes.  Low normal left ventricular ejection fraction.  normal functioning TAVR valve.  EF reported as 50%, more likely 40-45%.  Severely dilated left atrium  Mod to severe MR.     Echo 11/25/2019  Prior echo done 05/02/19.  No significant change.  Left ventricular ejection fraction is visually estimated to be 60%.  Moderate aortic stenosis.Vmax is 3.6 m/s.  Transvalvular gradients are - Peak: 53 mmHg, Mean: 38 mmHg.   Dimensionless index is 0.27.  Mild to moderate eccentric mitral regurgitation.  RVSP 35 mmHg.    Vascular procedure 3-2-2020  Dissection stopped at CFA plaque causing occlusion  Repair of left femoral  Artery Dissection - Wound Class: Clean  Pelvic angiogram    TAVR and endovascular procedure 3/2/2020  Transcatheter aortic valve replacement (29 mm S3 Forbes   pericardial valve), left iliac artery endovascular stenting and intraoperative   transesophageal echocardiography.      [facility] 2-  1.  Low flow, low gradient severe aortic stenosis with peak to peak gradient   of 40 mmHg, mean gradient of 34 mmHg, calculated ENRIKE of 0.81 cm2.  2.  Nonobstructive coronary artery disease.  3.  Dilated left ventricular systolic function with reduced left ventricular   systolic function, ejection fraction of 30%.  Global hypokinesis.  4.  Elevated left ventricular end-diastolic pressure.  5.  Mildly dilated ascending aorta.  6.  Large distal abdominal aortic and bilateral iliofemoral system with   tortuosity of the iliofemoral system, right greater than left.      CTA chest and abdomen 2/8/2020  1. Tricuspid aortic valve with moderate calcifications.  Annulus area: 548 mm?  Annulus diameter: 28.4 x 25.0 mm  Diameter at the level of the sinuses: 34.0 x 34.8 x 35.9 mm  Vertical distances from the aortic annulus to the coronary origins are below 10 mm, especially the right.  2. Access evaluation:  Minimal diameter on the right is at the ostium of right  common iliac artery: 9.8 mm  There is a focal dissection at the origin of the left common iliac artery with minimal lumen diameter, measuring 6.2 mm  3. Hazy groundglass opacities in the bilateral lungs with interlobular septal thickening could relate to pulmonary edema. Patchy bibasilar opacities, likely atelectasis.    Abdominal ultrasound 5/19/2020   No evidence of aortic aneurysm. Abdominal aorta is ectatic. Atherosclerotic    plaque.    No stenosis of the proximal segment of the major visceral arteries.    Patent bilateral iliac arteries. Left external iliac stent is patent without    evidence of stenosis. Tortuous bilateral iliac arteries    JANICE 5/19/2020  There is no evidence of major arterial disease demonstrated.     Carotid US 8-5-19   Mild to moderate plaque within bilateral internal carotid arteries with    less that 50% stenosis.   Prominent heterogeneous plaque proximal right external carotid artery.    MRI 12-13-17  MRI of the brain without contrast within normal limits for age with moderate white matter changes without demonstrable interval progression since the prior study.    Most recent labs:         Lab Results   Component Value Date/Time    HEMOGLOBIN 13.8 07/19/2021 04:11 AM    HEMOGLOBIN 13.8 (L) 05/29/2020 07:41 AM    HEMATOCRIT 42.5 07/19/2021 04:11 AM    HEMATOCRIT 44.7 05/29/2020 07:41 AM    MCV 91 07/19/2021 04:11 AM    MCV 89.6 05/29/2020 07:41 AM    INR 2.60 07/13/2021 12:00 AM    TSH 1.790 07/19/2021 04:11 AM      Lab Results   Component Value Date/Time    SODIUM 141 07/19/2021 04:11 AM    SODIUM 134 (L) 03/15/2021 09:48 AM    POTASSIUM 4.7 07/19/2021 04:11 AM    POTASSIUM 4.6 03/15/2021 09:48 AM    CHLORIDE 104 07/19/2021 04:11 AM    CHLORIDE 101 03/15/2021 09:48 AM    CO2 23 07/19/2021 04:11 AM    CO2 26 03/15/2021 09:48 AM    GLUCOSE 85 07/19/2021 04:11 AM    GLUCOSE 96 03/15/2021 09:48 AM    BUN 26 07/19/2021 04:11 AM    BUN 22 03/15/2021 09:48 AM    CREATININE 1.04 07/19/2021  04:11 AM    CREATININE 1.02 03/15/2021 09:48 AM    CREATININE 1.0 07/02/2008 03:43 PM      Lab Results   Component Value Date/Time    ASTSGOT 27 07/19/2021 04:11 AM    ASTSGOT 25 04/15/2020 11:36 AM    ALTSGPT 16 07/19/2021 04:11 AM    ALTSGPT 15 04/15/2020 11:36 AM    ALBUMIN 4.0 07/19/2021 04:11 AM    ALBUMIN 3.7 04/15/2020 11:36 AM      Lab Results   Component Value Date/Time    CHOLSTRLTOT 113 07/19/2021 04:11 AM    CHOLSTRLTOT 109 05/29/2020 07:40 AM    LDL 51 05/29/2020 07:40 AM    HDL 46 07/19/2021 04:11 AM    HDL 49 05/29/2020 07:40 AM    TRIGLYCERIDE 65 07/19/2021 04:11 AM    TRIGLYCERIDE 47 05/29/2020 07:40 AM       11/15/2019 hemoglobin 14.1, platelets 181, sodium 144, potassium 4.4, creatinine 1.19, GFR 56, LFTs normal, LDL 49, HDL 59, triglycerides 49, total cholesterol 116    Past Medical History:   Diagnosis Date   • Aortic valve stenosis    • Arrhythmia     Afib   • Arthritis     osteo, hips and knees   • Blood clotting disorder (Formerly [facility])     , left arm   • Bronchitis        • Cancer (Formerly [facility])     Prostate CA--2000   • Carotid arterial disease (Formerly [facility]) 1/6/2014   • Cataract     removed bilat   • DVT (deep venous thrombosis) (Formerly [facility]) 2016   • Heart valve disease     MVR   • HLD (hyperlipidemia) 1/6/2014   • Hypertension    • Mitral regurgitation 1/6/2014   • Pain 12-14-15    low back, hips, 2-3/10   • Pneumonia        • Stroke (Formerly [facility]) 2008    TIA, no residual   • TIA (transient ischemic attack)     2008   • Urinary bladder disorder     HAD MALE BLADDER SUSPENSION PROCEDURE DONE   • Urinary incontinence     stress incontinence     Past Surgical History:   Procedure Laterality Date   • TRANSCATHETER AORTIC VALVE REPLACEMENT  3/2/2020    Procedure: REPLACEMENT, AORTIC VALVE, TRANSCATHETER -;  Surgeon: Sony Barrientos M.D.;  Location: SURGERY [facility];  Service: Cardiac   • YOANDY  3/2/2020    Procedure: ECHOCARDIOGRAM, TRANSESOPHAGEAL;  Surgeon: Sony Barrientos M.D.;  Location: SURGERY  [facility];  Service: Cardiac   • FEMORAL ENDARTERECTOMY Left 3/2/2020    Procedure: Repair of left femoral  Artery Dissection;  Surgeon: Alpa Tillman M.D.;  Location: SURGERY [facility];  Service: General   • LUMBAR FUSION POSTERIOR  2015    Procedure: LUMBAR FUSION POSTERIOR L3-5 with peek rods;  Surgeon: Phylicia Almonte M.D.;  Location: SURGERY [facility];  Service:    • LUMBAR DECOMPRESSION  2015    Procedure: LUMBAR DECOMPRESSION posterior redo decompression L3-5, with dural repair;  Surgeon: Phylicia Almonte M.D.;  Location: SURGERY [facility];  Service:    • THORACOSCOPY  2015    Procedure: THORACOSCOPY with decortication VATS , side to be determined  ;  Surgeon: Elmira Holder M.D.;  Location: [facility];  Service:    • LUMBAR LAMINECTOMY DISKECTOMY  10/8/2010    Performed by PHYLICIA ALMONTE at [facility]   • CERVICAL DISK AND FUSION ANTERIOR  10/26/2009    Performed by PHYLICIA ALMONTE at [facility]   • CAROTID ENDARTERECTOMY  7/10/08    Performed by LEIGH MORATAYA at [facility]   • BLADDER SLING MALE  2004   • PROSTATECTOMY, RADIAL  2000     Family History   Problem Relation Age of Onset   • Heart Disease Father         CHF at 91.   • Other Brother         Aneurysm   • Cancer Brother         Seminal vascular cancer   • Autoimmune Disease Daughter         Lupus, RA   • No Known Problems Daughter    • No Known Problems Son    • No Known Problems Son    • Heart Attack Neg Hx      Social History     Socioeconomic History   • Marital status:      Spouse name: Not on file   • Number of children: Not on file   • Years of education: Not on file   • Highest education level: Not on file   Occupational History   • Not on file   Tobacco Use   • Smoking status: Former Smoker     Packs/day: 0.50     Years: 30.00     Pack years: 15.00     Types: Cigarettes, Pipe     Quit date: 1985     Years since quittin.5   •  Smokeless tobacco: Never Used   Vaping Use   • Vaping Use: Never used   Substance and Sexual Activity   • Alcohol use: Yes     Alcohol/week: 3.0 oz     Types: 3 Cans of beer, 3 Shots of liquor per week     Comment: 1 x week   • Drug use: No   • Sexual activity: Not Currently   Other Topics Concern   • Not on file   Social History Narrative   • Not on file     Social Determinants of Health     Financial Resource Strain:    • Difficulty of Paying Living Expenses:    Food Insecurity:    • Worried About Running Out of Food in the Last Year:    • Ran Out of Food in the Last Year:    Transportation Needs:    • Lack of Transportation (Medical):    • Lack of Transportation (Non-Medical):    Physical Activity:    • Days of Exercise per Week:    • Minutes of Exercise per Session:    Stress:    • Feeling of Stress :    Social Connections:    • Frequency of Communication with Friends and Family:    • Frequency of Social Gatherings with Friends and Family:    • Attends [employer] Services:    • Active Member of Clubs or Organizations:    • Attends Club or Organization Meetings:    • Marital Status:    Intimate Partner Violence:    • Fear of Current or Ex-Partner:    • Emotionally Abused:    • Physically Abused:    • Sexually Abused:      Allergies   Allergen Reactions   • Other Environmental Runny Nose and Itching     Seasonal, cats-hayfever       Current Outpatient Medications   Medication Sig Dispense Refill   • furosemide (LASIX) 20 MG Tab Take 1 tablet by mouth 1 time a day as needed.     • ezetimibe (ZETIA) 10 MG Tab Take 1 tablet by mouth once daily 90 tablet 3   • warfarin (COUMADIN) 4 MG Tab Take 1.5-2 tabs by mouth daily or as directed by anticoagulation clinic  Indications: Per pt takes once a week on Friday 160 tablet 1   • lisinopril (PRINIVIL) 5 MG Tab Take 1 tablet by mouth every day. 90 tablet 3   • metoprolol SR (TOPROL XL) 25 MG TABLET SR 24 HR Take 1 tablet by mouth every day. 90 tablet 3   • temazepam  "(RESTORIL) 30 MG capsule Take 1 Cap by mouth at bedtime as needed for up to 180 days. 1 po each night as needed 30 Cap 5   • atorvastatin (LIPITOR) 80 MG tablet TAKE 1/2 (ONE-HALF) TABLET BY MOUTH ONCE DAILY IN THE EVENING 45 Tab 6   • gabapentin (NEURONTIN) 300 MG Cap Take 1 Cap by mouth 3 times a day. 270 Cap 3   • amoxicillin (AMOXIL) 500 MG Cap Take 4 Caps by mouth Once PRN (30-60min prior to dental work) for up to 1 dose. 12 Cap 0     No current facility-administered medications for this visit.       ROS    All others systems reviewed and negative.     Objective:     /62 (BP Location: Left arm, Patient Position: Sitting, BP Cuff Size: Adult)   Pulse 67   Ht 1.803 m (5' 11\")   Wt 82.9 kg (182 lb 12.8 oz)   SpO2 96%  Body mass index is 25.5 kg/m².    Physical Exam   General: No acute distress. Well nourished.  HEENT: EOM grossly intact, no scleral icterus, no pharyngeal erythema.   Neck:  No JVD, no bruits, trachea midline  CVS: RRR. Normal S1, S2. 2/6 early peaking syst murmur RSB, 2/6 holo sys murmur apex. No LE edema.  2+ radial pulses, 2+ PT pulses  Resp: CTAB. No wheezing or crackles/rhonchi. Normal respiratory effort.  Abdomen: Soft, NT, no jen hepatomegaly.  MSK/Ext: No clubbing or cyanosis. Walks with cane.  Skin: Warm and dry, no rashes.  Neurological: CN III-XII grossly intact. No focal deficits.   Psych: A&O x 3, appropriate affect, good judgement    Physical exam performed today and unchanged, except what is noted, compared to 7-    Assessment:     1. Paroxysmal atrial fibrillation (HCC)     2. S/P TAVR (transcatheter aortic valve replacement)     3. Nonrheumatic mitral valve regurgitation     4. Essential hypertension, benign     5. Chronic systolic congestive heart failure (HCC)     6. Dilated cardiomyopathy (HCC)     7. Hx of TIA (transient ischemic attack)     8. Severe aortic stenosis     9. Bilateral carotid artery disease, unspecified type (HCC)     10. TELLEZ (dyspnea on " exertion)     11. PAC (premature atrial contraction)     12. 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 Deep vein thrombosis (DVT) of non-extremity vein, unspecified chronicity     14. Non-rheumatic mitral regurgitation     15. Other fatigue     16. Postop carotid endarterectomy surveillance, encounter for         Medical Decision Making:  Today's Assessment / Status / Plan:     -His EF has recovered to 60%, spironolactone was discontinued and Lasix is as needed, has not been needing it  -Nonobstructive coronary disease, on statin/zetia for primary prevention for MI, secondary prevention for known vascular disease  -Vascular disease has been addressed, has an endovascular stent/repair, aspirin and statin only  -Remains on warfarin for paroxysmal atrial fibrillation, chads 2 vascular score of 6 including prior TIA which predated A. fib  -Blood pressure controlled  -Cholesterol controlled  -His mod MR seems to be improved from prior moderate to severe, no plans for mitral clip  -Gets teeth cleaned regularly, takes Amoxicillin 4,000 mg.  -Seeing Dr. Barrientos annually  -See me in 1 year    Written instructions given today:  -See if you can capture your blood pressure during 1 of those moments where you had to sit down because you feel lightheaded.    -Talk to Dr. Walsh about zetia, we will defer to him.        Return in about 1 year (around 7/22/2022).    It is my pleasure to participate in the care of Mr. Hester.  Please do not hesitate to contact me with questions or concerns.    Maryana Anderson MD, [facility]  Cardiologist [facility]    Please note that this dictation was created using voice recognition software. I have made every reasonable attempt to correct obvious errors, but it is possible there are errors of grammar and possibly content that I did not discover before finalizing the note.        Michael J Bloch, M.D.  [address] [facility] [address]  Via  In Basket

## 2021-07-22 NOTE — PATIENT INSTRUCTIONS
-See if you can capture your blood pressure during 1 of those moments where you had to sit down because you feel lightheaded.    -Talk to Dr. Walsh about tori, we will defer to him.

## 2021-08-03 ENCOUNTER — ANTICOAGULATION VISIT (OUTPATIENT)
Dept: VASCULAR LAB | Facility: MEDICAL CENTER | Age: 86
End: 2021-08-03
Attending: INTERNAL MEDICINE
Payer: MEDICARE

## 2021-08-03 DIAGNOSIS — G45.9 TIA (TRANSIENT ISCHEMIC ATTACK): ICD-10-CM

## 2021-08-03 DIAGNOSIS — I48.0 PAROXYSMAL ATRIAL FIBRILLATION (HCC): ICD-10-CM

## 2021-08-03 LAB — INR PPP: 1.8 (ref 2–3.5)

## 2021-08-03 PROCEDURE — 85610 PROTHROMBIN TIME: CPT

## 2021-08-03 PROCEDURE — 99211 OFF/OP EST MAY X REQ PHY/QHP: CPT

## 2021-08-03 NOTE — PROGRESS NOTES
Anticoagulation Summary  As of 8/3/2021    INR goal:  2.0-3.0   TTR:  70.8 % (5.9 y)   INR used for dosing:     Warfarin maintenance plan:  6 mg (4 mg x 1.5) every day   Weekly warfarin total:  42 mg   Plan last modified:  Willian Velasquez, PharmD (8/3/2021)   Next INR check:  8/17/2021   Priority:  Maintenance   Target end date:  Indefinite    Indications    Paroxysmal atrial fibrillation (HCC) [I48.0]  Hx of Stroke (Resolved) [I63.9]  Hx of TIA (transient ischemic attack) [G45.9]  Deep vein thrombosis (DVT) (HCC) (Resolved) [I82.409]  Atrial fibrillation (HCC) (Resolved) [I48.91]             Anticoagulation Episode Summary     INR check location:  Anticoagulation Clinic    Preferred lab:      Send INR reminders to:      Comments:        Anticoagulation Care Providers     Provider Role Specialty Phone number    Michael J Bloch, M.D. Referring Internal Medicine [phone]    [facility]   [phone]                Refer to Patient Findings for HPI:  Patient Findings     Negatives:  Signs/symptoms of thrombosis, Signs/symptoms of bleeding, Laboratory test error suspected, Change in health, Change in alcohol use, Change in activity, Upcoming invasive procedure, Emergency department visit, Upcoming dental procedure, Missed doses, Extra doses, Change in medications, Change in diet/appetite, Hospital admission, Bruising, Other complaints            There were no vitals filed for this visit.  pt declined vitals    Verified current warfarin dosing schedule.    Medications reconciled yes  Pt is NOT on antiplatelet therapy    A/P   INR  sub-therapeutic.     Warfarin dosing recommendation: Bolus with additional 2 mg of warfarin for a total of 8 mg today then resume normal regimen    Pt educated to contact our clinic with any changes in medications or s/s of bleeding or thrombosis. Pt is aware to seek immediate medical attention for falls, head injury or deep cuts.    Follow up appointment in 2  week(s).    Willian Velasquez, PharmD   Seen with Karrie Jameson PharmD

## 2021-08-04 LAB — INR BLD: 1.7 (ref 0.9–1.2)

## 2021-08-17 ENCOUNTER — APPOINTMENT (OUTPATIENT)
Dept: VASCULAR LAB | Facility: MEDICAL CENTER | Age: 86
End: 2021-08-17
Payer: MEDICARE

## 2021-08-18 ENCOUNTER — ANTICOAGULATION VISIT (OUTPATIENT)
Dept: VASCULAR LAB | Facility: MEDICAL CENTER | Age: 86
End: 2021-08-18
Attending: INTERNAL MEDICINE
Payer: MEDICARE

## 2021-08-18 VITALS — DIASTOLIC BLOOD PRESSURE: 68 MMHG | HEART RATE: 65 BPM | SYSTOLIC BLOOD PRESSURE: 107 MMHG

## 2021-08-18 DIAGNOSIS — G45.9 TIA (TRANSIENT ISCHEMIC ATTACK): ICD-10-CM

## 2021-08-18 DIAGNOSIS — I48.0 PAROXYSMAL ATRIAL FIBRILLATION (HCC): ICD-10-CM

## 2021-08-18 LAB — INR PPP: 2.5 (ref 2–3.5)

## 2021-08-18 PROCEDURE — 99211 OFF/OP EST MAY X REQ PHY/QHP: CPT | Performed by: NURSE PRACTITIONER

## 2021-08-18 PROCEDURE — 85610 PROTHROMBIN TIME: CPT

## 2021-08-18 NOTE — PROGRESS NOTES
Anticoagulation Summary  As of 2021    INR goal:  2.0-3.0   TTR:  70.8 % (6 y)   INR used for dosin.50 (2021)   Warfarin maintenance plan:  6 mg (4 mg x 1.5) every day   Weekly warfarin total:  42 mg   Plan last modified:  Willian Velasquez, PharmD (8/3/2021)   Next INR check:  2021   Priority:  Maintenance   Target end date:  Indefinite    Indications    Paroxysmal atrial fibrillation (HCC) [I48.0]  Hx of Stroke (Resolved) [I63.9]  Hx of TIA (transient ischemic attack) [G45.9]  Deep vein thrombosis (DVT) (HCC) (Resolved) [I82.409]  Atrial fibrillation (HCC) (Resolved) [I48.91]             Anticoagulation Episode Summary     INR check location:  Anticoagulation Clinic    Preferred lab:      Send INR reminders to:      Comments:        Anticoagulation Care Providers     Provider Role Specialty Phone number    Michael J Bloch, M.D. Referring Internal Medicine [phone]    [facility]   [phone]                Refer to Patient Findings for HPI:  Patient Findings     Negatives:  Signs/symptoms of thrombosis, Signs/symptoms of bleeding, Laboratory test error suspected, Change in health, Change in alcohol use, Change in activity, Upcoming invasive procedure, Emergency department visit, Upcoming dental procedure, Missed doses, Extra doses, Change in medications, Change in diet/appetite, Hospital admission, Bruising, Other complaints            Vitals:    21 1345   BP: 107/68   Pulse: 65     Verified current warfarin dosing schedule.    Medications reconciled   Pt is not on antiplatelet therapy      A/P   INR  -therapeutic.     Warfarin dosing recommendation: Continue current regimen.    Pt educated to contact our clinic with any changes in medications or s/s of bleeding or thrombosis. Pt is aware to seek immediate medical attention for falls, head injury or deep cuts.    Follow up appointment in 4 week(s) per pt's request.    CHEYENNE Mckinley.

## 2021-08-19 LAB — INR BLD: 2.5 (ref 0.9–1.2)

## 2021-08-20 DIAGNOSIS — G47.00 INSOMNIA, UNSPECIFIED TYPE: ICD-10-CM

## 2021-08-20 RX ORDER — TEMAZEPAM 30 MG/1
CAPSULE ORAL
Qty: 30 CAPSULE | Refills: 0 | OUTPATIENT
Start: 2021-08-20

## 2021-08-20 RX ORDER — TEMAZEPAM 30 MG/1
30 CAPSULE ORAL NIGHTLY PRN
Qty: 30 CAPSULE | Refills: 5 | Status: SHIPPED | OUTPATIENT
Start: 2021-08-20 | End: 2022-02-14

## 2021-08-30 NOTE — PROGRESS NOTES
OP Anticoagulation Service Note    Date: 1/26/2017    Anticoagulation Summary as of 1/26/2017     INR goal 2.0-3.0   Selected INR 3.2! (1/26/2017)   Maintenance plan 8 mg (4 mg x 2) on Sun, Tue, Thu; 6 mg (4 mg x 1.5) all other days   Weekly total 48 mg   Plan last modified Quita Sim PHARMD (1/26/2017)   Next INR check 2/9/2017   Target end date Indefinite    Indications   DVT (deep venous thrombosis) (CMS-HCC) [I82.409]  Paroxysmal atrial fibrillation (CMS-HCC) [I48.0]  Hx of Stroke (Resolved) [I63.9]  Hx of TIA (transient ischemic attack) [G45.9]  Deep vein thrombosis (DVT) (CMS-HCC) (Resolved) [I82.409]  Atrial fibrillation (CMS-HCC) (Resolved) [I48.91]         Anticoagulation Episode Summary     INR check location [facility]    Preferred lab     Send INR reminders to     Comments       Anticoagulation Care Providers     Provider Role Specialty Phone number    Michael J Bloch, M.D. Referring Internal Medicine [phone]    [facility]   [phone]        Plan:  INR is high today. Confirmed dosing regimen. No missed or extra doses taken. Patient denies sign/symptoms of bleeding/clotting. No recent medication changes and patient has been eating a consistent diet. Instructed pt to call clinic with any concerns of bleeding or thrombosis. Will decrease weekly regimen as pts INRs have been supratherapeutic or on high end of INR range.  Follow up in 2 weeks      Quita Sim Pharm D         EEG Electrode Placement    Procedure Performed: EEG    Interpreting Physician:  SOLIS    Patient Room:  [facility]    Measurements:  10-20 System    Supplies: Glue -  Collodion and Paste -  [employer]      EEG Electrode Removal      Reason for Removal:  Study completed    Removal Agent:  Water and Head & Shoulders shampoo    Skin Condition post-electrode removal:  No skin breakdown noted.

## 2021-09-14 ENCOUNTER — OFFICE VISIT (OUTPATIENT)
Dept: CARDIOLOGY | Facility: MEDICAL CENTER | Age: 86
End: 2021-09-14
Payer: MEDICARE

## 2021-09-14 VITALS
HEART RATE: 58 BPM | DIASTOLIC BLOOD PRESSURE: 82 MMHG | SYSTOLIC BLOOD PRESSURE: 140 MMHG | HEIGHT: 71 IN | OXYGEN SATURATION: 97 % | BODY MASS INDEX: 25.8 KG/M2 | WEIGHT: 184.3 LBS | RESPIRATION RATE: 12 BRPM

## 2021-09-14 DIAGNOSIS — Z95.2 S/P TAVR (TRANSCATHETER AORTIC VALVE REPLACEMENT): ICD-10-CM

## 2021-09-14 DIAGNOSIS — M79.601 PAIN OF RIGHT UPPER EXTREMITY: ICD-10-CM

## 2021-09-14 PROCEDURE — 99214 OFFICE O/P EST MOD 30 MIN: CPT | Performed by: INTERNAL MEDICINE

## 2021-09-14 ASSESSMENT — FIBROSIS 4 INDEX: FIB4 SCORE: 3.48

## 2021-09-14 NOTE — PROGRESS NOTES
Cardiology Follow-up Consultation Note    Date of note:    9/14/2021    Primary Care Provider: Michael J Bloch, M.D.    Name:             Tristan Hester   YOB: 1935  MRN:               [id number]    CC: Regular follow-up s/p TAVR, mitral regurgitation    Patient ID/HPI:   86-year-old male patient here for follow-up.  He feels well.  He has mild dyspnea on exertion but stable, loss of balance.  He had a salty meal in a restaurant, gained 3 pounds.  Blood pressure is high today.  Complains of numbness in his right upper extremity when he lifts above his shoulder.        Past Medical History:   Diagnosis Date   • Aortic valve stenosis    • Arrhythmia     Afib   • Arthritis     osteo, hips and knees   • Blood clotting disorder ([facility])     , left arm   • Bronchitis        • Cancer ([facility])     Prostate CA--2000   • Carotid arterial disease ([facility]) 1/6/2014   • Cataract     removed bilat   • DVT (deep venous thrombosis) ([facility]) 2016   • Heart valve disease     MVR   • HLD (hyperlipidemia) 1/6/2014   • Hypertension    • Mitral regurgitation 1/6/2014   • Pain 12-14-15    low back, hips, 2-3/10   • Pneumonia        • Stroke ([facility]) 2008    TIA, no residual   • TIA (transient ischemic attack)     2008   • Urinary bladder disorder     HAD MALE BLADDER SUSPENSION PROCEDURE DONE   • Urinary incontinence     stress incontinence         Past Surgical History:   Procedure Laterality Date   • TRANSCATHETER AORTIC VALVE REPLACEMENT  3/2/2020    Procedure: REPLACEMENT, AORTIC VALVE, TRANSCATHETER -;  Surgeon: Sony Barrientos M.D.;  Location: [facility];  Service: Cardiac   • YOANDY  3/2/2020    Procedure: ECHOCARDIOGRAM, TRANSESOPHAGEAL;  Surgeon: Sony Barrientos M.D.;  Location: [facility];  Service: Cardiac   • FEMORAL ENDARTERECTOMY Left 3/2/2020    Procedure: Repair of left femoral  Artery Dissection;  Surgeon: lApa Tillman M.D.;  Location: [facility];  Service: General   • LUMBAR FUSION POSTERIOR  12/28/2015    Procedure: LUMBAR FUSION POSTERIOR L3-5 with peek rods;  Surgeon: Michael Sarkar M.D.;  Location: SURGERY [facility];  Service:    • LUMBAR DECOMPRESSION  12/28/2015    Procedure: LUMBAR DECOMPRESSION posterior redo decompression L3-5, with dural repair;  Surgeon: Michael Sarkar M.D.;  Location: SURGERY [facility];  Service:    • THORACOSCOPY  6/13/2015    Procedure: THORACOSCOPY with decortication VATS , side to be determined  ;  Surgeon: Elmira Holder M.D.;  Location: SURGERY [facility];  Service:    • LUMBAR LAMINECTOMY DISKECTOMY  10/8/2010    Performed by MICHAEL SARKAR at [facility]   • CERVICAL DISK AND FUSION ANTERIOR  10/26/2009    Performed by MICHAEL SARKAR at SURGERY [facility]   • CAROTID ENDARTERECTOMY  7/10/08    Performed by LEIGH MORATAYA at SURGERY [facility]   • BLADDER SLING MALE  11/2004   • PROSTATECTOMY, RADIAL  11/2000         Current Outpatient Medications   Medication Sig Dispense Refill   • Ascorbic Acid (VITAMIN C PO) Take 500 mg by mouth.     • Multiple Vitamins-Minerals (DAILY MULTI VITAMIN/MINERALS PO) Take  by mouth.     • Naproxen Sodium (ALEVE PO) Take  by mouth.     • Acetaminophen (TYLENOL PO) Take  by mouth.     • temazepam (RESTORIL) 30 MG capsule Take 1 Capsule by mouth at bedtime as needed for Sleep for up to 180 days. 30 Capsule 5   • furosemide (LASIX) 20 MG Tab Take 1 tablet by mouth 1 time a day as needed.     • ezetimibe (ZETIA) 10 MG Tab Take 1 tablet by mouth once daily 90 tablet 3   • warfarin (COUMADIN) 4 MG Tab Take 1.5-2 tabs by mouth daily or as directed by anticoagulation clinic  Indications: Per pt takes once a week on Friday 160 tablet 1   • lisinopril (PRINIVIL) 5 MG Tab Take 1 tablet by mouth every day. 90 tablet 3   • metoprolol SR (TOPROL XL) 25 MG TABLET SR 24 HR Take 1 tablet by mouth every day. 90 tablet 3   • atorvastatin (LIPITOR) 80 MG tablet  TAKE 1/2 (ONE-HALF) TABLET BY MOUTH ONCE DAILY IN THE EVENING 45 Tab 6   • gabapentin (NEURONTIN) 300 MG Cap Take 1 Cap by mouth 3 times a day. 270 Cap 3   • amoxicillin (AMOXIL) 500 MG Cap Take 4 Caps by mouth Once PRN (30-60min prior to dental work) for up to 1 dose. 12 Cap 0     No current facility-administered medications for this visit.         Allergies   Allergen Reactions   • Other Environmental Runny Nose and Itching     Seasonal, cats-hayfever         Family History   Problem Relation Age of Onset   • Heart Disease Father         CHF at 91.   • Other Brother         Aneurysm   • Cancer Brother         Seminal vascular cancer   • Autoimmune Disease Daughter         Lupus, RA   • No Known Problems Daughter    • No Known Problems Son    • No Known Problems Son    • Heart Attack Neg Hx          Social History     Socioeconomic History   • Marital status:      Spouse name: Not on file   • Number of children: Not on file   • Years of education: Not on file   • Highest education level: Not on file   Occupational History   • Not on file   Tobacco Use   • Smoking status: Former Smoker     Packs/day: 0.50     Years: 30.00     Pack years: 15.00     Types: Cigarettes, Pipe     Quit date: 1985     Years since quittin.7   • Smokeless tobacco: Never Used   Vaping Use   • Vaping Use: Never used   Substance and Sexual Activity   • Alcohol use: Yes     Alcohol/week: 3.0 oz     Types: 3 Cans of beer, 3 Shots of liquor per week     Comment: 1 x week   • Drug use: No   • Sexual activity: Not Currently   Other Topics Concern   • Not on file   Social History Narrative   • Not on file     Social Determinants of Health     Financial Resource Strain:    • Difficulty of Paying Living Expenses:    Food Insecurity:    • Worried About Running Out of Food in the Last Year:    • Ran Out of Food in the Last Year:    Transportation Needs:    • Lack of Transportation (Medical):    • Lack of Transportation (Non-Medical):   "  Physical Activity:    • Days of Exercise per Week:    • Minutes of Exercise per Session:    Stress:    • Feeling of Stress :    Social Connections:    • Frequency of Communication with Friends and Family:    • Frequency of Social Gatherings with Friends and Family:    • Attends Taoist Services:    • Active Member of Clubs or Organizations:    • Attends Club or Organization Meetings:    • Marital Status:    Intimate Partner Violence:    • Fear of Current or Ex-Partner:    • Emotionally Abused:    • Physically Abused:    • Sexually Abused:          Physical Exam:  Ambulatory Vitals  /88 (BP Location: Left arm, Patient Position: Sitting, BP Cuff Size: Adult)   Pulse (!) 58   Resp 12   Ht 1.803 m (5' 11\")   Wt 83.6 kg (184 lb 4.8 oz)   SpO2 97%    Oxygen Therapy:  Pulse Oximetry: 97 %  BP Readings from Last 4 Encounters:   09/14/21 160/88   08/18/21 107/68   07/22/21 124/62   07/13/21 144/83       Weight/BMI: Body mass index is 25.7 kg/m².  Wt Readings from Last 4 Encounters:   09/14/21 83.6 kg (184 lb 4.8 oz)   07/22/21 82.9 kg (182 lb 12.8 oz)   07/13/21 86.2 kg (190 lb)   06/29/21 86.2 kg (190 lb)       General: Well appearing and in no apparent distress  Head: atrumatic  Eyes: No conjunctival pallor   ENT: normal external appearance of nose and ears  Neck: JVD absent, carotid bruits absent  Lungs: respiratory sounds  normal, additional breath sounds absent  Heart: Regular rhythm,   No palpable thrills on palpation, 2 x 6 systolic murmur cardiac apex, no rubs,   Lower extremity edema absent.   Pedal pulses normal  Abdomen: soft, non tender, non distended.  Extremities/MSK: no clubbing, no cyanosis  Neurological: normal orientation, Gait normal   Psychiatric: Appropriate affect, intact judgement and insight  Skin: Warm extremities      Lab Data Review:  Lab Results   Component Value Date/Time    CHOLSTRLTOT 113 07/19/2021 04:11 AM    CHOLSTRLTOT 109 05/29/2020 07:40 AM    LDL 51 05/29/2020 07:40 AM    " HDL 46 07/19/2021 04:11 AM    HDL 49 05/29/2020 07:40 AM    TRIGLYCERIDE 65 07/19/2021 04:11 AM    TRIGLYCERIDE 47 05/29/2020 07:40 AM       Lab Results   Component Value Date/Time    SODIUM 141 07/19/2021 04:11 AM    SODIUM 134 (L) 03/15/2021 09:48 AM    POTASSIUM 4.7 07/19/2021 04:11 AM    POTASSIUM 4.6 03/15/2021 09:48 AM    CHLORIDE 104 07/19/2021 04:11 AM    CHLORIDE 101 03/15/2021 09:48 AM    CO2 23 07/19/2021 04:11 AM    CO2 26 03/15/2021 09:48 AM    GLUCOSE 85 07/19/2021 04:11 AM    GLUCOSE 96 03/15/2021 09:48 AM    BUN 26 07/19/2021 04:11 AM    BUN 22 03/15/2021 09:48 AM    CREATININE 1.04 07/19/2021 04:11 AM    CREATININE 1.02 03/15/2021 09:48 AM    CREATININE 1.0 07/02/2008 03:43 PM    BUNCREATRAT 25 (H) 07/19/2021 04:11 AM     Lab Results   Component Value Date/Time    ALKPHOSPHAT 71 07/19/2021 04:11 AM    ALKPHOSPHAT 72 04/15/2020 11:36 AM    ASTSGOT 27 07/19/2021 04:11 AM    ASTSGOT 25 04/15/2020 11:36 AM    ALTSGPT 16 07/19/2021 04:11 AM    ALTSGPT 15 04/15/2020 11:36 AM    TBILIRUBIN 0.9 07/19/2021 04:11 AM    TBILIRUBIN 1.2 04/15/2020 11:36 AM      Lab Results   Component Value Date/Time    WBC 6.9 07/19/2021 04:11 AM    WBC 6.6 05/29/2020 07:41 AM     Echo 8/7/20  CONCLUSIONS  Limited to reassess MR after TAVR.  Mild asymmetric septal hypertrophy with prominent septum, 1.3 cm.  Left ventricular ejection fraction is visually estimated to be 55%.  Possible hypokinesis of the basal to mid inferolateral wall.  Mild mitral annular calcification.  Moderate eccentric mitral regurgitation.  Well seated TAVR, gradients not assessed on this study, no significant   paravalvular leak.     Cath 2/21/20  POSTPROCEDURE DIAGNOSES:  1.  Low flow, low gradient severe aortic stenosis with peak to peak gradient   of 40 mmHg, mean gradient of 34 mmHg, calculated ENRIKE of 0.81 cm2.  2.  Nonobstructive coronary artery disease.  3.  Dilated left ventricular systolic function with reduced left ventricular   systolic  function, ejection fraction of 30%.  Global hypokinesis.  4.  Elevated left ventricular end-diastolic pressure.  5.  Mildly dilated ascending aorta.  6.  Large distal abdominal aortic and bilateral iliofemoral system with   tortuosity of the iliofemoral system, right greater than left.     Echo 3/2/21  CONCLUSIONS  Prior echo on 8/7/20.Left ventricular ejection fraction is visually   estimated to be 60%.  Normal diastolic function.  Known TAVR aortic valve that is functioning normally with normal   transvalvular gradients. Vmax is 1.85 m/s.  Moderate eccentric mitral regurgitation.     Vascular clinic notes reviewed from 6/18/2021.  Impression and Plan:  84-year-old male patient with  1.  Status post TAVR  2.  Chronic  heart failure, NYHA class II stage C  3.  Mild LV dysfunction, improved  4.  Essential hypertension  5.  Moderate mitral regurgitation  6.  Paroxysmal atrial fibrillation  7.  History of DVT,  8.  History of TIA     He gained 3 pounds after a salty meal with elevated blood pressure, advised to take Lasix when he gets home.  Stable from valvular disease standpoint.  We will get right extremity arterial duplex to rule out subclavian stenosis given his numbness.  Continue metoprolol, lisinopril, warfarin, Zetia, Lipitor.    Follow-up with me as needed basis.  Follow-up with Dr. Anderson on a regular basis.        Sony RAMIRES  Interventional cardiologist  [facility], [address]  [address] [address]  Phone: [phone]  Fax: [phone]

## 2021-09-16 ENCOUNTER — ANTICOAGULATION VISIT (OUTPATIENT)
Dept: VASCULAR LAB | Facility: MEDICAL CENTER | Age: 86
End: 2021-09-16
Attending: INTERNAL MEDICINE
Payer: MEDICARE

## 2021-09-16 DIAGNOSIS — G45.9 TIA (TRANSIENT ISCHEMIC ATTACK): ICD-10-CM

## 2021-09-16 DIAGNOSIS — I48.0 PAROXYSMAL ATRIAL FIBRILLATION (HCC): ICD-10-CM

## 2021-09-16 LAB
INR BLD: 2.8 (ref 0.9–1.2)
INR PPP: 2.8 (ref 2–3.5)

## 2021-09-16 PROCEDURE — 85610 PROTHROMBIN TIME: CPT

## 2021-09-16 PROCEDURE — 99211 OFF/OP EST MAY X REQ PHY/QHP: CPT | Performed by: NURSE PRACTITIONER

## 2021-09-16 NOTE — PROGRESS NOTES
Anticoagulation Summary  As of 2021    INR goal:  2.0-3.0   TTR:  71.2 % (6.1 y)   INR used for dosin.80 (2021)   Warfarin maintenance plan:  6 mg (4 mg x 1.5) every day   Weekly warfarin total:  42 mg   Plan last modified:  Willian Velasquez, PharmD (8/3/2021)   Next INR check:     Priority:  Maintenance   Target end date:  Indefinite    Indications    Paroxysmal atrial fibrillation (HCC) [I48.0]  Hx of Stroke (Resolved) [I63.9]  Hx of TIA (transient ischemic attack) [G45.9]  Deep vein thrombosis (DVT) (HCC) (Resolved) [I82.409]  Atrial fibrillation (HCC) (Resolved) [I48.91]             Anticoagulation Episode Summary     INR check location:  Anticoagulation Clinic    Preferred lab:      Send INR reminders to:      Comments:        Anticoagulation Care Providers     Provider Role Specialty Phone number    Michael J Bloch, M.D. Referring Internal Medicine [phone]    [facility]   [phone]                Refer to Patient Findings for HPI:    Verified current warfarin dosing schedule.    Medications reconciled . Takes Aleve only as needed and knows to be extra cautious about monitoring for bleeding.  Pt is not on antiplatelet therapy      A/P   INR  -therapeutic.     Warfarin dosing recommendation: Continue current regimen    Pt educated to contact our clinic with any changes in medications or s/s of bleeding or thrombosis. Pt is aware to seek immediate medical attention for falls, head injury or deep cuts.    Follow up appointment in 5 week(s).    CHEYENNE Mckinley.

## 2021-09-22 ENCOUNTER — HOSPITAL ENCOUNTER (OUTPATIENT)
Dept: RADIOLOGY | Facility: MEDICAL CENTER | Age: 86
End: 2021-09-22
Attending: INTERNAL MEDICINE
Payer: MEDICARE

## 2021-09-22 DIAGNOSIS — M79.601 PAIN OF RIGHT UPPER EXTREMITY: ICD-10-CM

## 2021-09-22 PROCEDURE — 93922 UPR/L XTREMITY ART 2 LEVELS: CPT

## 2021-09-29 ENCOUNTER — HOSPITAL ENCOUNTER (OUTPATIENT)
Dept: RADIOLOGY | Facility: MEDICAL CENTER | Age: 86
End: 2021-09-29
Attending: OTOLARYNGOLOGY
Payer: MEDICARE

## 2021-09-29 DIAGNOSIS — R05.9 COUGH: ICD-10-CM

## 2021-09-29 DIAGNOSIS — R13.13 PHARYNGEAL DYSPHAGIA: ICD-10-CM

## 2021-09-29 DIAGNOSIS — R13.12 OROPHARYNGEAL DYSPHAGIA: ICD-10-CM

## 2021-09-29 PROCEDURE — 92611 MOTION FLUOROSCOPY/SWALLOW: CPT

## 2021-09-29 PROCEDURE — 74230 X-RAY XM SWLNG FUNCJ C+: CPT

## 2021-09-29 NOTE — OP THERAPY EVALUATION
[facility]  Modified Barium Swallow Study          Patient Name: Tristan Hester  Date of Evaluation: 09/29/21  Referring Provider: Darlin Renee M.D.  Fax: [phone]        Patient History/Reason for Referral:  The pt was referred for a Modified Barium Swallow Study (MBSS) due to complaints of coughing during mealtimes, but endorsed that coughing is also happening irrespective of eating/drinking. He endorsed a hx of PNA approximately 6 years ago and no reoccurrence since then.        Subjective: Pt arrived on time for the evaluation.       Assessment:  Videofluoroscopic Swallow Study was conducted in the lateral/AP projection(s) to evaluate oropharyngeal swallow function. A radiology tech was present to assist with the procedure.    Positioning: seated upright in fluoroscopy chair  Bolus Administration: SLP  PO barium contrast trials: Varibar thin liquid, Varibar pudding, solid coated in Varibar pudding      Oral phase:   Premature spillage to the vallecular space appreciated with trials of pudding and solid textures and to the level of the pyriform sinuses with thin liquids, soft solids, and mixed consistencies. Mastication was timely and oral residue appreciated which cleared with a reflexive second swallow. Piecemeal deglutition appreciated one time with trials of soft solids.      Pharyngeal phase:  Aspiration after the swallow appreciated x1 with thin liquid trials from spillage of the pyriform sinuses. Pt had a delayed cough response to this event which appeared to clear aspirate from trachea. Intermittent high penetration during the swallow appreciated with trials of thin liquids. Penetration and aspiration events were reduced/eliminated with implementation of a three second prep compensatory strategy and pharyngeal residue was reduced/eliminated with double swallow compensatory strategy. Swallow cough swallow compensatory strategy was used to reduce/eliminate residue in  laryngeal vestibule. Cough noted x1 with trial of solid texture which happened irrespective of penetration/aspiration.        Penetration Aspiration Scale:  1     No contrast enters airway  2     Contrast enters the airway, remains above the vocal folds, and is ejected from the airway (not seen in the airway at the end of the swallow).  3     Contrast enters the airway, remains above the vocal folds, and is not ejected from the airway (is seen in the airway after the swallow).  4     Contrast enters the airway, contacts the vocal folds, and is ejected from the airway.  5     Contrast enters the airway, contacts the vocal folds, and is not ejected from the airway  6     Contrast enters the airway, crosses the plane of the vocal folds, and is ejected from the airway.  7     Contrast enters the airway, crosses the plane of the vocal folds, and is not ejected from the airway despite effort.  8     Contrast enters the airway, crosses the plane of the vocal folds, is not ejected from the airway and there is no response to aspiration.      Consistency PAS Score Timing  Thin Liquid 6 During swallow  Mildly Thick Liquid 2 During swallow  Pudding 1 N/A  Solid 1 N/A      Esophageal phase:  No esophageal concerns appreciated during this study.          Clinical Impressions:  The pt presents with a mild pharyngeal dysphagia as characterized by reduced incomplete laryngeal elevation and weak base of tongue retraction resulting in intermittent penetration and aspiration of thin liquids. Aspiration events appear to be happening intermittently and pt does have a delayed cough response to events.        Recommendations:  Recommend continuation of regular/thin liquid diet with implementation of swallowing compensatory strategies: three second prep, double swallow, and swallow cough swallow strategy). Discussed recommendation to sit up at 90* during meals and for at least 30 minutes after meals to reduce risk of reflux (pt endorsed  coughing events occurring after meals although no s/sx of reflux appreciated on this study). Pt may also benefit from outpatient speech therapy to address deficits found during this study.            Thank you for the referral. For further questions, please call [phone].  Kaci Perry, SLP

## 2021-10-21 ENCOUNTER — ANTICOAGULATION VISIT (OUTPATIENT)
Dept: VASCULAR LAB | Facility: MEDICAL CENTER | Age: 86
End: 2021-10-21
Attending: INTERNAL MEDICINE
Payer: MEDICARE

## 2021-10-21 VITALS — SYSTOLIC BLOOD PRESSURE: 161 MMHG | HEART RATE: 53 BPM | DIASTOLIC BLOOD PRESSURE: 90 MMHG

## 2021-10-21 DIAGNOSIS — G45.9 TIA (TRANSIENT ISCHEMIC ATTACK): ICD-10-CM

## 2021-10-21 DIAGNOSIS — I48.0 PAROXYSMAL ATRIAL FIBRILLATION (HCC): ICD-10-CM

## 2021-10-21 DIAGNOSIS — D68.69 SECONDARY HYPERCOAGULABLE STATE (HCC): ICD-10-CM

## 2021-10-21 LAB — INR PPP: 2.2 (ref 2–3.5)

## 2021-10-21 PROCEDURE — 99211 OFF/OP EST MAY X REQ PHY/QHP: CPT | Performed by: NURSE PRACTITIONER

## 2021-10-21 PROCEDURE — 85610 PROTHROMBIN TIME: CPT

## 2021-10-21 NOTE — PROGRESS NOTES
Anticoagulation Summary  As of 10/21/2021    INR goal:  2.0-3.0   TTR:  71.6 % (6.2 y)   INR used for dosin.20 (10/21/2021)   Warfarin maintenance plan:  6 mg (4 mg x 1.5) every day   Weekly warfarin total:  42 mg   Plan last modified:  Willian Velasquez, PharmD (8/3/2021)   Next INR check:  2021   Priority:  Maintenance   Target end date:  Indefinite    Indications    Paroxysmal atrial fibrillation (HCC) [I48.0]  Hx of Stroke (Resolved) [I63.9]  Hx of TIA (transient ischemic attack) [G45.9]  Deep vein thrombosis (DVT) (HCC) (Resolved) [I82.409]  Atrial fibrillation (HCC) (Resolved) [I48.91]             Anticoagulation Episode Summary     INR check location:  Anticoagulation Clinic    Preferred lab:      Send INR reminders to:      Comments:        Anticoagulation Care Providers     Provider Role Specialty Phone number    Michael J Bloch, M.D. Referring Internal Medicine [phone]    [facility]   [phone]                Refer to Patient Findings for HPI:      Vitals:    10/21/21 1102   BP: (!) 161/90   Pulse: (!) 53       Verified current warfarin dosing schedule.    Medications reconciled   Pt is not on antiplatelet therapy      A/P   INR  -therapeutic.     Warfarin dosing recommendation: Continue current regimen.    Pt educated to contact our clinic with any changes in medications or s/s of bleeding or thrombosis. Pt is aware to seek immediate medical attention for falls, head injury or deep cuts.    Follow up appointment in 2 week(s) for INR when here for vasc f/u    ADDISON Mckinley

## 2021-10-22 LAB — INR BLD: 2.2 (ref 0.9–1.2)

## 2021-11-02 ENCOUNTER — DOCUMENTATION (OUTPATIENT)
Dept: VASCULAR LAB | Facility: MEDICAL CENTER | Age: 86
End: 2021-11-02

## 2021-11-02 NOTE — PROGRESS NOTES
Called and left VM for pt to call back to get pt rescheduled for missed f/u appt with Dr. Bloch. Please schedule next available with APRN.

## 2021-11-09 ENCOUNTER — ANTICOAGULATION MONITORING (OUTPATIENT)
Dept: VASCULAR LAB | Facility: MEDICAL CENTER | Age: 86
End: 2021-11-09

## 2021-11-09 ENCOUNTER — OFFICE VISIT (OUTPATIENT)
Dept: VASCULAR LAB | Facility: MEDICAL CENTER | Age: 86
End: 2021-11-09
Attending: INTERNAL MEDICINE
Payer: MEDICARE

## 2021-11-09 VITALS
WEIGHT: 187 LBS | HEART RATE: 55 BPM | HEIGHT: 71 IN | BODY MASS INDEX: 26.18 KG/M2 | DIASTOLIC BLOOD PRESSURE: 83 MMHG | SYSTOLIC BLOOD PRESSURE: 136 MMHG

## 2021-11-09 DIAGNOSIS — T14.8XXA VASCULAR INJURY: ICD-10-CM

## 2021-11-09 DIAGNOSIS — Z79.01 CHRONIC ANTICOAGULATION: ICD-10-CM

## 2021-11-09 DIAGNOSIS — I48.0 PAROXYSMAL ATRIAL FIBRILLATION (HCC): ICD-10-CM

## 2021-11-09 DIAGNOSIS — I38 VALVULAR HEART DISEASE: ICD-10-CM

## 2021-11-09 DIAGNOSIS — I10 ESSENTIAL HYPERTENSION: ICD-10-CM

## 2021-11-09 DIAGNOSIS — D68.69 SECONDARY HYPERCOAGULABLE STATE (HCC): ICD-10-CM

## 2021-11-09 DIAGNOSIS — G45.9 TIA (TRANSIENT ISCHEMIC ATTACK): ICD-10-CM

## 2021-11-09 DIAGNOSIS — S35.513D: ICD-10-CM

## 2021-11-09 DIAGNOSIS — I65.29 STENOSIS OF CAROTID ARTERY, UNSPECIFIED LATERALITY: ICD-10-CM

## 2021-11-09 DIAGNOSIS — E78.5 HYPERLIPIDEMIA, UNSPECIFIED HYPERLIPIDEMIA TYPE: ICD-10-CM

## 2021-11-09 LAB — INR PPP: 2.2 (ref 2–3.5)

## 2021-11-09 PROCEDURE — 99212 OFFICE O/P EST SF 10 MIN: CPT

## 2021-11-09 PROCEDURE — 99214 OFFICE O/P EST MOD 30 MIN: CPT | Performed by: INTERNAL MEDICINE

## 2021-11-09 PROCEDURE — 99213 OFFICE O/P EST LOW 20 MIN: CPT

## 2021-11-09 PROCEDURE — 85610 PROTHROMBIN TIME: CPT

## 2021-11-09 RX ORDER — WARFARIN SODIUM 4 MG/1
TABLET ORAL
Qty: 135 TABLET | Refills: 1 | Status: SHIPPED | OUTPATIENT
Start: 2021-11-09 | End: 2022-05-10 | Stop reason: SDUPTHER

## 2021-11-09 RX ORDER — GABAPENTIN 300 MG/1
300 CAPSULE ORAL 3 TIMES DAILY
Qty: 270 CAPSULE | Refills: 3 | Status: SHIPPED | OUTPATIENT
Start: 2021-11-09 | End: 2022-12-21

## 2021-11-09 RX ORDER — IRBESARTAN 300 MG/1
300 TABLET ORAL DAILY
Qty: 30 TABLET | Refills: 11 | Status: SHIPPED | OUTPATIENT
Start: 2021-11-09 | End: 2022-11-11

## 2021-11-09 ASSESSMENT — FIBROSIS 4 INDEX: FIB4 SCORE: 3.48

## 2021-11-09 NOTE — PROGRESS NOTES
"VASCULAR FOLLOW UP VISIT  Subjective:   Tristan Hester is a 86 y.o. male who presents today 22 for vascular f/u  Chief Complaint   Patient presents with   • Follow-Up     HPI:   Patient here for f/u of HTN, dyslipidemia, carotid disease and valvular heart disease, and atrial fibrillation.  Still struggling with balance and mobility  BP at home have increased.  Now often 130s and 140s systolic  Remains on metoprolol  Previously was on higher dose of irbesartan but has been on low-dose ACE inhibitor since his most recent hospitalization  Does have chronic cough  Using furosemide only as needed  No dyspnea exertion and limited leg swelling  No groin pain or claudication  Remains on warfarin -easy bruising but no bleeding  Off asa  No myalgias on current lipid-lowering therapy  No TIA or stroke symptoms  No lightheadedness    Social History     Tobacco Use   • Smoking status: Former Smoker     Packs/day: 0.50     Years: 30.00     Pack years: 15.00     Types: Cigarettes, Pipe     Quit date: 1985     Years since quittin.8   • Smokeless tobacco: Never Used   Vaping Use   • Vaping Use: Never used   Substance Use Topics   • Alcohol use: Yes     Alcohol/week: 3.0 oz     Types: 3 Cans of beer, 3 Shots of liquor per week     Comment: 1 x week   • Drug use: No     DIET AND EXERCISE:  Weight Change: Stabilized  Diet: Relatively low sodium- bit less adherent  Exercise: 3x week at gym      Objective:     Vitals:    21 1118 21 1122   BP: 154/84 136/83   BP Location: Left arm Left arm   Patient Position: Sitting Sitting   BP Cuff Size: Adult Adult   Pulse: (!) 57 (!) 55   Weight: 84.8 kg (187 lb)    Height: 1.803 m (5' 11\")       Body mass index is 26.08 kg/m².  Physical Exam  Vitals reviewed.   Constitutional:       Appearance: He is well-developed. He is not toxic-appearing or diaphoretic.   Eyes:      Extraocular Movements: Extraocular movements intact.      Conjunctiva/sclera: Conjunctivae normal. "   Neck:      Vascular: No carotid bruit.   Cardiovascular:      Rate and Rhythm: Normal rate and regular rhythm.      Heart sounds: Murmur heard.       Pulmonary:      Effort: Pulmonary effort is normal. No respiratory distress.      Breath sounds: Normal breath sounds. No wheezing or rales.   Musculoskeletal:      Right lower leg: No edema.      Left lower leg: No edema.   Neurological:      General: No focal deficit present.      Mental Status: He is alert and oriented to person, place, and time.      Cranial Nerves: No cranial nerve deficit.      Gait: Gait normal.   Psychiatric:         Mood and Affect: Mood normal.         Behavior: Behavior normal.       Lab Results   Component Value Date    CHOLSTRLTOT 113 07/19/2021    CHOLSTRLTOT 109 05/29/2020    LDL 51 05/29/2020    HDL 46 07/19/2021    HDL 49 05/29/2020    TRIGLYCERIDE 65 07/19/2021    TRIGLYCERIDE 47 05/29/2020      Lab Results   Component Value Date    PROTHROMBTM 21.4 (H) 03/04/2020    INR 2.20 11/09/2021         Lab Results   Component Value Date    SODIUM 141 07/19/2021    SODIUM 134 (L) 03/15/2021    POTASSIUM 4.7 07/19/2021    POTASSIUM 4.6 03/15/2021    CHLORIDE 104 07/19/2021    CHLORIDE 101 03/15/2021    CO2 23 07/19/2021    CO2 26 03/15/2021    GLUCOSE 85 07/19/2021    GLUCOSE 96 03/15/2021    BUN 26 07/19/2021    BUN 22 03/15/2021    CREATININE 1.04 07/19/2021    CREATININE 1.02 03/15/2021    BUNCREATRAT 25 (H) 07/19/2021    IFAFRICA 75 07/19/2021    IFAFRICA >60 03/15/2021    IFNOTAFR 65 07/19/2021    IFNOTAFR >60 03/15/2021      Lab Results   Component Value Date    TSH 1.790 07/19/2021      Lab Results   Component Value Date    WBC 6.9 07/19/2021    WBC 6.6 05/29/2020    RBC 4.65 07/19/2021    RBC 4.99 05/29/2020    HEMOGLOBIN 13.8 07/19/2021    HEMOGLOBIN 13.8 (L) 05/29/2020    HEMATOCRIT 42.5 07/19/2021    HEMATOCRIT 44.7 05/29/2020    MCV 91 07/19/2021    MCV 89.6 05/29/2020    MCH 29.7 07/19/2021    MCH 27.7 05/29/2020    MCHC 32.5  07/19/2021    MCHC 30.9 (L) 05/29/2020    MPV 12.2 05/29/2020       Carotid duplex February 2021   No evidence for RIGHT carotid stenosis status post endarterectomy.   Mild LEFT internal carotid artery stenosis.     Echocardiogram March 2021  Prior echo on 8/7/20.Left ventricular ejection fraction is visually   estimated to be 60%.  Normal diastolic function.  Known TAVR aortic valve that is functioning normally with normal   transvalvular gradients. Vmax is 1.85 m/s.  Moderate eccentric mitral regurgitation.    Aortoiliac duplex April 2021   No evidence of aortic aneurysm.    No evidence of pseudoaneurysm or injury to the bilateral iliac arteries.     No stenosis of the proximal segment of the major visceral arteries.    Waveforms and velocities of the bilateral iliac arteries are normal.    Common iliac diameter (cm):   Right- 1.63 x 1.72         Left-   1.70 x 1.72     Medical Decision Making:  Today's Assessment / Status / Plan:     1. Secondary hypercoagulable state (HCC)     2. Paroxysmal atrial fibrillation (HCC)  Referral to Geriatrics    CBC WITHOUT DIFFERENTIAL   3. Iliac artery injury, unspecified laterality, subsequent encounter     4. Essential hypertension  irbesartan (AVAPRO) 300 MG Tab    Comp Metabolic Panel    Lipid Profile   5. Valvular heart disease     6. Stenosis of carotid artery, unspecified laterality     7. Hyperlipidemia, unspecified hyperlipidemia type     8. Vascular injury  gabapentin (NEURONTIN) 300 MG Cap    PROSTATE SPECIFIC AG DIAGNOSTIC     Patient Type: Secondary Prevention    Etiology of Established CVD if Present:   1. TIA - likely cardioembolic from valve disease  2. Carotid atherosclerosis -status post CEA  3. Valvular Heart Disease  4. Atrial fibrillation with onset during acute illness, now chronic  5.  DVT after back surgery jan 2016  6.  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    Lipid Management: Qualifies for Statin Therapy Based on 2018 ACC/AHA  Guidelines: yes  Calculated 10-Year Risk of ASCVD: N/A  Currently on Statin: Yes  ACC/aha criteria for at least mod intensity statin  Per nla goal ldl <70 and nonHDL <100 - below threshold on most recent blood work,  Plan:  - Continue atorvastatin at current dose    - continue zetia  - Continue TLC  - Recheck fasting lipids prior to next visit    Blood Pressure Management:  Acc/aha bp goal <130/80  BP up both at home and in the office  No significant albuminuria in the past  Possible ACE inhibitor cough  Very low-dose ACE inhibitor which is a significant change from the higher dose ARB he was on in the past  Plan:  -Continue metoprolol at current dose  -Change lisinopril to irbesartan 300 mg daily  -Continue lifestyle modification  -Recheck GFR, electrolytes, urine for albumin  -Continue home BP monitoring  -Intensify therapy as needed    Glycemic Status: Prediabetic  IFG, mild and stable  - Continue lifestyle mod  - Follow fasting glucose    Anti-Platelet/Anti-Coagulant Tx: yes  - Continue indefinite anticoagulation with warfarin  - Continue to hold aspirin-cardiology in agreement   - F/U with anticoagulation clinic as scheduled -POC INR today  - limit nsaids    Smoking: continue complete avoidance    Physical Activity:  Encouraged more walking    Weight Management and Nutrition: Dietary plan was discussed with patient at this visit including c/w Med diet and decreasing his sodium a bit    Other:     1. TIA - likely cardioembolic from valve disease - continue BP control and anticoagulation  2. Carotid atherosclerosis status post CEA- .  Only mild restenosis on repeat duplex.  Remains asymptomatic.  Repeat duplex in 2 years  3. Valvular Heart Disease -now status post TAVR March 2020.  Still with unrepaired mitral disease -improved on most recent echo.  Appears asymptomatic at present.  Does not appear to be volume overloaded.  Await repeat echocardiogram at discretion of cardiology  4. Colonic Polyp on recent  colonoscopy - repeat colonoscopy in 2019 negative per patient.   No further f/u needed  5. Insomnia - trialed zolpidem w/o success.   Continue temazepam. Patient understands addictive potential.  6-month refill given today  6.  Atrial fibrillation - defer management of rhythm and rate to cards.  Continue anticoag as above  7.  DVT - postoperative.  No recurrence.  Continue indefinite anticoagulation given concomitant afib  8.  H/o prostate ca and multiple urological issues - defer f/u to urology.  Check PSA as they recommend  9.  Balance issues and neuropathy -this seems recalcitrant to physical therapy or any intervention that we have tried previously.  Defer f/u to ENT and neurology  10.  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  Good arterial flow and patent stent on noninvasive study.  .  Report any claudication, rest pain, or ulceration immediately.  Will obtain repeat arterial duplex 2 year from previous  11.  Continuing medical health - cancer screening as above. Has had shingles vac, continue yearly flu shot.  Has had Covid vaccination    We will partner with other providers in the management of established vascular disease and cardiometabolic risk factors.    We will refer him to a geriatrician to establish as PCP    Studies to Be Obtained:    1) echo -timing to be determined by cardiology  2) aortoiliac duplex April 2023  3) carotid duplex February 2023    Labs to Be Obtained: as above prior to next visit    Follow up in: 6 weeks     Time: 30-39min - chart review/prep, review of other providers' records, imaging/lab review, face-to-face time for history/examination, ordering, prescribing,  review of results/meds/ treatment plan with patient/family/caregiver, documentation in EMR, care coordination (as needed)    Michael J Bloch, M.D.

## 2021-11-09 NOTE — PROGRESS NOTES
Anticoagulation Summary  As of 2021    INR goal:  2.0-3.0   TTR:  71.9 % (6.2 y)   INR used for dosin.20 (2021)   Warfarin maintenance plan:  6 mg (4 mg x 1.5) every day   Weekly warfarin total:  42 mg   Plan last modified:  Willian Velasquez, PharmD (8/3/2021)   Next INR check:  2021   Priority:  Maintenance   Target end date:  Indefinite    Indications    Paroxysmal atrial fibrillation (HCC) [I48.0]  Hx of TIA (transient ischemic attack) [G45.9]  Deep vein thrombosis (DVT) (HCC) (Resolved) [I82.409]  Atrial fibrillation (HCC) (Resolved) [I48.91]  Hx of Stroke (Resolved) [I63.9]  Secondary hypercoagulable state (HCC) [D68.69]             Anticoagulation Episode Summary     INR check location:  Anticoagulation Clinic    Preferred lab:      Send INR reminders to:      Comments:        Anticoagulation Care Providers     Provider Role Specialty Phone number    Michael J Bloch, M.D. Referring Internal Medicine [phone]    [facility]   [phone]                Refer to Patient Findings for HPI:  Patient Findings     Negatives:  Signs/symptoms of thrombosis, Signs/symptoms of bleeding, Laboratory test error suspected, Change in health, Change in alcohol use, Change in activity, Upcoming invasive procedure, Emergency department visit, Upcoming dental procedure, Missed doses, Extra doses, Change in medications, Change in diet/appetite, Hospital admission, Bruising, Other complaints          There were no vitals filed for this visit.  Vitals recorded during Vascular visit    Verified current warfarin dosing schedule.    Medications reconciled   Pt is not on antiplatelet therapy      A/P   INR  -therapeutic.     Warfarin dosing recommendation: Pt is to continue with current warfarin dosing regimen.    Pt educated to contact our clinic with any changes in medications or s/s of bleeding or thrombosis. Pt is aware to seek immediate medical attention for falls, head injury or deep  cuts.    Follow up appointment in 6 week(s).    Karrie Jameson, PharmD

## 2021-11-12 LAB
ALBUMIN SERPL-MCNC: 4.2 G/DL (ref 3.6–4.6)
ALBUMIN/GLOB SERPL: 1.6 {RATIO} (ref 1.2–2.2)
ALP SERPL-CCNC: 68 IU/L (ref 44–121)
ALT SERPL-CCNC: 18 IU/L (ref 0–44)
AST SERPL-CCNC: 30 IU/L (ref 0–40)
BILIRUB SERPL-MCNC: 1.2 MG/DL (ref 0–1.2)
BUN SERPL-MCNC: 20 MG/DL (ref 8–27)
BUN/CREAT SERPL: 21 (ref 10–24)
CALCIUM SERPL-MCNC: 9.5 MG/DL (ref 8.6–10.2)
CHLORIDE SERPL-SCNC: 103 MMOL/L (ref 96–106)
CHOLEST SERPL-MCNC: 120 MG/DL (ref 100–199)
CO2 SERPL-SCNC: 26 MMOL/L (ref 20–29)
CREAT SERPL-MCNC: 0.97 MG/DL (ref 0.76–1.27)
ERYTHROCYTE [DISTWIDTH] IN BLOOD BY AUTOMATED COUNT: 13.3 % (ref 11.6–15.4)
GLOBULIN SER CALC-MCNC: 2.7 G/DL (ref 1.5–4.5)
GLUCOSE SERPL-MCNC: 110 MG/DL (ref 65–99)
HCT VFR BLD AUTO: 46.8 % (ref 37.5–51)
HDLC SERPL-MCNC: 51 MG/DL
HGB BLD-MCNC: 15.5 G/DL (ref 13–17.7)
LABORATORY COMMENT REPORT: NORMAL
LDLC SERPL CALC-MCNC: 55 MG/DL (ref 0–99)
MCH RBC QN AUTO: 29.4 PG (ref 26.6–33)
MCHC RBC AUTO-ENTMCNC: 33.1 G/DL (ref 31.5–35.7)
MCV RBC AUTO: 89 FL (ref 79–97)
NRBC BLD AUTO-RTO: NORMAL %
PLATELET # BLD AUTO: 157 X10E3/UL (ref 150–450)
POTASSIUM SERPL-SCNC: 4.9 MMOL/L (ref 3.5–5.2)
PROT SERPL-MCNC: 6.9 G/DL (ref 6–8.5)
RBC # BLD AUTO: 5.28 X10E6/UL (ref 4.14–5.8)
SODIUM SERPL-SCNC: 142 MMOL/L (ref 134–144)
TRIGL SERPL-MCNC: 66 MG/DL (ref 0–149)
VLDLC SERPL CALC-MCNC: 14 MG/DL (ref 5–40)
WBC # BLD AUTO: 5.6 X10E3/UL (ref 3.4–10.8)

## 2021-11-30 ENCOUNTER — TELEPHONE (OUTPATIENT)
Dept: SCHEDULING | Facility: IMAGING CENTER | Age: 86
End: 2021-11-30

## 2021-12-16 ENCOUNTER — OFFICE VISIT (OUTPATIENT)
Dept: MEDICAL GROUP | Facility: MEDICAL CENTER | Age: 86
End: 2021-12-16
Payer: MEDICARE

## 2021-12-16 VITALS
SYSTOLIC BLOOD PRESSURE: 106 MMHG | HEART RATE: 57 BPM | TEMPERATURE: 97.6 F | HEIGHT: 71 IN | WEIGHT: 182 LBS | RESPIRATION RATE: 16 BRPM | OXYGEN SATURATION: 96 % | BODY MASS INDEX: 25.48 KG/M2 | DIASTOLIC BLOOD PRESSURE: 60 MMHG

## 2021-12-16 DIAGNOSIS — R53.83 LACK OF STAMINA: ICD-10-CM

## 2021-12-16 DIAGNOSIS — I65.29 STENOSIS OF CAROTID ARTERY, UNSPECIFIED LATERALITY: ICD-10-CM

## 2021-12-16 DIAGNOSIS — Z95.2 S/P TAVR (TRANSCATHETER AORTIC VALVE REPLACEMENT): ICD-10-CM

## 2021-12-16 DIAGNOSIS — I10 ESSENTIAL HYPERTENSION, BENIGN: ICD-10-CM

## 2021-12-16 DIAGNOSIS — F51.04 CHRONIC INSOMNIA: ICD-10-CM

## 2021-12-16 DIAGNOSIS — I48.0 PAROXYSMAL ATRIAL FIBRILLATION (HCC): ICD-10-CM

## 2021-12-16 DIAGNOSIS — G45.9 TIA (TRANSIENT ISCHEMIC ATTACK): ICD-10-CM

## 2021-12-16 DIAGNOSIS — E78.5 DYSLIPIDEMIA: ICD-10-CM

## 2021-12-16 DIAGNOSIS — C80.1 CANCER (HCC): ICD-10-CM

## 2021-12-16 DIAGNOSIS — R26.89 BALANCE DISORDER: ICD-10-CM

## 2021-12-16 DIAGNOSIS — D68.69 SECONDARY HYPERCOAGULABLE STATE (HCC): ICD-10-CM

## 2021-12-16 PROCEDURE — 99214 OFFICE O/P EST MOD 30 MIN: CPT | Performed by: FAMILY MEDICINE

## 2021-12-16 ASSESSMENT — FIBROSIS 4 INDEX: FIB4 SCORE: 3.83

## 2021-12-16 ASSESSMENT — PATIENT HEALTH QUESTIONNAIRE - PHQ9: CLINICAL INTERPRETATION OF PHQ2 SCORE: 0

## 2021-12-16 NOTE — PROGRESS NOTES
CC: New patient: Balance problem, lack of stamina, hypertension, A. fib, secondary hypercoagulable state, hyperlipidemia, chronic insomnia, status post TAVR, history of prostate cancer, history of TIA, history of right endarterectomy, chronic back pain    HPI:  Tristan presents today establishing PCP.    Patient has been active and independent with all ADLs.  Has the following chronic medical issues:    Balance disorder/  Lack of stamina  Patient stating that he has been having a problem with his balance, and has been feeling tired and weak all the time.  Patient uses a cane to help with his balance, he reported no recent falls.  He has been using his cane all the time for outdoors activity.  He has a walker which he uses at home.  Patient most recent blood work showed no significant abnormality.  Patient has been on temazepam 30 mg daily which is a high dose for elderly.  Blood pressure has been normal but on the low side.  Patient has been on gabapentin for chronic back pain.  Patient was told that he has arthritis of the left hip that does not need surgery at this time.    Essential hypertension, benign  Patient's blood pressure has been controlled.  However has been in the low side.  Patient has been having some tiredness and lack of stamina.  Patient is currently on irbesartan 300 mg daily    Paroxysmal atrial fibrillation (HCC)/ Secondary hypercoagulable state (HCC)  Patient denies any palpitation, chest pain, shortness of breath.  He has been Coumadin and metoprolol    Dyslipidemia  He has been tolerating the statin. Denies muscle pain LFTs has been normal.  Patient has history of minor stroke.  He has been on atorvastatin 80 mg.    Chronic insomnia  Patient has history of chronic insomnia and tried zolpidem in the past but did not work.  Temazepam has been working for him he stating that he was a started on 15 mg but apparently did not help so it was increased to 30 mg.      S/P TAVR (transcatheter aortic  valve replacement)  Patient had TAVR procedure for aortic stenosis in March 2020 during which he had a femoral/iliac injury after the surgery, that was repaired.  He has been following up with his own cardiologist in a regular basis    Hx of Prostate Cancer  Patient underwent post prostatectomy 20 years ago, has been following up with urology.  Last PSA checked was 2 years ago was normal.  Patient has been following up with urology but last time was seen by his urologist was 2 years ago.  Patient intends to make an appointment soon to follow-up with his urologist    Hx of TIA (transient ischemic attack)  Patient reported 2 episodes when he lost his vision for few minutes and comes back.      Stenosis of carotid artery, unspecified laterality  Patient has been stable.  He underwent right endarterectomy 2008.  No problems since then    Chronic back pain  Status post multiple back surgery, has been having normal bowel and bladder control, denies any leg pain or weakness, however he has been having tiredness and dependence issues.  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 ([facility]) 02/27/2020   • Acute deep vein thrombosis (DVT) of distal vein of lower extremity ([facility]) 02/27/2020   • Carotid artery disease ([facility]) 02/18/2020   • S/P TAVR (transcatheter aortic valve replacement) 01/30/2019   • Balance disorder 06/28/2018   • Lower back pain 12/27/2016   • Lumbar stenosis 12/28/2015   • Chronic insomnia 08/21/2015   • Paroxysmal atrial fibrillation (HCC) 06/18/2015   • Dyslipidemia 06/18/2015   • Hx of TIA (transient ischemic attack)    • Arthritis    • Hx of Prostate Cancer    • Essential  hypertension, benign        Current Outpatient Medications   Medication Sig Dispense Refill   • gabapentin (NEURONTIN) 300 MG Cap Take 1 Capsule by mouth 3 times a day. Get future refills from  Capsule 3   • irbesartan (AVAPRO) 300 MG Tab Take 1 Tablet by mouth every day. 30 Tablet 11   • warfarin (COUMADIN) 4 MG Tab Take 1.5 tabs by mouth daily or as directed by anticoagulation clinic  Indications: Per pt takes once a week on Friday 135 Tablet 1   • Ascorbic Acid (VITAMIN C PO) Take 500 mg by mouth.     • Multiple Vitamins-Minerals (DAILY MULTI VITAMIN/MINERALS PO) Take  by mouth.     • Naproxen Sodium (ALEVE PO) Take  by mouth.     • Acetaminophen (TYLENOL PO) Take  by mouth.     • temazepam (RESTORIL) 30 MG capsule Take 1 Capsule by mouth at bedtime as needed for Sleep for up to 180 days. 30 Capsule 5   • furosemide (LASIX) 20 MG Tab Take 1 tablet by mouth 1 time a day as needed.     • ezetimibe (ZETIA) 10 MG Tab Take 1 tablet by mouth once daily 90 tablet 3   • metoprolol SR (TOPROL XL) 25 MG TABLET SR 24 HR Take 1 tablet by mouth every day. 90 tablet 3   • atorvastatin (LIPITOR) 80 MG tablet TAKE 1/2 (ONE-HALF) TABLET BY MOUTH ONCE DAILY IN THE EVENING 45 Tab 6   • amoxicillin (AMOXIL) 500 MG Cap Take 4 Caps by mouth Once PRN (30-60min prior to dental work) for up to 1 dose. 12 Cap 0     No current facility-administered medications for this visit.         Allergies as of 12/16/2021 - Reviewed 12/16/2021   Allergen Reaction Noted   • Other environmental Runny Nose and Itching 10/08/2010        Social History     Socioeconomic History   • Marital status:      Spouse name: Not on file   • Number of children: Not on file   • Years of education: Not on file   • Highest education level: Not on file   Occupational History   • Not on file   Tobacco Use   • Smoking status: Former Smoker     Packs/day: 0.50     Years: 30.00     Pack years: 15.00     Types: Cigarettes, Pipe     Quit date: 1/1/1985     Years  since quittin.9   • Smokeless tobacco: Never Used   Vaping Use   • Vaping Use: Never used   Substance and Sexual Activity   • Alcohol use: Yes     Alcohol/week: 3.0 oz     Types: 3 Cans of beer, 3 Shots of liquor per week     Comment: 1 x week   • Drug use: No   • Sexual activity: Not Currently   Other Topics Concern   • Not on file   Social History Narrative   • Not on file     Social Determinants of Health     Financial Resource Strain:    • Difficulty of Paying Living Expenses: Not on file   Food Insecurity:    • Worried About Running Out of Food in the Last Year: Not on file   • Ran Out of Food in the Last Year: Not on file   Transportation Needs:    • Lack of Transportation (Medical): Not on file   • Lack of Transportation (Non-Medical): Not on file   Physical Activity:    • Days of Exercise per Week: Not on file   • Minutes of Exercise per Session: Not on file   Stress:    • Feeling of Stress : Not on file   Social Connections:    • Frequency of Communication with Friends and Family: Not on file   • Frequency of Social Gatherings with Friends and Family: Not on file   • Attends Confucianist Services: Not on file   • Active Member of Clubs or Organizations: Not on file   • Attends Club or Organization Meetings: Not on file   • Marital Status: Not on file   Intimate Partner Violence:    • Fear of Current or Ex-Partner: Not on file   • Emotionally Abused: Not on file   • Physically Abused: Not on file   • Sexually Abused: Not on file   Housing Stability:    • Unable to Pay for Housing in the Last Year: Not on file   • Number of Places Lived in the Last Year: Not on file   • Unstable Housing in the Last Year: Not on file       Family History   Problem Relation Age of Onset   • Heart Disease Father         CHF at 91.   • Other Brother         Aneurysm   • Cancer Brother         Seminal vascular cancer   • Autoimmune Disease Daughter         Lupus, RA   • No Known Problems Daughter    • No Known Problems Son    •  No Known Problems Son    • Heart Attack Neg Hx        Past Surgical History:   Procedure Laterality Date   • TRANSCATHETER AORTIC VALVE REPLACEMENT  3/2/2020    Procedure: REPLACEMENT, AORTIC VALVE, TRANSCATHETER -;  Surgeon: Sony Barrientos M.D.;  Location: [facility];  Service: Cardiac   • YOANDY  3/2/2020    Procedure: ECHOCARDIOGRAM, TRANSESOPHAGEAL;  Surgeon: Sony Barrientos M.D.;  Location: [facility];  Service: Cardiac   • FEMORAL ENDARTERECTOMY Left 3/2/2020    Procedure: Repair of left femoral  Artery Dissection;  Surgeon: Alpa Tillman M.D.;  Location: [facility];  Service: General   • LUMBAR FUSION POSTERIOR  12/28/2015    Procedure: LUMBAR FUSION POSTERIOR L3-5 with peek rods;  Surgeon: Phylicia Almonte M.D.;  Location: [facility];  Service:    • LUMBAR DECOMPRESSION  12/28/2015    Procedure: LUMBAR DECOMPRESSION posterior redo decompression L3-5, with dural repair;  Surgeon: Phylicia Almonte M.D.;  Location: [facility];  Service:    • THORACOSCOPY  6/13/2015    Procedure: THORACOSCOPY with decortication VATS , side to be determined  ;  Surgeon: Elmira Holder M.D.;  Location: [facility];  Service:    • LUMBAR LAMINECTOMY DISKECTOMY  10/8/2010    Performed by PHYLICIA ALMONTE at [facility]   • CERVICAL DISK AND FUSION ANTERIOR  10/26/2009    Performed by PHYLICIA ALMONTE at [facility]   • CAROTID ENDARTERECTOMY  7/10/08    Performed by LEIGH MORATAYA at [facility]   • BLADDER SLING MALE  11/2004   • PROSTATECTOMY, RADIAL  11/2000       ROS:  Denies any Headache, Blurred Vision, Confusion Chest pain,  Shortness of breath,  Abdominal pain, Changes of bowel or bladder, Lower ext edema, Fevers, Nights sweats, Weight Changes, Focal weakness or numbness.  All other systems are negative.    /60 (BP Location: Right arm, Patient Position: Sitting, BP Cuff Size: Adult)   Pulse (!) 57   Temp  "36.4 °C (97.6 °F) (Temporal)   Resp 16   Ht 1.803 m (5' 11\")   Wt 82.6 kg (182 lb)   SpO2 96%   BMI 25.38 kg/m²     Physical Exam:  Gen:         Alert and oriented, No apparent distress.  HEENT:   Perrla, TM clear,  Oralpharynx no erythema or exudates.  Neck:       No Jugular venous distension, Lymphadenopathy, Thyromegaly, Bruits.  Lungs:     Clear to auscultation bilaterally  CV:          Regular rate and rhythm. No murmurs, rubs or gallops.  Abd:         Soft non tender, non distended. Normal active bowel sounds. No                                        Hepatosplenomegaly, No pulsatile masses.  Ext:          No clubbing, cyanosis, edema.      Assessment and Plan.   86 y.o. male     1. Essential hypertension, benign  Controlled.  However has been in the low side.  Patient has been having some tiredness and lack of stamina  For now continue same dose of irbesartan.  Patient advised to check his blood pressure twice a day for a week and send it to me, if it continues to be on the low side we will decrease the dose of irbesartan    2. Paroxysmal atrial fibrillation (HCC)  No RVR.  Continue on Coumadin and metoprolol    3. Secondary hypercoagulable state (HCC)  Due to A. fib.  Continue on Coumadin    4. Dyslipidemia  He has been tolerating the statin. Denies muscle pain LFTs has been normal.  Patient has history of minor stroke  Continue atorvastatin 80 mg.    5. Chronic insomnia  Patient has been on high dose of temazepam.  Discussed with patient the risk of taking temazepam in elderly.  Recommend to start tapering down the medication as tolerated and that will probably help with his balance and stamina.    6. S/P TAVR (transcatheter aortic valve replacement)  Currently stable.  Had a femoral/iliac injury after the surgery, repaired.  Continue follow-up with cardiology    7. Hx of Prostate Cancer  Status post prostatectomy 20 years ago, has been following up with urology.  Last PSA checked was 2 years ago was " normal.  Continue follow-up with urology    8. Hx of TIA (transient ischemic attack)  Patient had 2 episodes of possible amaurosis fugax that resolved spontaneously within few minutes.  Currently on oral anticoagulation for A. Fib    9. Stenosis of carotid artery, unspecified laterality  Status post endarterectomy 2008.  Currently stable.    10. Balance disorder/  Lack of stamina  Multifactorial but the main reasons are:  Temazepam high-dose, I recommend to decrease the dose of temazepam gradually.  Currently he is on 30 mg every night, advised to try 15 mg.  Low normal blood pressure, I recommend checking the blood pressure twice a day for a week and send it to me for reevaluation.  If he continues to be on the low side I will decrease the dose of the irbesartan.  Osteoarthritis, knee and hip osteoarthritis I recommend physical therapy  Age-related muscle weakness, I recommend physical therapy, however advised to restart physical therapy after taking care of other issues with medications and blood pressure.  Patient has been on gabapentin for chronic back pain, will try lower dose in the future.  Patient advised to return to the clinic in a month for more evaluation.

## 2021-12-20 ENCOUNTER — OFFICE VISIT (OUTPATIENT)
Dept: VASCULAR LAB | Facility: MEDICAL CENTER | Age: 86
End: 2021-12-20
Attending: INTERNAL MEDICINE
Payer: MEDICARE

## 2021-12-20 VITALS
WEIGHT: 182 LBS | BODY MASS INDEX: 25.48 KG/M2 | HEIGHT: 71 IN | SYSTOLIC BLOOD PRESSURE: 158 MMHG | HEART RATE: 53 BPM | DIASTOLIC BLOOD PRESSURE: 89 MMHG

## 2021-12-20 DIAGNOSIS — I82.4Z9 ACUTE DEEP VEIN THROMBOSIS (DVT) OF DISTAL VEIN OF LOWER EXTREMITY, UNSPECIFIED LATERALITY (HCC): ICD-10-CM

## 2021-12-20 DIAGNOSIS — D68.69 SECONDARY HYPERCOAGULABLE STATE (HCC): ICD-10-CM

## 2021-12-20 DIAGNOSIS — I48.0 PAROXYSMAL ATRIAL FIBRILLATION (HCC): ICD-10-CM

## 2021-12-20 DIAGNOSIS — G45.9 TIA (TRANSIENT ISCHEMIC ATTACK): ICD-10-CM

## 2021-12-20 DIAGNOSIS — E78.5 DYSLIPIDEMIA: ICD-10-CM

## 2021-12-20 DIAGNOSIS — I65.29 STENOSIS OF CAROTID ARTERY, UNSPECIFIED LATERALITY: ICD-10-CM

## 2021-12-20 PROCEDURE — 99214 OFFICE O/P EST MOD 30 MIN: CPT | Performed by: NURSE PRACTITIONER

## 2021-12-20 PROCEDURE — 99212 OFFICE O/P EST SF 10 MIN: CPT

## 2021-12-20 ASSESSMENT — FIBROSIS 4 INDEX: FIB4 SCORE: 3.83

## 2021-12-20 NOTE — PROGRESS NOTES
"VASCULAR FOLLOW UP VISIT  Subjective:   Tristan Hester is a 86 y.o. male who presents today 21 for vascular f/u  Chief Complaint   Patient presents with   • Follow-Up     HPI:   Patient here for f/u of HTN, dyslipidemia, carotid disease and valvular heart disease, and atrial fibrillation.  Still struggling with balance and mobility- PCP rec decreasing Trazadone   BP at home have increased.  -150s, has wrist cuff  Remains on metoprolol  Restarted irbesartan 300mg- tolerating well   Using furosemide only as needed  No dyspnea exertion and limited leg swelling  No groin pain or claudication  Remains on warfarin -easy bruising but no bleeding problems   Off asa  No myalgias on current lipid-lowering therapy  No TIA or stroke symptoms  No lightheadedness    Social History     Tobacco Use   • Smoking status: Former Smoker     Packs/day: 0.50     Years: 30.00     Pack years: 15.00     Types: Cigarettes, Pipe     Quit date: 1985     Years since quittin.9   • Smokeless tobacco: Never Used   Vaping Use   • Vaping Use: Never used   Substance Use Topics   • Alcohol use: Yes     Alcohol/week: 3.0 oz     Types: 3 Cans of beer, 3 Shots of liquor per week     Comment: 1 x week   • Drug use: No     DIET AND EXERCISE:  Weight Change: Stabilized  Diet: Relatively low sodium- bit less adherent  Exercise: 3x week at gym      Objective:     Vitals:    21 1050 21 1054   BP: 156/88 158/89   BP Location: Left arm Left arm   Patient Position: Sitting Sitting   BP Cuff Size: Adult Adult   Pulse: (!) 56 (!) 53   Weight: 82.6 kg (182 lb)    Height: 1.803 m (5' 11\")       Body mass index is 25.38 kg/m².  Physical Exam  Vitals reviewed.   Constitutional:       Appearance: He is well-developed. He is not toxic-appearing or diaphoretic.   Eyes:      Extraocular Movements: Extraocular movements intact.      Conjunctiva/sclera: Conjunctivae normal.   Neck:      Vascular: No carotid bruit.   Cardiovascular:      " Rate and Rhythm: Normal rate and regular rhythm.      Heart sounds: Murmur heard.       Pulmonary:      Effort: Pulmonary effort is normal. No respiratory distress.      Breath sounds: Normal breath sounds. No wheezing or rales.   Musculoskeletal:      Right lower leg: No edema.      Left lower leg: No edema.   Neurological:      General: No focal deficit present.      Mental Status: He is alert and oriented to person, place, and time.      Cranial Nerves: No cranial nerve deficit.      Gait: Gait normal.   Psychiatric:         Mood and Affect: Mood normal.         Behavior: Behavior normal.       Lab Results   Component Value Date    CHOLSTRLTOT 120 11/11/2021    CHOLSTRLTOT 109 05/29/2020    LDL 51 05/29/2020    HDL 51 11/11/2021    HDL 49 05/29/2020    TRIGLYCERIDE 66 11/11/2021    TRIGLYCERIDE 47 05/29/2020      Lab Results   Component Value Date    PROTHROMBTM 21.4 (H) 03/04/2020    INR 2.20 11/09/2021         Lab Results   Component Value Date    SODIUM 142 11/11/2021    SODIUM 134 (L) 03/15/2021    POTASSIUM 4.9 11/11/2021    POTASSIUM 4.6 03/15/2021    CHLORIDE 103 11/11/2021    CHLORIDE 101 03/15/2021    CO2 26 11/11/2021    CO2 26 03/15/2021    GLUCOSE 110 (H) 11/11/2021    GLUCOSE 96 03/15/2021    BUN 20 11/11/2021    BUN 22 03/15/2021    CREATININE 0.97 11/11/2021    CREATININE 1.02 03/15/2021    BUNCREATRAT 21 11/11/2021    IFAFRICA 82 11/11/2021    IFAFRICA >60 03/15/2021    IFNOTAFR 71 11/11/2021    IFNOTAFR >60 03/15/2021      Lab Results   Component Value Date    TSH 1.790 07/19/2021      Lab Results   Component Value Date    WBC 5.6 11/11/2021    WBC 6.6 05/29/2020    RBC 5.28 11/11/2021    RBC 4.99 05/29/2020    HEMOGLOBIN 15.5 11/11/2021    HEMOGLOBIN 13.8 (L) 05/29/2020    HEMATOCRIT 46.8 11/11/2021    HEMATOCRIT 44.7 05/29/2020    MCV 89 11/11/2021    MCV 89.6 05/29/2020    MCH 29.4 11/11/2021    MCH 27.7 05/29/2020    MCHC 33.1 11/11/2021    MCHC 30.9 (L) 05/29/2020    MPV 12.2 05/29/2020      Carotid duplex February 2021   No evidence for RIGHT carotid stenosis status post endarterectomy.   Mild LEFT internal carotid artery stenosis.     Echocardiogram March 2021  Prior echo on 8/7/20.Left ventricular ejection fraction is visually   estimated to be 60%.  Normal diastolic function.  Known TAVR aortic valve that is functioning normally with normal   transvalvular gradients. Vmax is 1.85 m/s.  Moderate eccentric mitral regurgitation.    Aortoiliac duplex April 2021   No evidence of aortic aneurysm.    No evidence of pseudoaneurysm or injury to the bilateral iliac arteries.     No stenosis of the proximal segment of the major visceral arteries.    Waveforms and velocities of the bilateral iliac arteries are normal.    Common iliac diameter (cm):   Right- 1.63 x 1.72         Left-   1.70 x 1.72     Medical Decision Making:  Today's Assessment / Status / Plan:     1. Hx of TIA (transient ischemic attack)     2. Paroxysmal atrial fibrillation (HCC)     3. Stenosis of carotid artery, unspecified laterality     4. Acute deep vein thrombosis (DVT) of distal vein of lower extremity, unspecified laterality (HCC)     5. Secondary hypercoagulable state (HCC)     6. Dyslipidemia       Patient Type: Secondary Prevention    Etiology of Established CVD if Present:   1. TIA - likely cardioembolic from valve disease  2. Carotid atherosclerosis -status post CEA  3. Valvular Heart Disease  4. Atrial fibrillation with onset during acute illness, now chronic  5.  DVT after back surgery jan 2016  6.  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    Lipid Management: Qualifies for Statin Therapy Based on 2018 ACC/AHA Guidelines: yes  Calculated 10-Year Risk of ASCVD: N/A  Currently on Statin: Yes  ACC/aha criteria for at least mod intensity statin  Per nla goal ldl <70 and nonHDL <100 - below threshold on most recent blood work  Plan:  - Continue atorvastatin at current dose    - continue zetia  - Continue  TLC  - Recheck fasting lipids in 6 months     Blood Pressure Management:  Acc/aha bp goal <130/80  BP up both at home and in the office  No significant albuminuria in the past  Possible ACE inhibitor cough  Now back on high dose ARB  Long discussion regarding recent elevation in BP- he would like to reassess after holiday's, monitor his sodium intake, and log his home BP prior to starting additional med- will follow up in 1 month  Plan:  - Continue metoprolol at current dose- would not increase d/t HR in 50's  - Continue irbesartan 300 mg daily  - Consider addition of amlodipine at next visit    - Continue lifestyle modification  - Recheck GFR, electrolytes, urine for albumin  - Continue home BP monitoring  - Intensify therapy as needed    Glycemic Status: Prediabetic  IFG, mild and stable  - Continue lifestyle mod  - Follow fasting glucose    Anti-Platelet/Anti-Coagulant Tx: yes  - Continue indefinite anticoagulation with warfarin  - Continue to hold aspirin-cardiology in agreement   - F/U with anticoagulation clinic as scheduled -POC INR today  - limit nsaids    Smoking: continue complete avoidance    Physical Activity:  Encouraged more walking    Weight Management and Nutrition: Dietary plan was discussed with patient at this visit including c/w Med diet and decreasing his sodium a bit    Other:     1. TIA - likely cardioembolic from valve disease - continue BP control and anticoagulation  2. Carotid atherosclerosis status post CEA- .  Only mild restenosis on repeat duplex.  Remains asymptomatic.  Repeat duplex in 2 years  3. Valvular Heart Disease -now status post TAVR March 2020.  Still with unrepaired mitral disease -improved on most recent echo.  Appears asymptomatic at present.  Does not appear to be volume overloaded.  Await repeat echocardiogram at discretion of cardiology  4. Colonic Polyp on recent colonoscopy - repeat colonoscopy in 2019 negative per patient.   No further f/u needed  5. Insomnia -  trialed zolpidem w/o success. Wean down temazepam as trial recommendation by PCP as could be affecting balance. Patient understands addictive potential.  6-month refill given today  6.  Atrial fibrillation - defer management of rhythm and rate to cards.  Continue anticoag as above  7.  DVT - postoperative.  No recurrence.  Continue indefinite anticoagulation given concomitant afib  8.  H/o prostate ca and multiple urological issues - defer f/u to urology.  Check PSA as they recommend  9.  Balance issues and neuropathy -this seems recalcitrant to physical therapy or any intervention that we have tried previously.  Defer f/u to ENT and neurology  10.  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  Good arterial flow and patent stent on noninvasive study.  .  Report any claudication, rest pain, or ulceration immediately.  Will obtain repeat arterial duplex 2 year from previous  11.  Continuing medical health - cancer screening as above. Has had shingles vac, continue yearly flu shot.  Has had Covid vaccination    We will partner with other providers in the management of established vascular disease and cardiometabolic risk factors.    We will refer him to a geriatrician to establish as PCP    Studies to Be Obtained:    1) echo -timing to be determined by cardiology  2) aortoiliac duplex April 2023  3) carotid duplex February 2023    Labs to Be Obtained: as above prior to next visit    Follow up in: 1 month to assess BP    CHEYENNE Ag.

## 2022-01-18 ENCOUNTER — DOCUMENTATION (OUTPATIENT)
Dept: VASCULAR LAB | Facility: MEDICAL CENTER | Age: 87
End: 2022-01-18

## 2022-01-18 NOTE — PROGRESS NOTES
Due for a INR.  Currently sick with COVID.  They would prefer to wait until their next appointment with vascular to get a INR.

## 2022-02-09 DIAGNOSIS — I48.0 PAROXYSMAL ATRIAL FIBRILLATION (HCC): ICD-10-CM

## 2022-02-10 ENCOUNTER — OFFICE VISIT (OUTPATIENT)
Dept: VASCULAR LAB | Facility: MEDICAL CENTER | Age: 87
End: 2022-02-10
Attending: INTERNAL MEDICINE
Payer: MEDICARE

## 2022-02-10 VITALS
BODY MASS INDEX: 24.92 KG/M2 | WEIGHT: 178 LBS | HEART RATE: 51 BPM | DIASTOLIC BLOOD PRESSURE: 88 MMHG | SYSTOLIC BLOOD PRESSURE: 157 MMHG | HEIGHT: 71 IN

## 2022-02-10 DIAGNOSIS — G45.9 TIA (TRANSIENT ISCHEMIC ATTACK): ICD-10-CM

## 2022-02-10 DIAGNOSIS — Z95.2 S/P TAVR (TRANSCATHETER AORTIC VALVE REPLACEMENT): ICD-10-CM

## 2022-02-10 DIAGNOSIS — D68.69 SECONDARY HYPERCOAGULABLE STATE (HCC): ICD-10-CM

## 2022-02-10 DIAGNOSIS — E78.5 DYSLIPIDEMIA: ICD-10-CM

## 2022-02-10 DIAGNOSIS — I10 ESSENTIAL HYPERTENSION: ICD-10-CM

## 2022-02-10 DIAGNOSIS — I65.29 STENOSIS OF CAROTID ARTERY, UNSPECIFIED LATERALITY: ICD-10-CM

## 2022-02-10 DIAGNOSIS — I48.0 PAROXYSMAL ATRIAL FIBRILLATION (HCC): ICD-10-CM

## 2022-02-10 LAB
INR BLD: 2.3 (ref 0.9–1.2)
INR PPP: 2.3 (ref 2–3.5)

## 2022-02-10 PROCEDURE — 99214 OFFICE O/P EST MOD 30 MIN: CPT | Performed by: NURSE PRACTITIONER

## 2022-02-10 PROCEDURE — 99211 OFF/OP EST MAY X REQ PHY/QHP: CPT

## 2022-02-10 PROCEDURE — 85610 PROTHROMBIN TIME: CPT

## 2022-02-10 PROCEDURE — 99212 OFFICE O/P EST SF 10 MIN: CPT

## 2022-02-10 ASSESSMENT — FIBROSIS 4 INDEX: FIB4 SCORE: 3.83

## 2022-02-10 NOTE — PROGRESS NOTES
Anticoagulation Summary  As of 2/10/2022    INR goal:  2.0-3.0   TTR:  73.0 % (6.5 y)   INR used for dosin.30 (2/10/2022)   Warfarin maintenance plan:  6 mg (4 mg x 1.5) every day   Weekly warfarin total:  42 mg   Plan last modified:  Willian Velasquez, PharmD (8/3/2021)   Next INR check:  2022   Priority:  Maintenance   Target end date:  Indefinite    Indications    Paroxysmal atrial fibrillation (HCC) [I48.0]  Hx of TIA (transient ischemic attack) [G45.9]  Deep vein thrombosis (DVT) (HCC) (Resolved) [I82.409]  Atrial fibrillation (HCC) (Resolved) [I48.91]  Hx of Stroke (Resolved) [I63.9]  Secondary hypercoagulable state (HCC) [D68.69]             Anticoagulation Episode Summary     INR check location:  Anticoagulation Clinic    Preferred lab:      Send INR reminders to:      Comments:        Anticoagulation Care Providers     Provider Role Specialty Phone number    Michael J Bloch, M.D. Referring Internal Medicine [phone]    [facility]   [phone]                Refer to Patient Findings for HPI:  Patient Findings     Positives:  Change in health (pt had COVID ~1.5 months ago)    Negatives:  Signs/symptoms of thrombosis, Signs/symptoms of bleeding, Laboratory test error suspected, Change in alcohol use, Change in activity, Upcoming invasive procedure, Emergency department visit, Upcoming dental procedure, Missed doses, Extra doses, Change in medications, Change in diet/appetite, Hospital admission, Bruising, Other complaints          There were no vitals filed for this visit.   pt declined vitals  - has Vascular appt following this visit    Verified current warfarin dosing schedule.    Medications reconciled   Pt is not on antiplatelet therapy      A/P   INR  therapeutic.     Warfarin dosing recommendation: Pt is to continue with current warfarin dosing regimen.      Pt educated to contact our clinic with any changes in medications or s/s of bleeding or thrombosis. Pt is  aware to seek immediate medical attention for falls, head injury or deep cuts.    Follow up appointment in 12 week(s).    Leo Petit, JerzyD

## 2022-02-10 NOTE — PROGRESS NOTES
"VASCULAR FOLLOW UP VISIT  Subjective:   Tristan Hester is a 86 y.o. male who presents today 02/10/2022 for vascular f/u  Chief Complaint   Patient presents with   • Follow-Up     HPI:   Patient here for f/u of HTN, dyslipidemia, carotid disease and valvular heart disease, and atrial fibrillation.  Continues to struggle with balance, uses cane, no falls reported   BP at home have increased.  -150s, has wrist cuff, unclear accuracy   Remains on metoprolol  On irbesartan 300mg- tolerating well   Using furosemide only as needed- has not used recently   No dyspnea exertion and limited leg swelling  No groin pain or claudication  Remains on warfarin managed by AC clinic, no bleeding problems   Off asa  No myalgias on current lipid-lowering therapy  No TIA or stroke symptoms  No lightheadedness    Social History     Tobacco Use   • Smoking status: Former Smoker     Packs/day: 0.50     Years: 30.00     Pack years: 15.00     Types: Cigarettes, Pipe     Quit date: 1985     Years since quittin.1   • Smokeless tobacco: Never Used   Vaping Use   • Vaping Use: Never used   Substance Use Topics   • Alcohol use: Yes     Alcohol/week: 3.0 oz     Types: 3 Cans of beer, 3 Shots of liquor per week     Comment: 1 x week   • Drug use: No     DIET AND EXERCISE:  Weight Change: Stabilized  Diet: Relatively low sodium- bit less adherent  Exercise: 3x week at gym      Objective:     Vitals:    02/10/22 0957 02/10/22 1000   BP: 160/85 157/88   BP Location: Left arm Left arm   Patient Position: Sitting Sitting   BP Cuff Size: Adult Adult   Pulse: (!) 51 (!) 51   Weight: 80.7 kg (178 lb)    Height: 1.803 m (5' 11\")       Body mass index is 24.83 kg/m².  Physical Exam  Vitals reviewed.   Constitutional:       Appearance: He is well-developed. He is not toxic-appearing or diaphoretic.   Eyes:      Extraocular Movements: Extraocular movements intact.      Conjunctiva/sclera: Conjunctivae normal.   Neck:      Vascular: No " carotid bruit.   Cardiovascular:      Rate and Rhythm: Normal rate and regular rhythm.      Heart sounds: Murmur heard.       Pulmonary:      Effort: Pulmonary effort is normal. No respiratory distress.      Breath sounds: Normal breath sounds. No wheezing or rales.   Musculoskeletal:      Right lower leg: No edema.      Left lower leg: No edema.   Neurological:      General: No focal deficit present.      Mental Status: He is alert and oriented to person, place, and time.      Cranial Nerves: No cranial nerve deficit.      Gait: Gait normal.   Psychiatric:         Mood and Affect: Mood normal.         Behavior: Behavior normal.       Lab Results   Component Value Date    CHOLSTRLTOT 120 11/11/2021    CHOLSTRLTOT 109 05/29/2020    LDL 51 05/29/2020    HDL 51 11/11/2021    HDL 49 05/29/2020    TRIGLYCERIDE 66 11/11/2021    TRIGLYCERIDE 47 05/29/2020      Lab Results   Component Value Date    PROTHROMBTM 21.4 (H) 03/04/2020    INR 2.30 02/10/2022         Lab Results   Component Value Date    SODIUM 142 11/11/2021    SODIUM 134 (L) 03/15/2021    POTASSIUM 4.9 11/11/2021    POTASSIUM 4.6 03/15/2021    CHLORIDE 103 11/11/2021    CHLORIDE 101 03/15/2021    CO2 26 11/11/2021    CO2 26 03/15/2021    GLUCOSE 110 (H) 11/11/2021    GLUCOSE 96 03/15/2021    BUN 20 11/11/2021    BUN 22 03/15/2021    CREATININE 0.97 11/11/2021    CREATININE 1.02 03/15/2021    BUNCREATRAT 21 11/11/2021    IFAFRICA 82 11/11/2021    IFAFRICA >60 03/15/2021    IFNOTAFR 71 11/11/2021    IFNOTAFR >60 03/15/2021      Lab Results   Component Value Date    TSH 1.790 07/19/2021      Lab Results   Component Value Date    WBC 5.6 11/11/2021    WBC 6.6 05/29/2020    RBC 5.28 11/11/2021    RBC 4.99 05/29/2020    HEMOGLOBIN 15.5 11/11/2021    HEMOGLOBIN 13.8 (L) 05/29/2020    HEMATOCRIT 46.8 11/11/2021    HEMATOCRIT 44.7 05/29/2020    MCV 89 11/11/2021    MCV 89.6 05/29/2020    MCH 29.4 11/11/2021    MCH 27.7 05/29/2020    MCHC 33.1 11/11/2021    MCHC 30.9 (L)  05/29/2020    MPV 12.2 05/29/2020     Carotid duplex February 2021   No evidence for RIGHT carotid stenosis status post endarterectomy.   Mild LEFT internal carotid artery stenosis.     Echocardiogram March 2021  Prior echo on 8/7/20.Left ventricular ejection fraction is visually   estimated to be 60%.  Normal diastolic function.  Known TAVR aortic valve that is functioning normally with normal   transvalvular gradients. Vmax is 1.85 m/s.  Moderate eccentric mitral regurgitation.    Aortoiliac duplex April 2021   No evidence of aortic aneurysm.    No evidence of pseudoaneurysm or injury to the bilateral iliac arteries.     No stenosis of the proximal segment of the major visceral arteries.    Waveforms and velocities of the bilateral iliac arteries are normal.    Common iliac diameter (cm):   Right- 1.63 x 1.72         Left-   1.70 x 1.72     Medical Decision Making:  Today's Assessment / Status / Plan:     1. Hx of TIA (transient ischemic attack)     2. Dyslipidemia     3. S/P TAVR (transcatheter aortic valve replacement)     4. Stenosis of carotid artery, unspecified laterality     5. Secondary hypercoagulable state (HCC)       Patient Type: Secondary Prevention    Etiology of Established CVD if Present:   1. TIA - likely cardioembolic from valve disease  2. Carotid atherosclerosis -status post CEA  3. Valvular Heart Disease  4. Atrial fibrillation with onset during acute illness, now chronic  5.  DVT after back surgery jan 2016  6.  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    Lipid Management: Qualifies for Statin Therapy Based on 2018 ACC/AHA Guidelines: yes  Calculated 10-Year Risk of ASCVD: N/A  Currently on Statin: Yes  ACC/aha criteria for at least mod intensity statin  Per nla goal ldl <70 and nonHDL <100 - below threshold on most recent blood work  Plan:  - Continue atorvastatin at current dose    - continue zetia  - Continue TLC  - Recheck fasting lipids in 6 months     Blood  Pressure Management:  Acc/aha bp goal <130/80  BP up both at home and in the office  Reports BP was 116/80 recently, OV also have much lower readings   No significant albuminuria in the past  Possible ACE inhibitor cough  Now back on high dose ARB  Long discussion regarding recent elevation in BP  Hard to determine level of BP control as pt reports labile readings at home   Has balance issues so want to monitor closely to avoid overtreatment as well  Will obtain ABPM to determine average BP  Plan:  - Continue metoprolol at current dose- would not increase d/t HR in 50's  - Continue irbesartan 300 mg daily, consider change to olmesartan if ABPM elevated  - Consider addition of amlodipine   - Continue lifestyle modification  - Recheck GFR, electrolytes, urine for albumin  - Continue home BP monitoring  - Intensify therapy as needed    Glycemic Status: Prediabetic  IFG, mild and stable  - Continue lifestyle mod  - Follow fasting glucose    Anti-Platelet/Anti-Coagulant Tx: yes  - Continue indefinite anticoagulation with warfarin  - Continue to hold aspirin-cardiology in agreement   - F/U with anticoagulation clinic as scheduled -POC INR today  - limit nsaids    Smoking: continue complete avoidance    Physical Activity:  Encouraged more walking    Weight Management and Nutrition: Dietary plan was discussed with patient at this visit including c/w Med diet and decreasing his sodium a bit    Other:     1. TIA - likely cardioembolic from valve disease - continue BP control and anticoagulation  2. Carotid atherosclerosis status post CEA- .  Only mild restenosis on repeat duplex.  Remains asymptomatic.  Repeat duplex in 2 years  3. Valvular Heart Disease -now status post TAVR March 2020.  Still with unrepaired mitral disease -improved on most recent echo.  Appears asymptomatic at present.  Does not appear to be volume overloaded.  Await repeat echocardiogram at discretion of cardiology  4. Colonic Polyp on recent colonoscopy  - repeat colonoscopy in 2019 negative per patient.   No further f/u needed  5. Insomnia - trialed zolpidem w/o success. Wean down temazepam as trial recommendation by PCP as could be affecting balance. Patient understands addictive potential.  6-month refill given today  6.  Atrial fibrillation - defer management of rhythm and rate to cards.  Continue anticoag as above  7.  DVT - postoperative.  No recurrence.  Continue indefinite anticoagulation given concomitant afib  8.  H/o prostate ca and multiple urological issues - defer f/u to urology.  Check PSA as they recommend  9.  Balance issues and neuropathy -this seems recalcitrant to physical therapy or any intervention that we have tried previously.  Defer f/u to ENT and neurology  10.  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  Good arterial flow and patent stent on noninvasive study.  .  Report any claudication, rest pain, or ulceration immediately.  Will obtain repeat arterial duplex 2 year from previous  11.  Continuing medical health - cancer screening as above. Has had shingles vac, continue yearly flu shot.  Has had Covid vaccination    We will partner with other providers in the management of established vascular disease and cardiometabolic risk factors.    We will refer him to a geriatrician to establish as PCP    Studies to Be Obtained:    1) echo -timing to be determined by cardiology  2) aortoiliac duplex April 2023  3) carotid duplex February 2023    Labs to Be Obtained: as above prior to next visit    Follow up in: 2 months via phone to discuss ABPM results and adjust BP meds prarturo Amaya, A.P.N.

## 2022-02-12 DIAGNOSIS — G47.00 INSOMNIA, UNSPECIFIED TYPE: ICD-10-CM

## 2022-02-14 RX ORDER — TEMAZEPAM 30 MG/1
CAPSULE ORAL
Qty: 30 CAPSULE | Refills: 5 | Status: SHIPPED | OUTPATIENT
Start: 2022-02-14 | End: 2022-03-16

## 2022-02-28 ENCOUNTER — NON-PROVIDER VISIT (OUTPATIENT)
Dept: VASCULAR LAB | Facility: MEDICAL CENTER | Age: 87
End: 2022-02-28
Attending: INTERNAL MEDICINE
Payer: MEDICARE

## 2022-02-28 DIAGNOSIS — I10 ESSENTIAL HYPERTENSION, BENIGN: ICD-10-CM

## 2022-02-28 PROCEDURE — 93786 AMBL BP MNTR W/SW REC ONLY: CPT

## 2022-02-28 PROCEDURE — 93788 AMBL BP MNTR W/SW A/R: CPT

## 2022-02-28 NOTE — NON-PROVIDER
ABPM placed on pt at 10:15am. Pt instructed to wear for 24 hours and to bring it back tomorrow.

## 2022-03-01 NOTE — PROGRESS NOTES
Ambulatory blood pressure monitor results reviewed  Full report under media tab  Reasonable data acquisition    Mean daytime: 142/80 consistent with systolic blood pressure above ACC AHA target out of office    Nocturnal dip: Appears well-maintained    Clinical correlation needed.  We will discuss with patient at follow-up visit    Michael Bloch, MD  [facility]ascular [facility]

## 2022-03-02 DIAGNOSIS — I10 ESSENTIAL HYPERTENSION, BENIGN: ICD-10-CM

## 2022-03-02 RX ORDER — AMLODIPINE BESYLATE 5 MG/1
5 TABLET ORAL DAILY
Qty: 90 TABLET | Refills: 3 | Status: SHIPPED | OUTPATIENT
Start: 2022-03-02 | End: 2023-02-27

## 2022-03-02 NOTE — PROGRESS NOTES
MyChart msg sent to pt to add amlodipine to his HTN medications due to elevated ABPM readings on average 142/80.  Instructed pt to call or message me back with any questions.    Mari WILLIAM  [facility]

## 2022-03-14 DIAGNOSIS — I10 ESSENTIAL HYPERTENSION, BENIGN: ICD-10-CM

## 2022-03-15 RX ORDER — METOPROLOL SUCCINATE 25 MG/1
TABLET, EXTENDED RELEASE ORAL
Qty: 90 TABLET | Refills: 1 | Status: SHIPPED | OUTPATIENT
Start: 2022-03-15 | End: 2022-09-12

## 2022-03-29 ENCOUNTER — TELEPHONE (OUTPATIENT)
Dept: CARDIOLOGY | Facility: MEDICAL CENTER | Age: 87
End: 2022-03-29
Payer: MEDICARE

## 2022-03-29 DIAGNOSIS — I10 ESSENTIAL HYPERTENSION, BENIGN: ICD-10-CM

## 2022-03-29 DIAGNOSIS — Z95.2 S/P TAVR (TRANSCATHETER AORTIC VALVE REPLACEMENT): ICD-10-CM

## 2022-03-29 NOTE — TELEPHONE ENCOUNTER
Echo and lab orders placed. Spoke to patient over phone. SC recommendations given. Patient verbalizes understanding. He will reach out with any additional concerns.

## 2022-03-29 NOTE — TELEPHONE ENCOUNTER
Hx of MR, recommend echo soon.    Hydrate well. On lasix PRN, this could be causing his urinary frequency.    Recommend cbc, bmp for eval.    Follow up with vascular medicine team for BP control since they are managing medications. SC

## 2022-03-29 NOTE — TELEPHONE ENCOUNTER
AK    Patient called and states that he is experiencing dizziness and is lightheaded which usually starts in the morning and gets worse as the day progresses. Patient states that onset was Saturday 3/26. Please advise.    Thank you,  Bala BURR

## 2022-03-29 NOTE — TELEPHONE ENCOUNTER
Spoke to patient over phone. Patient states that on Saturday he started having intermittent lightheaded/dizziness. Symptoms last most of the day and seem to get worse throughout the day. He usually feels ok in the morning.     Patient denies chest pain, headache, and other symptoms. He states his stability has been off for the last month resulting in him using a walking stick. Per patient, he has a hx of vertigo, but symptoms are mores severe now and have not been an issue for a long time.     Patient states he limits his water intake due to incontinence. Encouraged patient to increase water intake to 8-8 oz glasses daily and limit other fluids like coffee.     Per patient, he does not monitor BP or HR daily. He states his BP cuff is broken and he has not gotten a new one. Encouraged patient to purchase new cuff to monitor. Patient states BP is monitored at MD appointments. BP has recently been elevated at 140/90's. As a result, vascular started patient on Amlodipine.     ER precautions given for new or worsening symptoms. Patient verbalizes understanding.           To SC: Please advise

## 2022-04-01 ENCOUNTER — HOSPITAL ENCOUNTER (OUTPATIENT)
Dept: CARDIOLOGY | Facility: MEDICAL CENTER | Age: 87
End: 2022-04-01
Attending: NURSE PRACTITIONER
Payer: MEDICARE

## 2022-04-01 ENCOUNTER — TELEPHONE (OUTPATIENT)
Dept: CARDIOLOGY | Facility: MEDICAL CENTER | Age: 87
End: 2022-04-01

## 2022-04-01 DIAGNOSIS — Z95.2 S/P TAVR (TRANSCATHETER AORTIC VALVE REPLACEMENT): ICD-10-CM

## 2022-04-01 LAB — LV EJECT FRACT  99904: 60

## 2022-04-01 PROCEDURE — 93306 TTE W/DOPPLER COMPLETE: CPT | Mod: 26 | Performed by: INTERNAL MEDICINE

## 2022-04-01 PROCEDURE — 93306 TTE W/DOPPLER COMPLETE: CPT

## 2022-04-01 NOTE — TELEPHONE ENCOUNTER
----- Message from JUDY Massey sent at 4/1/2022  3:32 PM PDT -----  Echo looks good. Normal heart function, mild MR, normal TAVR gradients, some diastolic dysfunction.    How is he feeling? SC

## 2022-04-01 NOTE — TELEPHONE ENCOUNTER
Spoke to patient's spouse, Tori, over phone. Results reviewed. Tori states patient is feeling much better. He is scheduled to have blood work done next week.

## 2022-04-05 ENCOUNTER — HOSPITAL ENCOUNTER (OUTPATIENT)
Dept: LAB | Facility: MEDICAL CENTER | Age: 87
End: 2022-04-05
Attending: INTERNAL MEDICINE
Payer: MEDICARE

## 2022-04-05 ENCOUNTER — HOSPITAL ENCOUNTER (OUTPATIENT)
Dept: LAB | Facility: MEDICAL CENTER | Age: 87
End: 2022-04-05
Attending: NURSE PRACTITIONER
Payer: MEDICARE

## 2022-04-05 DIAGNOSIS — I48.0 PAROXYSMAL ATRIAL FIBRILLATION (HCC): ICD-10-CM

## 2022-04-05 DIAGNOSIS — E78.5 HYPERLIPIDEMIA, UNSPECIFIED HYPERLIPIDEMIA TYPE: ICD-10-CM

## 2022-04-05 DIAGNOSIS — I10 ESSENTIAL HYPERTENSION: ICD-10-CM

## 2022-04-05 DIAGNOSIS — I10 ESSENTIAL HYPERTENSION, BENIGN: ICD-10-CM

## 2022-04-05 DIAGNOSIS — T14.8XXA VASCULAR INJURY: ICD-10-CM

## 2022-04-05 LAB
ALBUMIN SERPL BCP-MCNC: 4.4 G/DL (ref 3.2–4.9)
ALBUMIN/GLOB SERPL: 1.5 G/DL
ALP SERPL-CCNC: 67 U/L (ref 30–99)
ALT SERPL-CCNC: 24 U/L (ref 2–50)
ANION GAP SERPL CALC-SCNC: 13 MMOL/L (ref 7–16)
AST SERPL-CCNC: 31 U/L (ref 12–45)
BILIRUB SERPL-MCNC: 1.3 MG/DL (ref 0.1–1.5)
BUN SERPL-MCNC: 28 MG/DL (ref 8–22)
CALCIUM SERPL-MCNC: 9.8 MG/DL (ref 8.5–10.5)
CHLORIDE SERPL-SCNC: 102 MMOL/L (ref 96–112)
CHOLEST SERPL-MCNC: 125 MG/DL (ref 100–199)
CO2 SERPL-SCNC: 23 MMOL/L (ref 20–33)
CREAT SERPL-MCNC: 1.03 MG/DL (ref 0.5–1.4)
ERYTHROCYTE [DISTWIDTH] IN BLOOD BY AUTOMATED COUNT: 51.9 FL (ref 35.9–50)
GFR SERPLBLD CREATININE-BSD FMLA CKD-EPI: 71 ML/MIN/1.73 M 2
GLOBULIN SER CALC-MCNC: 2.9 G/DL (ref 1.9–3.5)
GLUCOSE SERPL-MCNC: 91 MG/DL (ref 65–99)
HCT VFR BLD AUTO: 46.6 % (ref 42–52)
HDLC SERPL-MCNC: 51 MG/DL
HGB BLD-MCNC: 15.2 G/DL (ref 14–18)
LDLC SERPL CALC-MCNC: 63 MG/DL
MCH RBC QN AUTO: 30.1 PG (ref 27–33)
MCHC RBC AUTO-ENTMCNC: 32.6 G/DL (ref 33.7–35.3)
MCV RBC AUTO: 92.3 FL (ref 81.4–97.8)
PLATELET # BLD AUTO: 170 K/UL (ref 164–446)
PMV BLD AUTO: 11.5 FL (ref 9–12.9)
POTASSIUM SERPL-SCNC: 5.3 MMOL/L (ref 3.6–5.5)
PROT SERPL-MCNC: 7.3 G/DL (ref 6–8.2)
RBC # BLD AUTO: 5.05 M/UL (ref 4.7–6.1)
SODIUM SERPL-SCNC: 138 MMOL/L (ref 135–145)
TRIGL SERPL-MCNC: 54 MG/DL (ref 0–149)
TSH SERPL DL<=0.005 MIU/L-ACNC: 1.38 UIU/ML (ref 0.38–5.33)
WBC # BLD AUTO: 6.6 K/UL (ref 4.8–10.8)

## 2022-04-05 PROCEDURE — 80053 COMPREHEN METABOLIC PANEL: CPT

## 2022-04-05 PROCEDURE — 80061 LIPID PANEL: CPT

## 2022-04-05 PROCEDURE — 84443 ASSAY THYROID STIM HORMONE: CPT

## 2022-04-05 PROCEDURE — 36415 COLL VENOUS BLD VENIPUNCTURE: CPT

## 2022-04-05 PROCEDURE — 85027 COMPLETE CBC AUTOMATED: CPT

## 2022-05-04 ENCOUNTER — OFFICE VISIT (OUTPATIENT)
Dept: VASCULAR LAB | Facility: MEDICAL CENTER | Age: 87
End: 2022-05-04
Attending: INTERNAL MEDICINE
Payer: MEDICARE

## 2022-05-04 VITALS
BODY MASS INDEX: 25.34 KG/M2 | DIASTOLIC BLOOD PRESSURE: 69 MMHG | HEIGHT: 71 IN | SYSTOLIC BLOOD PRESSURE: 128 MMHG | HEART RATE: 60 BPM | WEIGHT: 181 LBS

## 2022-05-04 VITALS — DIASTOLIC BLOOD PRESSURE: 69 MMHG | SYSTOLIC BLOOD PRESSURE: 128 MMHG | HEART RATE: 60 BPM

## 2022-05-04 DIAGNOSIS — I48.0 PAROXYSMAL ATRIAL FIBRILLATION (HCC): ICD-10-CM

## 2022-05-04 DIAGNOSIS — G45.9 TIA (TRANSIENT ISCHEMIC ATTACK): ICD-10-CM

## 2022-05-04 DIAGNOSIS — E78.5 HYPERLIPIDEMIA, UNSPECIFIED HYPERLIPIDEMIA TYPE: ICD-10-CM

## 2022-05-04 DIAGNOSIS — I10 ESSENTIAL HYPERTENSION, BENIGN: ICD-10-CM

## 2022-05-04 DIAGNOSIS — E78.5 DYSLIPIDEMIA: ICD-10-CM

## 2022-05-04 DIAGNOSIS — D68.69 SECONDARY HYPERCOAGULABLE STATE (HCC): ICD-10-CM

## 2022-05-04 LAB — INR PPP: 1.2 (ref 2–3.5)

## 2022-05-04 PROCEDURE — 99213 OFFICE O/P EST LOW 20 MIN: CPT

## 2022-05-04 PROCEDURE — 99212 OFFICE O/P EST SF 10 MIN: CPT

## 2022-05-04 PROCEDURE — 85610 PROTHROMBIN TIME: CPT

## 2022-05-04 PROCEDURE — 99214 OFFICE O/P EST MOD 30 MIN: CPT | Performed by: INTERNAL MEDICINE

## 2022-05-04 ASSESSMENT — FIBROSIS 4 INDEX: FIB4 SCORE: 3.16

## 2022-05-04 NOTE — PROGRESS NOTES
"VASCULAR FOLLOW UP VISIT  Subjective:   Tristan Hseter is a 86 y.o. male who presents today 2022 for vascular f/u  Chief Complaint   Patient presents with   • Follow-Up     Subjective     HPI:   Patient here for f/u of HTN, dyslipidemia, carotid disease and valvular heart disease, and atrial fibrillation.  Continues to struggle with mobility and balance, uses cane, no falls reported   Added amlodipine after ABPM   BPs at home usually 110s-120s/70s  Remains on metoprolol  On irbesartan 300mg- tolerating well   Does get occ lightheadedness - no more since adding amlodipine  No tia or cva symptoms  Follows up in anticoag clinic  No bleeding  Using furosemide only as needed- has not used recently   No myalgias on current lipid-lowering therapy    Social History     Tobacco Use   • Smoking status: Former Smoker     Packs/day: 0.50     Years: 30.00     Pack years: 15.00     Types: Cigarettes, Pipe     Quit date: 1985     Years since quittin.3   • Smokeless tobacco: Never Used   Vaping Use   • Vaping Use: Never used   Substance Use Topics   • Alcohol use: Yes     Alcohol/week: 3.0 oz     Types: 3 Cans of beer, 3 Shots of liquor per week     Comment: 1 x week   • Drug use: No     DIET AND EXERCISE:  Weight Change: Stabilized  Diet: Relatively low sodium- bit less adherent  Exercise: 3x week at gym        Objective        Objective:     Vitals:    22 0903   BP: 128/69   BP Location: Left arm   Patient Position: Sitting   BP Cuff Size: Adult   Pulse: 60   Weight: 82.1 kg (181 lb)   Height: 1.803 m (5' 11\")      Body mass index is 25.24 kg/m².  Physical Exam  Vitals reviewed.   Constitutional:       Appearance: He is well-developed. He is not toxic-appearing or diaphoretic.   Eyes:      Extraocular Movements: Extraocular movements intact.      Conjunctiva/sclera: Conjunctivae normal.   Neck:      Vascular: No carotid bruit.   Cardiovascular:      Rate and Rhythm: Normal rate and regular rhythm.      " Heart sounds: Murmur heard.   Pulmonary:      Effort: Pulmonary effort is normal. No respiratory distress.      Breath sounds: Normal breath sounds. No wheezing or rales.   Musculoskeletal:      Right lower leg: No edema.      Left lower leg: No edema.   Neurological:      General: No focal deficit present.      Mental Status: He is alert and oriented to person, place, and time.      Cranial Nerves: No cranial nerve deficit.      Gait: Gait normal.   Psychiatric:         Mood and Affect: Mood normal.         Behavior: Behavior normal.       Lab Results   Component Value Date    CHOLSTRLTOT 125 04/05/2022    LDL 63 04/05/2022    HDL 51 04/05/2022    TRIGLYCERIDE 54 04/05/2022      Lab Results   Component Value Date    INR 1.20 (A) 05/04/2022         Lab Results   Component Value Date    SODIUM 138 04/05/2022    POTASSIUM 5.3 04/05/2022    CHLORIDE 102 04/05/2022    CO2 23 04/05/2022    GLUCOSE 91 04/05/2022    BUN 28 (H) 04/05/2022    CREATININE 1.03 04/05/2022    BUNCREATRAT 21 11/11/2021    IFAFRICA 82 11/11/2021    IFAFRICA >60 03/15/2021    IFNOTAFR 71 11/11/2021    IFNOTAFR >60 03/15/2021      Lab Results   Component Value Date    TSH 1.790 07/19/2021      Lab Results   Component Value Date    WBC 6.6 04/05/2022    RBC 5.05 04/05/2022    HEMOGLOBIN 15.2 04/05/2022    HEMATOCRIT 46.6 04/05/2022    MCV 92.3 04/05/2022    MCH 30.1 04/05/2022    MCHC 32.6 (L) 04/05/2022    MPV 11.5 04/05/2022     Carotid duplex February 2021   No evidence for RIGHT carotid stenosis status post endarterectomy.   Mild LEFT internal carotid artery stenosis.     Echocardiogram March 2021  Prior echo on 8/7/20.Left ventricular ejection fraction is visually   estimated to be 60%.  Normal diastolic function.  Known TAVR aortic valve that is functioning normally with normal   transvalvular gradients. Vmax is 1.85 m/s.  Moderate eccentric mitral regurgitation.    Aortoiliac duplex April 2021   No evidence of aortic aneurysm.    No evidence  of pseudoaneurysm or injury to the bilateral iliac arteries.     No stenosis of the proximal segment of the major visceral arteries.    Waveforms and velocities of the bilateral iliac arteries are normal.    Common iliac diameter (cm):   Right- 1.63 x 1.72         Left-   1.70 x 1.72    abpm 2022  Mean daytime 142/80  Normal nocturnal dip            Medical Decision Making:  Today's Assessment / Status / Plan:     1. Essential hypertension, benign     2. Paroxysmal atrial fibrillation (HCC)     3. TIA (transient ischemic attack)     4. Secondary hypercoagulable state (HCC)     5. Dyslipidemia     6. Hyperlipidemia, unspecified hyperlipidemia type       Patient Type: Secondary Prevention    Etiology of Established CVD if Present:   1. TIA - likely cardioembolic from valve disease  2. Carotid atherosclerosis -status post CEA  3. Valvular Heart Disease  4. Atrial fibrillation with onset during acute illness, now chronic  5.  DVT after back surgery jan 2016  6.  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    Lipid Management: Qualifies for Statin Therapy Based on 2018 ACC/AHA Guidelines: yes  Calculated 10-Year Risk of ASCVD: N/A  Currently on Statin: Yes  ACC/aha criteria for at least mod intensity statin  Per nla goal ldl <70 and nonHDL <100 - below threshold on most recent blood work  Plan:  - Continue atorvastatin at current dose    - continue zetia  - Continue TLC  - Recheck fasting lipids in 6 months     Blood Pressure Management:  Acc/aha bp goal <130/80  ABPM high prior to intensification of meds  BP much improved both at home and in office with intensification of meds  No falls or further lightheadedness  No significant albuminuria in the past  Possible ACE inhibitor cough  Now back on high dose ARB  Has balance issues so want to monitor closely to avoid overtreatment as well  Gfr and electrolytes stable  Plan:  - Continue metoprolol at current dose- would not increase d/t HR in 50's  -  Continue irbesartan 300 mg daily  - Continue amlodipine   - Continue lifestyle modification  - Recheck GFR, electrolytes, urine for albumin  - Continue home BP monitoring  - Intensify therapy as needed    Glycemic Status: Prediabetic  IFG, mild and stable  - Continue lifestyle mod  - Follow fasting glucose    Anti-Platelet/Anti-Coagulant Tx: yes  - Continue indefinite anticoagulation with warfarin  - Continue to hold aspirin-cardiology in agreement   - F/U with anticoagulation clinic as scheduled -POC INR today  - limit nsaids    Smoking: continue complete avoidance    Physical Activity:  Encouraged more walking    Weight Management and Nutrition: Dietary plan was discussed with patient at this visit including c/w Med diet and decreasing his sodium a bit    Other:     1. TIA - likely cardioembolic from valve disease - continue BP control and anticoagulation  2. Carotid atherosclerosis status post CEA- .  Only mild restenosis on repeat duplex.  Remains asymptomatic.  Repeat duplex in 2 years  3. Valvular Heart Disease -now status post TAVR March 2020.  Still with unrepaired mitral disease -improved on most recent echo.  Appears asymptomatic at present.  Does not appear to be volume overloaded.  Await repeat echocardiogram at discretion of cardiology  4. Colonic Polyp on recent colonoscopy - repeat colonoscopy in 2019 negative per patient.   No further f/u needed  5. Insomnia - trialed zolpidem w/o success. Wean down temazepam as trial recommendation by PCP as could be affecting balance. Patient understands addictive potential.  6-month refill given today  6.  Atrial fibrillation - defer management of rhythm and rate to cards.  Continue anticoag as above  7.  DVT - postoperative.  No recurrence.  Continue indefinite anticoagulation given concomitant afib  8.  H/o prostate ca and multiple urological issues - defer f/u to urology.  Check PSA as they recommend  9.  Balance issues and neuropathy -this seems recalcitrant  to physical therapy or any intervention that we have tried previously.  Defer f/u to ENT and neurology  10.  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  Good arterial flow and patent stent on noninvasive study.  .  Report any claudication, rest pain, or ulceration immediately.  Will obtain repeat arterial duplex 2 year from previous    We will partner with other providers in the management of established vascular disease and cardiometabolic risk factors.    Studies to Be Obtained:    1) echo -timing to be determined by cardiology  2) aortoiliac duplex April 2023  3) carotid duplex February 2023    Labs to Be Obtained: as above prior to next visit    Follow up in: 6 mo    Time: 30-39min - chart review/prep, review of other providers' records, imaging/lab review, face-to-face time for history/examination, ordering, prescribing,  review of results/meds/ treatment plan with patient/family/caregiver, documentation in EMR, care coordination (as needed)    Michael J Bloch, M.D.

## 2022-05-04 NOTE — PROGRESS NOTES
Anticoagulation Summary  As of 2022    INR goal:  2.0-3.0   TTR:  71.4 % (6.7 y)   INR used for dosin.20 (2022)   Warfarin maintenance plan:  6 mg (4 mg x 1.5) every day   Weekly warfarin total:  42 mg   Plan last modified:  Willian Velasquez, PharmD (8/3/2021)   Next INR check:  5/10/2022   Priority:  Maintenance   Target end date:  Indefinite    Indications    Paroxysmal atrial fibrillation (HCC) [I48.0]  Hx of TIA (transient ischemic attack) [G45.9]  Deep vein thrombosis (DVT) (HCC) (Resolved) [I82.409]  Atrial fibrillation (HCC) (Resolved) [I48.91]  Hx of Stroke (Resolved) [I63.9]  Secondary hypercoagulable state (HCC) [D68.69]             Anticoagulation Episode Summary     INR check location:  Anticoagulation Clinic    Preferred lab:      Send INR reminders to:      Comments:        Anticoagulation Care Providers     Provider Role Specialty Phone number    Michael J Bloch, M.D. Referring Internal Medicine [phone]    [facility] Anticoagulation Services   [phone]                Refer to Patient Findings for HPI:  Patient Findings     Positives:  Change in activity, Other complaints    Negatives:  Signs/symptoms of thrombosis, Signs/symptoms of bleeding, Laboratory test error suspected, Change in health, Change in alcohol use, Upcoming invasive procedure, Emergency department visit, Upcoming dental procedure, Missed doses, Extra doses, Change in medications, Change in diet/appetite, Hospital admission, Bruising    Comments:  Having more back pain lately which is making it harder for him to walk. Denies missing any doses. Believes he's been consistent with his vit k intake.          Vitals:    22 0901   BP: 128/69   Pulse: 60     Verified current warfarin dosing schedule.    Medications reconciled. Take naproxen as needed for arthritic pain. He is aware of the increased risk of bleeding and takes only when needed with caution.  Pt is not on antiplatelet therapy  Atrial fibrillation  controlled on metoprolol.  Secondary hypercoagulable state due to Atrial Fibrillation/Flutter on warfarin.      A/P   INR  sub-therapeutic at 1.2. Unsure why INR so low. He had previously been very stable on this dose. His of TIA/CVA and VTEs. No recent events. Will give loading doses of warfarin and return soon for INR follow up.    Warfarin dosing recommendation: Tonight and tomorrow take 8 mg (2 tabs) then resume your previous regimen.    Pt educated to contact our clinic with any changes in medications or s/s of bleeding or thrombosis. Pt is aware to seek immediate medical attention for falls, head injury or deep cuts.    Follow up appointment on Tuesday.    CHEYENNE Mckinley.

## 2022-05-05 LAB — INR BLD: 1.2 (ref 0.9–1.2)

## 2022-05-09 DIAGNOSIS — Z79.01 CHRONIC ANTICOAGULATION: ICD-10-CM

## 2022-05-10 ENCOUNTER — ANTICOAGULATION VISIT (OUTPATIENT)
Dept: VASCULAR LAB | Facility: MEDICAL CENTER | Age: 87
End: 2022-05-10
Attending: INTERNAL MEDICINE
Payer: MEDICARE

## 2022-05-10 VITALS
BODY MASS INDEX: 25.34 KG/M2 | HEIGHT: 71 IN | HEART RATE: 60 BPM | WEIGHT: 181 LBS | SYSTOLIC BLOOD PRESSURE: 130 MMHG | DIASTOLIC BLOOD PRESSURE: 70 MMHG

## 2022-05-10 DIAGNOSIS — D68.69 SECONDARY HYPERCOAGULABLE STATE (HCC): ICD-10-CM

## 2022-05-10 DIAGNOSIS — Z79.01 CHRONIC ANTICOAGULATION: ICD-10-CM

## 2022-05-10 DIAGNOSIS — I48.0 PAROXYSMAL ATRIAL FIBRILLATION (HCC): ICD-10-CM

## 2022-05-10 DIAGNOSIS — G45.9 TIA (TRANSIENT ISCHEMIC ATTACK): ICD-10-CM

## 2022-05-10 LAB
INR BLD: 1.9 (ref 0.9–1.2)
INR PPP: 1.9 (ref 2–3.5)

## 2022-05-10 PROCEDURE — 99212 OFFICE O/P EST SF 10 MIN: CPT

## 2022-05-10 PROCEDURE — 85610 PROTHROMBIN TIME: CPT

## 2022-05-10 RX ORDER — TEMAZEPAM 30 MG/1
30 CAPSULE ORAL NIGHTLY PRN
COMMUNITY
End: 2022-08-30 | Stop reason: SDUPTHER

## 2022-05-10 RX ORDER — WARFARIN SODIUM 4 MG/1
6-8 TABLET ORAL DAILY
Qty: 180 TABLET | Refills: 1 | Status: SHIPPED | OUTPATIENT
Start: 2022-05-10 | End: 2022-05-10

## 2022-05-10 RX ORDER — WARFARIN SODIUM 4 MG/1
TABLET ORAL
Qty: 135 TABLET | Refills: 0 | Status: SHIPPED | OUTPATIENT
Start: 2022-05-10 | End: 2022-08-17

## 2022-05-10 ASSESSMENT — FIBROSIS 4 INDEX: FIB4 SCORE: 3.16

## 2022-05-10 NOTE — PROGRESS NOTES
OP Anticoagulation Service Note    Date: 5/10/2022    Anticoagulation Summary  As of 5/10/2022    INR goal:  2.0-3.0   TTR:  71.3 % (6.7 y)   INR used for dosin.90 (5/10/2022)   Warfarin maintenance plan:  6 mg (4 mg x 1.5) every day   Weekly warfarin total:  42 mg   Plan last modified:  Willian Velasquez, PharmD (8/3/2021)   Next INR check:  2022   Priority:  Maintenance   Target end date:  Indefinite    Indications    Paroxysmal atrial fibrillation (HCC) [I48.0]  Hx of TIA (transient ischemic attack) [G45.9]  Deep vein thrombosis (DVT) (HCC) (Resolved) [I82.409]  Atrial fibrillation (HCC) (Resolved) [I48.91]  Hx of Stroke (Resolved) [I63.9]  Secondary hypercoagulable state (HCC) [D68.69]             Anticoagulation Episode Summary     INR check location:  Anticoagulation Clinic    Preferred lab:      Send INR reminders to:      Comments:  Medications reconciled. Take naproxen as needed for arthritic pain. He is aware of the increased risk of bleeding and takes only when needed with caution      Anticoagulation Care Providers     Provider Role Specialty Phone number    Michael J Bloch, M.D. Referring Internal Medicine [phone]    [facility] Anticoagulation Services   [phone]        Anticoagulation Patient Findings  Patient Findings     Negatives:  Signs/symptoms of thrombosis, Signs/symptoms of bleeding, Laboratory test error suspected, Change in health, Change in alcohol use, Change in activity, Upcoming invasive procedure, Emergency department visit, Upcoming dental procedure, Missed doses, Extra doses, Change in medications, Change in diet/appetite, Hospital admission, Bruising, Other complaints          HPI:     Tristan Hester is in the Anticoagulation Clinic today.     The reason for today's visit is to prevent morbidity and mortality from a blood clot and/or stroke and to reduce the risk of bleeding while on a anticoagulant.     PCP:  Heber Burr M.D.  [address] [place] [address]  [address]  [facility] [address]  [phone]  [phone]    No results found for: HBA1C   Lab Results   Component Value Date/Time    CHOLSTRLTOT 125 04/05/2022 03:04 PM    LDL 63 04/05/2022 03:04 PM    HDL 51 04/05/2022 03:04 PM    TRIGLYCERIDE 54 04/05/2022 03:04 PM       Lab Results   Component Value Date/Time    GLUCOSE 91 04/05/2022 03:04 PM    BUN 28 (H) 04/05/2022 03:04 PM    CREATININE 1.03 04/05/2022 03:04 PM    CREATININE 1.0 07/02/2008 03:43 PM     Lab Results   Component Value Date/Time    ALKPHOSPHAT 67 04/05/2022 03:04 PM    ASTSGOT 31 04/05/2022 03:04 PM    ALTSGPT 24 04/05/2022 03:04 PM    TBILIRUBIN 1.3 04/05/2022 03:04 PM    INR 1.90 (A) 05/10/2022 12:00 AM    ALBUMIN 4.4 04/05/2022 03:04 PM      Lab Results   Component Value Date/Time    RBC 5.05 04/05/2022 03:04 PM    HEMOGLOBIN 15.2 04/05/2022 03:04 PM    HEMATOCRIT 46.6 04/05/2022 03:04 PM    PLATELETCT 170 04/05/2022 03:04 PM      No results found for: MALBCRT, MICROALBUR    Current Outpatient Medications:   •  warfarin, 6-8 mg, Oral, DAILY  •  temazepam, 30 mg, Oral, HS PRN  •  metoprolol SR, Take 1 tablet by mouth once daily  •  amLODIPine, 5 mg, Oral, DAILY  •  atorvastatin, TAKE 1/2 (ONE-HALF) TABLET BY MOUTH ONCE DAILY IN THE EVENING  •  gabapentin, 300 mg, Oral, TID  •  irbesartan, 300 mg, Oral, DAILY  •  Ascorbic Acid (VITAMIN C PO), Take 500 mg by mouth.  •  Multiple Vitamins-Minerals (DAILY MULTI VITAMIN/MINERALS PO), Take  by mouth.  •  Naproxen Sodium (ALEVE PO), Take  by mouth.  •  Acetaminophen (TYLENOL PO), Take  by mouth.  •  furosemide, 20 mg, Oral, QDAY PRN  •  ezetimibe, Take 1 tablet by mouth once daily  •  amoxicillin, 2,000 mg, Oral, Once PRN    Assessment:     INR  sub-therapeutic.     Is pt on antiplatelet therapy: no    Is pt on NSAID: yes, see above    3 vitals included with today's appt-unless patient declined:  (BP, weight, ht, RR)   Vitals:    05/10/22 1001   BP: 130/70   Pulse: 60   Weight: 82.1 kg (181 lb)   Height:  "1.803 m (5' 11\")       Plan:     • 8mg today then Continue the same warfarin dose, as noted above.       Follow-up:     • Test in 1 week(s).    • This decision was made using shared decision making with the pt and benefits vs risks were discussed.      Additional information discussed with patient:     • Pt educated to contact our clinic with any changes in medications or s/s of bleeding or thrombosis.  • Education was provided today regarding tips to reduce their bleed risk and dietary constraints while on a anticoagulant.    National recommendations regarding anticoagulation therapy:     The CHEST guidelines recommends frequent monitoring at regular intervals for any patient on a anticoagulant, to ensure the patient is on the proper dose, and to monitor that they are not having any complications from therapy.       Conor Locke, PharmD, MS, BCACP, [facility]  Phone [phone] fax [phone]    This note was created using voice recognition software (Dragon). The accuracy of the dictation is limited by the abilities of the software. I have reviewed the note prior to signing, however some errors in grammar and context are still possible. If you have any questions related to this note please do not hesitate to contact our office.     "

## 2022-05-17 ENCOUNTER — ANTICOAGULATION VISIT (OUTPATIENT)
Dept: VASCULAR LAB | Facility: MEDICAL CENTER | Age: 87
End: 2022-05-17
Attending: INTERNAL MEDICINE
Payer: MEDICARE

## 2022-05-17 VITALS
BODY MASS INDEX: 24.92 KG/M2 | WEIGHT: 178 LBS | HEART RATE: 67 BPM | HEIGHT: 71 IN | SYSTOLIC BLOOD PRESSURE: 145 MMHG | DIASTOLIC BLOOD PRESSURE: 80 MMHG

## 2022-05-17 DIAGNOSIS — I48.0 PAROXYSMAL ATRIAL FIBRILLATION (HCC): ICD-10-CM

## 2022-05-17 DIAGNOSIS — G45.9 TIA (TRANSIENT ISCHEMIC ATTACK): ICD-10-CM

## 2022-05-17 DIAGNOSIS — D68.69 SECONDARY HYPERCOAGULABLE STATE (HCC): ICD-10-CM

## 2022-05-17 LAB — INR PPP: 2.3 (ref 2–3.5)

## 2022-05-17 PROCEDURE — 85610 PROTHROMBIN TIME: CPT

## 2022-05-17 PROCEDURE — 99211 OFF/OP EST MAY X REQ PHY/QHP: CPT

## 2022-05-17 ASSESSMENT — FIBROSIS 4 INDEX: FIB4 SCORE: 3.16

## 2022-05-17 NOTE — PROGRESS NOTES
OP Anticoagulation Service Note    Date: 2022    Anticoagulation Summary  As of 2022    INR goal:  2.0-3.0   TTR:  71.2 % (6.8 y)   INR used for dosin.30 (2022)   Warfarin maintenance plan:  6 mg (4 mg x 1.5) every day   Weekly warfarin total:  42 mg   Plan last modified:  Willian Velasquez, PharmD (8/3/2021)   Next INR check:  2022   Priority:  Maintenance   Target end date:  Indefinite    Indications    Paroxysmal atrial fibrillation (HCC) [I48.0]  Hx of TIA (transient ischemic attack) [G45.9]  Deep vein thrombosis (DVT) (HCC) (Resolved) [I82.409]  Atrial fibrillation (HCC) (Resolved) [I48.91]  Hx of Stroke (Resolved) [I63.9]  Secondary hypercoagulable state (HCC) [D68.69]             Anticoagulation Episode Summary     INR check location:  Anticoagulation Clinic    Preferred lab:      Send INR reminders to:      Comments:  Medications reconciled. Take naproxen as needed for arthritic pain. He is aware of the increased risk of bleeding and takes only when needed with caution      Anticoagulation Care Providers     Provider Role Specialty Phone number    Michael J Bloch, M.D. [facility] Internal Medicine [phone]    [facility] Anticoagulation Services   [phone]        Anticoagulation Patient Findings  Patient Findings     Positives:  Signs/symptoms of bleeding (R arm)    Negatives:  Signs/symptoms of thrombosis, Laboratory test error suspected, Change in health, Change in alcohol use, Change in activity, Upcoming invasive procedure, Emergency department visit, Upcoming dental procedure, Missed doses, Extra doses, Change in medications, Change in diet/appetite, Hospital admission, Bruising, Other complaints          HPI:     Tristan Martin Swannatividad is in the Anticoagulation Clinic today.     The reason for today's visit is to prevent morbidity and mortality from a blood clot and/or stroke and to reduce the risk of bleeding while on a anticoagulant.     PCP:  Michael J Bloch, M.D.  [address] [facility]  [place] [address]  [phone]  [phone]    No results found for: HBA1C   Lab Results   Component Value Date/Time    CHOLSTRLTOT 125 04/05/2022 03:04 PM    LDL 63 04/05/2022 03:04 PM    HDL 51 04/05/2022 03:04 PM    TRIGLYCERIDE 54 04/05/2022 03:04 PM       Lab Results   Component Value Date/Time    GLUCOSE 91 04/05/2022 03:04 PM    BUN 28 (H) 04/05/2022 03:04 PM    CREATININE 1.03 04/05/2022 03:04 PM    CREATININE 1.0 07/02/2008 03:43 PM     Lab Results   Component Value Date/Time    ALKPHOSPHAT 67 04/05/2022 03:04 PM    ASTSGOT 31 04/05/2022 03:04 PM    ALTSGPT 24 04/05/2022 03:04 PM    TBILIRUBIN 1.3 04/05/2022 03:04 PM    INR 2.30 05/17/2022 12:00 AM    ALBUMIN 4.4 04/05/2022 03:04 PM      Lab Results   Component Value Date/Time    RBC 5.05 04/05/2022 03:04 PM    HEMOGLOBIN 15.2 04/05/2022 03:04 PM    HEMATOCRIT 46.6 04/05/2022 03:04 PM    PLATELETCT 170 04/05/2022 03:04 PM      No results found for: MALBCRT, MICROALBUR    Current Outpatient Medications:   •  warfarin, TAKE 1 & 1/2 (ONE & ONE-HALF) TABLETS BY MOUTH ONCE DAILY OR  AS  DIRECTED  BY  ANTICOAGULATION  CLINIC  •  temazepam, 30 mg, Oral, HS PRN  •  metoprolol SR, Take 1 tablet by mouth once daily  •  amLODIPine, 5 mg, Oral, DAILY  •  atorvastatin, TAKE 1/2 (ONE-HALF) TABLET BY MOUTH ONCE DAILY IN THE EVENING  •  gabapentin, 300 mg, Oral, TID  •  irbesartan, 300 mg, Oral, DAILY  •  Ascorbic Acid (VITAMIN C PO), Take 500 mg by mouth.  •  Multiple Vitamins-Minerals (DAILY MULTI VITAMIN/MINERALS PO), Take  by mouth.  •  Naproxen Sodium (ALEVE PO), Take  by mouth.  •  Acetaminophen (TYLENOL PO), Take  by mouth.  •  furosemide, 20 mg, Oral, QDAY PRN  •  ezetimibe, Take 1 tablet by mouth once daily  •  amoxicillin, 2,000 mg, Oral, Once PRN    Assessment:     INR  therapeutic.     Is pt on antiplatelet therapy: no    Is pt on NSAID: prn, see above    3 vitals included with today's appt-unless patient declined:  (BP, weight, ht, RR)   Vitals:     "05/17/22 1139   BP: (!) 145/80   Pulse: 67   Weight: 80.7 kg (178 lb)   Height: 1.803 m (5' 11\")     At home BPs have been 120/70s    Plan:     • Continue the same warfarin dose, as noted above.       Follow-up:     • Test in 2 week(s) per pt   • This decision was made using shared decision making with the pt and benefits vs risks were discussed.      Additional information discussed with patient:     • Pt educated to contact our clinic with any changes in medications or s/s of bleeding or thrombosis.  • Education was provided today regarding tips to reduce their bleed risk and dietary constraints while on a anticoagulant.    National recommendations regarding anticoagulation therapy:     The CHEST guidelines recommends frequent monitoring at regular intervals for any patient on a anticoagulant, to ensure the patient is on the proper dose, and to monitor that they are not having any complications from therapy.       Conor Locke, PharmD, MS, BCACP, C  [facility]  Phone [phone] fax [phone]    This note was created using voice recognition software (Dragon). The accuracy of the dictation is limited by the abilities of the software. I have reviewed the note prior to signing, however some errors in grammar and context are still possible. If you have any questions related to this note please do not hesitate to contact our office.     "

## 2022-05-18 LAB — INR BLD: 2.3 (ref 0.9–1.2)

## 2022-05-25 ENCOUNTER — HOSPITAL ENCOUNTER (OUTPATIENT)
Facility: MEDICAL CENTER | Age: 87
End: 2022-05-25
Attending: UROLOGY | Admitting: UROLOGY
Payer: MEDICARE

## 2022-05-25 DIAGNOSIS — E78.5 DYSLIPIDEMIA: ICD-10-CM

## 2022-05-26 ENCOUNTER — TELEPHONE (OUTPATIENT)
Dept: CARDIOLOGY | Facility: MEDICAL CENTER | Age: 87
End: 2022-05-26
Payer: MEDICARE

## 2022-05-26 RX ORDER — EZETIMIBE 10 MG/1
TABLET ORAL
Qty: 90 TABLET | Refills: 1 | Status: SHIPPED | OUTPATIENT
Start: 2022-05-26 | End: 2022-11-23

## 2022-05-26 NOTE — TELEPHONE ENCOUNTER
Received clearance request from [facility]. Discussed with [facility] who indicates patient may proceed with artificial urinary sphincter surgery and hold anticoagulants 7 days prior. Clearance response faxed to [phone], receipt confirmed.

## 2022-05-31 ENCOUNTER — ANTICOAGULATION VISIT (OUTPATIENT)
Dept: VASCULAR LAB | Facility: MEDICAL CENTER | Age: 87
End: 2022-05-31
Attending: INTERNAL MEDICINE
Payer: MEDICARE

## 2022-05-31 VITALS
DIASTOLIC BLOOD PRESSURE: 84 MMHG | BODY MASS INDEX: 24.92 KG/M2 | WEIGHT: 178 LBS | SYSTOLIC BLOOD PRESSURE: 137 MMHG | HEIGHT: 71 IN

## 2022-05-31 DIAGNOSIS — G45.9 TIA (TRANSIENT ISCHEMIC ATTACK): ICD-10-CM

## 2022-05-31 DIAGNOSIS — I48.0 PAROXYSMAL ATRIAL FIBRILLATION (HCC): ICD-10-CM

## 2022-05-31 DIAGNOSIS — D68.69 SECONDARY HYPERCOAGULABLE STATE (HCC): ICD-10-CM

## 2022-05-31 LAB — INR PPP: 3.4 (ref 2–3.5)

## 2022-05-31 PROCEDURE — 85610 PROTHROMBIN TIME: CPT

## 2022-05-31 PROCEDURE — 99212 OFFICE O/P EST SF 10 MIN: CPT

## 2022-05-31 ASSESSMENT — FIBROSIS 4 INDEX: FIB4 SCORE: 3.16

## 2022-05-31 NOTE — PROGRESS NOTES
OP Anticoagulation Service Note    Date: 5/31/2022    Anticoagulation Summary  As of 5/31/2022    INR goal:  2.0-3.0   TTR:  71.1 % (6.8 y)   INR used for dosing:  3.40 (5/31/2022)   Warfarin maintenance plan:  6 mg (4 mg x 1.5) every day   Weekly warfarin total:  42 mg   Plan last modified:  Willian Velasquez, PharmD (8/3/2021)   Next INR check:  6/14/2022   Priority:  Maintenance   Target end date:  Indefinite    Indications    Paroxysmal atrial fibrillation (HCC) [I48.0]  Hx of TIA (transient ischemic attack) [G45.9]  Deep vein thrombosis (DVT) (HCC) (Resolved) [I82.409]  Atrial fibrillation (HCC) (Resolved) [I48.91]  Hx of Stroke (Resolved) [I63.9]  Secondary hypercoagulable state (HCC) [D68.69]             Anticoagulation Episode Summary     INR check location:  Anticoagulation Clinic    Preferred lab:      Send INR reminders to:      Comments:  Medications reconciled. Take naproxen as needed for arthritic pain. He is aware of the increased risk of bleeding and takes only when needed with caution      Anticoagulation Care Providers     Provider Role Specialty Phone number    Michael J Bloch, M.D. [facility] Internal Medicine [phone]    [facility] Anticoagulation Services   [phone]        Anticoagulation Patient Findings  Patient Findings     Negatives:  Signs/symptoms of thrombosis, Signs/symptoms of bleeding, Laboratory test error suspected, Change in health, Change in alcohol use, Change in activity, Upcoming invasive procedure, Emergency department visit, Upcoming dental procedure, Missed doses, Extra doses, Change in medications, Change in diet/appetite, Hospital admission, Bruising, Other complaints          HPI:     Tristan Hester is in the Anticoagulation Clinic today.     The reason for today's visit is to prevent morbidity and mortality from a blood clot and/or stroke and to reduce the risk of bleeding while on a anticoagulant.     PCP:  Michael J Bloch, M.D.  [address] [place] [address]  [address]  [phone]  [phone]    No results found for: HBA1C   Lab Results   Component Value Date/Time    CHOLSTRLTOT 125 04/05/2022 03:04 PM    LDL 63 04/05/2022 03:04 PM    HDL 51 04/05/2022 03:04 PM    TRIGLYCERIDE 54 04/05/2022 03:04 PM       Lab Results   Component Value Date/Time    GLUCOSE 91 04/05/2022 03:04 PM    BUN 28 (H) 04/05/2022 03:04 PM    CREATININE 1.03 04/05/2022 03:04 PM    CREATININE 1.0 07/02/2008 03:43 PM     Lab Results   Component Value Date/Time    ALKPHOSPHAT 67 04/05/2022 03:04 PM    ASTSGOT 31 04/05/2022 03:04 PM    ALTSGPT 24 04/05/2022 03:04 PM    TBILIRUBIN 1.3 04/05/2022 03:04 PM    INR 3.40 05/31/2022 12:00 AM    ALBUMIN 4.4 04/05/2022 03:04 PM      Lab Results   Component Value Date/Time    RBC 5.05 04/05/2022 03:04 PM    HEMOGLOBIN 15.2 04/05/2022 03:04 PM    HEMATOCRIT 46.6 04/05/2022 03:04 PM    PLATELETCT 170 04/05/2022 03:04 PM      No results found for: MALBCRT, MICROALBUR    Current Outpatient Medications:   •  ezetimibe, Take 1 tablet by mouth once daily  •  warfarin, TAKE 1 & 1/2 (ONE & ONE-HALF) TABLETS BY MOUTH ONCE DAILY OR  AS  DIRECTED  BY  ANTICOAGULATION  CLINIC  •  temazepam, 30 mg, Oral, HS PRN  •  metoprolol SR, Take 1 tablet by mouth once daily  •  amLODIPine, 5 mg, Oral, DAILY  •  atorvastatin, TAKE 1/2 (ONE-HALF) TABLET BY MOUTH ONCE DAILY IN THE EVENING  •  gabapentin, 300 mg, Oral, TID  •  irbesartan, 300 mg, Oral, DAILY  •  Ascorbic Acid (VITAMIN C PO), Take 500 mg by mouth.  •  Multiple Vitamins-Minerals (DAILY MULTI VITAMIN/MINERALS PO), Take  by mouth.  •  Naproxen Sodium (ALEVE PO), Take  by mouth.  •  Acetaminophen (TYLENOL PO), Take  by mouth.  •  furosemide, 20 mg, Oral, QDAY PRN  •  amoxicillin, 2,000 mg, Oral, Once PRN    Assessment:     INR  supra-therapeutic.     Is pt on antiplatelet therapy: no    Is pt on NSAID: prn, see above     3 vitals included with today's appt-unless patient declined:  (BP, weight, ht, RR)   Vitals:    05/31/22  "1133   BP: 137/84   Weight: 80.7 kg (178 lb)   Height: 1.803 m (5' 11\")       Plan:     • 2mg today then continue the same warfarin dose, as noted above.       Follow-up:     • Test in 2 week(s).    • This decision was made using shared decision making with the pt and benefits vs risks were discussed.      Additional information discussed with patient:     • Pt educated to contact our clinic with any changes in medications or s/s of bleeding or thrombosis.  • Education was provided today regarding tips to reduce their bleed risk and dietary constraints while on a anticoagulant.    National recommendations regarding anticoagulation therapy:     The CHEST guidelines recommends frequent monitoring at regular intervals for any patient on a anticoagulant, to ensure the patient is on the proper dose, and to monitor that they are not having any complications from therapy.       Conor Locke, PharmD, MS, BCACP, [facility]  Phone [phone] fax [phone]    This note was created using voice recognition software (Dragon). The accuracy of the dictation is limited by the abilities of the software. I have reviewed the note prior to signing, however some errors in grammar and context are still possible. If you have any questions related to this note please do not hesitate to contact our office.     "

## 2022-06-02 LAB — INR BLD: 3.4 (ref 0.9–1.2)

## 2022-06-14 ENCOUNTER — ANTICOAGULATION VISIT (OUTPATIENT)
Dept: VASCULAR LAB | Facility: MEDICAL CENTER | Age: 87
End: 2022-06-14
Attending: INTERNAL MEDICINE
Payer: MEDICARE

## 2022-06-14 VITALS
DIASTOLIC BLOOD PRESSURE: 85 MMHG | HEART RATE: 55 BPM | BODY MASS INDEX: 24.92 KG/M2 | WEIGHT: 178 LBS | HEIGHT: 71 IN | SYSTOLIC BLOOD PRESSURE: 135 MMHG

## 2022-06-14 DIAGNOSIS — D68.69 SECONDARY HYPERCOAGULABLE STATE (HCC): ICD-10-CM

## 2022-06-14 DIAGNOSIS — G45.9 TIA (TRANSIENT ISCHEMIC ATTACK): ICD-10-CM

## 2022-06-14 DIAGNOSIS — I48.0 PAROXYSMAL ATRIAL FIBRILLATION (HCC): ICD-10-CM

## 2022-06-14 LAB — INR PPP: 3 (ref 2–3.5)

## 2022-06-14 PROCEDURE — 85610 PROTHROMBIN TIME: CPT

## 2022-06-14 PROCEDURE — 99212 OFFICE O/P EST SF 10 MIN: CPT

## 2022-06-14 ASSESSMENT — FIBROSIS 4 INDEX: FIB4 SCORE: 3.2

## 2022-06-14 NOTE — PROGRESS NOTES
OP Anticoagulation Service Note    Date: 6/14/2022    Anticoagulation Summary  As of 6/14/2022    INR goal:  2.0-3.0   TTR:  70.8 % (6.8 y)   INR used for dosing:  3.00 (6/14/2022)   Warfarin maintenance plan:  4 mg (4 mg x 1) every Tue; 6 mg (4 mg x 1.5) all other days   Weekly warfarin total:  40 mg   Plan last modified:  Conor Locke, PharmD (6/14/2022)   Next INR check:  7/8/2022   Priority:  Maintenance   Target end date:  Indefinite    Indications    Paroxysmal atrial fibrillation (HCC) [I48.0]  Hx of TIA (transient ischemic attack) [G45.9]  Deep vein thrombosis (DVT) (HCC) (Resolved) [I82.409]  Atrial fibrillation (HCC) (Resolved) [I48.91]  Hx of Stroke (Resolved) [I63.9]  Secondary hypercoagulable state (HCC) [D68.69]             Anticoagulation Episode Summary     INR check location:  Anticoagulation Clinic    Preferred lab:      Send INR reminders to:      Comments:  Medications reconciled. Take naproxen as needed for arthritic pain. He is aware of the increased risk of bleeding and takes only when needed with caution      Anticoagulation Care Providers     Provider Role Specialty Phone number    Michael J Bloch, M.D. Referring Internal Medicine [phone]    [facility]   [phone]        Anticoagulation Patient Findings  Patient Findings     Negatives:  Signs/symptoms of thrombosis, Signs/symptoms of bleeding, Laboratory test error suspected, Change in health, Change in alcohol use, Change in activity, Upcoming invasive procedure, Emergency department visit, Upcoming dental procedure, Missed doses, Extra doses, Change in medications, Change in diet/appetite, Hospital admission, Bruising, Other complaints          HPI:     Tristan Hester is in the Anticoagulation Clinic today.     The reason for today's visit is to prevent morbidity and mortality from a blood clot and/or stroke and to reduce the risk of bleeding while on a anticoagulant.     PCP:  Michael J Bloch,  M.D.  [address] [facility] [id number]  [phone]  [phone]    No results found for: HBA1C   Lab Results   Component Value Date/Time    CHOLSTRLTOT 125 04/05/2022 03:04 PM    LDL 63 04/05/2022 03:04 PM    HDL 51 04/05/2022 03:04 PM    TRIGLYCERIDE 54 04/05/2022 03:04 PM       Lab Results   Component Value Date/Time    GLUCOSE 91 04/05/2022 03:04 PM    BUN 28 (H) 04/05/2022 03:04 PM    CREATININE 1.03 04/05/2022 03:04 PM    CREATININE 1.0 07/02/2008 03:43 PM     Lab Results   Component Value Date/Time    ALKPHOSPHAT 67 04/05/2022 03:04 PM    ASTSGOT 31 04/05/2022 03:04 PM    ALTSGPT 24 04/05/2022 03:04 PM    TBILIRUBIN 1.3 04/05/2022 03:04 PM    INR 3.00 06/14/2022 12:00 AM    ALBUMIN 4.4 04/05/2022 03:04 PM      Lab Results   Component Value Date/Time    RBC 5.05 04/05/2022 03:04 PM    HEMOGLOBIN 15.2 04/05/2022 03:04 PM    HEMATOCRIT 46.6 04/05/2022 03:04 PM    PLATELETCT 170 04/05/2022 03:04 PM      No results found for: MALBCRT, MICROALBUR    Current Outpatient Medications:   •  ezetimibe, Take 1 tablet by mouth once daily  •  warfarin, TAKE 1 & 1/2 (ONE & ONE-HALF) TABLETS BY MOUTH ONCE DAILY OR  AS  DIRECTED  BY  ANTICOAGULATION  CLINIC  •  temazepam, 30 mg, Oral, HS PRN  •  metoprolol SR, Take 1 tablet by mouth once daily  •  amLODIPine, 5 mg, Oral, DAILY  •  atorvastatin, TAKE 1/2 (ONE-HALF) TABLET BY MOUTH ONCE DAILY IN THE EVENING  •  gabapentin, 300 mg, Oral, TID  •  irbesartan, 300 mg, Oral, DAILY  •  Ascorbic Acid (VITAMIN C PO), Take 500 mg by mouth.  •  Multiple Vitamins-Minerals (DAILY MULTI VITAMIN/MINERALS PO), Take  by mouth.  •  Naproxen Sodium (ALEVE PO), Take  by mouth.  •  Acetaminophen (TYLENOL PO), Take  by mouth.  •  furosemide, 20 mg, Oral, QDAY PRN  •  amoxicillin, 2,000 mg, Oral, Once PRN    Assessment:     INR  therapeutic.     Is pt on antiplatelet therapy: no    Is pt on NSAID: prn     3 vitals included with today's appt-unless patient declined:  (BP, weight, ht, RR)   Vitals:  "   06/14/22 1131   BP: 135/85   Pulse: (!) 55   Weight: 80.7 kg (178 lb)   Height: 1.803 m (5' 11\")       Plan:     • .Decrease weekly warfarin dose as noted above.   • Target 2.5    Follow-up:     • Test in 3 week(s).    • This decision was made using shared decision making with the pt and benefits vs risks were discussed.      Additional information discussed with patient:     • Pt educated to contact our clinic with any changes in medications or s/s of bleeding or thrombosis.  • Education was provided today regarding tips to reduce their bleed risk and dietary constraints while on a anticoagulant.    National recommendations regarding anticoagulation therapy:     The CHEST guidelines recommends frequent monitoring at regular intervals for any patient on a anticoagulant, to ensure the patient is on the proper dose, and to monitor that they are not having any complications from therapy.       Conor Locke, PharmD, MS, BCACP, C  [facility]  Phone [phone] fax [phone]    This note was created using voice recognition software (Dragon). The accuracy of the dictation is limited by the abilities of the software. I have reviewed the note prior to signing, however some errors in grammar and context are still possible. If you have any questions related to this note please do not hesitate to contact our office.     "

## 2022-06-15 LAB — INR BLD: 3 (ref 0.9–1.2)

## 2022-07-08 ENCOUNTER — APPOINTMENT (OUTPATIENT)
Dept: VASCULAR LAB | Facility: MEDICAL CENTER | Age: 87
End: 2022-07-08
Attending: INTERNAL MEDICINE
Payer: MEDICARE

## 2022-07-12 ENCOUNTER — ANTICOAGULATION VISIT (OUTPATIENT)
Dept: VASCULAR LAB | Facility: MEDICAL CENTER | Age: 87
End: 2022-07-12
Attending: INTERNAL MEDICINE
Payer: MEDICARE

## 2022-07-12 VITALS — DIASTOLIC BLOOD PRESSURE: 65 MMHG | SYSTOLIC BLOOD PRESSURE: 105 MMHG | HEART RATE: 61 BPM

## 2022-07-12 DIAGNOSIS — I48.0 PAROXYSMAL ATRIAL FIBRILLATION (HCC): ICD-10-CM

## 2022-07-12 DIAGNOSIS — D68.69 SECONDARY HYPERCOAGULABLE STATE (HCC): ICD-10-CM

## 2022-07-12 DIAGNOSIS — G45.9 TIA (TRANSIENT ISCHEMIC ATTACK): ICD-10-CM

## 2022-07-12 LAB
INR BLD: 3 (ref 0.9–1.2)
INR PPP: 3 (ref 2–3.5)

## 2022-07-12 PROCEDURE — 99211 OFF/OP EST MAY X REQ PHY/QHP: CPT

## 2022-07-12 PROCEDURE — 85610 PROTHROMBIN TIME: CPT

## 2022-07-12 NOTE — PROGRESS NOTES
Anticoagulation Summary  As of 7/12/2022    INR goal:  2.0-3.0   TTR:  71.1 % (6.9 y)   INR used for dosing:  3.00 (7/12/2022)   Warfarin maintenance plan:  4 mg (4 mg x 1) every Tue; 6 mg (4 mg x 1.5) all other days   Weekly warfarin total:  40 mg   Plan last modified:  Conor Locke, PharmD (6/14/2022)   Next INR check:  8/16/2022   Priority:  Maintenance   Target end date:  Indefinite    Indications    Paroxysmal atrial fibrillation (HCC) [I48.0]  Hx of TIA (transient ischemic attack) [G45.9]  Deep vein thrombosis (DVT) (HCC) (Resolved) [I82.409]  Atrial fibrillation (HCC) (Resolved) [I48.91]  Hx of Stroke (Resolved) [I63.9]  Secondary hypercoagulable state (HCC) [D68.69]             Anticoagulation Episode Summary     INR check location:  Anticoagulation Clinic    Preferred lab:      Send INR reminders to:      Comments:  Medications reconciled. Take naproxen as needed for arthritic pain. He is aware of the increased risk of bleeding and takes only when needed with caution      Anticoagulation Care Providers     Provider Role Specialty Phone number    Michael J Bloch, M.D. Referring Internal Medicine [phone]    [facility]   [phone]                Refer to Patient Findings for HPI:  Patient Findings     Negatives:  Signs/symptoms of thrombosis, Signs/symptoms of bleeding, Laboratory test error suspected, Change in health, Change in alcohol use, Change in activity, Upcoming invasive procedure, Emergency department visit, Upcoming dental procedure, Missed doses, Extra doses, Change in medications, Change in diet/appetite, Hospital admission, Bruising, Other complaints          Vitals:    07/12/22 1536   BP: 105/65   Pulse: 61       Verified current warfarin dosing schedule.    Medications reconciled       A/P   INR  therapeutic.     Warfarin dosing recommendation: Pt is to continue with current warfarin dosing regimen.    Pt educated to contact our clinic with any changes in  medications or s/s of bleeding or thrombosis. Pt is aware to seek immediate medical attention for falls, head injury or deep cuts.    Follow up appointment in 5 week(s).    Karrie Barker, JerzyD

## 2022-08-16 ENCOUNTER — ANTICOAGULATION VISIT (OUTPATIENT)
Dept: VASCULAR LAB | Facility: MEDICAL CENTER | Age: 87
End: 2022-08-16
Attending: NURSE PRACTITIONER
Payer: MEDICARE

## 2022-08-16 VITALS — HEART RATE: 59 BPM | DIASTOLIC BLOOD PRESSURE: 81 MMHG | SYSTOLIC BLOOD PRESSURE: 148 MMHG

## 2022-08-16 DIAGNOSIS — I48.0 PAROXYSMAL ATRIAL FIBRILLATION (HCC): ICD-10-CM

## 2022-08-16 DIAGNOSIS — G45.9 TIA (TRANSIENT ISCHEMIC ATTACK): ICD-10-CM

## 2022-08-16 DIAGNOSIS — D68.69 SECONDARY HYPERCOAGULABLE STATE (HCC): ICD-10-CM

## 2022-08-16 LAB — INR PPP: 3.7 (ref 2–3.5)

## 2022-08-16 PROCEDURE — 99212 OFFICE O/P EST SF 10 MIN: CPT

## 2022-08-16 PROCEDURE — 85610 PROTHROMBIN TIME: CPT

## 2022-08-16 NOTE — PROGRESS NOTES
Anticoagulation Summary  As of 8/16/2022      INR goal:  2.0-3.0   TTR:  70.1 % (7 y)   INR used for dosing:  3.70 (8/16/2022)   Warfarin maintenance plan:  4 mg (4 mg x 1) every Tue, Fri; 6 mg (4 mg x 1.5) all other days   Weekly warfarin total:  38 mg   Plan last modified:  Satya Parra, PharmD (8/16/2022)   Next INR check:     Priority:  Maintenance   Target end date:  Indefinite    Indications    Paroxysmal atrial fibrillation (HCC) [I48.0]  Hx of TIA (transient ischemic attack) [G45.9]  Deep vein thrombosis (DVT) (HCC) (Resolved) [I82.409]  Atrial fibrillation (HCC) (Resolved) [I48.91]  Hx of Stroke (Resolved) [I63.9]  Secondary hypercoagulable state (HCC) [D68.69]                 Anticoagulation Episode Summary       INR check location:  Anticoagulation Clinic    Preferred lab:      Send INR reminders to:      Comments:  Medications reconciled. Take naproxen as needed for arthritic pain. He is aware of the increased risk of bleeding and takes only when needed with caution          Anticoagulation Care Providers       Provider Role Specialty Phone number    Michael J Bloch, M.D. Referring Internal Medicine [phone]    [facility]   [phone]                  Refer to Patient Findings for HPI:  Patient Findings       Positives:  Change in diet/appetite (Decreased appetite recently, but will return to previous regimen.)    Negatives:  Signs/symptoms of thrombosis, Signs/symptoms of bleeding, Laboratory test error suspected, Change in health, Change in alcohol use, Change in activity, Upcoming invasive procedure, Emergency department visit, Upcoming dental procedure, Missed doses, Extra doses, Change in medications, Hospital admission, Bruising, Other complaints            Vitals:    08/16/22 1541   BP: (!) 147/85   Pulse: (!) 58       Verified current warfarin dosing schedule.    Medications reconciled   Pt is not on antiplatelet therapy      A/P   INR  SUPRA-therapeutic.     Warfarin  dosing recommendation: Hold warfarin today then begin reduced regimen.     Pt educated to contact our clinic with any changes in medications or s/s of bleeding or thrombosis. Pt is aware to seek immediate medical attention for falls, head injury or deep cuts.    Follow up appointment in 2 week(s).    Satya Parra, PharmD

## 2022-08-17 LAB — INR BLD: 3.7 (ref 0.9–1.2)

## 2022-08-30 ENCOUNTER — ANTICOAGULATION VISIT (OUTPATIENT)
Dept: VASCULAR LAB | Facility: MEDICAL CENTER | Age: 87
End: 2022-08-30
Attending: NURSE PRACTITIONER
Payer: MEDICARE

## 2022-08-30 VITALS — HEART RATE: 58 BPM | DIASTOLIC BLOOD PRESSURE: 59 MMHG | SYSTOLIC BLOOD PRESSURE: 90 MMHG

## 2022-08-30 DIAGNOSIS — I48.0 PAROXYSMAL ATRIAL FIBRILLATION (HCC): ICD-10-CM

## 2022-08-30 DIAGNOSIS — G45.9 TIA (TRANSIENT ISCHEMIC ATTACK): ICD-10-CM

## 2022-08-30 DIAGNOSIS — D68.69 SECONDARY HYPERCOAGULABLE STATE (HCC): ICD-10-CM

## 2022-08-30 DIAGNOSIS — F51.01 PRIMARY INSOMNIA: ICD-10-CM

## 2022-08-30 LAB — INR PPP: 2.7 (ref 2–3.5)

## 2022-08-30 PROCEDURE — 85610 PROTHROMBIN TIME: CPT

## 2022-08-30 PROCEDURE — 99211 OFF/OP EST MAY X REQ PHY/QHP: CPT

## 2022-08-30 RX ORDER — TEMAZEPAM 30 MG/1
30 CAPSULE ORAL NIGHTLY PRN
Qty: 30 CAPSULE | Refills: 0 | Status: SHIPPED | OUTPATIENT
Start: 2022-08-30 | End: 2022-09-27

## 2022-08-30 NOTE — PROGRESS NOTES
Refilled restoril for 30d supply.  F/u with Dr. Bloch or establish with PCP for ongoing care and refills.

## 2022-08-31 LAB — INR BLD: 2.7 (ref 0.9–1.2)

## 2022-09-07 ENCOUNTER — DOCUMENTATION (OUTPATIENT)
Dept: VASCULAR LAB | Facility: MEDICAL CENTER | Age: 87
End: 2022-09-07
Payer: MEDICARE

## 2022-09-07 ENCOUNTER — ANTICOAGULATION VISIT (OUTPATIENT)
Dept: VASCULAR LAB | Facility: MEDICAL CENTER | Age: 87
End: 2022-09-07
Attending: NURSE PRACTITIONER
Payer: MEDICARE

## 2022-09-07 ENCOUNTER — OFFICE VISIT (OUTPATIENT)
Dept: VASCULAR LAB | Facility: MEDICAL CENTER | Age: 87
End: 2022-09-07
Payer: MEDICARE

## 2022-09-07 DIAGNOSIS — D68.69 SECONDARY HYPERCOAGULABLE STATE (HCC): ICD-10-CM

## 2022-09-07 DIAGNOSIS — G45.9 TIA (TRANSIENT ISCHEMIC ATTACK): ICD-10-CM

## 2022-09-07 DIAGNOSIS — I48.0 PAROXYSMAL ATRIAL FIBRILLATION (HCC): ICD-10-CM

## 2022-09-07 LAB — INR PPP: 2.9 (ref 2–3.5)

## 2022-09-07 PROCEDURE — 85610 PROTHROMBIN TIME: CPT

## 2022-09-07 PROCEDURE — 99214 OFFICE O/P EST MOD 30 MIN: CPT | Performed by: INTERNAL MEDICINE

## 2022-09-07 PROCEDURE — 99213 OFFICE O/P EST LOW 20 MIN: CPT

## 2022-09-07 NOTE — PROGRESS NOTES
Pt called and reported he was reading and then completely lost vision in his right eye and then it came back after a minute. He states he has a severe headache and he is worried about a TIA. Would like to know what he should do. Message sent to Dr. Bloch.

## 2022-09-07 NOTE — PROGRESS NOTES
Anticoagulation Summary  As of 2022      INR goal:  2.0-3.0   TTR:  70.0 % (7.1 y)   INR used for dosin.90 (2022)   Warfarin maintenance plan:  4 mg (4 mg x 1) every Tue, Fri; 6 mg (4 mg x 1.5) all other days   Weekly warfarin total:  38 mg   Plan last modified:  Satya Parra, PharmD (2022)   Next INR check:  2022   Priority:  Maintenance   Target end date:  Indefinite    Indications    Paroxysmal atrial fibrillation (HCC) [I48.0]  Hx of TIA (transient ischemic attack) [G45.9]  Deep vein thrombosis (DVT) (HCC) (Resolved) [I82.409]  Atrial fibrillation (HCC) (Resolved) [I48.91]  Hx of Stroke (Resolved) [I63.9]  Secondary hypercoagulable state (HCC) [D68.69]                 Anticoagulation Episode Summary       INR check location:  Anticoagulation Clinic    Preferred lab:      Send INR reminders to:      Comments:  Medications reconciled. Take naproxen as needed for arthritic pain. He is aware of the increased risk of bleeding and takes only when needed with caution          Anticoagulation Care Providers       Provider Role Specialty Phone number    Michael J Bloch, M.D. Referring Internal Medicine [phone]    [facility]   [phone]             Refer to Patient Findings for HPI:  Patient Findings       Positives:  Other complaints (couple of days ago: experienced blindness in L eye. vision gradually came back. now experiencing residual puffiness/swelling on L eye)    Negatives:  Signs/symptoms of thrombosis, Signs/symptoms of bleeding, Laboratory test error suspected, Change in health, Change in alcohol use, Change in activity, Upcoming invasive procedure, Emergency department visit, Upcoming dental procedure, Missed doses, Extra doses, Change in medications, Change in diet/appetite, Hospital admission, Bruising            There were no vitals filed for this visit.    Verified current warfarin dosing schedule.    Medications reconciled     Dr. Bloch saw pt during  this visit. See his note for details.    A/P   INR  therapeutic.     Warfarin dosing recommendation: Pt is to continue with current warfarin dosing regimen.    Pt educated to contact our clinic with any changes in medications or s/s of bleeding or thrombosis. Pt is aware to seek immediate medical attention for falls, head injury or deep cuts.    Follow up appointment in 2 week(s).    Karrie Barker, PharmD

## 2022-09-07 NOTE — PROGRESS NOTES
Patient with 2 to 3 minutes of painless unilateral vision loss this morning.  His vision has recovered fully.  He has a dull headache.  Vigorously denies any other focal neurologic symptoms.  His INR was therapeutic 10 days ago.  He is not taking aspirin.  He does have a history of TIA in the past.    I recommended he go to the emergency room for further evaluation but he wanted to avoid if at all possible.  As such, he will come and get his INR checked and we will see if he is therapeutic.  He has been instructed if he has any further neurologic symptoms to call 911    Michael Bloch, MD  Vascular Medicine

## 2022-09-07 NOTE — PROGRESS NOTES
VASCULAR FOLLOW UP VISIT  Subjective:   Tristan Hester is a 87 y.o. male who presents 23  for vascular f/u  No chief complaint on file.    Subjective     HPI:   Patient here for for evaluation of possible TIA in the setting of HTN, dyslipidemia, carotid disease and valvular heart disease, and atrial fibrillation.  This morning while patient was reading at home he had the sudden onset of monocular vision loss.  It was his whole visual field.  His vision slowly recovered over the next 3 to 4 minutes.  He was left with a dull headache but is otherwise completely asymptomatic.  Denies any other new TIA or stroke symptoms.  Feels like he is back to his baseline.  I spoke to him this morning recommend he go to the emergency room but he was unwilling to do so.  He has been adherent with his warfarin.  No bleeding.  Continues to struggle with mobility and balance, uses cane, no falls reported   BPs at home usually 110s-120s/70s  Remains on metoprolol  On irbesartan 300mg- tolerating well   No myalgias on current lipid-lowering therapy    Social History     Tobacco Use    Smoking status: Former     Packs/day: 0.50     Years: 30.00     Pack years: 15.00     Types: Cigarettes, Pipe     Quit date: 1985     Years since quittin.7    Smokeless tobacco: Never   Vaping Use    Vaping Use: Never used   Substance Use Topics    Alcohol use: Yes     Alcohol/week: 3.0 oz     Types: 3 Cans of beer, 3 Shots of liquor per week     Comment: 1 x week    Drug use: No     DIET AND EXERCISE:  Weight Change: Stabilized  Diet: Relatively low sodium- bit less adherent  Exercise: 3x week at gym        Objective        Objective:     There were no vitals filed for this visit.     There is no height or weight on file to calculate BMI.  Physical Exam  Vitals reviewed.   Constitutional:       Appearance: He is well-developed. He is not toxic-appearing or diaphoretic.   Eyes:      Extraocular Movements: Extraocular movements intact.       Conjunctiva/sclera: Conjunctivae normal.   Neck:      Vascular: No carotid bruit.   Cardiovascular:      Rate and Rhythm: Normal rate and regular rhythm.      Heart sounds: Murmur heard.   Pulmonary:      Effort: Pulmonary effort is normal. No respiratory distress.      Breath sounds: Normal breath sounds. No wheezing or rales.   Musculoskeletal:      Right lower leg: No edema.      Left lower leg: No edema.   Neurological:      General: No focal deficit present.      Mental Status: He is alert and oriented to person, place, and time.      Cranial Nerves: No cranial nerve deficit.      Gait: Gait normal.   Psychiatric:         Mood and Affect: Mood normal.         Behavior: Behavior normal.     Lab Results   Component Value Date    CHOLSTRLTOT 125 04/05/2022    LDL 63 04/05/2022    HDL 51 04/05/2022    TRIGLYCERIDE 54 04/05/2022      Lab Results   Component Value Date    INR 2.90 09/07/2022         Lab Results   Component Value Date    SODIUM 138 04/05/2022    POTASSIUM 5.3 04/05/2022    CHLORIDE 102 04/05/2022    CO2 23 04/05/2022    GLUCOSE 91 04/05/2022    BUN 28 (H) 04/05/2022    CREATININE 1.03 04/05/2022    BUNCREATRAT 21 11/11/2021    IFAFRICA 82 11/11/2021    IFAFRICA >60 03/15/2021    IFNOTAFR 71 11/11/2021    IFNOTAFR >60 03/15/2021      Lab Results   Component Value Date    TSH 1.790 07/19/2021      Lab Results   Component Value Date    WBC 6.6 04/05/2022    RBC 5.05 04/05/2022    HEMOGLOBIN 15.2 04/05/2022    HEMATOCRIT 46.6 04/05/2022    MCV 92.3 04/05/2022    MCH 30.1 04/05/2022    MCHC 32.6 (L) 04/05/2022    MPV 11.5 04/05/2022     Carotid duplex February 2021   No evidence for RIGHT carotid stenosis status post endarterectomy.   Mild LEFT internal carotid artery stenosis.     Echocardiogram March 2021  Prior echo on 8/7/20.Left ventricular ejection fraction is visually   estimated to be 60%.  Normal diastolic function.  Known TAVR aortic valve that is functioning normally with normal    transvalvular gradients. Vmax is 1.85 m/s.  Moderate eccentric mitral regurgitation.    Aortoiliac duplex April 2021   No evidence of aortic aneurysm.    No evidence of pseudoaneurysm or injury to the bilateral iliac arteries.     No stenosis of the proximal segment of the major visceral arteries.    Waveforms and velocities of the bilateral iliac arteries are normal.    Common iliac diameter (cm):   Right- 1.63 x 1.72         Left-   1.70 x 1.72    abpm 2022  Mean daytime 142/80  Normal nocturnal dip            Medical Decision Making:  Today's Assessment / Status / Plan:     1. TIA (transient ischemic attack)  US-CAROTID DOPPLER BILAT        Patient Type: Secondary Prevention    Etiology of Established CVD if Present:   1. TIA - likely cardioembolic from valve disease  2. Carotid atherosclerosis -status post CEA  3. Valvular Heart Disease  4. Atrial fibrillation with onset during acute illness, now chronic  5.  DVT after back surgery jan 2016  6.  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    Lipid Management: Qualifies for Statin Therapy Based on 2018 ACC/AHA Guidelines: yes  Calculated 10-Year Risk of ASCVD: N/A  Currently on Statin: Yes  ACC/aha criteria for at least mod intensity statin  Per nla goal ldl <70 and nonHDL <100 - below threshold on most recent blood work  Plan:  - Continue atorvastatin at current dose    - continue zetia  - Continue TLC  - Recheck fasting lipids in 6 months     Blood Pressure Management:  Acc/aha bp goal <130/80  ABPM high prior to intensification of meds  BP much improved both at home and in office with intensification of meds  No falls or further lightheadedness  No significant albuminuria in the past  Possible ACE inhibitor cough  Now back on high dose ARB  Has balance issues so want to monitor closely to avoid overtreatment as well  Gfr and electrolytes stable  Plan:  - Continue metoprolol at current dose- would not increase d/t HR in 50's  - Continue  irbesartan 300 mg daily  - Continue amlodipine   - Continue lifestyle modification  - Recheck GFR, electrolytes, urine for albumin  - Continue home BP monitoring  - Intensify therapy as needed    Glycemic Status: Prediabetic  IFG, mild and stable  - Continue lifestyle mod  - Follow fasting glucose    Anti-Platelet/Anti-Coagulant Tx: yes  Patient is therapeutic  - Continue indefinite anticoagulation with warfarin  -Take aspirin 81 mg a day for 3 days and then can discontinue (see below  - F/U with anticoagulation clinic as scheduled -POC INR today  - limit nsaids    Smoking: continue complete avoidance    Physical Activity:  Encouraged more walking    Weight Management and Nutrition: Dietary plan was discussed with patient at this visit including c/w Med diet and decreasing his sodium a bit    Other:     1. TIA -has history of previous cardioembolic TIA likely with his native aortic valve as a source.  He had what sounds of a recurrent ocular TIA this morning.  His symptoms resolved after only a few minutes.  He has no other TIA or stroke symptoms.  He is refusing ER evaluation.  He is therapeutic on warfarin.  No evidence of any problems with his valve on recent echocardiogram.  As we discussed, there is a risk of a larger ischemic events.  He should call 911 immediately with any further TIA or stroke symptoms.  We will continue with his current warfarin dose.  I have asked him to take aspirin 81 mg daily for the next 3 days.  Report any bleeding.  We will check carotid duplex.  We will also message his cardiologist to see if he has any other recommendations  2. Carotid atherosclerosis status post CEA- .  Only mild restenosis on repeat duplex.  Remains asymptomatic.  Repeat duplex given TIA  3. Valvular Heart Disease -now status post TAVR March 2020.  Still with unrepaired mitral disease -improved on most recent echo.  Appears asymptomatic at present.  Does not appear to be volume overloaded.  Await repeat  echocardiogram at discretion of cardiology  4. Colonic Polyp on recent colonoscopy - repeat colonoscopy in 2019 negative per patient.   No further f/u needed  5. Insomnia - trialed zolpidem w/o success. Wean down temazepam as trial recommendation by PCP as could be affecting balance. Patient understands addictive potential.  6-month refill given today  6.  Atrial fibrillation - defer management of rhythm and rate to cards.  Continue anticoag as above  7.  DVT - postoperative.  No recurrence.  Continue indefinite anticoagulation given concomitant afib  8.  H/o prostate ca and multiple urological issues - defer f/u to urology.  Check PSA as they recommend  9.  Balance issues and neuropathy -this seems recalcitrant to physical therapy or any intervention that we have tried previously.  Defer f/u to ENT and neurology  10.  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  Good arterial flow and patent stent on noninvasive study.  .  Report any claudication, rest pain, or ulceration immediately.  Will obtain repeat arterial duplex 2 year from previous    We will partner with other providers in the management of established vascular disease and cardiometabolic risk factors.    Studies to Be Obtained:    1) carotid duplex now  2) aortoiliac duplex April 2023  3) repeat echocardiogram at discretion of cardiology    Labs to Be Obtained: as above prior to next visit    Follow up in: 6 mo    Time: 30-39min - chart review/prep, review of other providers' records, imaging/lab review, face-to-face time for history/examination, ordering, prescribing,  review of results/meds/ treatment plan with patient/family/caregiver, documentation in EMR, care coordination (as needed)     Michael J Bloch, M.D.     Cc: CORA Barrientos

## 2022-09-08 ENCOUNTER — DOCUMENTATION (OUTPATIENT)
Dept: VASCULAR LAB | Facility: MEDICAL CENTER | Age: 87
End: 2022-09-08
Payer: MEDICARE

## 2022-09-08 DIAGNOSIS — G45.9 TIA (TRANSIENT ISCHEMIC ATTACK): ICD-10-CM

## 2022-09-08 RX ORDER — ASPIRIN 81 MG/1
81 TABLET, CHEWABLE ORAL DAILY
Qty: 100 TABLET | Refills: 11
Start: 2022-09-08

## 2022-09-09 ENCOUNTER — DOCUMENTATION (OUTPATIENT)
Dept: VASCULAR LAB | Facility: MEDICAL CENTER | Age: 87
End: 2022-09-09
Payer: MEDICARE

## 2022-09-09 NOTE — PROGRESS NOTES
Case d/w cards who recommends continue low dose asa in addition to warfarin. Seems reasonable. Will ask MA to let patient know. Report any bleeding immediately.    Michael Bloch, MD  Vascular Care

## 2022-09-09 NOTE — PROGRESS NOTES
"Left message for patient to call back. Dr. Bloch would like to f/u on the following:    \"1) make sure no new tia or stroke symptoms   2) let him know that we discussed the case with his cardiologist and we agree that he should stay on low dose aspirin indefinitely in addition to the warfarin. He should let us know if he has any bleeding\"  "

## 2022-09-12 ENCOUNTER — DOCUMENTATION (OUTPATIENT)
Dept: VASCULAR LAB | Facility: MEDICAL CENTER | Age: 87
End: 2022-09-12
Payer: MEDICARE

## 2022-09-12 ENCOUNTER — HOSPITAL ENCOUNTER (OUTPATIENT)
Dept: RADIOLOGY | Facility: MEDICAL CENTER | Age: 87
End: 2022-09-12
Attending: INTERNAL MEDICINE
Payer: MEDICARE

## 2022-09-12 DIAGNOSIS — I10 ESSENTIAL HYPERTENSION: ICD-10-CM

## 2022-09-12 DIAGNOSIS — G45.9 TIA (TRANSIENT ISCHEMIC ATTACK): ICD-10-CM

## 2022-09-12 PROCEDURE — 93880 EXTRACRANIAL BILAT STUDY: CPT

## 2022-09-12 NOTE — PROGRESS NOTES
Spoke with patient who states he has not had any new s/s of tia or stroke. Patient agrees with plan to stay on asa 81 in addition to the warfarin. Patient will reach out with any bleeding concerns.

## 2022-09-12 NOTE — TELEPHONE ENCOUNTER
Is the patient due for a refill? Yes    Was the patient seen the past year? No    Date of last office visit: 9/14/2021    Does the patient have an upcoming appointment?  No    Provider to refill:Dr. MOLINA    Does the patients insurance require a 100 day supply?  No

## 2022-09-14 ENCOUNTER — OFFICE VISIT (OUTPATIENT)
Dept: CARDIOLOGY | Facility: MEDICAL CENTER | Age: 87
End: 2022-09-14
Payer: MEDICARE

## 2022-09-14 ENCOUNTER — DOCUMENTATION (OUTPATIENT)
Dept: VASCULAR LAB | Facility: MEDICAL CENTER | Age: 87
End: 2022-09-14
Payer: MEDICARE

## 2022-09-14 VITALS
DIASTOLIC BLOOD PRESSURE: 60 MMHG | BODY MASS INDEX: 25.48 KG/M2 | HEIGHT: 71 IN | HEART RATE: 60 BPM | RESPIRATION RATE: 16 BRPM | OXYGEN SATURATION: 95 % | SYSTOLIC BLOOD PRESSURE: 110 MMHG | WEIGHT: 182 LBS

## 2022-09-14 DIAGNOSIS — G45.9 TIA (TRANSIENT ISCHEMIC ATTACK): ICD-10-CM

## 2022-09-14 PROCEDURE — 99214 OFFICE O/P EST MOD 30 MIN: CPT | Performed by: INTERNAL MEDICINE

## 2022-09-14 RX ORDER — IRBESARTAN 300 MG/1
300 TABLET ORAL DAILY
Qty: 90 TABLET | Refills: 0 | OUTPATIENT
Start: 2022-09-14

## 2022-09-14 RX ORDER — ENOXAPARIN SODIUM 100 MG/ML
INJECTION SUBCUTANEOUS
COMMUNITY
Start: 2022-08-15 | End: 2022-09-20

## 2022-09-14 RX ORDER — LATANOPROST 50 UG/ML
SOLUTION/ DROPS OPHTHALMIC
COMMUNITY
Start: 2022-09-06

## 2022-09-14 ASSESSMENT — FIBROSIS 4 INDEX: FIB4 SCORE: 3.2

## 2022-09-14 NOTE — PROGRESS NOTES
Cardiology Follow-up Consultation Note        CC: Follow-up, recent TIA    Patient ID/HPI:   87-year-old male patient with history of aortic stenosis s/p TAVR, coronary artery disease, mitral regurgitation, hypertension experienced recent transient ischemic attack, he noticed sudden monocular vision loss lasted for few minutes.  He feels well currently.      Past Medical History:   Diagnosis Date    Aortic valve stenosis     Arrhythmia     Afib    Arthritis     osteo, hips and knees    Blood clotting disorder ([facility])     , left arm    Bronchitis         Cancer ([facility])     Prostate CA--2000    Carotid arterial disease ([facility]) 1/6/2014    Cataract     removed bilat    DVT (deep venous thrombosis) ([facility]) 2016    Heart valve disease     MVR    HLD (hyperlipidemia) 1/6/2014    Hypertension     Mitral regurgitation 1/6/2014    Pain 12-14-15    low back, hips, 2-3/10    Pneumonia         Stroke ([facility]) 2008    TIA, no residual    TIA (transient ischemic attack)     2008    Urinary bladder disorder     HAD MALE BLADDER SUSPENSION PROCEDURE DONE    Urinary incontinence     stress incontinence         Current Outpatient Medications   Medication Sig Dispense Refill    latanoprost (XALATAN) 0.005 % Solution INSTILL 1 DROP INTO EACH EYE AT BEDTIME      metoprolol SR (TOPROL XL) 25 MG TABLET SR 24 HR Take 1 tablet by mouth once daily 90 Tablet 0    aspirin (ASA) 81 MG Chew Tab chewable tablet Chew 1 Tablet every day. 100 Tablet 11    temazepam (RESTORIL) 30 MG capsule Take 1 Capsule by mouth at bedtime as needed for Sleep for up to 30 days. 30d supply, ICD 10 = F51.01 30 Capsule 0    warfarin (COUMADIN) 4 MG Tab TAKE 1 & 1/2 (ONE & ONE-HALF) TABLETS BY MOUTH ONCE DAILY OR  AS  DIRECTED  BY  ANTICOAGULATION  CLINIC 135 Tablet 1    ezetimibe (ZETIA) 10 MG Tab Take 1 tablet by mouth once daily 90 Tablet 1    amLODIPine (NORVASC) 5 MG Tab Take 1 Tablet by mouth every day. 90 Tablet 3    atorvastatin (LIPITOR) 80 MG  "tablet TAKE 1/2 (ONE-HALF) TABLET BY MOUTH ONCE DAILY IN THE EVENING 45 Tablet 3    gabapentin (NEURONTIN) 300 MG Cap Take 1 Capsule by mouth 3 times a day. Get future refills from  Capsule 3    irbesartan (AVAPRO) 300 MG Tab Take 1 Tablet by mouth every day. 30 Tablet 11    Ascorbic Acid (VITAMIN C PO) Take 500 mg by mouth.      Multiple Vitamins-Minerals (DAILY MULTI VITAMIN/MINERALS PO) Take  by mouth.      Acetaminophen (TYLENOL PO) Take  by mouth.      furosemide (LASIX) 20 MG Tab Take 1 tablet by mouth 1 time a day as needed.      enoxaparin (LOVENOX) 80 MG/0.8ML Solution Prefilled Syringe inj  (Patient not taking: Reported on 9/14/2022)       No current facility-administered medications for this visit.         Physical Exam:  Ambulatory Vitals  /60 (BP Location: Left arm, Patient Position: Sitting, BP Cuff Size: Adult)   Pulse 60   Resp 16   Ht 1.803 m (5' 11\")   Wt 82.6 kg (182 lb)   SpO2 95%    Oxygen Therapy:  Pulse Oximetry: 95 %  BP Readings from Last 4 Encounters:   09/14/22 110/60   08/30/22 (!) 90/59   08/16/22 (!) 148/81   07/12/22 105/65       Weight/BMI: Body mass index is 25.38 kg/m².  Wt Readings from Last 4 Encounters:   09/14/22 82.6 kg (182 lb)   06/14/22 80.7 kg (178 lb)   05/31/22 80.7 kg (178 lb)   05/17/22 80.7 kg (178 lb)       General: Well appearing and in no apparent distress  Head: atrumatic  Eyes: No conjunctival pallor   ENT: normal external appearance of nose and ears  Neck: JVD absent, carotid bruits absent  Lungs: respiratory sounds  normal, additional breath sounds absent  Heart: Regular rhythm,   No palpable thrills on palpation, murmurs absent, no rubs,   Lower extremity edema absent.     Carotid ultrasound report reviewed, independently interpreted shows no significant carotid artery disease    Impression and Plan:  87-year-old male patient with  1.  Status post TAVR  2.  Chronic  heart failure, NYHA class II stage C  3.  Mild LV dysfunction, improved  4.  " Essential hypertension  5.  Moderate mitral regurgitation  6.  Paroxysmal atrial fibrillation  7.  History of DVT,  8.  Recent TIA    Discussed with Dr. Bloch personally, in addition to warfarin we will add aspirin 81 mg indefinitely.  Recent echo shows normal valve function.  Continue current therapy with aspirin, Lipitor, Zetia, amlodipine, irbesartan, metoprolol succinate, warfarin.    He sees Dr. Cecile Dey at [facility].  Advised to follow-up with me as needed.      Sony RAMIRES  Interventional cardiologist  [facility], [address]  [address] [address]  Phone: [phone]  Fax: [phone]

## 2022-09-14 NOTE — PROGRESS NOTES
Carotid duplex s/p CEA without significant restenosis.  Will recheck in a couple of year.    Michael Bloch, MD  [facility]

## 2022-09-14 NOTE — PROGRESS NOTES
Spoke with patients wife and let her know Dr. Bloch has reviewed the carotid u/s, there is no major blockage. Patients wife states understanding and does not have any questions or concerns at this time.

## 2022-09-20 ENCOUNTER — ANTICOAGULATION VISIT (OUTPATIENT)
Dept: VASCULAR LAB | Facility: MEDICAL CENTER | Age: 87
End: 2022-09-20
Attending: NURSE PRACTITIONER
Payer: MEDICARE

## 2022-09-20 VITALS — DIASTOLIC BLOOD PRESSURE: 64 MMHG | SYSTOLIC BLOOD PRESSURE: 101 MMHG | HEART RATE: 67 BPM

## 2022-09-20 DIAGNOSIS — D68.69 SECONDARY HYPERCOAGULABLE STATE (HCC): ICD-10-CM

## 2022-09-20 DIAGNOSIS — I48.0 PAROXYSMAL ATRIAL FIBRILLATION (HCC): ICD-10-CM

## 2022-09-20 DIAGNOSIS — G45.9 TIA (TRANSIENT ISCHEMIC ATTACK): ICD-10-CM

## 2022-09-20 LAB — INR PPP: 2.4 (ref 2–3.5)

## 2022-09-20 PROCEDURE — 85610 PROTHROMBIN TIME: CPT

## 2022-09-20 PROCEDURE — 99211 OFF/OP EST MAY X REQ PHY/QHP: CPT

## 2022-09-20 NOTE — PROGRESS NOTES
Anticoagulation Summary  As of 2022      INR goal:  2.0-3.0   TTR:  70.2 % (7.1 y)   INR used for dosin.40 (2022)   Warfarin maintenance plan:  4 mg (4 mg x 1) every Tue, Fri; 6 mg (4 mg x 1.5) all other days   Weekly warfarin total:  38 mg   Plan last modified:  Jerzy BanksD (2022)   Next INR check:  10/4/2022   Priority:  Maintenance   Target end date:  Indefinite    Indications    Paroxysmal atrial fibrillation (HCC) [I48.0]  Hx of TIA (transient ischemic attack) [G45.9]  Deep vein thrombosis (DVT) (HCC) (Resolved) [I82.409]  Atrial fibrillation (HCC) (Resolved) [I48.91]  Hx of Stroke (Resolved) [I63.9]  Secondary hypercoagulable state (HCC) [D68.69]                 Anticoagulation Episode Summary       INR check location:  Anticoagulation Clinic    Preferred lab:      Send INR reminders to:      Comments:  Takes ASA + warfarin 2/2 TIA Sep 2022  Medications reconciled. Take naproxen as needed for arthritic pain. He is aware of the increased risk of bleeding and takes only when needed with caution          Anticoagulation Care Providers       Provider Role Specialty Phone number    Michael J Bloch, M.D. Referring Internal Medicine [phone]    [facility]ticoagulation [facility]   [phone]                  Refer to Patient Findings for HPI:      Vitals:    22 1013   BP: 101/64   Pulse: 67       Verified current warfarin dosing schedule.    Medications reconciled   Pt is on 81 mg ASA as antiplatelet therapy for recent TIA and must be reviewed again on Vascular visit in November..      A/P   INR  therapeutic.     Warfarin dosing recommendation: Pt is to continue with current warfarin dosing regimen.     Pt educated to contact our clinic with any changes in medications or s/s of bleeding or thrombosis. Pt is aware to seek immediate medical attention for falls, head injury or deep cuts.    Follow up appointment in 2 week(s).    Satya Parra, PharmD    used

## 2022-09-21 LAB — INR BLD: 2.4 (ref 0.9–1.2)

## 2022-09-27 DIAGNOSIS — F51.01 PRIMARY INSOMNIA: ICD-10-CM

## 2022-09-27 RX ORDER — TEMAZEPAM 30 MG/1
CAPSULE ORAL
Qty: 30 CAPSULE | Refills: 5 | Status: SHIPPED | OUTPATIENT
Start: 2022-09-27 | End: 2023-03-30

## 2022-10-06 ENCOUNTER — ANTICOAGULATION VISIT (OUTPATIENT)
Dept: VASCULAR LAB | Facility: MEDICAL CENTER | Age: 87
End: 2022-10-06
Attending: NURSE PRACTITIONER
Payer: MEDICARE

## 2022-10-06 VITALS — DIASTOLIC BLOOD PRESSURE: 78 MMHG | HEART RATE: 52 BPM | SYSTOLIC BLOOD PRESSURE: 142 MMHG

## 2022-10-06 DIAGNOSIS — G45.9 TIA (TRANSIENT ISCHEMIC ATTACK): ICD-10-CM

## 2022-10-06 DIAGNOSIS — I48.0 PAROXYSMAL ATRIAL FIBRILLATION (HCC): ICD-10-CM

## 2022-10-06 DIAGNOSIS — D68.69 SECONDARY HYPERCOAGULABLE STATE (HCC): ICD-10-CM

## 2022-10-06 LAB — INR PPP: 3.4 (ref 2–3.5)

## 2022-10-06 PROCEDURE — 99212 OFFICE O/P EST SF 10 MIN: CPT

## 2022-10-06 PROCEDURE — 85610 PROTHROMBIN TIME: CPT

## 2022-10-06 NOTE — PROGRESS NOTES
Anticoagulation Summary  As of 10/6/2022      INR goal:  2.0-3.0   TTR:  70.1 % (7.2 y)   INR used for dosing:  3.40 (10/6/2022)   Warfarin maintenance plan:  4 mg (4 mg x 1) every Tue, Fri; 6 mg (4 mg x 1.5) all other days   Weekly warfarin total:  38 mg   Plan last modified:  Jerzy BanksD (8/16/2022)   Next INR check:  10/20/2022   Priority:  Maintenance   Target end date:  Indefinite    Indications    Paroxysmal atrial fibrillation (HCC) [I48.0]  Hx of TIA (transient ischemic attack) [G45.9]  Deep vein thrombosis (DVT) (HCC) (Resolved) [I82.409]  Atrial fibrillation (HCC) (Resolved) [I48.91]  Hx of Stroke (Resolved) [I63.9]  Secondary hypercoagulable state (HCC) [D68.69]                 Anticoagulation Episode Summary       INR check location:  Anticoagulation Clinic    Preferred lab:      Send INR reminders to:      Comments:  Takes ASA + warfarin 2/2 TIA Sep 2022  Medications reconciled. Take naproxen as needed for arthritic pain. He is aware of the increased risk of bleeding and takes only when needed with caution          Anticoagulation Care Providers       Provider Role Specialty Phone number    Michael J Bloch, M.D. Referring Internal Medicine [phone]    [facility] Anticoagulation Services   [phone]                  Refer to Patient Findings for HPI:      There were no vitals filed for this visit.      Verified current warfarin dosing schedule.    Medications reconciled   Takes ASA + warfarin 2/2 TIA Sep 2022      A/P   INR  SUPRA-therapeutic.     Warfarin dosing recommendation: Reduced today's dose then Pt is to continue with current warfarin dosing regimen.     BP high, but has historically been low.  Continue to monitor.     Pt educated to contact our clinic with any changes in medications or s/s of bleeding or thrombosis. Pt is aware to seek immediate medical attention for falls, head injury or deep cuts.    Follow up appointment in 2 week(s).    Satya Parra, PharmD

## 2022-10-07 LAB — INR BLD: 3.4 (ref 0.9–1.2)

## 2022-10-07 NOTE — TELEPHONE ENCOUNTER
Result Notes for proBrain Natriuretic Peptide, NT     Notes recorded by Sony Barrientos M.D. on 7/3/2020 at 9:47 AM PDT   Please find out how he is doing. Probnp is better but still elevated. Ask him if he is still feeling fatigued and want to proceed with mitraclip.      ----------------------------------------------------------------------    S/w pt and notified him of lab results and questions per Dr. Barrientos. Pt reports that energy level has increased, he is feeling better, and going to the gym with his mask 3x a week. He reports tolerating new medical therapy well with no side effects. He continues to have some trouble sleeping but this is chronic for him. Pt will follow up with Dr. Anderson and Dr. Barrientos as scheduled and advised to call if any concerns. Pt agrees.     Dr. Barrientos notified.     Yes

## 2022-10-17 ENCOUNTER — OFFICE VISIT (OUTPATIENT)
Dept: URGENT CARE | Facility: CLINIC | Age: 87
End: 2022-10-17
Payer: MEDICARE

## 2022-10-17 VITALS
HEIGHT: 71 IN | OXYGEN SATURATION: 97 % | DIASTOLIC BLOOD PRESSURE: 62 MMHG | SYSTOLIC BLOOD PRESSURE: 100 MMHG | WEIGHT: 182 LBS | HEART RATE: 52 BPM | BODY MASS INDEX: 25.48 KG/M2 | TEMPERATURE: 97.1 F | RESPIRATION RATE: 16 BRPM

## 2022-10-17 DIAGNOSIS — M25.432 SWELLING OF LEFT WRIST: ICD-10-CM

## 2022-10-17 PROCEDURE — 99213 OFFICE O/P EST LOW 20 MIN: CPT | Performed by: FAMILY MEDICINE

## 2022-10-17 RX ORDER — PREDNISONE 20 MG/1
20 TABLET ORAL DAILY
Qty: 5 TABLET | Refills: 0 | Status: SHIPPED | OUTPATIENT
Start: 2022-10-17 | End: 2022-10-22

## 2022-10-17 ASSESSMENT — FIBROSIS 4 INDEX: FIB4 SCORE: 3.2

## 2022-10-17 ASSESSMENT — ENCOUNTER SYMPTOMS: FEVER: 0

## 2022-10-17 NOTE — PROGRESS NOTES
Subjective:     Tristan Hester is a 87 y.o. male who presents for Hand Pain ((L) woke up with pain yesterday, still having pain, no known injury )    HPI  Pt presents for evaluation of an acute problem  Pt with left hand pain which started yesterday   Pain started first thing in the morning   Pain started without fall or injury   Pain is in the wrist   Has been wearing a brace which helps a little   Hx of gout once before, but not recurrently     Review of Systems   Constitutional:  Negative for fever.   Skin:  Negative for rash.     PMH:  has a past medical history of Aortic valve stenosis, Arrhythmia, Arthritis, Blood clotting disorder ([facility]), Bronchitis, Cancer ([facility]), Carotid arterial disease ([facility]) (1/6/2014), Cataract, DVT (deep venous thrombosis) ([facility]) (2016), Heart valve disease, HLD (hyperlipidemia) (1/6/2014), Hypertension, Mitral regurgitation (1/6/2014), Pain (12-14-15), Pneumonia, Stroke ([facility]) (2008), TIA (transient ischemic attack), Urinary bladder disorder, and Urinary incontinence.    He has no past medical history of Liver disease.  MEDS:   Current Outpatient Medications:     temazepam (RESTORIL) 30 MG capsule, TAKE 1 CAPSULE BY MOUTH EVERY DAY AT BEDTIME AS NEEDED FOR SLEEP FOR  UP  TO  30  DAYS, Disp: 30 Capsule, Rfl: 5    latanoprost (XALATAN) 0.005 % Solution, INSTILL 1 DROP INTO EACH EYE AT BEDTIME, Disp: , Rfl:     metoprolol SR (TOPROL XL) 25 MG TABLET SR 24 HR, Take 1 tablet by mouth once daily, Disp: 90 Tablet, Rfl: 0    aspirin (ASA) 81 MG Chew Tab chewable tablet, Chew 1 Tablet every day., Disp: 100 Tablet, Rfl: 11    warfarin (COUMADIN) 4 MG Tab, TAKE 1 & 1/2 (ONE & ONE-HALF) TABLETS BY MOUTH ONCE DAILY OR  AS  DIRECTED  BY  ANTICOAGULATION  CLINIC, Disp: 135 Tablet, Rfl: 1    ezetimibe (ZETIA) 10 MG Tab, Take 1 tablet by mouth once daily, Disp: 90 Tablet, Rfl: 1    amLODIPine (NORVASC) 5 MG Tab, Take 1 Tablet by mouth every day., Disp: 90 Tablet, Rfl: 3    atorvastatin (LIPITOR) 80 MG  tablet, TAKE 1/2 (ONE-HALF) TABLET BY MOUTH ONCE DAILY IN THE EVENING, Disp: 45 Tablet, Rfl: 3    gabapentin (NEURONTIN) 300 MG Cap, Take 1 Capsule by mouth 3 times a day. Get future refills from PCP, Disp: 270 Capsule, Rfl: 3    irbesartan (AVAPRO) 300 MG Tab, Take 1 Tablet by mouth every day., Disp: 30 Tablet, Rfl: 11    Ascorbic Acid (VITAMIN C PO), Take 500 mg by mouth., Disp: , Rfl:     Multiple Vitamins-Minerals (DAILY MULTI VITAMIN/MINERALS PO), Take  by mouth., Disp: , Rfl:     Acetaminophen (TYLENOL PO), Take  by mouth., Disp: , Rfl:     furosemide (LASIX) 20 MG Tab, Take 1 tablet by mouth 1 time a day as needed., Disp: , Rfl:   ALLERGIES:   Allergies   Allergen Reactions    Other Environmental Runny Nose and Itching     Seasonal, cats-hayfever     SURGHX:   Past Surgical History:   Procedure Laterality Date    TRANSCATHETER AORTIC VALVE REPLACEMENT  3/2/2020    Procedure: REPLACEMENT, AORTIC VALVE, TRANSCATHETER -;  Surgeon: Sony Barrientos M.D.;  Location: [facility];  Service: Cardiac    YOANDY  3/2/2020    Procedure: ECHOCARDIOGRAM, TRANSESOPHAGEAL;  Surgeon: Sony Barrientos M.D.;  Location: [facility];  Service: Cardiac    FEMORAL ENDARTERECTOMY Left 3/2/2020    Procedure: Repair of left femoral  Artery Dissection;  Surgeon: Alpa Tillman M.D.;  Location: [facility];  Service: General    FUSION, SPINE, LUMBAR, PLIF  12/28/2015    Procedure: LUMBAR FUSION POSTERIOR L3-5 with peek rods;  Surgeon: Michael Almonte M.D.;  Location: [facility];  Service:     LUMBAR DECOMPRESSION  12/28/2015    Procedure: LUMBAR DECOMPRESSION posterior redo decompression L3-5, with dural repair;  Surgeon: Michael Almonte M.D.;  Location: [facility];  Service:     THORACOSCOPY  6/13/2015    Procedure: THORACOSCOPY with decortication VATS , side to be determined  ;  Surgeon: Elmira Holder M.D.;  Location: [facility];  Service:     LUMBAR  "LAMINECTOMY DISKECTOMY  10/8/2010    Performed by PHYLICIA SARKAR at SURGERY [facility] ORS    CERVICAL DISK AND FUSION ANTERIOR  10/26/2009    Performed by PHYLICIA SARKAR at SURGERY [facility] ORS    CAROTID ENDARTERECTOMY  7/10/08    Performed by LEIGH MORATAYA at SURGERY [facility] ORS    BLADDER SLING MALE  11/2004    PROSTATECTOMY, RADIAL  11/2000     SOCHX:  reports that he quit smoking about 37 years ago. His smoking use included cigarettes and pipe. He has a 15.00 pack-year smoking history. He has never used smokeless tobacco. He reports current alcohol use of about 3.0 oz per week. He reports that he does not use drugs.     Objective:   /62   Pulse (!) 52   Temp 36.2 °C (97.1 °F) (Temporal)   Resp 16   Ht 1.803 m (5' 11\")   Wt 82.6 kg (182 lb)   SpO2 97%   BMI 25.38 kg/m²     Physical Exam  Constitutional:       General: He is not in acute distress.     Appearance: He is well-developed. He is not diaphoretic.   Pulmonary:      Effort: Pulmonary effort is normal.   Neurological:      Mental Status: He is alert.   Left wrist/hand  General: Mild ecchymosis present in bilateral forearms  Palpation: TTP focally over the dorsal wrist joint with slight joint swelling compared to contralateral side, no overlying erythema, no abrasions/lacerations/scabbed lesions in the area  ROM: Wrist ROM limited by pain passively and actively  Strength: 5/5 throughout   Neuro: median, radial, ulnar nerves intact on testing  Vascular: radial, ulnar pulses 2+ and symmetric, cap refill <2 sec    Assessment/Plan:   Assessment    1. Swelling of left wrist  - predniSONE (DELTASONE) 20 MG Tab; Take 1 Tablet by mouth every day for 5 days.  Dispense: 5 Tablet; Refill: 0    Patient with a left wrist swelling which appears to be gout.  Advised that there is a small chance it could be autoimmune arthritis such as rheumatoid arthritis, however would be very odd to present this late in life.  Patient has had no recent injuries in " the area and has had no recent lacerations or abrasions.  There is no overlying erythema and wrist is not consistent with septic arthritis.  Given close follow-up precautions if patient is not rapidly improving over the next 24 to 48 hours.  Patient will follow-up if not recovering as expected.

## 2022-10-20 ENCOUNTER — ANTICOAGULATION VISIT (OUTPATIENT)
Dept: VASCULAR LAB | Facility: MEDICAL CENTER | Age: 87
End: 2022-10-20
Attending: NURSE PRACTITIONER
Payer: MEDICARE

## 2022-10-20 VITALS — HEART RATE: 55 BPM | SYSTOLIC BLOOD PRESSURE: 132 MMHG | DIASTOLIC BLOOD PRESSURE: 76 MMHG

## 2022-10-20 DIAGNOSIS — I48.0 PAROXYSMAL ATRIAL FIBRILLATION (HCC): ICD-10-CM

## 2022-10-20 DIAGNOSIS — G45.9 TIA (TRANSIENT ISCHEMIC ATTACK): ICD-10-CM

## 2022-10-20 DIAGNOSIS — D68.69 SECONDARY HYPERCOAGULABLE STATE (HCC): ICD-10-CM

## 2022-10-20 LAB
INR BLD: 3.3 (ref 0.9–1.2)
INR PPP: 3.3 (ref 2–3.5)

## 2022-10-20 PROCEDURE — 85610 PROTHROMBIN TIME: CPT

## 2022-10-20 PROCEDURE — 99212 OFFICE O/P EST SF 10 MIN: CPT

## 2022-10-20 NOTE — PROGRESS NOTES
Anticoagulation Summary  As of 10/20/2022      INR goal:  2.0-3.0   TTR:  69.7 % (7.2 y)   INR used for dosing:  3.30 (10/20/2022)   Warfarin maintenance plan:  4 mg (4 mg x 1) every Tue, Fri; 6 mg (4 mg x 1.5) all other days   Weekly warfarin total:  38 mg   Plan last modified:  Satya Parra, PharmD (8/16/2022)   Next INR check:  11/3/2022   Priority:  Maintenance   Target end date:  Indefinite    Indications    Paroxysmal atrial fibrillation (HCC) [I48.0]  Hx of TIA (transient ischemic attack) [G45.9]  Deep vein thrombosis (DVT) (HCC) (Resolved) [I82.409]  Atrial fibrillation (HCC) (Resolved) [I48.91]  Hx of Stroke (Resolved) [I63.9]  Secondary hypercoagulable state (HCC) [D68.69]                 Anticoagulation Episode Summary       INR check location:  Anticoagulation Clinic    Preferred lab:      Send INR reminders to:      Comments:  Takes ASA + warfarin 2/2 TIA Sep 2022  Medications reconciled. Take naproxen as needed for arthritic pain. He is aware of the increased risk of bleeding and takes only when needed with caution          Anticoagulation Care Providers       Provider Role Specialty Phone number    Michael J Bloch, M.D. Referring Internal Medicine [phone]    [facility] Anticoagulation Services   [phone]                  Refer to Patient Findings for HPI:  Patient Findings       Positives:  Change in health (experiencing bursitis), Change in medications (has 1 more day of prednisone; also taking ibuprofen prn and tramadol prn)    Negatives:  Signs/symptoms of thrombosis, Signs/symptoms of bleeding, Laboratory test error suspected, Change in alcohol use, Change in activity, Upcoming invasive procedure, Emergency department visit, Upcoming dental procedure, Missed doses, Extra doses, Change in diet/appetite, Hospital admission, Bruising, Other complaints            Vitals:    10/20/22 1041   BP: 132/76   Pulse: (!) 55     Verified current warfarin dosing schedule.    Medications reconciled   Pt is  on ASA as antiplatelet therapy for TIA per Dr Bloch     A/P   INR  supra-therapeutic.     Warfarin dosing recommendation: Reduce today's dose to 2 mg then continue regimen. Expect INR to continue to be supratherapeutic d/t DDI with prednisone, so will not change maintenance therapy    Pt educated to contact our clinic with any changes in medications or s/s of bleeding or thrombosis. Pt is aware to seek immediate medical attention for falls, head injury or deep cuts.    Follow up appointment in 2 week(s).    Karrie Barker, JerzyD

## 2022-11-03 ENCOUNTER — ANTICOAGULATION VISIT (OUTPATIENT)
Dept: VASCULAR LAB | Facility: MEDICAL CENTER | Age: 87
End: 2022-11-03
Attending: INTERNAL MEDICINE
Payer: MEDICARE

## 2022-11-03 VITALS — DIASTOLIC BLOOD PRESSURE: 67 MMHG | SYSTOLIC BLOOD PRESSURE: 108 MMHG | HEART RATE: 52 BPM

## 2022-11-03 DIAGNOSIS — G45.9 TIA (TRANSIENT ISCHEMIC ATTACK): ICD-10-CM

## 2022-11-03 DIAGNOSIS — D68.69 SECONDARY HYPERCOAGULABLE STATE (HCC): ICD-10-CM

## 2022-11-03 DIAGNOSIS — I48.0 PAROXYSMAL ATRIAL FIBRILLATION (HCC): ICD-10-CM

## 2022-11-03 LAB — INR PPP: 3.1 (ref 2–3.5)

## 2022-11-03 PROCEDURE — 99211 OFF/OP EST MAY X REQ PHY/QHP: CPT

## 2022-11-03 PROCEDURE — 85610 PROTHROMBIN TIME: CPT

## 2022-11-03 NOTE — PROGRESS NOTES
Anticoagulation Summary  As of 11/3/2022      INR goal:  2.0-3.0   TTR:  69.3 % (7.2 y)   INR used for dosing:  3.10 (11/3/2022)   Warfarin maintenance plan:  4 mg (4 mg x 1) every Sun, Tue, Fri; 6 mg (4 mg x 1.5) all other days   Weekly warfarin total:  36 mg   Plan last modified:  Jerzy SotoD (11/3/2022)   Next INR check:  11/17/2022   Priority:  Maintenance   Target end date:  Indefinite    Indications    Paroxysmal atrial fibrillation (HCC) [I48.0]  Hx of TIA (transient ischemic attack) [G45.9]  Deep vein thrombosis (DVT) (HCC) (Resolved) [I82.409]  Atrial fibrillation (HCC) (Resolved) [I48.91]  Hx of Stroke (Resolved) [I63.9]  Secondary hypercoagulable state (HCC) [D68.69]                 Anticoagulation Episode Summary       INR check location:  Anticoagulation Clinic    Preferred lab:      Send INR reminders to:      Comments:  Takes ASA + warfarin 2/2 TIA Sep 2022  Medications reconciled. Take naproxen as needed for arthritic pain. He is aware of the increased risk of bleeding and takes only when needed with caution          Anticoagulation Care Providers       Provider Role Specialty Phone number    Michael J Bloch, M.D. Referring Internal Medicine [phone]    [facility] Anticoagulation Services   [phone]                  Refer to Patient Findings for HPI:  Patient Findings       Positives:  Change in medications (Eye drops for glaucoma)    Negatives:  Signs/symptoms of thrombosis, Signs/symptoms of bleeding, Laboratory test error suspected, Change in health, Change in alcohol use, Change in activity, Upcoming invasive procedure, Emergency department visit, Upcoming dental procedure, Missed doses, Extra doses, Change in diet/appetite, Hospital admission, Bruising, Other complaints            Vitals:    11/03/22 1047   BP: 108/67   Pulse: (!) 52       Verified current warfarin dosing schedule.    Medications reconciled   Takes ASA + warfarin 2/2 TIA Sep 2022 - evaluate next week with   Bloch      A/P   INR  SUPRA-therapeutic. Decreased from prior. Prior weekly dosing reduced.      Warfarin dosing recommendation: Reduce weekly regimen to 4 mg Sun, Tues, Fri and 6 mg AOD.     Pt educated to contact our clinic with any changes in medications or s/s of bleeding or thrombosis. Pt is aware to seek immediate medical attention for falls, head injury or deep cuts.    Follow up appointment in 3 week(s).    Jerzy SotoD

## 2022-11-04 LAB — INR BLD: 3.1 (ref 0.9–1.2)

## 2022-11-08 ENCOUNTER — PATIENT MESSAGE (OUTPATIENT)
Dept: HEALTH INFORMATION MANAGEMENT | Facility: OTHER | Age: 87
End: 2022-11-08

## 2022-11-08 ENCOUNTER — OFFICE VISIT (OUTPATIENT)
Dept: VASCULAR LAB | Facility: MEDICAL CENTER | Age: 87
End: 2022-11-08
Attending: INTERNAL MEDICINE
Payer: MEDICARE

## 2022-11-08 VITALS
HEIGHT: 70 IN | DIASTOLIC BLOOD PRESSURE: 72 MMHG | WEIGHT: 185 LBS | SYSTOLIC BLOOD PRESSURE: 130 MMHG | BODY MASS INDEX: 26.48 KG/M2 | HEART RATE: 56 BPM

## 2022-11-08 DIAGNOSIS — I65.29 STENOSIS OF CAROTID ARTERY, UNSPECIFIED LATERALITY: ICD-10-CM

## 2022-11-08 DIAGNOSIS — I10 ESSENTIAL HYPERTENSION, BENIGN: ICD-10-CM

## 2022-11-08 DIAGNOSIS — G45.9 TIA (TRANSIENT ISCHEMIC ATTACK): ICD-10-CM

## 2022-11-08 DIAGNOSIS — D68.69 SECONDARY HYPERCOAGULABLE STATE (HCC): ICD-10-CM

## 2022-11-08 DIAGNOSIS — I48.0 PAROXYSMAL ATRIAL FIBRILLATION (HCC): ICD-10-CM

## 2022-11-08 DIAGNOSIS — E78.5 HYPERLIPIDEMIA, UNSPECIFIED HYPERLIPIDEMIA TYPE: ICD-10-CM

## 2022-11-08 PROCEDURE — 99212 OFFICE O/P EST SF 10 MIN: CPT

## 2022-11-08 PROCEDURE — 99214 OFFICE O/P EST MOD 30 MIN: CPT | Performed by: INTERNAL MEDICINE

## 2022-11-08 ASSESSMENT — FIBROSIS 4 INDEX: FIB4 SCORE: 3.2

## 2022-11-08 NOTE — PROGRESS NOTES
"VASCULAR FOLLOW UP VISIT  Subjective:   Tristan Hester is a 87 y.o. male who presents 22    for vascular f/u  Chief Complaint   Patient presents with    Follow-Up       Subjective     HPI:   Here for f/u of possible TIA in the setting of HTN, dyslipidemia, carotid disease and valvular heart disease, and atrial fibrillation.  No further tia or cva symptoms  No signficiant TELLEZ  BPs 120s-140s at home - lower in AC clinic  Lots of bruising but no blood in stool or urine.   On warfarin + asa  No change in statin or BP meds  Continues to struggle with mobility and balance, uses cane, no falls reported   Has been meeting with urology regularly  No longer seeing neuro    Social History     Tobacco Use    Smoking status: Former     Packs/day: 0.50     Years: 30.00     Pack years: 15.00     Types: Cigarettes, Pipe     Quit date: 1985     Years since quittin.8    Smokeless tobacco: Never   Vaping Use    Vaping Use: Never used   Substance Use Topics    Alcohol use: Yes     Alcohol/week: 3.0 oz     Types: 3 Cans of beer, 3 Shots of liquor per week     Comment: 1 x week    Drug use: No     DIET AND EXERCISE:  Weight Change: Stabilized  Diet: Relatively low sodium- bit less adherent  Exercise: 3x week at gym        Objective        Objective:     Vitals:    22 1045 22 1050   BP: 131/77 130/72   BP Location: Right arm Left arm   Patient Position: Sitting Sitting   BP Cuff Size: Adult Adult   Pulse: (!) 58 (!) 56   Weight: 83.9 kg (185 lb)    Height: 1.778 m (5' 10\")         Body mass index is 26.54 kg/m².  Physical Exam  Vitals reviewed.   Constitutional:       Appearance: He is well-developed. He is not toxic-appearing or diaphoretic.   Eyes:      Extraocular Movements: Extraocular movements intact.      Conjunctiva/sclera: Conjunctivae normal.   Neck:      Vascular: No carotid bruit.   Cardiovascular:      Rate and Rhythm: Normal rate and regular rhythm.      Heart sounds: Murmur heard. "   Pulmonary:      Effort: Pulmonary effort is normal. No respiratory distress.      Breath sounds: Normal breath sounds. No wheezing or rales.   Musculoskeletal:      Right lower leg: No edema.      Left lower leg: No edema.   Neurological:      General: No focal deficit present.      Mental Status: He is alert and oriented to person, place, and time.      Cranial Nerves: No cranial nerve deficit.      Gait: Gait normal.   Psychiatric:         Mood and Affect: Mood normal.         Behavior: Behavior normal.     Lab Results   Component Value Date    CHOLSTRLTOT 125 04/05/2022    LDL 63 04/05/2022    HDL 51 04/05/2022    TRIGLYCERIDE 54 04/05/2022      Lab Results   Component Value Date    INR 3.10 11/03/2022         Lab Results   Component Value Date    SODIUM 138 04/05/2022    POTASSIUM 5.3 04/05/2022    CHLORIDE 102 04/05/2022    CO2 23 04/05/2022    GLUCOSE 91 04/05/2022    BUN 28 (H) 04/05/2022    CREATININE 1.03 04/05/2022    BUNCREATRAT 21 11/11/2021    IFAFRICA 82 11/11/2021    IFAFRICA >60 03/15/2021    IFNOTAFR 71 11/11/2021    IFNOTAFR >60 03/15/2021      Lab Results   Component Value Date    TSH 1.790 07/19/2021      Lab Results   Component Value Date    WBC 6.6 04/05/2022    RBC 5.05 04/05/2022    HEMOGLOBIN 15.2 04/05/2022    HEMATOCRIT 46.6 04/05/2022    MCV 92.3 04/05/2022    MCH 30.1 04/05/2022    MCHC 32.6 (L) 04/05/2022    MPV 11.5 04/05/2022     Carotid duplex February 2021   No evidence for RIGHT carotid stenosis status post endarterectomy.   Mild LEFT internal carotid artery stenosis.     Echocardiogram March 2021  Prior echo on 8/7/20.Left ventricular ejection fraction is visually   estimated to be 60%.  Normal diastolic function.  Known TAVR aortic valve that is functioning normally with normal   transvalvular gradients. Vmax is 1.85 m/s.  Moderate eccentric mitral regurgitation.    Aortoiliac duplex April 2021   No evidence of aortic aneurysm.    No evidence of pseudoaneurysm or injury to the  bilateral iliac arteries.     No stenosis of the proximal segment of the major visceral arteries.    Waveforms and velocities of the bilateral iliac arteries are normal.    Common iliac diameter (cm):   Right- 1.63 x 1.72         Left-   1.70 x 1.72    abpm 2022  Mean daytime 142/80  Normal nocturnal dip    Echo april 2022  Compared to the images of the prior study 03/02/21, there has been no   significant change.   Normal left ventricular systolic function.  The left ventricular ejection fraction is visually estimated to be 60%.  Grade II diastolic dysfunction.  Normal right ventricular systolic function.  Mild mitral regurgitation.  Known TAVR aortic valve that is functioning normally with normal transvalvular gradients.    Carotid duplex sep 2022   Surgical changes of the right carotid bifurcation consistent with post    carotid endarterectomy status.    No significant stenosis of the bilateral internal carotid arteries (<50%).                Medical Decision Making:  Today's Assessment / Status / Plan:     1. Paroxysmal atrial fibrillation (HCC)        2. TIA (transient ischemic attack)        3. Secondary hypercoagulable state (HCC)        4. Hyperlipidemia, unspecified hyperlipidemia type        5. Essential hypertension, benign          Patient Type: Secondary Prevention    Etiology of Established CVD if Present:   1. TIA - likely cardioembolic from valve disease  2. Carotid atherosclerosis -status post CEA  3. Valvular Heart Disease  4. Atrial fibrillation with onset during acute illness, now chronic  5.  DVT after back surgery jan 2016  6.  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    Lipid Management: Qualifies for Statin Therapy Based on 2018 ACC/AHA Guidelines: yes  Calculated 10-Year Risk of ASCVD: N/A  Currently on Statin: Yes  ACC/aha criteria for at least mod intensity statin  Per nla goal ldl <70 and nonHDL <100 - below threshold on most recent blood work  Plan:  - Continue  atorvastatin at current dose    - continue zetia  - Continue TLC  - Recheck fasting lipids prior to next visit    Blood Pressure Management:  Acc/aha bp goal <130/80  ABPM high prior to intensification of meds  BP much improved both at home and in office with intensification of meds  No falls or further lightheadedness  No significant albuminuria in the past  Possible ACE inhibitor cough  Now back on high dose ARB  Has balance issues so want to monitor closely to avoid overtreatment as well  Gfr and electrolytes stable  Overall bp control seems reasonable for current scenario  Plan:  - Continue metoprolol at current dose- would not increase d/t HR in 50's  - Continue irbesartan 300 mg daily  - Continue amlodipine   - Continue lifestyle modification  - Recheck GFR, electrolytes prior to next visit  - Continue home BP monitoring  - Intensify therapy as needed    Glycemic Status: Prediabetic  IFG, mild and stable  - Continue lifestyle mod  - Follow fasting glucose    Anti-Platelet/Anti-Coagulant Tx: yes  - Continue indefinite anticoagulation with warfarin  -continue low dose asa as recommended by cards given tia on warfarin  - report any bleeding  - F/U with anticoagulation clinic as scheduled   - limit nsaids    Smoking: continue complete avoidance    Physical Activity:  Encouraged more walking    Weight Management and Nutrition: Dietary plan was discussed with patient at this visit including c/w Med diet and decreasing his sodium a bit    Other:     1. TIA -has history of previous cardioembolic TIA likely with his native aortic valve as a source.  He had what sounds of a recurrent ocular TIA in september.  His symptoms resolved after only a few minutes. No further TIA or stroke symptoms.  No evidence of any problems with his valve on recent echocardiogram.  No source on repeat carotid duplex. He should call 911 immediately with any further TIA or stroke symptoms.  Antiplatelet and anticoag as above    2. Carotid  atherosclerosis status post CEA- .  Only mild restenosis on repeat duplex.  Remains asymptomatic.  Repeat duplex in 2 years    3. Valvular Heart Disease -now status post TAVR March 2020.  Still with unrepaired mitral disease -improved on most recent echo.  Appears asymptomatic at present.  Does not appear to be volume overloaded.  Await repeat echocardiogram - timing at discretion of cardiology    4. Colonic Polyp on recent colonoscopy - repeat colonoscopy in 2019 negative per patient.   No further f/u needed    5. Insomnia - trialed zolpidem w/o success. Wean down temazepam as trial recommendation by PCP as could be affecting balance. Patient understands addictive potential.  6-month refill given today    6.  Atrial fibrillation - defer management of rhythm and rate to cards.  Continue anticoag as above    7.  DVT - postoperative.  No recurrence.  Continue indefinite anticoagulation given concomitant afib    8.  H/o prostate ca and multiple urological issues - defer f/u to urology.  Check PSA as they recommend    9.  Balance issues and neuropathy -this seems recalcitrant to physical therapy or any intervention that we have tried previously.  Defer f/u to ENT and neurology    10.  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  Good arterial flow and patent stent on noninvasive study.  .  Report any claudication, rest pain, or ulceration immediately.  Will obtain repeat arterial duplex 2 year from previous    We will partner with other providers in the management of established vascular disease and cardiometabolic risk factors.    Studies to Be Obtained:    1) carotid duplex sep 2024  2) aortoiliac duplex April 2023  3) repeat echocardiogram at discretion of cardiology    Labs to Be Obtained: as above prior to next visit    Follow up in: 4 mo    Time: 30-39min - chart review/prep, review of other providers' records, imaging/lab review, face-to-face  time for history/examination, ordering, prescribing,  review of results/meds/ treatment plan with patient/family/caregiver, documentation in EMR, care coordination (as needed)     Michael J Bloch, M.D.

## 2022-11-22 ENCOUNTER — APPOINTMENT (OUTPATIENT)
Dept: VASCULAR LAB | Facility: MEDICAL CENTER | Age: 87
End: 2022-11-22
Attending: INTERNAL MEDICINE
Payer: MEDICARE

## 2022-11-23 ENCOUNTER — ANTICOAGULATION VISIT (OUTPATIENT)
Dept: VASCULAR LAB | Facility: MEDICAL CENTER | Age: 87
End: 2022-11-23
Attending: INTERNAL MEDICINE
Payer: MEDICARE

## 2022-11-23 DIAGNOSIS — D68.69 SECONDARY HYPERCOAGULABLE STATE (HCC): ICD-10-CM

## 2022-11-23 DIAGNOSIS — G45.9 TIA (TRANSIENT ISCHEMIC ATTACK): ICD-10-CM

## 2022-11-23 DIAGNOSIS — I48.0 PAROXYSMAL ATRIAL FIBRILLATION (HCC): ICD-10-CM

## 2022-11-23 DIAGNOSIS — E78.5 DYSLIPIDEMIA: ICD-10-CM

## 2022-11-23 LAB
INR BLD: 2.2 (ref 0.9–1.2)
INR PPP: 2.2 (ref 2–3.5)

## 2022-11-23 PROCEDURE — 99211 OFF/OP EST MAY X REQ PHY/QHP: CPT

## 2022-11-23 PROCEDURE — 85610 PROTHROMBIN TIME: CPT

## 2022-11-23 RX ORDER — EZETIMIBE 10 MG/1
TABLET ORAL
Qty: 90 TABLET | Refills: 3 | Status: SHIPPED | OUTPATIENT
Start: 2022-11-23 | End: 2023-11-06

## 2022-11-23 NOTE — TELEPHONE ENCOUNTER
Is the patient due for a refill? Yes    Was the patient seen the past year? Yes    Date of last office visit: 9/14/2022    Does the patient have an upcoming appointment?  No   If yes, When?     Provider to refill:AK    Does the patients insurance require a 100 day supply?  No

## 2022-11-23 NOTE — PROGRESS NOTES
Anticoagulation Summary  As of 11/23/2022      INR goal:  2.0-3.0   TTR:  69.3 % (7.2 y)   INR used for dosing:     Warfarin maintenance plan:  4 mg (4 mg x 1) every Sun, Tue, Fri; 6 mg (4 mg x 1.5) all other days; Starting 11/23/2022   Weekly warfarin total:  36 mg   Plan last modified:  Jerzy SotoD (11/3/2022)   Next INR check:     Priority:  Maintenance   Target end date:  Indefinite    Indications    Paroxysmal atrial fibrillation (HCC) [I48.0]  Hx of TIA (transient ischemic attack) [G45.9]  Deep vein thrombosis (DVT) (HCC) (Resolved) [I82.409]  Atrial fibrillation (HCC) (Resolved) [I48.91]  Hx of Stroke (Resolved) [I63.9]  Secondary hypercoagulable state (HCC) [D68.69]                 Anticoagulation Episode Summary       INR check location:  Anticoagulation Clinic    Preferred lab:      Send INR reminders to:      Comments:  Takes ASA + warfarin 2/2 TIA Sep 2022  Medications reconciled. Take naproxen as needed for arthritic pain. He is aware of the increased risk of bleeding and takes only when needed with caution          Anticoagulation Care Providers       Provider Role Specialty Phone number    Michael J Bloch, M.D. Referring Internal Medicine [phone]    [facility]   [phone]                  Refer to Patient Findings for HPI:  Patient Findings       Positives:  Change in health (Back pain, unable to ambulate without walker)    Negatives:  Signs/symptoms of thrombosis, Signs/symptoms of bleeding, Laboratory test error suspected, Change in alcohol use, Change in activity, Upcoming invasive procedure, Emergency department visit, Upcoming dental procedure, Missed doses, Extra doses, Change in medications, Change in diet/appetite, Hospital admission, Bruising, Other complaints            There were no vitals filed for this visit.   pt declined vitals    Verified current warfarin dosing schedule.    Medications reconciled   Pt is on ASA as antiplatelet therapy for TIA and  must be reviewed again on next visit with vascular/neurology.      A/P   INR  -therapeutic.     Warfarin dosing recommendation: Continue current warfarin regimen as above without changes      Pt educated to contact our clinic with any changes in medications or s/s of bleeding or thrombosis. Pt is aware to seek immediate medical attention for falls, head injury or deep cuts.    Follow up appointment in 3 week(s).    Franchesca Russell, JerzyD

## 2022-12-06 DIAGNOSIS — I10 ESSENTIAL HYPERTENSION, BENIGN: ICD-10-CM

## 2022-12-07 RX ORDER — METOPROLOL SUCCINATE 25 MG/1
TABLET, EXTENDED RELEASE ORAL
Qty: 90 TABLET | Refills: 2 | Status: SHIPPED | OUTPATIENT
Start: 2022-12-07 | End: 2023-08-24

## 2022-12-07 NOTE — TELEPHONE ENCOUNTER
Is the patient due for a refill? Yes    Was the patient seen the past year? Yes    Date of last office visit: 9/14/2022    Does the patient have an upcoming appointment?  No    Provider to refill:AK    Does the patients insurance require a 100 day supply?  No

## 2022-12-14 ENCOUNTER — ANTICOAGULATION VISIT (OUTPATIENT)
Dept: VASCULAR LAB | Facility: MEDICAL CENTER | Age: 87
End: 2022-12-14
Attending: NURSE PRACTITIONER
Payer: MEDICARE

## 2022-12-14 VITALS — HEART RATE: 53 BPM | DIASTOLIC BLOOD PRESSURE: 76 MMHG | SYSTOLIC BLOOD PRESSURE: 128 MMHG

## 2022-12-14 DIAGNOSIS — G45.9 TIA (TRANSIENT ISCHEMIC ATTACK): ICD-10-CM

## 2022-12-14 DIAGNOSIS — I48.0 PAROXYSMAL ATRIAL FIBRILLATION (HCC): ICD-10-CM

## 2022-12-14 DIAGNOSIS — D68.69 SECONDARY HYPERCOAGULABLE STATE (HCC): ICD-10-CM

## 2022-12-14 LAB — INR PPP: 1.8 (ref 2–3.5)

## 2022-12-14 PROCEDURE — 99212 OFFICE O/P EST SF 10 MIN: CPT

## 2022-12-14 PROCEDURE — 85610 PROTHROMBIN TIME: CPT

## 2022-12-14 NOTE — PROGRESS NOTES
Anticoagulation Summary  As of 2022      INR goal:  2.0-3.0   TTR:  69.3 % (7.3 y)   INR used for dosin.80 (2022)   Warfarin maintenance plan:  4 mg (4 mg x 1) every Sun, Tue, Fri; 6 mg (4 mg x 1.5) all other days; Starting 2022   Weekly warfarin total:  36 mg   Plan last modified:  Franchesca Russell PharmD (11/3/2022)   Next INR check:  2023   Priority:  Maintenance   Target end date:  Indefinite    Indications    Paroxysmal atrial fibrillation (HCC) [I48.0]  Hx of TIA (transient ischemic attack) [G45.9]  Deep vein thrombosis (DVT) (HCC) (Resolved) [I82.409]  Atrial fibrillation (HCC) (Resolved) [I48.91]  Hx of Stroke (Resolved) [I63.9]  Secondary hypercoagulable state (HCC) [D68.69]                 Anticoagulation Episode Summary       INR check location:  Anticoagulation Clinic    Preferred lab:      Send INR reminders to:      Comments:  Takes ASA + warfarin 2/2 TIA Sep 2022  Medications reconciled. Take naproxen as needed for arthritic pain. He is aware of the increased risk of bleeding and takes only when needed with caution          Anticoagulation Care Providers       Provider Role Specialty Phone number    Michael J Bloch, M.D. Referring Internal Medicine [phone]    [facility] Anticoagulation [facility]   [phone]                  Refer to Patient Findings for HPI:  Patient Findings       Negatives:  Signs/symptoms of thrombosis, Signs/symptoms of bleeding, Laboratory test error suspected, Change in health, Change in alcohol use, Change in activity, Upcoming invasive procedure, Emergency department visit, Upcoming dental procedure, Missed doses, Extra doses, Change in medications, Change in diet/appetite, Hospital admission, Bruising, Other complaints            Vitals:    22 1400   BP: 128/76   Pulse: (!) 53       Verified current warfarin dosing schedule.    Medications reconciled   Pt is on ASA as antiplatelet therapy for TIA and must be reviewed again on next visit with  vascular/neurology.      A/P   INR  SUB-therapeutic. Unclear reason.      Warfarin dosing recommendation: Take increased dose of 8mg tonight and continue current warfarin regimen as above without changes.       Pt educated to contact our clinic with any changes in medications or s/s of bleeding or thrombosis. Pt is aware to seek immediate medical attention for falls, head injury or deep cuts.    Follow up appointment in 4 week(s). Per patient preference.     Jerzy SotoD

## 2022-12-16 LAB — INR BLD: 1.8 (ref 0.9–1.2)

## 2022-12-21 DIAGNOSIS — T14.8XXA VASCULAR INJURY: ICD-10-CM

## 2022-12-21 RX ORDER — GABAPENTIN 300 MG/1
CAPSULE ORAL
Qty: 270 CAPSULE | Refills: 0 | Status: SHIPPED | OUTPATIENT
Start: 2022-12-21 | End: 2023-03-20

## 2023-01-11 ENCOUNTER — ANTICOAGULATION VISIT (OUTPATIENT)
Dept: VASCULAR LAB | Facility: MEDICAL CENTER | Age: 88
End: 2023-01-11
Attending: INTERNAL MEDICINE
Payer: MEDICARE

## 2023-01-11 VITALS — DIASTOLIC BLOOD PRESSURE: 77 MMHG | SYSTOLIC BLOOD PRESSURE: 127 MMHG | HEART RATE: 62 BPM

## 2023-01-11 DIAGNOSIS — D68.69 SECONDARY HYPERCOAGULABLE STATE (HCC): ICD-10-CM

## 2023-01-11 DIAGNOSIS — G45.9 TIA (TRANSIENT ISCHEMIC ATTACK): ICD-10-CM

## 2023-01-11 DIAGNOSIS — I48.0 PAROXYSMAL ATRIAL FIBRILLATION (HCC): ICD-10-CM

## 2023-01-11 DIAGNOSIS — Z95.2 S/P TAVR (TRANSCATHETER AORTIC VALVE REPLACEMENT): ICD-10-CM

## 2023-01-11 LAB — INR PPP: 2.4 (ref 2–3.5)

## 2023-01-11 PROCEDURE — 99211 OFF/OP EST MAY X REQ PHY/QHP: CPT | Performed by: NURSE PRACTITIONER

## 2023-01-11 PROCEDURE — 85610 PROTHROMBIN TIME: CPT

## 2023-01-11 NOTE — PROGRESS NOTES
Anticoagulation Summary  As of 2023      INR goal:  2.0-3.0   TTR:  69.3 % (7.4 y)   INR used for dosin.40 (2023)   Warfarin maintenance plan:  4 mg (4 mg x 1) every Sun, Tue, Fri; 6 mg (4 mg x 1.5) all other days   Weekly warfarin total:  36 mg   Plan last modified:  Jerzy SotoD (11/3/2022)   Next INR check:  2023   Priority:  Maintenance   Target end date:  Indefinite    Indications    Paroxysmal atrial fibrillation (HCC) [I48.0]  Hx of TIA (transient ischemic attack) [G45.9]  Deep vein thrombosis (DVT) (HCC) (Resolved) [I82.409]  Atrial fibrillation (HCC) (Resolved) [I48.91]  Hx of Stroke (Resolved) [I63.9]  Secondary hypercoagulable state (HCC) [D68.69]                 Anticoagulation Episode Summary       INR check location:  Anticoagulation Clinic    Preferred lab:      Send INR reminders to:      Comments:  Takes ASA + warfarin 2/2 TIA Sep 2022  Medications reconciled. Take naproxen as needed for arthritic pain. He is aware of the increased risk of bleeding and takes only when needed with caution          Anticoagulation Care Providers       Provider Role Specialty Phone number    Michael J Bloch, M.D. Referring Cardiovascular Disease (Cardiology) [phone]    [facility] Anticoagulation Services   [phone]                  Refer to Patient Findings for HPI:  Patient Findings       Positives:  Other complaints    Comments:  Having more back pain. Sees [facility] for this.            Vitals:    23 1023   BP: 127/77   Pulse: 62       Verified current warfarin dosing schedule.    Medications reconciled   Pt is on ASA 81 mg as antiplatelet therapy for TIA and must be reviewed again on - next visit with vascular med.    Not currently taking Aleve PRN because it doesn't help his pain.    A/P   INR  -therapeutic.     Warfarin dosing recommendation: Continue current regimen.    Pt educated to contact our clinic with any changes in medications or s/s of bleeding or  thrombosis. Pt is aware to seek immediate medical attention for falls, head injury or deep cuts.    Follow up appointment in 4 week(s).    CHEYENNE Mckinley.

## 2023-01-12 LAB — INR BLD: 2.4 (ref 0.9–1.2)

## 2023-01-30 NOTE — PROGRESS NOTES
Anticoagulation Summary  As of 2022      INR goal:  2.0-3.0   TTR:  69.9 % (7.1 y)   INR used for dosin.70 (2022)   Warfarin maintenance plan:  4 mg (4 mg x 1) every Tue, Fri; 6 mg (4 mg x 1.5) all other days   Weekly warfarin total:  38 mg   Plan last modified:  Satya Parra, PharmD (2022)   Next INR check:  2022   Priority:  Maintenance   Target end date:  Indefinite    Indications    Paroxysmal atrial fibrillation (HCC) [I48.0]  Hx of TIA (transient ischemic attack) [G45.9]  Deep vein thrombosis (DVT) (HCC) (Resolved) [I82.409]  Atrial fibrillation (HCC) (Resolved) [I48.91]  Hx of Stroke (Resolved) [I63.9]  Secondary hypercoagulable state (HCC) [D68.69]                 Anticoagulation Episode Summary       INR check location:  Anticoagulation Clinic    Preferred lab:      Send INR reminders to:      Comments:  Medications reconciled. Take naproxen as needed for arthritic pain. He is aware of the increased risk of bleeding and takes only when needed with caution          Anticoagulation Care Providers       Provider Role Specialty Phone number    Michael J Bloch, M.D. Referring Internal Medicine [phone]    [facility] Anticoagulation Services   [phone]               Refer to Patient Findings for HPI:  Patient Findings       Negatives:  Signs/symptoms of thrombosis, Signs/symptoms of bleeding, Laboratory test error suspected, Change in health, Change in alcohol use, Change in activity, Upcoming invasive procedure, Emergency department visit, Upcoming dental procedure, Missed doses, Extra doses, Change in medications, Change in diet/appetite, Hospital admission, Bruising, Other complaints            Vitals:    22 1539   BP: (!) 90/59   Pulse: (!) 58     Pt denies any symptomatic hypotension.    Verified current warfarin dosing schedule.    Medications reconciled   Pt is not on antiplatelet therapy      A/P   INR  therapeutic.     Warfarin dosing recommendation: Pt is to continue  with current warfarin dosing regimen.    Pt educated to contact our clinic with any changes in medications or s/s of bleeding or thrombosis. Pt is aware to seek immediate medical attention for falls, head injury or deep cuts.    Follow up appointment in 3 week(s).    Karrie Barker, PharmD     Cryotherapy Text: The wound bed was treated with cryotherapy after the biopsy was performed.

## 2023-02-01 LAB
ALBUMIN SERPL-MCNC: 4.3 G/DL (ref 3.6–4.6)
ALBUMIN/GLOB SERPL: 1.7 {RATIO} (ref 1.2–2.2)
ALP SERPL-CCNC: 82 IU/L (ref 44–121)
ALT SERPL-CCNC: 19 IU/L (ref 0–44)
AST SERPL-CCNC: 28 IU/L (ref 0–40)
BILIRUB SERPL-MCNC: 1.1 MG/DL (ref 0–1.2)
BUN SERPL-MCNC: 22 MG/DL (ref 8–27)
BUN/CREAT SERPL: 22 (ref 10–24)
CALCIUM SERPL-MCNC: 9.2 MG/DL (ref 8.6–10.2)
CHLORIDE SERPL-SCNC: 101 MMOL/L (ref 96–106)
CHOLEST SERPL-MCNC: 111 MG/DL (ref 100–199)
CO2 SERPL-SCNC: 25 MMOL/L (ref 20–29)
CREAT SERPL-MCNC: 0.99 MG/DL (ref 0.76–1.27)
EGFRCR SERPLBLD CKD-EPI 2021: 74 ML/MIN/1.73
ERYTHROCYTE [DISTWIDTH] IN BLOOD BY AUTOMATED COUNT: 12.9 % (ref 11.6–15.4)
GLOBULIN SER CALC-MCNC: 2.6 G/DL (ref 1.5–4.5)
GLUCOSE SERPL-MCNC: 72 MG/DL (ref 70–99)
HCT VFR BLD AUTO: 45.1 % (ref 37.5–51)
HDLC SERPL-MCNC: 46 MG/DL
HGB BLD-MCNC: 15 G/DL (ref 13–17.7)
LABORATORY COMMENT REPORT: NORMAL
LDLC SERPL CALC-MCNC: 50 MG/DL (ref 0–99)
MCH RBC QN AUTO: 29.6 PG (ref 26.6–33)
MCHC RBC AUTO-ENTMCNC: 33.3 G/DL (ref 31.5–35.7)
MCV RBC AUTO: 89 FL (ref 79–97)
NRBC BLD AUTO-RTO: NORMAL %
PLATELET # BLD AUTO: 188 X10E3/UL (ref 150–450)
POTASSIUM SERPL-SCNC: 4.7 MMOL/L (ref 3.5–5.2)
PROT SERPL-MCNC: 6.9 G/DL (ref 6–8.5)
RBC # BLD AUTO: 5.06 X10E6/UL (ref 4.14–5.8)
SODIUM SERPL-SCNC: 137 MMOL/L (ref 134–144)
TRIGL SERPL-MCNC: 74 MG/DL (ref 0–149)
TSH SERPL DL<=0.005 MIU/L-ACNC: 1.38 UIU/ML (ref 0.45–4.5)
VLDLC SERPL CALC-MCNC: 15 MG/DL (ref 5–40)
WBC # BLD AUTO: 6 X10E3/UL (ref 3.4–10.8)

## 2023-02-08 ENCOUNTER — ANTICOAGULATION VISIT (OUTPATIENT)
Dept: VASCULAR LAB | Facility: MEDICAL CENTER | Age: 88
End: 2023-02-08
Attending: NURSE PRACTITIONER
Payer: MEDICARE

## 2023-02-08 VITALS — DIASTOLIC BLOOD PRESSURE: 62 MMHG | SYSTOLIC BLOOD PRESSURE: 99 MMHG | HEART RATE: 61 BPM

## 2023-02-08 DIAGNOSIS — I48.0 PAROXYSMAL ATRIAL FIBRILLATION (HCC): ICD-10-CM

## 2023-02-08 DIAGNOSIS — D68.69 SECONDARY HYPERCOAGULABLE STATE (HCC): ICD-10-CM

## 2023-02-08 DIAGNOSIS — G45.9 TIA (TRANSIENT ISCHEMIC ATTACK): ICD-10-CM

## 2023-02-08 LAB — INR PPP: 2.2 (ref 2–3.5)

## 2023-02-08 PROCEDURE — 99211 OFF/OP EST MAY X REQ PHY/QHP: CPT | Performed by: PHARMACIST

## 2023-02-08 PROCEDURE — 85610 PROTHROMBIN TIME: CPT

## 2023-02-08 NOTE — PROGRESS NOTES
Anticoagulation Summary  As of 2023      INR goal:  2.0-3.0   TTR:  69.6 % (7.5 y)   INR used for dosin.20 (2023)   Warfarin maintenance plan:  4 mg (4 mg x 1) every Sun, Tue, Fri; 6 mg (4 mg x 1.5) all other days   Weekly warfarin total:  36 mg   Plan last modified:  Franchesca Russell PharmD (11/3/2022)   Next INR check:  3/15/2023   Priority:  Maintenance   Target end date:  Indefinite    Indications    Paroxysmal atrial fibrillation (HCC) [I48.0]  Hx of TIA (transient ischemic attack) [G45.9]  Deep vein thrombosis (DVT) (HCC) (Resolved) [I82.409]  Atrial fibrillation (HCC) (Resolved) [I48.91]  Hx of Stroke (Resolved) [I63.9]  Secondary hypercoagulable state (HCC) [D68.69]                 Anticoagulation Episode Summary       INR check location:  Anticoagulation Clinic    Preferred lab:      Send INR reminders to:      Comments:  Takes ASA + warfarin 2/2 TIA Sep 2022  Medications reconciled. Take naproxen as needed for arthritic pain. He is aware of the increased risk of bleeding and takes only when needed with caution          Anticoagulation Care Providers       Provider Role Specialty Phone number    Michael J Bloch, M.D. Referring Cardiovascular Disease (Cardiology) [phone]    [facility]   [phone]                  Refer to Patient Findings for HPI:  Patient Findings       Negatives:  Signs/symptoms of thrombosis, Signs/symptoms of bleeding, Laboratory test error suspected, Change in health, Change in alcohol use, Change in activity, Upcoming invasive procedure, Emergency department visit, Upcoming dental procedure, Missed doses, Extra doses, Change in medications, Change in diet/appetite, Hospital admission, Bruising, Other complaints            Vitals:    23 1011   BP: 99/62   BP Location: Right arm   Patient Position: Sitting   BP Cuff Size: Large adult   Pulse: 61         Verified current warfarin dosing schedule.    Medications reconciled  Pt is on ASA 81mg  as  antiplatelet therapy for CAD and must be reviewed again on 3-14-23.      A/P   INR  remains -therapeutic at 2.2.     Warfarin dosing recommendation: Pt is to continue with current warfarin dosing regimen.    Pt educated to contact our clinic with any changes in medications or s/s of bleeding or thrombosis. Pt is aware to seek immediate medical attention for falls, head injury or deep cuts.    Follow up appointment in 5 week(s).    Shawn Fang, JerzyD, BCACP

## 2023-02-09 DIAGNOSIS — I48.0 PAROXYSMAL ATRIAL FIBRILLATION (HCC): ICD-10-CM

## 2023-02-09 LAB — INR BLD: 2.2 (ref 0.9–1.2)

## 2023-02-14 DIAGNOSIS — S35.513D: ICD-10-CM

## 2023-02-14 DIAGNOSIS — Z95.2 S/P TAVR (TRANSCATHETER AORTIC VALVE REPLACEMENT): ICD-10-CM

## 2023-02-14 NOTE — PROGRESS NOTES
Order placed for vascular surveillance imaging.  [employer] message sent to pt to remind that they are due for their surveillance vascular imaging.  Scheduling numbers provided.    Marisela TAM  [facility]

## 2023-03-14 ENCOUNTER — ANTICOAGULATION VISIT (OUTPATIENT)
Dept: VASCULAR LAB | Facility: MEDICAL CENTER | Age: 88
End: 2023-03-14
Attending: INTERNAL MEDICINE
Payer: MEDICARE

## 2023-03-14 VITALS — DIASTOLIC BLOOD PRESSURE: 73 MMHG | HEART RATE: 56 BPM | SYSTOLIC BLOOD PRESSURE: 122 MMHG

## 2023-03-14 VITALS
DIASTOLIC BLOOD PRESSURE: 79 MMHG | HEIGHT: 71 IN | SYSTOLIC BLOOD PRESSURE: 147 MMHG | WEIGHT: 187 LBS | HEART RATE: 58 BPM | BODY MASS INDEX: 26.18 KG/M2

## 2023-03-14 DIAGNOSIS — D68.69 SECONDARY HYPERCOAGULABLE STATE (HCC): ICD-10-CM

## 2023-03-14 DIAGNOSIS — Z79.01 CHRONIC ANTICOAGULATION: ICD-10-CM

## 2023-03-14 DIAGNOSIS — I10 ESSENTIAL HYPERTENSION: ICD-10-CM

## 2023-03-14 DIAGNOSIS — G45.9 TIA (TRANSIENT ISCHEMIC ATTACK): ICD-10-CM

## 2023-03-14 DIAGNOSIS — I48.0 PAROXYSMAL ATRIAL FIBRILLATION (HCC): ICD-10-CM

## 2023-03-14 DIAGNOSIS — S35.513D: ICD-10-CM

## 2023-03-14 DIAGNOSIS — E78.5 HYPERLIPIDEMIA, UNSPECIFIED HYPERLIPIDEMIA TYPE: ICD-10-CM

## 2023-03-14 DIAGNOSIS — Z95.2 S/P TAVR (TRANSCATHETER AORTIC VALVE REPLACEMENT): ICD-10-CM

## 2023-03-14 DIAGNOSIS — I10 ESSENTIAL HYPERTENSION, BENIGN: ICD-10-CM

## 2023-03-14 LAB — INR PPP: 2.7 (ref 2–3.5)

## 2023-03-14 PROCEDURE — 85610 PROTHROMBIN TIME: CPT

## 2023-03-14 PROCEDURE — 99214 OFFICE O/P EST MOD 30 MIN: CPT | Performed by: INTERNAL MEDICINE

## 2023-03-14 PROCEDURE — 99212 OFFICE O/P EST SF 10 MIN: CPT

## 2023-03-14 PROCEDURE — 99213 OFFICE O/P EST LOW 20 MIN: CPT

## 2023-03-14 RX ORDER — WARFARIN SODIUM 4 MG/1
4-6 TABLET ORAL DAILY
Qty: 135 TABLET | Refills: 1 | Status: SHIPPED | OUTPATIENT
Start: 2023-03-14 | End: 2023-08-10

## 2023-03-14 ASSESSMENT — FIBROSIS 4 INDEX: FIB4 SCORE: 2.94

## 2023-03-14 NOTE — PROGRESS NOTES
"VASCULAR FOLLOW UP VISIT  Subjective:   Tristan Hester is a 87 y.o. male who presents 23 for vascular f/u  Chief Complaint   Patient presents with    Follow-Up       Subjective     HPI:   Here for f/u of possible TIA in the setting of HTN, dyslipidemia, carotid disease and valvular heart disease, and atrial fibrillation.  Still struggling with his back  No further tia or cva symptoms  No signficiant TELLEZ  BPs at home 140s-150s/70s  Lots of bruising but no blood in stool or urine.   On warfarin + asa  No change in statin or BP meds  Continues to struggle with mobility and balance, uses cane, no falls reported   Has been meeting with urology regularly      Social History     Tobacco Use    Smoking status: Former     Packs/day: 0.50     Years: 30.00     Pack years: 15.00     Types: Cigarettes, Pipe     Quit date: 1985     Years since quittin.2    Smokeless tobacco: Never   Vaping Use    Vaping Use: Never used   Substance Use Topics    Alcohol use: Yes     Alcohol/week: 3.0 oz     Types: 3 Cans of beer, 3 Shots of liquor per week     Comment: 1 x week    Drug use: No     DIET AND EXERCISE:  Weight Change: Stabilized  Diet: Relatively low sodium- bit less adherent  Exercise: PT        Objective        Objective:     Vitals:    23 1013 23 1016   BP: (!) 147/76 (!) 147/79   BP Location: Left arm Left arm   Patient Position: Sitting Sitting   BP Cuff Size: Adult Adult   Pulse: 62 (!) 58   Weight: 84.8 kg (187 lb)    Height: 1.803 m (5' 11\")         Body mass index is 26.08 kg/m².  Physical Exam  Vitals reviewed.   Constitutional:       Appearance: He is well-developed. He is not toxic-appearing or diaphoretic.   Eyes:      Extraocular Movements: Extraocular movements intact.      Conjunctiva/sclera: Conjunctivae normal.   Neck:      Vascular: No carotid bruit.   Cardiovascular:      Rate and Rhythm: Normal rate and regular rhythm.      Heart sounds: Murmur heard.   Pulmonary:      Effort: " Pulmonary effort is normal. No respiratory distress.      Breath sounds: Normal breath sounds. No wheezing or rales.   Musculoskeletal:      Right lower leg: No edema.      Left lower leg: No edema.   Neurological:      General: No focal deficit present.      Mental Status: He is alert and oriented to person, place, and time.      Cranial Nerves: No cranial nerve deficit.      Gait: Gait normal.   Psychiatric:         Mood and Affect: Mood normal.         Behavior: Behavior normal.     Lab Results   Component Value Date    CHOLSTRLTOT 111 01/31/2023    CHOLSTRLTOT 125 04/05/2022    LDL 63 04/05/2022    HDL 46 01/31/2023    HDL 51 04/05/2022    TRIGLYCERIDE 74 01/31/2023    TRIGLYCERIDE 54 04/05/2022      Lab Results   Component Value Date    INR 2.70 03/14/2023         Lab Results   Component Value Date    SODIUM 137 01/31/2023    SODIUM 138 04/05/2022    POTASSIUM 4.7 01/31/2023    POTASSIUM 5.3 04/05/2022    CHLORIDE 101 01/31/2023    CHLORIDE 102 04/05/2022    CO2 25 01/31/2023    CO2 23 04/05/2022    GLUCOSE 72 01/31/2023    GLUCOSE 91 04/05/2022    BUN 22 01/31/2023    BUN 28 (H) 04/05/2022    CREATININE 0.99 01/31/2023    CREATININE 1.03 04/05/2022    BUNCREATRAT 22 01/31/2023    IFAFRICA 82 11/11/2021    IFAFRICA >60 03/15/2021    IFNOTAFR 71 11/11/2021    IFNOTAFR >60 03/15/2021      Lab Results   Component Value Date    TSH 1.380 01/31/2023      Lab Results   Component Value Date    WBC 6.0 01/31/2023    WBC 6.6 04/05/2022    RBC 5.06 01/31/2023    RBC 5.05 04/05/2022    HEMOGLOBIN 15.0 01/31/2023    HEMOGLOBIN 15.2 04/05/2022    HEMATOCRIT 45.1 01/31/2023    HEMATOCRIT 46.6 04/05/2022    MCV 89 01/31/2023    MCV 92.3 04/05/2022    MCH 29.6 01/31/2023    MCH 30.1 04/05/2022    MCHC 33.3 01/31/2023    MCHC 32.6 (L) 04/05/2022    MPV 11.5 04/05/2022     Carotid duplex February 2021   No evidence for RIGHT carotid stenosis status post endarterectomy.   Mild LEFT internal carotid artery stenosis.      Echocardiogram March 2021  Prior echo on 8/7/20.Left ventricular ejection fraction is visually   estimated to be 60%.  Normal diastolic function.  Known TAVR aortic valve that is functioning normally with normal   transvalvular gradients. Vmax is 1.85 m/s.  Moderate eccentric mitral regurgitation.    Aortoiliac duplex April 2021   No evidence of aortic aneurysm.    No evidence of pseudoaneurysm or injury to the bilateral iliac arteries.     No stenosis of the proximal segment of the major visceral arteries.    Waveforms and velocities of the bilateral iliac arteries are normal.    Common iliac diameter (cm):   Right- 1.63 x 1.72         Left-   1.70 x 1.72    abpm 2022  Mean daytime 142/80  Normal nocturnal dip    Echo april 2022  Compared to the images of the prior study 03/02/21, there has been no   significant change.   Normal left ventricular systolic function.  The left ventricular ejection fraction is visually estimated to be 60%.  Grade II diastolic dysfunction.  Normal right ventricular systolic function.  Mild mitral regurgitation.  Known TAVR aortic valve that is functioning normally with normal transvalvular gradients.    Carotid duplex sep 2022   Surgical changes of the right carotid bifurcation consistent with post    carotid endarterectomy status.    No significant stenosis of the bilateral internal carotid arteries (<50%).                Medical Decision Making:  Today's Assessment / Status / Plan:     1. Essential hypertension, benign        2. S/P TAVR (transcatheter aortic valve replacement)        3. Iliac artery injury, unspecified laterality, subsequent encounter        4. Paroxysmal atrial fibrillation (HCC)        5. TIA (transient ischemic attack)        6. Secondary hypercoagulable state (HCC)        7. Hyperlipidemia, unspecified hyperlipidemia type  Lipid Profile    Comp Metabolic Panel      8. Essential hypertension  CBC WITHOUT DIFFERENTIAL    Comp Metabolic Panel        Patient  Type: Secondary Prevention    Etiology of Established CVD if Present:   1. TIA - likely cardioembolic from valve disease  2. Carotid atherosclerosis -status post CEA  3. Valvular Heart Disease  4. Atrial fibrillation with onset during acute illness, now chronic  5.  DVT after back surgery jan 2016  6.  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    Lipid Management: Qualifies for Statin Therapy Based on 2018 ACC/AHA Guidelines: yes  Calculated 10-Year Risk of ASCVD: N/A  Currently on Statin: Yes  ACC/aha criteria for at least mod intensity statin  Per nla goal ldl <70 and nonHDL <100 - below threshold on most recent blood work  Plan:  - Continue atorvastatin at current dose    - continue zetia  - Continue TLC  - Recheck fasting lipids prior to next visit    Blood Pressure Management:  Acc/aha bp goal <130/80  ABPM high prior to intensification of meds  BP previously much improved both at home and in office with intensification of meds -now appears to be up a bit  No falls or further lightheadedness  No significant albuminuria in the past  Possible ACE inhibitor cough  Now back on high dose ARB  Has balance issues so want to monitor closely to avoid overtreatment as well  Gfr and electrolytes stable  Overall bp control seems reasonable for current scenario  Plan:  - Continue metoprolol at current dose- would not increase d/t HR in 50's  - Continue irbesartan 300 mg daily  - Continue amlodipine   - Continue lifestyle modification  - Recheck GFR, electrolytes prior to next visit  - Continue home BP monitoring  - Intensify therapy as needed    Glycemic Status: Prediabetic  IFG, mild and stable  - Continue lifestyle mod  - Follow fasting glucose    Anti-Platelet/Anti-Coagulant Tx: yes  - Continue indefinite anticoagulation with warfarin  -continue low dose asa as recommended by cards given tia on warfarin  - report any bleeding  - F/U with anticoagulation clinic today as scheduled   - limit  nsaids    Smoking: continue complete avoidance    Physical Activity:  Encouraged more walking    Weight Management and Nutrition: Dietary plan was discussed with patient at this visit including c/w Med diet and decreasing his sodium a bit    Other:     1. TIA -has history of previous cardioembolic TIA likely with his native aortic valve as a source.  He had what sounds of a recurrent ocular TIA in september.  His symptoms resolved after only a few minutes. No further TIA or stroke symptoms.  No evidence of any problems with his valve on recent echocardiogram.  No source on repeat carotid duplex. He should call 911 immediately with any further TIA or stroke symptoms.  Antiplatelet and anticoag as above    2. Carotid atherosclerosis status post CEA- .  Only mild restenosis on repeat duplex.  Remains asymptomatic.  Repeat duplex in 2 years from previous    3. Valvular Heart Disease -now status post TAVR March 2020.  Still with unrepaired mitral disease -improved on most recent echo.  Appears asymptomatic at present.  Does not appear to be volume overloaded.  Defer timing of repeat echocardiogram to cardiology, but would be sure it does not go more than 2 years    4. Colonic Polyp on recent colonoscopy - repeat colonoscopy in 2019 negative per patient.   No further f/u needed    5. Insomnia - trialed zolpidem w/o success.  Continue temazepam, but minimize dose.  Patient understands addictive potential.      6.  Atrial fibrillation - defer management of rhythm and rate to cards.  Continue anticoag as above    7.  DVT - postoperative.  No recurrence.  Continue indefinite anticoagulation given concomitant afib    8.  H/o prostate ca and multiple urological issues - defer f/u to urology.  Check PSA as they recommend    9.  Balance issues, back pain and neuropathy -this seems recalcitrant to physical therapy or any intervention that we have tried previously.  Defer f/u to ENT and neurology    10.  Arterial injury after TAVR  status post reconstruction March 2020 -now asymptomatic and with good pulses on exam.  Good arterial flow and patent stent on noninvasive study.  .  Report any claudication, rest pain, or ulceration immediately.  Will obtain repeat arterial duplex 2 year from previous    We will partner with other providers in the management of established vascular disease and cardiometabolic risk factors.    Studies to Be Obtained:    1) carotid duplex sep 2024  2) aortoiliac duplex April 2023  3) repeat echocardiogram at discretion of cardiology    Labs to Be Obtained: as above prior to next visit    Follow up in: 6 mo    Time: 30-39min - chart review/prep, review of other providers' records, imaging/lab review, face-to-face time for history/examination, ordering, prescribing,  review of results/meds/ treatment plan with patient/family/caregiver, documentation in EMR, care coordination (as needed)     Michael J Bloch, M.D.

## 2023-03-14 NOTE — PROGRESS NOTES
Anticoagulation Summary  As of 3/14/2023      INR goal:  2.0-3.0   TTR:  70.0 % (7.6 y)   INR used for dosin.70 (3/14/2023)   Warfarin maintenance plan:  4 mg (4 mg x 1) every Sun, Tue, Fri; 6 mg (4 mg x 1.5) all other days   Weekly warfarin total:  36 mg   Plan last modified:  Franchesca Russell PharmD (11/3/2022)   Next INR check:  2023   Priority:  Maintenance   Target end date:  Indefinite    Indications    Paroxysmal atrial fibrillation (HCC) [I48.0]  Hx of TIA (transient ischemic attack) [G45.9]  Deep vein thrombosis (DVT) (HCC) (Resolved) [I82.409]  Atrial fibrillation (HCC) (Resolved) [I48.91]  Hx of Stroke (Resolved) [I63.9]  Secondary hypercoagulable state (HCC) [D68.69]                 Anticoagulation Episode Summary       INR check location:  Anticoagulation Clinic    Preferred lab:      Send INR reminders to:      Comments:  Takes ASA + warfarin 2/2 TIA Sep 2022  Medications reconciled. Take naproxen as needed for arthritic pain. He is aware of the increased risk of bleeding and takes only when needed with caution          Anticoagulation Care Providers       Provider Role Specialty Phone number    Michael J Bloch, M.D. Referring Cardiovascular Disease (Cardiology) [phone]    [facility]ticoagulation [facility]   [phone]                  Refer to Patient Findings for HPI:  Patient Findings       Positives:  Change in activity (cracked vertebrae  so decreased activity)    Negatives:  Signs/symptoms of thrombosis, Signs/symptoms of bleeding, Laboratory test error suspected, Change in health, Change in alcohol use, Upcoming invasive procedure, Emergency department visit, Upcoming dental procedure, Missed doses, Extra doses, Change in medications, Change in diet/appetite, Hospital admission, Bruising, Other complaints            Vitals:    23 1040   BP: 122/73   BP Location: Right arm   Pulse: (!) 56     Verified current warfarin dosing schedule.    Pt is on ASA 81mg  as antiplatelet  therapy for CAD and must be reviewed again by cardiology.     A/P   INR therapeutic.     Warfarin dosing recommendation: Pt is to continue with current warfarin dosing regimen.    Pt educated to contact our clinic with any changes in medications or s/s of bleeding or thrombosis. Pt is aware to seek immediate medical attention for falls, head injury or deep cuts.    Follow up appointment in 6 week(s).    Franchesca Perez, Pharmacy Intern  Karrie Barker, PharmD

## 2023-03-19 DIAGNOSIS — T14.8XXA VASCULAR INJURY: ICD-10-CM

## 2023-03-20 RX ORDER — GABAPENTIN 300 MG/1
CAPSULE ORAL
Qty: 270 CAPSULE | Refills: 0 | Status: SHIPPED | OUTPATIENT
Start: 2023-03-20 | End: 2023-06-19

## 2023-03-29 DIAGNOSIS — F51.01 PRIMARY INSOMNIA: ICD-10-CM

## 2023-03-30 RX ORDER — TEMAZEPAM 30 MG/1
CAPSULE ORAL
Qty: 30 CAPSULE | Refills: 0 | Status: SHIPPED | OUTPATIENT
Start: 2023-03-30 | End: 2023-05-01

## 2023-04-25 ENCOUNTER — ANTICOAGULATION VISIT (OUTPATIENT)
Dept: VASCULAR LAB | Facility: MEDICAL CENTER | Age: 88
End: 2023-04-25
Attending: INTERNAL MEDICINE
Payer: MEDICARE

## 2023-04-25 VITALS — DIASTOLIC BLOOD PRESSURE: 75 MMHG | HEART RATE: 57 BPM | SYSTOLIC BLOOD PRESSURE: 125 MMHG

## 2023-04-25 DIAGNOSIS — G45.9 TIA (TRANSIENT ISCHEMIC ATTACK): ICD-10-CM

## 2023-04-25 DIAGNOSIS — D68.69 SECONDARY HYPERCOAGULABLE STATE (HCC): ICD-10-CM

## 2023-04-25 DIAGNOSIS — I48.0 PAROXYSMAL ATRIAL FIBRILLATION (HCC): ICD-10-CM

## 2023-04-25 LAB — INR PPP: 2.3 (ref 2–3.5)

## 2023-04-25 PROCEDURE — 99211 OFF/OP EST MAY X REQ PHY/QHP: CPT

## 2023-04-25 PROCEDURE — 85610 PROTHROMBIN TIME: CPT

## 2023-04-25 NOTE — PROGRESS NOTES
Anticoagulation Summary  As of 2023      INR goal:  2.0-3.0   TTR:  70.4 % (7.7 y)   INR used for dosin.30 (2023)   Warfarin maintenance plan:  4 mg (4 mg x 1) every Sun, Tue, Fri; 6 mg (4 mg x 1.5) all other days   Weekly warfarin total:  36 mg   Plan last modified:  Franchesca Russell PharmD (11/3/2022)   Next INR check:  2023   Priority:  Maintenance   Target end date:  Indefinite    Indications    Paroxysmal atrial fibrillation (HCC) [I48.0]  Hx of TIA (transient ischemic attack) [G45.9]  Deep vein thrombosis (DVT) (HCC) (Resolved) [I82.409]  Atrial fibrillation (HCC) (Resolved) [I48.91]  Hx of Stroke (Resolved) [I63.9]  Secondary hypercoagulable state (HCC) [D68.69]                 Anticoagulation Episode Summary       INR check location:  Anticoagulation Clinic    Preferred lab:      Send INR reminders to:      Comments:  Takes ASA + warfarin 2/2 TIA Sep 2022  Medications reconciled. Take naproxen as needed for arthritic pain. He is aware of the increased risk of bleeding and takes only when needed with caution          Anticoagulation Care Providers       Provider Role Specialty Phone number    Michael J Bloch, M.D. Referring Cardiovascular Disease (Cardiology) [phone]    [facility] Anticoagulation Services   [phone]                  Refer to Patient Findings for HPI:      Vitals:    23 1044 23 1046   BP: (!) 149/73 125/75   Pulse: 60 (!) 57       Verified current warfarin dosing schedule.    Medications reconciled   yonathan ASA + warfarin 2/2 TIA Sep 2022    A/P   INR  therapeutic.     Warfarin dosing recommendation: Pt is to continue with current warfarin dosing regimen.     Pt educated to contact our clinic with any changes in medications or s/s of bleeding or thrombosis. Pt is aware to seek immediate medical attention for falls, head injury or deep cuts.    Follow up appointment in 8 week(s).    Satya Parra, JerzyD

## 2023-04-28 DIAGNOSIS — F51.01 PRIMARY INSOMNIA: ICD-10-CM

## 2023-05-01 RX ORDER — TEMAZEPAM 30 MG/1
CAPSULE ORAL
Qty: 30 CAPSULE | Refills: 0 | Status: SHIPPED | OUTPATIENT
Start: 2023-05-01 | End: 2023-05-30

## 2023-05-26 ENCOUNTER — DOCUMENTATION (OUTPATIENT)
Dept: VASCULAR LAB | Facility: MEDICAL CENTER | Age: 88
End: 2023-05-26
Payer: MEDICARE

## 2023-05-26 NOTE — PROGRESS NOTES
Spoke to patient - he is covid positive.   Had a lot of fatigue and myalgias earlier in week, but is now feeling much better - just some mild - mod URI symptoms. No SOB. No fevers. Every day a bit better  Using shared decision making we decided to forgo paxlovid or other prescription meds at present.   He is to call back if his symptoms worsen.     Michael Bloch, MD  Vascular Care

## 2023-05-29 DIAGNOSIS — F51.01 PRIMARY INSOMNIA: ICD-10-CM

## 2023-05-30 RX ORDER — TEMAZEPAM 30 MG/1
30 CAPSULE ORAL NIGHTLY PRN
Qty: 30 CAPSULE | Refills: 5 | Status: SHIPPED | OUTPATIENT
Start: 2023-05-30 | End: 2023-10-30 | Stop reason: SDUPTHER

## 2023-06-02 LAB
ALBUMIN SERPL-MCNC: 4.1 G/DL (ref 3.6–4.6)
ALBUMIN/GLOB SERPL: 1.6 {RATIO} (ref 1.2–2.2)
ALP SERPL-CCNC: 81 IU/L (ref 44–121)
ALT SERPL-CCNC: 18 IU/L (ref 0–44)
AST SERPL-CCNC: 27 IU/L (ref 0–40)
BASOPHILS # BLD AUTO: 0.1 X10E3/UL (ref 0–0.2)
BASOPHILS NFR BLD AUTO: 1 %
BILIRUB SERPL-MCNC: 0.8 MG/DL (ref 0–1.2)
BUN SERPL-MCNC: 18 MG/DL (ref 8–27)
BUN/CREAT SERPL: 21 (ref 10–24)
CALCIUM SERPL-MCNC: 9.2 MG/DL (ref 8.6–10.2)
CHLORIDE SERPL-SCNC: 105 MMOL/L (ref 96–106)
CHOLEST SERPL-MCNC: 110 MG/DL (ref 100–199)
CO2 SERPL-SCNC: 23 MMOL/L (ref 20–29)
CREAT SERPL-MCNC: 0.87 MG/DL (ref 0.76–1.27)
EGFRCR SERPLBLD CKD-EPI 2021: 84 ML/MIN/1.73
EOSINOPHIL # BLD AUTO: 0.3 X10E3/UL (ref 0–0.4)
EOSINOPHIL NFR BLD AUTO: 5 %
ERYTHROCYTE [DISTWIDTH] IN BLOOD BY AUTOMATED COUNT: 13 % (ref 11.6–15.4)
GLOBULIN SER CALC-MCNC: 2.5 G/DL (ref 1.5–4.5)
GLUCOSE SERPL-MCNC: 100 MG/DL (ref 70–99)
HCT VFR BLD AUTO: 42.3 % (ref 37.5–51)
HDLC SERPL-MCNC: 42 MG/DL
HGB BLD-MCNC: 14.1 G/DL (ref 13–17.7)
IMM GRANULOCYTES # BLD AUTO: 0 X10E3/UL (ref 0–0.1)
IMM GRANULOCYTES NFR BLD AUTO: 0 %
IMMATURE CELLS  115398: NORMAL
LABORATORY COMMENT REPORT: NORMAL
LDLC SERPL CALC-MCNC: 54 MG/DL (ref 0–99)
LYMPHOCYTES # BLD AUTO: 1.5 X10E3/UL (ref 0.7–3.1)
LYMPHOCYTES NFR BLD AUTO: 24 %
MCH RBC QN AUTO: 30 PG (ref 26.6–33)
MCHC RBC AUTO-ENTMCNC: 33.3 G/DL (ref 31.5–35.7)
MCV RBC AUTO: 90 FL (ref 79–97)
MONOCYTES # BLD AUTO: 0.5 X10E3/UL (ref 0.1–0.9)
MONOCYTES NFR BLD AUTO: 9 %
MORPHOLOGY BLD-IMP: NORMAL
NEUTROPHILS # BLD AUTO: 3.8 X10E3/UL (ref 1.4–7)
NEUTROPHILS NFR BLD AUTO: 61 %
NRBC BLD AUTO-RTO: NORMAL %
PLATELET # BLD AUTO: 215 X10E3/UL (ref 150–450)
POTASSIUM SERPL-SCNC: 4.9 MMOL/L (ref 3.5–5.2)
PROT SERPL-MCNC: 6.6 G/DL (ref 6–8.5)
RBC # BLD AUTO: 4.7 X10E6/UL (ref 4.14–5.8)
SODIUM SERPL-SCNC: 141 MMOL/L (ref 134–144)
TRIGL SERPL-MCNC: 66 MG/DL (ref 0–149)
VLDLC SERPL CALC-MCNC: 14 MG/DL (ref 5–40)
WBC # BLD AUTO: 6.2 X10E3/UL (ref 3.4–10.8)

## 2023-06-13 ENCOUNTER — HOSPITAL ENCOUNTER (OUTPATIENT)
Dept: RADIOLOGY | Facility: MEDICAL CENTER | Age: 88
End: 2023-06-13
Payer: MEDICARE

## 2023-06-13 ENCOUNTER — DOCUMENTATION (OUTPATIENT)
Dept: VASCULAR LAB | Facility: MEDICAL CENTER | Age: 88
End: 2023-06-13
Payer: MEDICARE

## 2023-06-13 DIAGNOSIS — S35.513D: ICD-10-CM

## 2023-06-13 DIAGNOSIS — Z95.2 S/P TAVR (TRANSCATHETER AORTIC VALVE REPLACEMENT): ICD-10-CM

## 2023-06-13 PROCEDURE — 93978 VASCULAR STUDY: CPT

## 2023-06-13 NOTE — PROGRESS NOTES
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

## 2023-06-19 DIAGNOSIS — T14.8XXA VASCULAR INJURY: ICD-10-CM

## 2023-06-19 RX ORDER — GABAPENTIN 300 MG/1
CAPSULE ORAL
Qty: 270 CAPSULE | Refills: 0 | Status: SHIPPED | OUTPATIENT
Start: 2023-06-19 | End: 2023-09-10

## 2023-06-20 ENCOUNTER — ANTICOAGULATION VISIT (OUTPATIENT)
Dept: VASCULAR LAB | Facility: MEDICAL CENTER | Age: 88
End: 2023-06-20
Attending: INTERNAL MEDICINE
Payer: MEDICARE

## 2023-06-20 VITALS — DIASTOLIC BLOOD PRESSURE: 71 MMHG | SYSTOLIC BLOOD PRESSURE: 116 MMHG | HEART RATE: 60 BPM

## 2023-06-20 DIAGNOSIS — I48.0 PAROXYSMAL ATRIAL FIBRILLATION (HCC): ICD-10-CM

## 2023-06-20 DIAGNOSIS — D68.69 SECONDARY HYPERCOAGULABLE STATE (HCC): ICD-10-CM

## 2023-06-20 DIAGNOSIS — G45.9 TIA (TRANSIENT ISCHEMIC ATTACK): ICD-10-CM

## 2023-06-20 LAB — INR PPP: 2.1 (ref 2–3.5)

## 2023-06-20 PROCEDURE — 99211 OFF/OP EST MAY X REQ PHY/QHP: CPT

## 2023-06-20 PROCEDURE — 85610 PROTHROMBIN TIME: CPT

## 2023-06-20 NOTE — PROGRESS NOTES
Anticoagulation Summary  As of 2023      INR goal:  2.0-3.0   TTR:  71.0 % (7.9 y)   INR used for dosin.10 (2023)   Warfarin maintenance plan:  4 mg (4 mg x 1) every Sun, Tue, Fri; 6 mg (4 mg x 1.5) all other days   Weekly warfarin total:  36 mg   Plan last modified:  Jerzy SotoD (11/3/2022)   Next INR check:  2023   Priority:  Maintenance   Target end date:  Indefinite    Indications    Paroxysmal atrial fibrillation (HCC) [I48.0]  Hx of TIA (transient ischemic attack) [G45.9]  Deep vein thrombosis (DVT) (HCC) (Resolved) [I82.409]  Atrial fibrillation (HCC) (Resolved) [I48.91]  Hx of Stroke (Resolved) [I63.9]  Secondary hypercoagulable state (HCC) [D68.69]                 Anticoagulation Episode Summary       INR check location:  Anticoagulation Clinic    Preferred lab:      Send INR reminders to:      Comments:  Takes ASA + warfarin 2/2 TIA Sep 2022  Medications reconciled. Take naproxen as needed for arthritic pain. He is aware of the increased risk of bleeding and takes only when needed with caution          Anticoagulation Care Providers       Provider Role Specialty Phone number    Michael J Bloch, M.D. Referring Cardiovascular Disease (Cardiology) [phone]    [facility] Anticoagulation Services   [phone]                  Refer to Patient Findings for HPI:  Patient Findings       Positives:  Bruising    Negatives:  Signs/symptoms of thrombosis, Signs/symptoms of bleeding, Laboratory test error suspected, Change in health, Change in alcohol use, Change in activity, Upcoming invasive procedure, Emergency department visit, Upcoming dental procedure, Missed doses, Extra doses, Change in medications, Change in diet/appetite, Hospital admission, Other complaints            Vitals:    23 1011   BP: 116/71   Pulse: 60       Verified current warfarin dosing schedule.    Medications reconciled   Takes ASA + warfarin 2/2 TIA Sep 2022 per Dr Bloch      A/P   INR  therapeutic.      Warfarin dosing recommendation: Pt is to continue with current warfarin dosing regimen.    Pt educated to contact our clinic with any changes in medications or s/s of bleeding or thrombosis. Pt is aware to seek immediate medical attention for falls, head injury or deep cuts.    Follow up appointment in 9 week(s).    Karrie Barker, JerzyD

## 2023-07-20 ENCOUNTER — TELEPHONE (OUTPATIENT)
Dept: HEALTH INFORMATION MANAGEMENT | Facility: OTHER | Age: 88
End: 2023-07-20
Payer: MEDICARE

## 2023-08-23 DIAGNOSIS — I10 ESSENTIAL HYPERTENSION, BENIGN: ICD-10-CM

## 2023-08-24 ENCOUNTER — TELEPHONE (OUTPATIENT)
Dept: VASCULAR LAB | Facility: MEDICAL CENTER | Age: 88
End: 2023-08-24
Payer: MEDICARE

## 2023-08-24 RX ORDER — METOPROLOL SUCCINATE 25 MG/1
TABLET, EXTENDED RELEASE ORAL
Qty: 90 TABLET | Refills: 0 | Status: SHIPPED | OUTPATIENT
Start: 2023-08-24 | End: 2023-12-07

## 2023-08-24 NOTE — TELEPHONE ENCOUNTER
[facility]    Pt missed their last appointment. Left VM for pt to reschedule.    Satya Parra, PharmD

## 2023-08-29 ENCOUNTER — APPOINTMENT (OUTPATIENT)
Dept: VASCULAR LAB | Facility: MEDICAL CENTER | Age: 88
End: 2023-08-29
Attending: INTERNAL MEDICINE
Payer: MEDICARE

## 2023-09-08 DIAGNOSIS — T14.8XXA VASCULAR INJURY: ICD-10-CM

## 2023-09-10 RX ORDER — GABAPENTIN 300 MG/1
CAPSULE ORAL
Qty: 270 CAPSULE | Refills: 0 | Status: SHIPPED | OUTPATIENT
Start: 2023-09-10 | End: 2023-12-08 | Stop reason: SDUPTHER

## 2023-09-14 ENCOUNTER — APPOINTMENT (OUTPATIENT)
Dept: VASCULAR LAB | Facility: MEDICAL CENTER | Age: 88
End: 2023-09-14
Attending: INTERNAL MEDICINE
Payer: MEDICARE

## 2023-09-14 ENCOUNTER — ANTICOAGULATION MONITORING (OUTPATIENT)
Dept: VASCULAR LAB | Facility: MEDICAL CENTER | Age: 88
End: 2023-09-14

## 2023-09-14 VITALS
DIASTOLIC BLOOD PRESSURE: 74 MMHG | SYSTOLIC BLOOD PRESSURE: 150 MMHG | BODY MASS INDEX: 25.76 KG/M2 | HEART RATE: 54 BPM | WEIGHT: 184 LBS | HEIGHT: 71 IN

## 2023-09-14 DIAGNOSIS — I48.0 PAROXYSMAL ATRIAL FIBRILLATION (HCC): ICD-10-CM

## 2023-09-14 DIAGNOSIS — T14.8XXA VASCULAR INJURY: ICD-10-CM

## 2023-09-14 DIAGNOSIS — I10 ESSENTIAL HYPERTENSION, BENIGN: ICD-10-CM

## 2023-09-14 DIAGNOSIS — D68.69 SECONDARY HYPERCOAGULABLE STATE (HCC): ICD-10-CM

## 2023-09-14 DIAGNOSIS — G45.9 TIA (TRANSIENT ISCHEMIC ATTACK): ICD-10-CM

## 2023-09-14 DIAGNOSIS — E78.5 HYPERLIPIDEMIA, UNSPECIFIED HYPERLIPIDEMIA TYPE: ICD-10-CM

## 2023-09-14 LAB — INR PPP: 1.9 (ref 2–3.5)

## 2023-09-14 PROCEDURE — 99213 OFFICE O/P EST LOW 20 MIN: CPT

## 2023-09-14 PROCEDURE — 99214 OFFICE O/P EST MOD 30 MIN: CPT | Performed by: INTERNAL MEDICINE

## 2023-09-14 PROCEDURE — 3077F SYST BP >= 140 MM HG: CPT | Performed by: INTERNAL MEDICINE

## 2023-09-14 PROCEDURE — 3078F DIAST BP <80 MM HG: CPT | Performed by: INTERNAL MEDICINE

## 2023-09-14 PROCEDURE — 99212 OFFICE O/P EST SF 10 MIN: CPT

## 2023-09-14 PROCEDURE — 85610 PROTHROMBIN TIME: CPT

## 2023-09-14 ASSESSMENT — FIBROSIS 4 INDEX: FIB4 SCORE: 2.58

## 2023-09-14 NOTE — PROGRESS NOTES
Anticoagulation Summary  As of 2023      INR goal:  2.0-3.0   TTR:  70.5 % (8.1 y)   INR used for dosin.90 (2023)   Warfarin maintenance plan:  4 mg (4 mg x 1) every Sun, Tue, Fri; 6 mg (4 mg x 1.5) all other days   Weekly warfarin total:  36 mg   Plan last modified:  Franchesca Russell PharmD (11/3/2022)   Next INR check:  10/5/2023   Priority:  Maintenance   Target end date:  Indefinite    Indications    Paroxysmal atrial fibrillation (HCC) [I48.0]  Hx of TIA (transient ischemic attack) [G45.9]  Deep vein thrombosis (DVT) (HCC) (Resolved) [I82.409]  Atrial fibrillation (HCC) (Resolved) [I48.91]  Hx of Stroke (Resolved) [I63.9]  Secondary hypercoagulable state (HCC) [D68.69]                 Anticoagulation Episode Summary       INR check location:  Anticoagulation Clinic    Preferred lab:      Send INR reminders to:      Comments:  Takes ASA + warfarin 2/2 TIA Sep 2022 per Dr Bloch  Medications reconciled. Take naproxen as needed for arthritic pain. He is aware of the increased risk of bleeding and takes only when needed with caution          Anticoagulation Care Providers       Provider Role Specialty Phone number    Michael J Bloch, M.D. Referring Cardiovascular Disease (Cardiology) [phone]    [facility] Anticoagulation Services   [phone]                  Refer to Patient Findings for HPI:  Patient Findings       Negatives:  Signs/symptoms of thrombosis, Signs/symptoms of bleeding, Laboratory test error suspected, Change in health, Change in alcohol use, Change in activity, Upcoming invasive procedure, Emergency department visit, Upcoming dental procedure, Missed doses, Extra doses, Change in medications, Change in diet/appetite, Hospital admission, Bruising, Other complaints          See vascular visit for vitals  Verified current warfarin dosing schedule.    Medications reconciled: Yes  Pt is on antiplatelet therapy with ASA for TIA per Dr Bloch.      A/P   INR is subtherapeutic    Warfarin  dosing recommendation: Bolus with 6mg tomorrow, then continue regimen    Pt educated to contact our clinic with any changes in medications or s/s of bleeding or thrombosis. Pt is aware to seek immediate medical attention for falls, head injury or deep cuts.    Follow up appointment in 3 week(s).    Karrie Barker, JerzyD

## 2023-09-14 NOTE — PROGRESS NOTES
"VASCULAR FOLLOW UP VISIT  Subjective:   Tristan Hester is a 88 y.o. male who presents 23  for vascular f/u  Chief Complaint   Patient presents with    Follow-Up       Subjective     HPI:   Here for f/u of possible TIA in the setting of HTN, dyslipidemia, carotid disease and valvular heart disease, and atrial fibrillation.  Still struggling with his back  No further tia or cva symptoms  No signficiant TELLEZ  BPs at home [id number] - uses wrist cuff  Lots of bruising but no blood in stool or urine.   On warfarin + asa  Overdue for INR  No change in statin or BP meds  Continues to struggle with mobility and balance, uses cane, no falls reported   Has been meeting with urology regularly      Social History     Tobacco Use    Smoking status: Former     Current packs/day: 0.00     Average packs/day: 0.5 packs/day for 30.0 years (15.0 ttl pk-yrs)     Types: Cigarettes, Pipe     Start date: 1955     Quit date: 1985     Years since quittin.7    Smokeless tobacco: Never   Vaping Use    Vaping Use: Never used   Substance Use Topics    Alcohol use: Yes     Alcohol/week: 3.0 oz     Types: 3 Cans of beer, 3 Shots of liquor per week     Comment: 1 x week    Drug use: No     DIET AND EXERCISE:  Weight Change: Stabilized  Diet: Relatively low sodium- bit less adherent  Exercise: gym 3x per week       Objective        Objective:     Vitals:    23 1022 23 1025   BP: (!) 174/81 (!) 150/74   BP Location: Left arm Left arm   Patient Position: Sitting Sitting   BP Cuff Size: Adult Adult   Pulse: (!) 59 (!) 54   Weight: 83.5 kg (184 lb)    Height: 1.803 m (5' 11\")         Body mass index is 25.66 kg/m².  Physical Exam  Vitals reviewed.   Constitutional:       Appearance: He is well-developed. He is not toxic-appearing or diaphoretic.   Eyes:      Extraocular Movements: Extraocular movements intact.      Conjunctiva/sclera: Conjunctivae normal.   Neck:      Vascular: No carotid bruit. "   Cardiovascular:      Rate and Rhythm: Normal rate and regular rhythm.      Heart sounds: Murmur heard.   Pulmonary:      Effort: Pulmonary effort is normal. No respiratory distress.      Breath sounds: Normal breath sounds. No wheezing or rales.   Musculoskeletal:      Right lower leg: No edema.      Left lower leg: No edema.   Neurological:      General: No focal deficit present.      Mental Status: He is alert and oriented to person, place, and time.      Cranial Nerves: No cranial nerve deficit.      Gait: Gait normal.   Psychiatric:         Mood and Affect: Mood normal.         Behavior: Behavior normal.       Lab Results   Component Value Date    CHOLSTRLTOT 110 06/01/2023    CHOLSTRLTOT 125 04/05/2022    LDL 63 04/05/2022    HDL 42 06/01/2023    HDL 51 04/05/2022    TRIGLYCERIDE 66 06/01/2023    TRIGLYCERIDE 54 04/05/2022      Lab Results   Component Value Date    INR 2.10 06/20/2023         Lab Results   Component Value Date    SODIUM 141 06/01/2023    SODIUM 138 04/05/2022    POTASSIUM 4.9 06/01/2023    POTASSIUM 5.3 04/05/2022    CHLORIDE 105 06/01/2023    CHLORIDE 102 04/05/2022    CO2 23 06/01/2023    CO2 23 04/05/2022    GLUCOSE 100 (H) 06/01/2023    GLUCOSE 91 04/05/2022    BUN 18 06/01/2023    BUN 28 (H) 04/05/2022    CREATININE 0.87 06/01/2023    CREATININE 1.03 04/05/2022    BUNCREATRAT 21 06/01/2023    IFAFRICA 82 11/11/2021    IFNOTAFR 71 11/11/2021      Lab Results   Component Value Date    TSH 1.380 01/31/2023      Lab Results   Component Value Date    WBC 6.2 06/01/2023    WBC 6.6 04/05/2022    RBC 4.70 06/01/2023    RBC 5.05 04/05/2022    HEMOGLOBIN 14.1 06/01/2023    HEMOGLOBIN 15.2 04/05/2022    HEMATOCRIT 42.3 06/01/2023    HEMATOCRIT 46.6 04/05/2022    MCV 90 06/01/2023    MCV 92.3 04/05/2022    MCH 30.0 06/01/2023    MCH 30.1 04/05/2022    MCHC 33.3 06/01/2023    MCHC 32.6 (L) 04/05/2022    MPV 11.5 04/05/2022     Carotid duplex February 2021   No evidence for RIGHT carotid stenosis  status post endarterectomy.   Mild LEFT internal carotid artery stenosis.     Echocardiogram March 2021  Prior echo on 8/7/20.Left ventricular ejection fraction is visually   estimated to be 60%.  Normal diastolic function.  Known TAVR aortic valve that is functioning normally with normal   transvalvular gradients. Vmax is 1.85 m/s.  Moderate eccentric mitral regurgitation.    Aortoiliac duplex April 2021   No evidence of aortic aneurysm.    No evidence of pseudoaneurysm or injury to the bilateral iliac arteries.     No stenosis of the proximal segment of the major visceral arteries.    Waveforms and velocities of the bilateral iliac arteries are normal.    Common iliac diameter (cm):   Right- 1.63 x 1.72         Left-   1.70 x 1.72    abpm 2022  Mean daytime 142/80  Normal nocturnal dip    Echo april 2022  Compared to the images of the prior study 03/02/21, there has been no   significant change.   Normal left ventricular systolic function.  The left ventricular ejection fraction is visually estimated to be 60%.  Grade II diastolic dysfunction.  Normal right ventricular systolic function.  Mild mitral regurgitation.  Known TAVR aortic valve that is functioning normally with normal transvalvular gradients.    Carotid duplex sep 2022   Surgical changes of the right carotid bifurcation consistent with post    carotid endarterectomy status.    No significant stenosis of the bilateral internal carotid arteries (<50%).      Aortoiliac duplex june 2023   No evidence of abdominal aortic or iliac artery aneurysm.    Waveforms and velocities of the abdominal aorta and bilateral iliac arteries    are normal with no hemodynamically significant stenosis.    Bilateral common iliac arteries are slightly ectatic.          Medical Decision Making:  Today's Assessment / Status / Plan:     1. Vascular injury        2. Paroxysmal atrial fibrillation (HCC)        3. TIA (transient ischemic attack)        4. Essential hypertension,  benign        5. Hyperlipidemia, unspecified hyperlipidemia type          Patient Type: Secondary Prevention    Etiology of Established CVD if Present:   1. TIA - likely cardioembolic from valve disease  2. Carotid atherosclerosis -status post CEA  3. Valvular Heart Disease  4. Atrial fibrillation with onset during acute illness, now chronic  5.  DVT after back surgery jan 2016  6.  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    Lipid Management: Qualifies for Statin Therapy Based on 2018 ACC/AHA Guidelines: yes  Calculated 10-Year Risk of ASCVD: N/A  Currently on Statin: Yes  ACC/aha criteria for at least mod intensity statin  Per nla goal ldl <70 and nonHDL <100 - below threshold on most recent blood work  Plan:  - Continue atorvastatin at current dose    - continue zetia  - Continue TLC  - Recheck fasting lipids prior to next visit    Blood Pressure Management:  Acc/aha bp goal <130/80  ABPM high prior to intensification of meds  BP previously much improved both at home and in office with intensification of meds -now appears to be up a bit, but is somewhat labile  No falls or further lightheadedness, but this would be a risk with more intensive therapy  No significant albuminuria in the past  Possible ACE inhibitor cough  Gfr and electrolytes stable  Using shared decision making we decided not to intensify meds at this time  Plan:  - Continue metoprolol at current dose- would not increase d/t HR in 50's  - Continue irbesartan 300 mg daily  - Continue amlodipine   - Continue lifestyle modification  - Recheck GFR, electrolytes prior to next visit  -recheck urine for ma prior to next visit  - Continue home BP monitoring and checks in INR clinic  - Intensify therapy as needed    Glycemic Status: Prediabetic  IFG, mild and stable  - Continue lifestyle mod  - Follow fasting glucose    Anti-Platelet/Anti-Coagulant Tx: yes  - Continue indefinite anticoagulation with warfarin  -continue low dose asa  as recommended by cards given tia on warfarin  - report any bleeding  - F/U with anticoagulation clinic today as scheduled   - limit nsaids    Smoking: continue complete avoidance    Physical Activity:  Keep going to gym at least 3x per week    Weight Management and Nutrition: Dietary plan was discussed with patient at this visit including c/w Med diet and decreasing his sodium a bit    Other:     1. TIA -has history of previous cardioembolic TIA likely with his native aortic valve as a source.  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  His symptoms resolved after only a few minutes. No further TIA or stroke symptoms.  No evidence of any problems with his valve on recent echocardiogram.  No source on repeat carotid duplex. He should call 911 immediately with any further TIA or stroke symptoms.  Antiplatelet and anticoag as above    2. Carotid atherosclerosis status post CEA- .  Only mild restenosis on repeat duplex.  Remains asymptomatic.  Repeat duplex in 2 years from previous - sep 2024    3. Valvular Heart Disease -now status post TAVR March 2020.  Still with unrepaired mitral disease -improved on most recent echo.  Appears asymptomatic at present.  Does not appear to be volume overloaded.  Defer timing of repeat echocardiogram to cardiology, but would be sure it does not go more than 2 years    4. Colonic Polyp on recent colonoscopy - repeat colonoscopy in 2019 negative per patient.   No further f/u needed    5. Insomnia - trialed zolpidem w/o success.  Continue temazepam, but minimize dose.  Patient understands addictive potential.      6.  Atrial fibrillation - defer management of rhythm and rate to cards.  Continue anticoag as above    7.  DVT - postoperative.  No recurrence.  Continue indefinite anticoagulation given concomitant afib    8.  H/o prostate ca and multiple urological issues - defer f/u to urology.  Check PSA as they recommend    9.  Balance issues, back pain and neuropathy  -this seems recalcitrant to physical therapy or any intervention that we have tried previously.  Defer f/u to ENT and neurology    10.  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  Good arterial flow and patent stent on noninvasive study.  Report any claudication, rest pain, or ulceration immediately.  Will obtain repeat arterial duplex 2 year from previous - june 2025    We will partner with other providers in the management of established vascular disease and cardiometabolic risk factors.    Studies to Be Obtained:    1) carotid duplex sep 2024  2) aortoiliac duplex june 2025  3) repeat echocardiogram at discretion of cardiology (or at least by april 2024)    Labs to Be Obtained: as above prior to next visit    Follow up in: 6 mo    Michael J Bloch, M.D.

## 2023-10-05 ENCOUNTER — ANTICOAGULATION VISIT (OUTPATIENT)
Dept: VASCULAR LAB | Facility: MEDICAL CENTER | Age: 88
End: 2023-10-05
Attending: INTERNAL MEDICINE
Payer: MEDICARE

## 2023-10-05 VITALS — DIASTOLIC BLOOD PRESSURE: 61 MMHG | SYSTOLIC BLOOD PRESSURE: 104 MMHG | HEART RATE: 58 BPM

## 2023-10-05 DIAGNOSIS — D68.69 SECONDARY HYPERCOAGULABLE STATE (HCC): ICD-10-CM

## 2023-10-05 DIAGNOSIS — I48.0 PAROXYSMAL ATRIAL FIBRILLATION (HCC): ICD-10-CM

## 2023-10-05 DIAGNOSIS — G45.9 TIA (TRANSIENT ISCHEMIC ATTACK): ICD-10-CM

## 2023-10-05 LAB — INR PPP: 2.7 (ref 2–3.5)

## 2023-10-05 PROCEDURE — 99211 OFF/OP EST MAY X REQ PHY/QHP: CPT

## 2023-10-05 PROCEDURE — 85610 PROTHROMBIN TIME: CPT

## 2023-10-05 NOTE — PROGRESS NOTES
Anticoagulation Summary  As of 10/5/2023      INR goal:  2.0-3.0   TTR:  70.6 % (8.1 y)   INR used for dosin.70 (10/5/2023)   Warfarin maintenance plan:  4 mg (4 mg x 1) every Sun, Tue, Fri; 6 mg (4 mg x 1.5) all other days   Weekly warfarin total:  36 mg   Plan last modified:  Jerzy SotoD (11/3/2022)   Next INR check:  2023   Priority:  Maintenance   Target end date:  Indefinite    Indications    Paroxysmal atrial fibrillation (HCC) [I48.0]  Hx of TIA (transient ischemic attack) [G45.9]  Deep vein thrombosis (DVT) (HCC) (Resolved) [I82.409]  Atrial fibrillation (HCC) (Resolved) [I48.91]  Hx of Stroke (Resolved) [I63.9]  Secondary hypercoagulable state (HCC) [D68.69]                 Anticoagulation Episode Summary       INR check location:  Anticoagulation Clinic    Preferred lab:      Send INR reminders to:      Comments:  Takes ASA + warfarin 2/2 TIA Sep 2022 per Dr Bloch  Medications reconciled. Take naproxen as needed for arthritic pain. He is aware of the increased risk of bleeding and takes only when needed with caution          Anticoagulation Care Providers       Provider Role Specialty Phone number    Michael J Bloch, M.D. Referring Cardiovascular Disease (Cardiology) [phone]    [facility]   [phone]                  Refer to Patient Findings for HPI:  Patient Findings       Negatives:  Signs/symptoms of thrombosis, Signs/symptoms of bleeding, Laboratory test error suspected, Change in health, Change in alcohol use, Change in activity, Upcoming invasive procedure, Emergency department visit, Upcoming dental procedure, Missed doses, Extra doses, Change in medications, Change in diet/appetite, Hospital admission, Bruising, Other complaints            Vitals:    10/05/23 1038   BP: 104/61   Pulse: (!) 58       Verified current warfarin dosing schedule.    Medications reconciled: Yes  Pt is on antiplatelet therapy with ASA for TIA.      A/P   INR is  therapeutic    Warfarin dosing recommendation: Continue regimen as listed above.    Pt educated to contact our clinic with any changes in medications or s/s of bleeding or thrombosis. Pt is aware to seek immediate medical attention for falls, head injury or deep cuts.    Follow up appointment in 6 week(s).    Karrie Barker, PharmD

## 2023-10-30 DIAGNOSIS — F51.01 PRIMARY INSOMNIA: ICD-10-CM

## 2023-10-30 RX ORDER — TEMAZEPAM 30 MG/1
30 CAPSULE ORAL NIGHTLY PRN
Qty: 30 CAPSULE | Refills: 5 | Status: SHIPPED | OUTPATIENT
Start: 2023-10-30 | End: 2023-11-27

## 2023-11-06 DIAGNOSIS — E78.5 DYSLIPIDEMIA: ICD-10-CM

## 2023-11-06 RX ORDER — EZETIMIBE 10 MG/1
TABLET ORAL
Qty: 90 TABLET | Refills: 0 | Status: SHIPPED | OUTPATIENT
Start: 2023-11-06 | End: 2024-02-05

## 2023-11-06 NOTE — TELEPHONE ENCOUNTER
Is the patient due for a refill? Yes    Was the patient seen the past year? No    Date of last office visit: 09/14/2022    Does the patient have an upcoming appointment?  No      Provider to refill:AK    Does the patients insurance require a 100 day supply?  No

## 2023-11-16 ENCOUNTER — ANTICOAGULATION VISIT (OUTPATIENT)
Dept: VASCULAR LAB | Facility: MEDICAL CENTER | Age: 88
End: 2023-11-16
Attending: INTERNAL MEDICINE
Payer: MEDICARE

## 2023-11-16 VITALS — HEART RATE: 52 BPM | SYSTOLIC BLOOD PRESSURE: 134 MMHG | DIASTOLIC BLOOD PRESSURE: 73 MMHG

## 2023-11-16 DIAGNOSIS — G45.9 TIA (TRANSIENT ISCHEMIC ATTACK): ICD-10-CM

## 2023-11-16 DIAGNOSIS — D68.69 SECONDARY HYPERCOAGULABLE STATE (HCC): ICD-10-CM

## 2023-11-16 DIAGNOSIS — I48.0 PAROXYSMAL ATRIAL FIBRILLATION (HCC): ICD-10-CM

## 2023-11-16 LAB — INR PPP: 2.1 (ref 2–3.5)

## 2023-11-16 PROCEDURE — 99211 OFF/OP EST MAY X REQ PHY/QHP: CPT | Performed by: NURSE PRACTITIONER

## 2023-11-16 PROCEDURE — 85610 PROTHROMBIN TIME: CPT

## 2023-11-16 NOTE — PROGRESS NOTES
Anticoagulation Summary  As of 2023      INR goal:  2.0-3.0   TTR:  71.0 % (8.2 y)   INR used for dosin.10 (2023)   Warfarin maintenance plan:  4 mg (4 mg x 1) every Sun, Tue, Fri; 6 mg (4 mg x 1.5) all other days   Weekly warfarin total:  36 mg   Plan last modified:  Franchesca Russell PharmD (11/3/2022)   Next INR check:  2023   Priority:  Maintenance   Target end date:  Indefinite    Indications    Paroxysmal atrial fibrillation (HCC) [I48.0]  Hx of TIA (transient ischemic attack) [G45.9]  Deep vein thrombosis (DVT) (HCC) (Resolved) [I82.409]  Atrial fibrillation (HCC) (Resolved) [I48.91]  Hx of Stroke (Resolved) [I63.9]  Secondary hypercoagulable state (HCC) [D68.69]                 Anticoagulation Episode Summary       INR check location:  Anticoagulation Clinic    Preferred lab:      Send INR reminders to:      Comments:  Takes ASA + warfarin 2/2 TIA Sep 2022 per Dr Bloch  Medications reconciled. Take naproxen as needed for arthritic pain. He is aware of the increased risk of bleeding and takes only when needed with caution          Anticoagulation Care Providers       Provider Role Specialty Phone number    Michael J Bloch, M.D. Referring Cardiovascular Disease (Cardiology) [phone]    [facility]   [phone]                  Refer to Patient Findings for HPI:  Patient Findings       Negatives:  Signs/symptoms of thrombosis, Signs/symptoms of bleeding, Laboratory test error suspected, Change in health, Change in alcohol use, Change in activity, Upcoming invasive procedure, Emergency department visit, Upcoming dental procedure, Missed doses, Extra doses, Change in medications, Change in diet/appetite, Hospital admission, Bruising, Other complaints    Comments:  He is not sure if he is taking 4 mg three times per week (as outlined on calendar) or four times a week.             Vitals:    23 1038   BP: 134/73   Pulse: (!) 52       Verified current warfarin  dosing schedule.    Medications reconciled: Yes  Pt is on antiplatelet therapy with ASA for TIA.      A/P   INR is therapeutic. Since pt may be taking a slightly lower dose, I will have him take the dose recommended on the calendar but will have him return in 1 month.    Warfarin dosing recommendation: Take 4 mg Sun/Tues/Fri, 6 mg all other days.    Pt educated to contact our clinic with any changes in medications or s/s of bleeding or thrombosis. Pt is aware to seek immediate medical attention for falls, head injury or deep cuts.    Follow up appointment in 4 week(s).    CHEYENNE Mckinley.

## 2023-11-25 DIAGNOSIS — E78.5 HYPERLIPIDEMIA, UNSPECIFIED HYPERLIPIDEMIA TYPE: ICD-10-CM

## 2023-11-25 DIAGNOSIS — F51.01 PRIMARY INSOMNIA: ICD-10-CM

## 2023-11-27 RX ORDER — ATORVASTATIN CALCIUM 80 MG/1
TABLET, FILM COATED ORAL
Qty: 45 TABLET | Refills: 3 | Status: SHIPPED | OUTPATIENT
Start: 2023-11-27

## 2023-11-27 RX ORDER — TEMAZEPAM 30 MG/1
CAPSULE ORAL
Qty: 30 CAPSULE | Refills: 5 | Status: SHIPPED | OUTPATIENT
Start: 2023-11-27 | End: 2024-05-25

## 2023-11-28 DIAGNOSIS — I10 ESSENTIAL HYPERTENSION, BENIGN: ICD-10-CM

## 2023-11-28 RX ORDER — AMLODIPINE BESYLATE 5 MG/1
5 TABLET ORAL DAILY
Qty: 90 TABLET | Refills: 3 | Status: SHIPPED | OUTPATIENT
Start: 2023-11-28

## 2023-11-29 ENCOUNTER — PATIENT MESSAGE (OUTPATIENT)
Dept: HEALTH INFORMATION MANAGEMENT | Facility: OTHER | Age: 88
End: 2023-11-29

## 2023-12-04 DIAGNOSIS — I10 ESSENTIAL HYPERTENSION, BENIGN: ICD-10-CM

## 2023-12-06 RX ORDER — METOPROLOL SUCCINATE 25 MG/1
TABLET, EXTENDED RELEASE ORAL
Qty: 90 TABLET | Refills: 0 | OUTPATIENT
Start: 2023-12-06

## 2023-12-07 DIAGNOSIS — I10 ESSENTIAL HYPERTENSION, BENIGN: ICD-10-CM

## 2023-12-07 RX ORDER — METOPROLOL SUCCINATE 25 MG/1
TABLET, EXTENDED RELEASE ORAL
Qty: 30 TABLET | Refills: 0 | Status: SHIPPED | OUTPATIENT
Start: 2023-12-07 | End: 2024-01-09 | Stop reason: SDUPTHER

## 2023-12-07 NOTE — TELEPHONE ENCOUNTER
Last appointment 9/14/22 with AK. FV scheduled for 12/11/23 with BE. Short supply/Courtesy refill provided.

## 2023-12-08 ENCOUNTER — TELEPHONE (OUTPATIENT)
Dept: VASCULAR LAB | Facility: MEDICAL CENTER | Age: 88
End: 2023-12-08

## 2023-12-08 DIAGNOSIS — T14.8XXA VASCULAR INJURY: ICD-10-CM

## 2023-12-08 RX ORDER — GABAPENTIN 300 MG/1
300 CAPSULE ORAL 3 TIMES DAILY
Qty: 270 CAPSULE | Refills: 0 | Status: SHIPPED | OUTPATIENT
Start: 2023-12-08 | End: 2024-02-27

## 2023-12-09 NOTE — TELEPHONE ENCOUNTER
Caller:  David Mcgee    Medication Name and Dosage:    gabapentin (NEURONTIN) 300 MG Cap [[id number]]    Medication amount left: 0    Preferred Pharmacy:   [facility] [address]    Other questions (Topic): N/A    Callback Number (Will only call for issues): [phone]    Thank you,   Shantel GEORGE

## 2023-12-11 ENCOUNTER — OFFICE VISIT (OUTPATIENT)
Dept: CARDIOLOGY | Facility: MEDICAL CENTER | Age: 88
End: 2023-12-11
Attending: INTERNAL MEDICINE
Payer: MEDICARE

## 2023-12-11 VITALS
RESPIRATION RATE: 16 BRPM | OXYGEN SATURATION: 96 % | WEIGHT: 182 LBS | BODY MASS INDEX: 25.48 KG/M2 | DIASTOLIC BLOOD PRESSURE: 80 MMHG | HEART RATE: 59 BPM | SYSTOLIC BLOOD PRESSURE: 122 MMHG | HEIGHT: 71 IN

## 2023-12-11 DIAGNOSIS — I48.0 PAF (PAROXYSMAL ATRIAL FIBRILLATION) (HCC): ICD-10-CM

## 2023-12-11 DIAGNOSIS — Z98.890 HISTORY OF CEA (CAROTID ENDARTERECTOMY): ICD-10-CM

## 2023-12-11 DIAGNOSIS — G45.9 TIA (TRANSIENT ISCHEMIC ATTACK): ICD-10-CM

## 2023-12-11 DIAGNOSIS — I25.10 CORONARY ARTERIOSCLEROSIS: ICD-10-CM

## 2023-12-11 DIAGNOSIS — Z95.2 S/P TAVR (TRANSCATHETER AORTIC VALVE REPLACEMENT): ICD-10-CM

## 2023-12-11 DIAGNOSIS — I65.29 STENOSIS OF CAROTID ARTERY, UNSPECIFIED LATERALITY: ICD-10-CM

## 2023-12-11 DIAGNOSIS — R53.1 WEAKNESS: ICD-10-CM

## 2023-12-11 PROBLEM — I38 CHRONIC DIASTOLIC HEART FAILURE DUE TO VALVULAR DISEASE (HCC): Status: RESOLVED | Noted: 2020-02-27 | Resolved: 2023-12-11

## 2023-12-11 PROBLEM — I50.32 CHRONIC DIASTOLIC HEART FAILURE DUE TO VALVULAR DISEASE (HCC): Status: RESOLVED | Noted: 2020-02-27 | Resolved: 2023-12-11

## 2023-12-11 PROBLEM — I82.4Z9 ACUTE DEEP VEIN THROMBOSIS (DVT) OF DISTAL VEIN OF LOWER EXTREMITY (HCC): Status: RESOLVED | Noted: 2020-02-27 | Resolved: 2023-12-11

## 2023-12-11 LAB — EKG IMPRESSION: NORMAL

## 2023-12-11 PROCEDURE — 3079F DIAST BP 80-89 MM HG: CPT | Performed by: INTERNAL MEDICINE

## 2023-12-11 PROCEDURE — 99215 OFFICE O/P EST HI 40 MIN: CPT | Mod: 25 | Performed by: INTERNAL MEDICINE

## 2023-12-11 PROCEDURE — 3074F SYST BP LT 130 MM HG: CPT | Performed by: INTERNAL MEDICINE

## 2023-12-11 PROCEDURE — 93010 ELECTROCARDIOGRAM REPORT: CPT | Performed by: INTERNAL MEDICINE

## 2023-12-11 PROCEDURE — 93005 ELECTROCARDIOGRAM TRACING: CPT | Performed by: INTERNAL MEDICINE

## 2023-12-11 PROCEDURE — 99213 OFFICE O/P EST LOW 20 MIN: CPT | Performed by: INTERNAL MEDICINE

## 2023-12-11 RX ORDER — BRIMONIDINE TARTRATE, TIMOLOL MALEATE 2; 5 MG/ML; MG/ML
SOLUTION/ DROPS OPHTHALMIC
COMMUNITY
Start: 2023-11-07

## 2023-12-11 ASSESSMENT — FIBROSIS 4 INDEX: FIB4 SCORE: 2.58

## 2023-12-11 NOTE — PROGRESS NOTES
"CARDIOLOGY OUTPATIENT FOLLOWUP    PCP: Michael J Bloch, M.D.    1. S/P TAVR (transcatheter aortic valve replacement)    2. Coronary arteriosclerosis    3. TIA (transient ischemic attack)    4. Weakness    5. PAF (paroxysmal atrial fibrillation) (HCC)    6. History of CEA (carotid endarterectomy)    7. Stenosis of carotid artery, unspecified laterality        Tristan Hester experienced recent TIA-like episode with transient garbled speech.  I advised continuing with the present medication regimen including high-dose statin, warfarin and aspirin.  I will update the carotid duplex.  Should the fatigue fail to resolve, he will also complete an echocardiogram.    Follow up: 1 year    History: Tristan Hester is a 88 y.o. male with history of 29 mm TAVR in 2020, remote PAF during an acute illness, history of DVT, history of empyema, history of YOANDY days and carotid endarterectomy presenting for assessment of recent weakness and garbled speech.    Symptoms occurred abruptly this Monday.  Initially experienced severe weakness.  There was also garbled speech.  The garbled speech resolved fully on its own but the weakness has only improved about 50%.    Physical Exam:  /80 (BP Location: Left arm, Patient Position: Sitting, BP Cuff Size: Adult)   Pulse (!) 59   Resp 16   Ht 1.803 m (5' 11\")   Wt 82.6 kg (182 lb)   SpO2 96%   BMI 25.38 kg/m²   GEN: NAD  CARDIAC: Regular Systolic ejection murmur  RESP: Clear to auscultation bilaterally  ABD: Soft, non-tender, non-distended  EXT: No edema  NEURO: No focal deficit    The ASCVD Risk score (Dwayne DK, et al., 2019) failed to calculate.    For this encounter I directly reviewed and interpreted ECG tracings.  Sinus bradycardia  -which is my same interpretation of prior EKGs which have all showed a similar heart rate as well.  The QRS complex is narrow.    Studies  Lab Results   Component Value Date/Time    CHOLSTRLTOT 110 06/01/2023 07:41 AM    CHOLSTRLTOT 125 " 04/05/2022 03:04 PM    LDL 63 04/05/2022 03:04 PM    HDL 42 06/01/2023 07:41 AM    HDL 51 04/05/2022 03:04 PM    TRIGLYCERIDE 66 06/01/2023 07:41 AM    TRIGLYCERIDE 54 04/05/2022 03:04 PM       Lab Results   Component Value Date/Time    SODIUM 141 06/01/2023 07:41 AM    SODIUM 138 04/05/2022 03:04 PM    POTASSIUM 4.9 06/01/2023 07:41 AM    POTASSIUM 5.3 04/05/2022 03:04 PM    CHLORIDE 105 06/01/2023 07:41 AM    CHLORIDE 102 04/05/2022 03:04 PM    CO2 23 06/01/2023 07:41 AM    CO2 23 04/05/2022 03:04 PM    GLUCOSE 100 (H) 06/01/2023 07:41 AM    GLUCOSE 91 04/05/2022 03:04 PM    BUN 18 06/01/2023 07:41 AM    BUN 28 (H) 04/05/2022 03:04 PM    CREATININE 0.87 06/01/2023 07:41 AM    CREATININE 1.03 04/05/2022 03:04 PM    CREATININE 1.0 07/02/2008 03:43 PM    BUNCREATRAT 21 06/01/2023 07:41 AM     Lab Results   Component Value Date/Time    ALKPHOSPHAT 81 06/01/2023 07:41 AM    ALKPHOSPHAT 67 04/05/2022 03:04 PM    ASTSGOT 27 06/01/2023 07:41 AM    ASTSGOT 31 04/05/2022 03:04 PM    ALTSGPT 18 06/01/2023 07:41 AM    ALTSGPT 24 04/05/2022 03:04 PM    TBILIRUBIN 0.8 06/01/2023 07:41 AM    TBILIRUBIN 1.3 04/05/2022 03:04 PM        Past Medical History:   Diagnosis Date    Aortic valve stenosis     Arrhythmia     Afib    Arthritis     osteo, hips and knees    Blood clotting disorder (HCC)     , left arm    Bronchitis         Cancer (HCC)     Prostate CA--2000    Carotid arterial disease (HCC) 1/6/2014    Cataract     removed bilat    DVT (deep venous thrombosis) ([facility]) 2016    Heart valve disease     MVR    HLD (hyperlipidemia) 1/6/2014    Hypertension     Mitral regurgitation 1/6/2014    Pain 12-14-15    low back, hips, 2-3/10    Pneumonia         Stroke (HCC) 2008    TIA, no residual    TIA (transient ischemic attack)     2008    Urinary bladder disorder     HAD MALE BLADDER SUSPENSION PROCEDURE DONE    Urinary incontinence     stress incontinence     Allergies   Allergen Reactions    Other Environmental  Runny Nose and Itching     Seasonal, cats-hayfever     Outpatient Encounter Medications as of 2023   Medication Sig Dispense Refill    COMBIGAN 0.2-0.5 % Solution       gabapentin (NEURONTIN) 300 MG Cap Take 1 Capsule by mouth 3 times a day. 270 Capsule 0    metoprolol SR (TOPROL XL) 25 MG TABLET SR 24 HR Take 1 tablet by mouth once daily 30 Tablet 0    amLODIPine (NORVASC) 5 MG Tab Take 1 Tablet by mouth every day. 90 Tablet 3    atorvastatin (LIPITOR) 80 MG tablet TAKE 1/2 (ONE-HALF) TABLET BY MOUTH ONCE DAILY IN THE EVENING 45 Tablet 3    temazepam (RESTORIL) 30 MG capsule TAKE 1 CAPSULE BY MOUTH EVERY DAY AT BEDTIME AS NEEDED FOR SLEEP 30 Capsule 5    ezetimibe (ZETIA) 10 MG Tab Take 1 tablet by mouth once daily 90 Tablet 0    warfarin (COUMADIN) 4 MG Tab TAKE 1 TO 1 & 1/2 (ONE & ONE-HALF) TABLETS BY MOUTH ONCE DAILY AS DIRECTED BY  [facility]  ANTICOAGULATION  CLINIC 135 Tablet 1    irbesartan (AVAPRO) 300 MG Tab Take 1 tablet by mouth once daily 90 Tablet 1    latanoprost (XALATAN) 0.005 % Solution INSTILL 1 DROP INTO EACH EYE AT BEDTIME      aspirin (ASA) 81 MG Chew Tab chewable tablet Chew 1 Tablet every day. 100 Tablet 11    Ascorbic Acid (VITAMIN C PO) Take 500 mg by mouth.      Multiple Vitamins-Minerals (DAILY MULTI VITAMIN/MINERALS PO) Take  by mouth.      Acetaminophen (TYLENOL PO) Take 975 mg by mouth every day.       No facility-administered encounter medications on file as of 2023.     Social History     Socioeconomic History    Marital status:      Spouse name: Not on file    Number of children: Not on file    Years of education: Not on file    Highest education level: Not on file   Occupational History    Not on file   Tobacco Use    Smoking status: Former     Current packs/day: 0.00     Average packs/day: 0.5 packs/day for 30.0 years (15.0 ttl pk-yrs)     Types: Cigarettes, Pipe     Start date: 1955     Quit date: 1985     Years since quittin.9    Smokeless tobacco:  Never   Vaping Use    Vaping Use: Never used   Substance and Sexual Activity    Alcohol use: Yes     Alcohol/week: 3.0 oz     Types: 3 Cans of beer, 3 Shots of liquor per week     Comment: 1 x week    Drug use: No    Sexual activity: Not Currently   Other Topics Concern    Not on file   Social History Narrative    Not on file     Social Determinants of Health     Financial Resource Strain: Not on file   Food Insecurity: No Food Insecurity (2/17/2020)    Hunger Vital Sign     Worried About Running Out of Food in the Last Year: Never true     Ran Out of Food in the Last Year: Never true   Transportation Needs: No Transportation Needs (2/17/2020)    PRAPARE - Transportation     Lack of Transportation (Medical): No     Lack of Transportation (Non-Medical): No   Physical Activity: Not on file   Stress: Not on file   Social Connections: Not on file   Intimate Partner Violence: Not on file   Housing Stability: Not on file     Today's encounter included evaluation and management of symptoms of neurologic ischemia-threat to bodily function    ROS:   10 point review systems is otherwise negative except as per the HPI    Chief Complaint   Patient presents with    Transient Ischemic Attack           Atrial Fibrillation

## 2023-12-14 ENCOUNTER — ANTICOAGULATION VISIT (OUTPATIENT)
Dept: VASCULAR LAB | Facility: MEDICAL CENTER | Age: 88
End: 2023-12-14
Attending: INTERNAL MEDICINE
Payer: MEDICARE

## 2023-12-14 VITALS — HEART RATE: 53 BPM | SYSTOLIC BLOOD PRESSURE: 153 MMHG | DIASTOLIC BLOOD PRESSURE: 95 MMHG

## 2023-12-14 DIAGNOSIS — D68.69 SECONDARY HYPERCOAGULABLE STATE (HCC): ICD-10-CM

## 2023-12-14 DIAGNOSIS — G45.9 TIA (TRANSIENT ISCHEMIC ATTACK): ICD-10-CM

## 2023-12-14 DIAGNOSIS — I48.0 PAF (PAROXYSMAL ATRIAL FIBRILLATION) (HCC): ICD-10-CM

## 2023-12-14 LAB — INR PPP: 2.8 (ref 2–3.5)

## 2023-12-14 PROCEDURE — 85610 PROTHROMBIN TIME: CPT

## 2023-12-14 PROCEDURE — 99212 OFFICE O/P EST SF 10 MIN: CPT

## 2023-12-14 NOTE — PROGRESS NOTES
Anticoagulation Summary  As of 2023      INR goal:  2.0-3.0   TTR:  71.3 % (8.3 y)   INR used for dosin.80 (2023)   Warfarin maintenance plan:  4 mg (4 mg x 1) every Sun, Tue, Fri; 6 mg (4 mg x 1.5) all other days   Weekly warfarin total:  36 mg   Plan last modified:  Franchesca Russlel PharmD (11/3/2022)   Next INR check:  2024   Priority:  Maintenance   Target end date:  Indefinite    Indications    PAF (paroxysmal atrial fibrillation) (HCC) [I48.0]  Hx of TIA (transient ischemic attack) [G45.9]  Deep vein thrombosis (DVT) (HCC) (Resolved) [I82.409]  Atrial fibrillation (HCC) (Resolved) [I48.91]  Hx of Stroke (Resolved) [I63.9]  Secondary hypercoagulable state (HCC) [D68.69]                 Anticoagulation Episode Summary       INR check location:  Anticoagulation Clinic    Preferred lab:      Send INR reminders to:      Comments:  Takes ASA + warfarin 2/2 TIA Sep 2022 per Dr Bloch  Medications reconciled. Take naproxen as needed for arthritic pain. He is aware of the increased risk of bleeding and takes only when needed with caution          Anticoagulation Care Providers       Provider Role Specialty Phone number    Michael J Bloch, M.D. Referring Internal Medicine [phone]    [facility]   [phone]            Refer to Patient Findings for HPI:  Patient Findings       Negatives:  Signs/symptoms of thrombosis, Signs/symptoms of bleeding, Laboratory test error suspected, Change in health, Change in alcohol use, Change in activity, Upcoming invasive procedure, Emergency department visit, Upcoming dental procedure, Missed doses, Extra doses, Change in medications, Change in diet/appetite, Hospital admission, Bruising, Other complaints            Vitals:    23 1050   BP: (!) 153/95   Pulse: (!) 53   BP elevated - had 2.5 cups of coffee today    Verified current warfarin dosing schedule.    Medications reconciled: Yes  Pt is on antiplatelet therapy with ASA for TIA  per Dr Bloch.      A/P   INR is therapeutic    Warfarin dosing recommendation: Continue regimen as listed above.    Pt educated to contact our clinic with any changes in medications or s/s of bleeding or thrombosis. Pt is aware to seek immediate medical attention for falls, head injury or deep cuts.    Request pt to return in 6 week(s). Pt agrees.    Karrie Barker, JerzyD

## 2023-12-26 ENCOUNTER — OFFICE VISIT (OUTPATIENT)
Dept: URGENT CARE | Facility: CLINIC | Age: 88
End: 2023-12-26
Payer: MEDICARE

## 2023-12-26 VITALS
BODY MASS INDEX: 25.9 KG/M2 | HEART RATE: 52 BPM | HEIGHT: 71 IN | RESPIRATION RATE: 20 BRPM | DIASTOLIC BLOOD PRESSURE: 72 MMHG | OXYGEN SATURATION: 95 % | SYSTOLIC BLOOD PRESSURE: 130 MMHG | TEMPERATURE: 97.1 F | WEIGHT: 185 LBS

## 2023-12-26 DIAGNOSIS — Z79.01 ANTICOAGULATED ON COUMADIN: ICD-10-CM

## 2023-12-26 DIAGNOSIS — T14.8XXA MULTIPLE SKIN TEARS: ICD-10-CM

## 2023-12-26 PROCEDURE — 99214 OFFICE O/P EST MOD 30 MIN: CPT | Performed by: NURSE PRACTITIONER

## 2023-12-26 PROCEDURE — 3078F DIAST BP <80 MM HG: CPT | Performed by: NURSE PRACTITIONER

## 2023-12-26 PROCEDURE — 3075F SYST BP GE 130 - 139MM HG: CPT | Performed by: NURSE PRACTITIONER

## 2023-12-26 ASSESSMENT — FIBROSIS 4 INDEX: FIB4 SCORE: 2.58

## 2023-12-27 RX ORDER — CEPHALEXIN 500 MG/1
500 CAPSULE ORAL 3 TIMES DAILY
Qty: 21 CAPSULE | Refills: 0 | Status: SHIPPED | OUTPATIENT
Start: 2023-12-27 | End: 2024-01-03

## 2023-12-27 ASSESSMENT — ENCOUNTER SYMPTOMS
MYALGIAS: 0
BRUISES/BLEEDS EASILY: 1
FOCAL WEAKNESS: 1
SENSORY CHANGE: 1
TINGLING: 1

## 2023-12-27 NOTE — PROGRESS NOTES
Subjective     Everardo Hester is a 88 y.o. male who presents with Other (Skin tear on (R) arm X 5 days slipped down stairs )            HPI  New problem.  This patient is a very pleasant 88-year-old male who presents with multiple skin tears on his right forearm x 5 days after he slipped on stairs at home.  He presents today wanting it looked at as he is worried about infection.  He has been keeping it clean and dressed with Polysporin and dressing and changing this about every other day.  He denies any fever, chills, myalgia, nausea, or any other injury.  Of note he is anticoagulated on warfarin.    Other environmental  Current Outpatient Medications on File Prior to Visit   Medication Sig Dispense Refill    COMBIGAN 0.2-0.5 % Solution       gabapentin (NEURONTIN) 300 MG Cap Take 1 Capsule by mouth 3 times a day. 270 Capsule 0    metoprolol SR (TOPROL XL) 25 MG TABLET SR 24 HR Take 1 tablet by mouth once daily 30 Tablet 0    amLODIPine (NORVASC) 5 MG Tab Take 1 Tablet by mouth every day. 90 Tablet 3    atorvastatin (LIPITOR) 80 MG tablet TAKE 1/2 (ONE-HALF) TABLET BY MOUTH ONCE DAILY IN THE EVENING 45 Tablet 3    temazepam (RESTORIL) 30 MG capsule TAKE 1 CAPSULE BY MOUTH EVERY DAY AT BEDTIME AS NEEDED FOR SLEEP 30 Capsule 5    ezetimibe (ZETIA) 10 MG Tab Take 1 tablet by mouth once daily 90 Tablet 0    warfarin (COUMADIN) 4 MG Tab TAKE 1 TO 1 & 1/2 (ONE & ONE-HALF) TABLETS BY MOUTH ONCE DAILY AS DIRECTED BY  [facility]  ANTICOAGULATION  CLINIC 135 Tablet 1    irbesartan (AVAPRO) 300 MG Tab Take 1 tablet by mouth once daily 90 Tablet 1    latanoprost (XALATAN) 0.005 % Solution INSTILL 1 DROP INTO EACH EYE AT BEDTIME      aspirin (ASA) 81 MG Chew Tab chewable tablet Chew 1 Tablet every day. 100 Tablet 11    Ascorbic Acid (VITAMIN C PO) Take 500 mg by mouth.      Multiple Vitamins-Minerals (DAILY MULTI VITAMIN/MINERALS PO) Take  by mouth.      Acetaminophen (TYLENOL PO) Take 975 mg by mouth every day.       No current  facility-administered medications on file prior to visit.     Social History     Socioeconomic History    Marital status:      Spouse name: Not on file    Number of children: Not on file    Years of education: Not on file    Highest education level: Not on file   Occupational History    Not on file   Tobacco Use    Smoking status: Former     Current packs/day: 0.00     Average packs/day: 0.5 packs/day for 30.0 years (15.0 ttl pk-yrs)     Types: Cigarettes, Pipe     Start date: 1955     Quit date: 1985     Years since quittin.0    Smokeless tobacco: Never   Vaping Use    Vaping Use: Never used   Substance and Sexual Activity    Alcohol use: Yes     Alcohol/week: 3.0 oz     Types: 3 Cans of beer, 3 Shots of liquor per week     Comment: 1 x week    Drug use: No    Sexual activity: Not Currently   Other Topics Concern    Not on file   Social History Narrative    Not on file     Social Determinants of Health     Financial Resource Strain: Not on file   Food Insecurity: No Food Insecurity (2020)    Hunger Vital Sign     Worried About Running Out of Food in the Last Year: Never true     Ran Out of Food in the Last Year: Never true   Transportation Needs: No Transportation Needs (2020)    PRAPARE - Transportation     Lack of Transportation (Medical): No     Lack of Transportation (Non-Medical): No   Physical Activity: Not on file   Stress: Not on file   Social Connections: Not on file   Intimate Partner Violence: Not on file   Housing Stability: Not on file     Breast Cancer-related family history is not on file.      Review of Systems   Musculoskeletal:  Negative for myalgias.   Skin:         Multiple skin tears to right forearm.    Neurological:  Positive for tingling, sensory change and focal weakness.   Endo/Heme/Allergies:  Bruises/bleeds easily.   All other systems reviewed and are negative.             Objective     /72 (BP Location: Left arm, Patient Position: Sitting, BP Cuff  "Size: Adult)   Pulse (!) 52   Temp 36.2 °C (97.1 °F) (Temporal)   Resp 20   Ht 1.803 m (5' 11\")   Wt 83.9 kg (185 lb)   SpO2 95%   BMI 25.80 kg/m²      Physical Exam  Vitals and nursing note reviewed.   Constitutional:       Appearance: Normal appearance. He is not ill-appearing.   Cardiovascular:      Rate and Rhythm: Normal rate and regular rhythm.      Heart sounds: No murmur heard.  Pulmonary:      Effort: Pulmonary effort is normal.      Breath sounds: Normal breath sounds.   Musculoskeletal:         General: Normal range of motion.   Skin:     General: Skin is warm and dry.      Capillary Refill: Capillary refill takes less than 2 seconds.      Comments: Multiple skin tears noted after removal of dressing. Irrigated with NS and redressed with polysporin, non adherent gauze and coban. No s/s of infection noted.    Neurological:      General: No focal deficit present.      Mental Status: He is alert and oriented to person, place, and time.                             Assessment & Plan        1. Multiple skin tears  cephALEXin (KEFLEX) 500 MG Cap      2. Anticoagulated on Coumadin            Reviewed with patient and wife healing process of this type of skin injury.   Discussed ongoing wound care.  Differential diagnosis, natural history, supportive care, and indications for immediate follow-up were discussed.                     "

## 2023-12-28 ENCOUNTER — TELEPHONE (OUTPATIENT)
Dept: VASCULAR LAB | Facility: MEDICAL CENTER | Age: 88
End: 2023-12-28

## 2023-12-28 NOTE — TELEPHONE ENCOUNTER
Caller: Everardo Hester     Topic/issue: Patient states that he is very instable right now and light headed and he has fallen three times   I did advise patient to go to ER he would like a call back from Dr Bloch     Callback Number: [phone]    Thank You   Ariadne DEL REAL

## 2023-12-28 NOTE — TELEPHONE ENCOUNTER
Pt states better right now, but this morning, pt had slurred speech and fallen 3x. Pt states now back to his baseline.   It lasted for a few hours and was able to recover.    This has happened to him once before and was dx with a TIA.     Recommended ER for evaluation of recurrent symptoms. Pt at this time declines but will call 911 if symptoms come back.   Advised PHOEBE BEST and will notify him and get recommendations.

## 2023-12-29 NOTE — TELEPHONE ENCOUNTER
Called: [phone]    Spoke With: Everardo Hester (patient)       Message:   Spoke with pt in regards to this issue he says he cannot go to ED today also advised of going to Cards per Dr Bloch, he says he is okay but is physically unstable, says his HR could only get up to 65 today on his cycle machine. Pt says if he is experiencing difficulty walking or regular daily life activities he will go to ED he says his experiences in ED have not been positive so he is hesitant to go in. Says he has a cardiology appt in a few weeks and an US in 10 days.       Isabel Stone, Medical Ass't  [facility]   Phone: [phone]   Fax: [phone]

## 2024-01-08 ENCOUNTER — HOSPITAL ENCOUNTER (OUTPATIENT)
Dept: RADIOLOGY | Facility: MEDICAL CENTER | Age: 89
End: 2024-01-08
Attending: INTERNAL MEDICINE
Payer: MEDICARE

## 2024-01-08 DIAGNOSIS — Z95.2 S/P TAVR (TRANSCATHETER AORTIC VALVE REPLACEMENT): ICD-10-CM

## 2024-01-08 PROCEDURE — 93880 EXTRACRANIAL BILAT STUDY: CPT

## 2024-01-08 PROCEDURE — 93880 EXTRACRANIAL BILAT STUDY: CPT | Mod: 26 | Performed by: INTERNAL MEDICINE

## 2024-01-09 ENCOUNTER — TELEPHONE (OUTPATIENT)
Dept: CARDIOLOGY | Facility: MEDICAL CENTER | Age: 89
End: 2024-01-09
Payer: MEDICARE

## 2024-01-09 DIAGNOSIS — I10 ESSENTIAL HYPERTENSION, BENIGN: ICD-10-CM

## 2024-01-09 RX ORDER — METOPROLOL SUCCINATE 25 MG/1
25 TABLET, EXTENDED RELEASE ORAL DAILY
Qty: 90 TABLET | Refills: 3 | Status: SHIPPED | OUTPATIENT
Start: 2024-01-09

## 2024-01-09 NOTE — TELEPHONE ENCOUNTER
Received request via: Patient    Was the patient seen in the last year in this department? Yes    Does the patient have an active prescription (recently filled or refills available) for medication(s) requested?  Yes    Does the patient have skilled nursing Plus and need 100 day supply (blood pressure, diabetes and cholesterol meds only)? Patient does not have SCP    Call back - [phone]    Thank you,   Shantel GEORGE

## 2024-01-25 ENCOUNTER — ANTICOAGULATION VISIT (OUTPATIENT)
Dept: VASCULAR LAB | Facility: MEDICAL CENTER | Age: 89
End: 2024-01-25
Attending: INTERNAL MEDICINE
Payer: MEDICARE

## 2024-01-25 VITALS — DIASTOLIC BLOOD PRESSURE: 63 MMHG | HEART RATE: 52 BPM | SYSTOLIC BLOOD PRESSURE: 99 MMHG

## 2024-01-25 DIAGNOSIS — I48.0 PAF (PAROXYSMAL ATRIAL FIBRILLATION) (HCC): ICD-10-CM

## 2024-01-25 DIAGNOSIS — Z79.01 CHRONIC ANTICOAGULATION: ICD-10-CM

## 2024-01-25 DIAGNOSIS — D68.69 SECONDARY HYPERCOAGULABLE STATE (HCC): ICD-10-CM

## 2024-01-25 DIAGNOSIS — G45.9 TIA (TRANSIENT ISCHEMIC ATTACK): ICD-10-CM

## 2024-01-25 LAB — INR PPP: 2.9 (ref 2–3.5)

## 2024-01-25 PROCEDURE — 99211 OFF/OP EST MAY X REQ PHY/QHP: CPT

## 2024-01-25 PROCEDURE — 85610 PROTHROMBIN TIME: CPT

## 2024-01-25 RX ORDER — WARFARIN SODIUM 4 MG/1
TABLET ORAL
Qty: 135 TABLET | Refills: 1 | Status: SHIPPED | OUTPATIENT
Start: 2024-01-25

## 2024-01-25 NOTE — PROGRESS NOTES
Anticoagulation Summary  As of 2024      INR goal:  2.0-3.0   TTR:  71.6 % (8.4 y)   INR used for dosin.90 (2024)   Warfarin maintenance plan:  4 mg (4 mg x 1) every Sun, Tue, Fri; 6 mg (4 mg x 1.5) all other days   Weekly warfarin total:  36 mg   Plan last modified:  Jerzy SotoD (11/3/2022)   Next INR check:  3/11/2024   Priority:  Maintenance   Target end date:  Indefinite    Indications    PAF (paroxysmal atrial fibrillation) (HCC) [I48.0]  Hx of TIA (transient ischemic attack) [G45.9]  Deep vein thrombosis (DVT) (HCC) (Resolved) [I82.409]  Atrial fibrillation (HCC) (Resolved) [I48.91]  Hx of Stroke (Resolved) [I63.9]  Secondary hypercoagulable state (HCC) [D68.69]                 Anticoagulation Episode Summary       INR check location:  Anticoagulation Clinic    Preferred lab:      Send INR reminders to:      Comments:  Takes ASA + warfarin 2/2 TIA Sep 2022 per Dr Bloch  Medications reconciled. Take naproxen as needed for arthritic pain. He is aware of the increased risk of bleeding and takes only when needed with caution          Anticoagulation Care Providers       Provider Role Specialty Phone number    Michael J Bloch, M.D. Referring Cardiovascular Disease (Cardiology) [phone]    [facility]   [phone]                  Refer to Patient Findings for HPI:  Patient Findings       Negatives:  Signs/symptoms of thrombosis, Signs/symptoms of bleeding, Laboratory test error suspected, Change in health, Change in alcohol use, Change in activity, Upcoming invasive procedure, Emergency department visit, Upcoming dental procedure, Missed doses, Extra doses, Change in medications, Change in diet/appetite, Hospital admission, Bruising, Other complaints            Vitals:    24 1057   BP: 99/63   Pulse: (!) 52       Verified current warfarin dosing schedule.    Medications reconciled: Yes  Pt is on antiplatelet therapy with ASA for TIA.      A/P   INR is  therapeutic    Warfarin dosing recommendation: Continue regimen as listed above.    Pt educated to contact our clinic with any changes in medications or s/s of bleeding or thrombosis. Pt is aware to seek immediate medical attention for falls, head injury or deep cuts.    Request pt to return in 7 week(s). Pt agrees.    Karrie Barker, PharmD

## 2024-02-03 DIAGNOSIS — E78.5 DYSLIPIDEMIA: ICD-10-CM

## 2024-02-04 LAB
ALBUMIN SERPL-MCNC: 4.3 G/DL (ref 3.7–4.7)
ALBUMIN/CREAT UR: 14 MG/G CREAT (ref 0–29)
ALBUMIN/GLOB SERPL: 1.7 {RATIO} (ref 1.2–2.2)
ALP SERPL-CCNC: 74 IU/L (ref 44–121)
ALT SERPL-CCNC: 17 IU/L (ref 0–44)
AST SERPL-CCNC: 29 IU/L (ref 0–40)
BILIRUB SERPL-MCNC: 2 MG/DL (ref 0–1.2)
BUN SERPL-MCNC: 20 MG/DL (ref 8–27)
BUN/CREAT SERPL: 23 (ref 10–24)
CALCIUM SERPL-MCNC: 9.3 MG/DL (ref 8.6–10.2)
CHLORIDE SERPL-SCNC: 101 MMOL/L (ref 96–106)
CHOLEST SERPL-MCNC: 106 MG/DL (ref 100–199)
CO2 SERPL-SCNC: 20 MMOL/L (ref 20–29)
CREAT SERPL-MCNC: 0.87 MG/DL (ref 0.76–1.27)
CREAT UR-MCNC: 49.6 MG/DL
EGFRCR SERPLBLD CKD-EPI 2021: 84 ML/MIN/1.73
ERYTHROCYTE [DISTWIDTH] IN BLOOD BY AUTOMATED COUNT: 13.6 % (ref 11.6–15.4)
GLOBULIN SER CALC-MCNC: 2.5 G/DL (ref 1.5–4.5)
GLUCOSE SERPL-MCNC: 86 MG/DL (ref 70–99)
HCT VFR BLD AUTO: 43.3 % (ref 37.5–51)
HDLC SERPL-MCNC: 48 MG/DL
HGB BLD-MCNC: 14.3 G/DL (ref 13–17.7)
LABORATORY COMMENT REPORT: NORMAL
LDLC SERPL CALC-MCNC: 43 MG/DL (ref 0–99)
MCH RBC QN AUTO: 29.7 PG (ref 26.6–33)
MCHC RBC AUTO-ENTMCNC: 33 G/DL (ref 31.5–35.7)
MCV RBC AUTO: 90 FL (ref 79–97)
MICROALBUMIN UR-MCNC: 7.1 UG/ML
NRBC BLD AUTO-RTO: NORMAL %
PLATELET # BLD AUTO: 171 X10E3/UL (ref 150–450)
POTASSIUM SERPL-SCNC: 4.3 MMOL/L (ref 3.5–5.2)
PROT SERPL-MCNC: 6.8 G/DL (ref 6–8.5)
RBC # BLD AUTO: 4.81 X10E6/UL (ref 4.14–5.8)
SODIUM SERPL-SCNC: 138 MMOL/L (ref 134–144)
TRIGL SERPL-MCNC: 68 MG/DL (ref 0–149)
TSH SERPL DL<=0.005 MIU/L-ACNC: 1.3 UIU/ML (ref 0.45–4.5)
VLDLC SERPL CALC-MCNC: 15 MG/DL (ref 5–40)
WBC # BLD AUTO: 5.7 X10E3/UL (ref 3.4–10.8)

## 2024-02-05 RX ORDER — EZETIMIBE 10 MG/1
TABLET ORAL
Qty: 90 TABLET | Refills: 3 | Status: SHIPPED | OUTPATIENT
Start: 2024-02-05

## 2024-02-05 NOTE — TELEPHONE ENCOUNTER
Is the patient due for a refill? Yes    Was the patient seen the past year? Yes    Date of last office visit: 12/11/2023    Does the patient have an upcoming appointment?  Yes   If yes, When? 12/11/2024    Provider to refill:BE    Does the patients insurance require a 100 day supply?  No

## 2024-02-09 DIAGNOSIS — I10 ESSENTIAL HYPERTENSION: ICD-10-CM

## 2024-02-09 RX ORDER — IRBESARTAN 300 MG/1
300 TABLET ORAL DAILY
Qty: 90 TABLET | Refills: 3 | Status: SHIPPED | OUTPATIENT
Start: 2024-02-09

## 2024-02-09 NOTE — TELEPHONE ENCOUNTER
MB  Received request via: Patient    Was the patient seen in the last year in this department? Yes 9/14/23    Does the patient have an active prescription (recently filled or refills available) for medication(s) requested? Yes.     Pharmacy Name:   [facility] [address] [[id number]]     Does the patient have skilled nursing Plus and need 100 day supply (blood pressure, diabetes and cholesterol meds only)? Patient does not have SCP     Best call back :[phone]     Thank you   Ermelinda WYATT

## 2024-02-22 DIAGNOSIS — I48.0 PAF (PAROXYSMAL ATRIAL FIBRILLATION) (HCC): ICD-10-CM

## 2024-02-26 DIAGNOSIS — T14.8XXA VASCULAR INJURY: ICD-10-CM

## 2024-02-27 RX ORDER — GABAPENTIN 300 MG/1
300 CAPSULE ORAL 3 TIMES DAILY
Qty: 270 CAPSULE | Refills: 0 | Status: SHIPPED | OUTPATIENT
Start: 2024-02-27

## 2024-03-11 ENCOUNTER — HOSPITAL ENCOUNTER (OUTPATIENT)
Dept: CARDIOLOGY | Facility: MEDICAL CENTER | Age: 89
End: 2024-03-11
Attending: INTERNAL MEDICINE
Payer: MEDICARE

## 2024-03-11 ENCOUNTER — ANTICOAGULATION VISIT (OUTPATIENT)
Dept: VASCULAR LAB | Facility: MEDICAL CENTER | Age: 89
End: 2024-03-11
Attending: INTERNAL MEDICINE
Payer: MEDICARE

## 2024-03-11 DIAGNOSIS — Z95.2 S/P TAVR (TRANSCATHETER AORTIC VALVE REPLACEMENT): ICD-10-CM

## 2024-03-11 DIAGNOSIS — D68.69 SECONDARY HYPERCOAGULABLE STATE (HCC): ICD-10-CM

## 2024-03-11 DIAGNOSIS — I48.0 PAF (PAROXYSMAL ATRIAL FIBRILLATION) (HCC): ICD-10-CM

## 2024-03-11 DIAGNOSIS — G45.9 TIA (TRANSIENT ISCHEMIC ATTACK): ICD-10-CM

## 2024-03-11 LAB
INR PPP: 2.1 (ref 2–3.5)
LV EJECT FRACT  99904: 65
LV EJECT FRACT MOD 2C 99903: 65.13
LV EJECT FRACT MOD 4C 99902: 61.53
LV EJECT FRACT MOD BP 99901: 64.63

## 2024-03-11 PROCEDURE — 85610 PROTHROMBIN TIME: CPT

## 2024-03-11 PROCEDURE — 93306 TTE W/DOPPLER COMPLETE: CPT

## 2024-03-11 PROCEDURE — 93306 TTE W/DOPPLER COMPLETE: CPT | Mod: 26 | Performed by: INTERNAL MEDICINE

## 2024-03-11 PROCEDURE — 99211 OFF/OP EST MAY X REQ PHY/QHP: CPT

## 2024-03-11 NOTE — PROGRESS NOTES
Anticoagulation Summary  As of 3/11/2024      INR goal:  2.0-3.0   TTR:  72.1% (8.6 y)   INR used for dosin.10 (3/11/2024)   Warfarin maintenance plan:  4 mg (4 mg x 1) every Sun, Tue, Fri; 6 mg (4 mg x 1.5) all other days   Weekly warfarin total:  36 mg   Plan last modified:  Jerzy SotoD (11/3/2022)   Next INR check:  2024   Priority:  Maintenance   Target end date:  Indefinite    Indications    PAF (paroxysmal atrial fibrillation) (HCC) [I48.0]  Hx of TIA (transient ischemic attack) [G45.9]  Deep vein thrombosis (DVT) (HCC) (Resolved) [I82.409]  Atrial fibrillation (HCC) (Resolved) [I48.91]  Hx of Stroke (Resolved) [I63.9]  Secondary hypercoagulable state (HCC) [D68.69]                 Anticoagulation Episode Summary       INR check location:  Anticoagulation Clinic    Preferred lab:      Send INR reminders to:      Comments:  Takes ASA + warfarin 2/2 TIA Sep 2022 per Dr Bloch  Medications reconciled. Take naproxen as needed for arthritic pain. He is aware of the increased risk of bleeding and takes only when needed with caution          Anticoagulation Care Providers       Provider Role Specialty Phone number    Michael J Bloch, M.D. Referring Cardiovascular Disease (Cardiology) [phone]    [facility]   [phone]                  Refer to Patient Findings for HPI:  Patient Findings       Negatives:  Signs/symptoms of thrombosis, Signs/symptoms of bleeding, Laboratory test error suspected, Change in health, Change in alcohol use, Change in activity, Upcoming invasive procedure, Emergency department visit, Upcoming dental procedure, Missed doses, Extra doses, Change in medications, Change in diet/appetite, Hospital admission, Bruising, Other complaints            There were no vitals filed for this visit.  Pt declined vitals    Verified current warfarin dosing schedule.    Medications reconciled: No  Pt is on antiplatelet therapy with ASA 81 mg for TIA.      A/P   INR  is therapeutic    Warfarin dosing recommendation: Continue regimen as listed above.    Pt educated to contact our clinic with any changes in medications or s/s of bleeding or thrombosis. Pt is aware to seek immediate medical attention for falls, head injury or deep cuts.    Request pt to return in 6 week(s). Pt agrees.    Christopher Kang, JerzyD

## 2024-03-14 ENCOUNTER — TELEPHONE (OUTPATIENT)
Dept: VASCULAR LAB | Facility: MEDICAL CENTER | Age: 89
End: 2024-03-14
Payer: MEDICARE

## 2024-03-14 NOTE — TELEPHONE ENCOUNTER
Established patient  Chart prep for upcoming appointment with Dr. Bloch    Any pending/incomplete orders from last visit? No, all orders completed.  Were any records requested?  No  Correct appointment type (New/Established/virtual)? Yes  Referral up to date? Yes  Referral attached to appointment (renewals and New patients only)? N/A (established with up-to-date referral)    Lenora Davalos, Med Ass't  [facility]  Ph. [phone]  Fx. [phone]

## 2024-03-21 ENCOUNTER — OFFICE VISIT (OUTPATIENT)
Dept: CARDIOLOGY | Facility: MEDICAL CENTER | Age: 89
End: 2024-03-21
Attending: INTERNAL MEDICINE
Payer: MEDICARE

## 2024-03-21 VITALS
SYSTOLIC BLOOD PRESSURE: 154 MMHG | HEIGHT: 71 IN | BODY MASS INDEX: 27.02 KG/M2 | DIASTOLIC BLOOD PRESSURE: 79 MMHG | WEIGHT: 193 LBS | HEART RATE: 55 BPM

## 2024-03-21 DIAGNOSIS — I25.10 CORONARY ARTERIOSCLEROSIS: ICD-10-CM

## 2024-03-21 DIAGNOSIS — I48.0 PAF (PAROXYSMAL ATRIAL FIBRILLATION) (HCC): ICD-10-CM

## 2024-03-21 DIAGNOSIS — I10 WHITE COAT SYNDROME WITH DIAGNOSIS OF HYPERTENSION: ICD-10-CM

## 2024-03-21 DIAGNOSIS — E78.5 DYSLIPIDEMIA: ICD-10-CM

## 2024-03-21 DIAGNOSIS — G45.9 TIA (TRANSIENT ISCHEMIC ATTACK): ICD-10-CM

## 2024-03-21 DIAGNOSIS — Z95.2 S/P TAVR (TRANSCATHETER AORTIC VALVE REPLACEMENT): ICD-10-CM

## 2024-03-21 DIAGNOSIS — I10 ESSENTIAL HYPERTENSION, BENIGN: ICD-10-CM

## 2024-03-21 PROCEDURE — 99212 OFFICE O/P EST SF 10 MIN: CPT

## 2024-03-21 PROCEDURE — 99214 OFFICE O/P EST MOD 30 MIN: CPT | Performed by: INTERNAL MEDICINE

## 2024-03-21 PROCEDURE — 3078F DIAST BP <80 MM HG: CPT | Performed by: INTERNAL MEDICINE

## 2024-03-21 PROCEDURE — 3077F SYST BP >= 140 MM HG: CPT | Performed by: INTERNAL MEDICINE

## 2024-03-21 ASSESSMENT — FIBROSIS 4 INDEX: FIB4 SCORE: 3.58

## 2024-03-21 NOTE — PROGRESS NOTES
"VASCULAR FOLLOW UP VISIT  Subjective:   Tristan Hester is a 88 y.o. male who presents 24  for vascular f/u  Chief Complaint   Patient presents with    Follow-Up     Subjective     HPI:   Here for f/u of possible TIA in the setting of HTN, dyslipidemia, carotid disease and valvular heart disease, and atrial fibrillation.  Had an episode of garbled speech for a few minutes - had falls later that day. Perhaps some RLE weakness.   No subsequent tia or cva symptoms  Still struggling with his back  Still with intermittent TELLEZ  BPs at home mostly in 140s/70s - uses wrist cuff  HR usually high 50s-low 60s  Still with intermittent lightheadedness  Lots of bruising but no blood in stool or urine.   On warfarin + asa  Follows up in anticoag clinic regularly - has been therapeutic.  No change in statin or BP meds  Continues to struggle with mobility and balance, uses cane, no falls reported   Has been meeting with urology regularly    Social History     Tobacco Use    Smoking status: Former     Current packs/day: 0.00     Average packs/day: 0.5 packs/day for 30.0 years (15.0 ttl pk-yrs)     Types: Cigarettes, Pipe     Start date: 1955     Quit date: 1985     Years since quittin.2    Smokeless tobacco: Never   Vaping Use    Vaping Use: Never used   Substance Use Topics    Alcohol use: Yes     Alcohol/week: 3.0 oz     Types: 3 Cans of beer, 3 Shots of liquor per week     Comment: 1 x week    Drug use: No     DIET AND EXERCISE:  Weight Change: Stabilized  Diet: Relatively low sodium- bit less adherent  Exercise: gym 3x per week     Objective        Objective:     Vitals:    24 0935 24 0939   BP: (!) 164/81 (!) 154/79   BP Location: Left arm Left arm   Patient Position: Sitting Sitting   BP Cuff Size: Adult Adult   Pulse: (!) 56 (!) 55   Weight: 87.5 kg (193 lb)    Height: 1.803 m (5' 11\")         Body mass index is 26.92 kg/m².  Physical Exam  Vitals reviewed.   Constitutional:       Appearance: " He is well-developed. He is not toxic-appearing or diaphoretic.   Eyes:      Extraocular Movements: Extraocular movements intact.      Conjunctiva/sclera: Conjunctivae normal.   Neck:      Vascular: No carotid bruit.   Cardiovascular:      Rate and Rhythm: Normal rate and regular rhythm.      Heart sounds: Murmur heard.   Pulmonary:      Effort: Pulmonary effort is normal. No respiratory distress.      Breath sounds: Normal breath sounds. No wheezing or rales.   Musculoskeletal:      Right lower leg: No edema.      Left lower leg: No edema.   Neurological:      General: No focal deficit present.      Mental Status: He is alert and oriented to person, place, and time.      Cranial Nerves: No cranial nerve deficit.      Gait: Gait normal.   Psychiatric:         Mood and Affect: Mood normal.         Behavior: Behavior normal.       Lab Results   Component Value Date    CHOLSTRLTOT 106 02/01/2024    CHOLSTRLTOT 125 04/05/2022    LDL 63 04/05/2022    HDL 48 02/01/2024    HDL 51 04/05/2022    TRIGLYCERIDE 68 02/01/2024    TRIGLYCERIDE 54 04/05/2022      Lab Results   Component Value Date    INR 2.10 03/11/2024         Lab Results   Component Value Date    SODIUM 138 02/01/2024    SODIUM 138 04/05/2022    POTASSIUM 4.3 02/01/2024    POTASSIUM 5.3 04/05/2022    CHLORIDE 101 02/01/2024    CHLORIDE 102 04/05/2022    CO2 20 02/01/2024    CO2 23 04/05/2022    GLUCOSE 86 02/01/2024    GLUCOSE 91 04/05/2022    BUN 20 02/01/2024    BUN 28 (H) 04/05/2022    CREATININE 0.87 02/01/2024    CREATININE 1.03 04/05/2022    BUNCREATRAT 23 02/01/2024      Lab Results   Component Value Date    TSH 1.300 02/01/2024      Lab Results   Component Value Date    WBC 5.7 02/01/2024    WBC 6.6 04/05/2022    RBC 4.81 02/01/2024    RBC 5.05 04/05/2022    HEMOGLOBIN 14.3 02/01/2024    HEMOGLOBIN 15.2 04/05/2022    HEMATOCRIT 43.3 02/01/2024    HEMATOCRIT 46.6 04/05/2022    MCV 90 02/01/2024    MCV 92.3 04/05/2022    MCH 29.7 02/01/2024    [facility] 30.1  04/05/2022    MCHC 33.0 02/01/2024    MCHC 32.6 (L) 04/05/2022    MPV 11.5 04/05/2022     Carotid duplex February 2021   No evidence for RIGHT carotid stenosis status post endarterectomy.   Mild LEFT internal carotid artery stenosis.     Echocardiogram March 2021  Prior echo on 8/7/20.Left ventricular ejection fraction is visually   estimated to be 60%.  Normal diastolic function.  Known TAVR aortic valve that is functioning normally with normal   transvalvular gradients. Vmax is 1.85 m/s.  Moderate eccentric mitral regurgitation.    Aortoiliac duplex April 2021   No evidence of aortic aneurysm.    No evidence of pseudoaneurysm or injury to the bilateral iliac arteries.     No stenosis of the proximal segment of the major visceral arteries.    Waveforms and velocities of the bilateral iliac arteries are normal.    Common iliac diameter (cm):   Right- 1.63 x 1.72         Left-   1.70 x 1.72    abpm 2022  Mean daytime 142/80  Normal nocturnal dip    Echo april 2022  Compared to the images of the prior study 03/02/21, there has been no   significant change.   Normal left ventricular systolic function.  The left ventricular ejection fraction is visually estimated to be 60%.  Grade II diastolic dysfunction.  Normal right ventricular systolic function.  Mild mitral regurgitation.  Known TAVR aortic valve that is functioning normally with normal transvalvular gradients.    Carotid duplex sep 2022   Surgical changes of the right carotid bifurcation consistent with post    carotid endarterectomy status.    No significant stenosis of the bilateral internal carotid arteries (<50%).      Aortoiliac duplex june 2023   No evidence of abdominal aortic or iliac artery aneurysm.    Waveforms and velocities of the abdominal aorta and bilateral iliac arteries    are normal with no hemodynamically significant stenosis.    Bilateral common iliac arteries are slightly ectatic.    Carotid duplex jan 2024   Compared to the prior study on  09/12/2022, no major changes.   Right.    Patent right carotid endarterectomy status, no significant stenosis.      Echo march 2024  Normal left ventricular systolic function.  Mild mitral regurgitation.  Known TAVR aortic valve that is functioning normally with normal   transvalvular gradients.       Medical Decision Making:  Today's Assessment / Status / Plan:     1. S/P TAVR (transcatheter aortic valve replacement)        2. Dyslipidemia        3. Essential hypertension, benign        4. TIA (transient ischemic attack)        5. Coronary arteriosclerosis        6. PAF (paroxysmal atrial fibrillation) ([facility])        7. White coat syndrome with diagnosis of hypertension  Referral to 24-Hour Blood Pressure Monitoring        Patient Type: Secondary Prevention    Etiology of Established CVD if Present:   1. TIA - likely cardioembolic from valve disease  2. Carotid atherosclerosis -status post CEA  3. Valvular Heart Disease  4. Atrial fibrillation with onset during acute illness, now chronic  5.  DVT after back surgery jan 2016  6.  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    Lipid Management: Qualifies for Statin Therapy Based on 2018 ACC/AHA Guidelines: yes  Calculated 10-Year Risk of ASCVD: N/A  Currently on Statin: Yes  ACC/aha criteria for at least mod intensity statin  Per nla goal ldl <70 and nonHDL <100 - well below threshold on most recent blood work  Plan:  - Continue atorvastatin at current dose    - continue zetia  - Continue TLC  - Recheck fasting lipids in 6-12 mo    Blood Pressure Management:  Acc/aha bp goal <130/80  ABPM high prior to intensification of meds - 2 yrs ago  BP previously much improved both at home and in office with intensification of meds -now appears to be up a bit both at home and in the office, but is somewhat labile  No falls but does have occasional lightheadedness, making intensification of pharmacologic therapy somewhat worrisome  No significant albuminuria    Possible ACE inhibitor cough  Gfr and electrolytes stable  Plan:  - Continue metoprolol at current dose- would not increase d/t HR in 50's  - Continue irbesartan 300 mg daily  - Continue amlodipine 5 mg daily  - Continue lifestyle modification  - Continue home BP monitoring and checks in INR clinic  -Obtain ABPM  -May want to consider heart rhythm monitor in the future to make sure he is not having pauses  - Intensify therapy as needed based upon those results    Glycemic Status: Prediabetic  IFG, mild and stable  - Continue lifestyle mod  - Follow fasting glucose    Anti-Platelet/Anti-Coagulant Tx: yes  As we discussed, combination of aspirin and warfarin does increase risk of bleeding, especially in his recent TIA and you think the benefit outweighs the risk.  Patient in agreement  - Continue indefinite anticoagulation with warfarin  -continue low dose asa as recommended by cards given tia on warfarin  - report any bleeding  - F/U with anticoagulation clinic as scheduled   - limit nsaids    Smoking: continue complete avoidance    Physical Activity:  Keep going to gym at least 3x per week.  Keep doing his balance exercises    Weight Management and Nutrition: Dietary plan was discussed with patient at this visit including c/w Med diet and decreasing his sodium a bit    Other:     1. TIA -has history of previous cardioembolic TIA likely with his native aortic valve as a source.  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  And now, possible recurrent TIA earlier this year.  His symptoms resolved after only a few minutes. No further TIA or stroke symptoms.  No evidence of any problems with his valve on recent echocardiogram or any worsening stenosis on carotid duplex.    -He should call 911 immediately with any further TIA or stroke symptoms.    -Antiplatelet and anticoag as above    2. Carotid atherosclerosis status post CEA- .  Only mild restenosis on repeat duplex.  Remains asymptomatic.   Repeat duplex in 2 years from previous -January 2026    3. Valvular Heart Disease -now status post TAVR March 2020.  Still with unrepaired mitral disease -improved on most recent echo.  Appears asymptomatic at present.  Does not appear to be volume overloaded.  Defer timing of repeat echocardiogram to cardiology, but would be sure it does not go more than 2 years    4. Colonic Polyp on recent colonoscopy - repeat colonoscopy in 2019 negative per patient.   No further f/u needed    5. Insomnia - trialed zolpidem w/o success.  Continue temazepam, but minimize dose.  Patient understands addictive potential.      6.  Atrial fibrillation - defer management of rhythm and rate to cards.  Continue anticoag as above    7.  DVT - postoperative.  No recurrence.  Continue indefinite anticoagulation given concomitant afib    8.  H/o prostate ca and multiple urological issues - defer f/u to urology.  Check PSA as they recommend    9.  Balance issues, back pain and neuropathy -this seems recalcitrant to physical therapy or any intervention that we have tried previously.  Defer f/u to ENT and neurology    10.  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  Good arterial flow and patent stent on noninvasive study.  Report any claudication, rest pain, or ulceration immediately.  Will obtain repeat arterial duplex 2 year from previous - june 2025    We will partner with other providers in the management of established vascular disease and cardiometabolic risk factors.    Studies to Be Obtained:    1) carotid duplex January 2026  2) aortoiliac duplex june 2025  3) repeat echocardiogram at discretion of cardiology (or at least by March 2026)  4) ABPM prior to next visit - ordered and scheduled    Labs to Be Obtained: as above prior to next visit    Follow up in: 6 mo    Michael J Bloch, M.D.

## 2024-03-27 ENCOUNTER — TELEPHONE (OUTPATIENT)
Dept: VASCULAR LAB | Facility: MEDICAL CENTER | Age: 89
End: 2024-03-27
Payer: MEDICARE

## 2024-03-27 NOTE — TELEPHONE ENCOUNTER
Caller: Tristan Hester    Topic/issue: Patient calling because he just saw a dermatologist and was prescribed an antibiotic for an abscess that he has. When he picked up the antibiotic the pharmacist told him he should not combine the antibiotic and his warfarin. Patient would like a call back to discuss what he should do/if he needs to adjust his dosage. Please advise.    Callback Number: [phone]    Thank you,   Daya TORRES

## 2024-03-29 ENCOUNTER — TELEPHONE (OUTPATIENT)
Dept: VASCULAR LAB | Facility: MEDICAL CENTER | Age: 89
End: 2024-03-29
Payer: MEDICARE

## 2024-03-29 ENCOUNTER — APPOINTMENT (OUTPATIENT)
Dept: VASCULAR LAB | Facility: MEDICAL CENTER | Age: 89
End: 2024-03-29
Payer: MEDICARE

## 2024-04-01 ENCOUNTER — ANTICOAGULATION VISIT (OUTPATIENT)
Dept: VASCULAR LAB | Facility: MEDICAL CENTER | Age: 89
End: 2024-04-01
Attending: INTERNAL MEDICINE
Payer: MEDICARE

## 2024-04-01 VITALS — HEART RATE: 50 BPM | DIASTOLIC BLOOD PRESSURE: 76 MMHG | SYSTOLIC BLOOD PRESSURE: 134 MMHG

## 2024-04-01 DIAGNOSIS — I48.0 PAF (PAROXYSMAL ATRIAL FIBRILLATION) (HCC): ICD-10-CM

## 2024-04-01 DIAGNOSIS — D68.69 SECONDARY HYPERCOAGULABLE STATE (HCC): ICD-10-CM

## 2024-04-01 DIAGNOSIS — G45.9 TIA (TRANSIENT ISCHEMIC ATTACK): ICD-10-CM

## 2024-04-01 LAB — INR PPP: 2.6 (ref 2–3.5)

## 2024-04-01 PROCEDURE — 99212 OFFICE O/P EST SF 10 MIN: CPT

## 2024-04-01 PROCEDURE — 85610 PROTHROMBIN TIME: CPT

## 2024-04-01 NOTE — PROGRESS NOTES
Anticoagulation Summary  As of 2024      INR goal:  2.0-3.0   TTR:  72.3% (8.6 y)   INR used for dosin.60 (2024)   Warfarin maintenance plan:  4 mg (4 mg x 1) every Sun, Tue, Fri; 6 mg (4 mg x 1.5) all other days   Weekly warfarin total:  36 mg   Plan last modified:  Franchesca Russell PharmD (11/3/2022)   Next INR check:     Priority:  Maintenance   Target end date:  Indefinite    Indications    PAF (paroxysmal atrial fibrillation) (HCC) [I48.0]  Hx of TIA (transient ischemic attack) [G45.9]  Deep vein thrombosis (DVT) (HCC) (Resolved) [I82.409]  Atrial fibrillation (HCC) (Resolved) [I48.91]  Hx of Stroke (Resolved) [I63.9]  Secondary hypercoagulable state (HCC) [D68.69]                 Anticoagulation Episode Summary       INR check location:  Anticoagulation Clinic    Preferred lab:      Send INR reminders to:      Comments:  Takes ASA + warfarin 2/2 TIA Sep 2022 per Dr Bloch  Medications reconciled. Take naproxen as needed for arthritic pain. He is aware of the increased risk of bleeding and takes only when needed with caution          Anticoagulation Care Providers       Provider Role Specialty Phone number    Michael J Bloch, M.D. Referring Cardiovascular Disease (Cardiology) [phone]    [facility]ticoagulation [facility]   [phone]          Refer to Patient Findings for HPI:  Patient Findings       Positives:  Change in medications (Pt started on levofloxacin - 10d course; 5 days left in course)    Negatives:  Signs/symptoms of thrombosis, Signs/symptoms of bleeding, Laboratory test error suspected, Change in health, Change in alcohol use, Change in activity, Upcoming invasive procedure, Emergency department visit, Upcoming dental procedure, Missed doses, Extra doses, Change in diet/appetite, Hospital admission, Bruising, Other complaints            Vitals:    24 1058   BP: 134/76   Pulse: (!) 50     Pt declined vitals    Verified current warfarin dosing schedule.    Medications reconciled:  Yes  Pt is on antiplatelet therapy with ASA 81 mg daily for TIA.      A/P   INR is therapeutic    Warfarin dosing recommendation: Continue regimen as listed above.    Pt educated to contact our clinic with any changes in medications or s/s of bleeding or thrombosis. Pt is aware to seek immediate medical attention for falls, head injury or deep cuts.    Request pt to return in 8 day(s).  Patient agrees - wanting close follow up for the remainder of the antibiotic course to ensure INR does not increase.    Juan Miguel Landers, PharmD

## 2024-04-23 ENCOUNTER — ANTICOAGULATION VISIT (OUTPATIENT)
Dept: VASCULAR LAB | Facility: MEDICAL CENTER | Age: 89
End: 2024-04-23
Attending: INTERNAL MEDICINE
Payer: MEDICARE

## 2024-04-23 VITALS — SYSTOLIC BLOOD PRESSURE: 140 MMHG | HEART RATE: 51 BPM | DIASTOLIC BLOOD PRESSURE: 66 MMHG

## 2024-04-23 DIAGNOSIS — I48.0 PAF (PAROXYSMAL ATRIAL FIBRILLATION) (HCC): ICD-10-CM

## 2024-04-23 DIAGNOSIS — D68.69 SECONDARY HYPERCOAGULABLE STATE (HCC): ICD-10-CM

## 2024-04-23 DIAGNOSIS — G45.9 TIA (TRANSIENT ISCHEMIC ATTACK): ICD-10-CM

## 2024-04-23 LAB — INR PPP: 2.4 (ref 2–3.5)

## 2024-04-23 PROCEDURE — 85610 PROTHROMBIN TIME: CPT

## 2024-04-23 PROCEDURE — 99211 OFF/OP EST MAY X REQ PHY/QHP: CPT

## 2024-04-23 NOTE — PROGRESS NOTES
Anticoagulation Summary  As of 2024      INR goal:  2.0-3.0   TTR:  72.4% (8.7 y)   INR used for dosin.40 (2024)   Warfarin maintenance plan:  4 mg (4 mg x 1) every Sun, Tue, Fri; 6 mg (4 mg x 1.5) all other days   Weekly warfarin total:  36 mg   Plan last modified:  Franchesca Russell PharmD (11/3/2022)   Next INR check:  2024   Priority:  Maintenance   Target end date:  Indefinite    Indications    PAF (paroxysmal atrial fibrillation) (HCC) [I48.0]  Hx of TIA (transient ischemic attack) [G45.9]  Deep vein thrombosis (DVT) (HCC) (Resolved) [I82.409]  Atrial fibrillation (HCC) (Resolved) [I48.91]  Hx of Stroke (Resolved) [I63.9]  Secondary hypercoagulable state (HCC) [D68.69]                 Anticoagulation Episode Summary       INR check location:  Anticoagulation Clinic    Preferred lab:      Send INR reminders to:      Comments:  Takes ASA + warfarin 2/2 TIA Sep 2022 per Dr Bloch  Medications reconciled. Take naproxen as needed for arthritic pain. He is aware of the increased risk of bleeding and takes only when needed with caution          Anticoagulation Care Providers       Provider Role Specialty Phone number    Michael J Bloch, M.D. Referring Cardiovascular Disease (Cardiology) [phone]    [facility] Anticoagulation [facility]   [phone]          Refer to Patient Findings for HPI:  Patient Findings       Negatives:  Signs/symptoms of thrombosis, Signs/symptoms of bleeding, Laboratory test error suspected, Change in health, Change in alcohol use, Change in activity, Upcoming invasive procedure, Emergency department visit, Upcoming dental procedure, Missed doses, Extra doses, Change in medications, Change in diet/appetite, Hospital admission, Bruising, Other complaints          Vitals:    24 1038   BP: (!) 140/66   Pulse: (!) 51       Patient seen in clinic today for follow up on anticoagulation therapy with warfarin (a high risk medication) for hx of PAF, TIA, DVT   Verified current  warfarin dosing schedule.  Patient denies any missed doses of warfarin.    Medications reconciled   Pt is on antiplatelet therapy with ASA 81 mg daily for TIA       A/P   INR is therapeutic today at 2.4.     Warfarin dosing recommendation: Continue with the current dosing regimen.   Patient will follow up agin in 4 weeks.     Pt educated to contact our clinic with any changes in medications or s/s of bleeding or thrombosis. Pt is aware to seek immediate medical attention for falls, head injury or deep cuts.    Follow up appointment in 4 week(s).    Next appt: Tues, May 21 @ 10:30am     Amina Auguste PharmD

## 2024-04-24 ENCOUNTER — NON-PROVIDER VISIT (OUTPATIENT)
Dept: VASCULAR LAB | Facility: MEDICAL CENTER | Age: 89
End: 2024-04-24
Attending: INTERNAL MEDICINE
Payer: MEDICARE

## 2024-04-24 PROCEDURE — 93788 AMBL BP MNTR W/SW A/R: CPT

## 2024-04-24 PROCEDURE — 93786 AMBL BP MNTR W/SW REC ONLY: CPT

## 2024-04-24 NOTE — NON-PROVIDER
ABPM placed on 04/24/24 at 2:21PM.    Patient educated on monitor care and procedure.    Patient to return device on 04/25/24 before 1700.    Verbal understanding provided.   Lenora Davalos, Med Ass't  [facility]  Ph. [phone]  Fx. [phone]

## 2024-04-30 NOTE — PROGRESS NOTES
Ambulatory blood pressure monitor results reviewed  Full report under media tab  Reasonable data acquisition    Mean daytime: 122/65 consistent with well-controlled out of office blood pressure    Nocturnal dip: Appears well-maintained    Does have significant blood pressure lability with occasional readings less than 90 systolic    Clinical correlation needed.  Will discuss with patient at follow-up visit    Michael Bloch, MD  [facility]ascular Care

## 2024-05-20 DIAGNOSIS — F51.01 PRIMARY INSOMNIA: ICD-10-CM

## 2024-05-20 RX ORDER — TEMAZEPAM 30 MG/1
CAPSULE ORAL
Qty: 30 CAPSULE | Refills: 0 | Status: SHIPPED | OUTPATIENT
Start: 2024-05-20 | End: 2024-11-16

## 2024-05-21 ENCOUNTER — ANTICOAGULATION VISIT (OUTPATIENT)
Dept: VASCULAR LAB | Facility: MEDICAL CENTER | Age: 89
End: 2024-05-21
Payer: MEDICARE

## 2024-05-21 DIAGNOSIS — G45.9 TIA (TRANSIENT ISCHEMIC ATTACK): ICD-10-CM

## 2024-05-21 DIAGNOSIS — I48.0 PAF (PAROXYSMAL ATRIAL FIBRILLATION) (HCC): ICD-10-CM

## 2024-05-21 DIAGNOSIS — D68.69 SECONDARY HYPERCOAGULABLE STATE (HCC): ICD-10-CM

## 2024-05-21 LAB — INR PPP: 3 (ref 2–3.5)

## 2024-05-21 NOTE — PROGRESS NOTES
Anticoagulation Summary  As of 5/21/2024      INR goal:  2.0-3.0   TTR:  72.7% (8.7 y)   INR used for dosing:  3.00 (5/21/2024)   Warfarin maintenance plan:  4 mg (4 mg x 1) every Mon, Wed, Fri; 6 mg (4 mg x 1.5) all other days   Weekly warfarin total:  36 mg   Plan last modified:  Karrie Barker, PharmD (5/21/2024)   Next INR check:  6/20/2024   Priority:  Maintenance   Target end date:  Indefinite    Indications    PAF (paroxysmal atrial fibrillation) (HCC) [I48.0]  Hx of TIA (transient ischemic attack) [G45.9]  Deep vein thrombosis (DVT) (HCC) (Resolved) [I82.409]  Atrial fibrillation (HCC) (Resolved) [I48.91]  Hx of Stroke (Resolved) [I63.9]  Secondary hypercoagulable state (HCC) [D68.69]                 Anticoagulation Episode Summary       INR check location:  Anticoagulation Clinic    Preferred lab:      Send INR reminders to:      Comments:  Takes ASA + warfarin 2/2 TIA Sep 2022 per Dr Bloch  Medications reconciled. Take naproxen as needed for arthritic pain. He is aware of the increased risk of bleeding and takes only when needed with caution          Anticoagulation Care Providers       Provider Role Specialty Phone number    Michael J Bloch, M.D. Referring Cardiovascular Disease (Cardiology) [phone]    [facility] Anticoagulation Services   [phone]                  Refer to Patient Findings for HPI:  Patient Findings       Positives:  Change in medications (reports cream and ointment for scalp since 5 weeks ago)    Negatives:  Signs/symptoms of thrombosis, Signs/symptoms of bleeding, Laboratory test error suspected, Change in health, Change in alcohol use, Change in activity, Upcoming invasive procedure, Emergency department visit, Upcoming dental procedure, Missed doses, Extra doses, Change in diet/appetite, Hospital admission, Bruising, Other complaints            There were no vitals filed for this visit.    Verified current warfarin dosing schedule. Patient taking equivalent weekly dose, but  taking as 4mg on Mon, Wed, Fri, and 6mg on all other days. Updated calendar to reflect actual dosing.    Medications reconciled: No  Pt is on antiplatelet therapy with Aspirin for TIA.      A/P   INR is therapeutic    Warfarin dosing recommendation: Continue regimen as listed above.    Pt educated to contact our clinic with any changes in medications or s/s of bleeding or thrombosis. Pt is aware to seek immediate medical attention for falls, head injury or deep cuts.    Request pt to return in 4 week(s). Pt agrees.    Michelle Lea, Pharmacy Student     Karrie Barker, PharmD

## 2024-05-31 DIAGNOSIS — T14.8XXA VASCULAR INJURY: ICD-10-CM

## 2024-06-05 RX ORDER — GABAPENTIN 300 MG/1
300 CAPSULE ORAL 3 TIMES DAILY
Qty: 270 CAPSULE | Refills: 0 | Status: SHIPPED | OUTPATIENT
Start: 2024-06-05

## 2024-06-13 ENCOUNTER — TELEPHONE (OUTPATIENT)
Dept: VASCULAR LAB | Facility: MEDICAL CENTER | Age: 89
End: 2024-06-13
Payer: MEDICARE

## 2024-06-13 DIAGNOSIS — G45.9 TIA (TRANSIENT ISCHEMIC ATTACK): ICD-10-CM

## 2024-06-13 DIAGNOSIS — I48.0 PAF (PAROXYSMAL ATRIAL FIBRILLATION) (HCC): ICD-10-CM

## 2024-06-13 DIAGNOSIS — I10 ESSENTIAL HYPERTENSION: ICD-10-CM

## 2024-06-13 DIAGNOSIS — D68.69 SECONDARY HYPERCOAGULABLE STATE (HCC): ICD-10-CM

## 2024-06-13 DIAGNOSIS — F51.01 PRIMARY INSOMNIA: ICD-10-CM

## 2024-06-13 DIAGNOSIS — I10 ESSENTIAL HYPERTENSION, BENIGN: ICD-10-CM

## 2024-06-13 DIAGNOSIS — T14.8XXA VASCULAR INJURY: ICD-10-CM

## 2024-06-13 DIAGNOSIS — Z79.01 CHRONIC ANTICOAGULATION: ICD-10-CM

## 2024-06-13 NOTE — TELEPHONE ENCOUNTER
Established patient  Chart prep for upcoming appointment.    Any pending/incomplete orders from last visit? No, all orders completed.  Was patient called and reminded to complete pending orders? N/A orders complete  Were any records requested?  No    Referral up to date? Yes renewal ordered, sent to provider to co-sign, AND new referral attached to upcoming appointment.    Referral attached to appointment (renewals and New patients only)? Yes renewal ordered and sent to provider to co-sign   Virtual appointment? No    Mandeep Bright Ass't  [facility]  Ph. [phone]  Fx. [phone]

## 2024-06-20 ENCOUNTER — ANTICOAGULATION VISIT (OUTPATIENT)
Dept: VASCULAR LAB | Facility: MEDICAL CENTER | Age: 89
End: 2024-06-20
Attending: INTERNAL MEDICINE
Payer: MEDICARE

## 2024-06-20 ENCOUNTER — TELEMEDICINE (OUTPATIENT)
Dept: CARDIOLOGY | Facility: MEDICAL CENTER | Age: 89
End: 2024-06-20
Attending: INTERNAL MEDICINE
Payer: MEDICARE

## 2024-06-20 VITALS
BODY MASS INDEX: 25.62 KG/M2 | HEART RATE: 60 BPM | HEIGHT: 70 IN | WEIGHT: 179 LBS | DIASTOLIC BLOOD PRESSURE: 49 MMHG | SYSTOLIC BLOOD PRESSURE: 75 MMHG

## 2024-06-20 DIAGNOSIS — G45.9 TIA (TRANSIENT ISCHEMIC ATTACK): ICD-10-CM

## 2024-06-20 DIAGNOSIS — E78.5 DYSLIPIDEMIA: ICD-10-CM

## 2024-06-20 DIAGNOSIS — I25.10 CORONARY ARTERIOSCLEROSIS: ICD-10-CM

## 2024-06-20 DIAGNOSIS — I10 ESSENTIAL HYPERTENSION, BENIGN: ICD-10-CM

## 2024-06-20 DIAGNOSIS — I48.0 PAF (PAROXYSMAL ATRIAL FIBRILLATION) (HCC): ICD-10-CM

## 2024-06-20 DIAGNOSIS — D68.69 SECONDARY HYPERCOAGULABLE STATE (HCC): ICD-10-CM

## 2024-06-20 DIAGNOSIS — Z98.890 HISTORY OF CEA (CAROTID ENDARTERECTOMY): ICD-10-CM

## 2024-06-20 DIAGNOSIS — Z95.2 S/P TAVR (TRANSCATHETER AORTIC VALVE REPLACEMENT): ICD-10-CM

## 2024-06-20 LAB — INR PPP: 3.7 (ref 2–3.5)

## 2024-06-20 PROCEDURE — 99214 OFFICE O/P EST MOD 30 MIN: CPT | Mod: 95 | Performed by: INTERNAL MEDICINE

## 2024-06-20 PROCEDURE — 85610 PROTHROMBIN TIME: CPT

## 2024-06-20 PROCEDURE — G2211 COMPLEX E/M VISIT ADD ON: HCPCS | Mod: 95 | Performed by: INTERNAL MEDICINE

## 2024-06-20 PROCEDURE — 99212 OFFICE O/P EST SF 10 MIN: CPT

## 2024-06-20 RX ORDER — AMLODIPINE BESYLATE 2.5 MG/1
2.5 TABLET ORAL DAILY
Qty: 30 TABLET | Refills: 11 | Status: SHIPPED | OUTPATIENT
Start: 2024-06-20

## 2024-06-20 RX ORDER — METOPROLOL SUCCINATE 25 MG/1
12.5 TABLET, EXTENDED RELEASE ORAL DAILY
Qty: 90 TABLET | Refills: 3 | Status: SHIPPED | OUTPATIENT
Start: 2024-06-20

## 2024-06-20 ASSESSMENT — FIBROSIS 4 INDEX: FIB4 SCORE: 3.62

## 2024-06-20 NOTE — PROGRESS NOTES
"Anticoagulation Summary  As of 6/20/2024      INR goal:  2.0-3.0   TTR:  72.0% (8.8 y)   INR used for dosing:  3.70 (6/20/2024)   Warfarin maintenance plan:  6 mg (4 mg x 1.5) every Sun, Tue, Thu; 4 mg (4 mg x 1) all other days   Weekly warfarin total:  34 mg   Plan last modified:  Satya Parra, PharmD (6/20/2024)   Next INR check:  7/2/2024   Priority:  Maintenance   Target end date:  Indefinite    Indications    PAF (paroxysmal atrial fibrillation) (HCC) [I48.0]  Hx of TIA (transient ischemic attack) [G45.9]  Deep vein thrombosis (DVT) (HCC) (Resolved) [I82.409]  Atrial fibrillation (HCC) (Resolved) [I48.91]  Hx of Stroke (Resolved) [I63.9]  Secondary hypercoagulable state (HCC) [D68.69]                 Anticoagulation Episode Summary       INR check location:  Anticoagulation Clinic    Preferred lab:      Send INR reminders to:      Comments:  Takes ASA + warfarin 2/2 TIA Sep 2022 per Dr Bloch  Medications reconciled. Take naproxen as needed for arthritic pain. He is aware of the increased risk of bleeding and takes only when needed with caution          Anticoagulation Care Providers       Provider Role Specialty Phone number    Michael J Bloch, M.D. Referring Cardiovascular Disease (Cardiology) [phone]    [facility] Anticoagulation Services   [phone]                  Refer to Patient Findings for HPI:      Vitals:    06/20/24 1518 06/20/24 1520   BP: (!) 76/52 (!) 75/49   Pulse: (!) 59 60   Weight:  81.2 kg (179 lb)   Height:  1.778 m (5' 10\")       Verified current warfarin dosing schedule.    Takes ASA + warfarin 2/2 TIA Sep 2022 per Dr Bloch  Medications reconciled. Take naproxen as needed for arthritic pain. He is aware of the increased risk of bleeding and takes only when needed with caution      A/P   INR is supratherapeutic    Warfarin dosing recommendation: Hold today and begin reduced regimen.     Pt educated to contact our clinic with any changes in medications or s/s of bleeding or thrombosis. " Pt is aware to seek immediate medical attention for falls, head injury or deep cuts.    Request pt to return in 2 week(s). Pt agrees.    Satya Parra, PharmD

## 2024-06-21 NOTE — PROGRESS NOTES
VASCULAR FOLLOW UP VISIT  Subjective:   Tristan Hester is a 88 y.o. male who presents 24  for vascular f/u    This visit was conducted via Facetime video call using secure and encrypted video conferencing technology to reduce spread of and/or potential exposures to COVID.  The patient was in a private location (office) in the [facility].    The patient's identity was confirmed and verbal consent was obtained for this virtual visit.     Subjective     HPI:   Here for f/u of possible TIA in the setting of HTN, dyslipidemia, carotid disease and valvular heart disease, and atrial fibrillation.  No new tia or cva symptoms  Still struggling with his back  Still with intermittent TELLEZ  BPs at home mostly in 110s-120s systolic - uses wrist cuff  HR usually in 50s  Still with intermittent lightheadedness  Lots of bruising but no blood in stool or urine.   On warfarin + asa  Follows up in anticoag clinic regularly - has been therapeutic.  No change in statin or BP meds  Continues to struggle with mobility and balance, uses cane, no falls reported   Has been meeting with urology regularly    Social History     Tobacco Use    Smoking status: Former     Current packs/day: 0.00     Average packs/day: 0.5 packs/day for 30.0 years (15.0 ttl pk-yrs)     Types: Cigarettes, Pipe     Start date: 1955     Quit date: 1985     Years since quittin.4    Smokeless tobacco: Never   Vaping Use    Vaping status: Never Used   Substance Use Topics    Alcohol use: Yes     Alcohol/week: 3.0 oz     Types: 3 Cans of beer, 3 Shots of liquor per week     Comment: 1 x week    Drug use: No     DIET AND EXERCISE:  Weight Change: Stabilized  Diet: Relatively low sodium- bit less adherent  Exercise: gym 3x per week     Objective        Objective:     There were no vitals filed for this visit.       There is no height or weight on file to calculate BMI.  Physical Exam  Vitals reviewed.   Constitutional:       Appearance: He is  well-developed. He is not toxic-appearing or diaphoretic.   Eyes:      Extraocular Movements: Extraocular movements intact.      Conjunctiva/sclera: Conjunctivae normal.   Neck:      Vascular: No carotid bruit.   Cardiovascular:      Rate and Rhythm: Normal rate and regular rhythm.      Heart sounds: Murmur heard.   Pulmonary:      Effort: Pulmonary effort is normal. No respiratory distress.      Breath sounds: Normal breath sounds. No wheezing or rales.   Musculoskeletal:      Right lower leg: No edema.      Left lower leg: No edema.   Neurological:      General: No focal deficit present.      Mental Status: He is alert and oriented to person, place, and time.      Cranial Nerves: No cranial nerve deficit.      Gait: Gait normal.   Psychiatric:         Mood and Affect: Mood normal.         Behavior: Behavior normal.       Lab Results   Component Value Date    CHOLSTRLTOT 106 02/01/2024    HDL 48 02/01/2024    TRIGLYCERIDE 68 02/01/2024      Lab Results   Component Value Date    INR 3.70 (A) 06/20/2024         Lab Results   Component Value Date    SODIUM 138 02/01/2024    POTASSIUM 4.3 02/01/2024    CHLORIDE 101 02/01/2024    CO2 20 02/01/2024    GLUCOSE 86 02/01/2024    BUN 20 02/01/2024    CREATININE 0.87 02/01/2024    BUNCREATRAT 23 02/01/2024      Lab Results   Component Value Date    TSH 1.300 02/01/2024      Lab Results   Component Value Date    WBC 5.7 02/01/2024    RBC 4.81 02/01/2024    HEMOGLOBIN 14.3 02/01/2024    HEMATOCRIT 43.3 02/01/2024    MCV 90 02/01/2024    MCH 29.7 02/01/2024    MCHC 33.0 02/01/2024     Carotid duplex February 2021   No evidence for RIGHT carotid stenosis status post endarterectomy.   Mild LEFT internal carotid artery stenosis.     Echocardiogram March 2021  Prior echo on 8/7/20.Left ventricular ejection fraction is visually   estimated to be 60%.  Normal diastolic function.  Known TAVR aortic valve that is functioning normally with normal   transvalvular gradients. Vmax is 1.85  m/s.  Moderate eccentric mitral regurgitation.    Aortoiliac duplex April 2021   No evidence of aortic aneurysm.    No evidence of pseudoaneurysm or injury to the bilateral iliac arteries.     No stenosis of the proximal segment of the major visceral arteries.    Waveforms and velocities of the bilateral iliac arteries are normal.    Common iliac diameter (cm):   Right- 1.63 x 1.72         Left-   1.70 x 1.72    abpm 2022  Mean daytime 142/80  Normal nocturnal dip    Echo april 2022  Compared to the images of the prior study 03/02/21, there has been no   significant change.   Normal left ventricular systolic function.  The left ventricular ejection fraction is visually estimated to be 60%.  Grade II diastolic dysfunction.  Normal right ventricular systolic function.  Mild mitral regurgitation.  Known TAVR aortic valve that is functioning normally with normal transvalvular gradients.    Carotid duplex sep 2022   Surgical changes of the right carotid bifurcation consistent with post    carotid endarterectomy status.    No significant stenosis of the bilateral internal carotid arteries (<50%).      Aortoiliac duplex june 2023   No evidence of abdominal aortic or iliac artery aneurysm.    Waveforms and velocities of the abdominal aorta and bilateral iliac arteries    are normal with no hemodynamically significant stenosis.    Bilateral common iliac arteries are slightly ectatic.    Carotid duplex jan 2024   Compared to the prior study on 09/12/2022, no major changes.   Right.    Patent right carotid endarterectomy status, no significant stenosis.      Echo march 2024  Normal left ventricular systolic function.  Mild mitral regurgitation.  Known TAVR aortic valve that is functioning normally with normal   transvalvular gradients.       Medical Decision Making:  Today's Assessment / Status / Plan:     1. S/P TAVR (transcatheter aortic valve replacement)        2. Dyslipidemia        3. Essential hypertension, benign   amLODIPine (NORVASC) 2.5 MG Tab    metoprolol SR (TOPROL XL) 25 MG TABLET SR 24 HR      4. TIA (transient ischemic attack)        5. Coronary arteriosclerosis        6. PAF (paroxysmal atrial fibrillation) (HCC)        7. History of CEA (carotid endarterectomy)          Patient Type: Secondary Prevention    Etiology of Established CVD if Present:   1. TIA - likely cardioembolic from valve disease  2. Carotid atherosclerosis -status post CEA  3. Valvular Heart Disease  4. Atrial fibrillation with onset during acute illness, now chronic  5.  DVT after back surgery jan 2016  6.  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    Lipid Management: Qualifies for Statin Therapy Based on 2018 ACC/AHA Guidelines: yes  Calculated 10-Year Risk of ASCVD: N/A  Currently on Statin: Yes  ACC/aha criteria for at least mod intensity statin  Per nla goal ldl <70 and nonHDL <100 - well below threshold on most recent blood work  Plan:  - Continue atorvastatin at current dose    - continue zetia  - Continue TLC  - Recheck fasting lipids in 6-12 mo    Blood Pressure Management:  Acc/aha bp goal <130/80  ABPM high prior to intensification of meds - 2 yrs ago  Now ABPM in 2024 demonstrates good control with mean 120s/70s, but does have episodes of low BP without change in HR  BP generally well controlled at home  Low in office today.   Does have intermittent lightheadedness - difficult to determine how much is caused by BP and HR  No significant albuminuria   Possible ACE inhibitor cough  Gfr and electrolytes stable  Overall I think the case can be made to decrease therapy  Plan:  - Decrease metoprolol ER to 12.5 mg daily  - Continue irbesartan 300 mg daily  - Decrease amlodipine to 2.5 mg daily  - Continue lifestyle modification  - Continue home BP monitoring and checks in INR clinic  -May want to consider heart rhythm monitor in the future to make sure he is not having pauses    Glycemic Status: Prediabetic  IFG, mild  and stable - actually normal on most recent  - Continue lifestyle mod  - Follow fasting glucose    Anti-Platelet/Anti-Coagulant Tx: yes  As we discussed, combination of aspirin and warfarin does increase risk of bleeding, but especially in light of his relatively recent TIA and fact that he had TIA on warfarin monotherapy, using SDM we decided that benefit likely outweighs risk at present  - Continue indefinite anticoagulation with warfarin  -continue low dose asa   - report any bleeding  - F/U with anticoagulation clinic as scheduled   - limit nsaids    Smoking: continue complete avoidance    Physical Activity:  Keep going to gym at least 3x per week.  Keep doing his balance exercises    Weight Management and Nutrition: Dietary plan was discussed with patient at this visit including c/w Med diet and decreasing his sodium a bit    Other:     1. TIA -has history of previous cardioembolic TIA likely with his native aortic valve as a source.  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  And now, possible recurrent TIA earlier in 2024.  His symptoms resolved after only a few minutes. No further TIA or stroke symptoms.  No evidence of any problems with his valve on recent echocardiogram or any worsening stenosis on carotid duplex.    -He should call 911 immediately with any further TIA or stroke symptoms.    -Antiplatelet and anticoag as above    2. Carotid atherosclerosis status post CEA- .  Only mild restenosis on repeat duplex.  Remains asymptomatic.  Repeat duplex in 2 years from previous -January 2026    3. Valvular Heart Disease -now status post TAVR March 2020.  Still with unrepaired mitral disease -improved on most recent echo.  Appears asymptomatic at present.  Does not appear to be volume overloaded.  Defer timing of repeat echocardiogram to cardiology, but would be sure it does not go more than 2 years    4. Colonic Polyp on recent colonoscopy - repeat colonoscopy in 2019 negative per  patient.   No further f/u needed    5. Insomnia - trialed zolpidem w/o success.  Continue temazepam, but minimize dose.  Patient understands addictive potential.      6.  Atrial fibrillation - defer management of rhythm and rate to cards.  Continue anticoag as above    7.  DVT - postoperative.  No recurrence.  Continue indefinite anticoagulation given concomitant afib    8.  H/o prostate ca and multiple urological issues - defer f/u to urology.  Check PSA as they recommend    9.  Balance issues, back pain and neuropathy -this seems recalcitrant to physical therapy or any intervention that we have tried previously.  Defer f/u to ENT and neurology    10.  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  Good arterial flow and patent stent on noninvasive study.  Report any claudication, rest pain, or ulceration immediately.  Will obtain repeat arterial duplex 2 year from previous - june 2025    We will partner with other providers in the management of established vascular disease and cardiometabolic risk factors.    Studies to Be Obtained:    1) carotid duplex January 2026  2) aortoiliac duplex june 2025  3) repeat echocardiogram at discretion of cardiology (or at least by March 2026)    Labs to Be Obtained: none currently    Follow up in: 6 weeks to assess BP control    Time: 33 min - chart review/prep, review of other providers' records, imaging/lab review, face-to-face time for history/examination, ordering, prescribing,  review of results/meds/ treatment plan with patient/family/caregiver, documentation in EMR, care coordination (as needed)     Michael J Bloch, M.D.

## 2024-06-25 DIAGNOSIS — F51.01 PRIMARY INSOMNIA: ICD-10-CM

## 2024-06-25 RX ORDER — TEMAZEPAM 30 MG/1
CAPSULE ORAL
Qty: 30 CAPSULE | Refills: 0 | Status: SHIPPED | OUTPATIENT
Start: 2024-06-25 | End: 2024-12-22

## 2024-07-02 ENCOUNTER — ANTICOAGULATION VISIT (OUTPATIENT)
Dept: VASCULAR LAB | Facility: MEDICAL CENTER | Age: 89
End: 2024-07-02
Attending: INTERNAL MEDICINE
Payer: MEDICARE

## 2024-07-02 VITALS
DIASTOLIC BLOOD PRESSURE: 77 MMHG | WEIGHT: 180.2 LBS | HEART RATE: 51 BPM | SYSTOLIC BLOOD PRESSURE: 131 MMHG | BODY MASS INDEX: 25.86 KG/M2

## 2024-07-02 DIAGNOSIS — G45.9 TIA (TRANSIENT ISCHEMIC ATTACK): ICD-10-CM

## 2024-07-02 DIAGNOSIS — I48.0 PAF (PAROXYSMAL ATRIAL FIBRILLATION) (HCC): ICD-10-CM

## 2024-07-02 DIAGNOSIS — D68.69 SECONDARY HYPERCOAGULABLE STATE (HCC): ICD-10-CM

## 2024-07-02 LAB — INR PPP: 2 (ref 2–3.5)

## 2024-07-02 PROCEDURE — 85610 PROTHROMBIN TIME: CPT

## 2024-07-02 PROCEDURE — 99211 OFF/OP EST MAY X REQ PHY/QHP: CPT

## 2024-07-02 ASSESSMENT — FIBROSIS 4 INDEX: FIB4 SCORE: 3.62

## 2024-07-22 DIAGNOSIS — F51.01 PRIMARY INSOMNIA: ICD-10-CM

## 2024-07-22 RX ORDER — TEMAZEPAM 30 MG/1
CAPSULE ORAL
Qty: 30 CAPSULE | Refills: 0 | Status: SHIPPED | OUTPATIENT
Start: 2024-07-22 | End: 2024-07-25 | Stop reason: SDUPTHER

## 2024-07-23 ENCOUNTER — ANTICOAGULATION VISIT (OUTPATIENT)
Dept: VASCULAR LAB | Facility: MEDICAL CENTER | Age: 89
End: 2024-07-23
Attending: INTERNAL MEDICINE
Payer: MEDICARE

## 2024-07-23 DIAGNOSIS — G45.9 TIA (TRANSIENT ISCHEMIC ATTACK): ICD-10-CM

## 2024-07-23 DIAGNOSIS — I48.0 PAF (PAROXYSMAL ATRIAL FIBRILLATION) (HCC): ICD-10-CM

## 2024-07-23 DIAGNOSIS — D68.69 SECONDARY HYPERCOAGULABLE STATE (HCC): ICD-10-CM

## 2024-07-23 LAB — INR PPP: 2.1 (ref 2–3.5)

## 2024-07-23 PROCEDURE — 85610 PROTHROMBIN TIME: CPT

## 2024-07-23 PROCEDURE — 99211 OFF/OP EST MAY X REQ PHY/QHP: CPT

## 2024-07-25 ENCOUNTER — TELEPHONE (OUTPATIENT)
Dept: VASCULAR LAB | Facility: MEDICAL CENTER | Age: 89
End: 2024-07-25
Payer: MEDICARE

## 2024-07-25 DIAGNOSIS — F51.01 PRIMARY INSOMNIA: ICD-10-CM

## 2024-07-25 NOTE — TELEPHONE ENCOUNTER
[facility] MED    Caller: Zaid    Name and Department:     [facility]  P: [phone]  F: [phone]    Topic/Issue: MEDICATION MANAGEMENT     Per Zaid;    They have received a prescription for TEMAZEPAM 30 mg and this is a controlled substance and needs a day supply. Please advise.    Thank you,  Bala BURR    Callback Number or Extension: [phone]

## 2024-07-26 ENCOUNTER — DOCUMENTATION (OUTPATIENT)
Dept: VASCULAR LAB | Facility: MEDICAL CENTER | Age: 89
End: 2024-07-26
Payer: MEDICARE

## 2024-07-26 RX ORDER — TEMAZEPAM 30 MG/1
30 CAPSULE ORAL NIGHTLY PRN
Qty: 30 CAPSULE | Refills: 5 | Status: SHIPPED | OUTPATIENT
Start: 2024-07-26 | End: 2025-01-22

## 2024-07-30 ENCOUNTER — TELEPHONE (OUTPATIENT)
Dept: CARDIOLOGY | Facility: MEDICAL CENTER | Age: 89
End: 2024-07-30
Payer: MEDICARE

## 2024-08-01 ENCOUNTER — OFFICE VISIT (OUTPATIENT)
Dept: VASCULAR LAB | Facility: MEDICAL CENTER | Age: 89
End: 2024-08-01
Attending: INTERNAL MEDICINE
Payer: MEDICARE

## 2024-08-01 VITALS
BODY MASS INDEX: 25.06 KG/M2 | HEART RATE: 54 BPM | DIASTOLIC BLOOD PRESSURE: 78 MMHG | WEIGHT: 179 LBS | HEIGHT: 71 IN | SYSTOLIC BLOOD PRESSURE: 132 MMHG

## 2024-08-01 DIAGNOSIS — I10 ESSENTIAL HYPERTENSION: ICD-10-CM

## 2024-08-01 DIAGNOSIS — G45.9 TIA (TRANSIENT ISCHEMIC ATTACK): ICD-10-CM

## 2024-08-01 PROCEDURE — 99212 OFFICE O/P EST SF 10 MIN: CPT

## 2024-08-01 PROCEDURE — 3078F DIAST BP <80 MM HG: CPT

## 2024-08-01 PROCEDURE — 99213 OFFICE O/P EST LOW 20 MIN: CPT

## 2024-08-01 PROCEDURE — 3075F SYST BP GE 130 - 139MM HG: CPT

## 2024-08-01 ASSESSMENT — FIBROSIS 4 INDEX: FIB4 SCORE: 3.62

## 2024-08-01 NOTE — PROGRESS NOTES
VASCULAR FOLLOW UP VISIT  Subjective:   Tristan Hester is a 89 y.o. male who presents 2024  for vascular f/u      Subjective     HPI:   Here for f/u of possible TIA in the setting of HTN, dyslipidemia, carotid disease and valvular heart disease, and atrial fibrillation.  Last seen 2024   Has decreased meds per last appt  Now taking 2.5 amlodipine, and 12.5 metoprolol  Remains on irbesartan 300  Has only checked bps 3 times since last appt, readings were all 110s/70s, using wrist cuff  Feeling slightly better, no dizziness or light headedness,   Still with ongoing fatigue and generalized weakness, also with decreased appetite  No new tia or cva symptoms  Still struggling with his back  Still with intermittent TELLEZ  HR usually in 50s to low 60s  Lots of bruising but no blood in stool or urine.   On warfarin + asa  Follows up in anticoag clinic regularly - has been therapeutic.  No change in statin   Continues to struggle with mobility and balance, uses cane/rollator, no falls reported   Has been meeting with urology regularly    Social History     Tobacco Use    Smoking status: Former     Current packs/day: 0.00     Average packs/day: 0.5 packs/day for 30.0 years (15.0 ttl pk-yrs)     Types: Cigarettes, Pipe     Start date: 1955     Quit date: 1985     Years since quittin.6    Smokeless tobacco: Never   Vaping Use    Vaping status: Never Used   Substance Use Topics    Alcohol use: Yes     Alcohol/week: 3.0 oz     Types: 3 Cans of beer, 3 Shots of liquor per week     Comment: 1 x week    Drug use: No     DIET AND EXERCISE:  Weight Change: Stabilized  Diet: Relatively low sodium- bit less adherent  Exercise: gym 3x per week     Objective        Objective:     Vitals:    24 1107 24 1110 24 1137   BP: (!) 161/88 (!) 148/77 132/78   BP Location: Left arm Left arm    Patient Position: Sitting Sitting    BP Cuff Size: Adult long Adult    Pulse: (!) 56 (!) 54 (!) 54   Weight: 81.2  "kg (179 lb)     Height: 1.803 m (5' 11\")            Body mass index is 24.97 kg/m².  Physical Exam  Vitals reviewed.   Constitutional:       Appearance: He is well-developed. He is not toxic-appearing or diaphoretic.   Eyes:      Extraocular Movements: Extraocular movements intact.      Conjunctiva/sclera: Conjunctivae normal.   Neck:      Vascular: No carotid bruit.   Cardiovascular:      Rate and Rhythm: Normal rate and regular rhythm.      Heart sounds: Murmur heard.   Pulmonary:      Effort: Pulmonary effort is normal. No respiratory distress.      Breath sounds: Normal breath sounds. No wheezing or rales.   Musculoskeletal:      Right lower leg: No edema.      Left lower leg: No edema.   Neurological:      General: No focal deficit present.      Mental Status: He is alert and oriented to person, place, and time.      Cranial Nerves: No cranial nerve deficit.      Gait: Gait normal.   Psychiatric:         Mood and Affect: Mood normal.         Behavior: Behavior normal.       Lab Results   Component Value Date    CHOLSTRLTOT 106 02/01/2024    HDL 48 02/01/2024    TRIGLYCERIDE 68 02/01/2024      Lab Results   Component Value Date    INR 2.10 07/23/2024         Lab Results   Component Value Date    SODIUM 138 02/01/2024    POTASSIUM 4.3 02/01/2024    CHLORIDE 101 02/01/2024    CO2 20 02/01/2024    GLUCOSE 86 02/01/2024    BUN 20 02/01/2024    CREATININE 0.87 02/01/2024    BUNCREATRAT 23 02/01/2024      Lab Results   Component Value Date    TSH 1.300 02/01/2024      Lab Results   Component Value Date    WBC 5.7 02/01/2024    RBC 4.81 02/01/2024    HEMOGLOBIN 14.3 02/01/2024    HEMATOCRIT 43.3 02/01/2024    MCV 90 02/01/2024    MCH 29.7 02/01/2024    MCHC 33.0 02/01/2024     Carotid duplex February 2021   No evidence for RIGHT carotid stenosis status post endarterectomy.   Mild LEFT internal carotid artery stenosis.     Echocardiogram March 2021  Prior echo on 8/7/20.Left ventricular ejection fraction is visually "   estimated to be 60%.  Normal diastolic function.  Known TAVR aortic valve that is functioning normally with normal   transvalvular gradients. Vmax is 1.85 m/s.  Moderate eccentric mitral regurgitation.    Aortoiliac duplex April 2021   No evidence of aortic aneurysm.    No evidence of pseudoaneurysm or injury to the bilateral iliac arteries.     No stenosis of the proximal segment of the major visceral arteries.    Waveforms and velocities of the bilateral iliac arteries are normal.    Common iliac diameter (cm):   Right- 1.63 x 1.72         Left-   1.70 x 1.72    abpm 2022  Mean daytime 142/80  Normal nocturnal dip    Echo april 2022  Compared to the images of the prior study 03/02/21, there has been no   significant change.   Normal left ventricular systolic function.  The left ventricular ejection fraction is visually estimated to be 60%.  Grade II diastolic dysfunction.  Normal right ventricular systolic function.  Mild mitral regurgitation.  Known TAVR aortic valve that is functioning normally with normal transvalvular gradients.    Carotid duplex sep 2022   Surgical changes of the right carotid bifurcation consistent with post    carotid endarterectomy status.    No significant stenosis of the bilateral internal carotid arteries (<50%).      Aortoiliac duplex june 2023   No evidence of abdominal aortic or iliac artery aneurysm.    Waveforms and velocities of the abdominal aorta and bilateral iliac arteries    are normal with no hemodynamically significant stenosis.    Bilateral common iliac arteries are slightly ectatic.    Carotid duplex jan 2024   Compared to the prior study on 09/12/2022, no major changes.   Right.    Patent right carotid endarterectomy status, no significant stenosis.      Echo march 2024  Normal left ventricular systolic function.  Mild mitral regurgitation.  Known TAVR aortic valve that is functioning normally with normal   transvalvular gradients.       Medical Decision Making:   Today's Assessment / Status / Plan:     1. Essential hypertension        2. Hx of TIA (transient ischemic attack)            Patient Type: Secondary Prevention    Etiology of Established CVD if Present:   1. TIA - likely cardioembolic from valve disease  2. Carotid atherosclerosis -status post CEA  3. Valvular Heart Disease  4. Atrial fibrillation with onset during acute illness, now chronic  5.  DVT after back surgery jan 2016  6.  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    Lipid Management: Qualifies for Statin Therapy Based on 2018 ACC/AHA Guidelines: yes  Calculated 10-Year Risk of ASCVD: N/A  Currently on Statin: Yes  ACC/aha criteria for at least mod intensity statin  Per nla goal ldl <70 and nonHDL <100 - well below threshold on most recent blood work 2/2024  Plan:  - Continue atorvastatin at current dose    - continue zetia  - Continue TLC  - Recheck fasting lipids in 6-12 mo order at next appt    Blood Pressure Management:  Acc/aha bp goal <130/80  ABPM high prior to intensification of meds - 2 yrs ago  Now ABPM in 2024 demonstrates good control with mean 120s/70s, but does have episodes of low BP without change in HR  BP generally well controlled at home on rare readings  High in office today.   Does have intermittent lightheadedness - difficult to determine how much is caused by BP and HR - improved with decrease in regimen  No significant albuminuria   Possible ACE inhibitor cough  Gfr and electrolytes stable  Overall I think the case can be made to decrease therapy, which has slightly improved some of his symptoms.  Unclear what current home readings are as not regularly checking.  Will have pt keep a more regular log and have back to review in a few weeks to determine if additional med adjustments needed.  Pt is in agreement.    Plan:  - continue metoprolol ER to 12.5 mg daily  - Continue irbesartan 300 mg daily  - continue amlodipine to 2.5 mg daily - consider increase back to  5 if home readings elevated  - Continue lifestyle modification  - RESTART/Continue home BP monitoring and checks in INR clinic - provided log, pt to bring in to next appt  -May want to consider heart rhythm monitor in the future to make sure he is not having pauses    Glycemic Status: Prediabetic  IFG, mild and stable - actually normal on most recent  - Continue lifestyle mod  - Follow fasting glucose    Anti-Platelet/Anti-Coagulant Tx: yes  As previously discussed, combination of aspirin and warfarin does increase risk of bleeding, but especially in light of his relatively recent TIA and fact that he had TIA on warfarin monotherapy, using SDM we decided that benefit likely outweighs risk at present  - Continue indefinite anticoagulation with warfarin  -continue low dose asa   - report any bleeding  - F/U with anticoagulation clinic as scheduled   - limit nsaids    Smoking: continue complete avoidance    Physical Activity:  Keep going to gym at least 3x per week.  Keep doing his balance exercises    Weight Management and Nutrition: Dietary plan was discussed with patient at this visit including c/w Med diet and decreasing his sodium a bit    Other:     1. TIA -has history of previous cardioembolic TIA likely with his native aortic valve as a source.  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  And now, possible recurrent TIA earlier in 2024.  His symptoms resolved after only a few minutes. No further TIA or stroke symptoms.  No evidence of any problems with his valve on recent echocardiogram or any worsening stenosis on carotid duplex.    -He should call 911 immediately with any further TIA or stroke symptoms.    -Antiplatelet and anticoag as above    2. Carotid atherosclerosis status post CEA- .  Only mild restenosis on repeat duplex.  Remains asymptomatic.  Repeat duplex in 2 years from previous -January 2026    3. Valvular Heart Disease -now status post TAVR March 2020.  Still with unrepaired  mitral disease -improved on most recent echo.  Appears asymptomatic at present.  Does not appear to be volume overloaded.  Defer timing of repeat echocardiogram to cardiology, but would be sure it does not go more than 2 years    4. Colonic Polyp on recent colonoscopy - repeat colonoscopy in 2019 negative per patient.   No further f/u needed    5. Insomnia - trialed zolpidem w/o success.  Continue temazepam, but minimize dose.  Patient understands addictive potential.      6.  Atrial fibrillation - defer management of rhythm and rate to cards.  Continue anticoag as above    7.  DVT - postoperative.  No recurrence.  Continue indefinite anticoagulation given concomitant afib    8.  H/o prostate ca and multiple urological issues - defer f/u to urology.  Check PSA as they recommend    9.  Balance issues, back pain and neuropathy -this seems recalcitrant to physical therapy or any intervention that we have tried previously.  Defer f/u to ENT and neurology    10.  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  Good arterial flow and patent stent on noninvasive study.  Report any claudication, rest pain, or ulceration immediately.  Will obtain repeat arterial duplex 2 year from previous - june 2025    We will partner with other providers in the management of established vascular disease and cardiometabolic risk factors.    Studies to Be Obtained:    1) carotid duplex January 2026  2) aortoiliac duplex june 2025  3) repeat echocardiogram at discretion of cardiology (or at least by March 2026)    Labs to Be Obtained: none currently - order at next appt    Follow up in: 6-8 weeks to assess BP control with bp log!        YONATAN De Oliveira.

## 2024-08-01 NOTE — Clinical Note
Pt only checked bps 3 times since last appt and med changes.  Reports 110s/70s, HR remains 50s mostly.  Office bp elevated today.  Having him back to check in with more bp readings in 8 weeks.   He had asked when he was going to see you again, I told him I would reach out to you to see what your preference was!

## 2024-08-30 ENCOUNTER — DOCUMENTATION (OUTPATIENT)
Dept: VASCULAR LAB | Facility: MEDICAL CENTER | Age: 89
End: 2024-08-30
Payer: MEDICARE

## 2024-08-30 NOTE — PROGRESS NOTES
Established patient  Chart prep for upcoming appointment.    Any pending/incomplete orders from last visit? No, all orders completed.  Was patient called and reminded to complete pending orders? N/A orders complete  Were any records requested?  No    Referral up to date? Yes  Referral attached to appointment (renewals and New patients only)? N/A (established with up-to-date referral)  Virtual appointment? No          Tosha Jones, Medical Assistant   [facility]   Ph: [phone]  Fx: [phone]

## 2024-09-04 DIAGNOSIS — T14.8XXA VASCULAR INJURY: ICD-10-CM

## 2024-09-04 RX ORDER — GABAPENTIN 300 MG/1
300 CAPSULE ORAL 3 TIMES DAILY
Qty: 270 CAPSULE | Refills: 0 | Status: SHIPPED | OUTPATIENT
Start: 2024-09-04

## 2024-09-05 ENCOUNTER — OFFICE VISIT (OUTPATIENT)
Dept: VASCULAR LAB | Facility: MEDICAL CENTER | Age: 89
End: 2024-09-05
Attending: NURSE PRACTITIONER
Payer: MEDICARE

## 2024-09-05 ENCOUNTER — ANTICOAGULATION VISIT (OUTPATIENT)
Dept: VASCULAR LAB | Facility: MEDICAL CENTER | Age: 89
End: 2024-09-05
Attending: INTERNAL MEDICINE
Payer: MEDICARE

## 2024-09-05 VITALS
SYSTOLIC BLOOD PRESSURE: 150 MMHG | WEIGHT: 178 LBS | BODY MASS INDEX: 24.92 KG/M2 | HEIGHT: 71 IN | HEART RATE: 52 BPM | DIASTOLIC BLOOD PRESSURE: 82 MMHG

## 2024-09-05 DIAGNOSIS — Z79.01 CHRONIC ANTICOAGULATION: ICD-10-CM

## 2024-09-05 DIAGNOSIS — I10 ESSENTIAL HYPERTENSION, BENIGN: ICD-10-CM

## 2024-09-05 DIAGNOSIS — D68.69 SECONDARY HYPERCOAGULABLE STATE (HCC): ICD-10-CM

## 2024-09-05 DIAGNOSIS — E78.5 HYPERLIPIDEMIA, UNSPECIFIED HYPERLIPIDEMIA TYPE: ICD-10-CM

## 2024-09-05 DIAGNOSIS — I10 ESSENTIAL HYPERTENSION: ICD-10-CM

## 2024-09-05 DIAGNOSIS — Z95.2 S/P TAVR (TRANSCATHETER AORTIC VALVE REPLACEMENT): ICD-10-CM

## 2024-09-05 DIAGNOSIS — Z98.890 HISTORY OF CEA (CAROTID ENDARTERECTOMY): ICD-10-CM

## 2024-09-05 DIAGNOSIS — G45.9 TIA (TRANSIENT ISCHEMIC ATTACK): ICD-10-CM

## 2024-09-05 DIAGNOSIS — I65.29 STENOSIS OF CAROTID ARTERY, UNSPECIFIED LATERALITY: ICD-10-CM

## 2024-09-05 DIAGNOSIS — E78.5 DYSLIPIDEMIA: ICD-10-CM

## 2024-09-05 DIAGNOSIS — I48.0 PAF (PAROXYSMAL ATRIAL FIBRILLATION) (HCC): ICD-10-CM

## 2024-09-05 LAB — INR PPP: 2.2 (ref 2–3.5)

## 2024-09-05 PROCEDURE — 3079F DIAST BP 80-89 MM HG: CPT | Performed by: NURSE PRACTITIONER

## 2024-09-05 PROCEDURE — 3077F SYST BP >= 140 MM HG: CPT | Performed by: NURSE PRACTITIONER

## 2024-09-05 PROCEDURE — G2211 COMPLEX E/M VISIT ADD ON: HCPCS | Performed by: NURSE PRACTITIONER

## 2024-09-05 PROCEDURE — 85610 PROTHROMBIN TIME: CPT

## 2024-09-05 PROCEDURE — 99213 OFFICE O/P EST LOW 20 MIN: CPT

## 2024-09-05 PROCEDURE — 99212 OFFICE O/P EST SF 10 MIN: CPT

## 2024-09-05 PROCEDURE — 99214 OFFICE O/P EST MOD 30 MIN: CPT | Performed by: NURSE PRACTITIONER

## 2024-09-05 ASSESSMENT — FIBROSIS 4 INDEX: FIB4 SCORE: 3.62

## 2024-09-05 NOTE — PROGRESS NOTES
"VASCULAR FOLLOW UP VISIT  Subjective:   Tristan Hester is a 89 y.o. male who presents 2024  for vascular f/u    Subjective     HPI:   Here for f/u of possible TIA in the setting of HTN, dyslipidemia, carotid disease and valvular heart disease, and atrial fibrillation.  Tolerating meds currently  Home BP readings labile, has wrist cuff  Previous ABPM in 2024 with well-controlled BP  Chronic back pain  Balance is difficult- uses cane  On amlodipine, 2.5mg- no LE swelling  Tolerating metoprolol and irbesartan  On atorva and zetia  Denies CP, SOB, palpations  No TIA or CVA symptoms  On warfarin + ASA without bleeding problems- followed by [facility] clinic     Social History     Tobacco Use    Smoking status: Former     Current packs/day: 0.00     Average packs/day: 0.5 packs/day for 30.0 years (15.0 ttl pk-yrs)     Types: Cigarettes, Pipe     Start date: 1955     Quit date: 1985     Years since quittin.7    Smokeless tobacco: Never   Vaping Use    Vaping status: Never Used   Substance Use Topics    Alcohol use: Yes     Alcohol/week: 3.0 oz     Types: 3 Cans of beer, 3 Shots of liquor per week     Comment: 1 x week    Drug use: No     DIET AND EXERCISE:  Weight Change: Stabilized  Diet: Relatively low sodium- bit less adherent  Exercise: gym 3x per week     Objective        Objective:     Vitals:    24 1032 24 1036   BP: (!) 152/87 (!) 150/82   BP Location: Left arm Left arm   Patient Position: Sitting Sitting   BP Cuff Size: Adult Adult   Pulse: (!) 54 (!) 52   Weight: 80.7 kg (178 lb)    Height: 1.803 m (5' 11\")      Body mass index is 24.83 kg/m².  Physical Exam  Vitals reviewed.   Constitutional:       Appearance: He is well-developed. He is not toxic-appearing or diaphoretic.   Eyes:      Extraocular Movements: Extraocular movements intact.      Conjunctiva/sclera: Conjunctivae normal.   Neck:      Vascular: No carotid bruit.   Cardiovascular:      Rate and Rhythm: Normal rate and " regular rhythm.      Heart sounds: Murmur heard.   Pulmonary:      Effort: Pulmonary effort is normal. No respiratory distress.      Breath sounds: Normal breath sounds. No wheezing or rales.   Musculoskeletal:      Right lower leg: No edema.      Left lower leg: No edema.   Neurological:      General: No focal deficit present.      Mental Status: He is alert and oriented to person, place, and time.      Cranial Nerves: No cranial nerve deficit.      Gait: Gait normal.   Psychiatric:         Mood and Affect: Mood normal.         Behavior: Behavior normal.       Lab Results   Component Value Date    CHOLSTRLTOT 106 02/01/2024    HDL 48 02/01/2024    TRIGLYCERIDE 68 02/01/2024      Lab Results   Component Value Date    INR 2.20 09/05/2024         Lab Results   Component Value Date    SODIUM 138 02/01/2024    POTASSIUM 4.3 02/01/2024    CHLORIDE 101 02/01/2024    CO2 20 02/01/2024    GLUCOSE 86 02/01/2024    BUN 20 02/01/2024    CREATININE 0.87 02/01/2024    BUNCREATRAT 23 02/01/2024      Lab Results   Component Value Date    TSH 1.300 02/01/2024      Lab Results   Component Value Date    WBC 5.7 02/01/2024    RBC 4.81 02/01/2024    HEMOGLOBIN 14.3 02/01/2024    HEMATOCRIT 43.3 02/01/2024    MCV 90 02/01/2024    MCH 29.7 02/01/2024    MCHC 33.0 02/01/2024     Carotid duplex February 2021   No evidence for RIGHT carotid stenosis status post endarterectomy.   Mild LEFT internal carotid artery stenosis.     Echocardiogram March 2021  Prior echo on 8/7/20.Left ventricular ejection fraction is visually   estimated to be 60%.  Normal diastolic function.  Known TAVR aortic valve that is functioning normally with normal   transvalvular gradients. Vmax is 1.85 m/s.  Moderate eccentric mitral regurgitation.    Aortoiliac duplex April 2021   No evidence of aortic aneurysm.    No evidence of pseudoaneurysm or injury to the bilateral iliac arteries.     No stenosis of the proximal segment of the major visceral arteries.     Waveforms and velocities of the bilateral iliac arteries are normal.    Common iliac diameter (cm):   Right- 1.63 x 1.72         Left-   1.70 x 1.72    abpm 2022  Mean daytime 142/80  Normal nocturnal dip    Echo april 2022  Compared to the images of the prior study 03/02/21, there has been no   significant change.   Normal left ventricular systolic function.  The left ventricular ejection fraction is visually estimated to be 60%.  Grade II diastolic dysfunction.  Normal right ventricular systolic function.  Mild mitral regurgitation.  Known TAVR aortic valve that is functioning normally with normal transvalvular gradients.    Carotid duplex sep 2022   Surgical changes of the right carotid bifurcation consistent with post    carotid endarterectomy status.    No significant stenosis of the bilateral internal carotid arteries (<50%).      Aortoiliac duplex june 2023   No evidence of abdominal aortic or iliac artery aneurysm.    Waveforms and velocities of the abdominal aorta and bilateral iliac arteries    are normal with no hemodynamically significant stenosis.    Bilateral common iliac arteries are slightly ectatic.    Carotid duplex jan 2024   Compared to the prior study on 09/12/2022, no major changes.   Right.    Patent right carotid endarterectomy status, no significant stenosis.      Echo march 2024  Normal left ventricular systolic function.  Mild mitral regurgitation.  Known TAVR aortic valve that is functioning normally with normal   transvalvular gradients.       Medical Decision Making:  Today's Assessment / Status / Plan:     1. Essential hypertension  Comp Metabolic Panel    MICROALBUMIN CREAT RATIO URINE      2. Hyperlipidemia, unspecified hyperlipidemia type  Lipid Profile      3. Chronic anticoagulation        4. Dyslipidemia        5. Essential hypertension, benign        6. S/P TAVR (transcatheter aortic valve replacement)        7. Stenosis of carotid artery, unspecified laterality        8.  History of CEA (carotid endarterectomy)        9. Secondary hypercoagulable state (HCC)  CBC WITHOUT DIFFERENTIAL      10. Hx of TIA (transient ischemic attack)          Patient Type: Secondary Prevention    Etiology of Established CVD if Present:   1. TIA - likely cardioembolic from valve disease  2. Carotid atherosclerosis -status post CEA  3. Valvular Heart Disease  4. Atrial fibrillation with onset during acute illness, now chronic  5.  DVT after back surgery jan 2016  6.  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    Lipid Management: Qualifies for Statin Therapy Based on 2018 ACC/AHA Guidelines: yes  Calculated 10-Year Risk of ASCVD: N/A  Currently on Statin: Yes  ACC/aha criteria for at least mod intensity statin  Per nla goal ldl <70 and nonHDL <100 - well below threshold on most recent blood work 2/2024  Plan:  - Continue atorvastatin at current dose    - continue zetia  - Continue TLC  - Recheck fasting lipids prior to next appt     Blood Pressure Management:  Acc/aha bp goal <130/80  ABPM high prior to intensification of meds - 2 yrs ago  ABPM April 2024: good control with mean 120s/70s, but does have episodes of low BP without change in HR  Home readings labile   Does have intermittent lightheadedness - difficult to determine how much is caused by BP and HR - improved with decrease in regimen  No significant albuminuria   Possible ACE inhibitor cough  Gfr and electrolytes stable  Overall I think the case can be made to decrease therapy, which has slightly improved some of his symptoms.   Plan:  - Continue metoprolol ER to 12.5 mg daily  - Continue irbesartan 300 mg daily  - Continue amlodipine to 2.5 mg daily   - Continue lifestyle modification  - May want to consider heart rhythm monitor in the future to make sure he is not having pauses    Glycemic Status: Prediabetic  IFG, mild and stable - actually normal on most recent  - Continue lifestyle mod  - Follow fasting  glucose    Anti-Platelet/Anti-Coagulant Tx: yes  As previously discussed, combination of aspirin and warfarin does increase risk of bleeding, but especially in light of his relatively recent TIA and fact that he had TIA on warfarin monotherapy, using SDM we decided that benefit likely outweighs risk at present  - Continue indefinite anticoagulation with warfarin  -continue low dose asa   - report any bleeding  - F/U with anticoagulation clinic as scheduled   - limit nsaids    Smoking: continue complete avoidance    Physical Activity:  Keep going to gym at least 3x per week.  Keep doing his balance exercises    Weight Management and Nutrition: Dietary plan was discussed with patient at this visit including c/w Med diet and decreasing his sodium a bit    Other:     1. TIA -has history of previous cardioembolic TIA likely with his native aortic valve as a source.  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  Possible recurrent TIA earlier in 2024.  His symptoms resolved after only a few minutes. No further TIA or stroke symptoms.  No evidence of any problems with his valve on recent echocardiogram or any worsening stenosis on carotid duplex.    -He should call 911 immediately with any further TIA or stroke symptoms.    -Antiplatelet and anticoag as above    2. Carotid atherosclerosis status post CEA- .  Only mild restenosis on repeat duplex.  Remains asymptomatic.  Repeat duplex in 2 years from previous -January 2026    3. Valvular Heart Disease -now status post TAVR March 2020.  Still with unrepaired mitral disease -improved on most recent echo.  Appears asymptomatic at present.  Does not appear to be volume overloaded.  Defer timing of repeat echocardiogram to cardiology, but would be sure it does not go more than 2 years    4. Colonic Polyp on recent colonoscopy - repeat colonoscopy in 2019 negative per patient.   No further f/u needed    5. Insomnia - trialed zolpidem w/o success.  Continue  temazepam, but minimize dose.  Patient understands addictive potential.      6.  Atrial fibrillation - defer management of rhythm and rate to cards.  Continue anticoag as above    7.  DVT - postoperative.  No recurrence.  Continue indefinite anticoagulation given concomitant afib    8.  H/o prostate ca and multiple urological issues - defer f/u to urology.  Check PSA as they recommend    9.  Balance issues, back pain and neuropathy -this seems recalcitrant to physical therapy or any intervention that we have tried previously.  Defer f/u to ENT and neurology    10.  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  Good arterial flow and patent stent on noninvasive study.  Report any claudication, rest pain, or ulceration immediately.  Will obtain repeat arterial duplex 2 year from previous - June 2025    We will partner with other providers in the management of established vascular disease and cardiometabolic risk factors.    Studies to Be Obtained:    1) carotid duplex January 2026  2) aortoiliac duplex june 2025  3) repeat echocardiogram at discretion of cardiology (or at least by March 2026)    Labs to Be Obtained: as above     Follow up in:  4 months as planned with Dr. Bloch Ashlee A Crawford, A.P.N.

## 2024-09-05 NOTE — PROGRESS NOTES
Anticoagulation Summary  As of 2024      INR goal:  2.0-3.0   TTR:  72.5% (9 y)   INR used for dosin.20 (2024)   Warfarin maintenance plan:  6 mg (4 mg x 1.5) every Sun, e, Thu; 4 mg (4 mg x 1) all other days   Weekly warfarin total:  34 mg   Plan last modified:  Satya Parra, PharmD (2024)   Next INR check:  10/17/2024   Priority:  Maintenance   Target end date:  Indefinite    Indications    PAF (paroxysmal atrial fibrillation) (HCC) [I48.0]  Hx of TIA (transient ischemic attack) [G45.9]  Deep vein thrombosis (DVT) (HCC) (Resolved) [I82.409]  Atrial fibrillation (HCC) (Resolved) [I48.91]  Hx of Stroke (Resolved) [I63.9]  Secondary hypercoagulable state (HCC) [D68.69]                 Anticoagulation Episode Summary       INR check location:  Anticoagulation Clinic    Preferred lab:  --    Send INR reminders to:  --    Comments:  Takes ASA + warfarin 2/2 TIA Sep 2022 per Dr Bloch  Medications reconciled. Take naproxen as needed for arthritic pain. He is aware of the increased risk of bleeding and takes only when needed with caution          Anticoagulation Care Providers       Provider Role Specialty Phone number    Michael J Bloch, M.D. Referring Cardiovascular Disease (Cardiology) [phone]    [facility]   [phone]                  Refer to Patient Findings for HPI:  Patient Findings       Negatives:  Signs/symptoms of thrombosis, Signs/symptoms of bleeding, Laboratory test error suspected, Change in health, Change in alcohol use, Change in activity, Upcoming invasive procedure, Emergency department visit, Upcoming dental procedure, Missed doses, Extra doses, Change in medications, Change in diet/appetite, Hospital admission, Bruising, Other complaints            There were no vitals filed for this visit.  Pt declined vitals    Verified current warfarin dosing schedule.    Medications reconciled.  Pt is on antiplatelet therapy with ASA 81mg for TIA.      A/P   INR is  therapeutic  Reason(s) for out of range INR today: N/A      Warfarin dosing recommendation: Continue regimen as listed above.    Pt educated to contact our clinic with any changes in medications or s/s of bleeding or thrombosis. Pt is aware to seek immediate medical attention for falls, head injury or deep cuts.    Request pt to return in 6 week(s). Pt agrees.    Sal Anglin, PharmD

## 2024-10-10 ENCOUNTER — TELEPHONE (OUTPATIENT)
Dept: VASCULAR LAB | Facility: MEDICAL CENTER | Age: 89
End: 2024-10-10
Payer: MEDICARE

## 2024-10-11 ENCOUNTER — APPOINTMENT (OUTPATIENT)
Dept: URGENT CARE | Facility: CLINIC | Age: 89
End: 2024-10-11

## 2024-10-11 ENCOUNTER — OFFICE VISIT (OUTPATIENT)
Dept: URGENT CARE | Facility: CLINIC | Age: 89
End: 2024-10-11
Payer: MEDICARE

## 2024-10-11 VITALS
RESPIRATION RATE: 18 BRPM | WEIGHT: 178 LBS | SYSTOLIC BLOOD PRESSURE: 146 MMHG | TEMPERATURE: 98.7 F | HEART RATE: 63 BPM | DIASTOLIC BLOOD PRESSURE: 72 MMHG | HEIGHT: 71 IN | BODY MASS INDEX: 24.92 KG/M2 | OXYGEN SATURATION: 94 %

## 2024-10-11 DIAGNOSIS — L89.159 PRESSURE INJURY OF SKIN OF SACRAL REGION, UNSPECIFIED INJURY STAGE: ICD-10-CM

## 2024-10-11 PROCEDURE — 3077F SYST BP >= 140 MM HG: CPT | Performed by: NURSE PRACTITIONER

## 2024-10-11 PROCEDURE — 3078F DIAST BP <80 MM HG: CPT | Performed by: NURSE PRACTITIONER

## 2024-10-11 PROCEDURE — 99213 OFFICE O/P EST LOW 20 MIN: CPT | Performed by: NURSE PRACTITIONER

## 2024-10-11 ASSESSMENT — FIBROSIS 4 INDEX: FIB4 SCORE: 3.62

## 2024-10-11 ASSESSMENT — ENCOUNTER SYMPTOMS
CHILLS: 0
FEVER: 0

## 2024-10-17 ENCOUNTER — ANTICOAGULATION VISIT (OUTPATIENT)
Dept: VASCULAR LAB | Facility: MEDICAL CENTER | Age: 89
End: 2024-10-17
Attending: INTERNAL MEDICINE
Payer: MEDICARE

## 2024-10-17 VITALS — DIASTOLIC BLOOD PRESSURE: 88 MMHG | SYSTOLIC BLOOD PRESSURE: 155 MMHG | HEART RATE: 55 BPM

## 2024-10-17 DIAGNOSIS — I48.0 PAF (PAROXYSMAL ATRIAL FIBRILLATION) (HCC): ICD-10-CM

## 2024-10-17 DIAGNOSIS — G45.9 TIA (TRANSIENT ISCHEMIC ATTACK): ICD-10-CM

## 2024-10-17 DIAGNOSIS — D68.69 SECONDARY HYPERCOAGULABLE STATE (HCC): ICD-10-CM

## 2024-10-17 LAB — INR PPP: 2 (ref 2–3.5)

## 2024-10-17 PROCEDURE — 85610 PROTHROMBIN TIME: CPT

## 2024-10-17 PROCEDURE — 99211 OFF/OP EST MAY X REQ PHY/QHP: CPT

## 2024-11-21 ENCOUNTER — ANTICOAGULATION VISIT (OUTPATIENT)
Dept: VASCULAR LAB | Facility: MEDICAL CENTER | Age: 89
End: 2024-11-21
Attending: INTERNAL MEDICINE
Payer: MEDICARE

## 2024-11-21 VITALS — SYSTOLIC BLOOD PRESSURE: 150 MMHG | DIASTOLIC BLOOD PRESSURE: 78 MMHG | HEART RATE: 59 BPM

## 2024-11-21 DIAGNOSIS — G45.9 TIA (TRANSIENT ISCHEMIC ATTACK): ICD-10-CM

## 2024-11-21 DIAGNOSIS — D68.69 SECONDARY HYPERCOAGULABLE STATE (HCC): ICD-10-CM

## 2024-11-21 DIAGNOSIS — I48.0 PAF (PAROXYSMAL ATRIAL FIBRILLATION) (HCC): ICD-10-CM

## 2024-11-21 LAB — INR PPP: 4.1 (ref 2–3.5)

## 2024-11-21 PROCEDURE — 85610 PROTHROMBIN TIME: CPT

## 2024-11-21 PROCEDURE — 99212 OFFICE O/P EST SF 10 MIN: CPT | Performed by: NURSE PRACTITIONER

## 2024-11-21 NOTE — PROGRESS NOTES
Anticoagulation Summary  As of 2024      INR goal:  2.0-3.0   TTR:  72.6% (9.2 y)   INR used for dosin.10 (2024)   Warfarin maintenance plan:  6 mg (4 mg x 1.5) every Sun, Tue, Thu; 4 mg (4 mg x 1) all other days   Weekly warfarin total:  34 mg   Plan last modified:  Satya Parra, PharmD (2024)   Next INR check:  2024   Priority:  Maintenance   Target end date:  Indefinite    Indications    PAF (paroxysmal atrial fibrillation) (HCC) [I48.0]  Hx of TIA (transient ischemic attack) [G45.9]  Deep vein thrombosis (DVT) (HCC) (Resolved) [I82.409]  Atrial fibrillation (HCC) (Resolved) [I48.91]  Hx of Stroke (Resolved) [I63.9]  Secondary hypercoagulable state (HCC) [D68.69]                 Anticoagulation Episode Summary       INR check location:  Anticoagulation Clinic    Preferred lab:  --    Send INR reminders to:  --    Comments:  Takes ASA + warfarin 2/2 TIA Sep 2022 per Dr Bloch  Medications reconciled. Take naproxen as needed for arthritic pain. He is aware of the increased risk of bleeding and takes only when needed with caution          Anticoagulation Care Providers       Provider Role Specialty Phone number    Michael J Bloch, M.D. Referring Cardiovascular Disease (Cardiology) [phone]    [facility]   [phone]                  Refer to Patient Findings for HPI:  Patient Findings       Positives:  Change in alcohol use, Change in medications    Negatives:  Signs/symptoms of thrombosis, Signs/symptoms of bleeding, Laboratory test error suspected, Change in health, Change in activity, Upcoming invasive procedure, Emergency department visit, Upcoming dental procedure, Missed doses, Extra doses, Change in diet/appetite, Hospital admission, Bruising, Other complaints    Comments:  Recently stopped his eye gtts for glaucoma due to an allergic reaction. Following with ophthalmology. He had two servings of beer last night while out at dinner.            Vitals:     11/21/24 1041   BP: (!) 150/78   Pulse: (!) 59     Verified current warfarin dosing schedule.    Medications reconciled.  Pt is on antiplatelet therapy with ASA 81 mg for TIA per vasc med.      A/P   INR is supratherapeutic. Past few INRs have been stable on this regimen.  Reason(s) for out of range INR today: Increased EtOH K intake      Warfarin dosing recommendation: Hold tonight's dose, then resume your previous regimen.    Pt educated to contact our clinic with any changes in medications or s/s of bleeding or thrombosis. Pt is aware to seek immediate medical attention for falls, head injury or deep cuts.    Request pt to return in 2 week(s). Pt agrees.    CORA Mckinley.P.N.

## 2024-12-02 DIAGNOSIS — E78.5 HYPERLIPIDEMIA, UNSPECIFIED HYPERLIPIDEMIA TYPE: ICD-10-CM

## 2024-12-02 DIAGNOSIS — Z79.01 CHRONIC ANTICOAGULATION: ICD-10-CM

## 2024-12-02 DIAGNOSIS — T14.8XXA VASCULAR INJURY: ICD-10-CM

## 2024-12-03 RX ORDER — GABAPENTIN 300 MG/1
300 CAPSULE ORAL 3 TIMES DAILY
Qty: 270 CAPSULE | Refills: 0 | Status: SHIPPED | OUTPATIENT
Start: 2024-12-03

## 2024-12-03 RX ORDER — WARFARIN SODIUM 4 MG/1
TABLET ORAL
Qty: 135 TABLET | Refills: 0 | Status: SHIPPED | OUTPATIENT
Start: 2024-12-03

## 2024-12-03 RX ORDER — ATORVASTATIN CALCIUM 80 MG/1
TABLET, FILM COATED ORAL
Qty: 45 TABLET | Refills: 3 | Status: SHIPPED | OUTPATIENT
Start: 2024-12-03

## 2024-12-06 ENCOUNTER — ANTICOAGULATION VISIT (OUTPATIENT)
Dept: VASCULAR LAB | Facility: MEDICAL CENTER | Age: 89
End: 2024-12-06
Attending: INTERNAL MEDICINE
Payer: MEDICARE

## 2024-12-06 ENCOUNTER — TELEPHONE (OUTPATIENT)
Dept: VASCULAR LAB | Facility: MEDICAL CENTER | Age: 89
End: 2024-12-06

## 2024-12-06 ENCOUNTER — OFFICE VISIT (OUTPATIENT)
Dept: URGENT CARE | Facility: CLINIC | Age: 89
End: 2024-12-06
Payer: MEDICARE

## 2024-12-06 ENCOUNTER — APPOINTMENT (OUTPATIENT)
Dept: URGENT CARE | Facility: CLINIC | Age: 89
End: 2024-12-06
Payer: MEDICARE

## 2024-12-06 VITALS
DIASTOLIC BLOOD PRESSURE: 80 MMHG | SYSTOLIC BLOOD PRESSURE: 130 MMHG | WEIGHT: 180 LBS | RESPIRATION RATE: 14 BRPM | HEART RATE: 65 BPM | TEMPERATURE: 97.3 F | HEIGHT: 70 IN | BODY MASS INDEX: 25.77 KG/M2 | OXYGEN SATURATION: 95 %

## 2024-12-06 VITALS — SYSTOLIC BLOOD PRESSURE: 103 MMHG | HEART RATE: 61 BPM | DIASTOLIC BLOOD PRESSURE: 63 MMHG

## 2024-12-06 DIAGNOSIS — I48.0 PAF (PAROXYSMAL ATRIAL FIBRILLATION) (HCC): ICD-10-CM

## 2024-12-06 DIAGNOSIS — R05.1 ACUTE COUGH: ICD-10-CM

## 2024-12-06 DIAGNOSIS — J20.9 ACUTE BRONCHITIS, UNSPECIFIED ORGANISM: ICD-10-CM

## 2024-12-06 DIAGNOSIS — D68.69 SECONDARY HYPERCOAGULABLE STATE (HCC): ICD-10-CM

## 2024-12-06 DIAGNOSIS — G45.9 TIA (TRANSIENT ISCHEMIC ATTACK): ICD-10-CM

## 2024-12-06 LAB — INR PPP: 1.8 (ref 2–3.5)

## 2024-12-06 PROCEDURE — 3079F DIAST BP 80-89 MM HG: CPT

## 2024-12-06 PROCEDURE — 99213 OFFICE O/P EST LOW 20 MIN: CPT

## 2024-12-06 PROCEDURE — 3075F SYST BP GE 130 - 139MM HG: CPT

## 2024-12-06 PROCEDURE — 99212 OFFICE O/P EST SF 10 MIN: CPT

## 2024-12-06 PROCEDURE — 85610 PROTHROMBIN TIME: CPT

## 2024-12-06 RX ORDER — PREDNISONE 10 MG/1
10 TABLET ORAL DAILY
Qty: 5 TABLET | Refills: 0 | Status: SHIPPED | OUTPATIENT
Start: 2024-12-06 | End: 2024-12-12

## 2024-12-06 RX ORDER — BENZONATATE 100 MG/1
100 CAPSULE ORAL 3 TIMES DAILY PRN
Qty: 60 CAPSULE | Refills: 0 | Status: SHIPPED | OUTPATIENT
Start: 2024-12-06 | End: 2024-12-12

## 2024-12-06 ASSESSMENT — ENCOUNTER SYMPTOMS
VOMITING: 0
DIARRHEA: 0
CHILLS: 0
MYALGIAS: 0
SORE THROAT: 0
PALPITATIONS: 0
WEAKNESS: 0
TINGLING: 0
DIZZINESS: 0
DOUBLE VISION: 0
SPUTUM PRODUCTION: 1
FEVER: 0
SHORTNESS OF BREATH: 0
NAUSEA: 0
HEADACHES: 0
WHEEZING: 0
COUGH: 1
BLURRED VISION: 0

## 2024-12-06 ASSESSMENT — FIBROSIS 4 INDEX: FIB4 SCORE: 3.62

## 2024-12-06 NOTE — PROGRESS NOTES
Anticoagulation Summary  As of 2024      INR goal:  2.0-3.0   TTR:  72.5% (9.3 y)   INR used for dosin.80 (2024)   Warfarin maintenance plan:  6 mg (4 mg x 1.5) every Tue, Thu, Sat; 4 mg (4 mg x 1) all other days   Weekly warfarin total:  34 mg   Plan last modified:  Natasha Jack, PharmD (2024)   Next INR check:  2024   Priority:  Maintenance   Target end date:  Indefinite    Indications    PAF (paroxysmal atrial fibrillation) (HCC) [I48.0]  Hx of TIA (transient ischemic attack) [G45.9]  Deep vein thrombosis (DVT) (HCC) (Resolved) [I82.409]  Atrial fibrillation (HCC) (Resolved) [I48.91]  Hx of Stroke (Resolved) [I63.9]  Secondary hypercoagulable state (HCC) [D68.69]                 Anticoagulation Episode Summary       INR check location:  Anticoagulation Clinic    Preferred lab:  --    Send INR reminders to:  --    Comments:  Takes ASA + warfarin 2/2 TIA Sep 2022 per Dr Bloch  Medications reconciled. Take naproxen as needed for arthritic pain. He is aware of the increased risk of bleeding and takes only when needed with caution          Anticoagulation Care Providers       Provider Role Specialty Phone number    Michael J Bloch, M.D. Referring Cardiovascular Disease (Cardiology) [phone]    [facility]   [phone]                  Refer to Patient Findings for HPI:  Patient Findings       Negatives:  Signs/symptoms of thrombosis, Signs/symptoms of bleeding, Laboratory test error suspected, Change in health, Change in alcohol use, Change in activity, Upcoming invasive procedure, Emergency department visit, Upcoming dental procedure, Missed doses, Extra doses, Change in medications, Change in diet/appetite, Hospital admission, Bruising, Other complaints            Vitals:    24 1336   BP: 103/63   Pulse: 61       Verified current warfarin dosing schedule.    Medications reconciled.  Pt is on antiplatelet therapy with ASA 81  [facility]      A/P    INR is slightly subtherapeutic  Reason(s) for out of range INR today: Determining dose requirements and Decreased EtOH K intake      Warfarin dosing recommendation: Bolus 6 mg tonight, then Continue regimen as listed above.    Pt educated to contact our clinic with any changes in medications or s/s of bleeding or thrombosis. Pt is aware to seek immediate medical attention for falls, head injury or deep cuts.    Request pt to return in 2 week(s). Pt agrees.    Natasha Jack, JerzyD   Detail Level: Simple Instructions: This plan will send the code FBSD to the PM system.  DO NOT or CHANGE the price. Price (Do Not Change): 0.00

## 2024-12-06 NOTE — PROGRESS NOTES
Subjective:   Tristan Hester is a 89 y.o. male who presents for Cough (X 12 days, severe cough, history of URI's)    Patient presents to the clinic for complaints of cough with sputum that keeps him up at night and body aches x 12 days. Had previous lightheadedness, fever Tmax 100F and chills but has been gone for 5 days. Usually avid at the gym but has not been up for it since symptom onset.  Has taken tylenol with mid relief.  Patient denies chest pain, SOB, dizziness/lightheadedness, or N/V/D.     Cough  Pertinent negatives include no chest pain, chills, ear pain, fever, headaches, myalgias, sore throat, shortness of breath or wheezing.       Review of Systems   Constitutional:  Positive for malaise/fatigue. Negative for chills and fever.   HENT:  Negative for congestion, ear pain and sore throat.    Eyes:  Negative for blurred vision and double vision.   Respiratory:  Positive for cough and sputum production. Negative for shortness of breath and wheezing.    Cardiovascular:  Negative for chest pain and palpitations.   Gastrointestinal:  Negative for diarrhea, nausea and vomiting.   Genitourinary:  Negative for dysuria.   Musculoskeletal:  Negative for myalgias.   Neurological:  Negative for dizziness, tingling, weakness and headaches.   All other systems reviewed and are negative.    Refer to HPI for additional details.    During this visit, appropriate PPE was worn, and hand hygiene was performed.    PMH:  has a past medical history of Aortic valve stenosis, Arrhythmia, Arthritis, Blood clotting disorder ([facility]), Bronchitis, Cancer ([facility]), Carotid arterial disease ([facility]) (1/6/2014), Cataract, DVT (deep venous thrombosis) ([facility]) (2016), Heart valve disease, HLD (hyperlipidemia) (1/6/2014), Hypertension, Mitral regurgitation (1/6/2014), Pain (12-14-15), Pneumonia, Stroke ([facility]) (2008), TIA (transient ischemic attack), Urinary bladder disorder, and Urinary incontinence.    He has no past medical history of Liver  disease.    MEDS:   Current Outpatient Medications:     benzonatate (TESSALON) 100 MG Cap, Take 1 Capsule by mouth 3 times a day as needed for Cough., Disp: 60 Capsule, Rfl: 0    predniSONE (DELTASONE) 10 MG Tab, Take 1 Tablet by mouth every day., Disp: 5 Tablet, Rfl: 0    gabapentin (NEURONTIN) 300 MG Cap, TAKE 1 CAPSULE BY MOUTH THREE TIMES DAILY, Disp: 270 Capsule, Rfl: 0    atorvastatin (LIPITOR) 80 MG tablet, TAKE 1/2 (ONE-HALF) TABLET BY MOUTH ONCE DAILY IN THE EVENING, Disp: 45 Tablet, Rfl: 3    warfarin (COUMADIN) 4 MG Tab, TAKE 1 (ONE) TO 1 & 1/2 (ONE & ONE-HALF) TABLETS BY MOUTH ONCE DAILY AS DIRECTED BY [facility]UL[facility]IC, Disp: 135 Tablet, Rfl: 0    temazepam (RESTORIL) 30 MG capsule, Take 1 Capsule by mouth at bedtime as needed for Sleep for up to 180 days., Disp: 30 Capsule, Rfl: 5    amLODIPine (NORVASC) 2.5 MG Tab, Take 1 Tablet by mouth every day., Disp: 30 Tablet, Rfl: 11    metoprolol SR (TOPROL XL) 25 MG TABLET SR 24 HR, Take 0.5 Tablets by mouth every day., Disp: 90 Tablet, Rfl: 3    irbesartan (AVAPRO) 300 MG Tab, Take 1 Tablet by mouth every day., Disp: 90 Tablet, Rfl: 3    ezetimibe (ZETIA) 10 MG Tab, Take 1 tablet by mouth once daily, Disp: 90 Tablet, Rfl: 3    latanoprost (XALATAN) 0.005 % Solution, INSTILL 1 DROP INTO EACH EYE AT BEDTIME, Disp: , Rfl:     aspirin (ASA) 81 MG Chew Tab chewable tablet, Chew 1 Tablet every day., Disp: 100 Tablet, Rfl: 11    Ascorbic Acid (VITAMIN C PO), Take 500 mg by mouth., Disp: , Rfl:     Multiple Vitamins-Minerals (DAILY MULTI VITAMIN/MINERALS PO), Take  by mouth., Disp: , Rfl:     Acetaminophen (TYLENOL PO), Take 975 mg by mouth every day., Disp: , Rfl:     ALLERGIES:   Allergies   Allergen Reactions    Other Environmental Runny Nose and Itching     Seasonal, cats-hayfever     SURGHX:   Past Surgical History:   Procedure Laterality Date    TRANSCATHETER AORTIC VALVE REPLACEMENT  3/2/2020    Procedure: REPLACEMENT, AORTIC VALVE, TRANSCATHETER  "-;  Surgeon: Sony Barrientos M.D.;  Location: SURGERY [facility];  Service: Cardiac    YOANDY  3/2/2020    Procedure: ECHOCARDIOGRAM, TRANSESOPHAGEAL;  Surgeon: Sony Barrientos M.D.;  Location: SURGERY [facility];  Service: Cardiac    FEMORAL ENDARTERECTOMY Left 3/2/2020    Procedure: Repair of left femoral  Artery Dissection;  Surgeon: Alpa Tillman M.D.;  Location: SURGERY [facility];  Service: General    FUSION, SPINE, LUMBAR, PLIF  12/28/2015    Procedure: LUMBAR FUSION POSTERIOR L3-5 with peek rods;  Surgeon: Phylicia Almonte M.D.;  Location: SURGERY [facility];  Service:     LUMBAR DECOMPRESSION  12/28/2015    Procedure: LUMBAR DECOMPRESSION posterior redo decompression L3-5, with dural repair;  Surgeon: Phylicia Almonte M.D.;  Location: SURGERY [facility];  Service:     THORACOSCOPY  6/13/2015    Procedure: THORACOSCOPY with decortication VATS , side to be determined  ;  Surgeon: Elmira Holder M.D.;  Location: SURGERY [facility];  Service:     LUMBAR LAMINECTOMY DISKECTOMY  10/8/2010    Performed by PHYLICIA ALMONTE at [facility]    CERVICAL DISK AND FUSION ANTERIOR  10/26/2009    Performed by PHYLICIA ALMONTE at [facility]    CAROTID ENDARTERECTOMY  7/10/08    Performed by LEIGH MORATAYA at SURGERY [facility]    BLADDER SLING MALE  11/2004    PROSTATECTOMY, RADIAL  11/2000     SOCHX:  reports that he quit smoking about 39 years ago. His smoking use included cigarettes and pipe. He started smoking about 69 years ago. He has a 15 pack-year smoking history. He has never used smokeless tobacco. He reports current alcohol use of about 3.0 oz of alcohol per week. He reports that he does not use drugs.    FH: Per HPI as applicable/pertinent.    Medications, Allergies, and current problem list reviewed today in Epic.     Objective:     /80   Pulse 65   Temp 36.3 °C (97.3 °F) (Temporal)   Resp 14   Ht 1.778 m (5' 10\")   Wt 81.6 kg (180 lb)   " SpO2 95%     Physical Exam  Constitutional:       Appearance: Normal appearance. He is not ill-appearing or toxic-appearing.   HENT:      Head: Normocephalic.      Right Ear: Tympanic membrane, ear canal and external ear normal.      Left Ear: Tympanic membrane, ear canal and external ear normal.      Nose: Nose normal. No congestion or rhinorrhea.      Mouth/Throat:      Mouth: Mucous membranes are moist.      Pharynx: Oropharynx is clear. No oropharyngeal exudate or posterior oropharyngeal erythema.   Eyes:      General:         Right eye: No discharge.         Left eye: No discharge.      Extraocular Movements: Extraocular movements intact.      Conjunctiva/sclera: Conjunctivae normal.      Pupils: Pupils are equal, round, and reactive to light.   Cardiovascular:      Rate and Rhythm: Normal rate and regular rhythm.      Pulses: Normal pulses.      Heart sounds: Normal heart sounds. No murmur heard.  Pulmonary:      Effort: Pulmonary effort is normal. No respiratory distress.      Breath sounds: Normal breath sounds. No wheezing or rhonchi.      Comments: Cough in clinic.  Abdominal:      General: Abdomen is flat.   Musculoskeletal:         General: No signs of injury. Normal range of motion.      Cervical back: Normal range of motion. No rigidity.   Lymphadenopathy:      Cervical: No cervical adenopathy.   Skin:     General: Skin is warm and dry.   Neurological:      General: No focal deficit present.      Mental Status: He is alert and oriented to person, place, and time.      Motor: No weakness.         Assessment/Plan:     Diagnosis and associated orders:     1. Acute bronchitis, unspecified organism  - benzonatate (TESSALON) 100 MG Cap; Take 1 Capsule by mouth 3 times a day as needed for Cough.  Dispense: 60 Capsule; Refill: 0  - predniSONE (DELTASONE) 10 MG Tab; Take 1 Tablet by mouth every day.  Dispense: 5 Tablet; Refill: 0     Comments/MDM:     Discussed with patient that likely etiology of his symptoms  is acute bronchitis secondary to his recent viral URI.  Discussed with patient that this lingering cough can take weeks to clear and that symptomatic treatment is indicated.  Low-dose prednisone x 5 days and Tessalon as needed prescribed patient.  Discussed possible adverse/side effects involved with medication as well as proper administration and dosing of both.  Advised patient to continue OTC symptomatic care as well as plenty of rest and fluids.  Patient agreeable to this plan of care.  All patient questions answered.  RTC if symptoms persist and/or worsen.  Red flag symptoms warranting emergency medical services discussed, including but not limited to fever greater than 103F, chest pain, shortness of breath, difficulty breathing, bloody nausea/vomiting/diarrhea.         Differential diagnosis, natural history, supportive care, and indications for immediate follow-up discussed.    Advised the patient to follow-up with the primary care physician for recheck, reevaluation, and consideration of further management.    Instructed patient to seek emergency medical attention via calling EMS or going to the Emergency Room for red flag symptoms, including but not limited to: chest pain, palpitations, fever greater than 103F, shortness of breath, wheezing, new or worsened numbness/tingling, focal or unilateral weakness, and bloody vomit/stool.     Please note that this dictation was created using voice recognition software. I have made a reasonable attempt to correct obvious errors, but I expect that there are errors of grammar and possibly content that I did not discover before finalizing the note.    This note was electronically signed by JUDY Hall

## 2024-12-07 NOTE — TELEPHONE ENCOUNTER
Noted pt started on 5 day course of prednisone 10 mg.    INR was sub-therapeutic today.    Given sub-therapeutic INR and low dose of prednisone, pt will continue current warfarin dosing regimen and increase dietary greens while on concomitant steroid therapy.    R/s INR f/u for next week.    Maxime Cruz, PharmD, BCACP

## 2024-12-12 ENCOUNTER — APPOINTMENT (OUTPATIENT)
Dept: VASCULAR LAB | Facility: MEDICAL CENTER | Age: 89
End: 2024-12-12
Attending: INTERNAL MEDICINE
Payer: MEDICARE

## 2024-12-12 VITALS — HEART RATE: 58 BPM | SYSTOLIC BLOOD PRESSURE: 144 MMHG | DIASTOLIC BLOOD PRESSURE: 82 MMHG

## 2024-12-12 DIAGNOSIS — D68.69 SECONDARY HYPERCOAGULABLE STATE (HCC): ICD-10-CM

## 2024-12-12 DIAGNOSIS — I48.0 PAF (PAROXYSMAL ATRIAL FIBRILLATION) (HCC): ICD-10-CM

## 2024-12-12 DIAGNOSIS — G45.9 TIA (TRANSIENT ISCHEMIC ATTACK): ICD-10-CM

## 2024-12-12 LAB — INR PPP: 2.1 (ref 2–3.5)

## 2024-12-12 PROCEDURE — 85610 PROTHROMBIN TIME: CPT

## 2024-12-12 PROCEDURE — 99211 OFF/OP EST MAY X REQ PHY/QHP: CPT | Performed by: NURSE PRACTITIONER

## 2024-12-12 NOTE — PROGRESS NOTES
Anticoagulation Summary  As of 2024      INR goal:  2.0-3.0   TTR:  72.4% (9.3 y)   INR used for dosin.10 (2024)   Warfarin maintenance plan:  6 mg (4 mg x 1.5) every Tue, Thu, Sat; 4 mg (4 mg x 1) all other days   Weekly warfarin total:  34 mg   Plan last modified:  Natasha Jack, PharmD (2024)   Next INR check:  2025   Priority:  Maintenance   Target end date:  Indefinite    Indications    PAF (paroxysmal atrial fibrillation) (HCC) [I48.0]  Hx of TIA (transient ischemic attack) [G45.9]  Deep vein thrombosis (DVT) (HCC) (Resolved) [I82.409]  Atrial fibrillation (HCC) (Resolved) [I48.91]  Hx of Stroke (Resolved) [I63.9]  Secondary hypercoagulable state (HCC) [D68.69]                 Anticoagulation Episode Summary       INR check location:  Anticoagulation Clinic    Preferred lab:  --    Send INR reminders to:  --    Comments:  Takes ASA + warfarin 2/2 TIA Sep 2022 per Dr Bloch  Medications reconciled. Take naproxen as needed for arthritic pain. He is aware of the increased risk of bleeding and takes only when needed with caution          Anticoagulation Care Providers       Provider Role Specialty Phone number    Michael J Bloch, M.D. Referring Cardiovascular Disease (Cardiology) [phone]    [facility]ticoagulation [facility]   [phone]                  Refer to Patient Findings for HPI:  Patient Findings       Positives:  Change in medications    Negatives:  Signs/symptoms of thrombosis, Signs/symptoms of bleeding, Laboratory test error suspected, Change in health, Change in alcohol use, Change in activity, Upcoming invasive procedure, Emergency department visit, Upcoming dental procedure, Missed doses, Extra doses, Change in diet/appetite, Hospital admission, Bruising, Other complaints    Comments:  Was on a 5 day course of Prednisone but has finished this.            Vitals:    24 1419   BP: (!) 144/82   Pulse: (!) 58       Verified current warfarin dosing  schedule.    Medications reconciled.  Pt is on antiplatelet therapy with ASA 81 mg for TIA.      A/P   INR is therapeutic  Reason(s) for out of range INR today: N/A      Warfarin dosing recommendation: Continue regimen as listed above.    Pt educated to contact our clinic with any changes in medications or s/s of bleeding or thrombosis. Pt is aware to seek immediate medical attention for falls, head injury or deep cuts.    Request pt to return in 2 week(s). Pt declines despite warning of extending their follow up interval. Pt opts to RTC in 3 week(s).    CHEYENNE Mckinley.

## 2024-12-19 ENCOUNTER — APPOINTMENT (OUTPATIENT)
Dept: VASCULAR LAB | Facility: MEDICAL CENTER | Age: 89
End: 2024-12-19
Attending: INTERNAL MEDICINE
Payer: MEDICARE

## 2025-01-02 ENCOUNTER — ANTICOAGULATION VISIT (OUTPATIENT)
Dept: VASCULAR LAB | Facility: MEDICAL CENTER | Age: 89
End: 2025-01-02
Attending: INTERNAL MEDICINE
Payer: MEDICARE

## 2025-01-02 VITALS — DIASTOLIC BLOOD PRESSURE: 59 MMHG | SYSTOLIC BLOOD PRESSURE: 100 MMHG | HEART RATE: 58 BPM

## 2025-01-02 DIAGNOSIS — G45.9 TIA (TRANSIENT ISCHEMIC ATTACK): ICD-10-CM

## 2025-01-02 DIAGNOSIS — I48.0 PAF (PAROXYSMAL ATRIAL FIBRILLATION) (HCC): ICD-10-CM

## 2025-01-02 DIAGNOSIS — D68.69 SECONDARY HYPERCOAGULABLE STATE (HCC): ICD-10-CM

## 2025-01-02 LAB — INR PPP: 2.9 (ref 2–3.5)

## 2025-01-02 PROCEDURE — 85610 PROTHROMBIN TIME: CPT

## 2025-01-02 PROCEDURE — 99211 OFF/OP EST MAY X REQ PHY/QHP: CPT

## 2025-01-02 NOTE — PROGRESS NOTES
Anticoagulation Summary  As of 2025      INR goal:  2.0-3.0   TTR:  72.6% (9.4 y)   INR used for dosin.90 (2025)   Warfarin maintenance plan:  6 mg (4 mg x 1.5) every Tue, Thu, Sat; 4 mg (4 mg x 1) all other days   Weekly warfarin total:  34 mg   Plan last modified:  Natasha Jack PharmD (2024)   Next INR check:  2025   Priority:  Maintenance   Target end date:  Indefinite    Indications    PAF (paroxysmal atrial fibrillation) (HCC) [I48.0]  Hx of TIA (transient ischemic attack) [G45.9]  Deep vein thrombosis (DVT) (HCC) (Resolved) [I82.409]  Atrial fibrillation (HCC) (Resolved) [I48.91]  Hx of Stroke (Resolved) [I63.9]  Secondary hypercoagulable state (HCC) [D68.69]                 Anticoagulation Episode Summary       INR check location:  Anticoagulation Clinic    Preferred lab:  --    Send INR reminders to:  --    Comments:  Takes ASA + warfarin 2/2 TIA Sep 2022 per Dr Bloch  Medications reconciled. Take naproxen as needed for arthritic pain. He is aware of the increased risk of bleeding and takes only when needed with caution          Anticoagulation Care Providers       Provider Role Specialty Phone number    Michael J Bloch, M.D. Referring Cardiovascular Disease (Cardiology) [phone]    [facility] Anticoagulation Services   [phone]                  Refer to Patient Findings for HPI:  Patient Findings       Negatives:  Signs/symptoms of thrombosis, Signs/symptoms of bleeding, Laboratory test error suspected, Change in health, Change in alcohol use, Change in activity, Upcoming invasive procedure, Emergency department visit, Upcoming dental procedure, Missed doses, Extra doses, Change in medications, Change in diet/appetite, Hospital admission, Bruising, Other complaints            Vitals:    25 1420   BP: 100/59   Pulse: (!) 58     Verified current warfarin dosing schedule.    Medications reconciled.  Pt is on antiplatelet therapy with ASA for TIA per Dr Bloch.      A/P   INR is  therapeutic  Reason(s) for out of range INR today: N/A      Warfarin dosing recommendation: Continue regimen as listed above.    Pt educated to contact our clinic with any changes in medications or s/s of bleeding or thrombosis. Pt is aware to seek immediate medical attention for falls, head injury or deep cuts.    Request pt to return in 4 week(s). Pt agrees.    Karrie Barker, PharmD

## 2025-01-07 ENCOUNTER — OFFICE VISIT (OUTPATIENT)
Dept: VASCULAR LAB | Facility: MEDICAL CENTER | Age: 89
End: 2025-01-07
Attending: INTERNAL MEDICINE
Payer: MEDICARE

## 2025-01-07 VITALS
DIASTOLIC BLOOD PRESSURE: 80 MMHG | WEIGHT: 180 LBS | HEART RATE: 55 BPM | BODY MASS INDEX: 25.2 KG/M2 | SYSTOLIC BLOOD PRESSURE: 154 MMHG | HEIGHT: 71 IN

## 2025-01-07 DIAGNOSIS — Z95.2 S/P TAVR (TRANSCATHETER AORTIC VALVE REPLACEMENT): ICD-10-CM

## 2025-01-07 DIAGNOSIS — F51.01 PRIMARY INSOMNIA: ICD-10-CM

## 2025-01-07 DIAGNOSIS — I10 ESSENTIAL HYPERTENSION: ICD-10-CM

## 2025-01-07 DIAGNOSIS — E78.5 HYPERLIPIDEMIA, UNSPECIFIED HYPERLIPIDEMIA TYPE: ICD-10-CM

## 2025-01-07 DIAGNOSIS — I48.0 PAF (PAROXYSMAL ATRIAL FIBRILLATION) (HCC): ICD-10-CM

## 2025-01-07 DIAGNOSIS — G45.9 TIA (TRANSIENT ISCHEMIC ATTACK): ICD-10-CM

## 2025-01-07 PROCEDURE — 99212 OFFICE O/P EST SF 10 MIN: CPT

## 2025-01-07 PROCEDURE — 99214 OFFICE O/P EST MOD 30 MIN: CPT | Performed by: INTERNAL MEDICINE

## 2025-01-07 PROCEDURE — G2211 COMPLEX E/M VISIT ADD ON: HCPCS | Performed by: INTERNAL MEDICINE

## 2025-01-07 RX ORDER — TEMAZEPAM 30 MG/1
30 CAPSULE ORAL NIGHTLY PRN
Qty: 30 CAPSULE | Refills: 5 | Status: SHIPPED | OUTPATIENT
Start: 2025-01-07 | End: 2025-07-06

## 2025-01-07 ASSESSMENT — FIBROSIS 4 INDEX: FIB4 SCORE: 3.62

## 2025-01-07 NOTE — PROGRESS NOTES
"VASCULAR FOLLOW UP VISIT  Subjective:   Tristan Hester is a 89 y.o. male who presents 25  for vascular f/u    Subjective     HPI:   Here for f/u of possible TIA in the setting of HTN, dyslipidemia, carotid disease and valvular heart disease, and atrial fibrillation.  Seeing optho regularly for glaucoma  Exercises every other day - good ex cathie  Tolerating meds currently  Home BP readings labile, has wrist cuff  Previous ABPM in 2024 with well-controlled BP  Chronic back pain  Balance is difficult- uses cane  On amlodipine, 2.5mg-has had a bit of leg swelling  Tolerating metoprolol and irbesartan  On atorva and zetia  Denies CP, SOB, palpations  No TIA or CVA symptoms  On warfarin + ASA without bleeding problems- followed by [facility]   Forgot to have blood work done    Social History     Tobacco Use    Smoking status: Former     Current packs/day: 0.00     Average packs/day: 0.5 packs/day for 30.0 years (15.0 ttl pk-yrs)     Types: Cigarettes, Pipe     Start date: 1955     Quit date: 1985     Years since quittin.0    Smokeless tobacco: Never   Vaping Use    Vaping status: Never Used   Substance Use Topics    Alcohol use: Yes     Alcohol/week: 3.0 oz     Types: 3 Cans of beer, 3 Shots of liquor per week     Comment: 1 x week    Drug use: No     DIET AND EXERCISE:  Weight Change: Stabilized  Diet: Relatively low sodium- bit less adherent  Exercise: gym 3x per week     Objective        Objective:     Vitals:    25 1010 25 1014   BP: (!) 156/79 (!) 154/80   BP Location: Left arm Left arm   Patient Position: Sitting Sitting   BP Cuff Size: Adult Adult   Pulse: (!) 58 (!) 55   Weight: 81.6 kg (180 lb)    Height: 1.803 m (5' 11\")      Body mass index is 25.1 kg/m².  Physical Exam  Vitals reviewed.   Constitutional:       Appearance: He is well-developed. He is not toxic-appearing or diaphoretic.   Eyes:      Extraocular Movements: Extraocular movements intact.      " "Conjunctiva/sclera: Conjunctivae normal.   Neck:      Vascular: No carotid bruit.   Cardiovascular:      Rate and Rhythm: Normal rate and regular rhythm.      Heart sounds: Murmur heard.   Pulmonary:      Effort: Pulmonary effort is normal. No respiratory distress.      Breath sounds: Normal breath sounds. No wheezing or rales.   Musculoskeletal:      Right lower leg: No edema.      Left lower leg: No edema.   Neurological:      General: No focal deficit present.      Mental Status: He is alert and oriented to person, place, and time.      Cranial Nerves: No cranial nerve deficit.      Gait: Gait normal.   Psychiatric:         Mood and Affect: Mood normal.         Behavior: Behavior normal.       Lab Results   Component Value Date    CHOLSTRLTOT 106 02/01/2024    HDL 48 02/01/2024    TRIGLYCERIDE 68 02/01/2024      Lab Results   Component Value Date/Time    LIPOPROTA <6 04/15/2020 11:36 AM      No results found for: \"APOB\"   No results found for: \"CRPHIGHSEN\"     Lab Results   Component Value Date    INR 2.90 01/02/2025         Lab Results   Component Value Date    SODIUM 138 02/01/2024    POTASSIUM 4.3 02/01/2024    CHLORIDE 101 02/01/2024    CO2 20 02/01/2024    GLUCOSE 86 02/01/2024    BUN 20 02/01/2024    CREATININE 0.87 02/01/2024    BUNCREATRAT 23 02/01/2024      Lab Results   Component Value Date    TSH 1.300 02/01/2024      Lab Results   Component Value Date    WBC 5.7 02/01/2024    RBC 4.81 02/01/2024    HEMOGLOBIN 14.3 02/01/2024    HEMATOCRIT 43.3 02/01/2024    MCV 90 02/01/2024    MCH 29.7 02/01/2024    MCHC 33.0 02/01/2024     Carotid duplex February 2021   No evidence for RIGHT carotid stenosis status post endarterectomy.   Mild LEFT internal carotid artery stenosis.     Echocardiogram March 2021  Prior echo on 8/7/20.Left ventricular ejection fraction is visually   estimated to be 60%.  Normal diastolic function.  Known TAVR aortic valve that is functioning normally with normal   transvalvular " gradients. Vmax is 1.85 m/s.  Moderate eccentric mitral regurgitation.    Aortoiliac duplex April 2021   No evidence of aortic aneurysm.    No evidence of pseudoaneurysm or injury to the bilateral iliac arteries.     No stenosis of the proximal segment of the major visceral arteries.    Waveforms and velocities of the bilateral iliac arteries are normal.    Common iliac diameter (cm):   Right- 1.63 x 1.72         Left-   1.70 x 1.72    abpm 2022  Mean daytime 142/80  Normal nocturnal dip    Echo april 2022  Compared to the images of the prior study 03/02/21, there has been no   significant change.   Normal left ventricular systolic function.  The left ventricular ejection fraction is visually estimated to be 60%.  Grade II diastolic dysfunction.  Normal right ventricular systolic function.  Mild mitral regurgitation.  Known TAVR aortic valve that is functioning normally with normal transvalvular gradients.    Carotid duplex sep 2022   Surgical changes of the right carotid bifurcation consistent with post    carotid endarterectomy status.    No significant stenosis of the bilateral internal carotid arteries (<50%).      Aortoiliac duplex june 2023   No evidence of abdominal aortic or iliac artery aneurysm.    Waveforms and velocities of the abdominal aorta and bilateral iliac arteries    are normal with no hemodynamically significant stenosis.    Bilateral common iliac arteries are slightly ectatic.    Carotid duplex jan 2024   Compared to the prior study on 09/12/2022, no major changes.   Right.    Patent right carotid endarterectomy status, no significant stenosis.      Echo march 2024  Normal left ventricular systolic function.  Mild mitral regurgitation.  Known TAVR aortic valve that is functioning normally with normal   transvalvular gradients.       Medical Decision Making:  Today's Assessment / Status / Plan:     1. PAF (paroxysmal atrial fibrillation) (HCC)  CBC WITHOUT DIFFERENTIAL      2. Hx of TIA  (transient ischemic attack)        3. Hyperlipidemia, unspecified hyperlipidemia type  Lipid Profile    Comp Metabolic Panel    TSH      4. Essential hypertension  Comp Metabolic Panel    MICROALBUMIN CREAT RATIO URINE      5. S/P TAVR (transcatheter aortic valve replacement)        6. Primary insomnia  temazepam (RESTORIL) 30 MG capsule        Patient Type: Secondary Prevention    Etiology of Established CVD if Present:   1. TIA - likely cardioembolic from valve disease  2. Carotid atherosclerosis -status post CEA  3. Valvular Heart Disease  4. Atrial fibrillation with onset during acute illness, now chronic  5.  DVT after back surgery jan 2016  6.  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    Lipid Management: Qualifies for Statin Therapy Based on 2018 ACC/AHA Guidelines: yes  Calculated 10-Year Risk of ASCVD: N/A  Currently on Statin: Yes  ACC/aha criteria for at least mod intensity statin  Lipoprotein a normal  Per nla goal ldl <70 and nonHDL <100 - well below threshold on most recent blood work 2/2024  Plan:  - Continue atorvastatin at current dose    - continue zetia  - Continue TLC  - Recheck fasting lipids prior to next appt     Blood Pressure Management:  Acc/aha bp goal <130/80  ABPM high prior to intensification of meds - 2 yrs ago  ABPM April 2024: good control with mean 120s/70s, but does have episodes of low BP without change in HR  Home readings labile   Does have intermittent lightheadedness - difficult to determine how much is caused by BP and HR - improved with decrease in regimen  No significant albuminuria   Possible ACE inhibitor cough  Gfr and electrolytes stable  Modest leg swelling on current dose of amlodipine, but tolerable  Plan:  - Continue metoprolol ER to 12.5 mg daily  - Continue irbesartan 300 mg daily  - Continue amlodipine to 2.5 mg daily   - Continue lifestyle modification  -Continue home blood pressure monitoring    Glycemic Status: Prediabetic  IFG, mild  and stable - actually normal on most recent  - Continue lifestyle mod  -Repeat fasting glucose with next blood work    Anti-Platelet/Anti-Coagulant Tx: yes  As previously discussed, combination of aspirin and warfarin does increase risk of bleeding, but especially in light of his relatively recent TIA and fact that he had TIA on warfarin monotherapy, using SDM we have decided that benefit likely outweighs risk at present  - Continue indefinite anticoagulation with warfarin  -continue low dose asa   - report any bleeding  - F/U with anticoagulation clinic as scheduled   - limit nsaids    Smoking: continue complete avoidance    Physical Activity:  Keep going to gym at least 3x per week.  Encouraged more frequent balance exercises.      Weight Management and Nutrition: Dietary plan was discussed with patient at this visit including c/w Med diet and decreasing his sodium a bit    Other:     1. TIA -has history of previous cardioembolic TIA likely with his native aortic valve as a source.  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  Possible recurrent TIA earlier in 2024.  His symptoms resolved after only a few minutes. No further TIA or stroke symptoms.  No evidence of any problems with his valve on recent echocardiogram or any worsening stenosis on carotid duplex.    -He should call 911 immediately with any further TIA or stroke symptoms.    -Antiplatelet and anticoag as above    2. Carotid atherosclerosis status post CEA- .  Only mild restenosis on repeat duplex.  Remains asymptomatic.  Repeat duplex in 2 years from previous -January 2026    3. Valvular Heart Disease -now status post TAVR March 2020.  Still with unrepaired mitral disease -improved on most recent echo.  Appears asymptomatic at present.  Does not appear to be volume overloaded.  Defer timing of repeat echocardiogram to cardiology, but would be sure it does not go more than 2 years    4. Colonic Polyp on recent colonoscopy - repeat  colonoscopy in 2019 negative per patient.   No further f/u needed    5. Insomnia - trialed zolpidem w/o success.  Continue temazepam, but minimize dose.  Patient understands addictive potential.      6.  Atrial fibrillation - defer management of rhythm and rate to cards.  Continue anticoag as above    7.  DVT - postoperative.  No recurrence.  Continue indefinite anticoagulation given concomitant afib    8.  H/o prostate ca and multiple urological issues - defer f/u to urology.  Check PSA as they recommend    9.  Balance issues, back pain and neuropathy -this seems recalcitrant to physical therapy or any intervention that we have tried previously.  Defer f/u to ENT and neurology.  Agree with acupuncture    10.  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  Good arterial flow and patent stent on noninvasive study.  Report any claudication, rest pain, or ulceration immediately.  Will obtain repeat arterial duplex 2 year from previous - June 2025    We will partner with other providers in the management of established vascular disease and cardiometabolic risk factors.    Studies to Be Obtained:    1) carotid duplex January 2026  2) aortoiliac duplex june 2025  3) repeat echocardiogram at discretion of cardiology (or at least by March 2026)    Labs to Be Obtained: as above     Follow up in: 2 months to review blood work      Michael J Bloch, M.D.

## 2025-01-09 ENCOUNTER — APPOINTMENT (OUTPATIENT)
Dept: RADIOLOGY | Facility: IMAGING CENTER | Age: 89
End: 2025-01-09
Attending: PHYSICIAN ASSISTANT
Payer: MEDICARE

## 2025-01-09 ENCOUNTER — OFFICE VISIT (OUTPATIENT)
Dept: URGENT CARE | Facility: CLINIC | Age: 89
End: 2025-01-09
Payer: MEDICARE

## 2025-01-09 VITALS
TEMPERATURE: 97.4 F | HEART RATE: 54 BPM | WEIGHT: 180 LBS | HEIGHT: 71 IN | DIASTOLIC BLOOD PRESSURE: 70 MMHG | RESPIRATION RATE: 14 BRPM | BODY MASS INDEX: 25.2 KG/M2 | OXYGEN SATURATION: 96 % | SYSTOLIC BLOOD PRESSURE: 128 MMHG

## 2025-01-09 DIAGNOSIS — M54.50 LUMBAR BACK PAIN: ICD-10-CM

## 2025-01-09 DIAGNOSIS — G89.29 EXACERBATION OF CHRONIC BACK PAIN: ICD-10-CM

## 2025-01-09 DIAGNOSIS — M54.9 EXACERBATION OF CHRONIC BACK PAIN: ICD-10-CM

## 2025-01-09 PROCEDURE — 72100 X-RAY EXAM L-S SPINE 2/3 VWS: CPT | Mod: TC,FY | Performed by: PHYSICIAN ASSISTANT

## 2025-01-09 PROCEDURE — 3078F DIAST BP <80 MM HG: CPT | Performed by: PHYSICIAN ASSISTANT

## 2025-01-09 PROCEDURE — 99214 OFFICE O/P EST MOD 30 MIN: CPT | Performed by: PHYSICIAN ASSISTANT

## 2025-01-09 PROCEDURE — 3074F SYST BP LT 130 MM HG: CPT | Performed by: PHYSICIAN ASSISTANT

## 2025-01-09 RX ORDER — NEOMYCIN SULFATE, POLYMYXIN B SULFATE, AND DEXAMETHASONE 3.5; 10000; 1 MG/G; [USP'U]/G; MG/G
OINTMENT OPHTHALMIC
COMMUNITY
Start: 2024-11-22 | End: 2025-01-28

## 2025-01-09 RX ORDER — LIDOCAINE 50 MG/G
PATCH TOPICAL
Qty: 14 PATCH | Refills: 1 | Status: SHIPPED | OUTPATIENT
Start: 2025-01-09 | End: 2025-01-28

## 2025-01-09 ASSESSMENT — ENCOUNTER SYMPTOMS
DIARRHEA: 0
FEVER: 0
ABDOMINAL PAIN: 0
CHILLS: 0
VOMITING: 0
WHEEZING: 0
SHORTNESS OF BREATH: 0
MYALGIAS: 1
BACK PAIN: 1
TINGLING: 0
COUGH: 0
WEAKNESS: 0
FOCAL WEAKNESS: 0
NAUSEA: 0
PALPITATIONS: 0
SENSORY CHANGE: 0

## 2025-01-09 ASSESSMENT — FIBROSIS 4 INDEX: FIB4 SCORE: 3.62

## 2025-01-09 NOTE — PROGRESS NOTES
"Subjective     Everardo Hester is a 89 y.o. male who presents with Back Pain (X 2 days, severe Lt side back pain, history of back pain.)            Patient is here with complaints of 2-3 days of lumbar back pain. Patient denies any recent injury. He reports long history of back pain but the past 3 days he had an exacerbation. He reports 9/10 pain yesterday morning. He has been taking Tramadol and Flexeril. Flexeril did not help but the Tramadol has \"taken  the edge off.\" He reports this morning he felt some pain in his anterior left thigh. He denies any numbness, weakness, paralysis, urinary or bowel incontinence, chest pain or abdominal pain.        Past Medical History:   Diagnosis Date    Aortic valve stenosis     Arrhythmia     Afib    Arthritis     osteo, hips and knees    Blood clotting disorder ([facility])     , left arm    Bronchitis         Cancer ([facility])     Prostate CA--2000    Carotid arterial disease ([facility]) 1/6/2014    Cataract     removed bilat    DVT (deep venous thrombosis) ([facility]) 2016    Heart valve disease     MVR    HLD (hyperlipidemia) 1/6/2014    Hypertension     Mitral regurgitation 1/6/2014    Pain 12-14-15    low back, hips, 2-3/10    Pneumonia         Stroke ([facility]) 2008    TIA, no residual    TIA (transient ischemic attack)     2008    Urinary bladder disorder     HAD MALE BLADDER SUSPENSION PROCEDURE DONE    Urinary incontinence     stress incontinence         Review of Systems   Constitutional:  Negative for chills and fever.   Respiratory:  Negative for cough, shortness of breath and wheezing.    Cardiovascular:  Negative for chest pain and palpitations.   Gastrointestinal:  Negative for abdominal pain, diarrhea, nausea and vomiting.   Musculoskeletal:  Positive for back pain and myalgias.   Neurological:  Negative for tingling, sensory change, focal weakness and weakness.        All other systems reviewed and are negative.         Objective     /70   Pulse (!) 54   " "Temp 36.3 °C (97.4 °F) (Temporal)   Resp 14   Ht 1.803 m (5' 11\")   Wt 81.6 kg (180 lb)   SpO2 96%   BMI 25.10 kg/m²      Physical Exam  Constitutional:       General: He is not in acute distress.     Appearance: Normal appearance. He is not ill-appearing.   HENT:      Head: Normocephalic and atraumatic.   Eyes:      Conjunctiva/sclera: Conjunctivae normal.   Cardiovascular:      Rate and Rhythm: Regular rhythm. Bradycardia present.   Pulmonary:      Effort: Pulmonary effort is normal. No respiratory distress.      Breath sounds: No stridor. No wheezing.   Musculoskeletal:      Lumbar back: No swelling, spasms, tenderness or bony tenderness. Decreased range of motion.      Comments: Patient with stooped posture and shuffled gait. Lower extremities with distal n/v intact.    Skin:     General: Skin is warm and dry.   Neurological:      General: No focal deficit present.      Mental Status: He is alert and oriented to person, place, and time.   Psychiatric:         Mood and Affect: Mood normal.         Behavior: Behavior normal.         Thought Content: Thought content normal.         Judgment: Judgment normal.                1/9/2025 2:56 PM     HISTORY/REASON FOR EXAM: Atraumatic Pain 4 years worsening over the last week     TECHNIQUE/ EXAM DESCRIPTION AND NUMBER OF VIEWS:  3 weightbearing views of the lumbar spine.     COMPARISON: 6/10/2019.     FINDINGS:  The patient was unable to stand straight.     L3-L5 posterior fusion with transpedicular screws and laminectomies again noted. No hardware fracture or loosening is confirmed.     Worsening dextroscoliosis where there is now severe left L2/3 disc height loss, 31 degrees. There is vacuum disc phenomenon here and at L3/4 where there is severe disc height loss and degenerative retrolisthesis as before     There are 5 non-rib bearing lumbar type vertebral bodies.     No acute fracture is detected.  The vertebral body heights are preserved.     The alignment of " the lumbar spine is otherwise normal.     Intervertebral body disk spaces are less narrowed at the other levels and there are some bridging syndesmophytes.     Facet joints: Caudal facet arthropathy     Severe atherosclerosis. Left external iliac artery stent. Pelvic vascular clips from presumed prostatectomy.     No osteoblastic changes are seen     IMPRESSION:     Worsening degenerative dextroscoliosis     No evidence of acute displaced fracture     L3-L5 posterior fusion, laminectomies             Assessment & Plan        Assessment & Plan  Lumbar back pain    Orders:    DX-LUMBAR SPINE-2 OR 3 VIEWS; Future    lidocaine (LIDODERM) 5 % Patch; Place up to 2 patches every 12 hours as needed. Do not keep patch on more than 12 hours.    Exacerbation of chronic back pain    Orders:    lidocaine (LIDODERM) 5 % Patch; Place up to 2 patches every 12 hours as needed. Do not keep patch on more than 12 hours.          Patient without any new fractures of lumbar spine. Worsening degenerative dextroscoliosis. Patient has Tramadol and muscle relaxants at home. He states he cannot take NSAIDS or Prednisone. Recommend heat and rest. He is under pain management with his Neurosurgeon.  RX for Lidoderm Patches.  No signs of Cauda Equina Syndrome    Differential diagnoses, Supportive care, and indications for immediate follow-up discussed with patient.   Pathogenesis of diagnosis discussed including typical length and natural progression.   Instructed to return to clinic or nearest emergency department for any change in condition, further concerns, or worsening of symptoms.    My total time spent caring for the patient on the day of the encounter was 33 minutes.   This does not include time spent on separately billable procedures/tests.    The patient demonstrated a good understanding and agreed with the treatment plan.    Licha Rodrigues P.A.-C.

## 2025-01-15 LAB
ALBUMIN SERPL-MCNC: 4.5 G/DL (ref 3.7–4.7)
ALBUMIN/CREAT UR: 11 MG/G CREAT (ref 0–29)
ALP SERPL-CCNC: 83 IU/L (ref 44–121)
ALT SERPL-CCNC: 14 IU/L (ref 0–44)
AST SERPL-CCNC: 27 IU/L (ref 0–40)
BILIRUB SERPL-MCNC: 1.4 MG/DL (ref 0–1.2)
BUN SERPL-MCNC: 20 MG/DL (ref 8–27)
BUN/CREAT SERPL: 22 (ref 10–24)
CALCIUM SERPL-MCNC: 10.2 MG/DL (ref 8.6–10.2)
CHLORIDE SERPL-SCNC: 101 MMOL/L (ref 96–106)
CHOLEST SERPL-MCNC: 126 MG/DL (ref 100–199)
CO2 SERPL-SCNC: 24 MMOL/L (ref 20–29)
CREAT SERPL-MCNC: 0.9 MG/DL (ref 0.76–1.27)
CREAT UR-MCNC: 95 MG/DL
EGFRCR SERPLBLD CKD-EPI 2021: 82 ML/MIN/1.73
ERYTHROCYTE [DISTWIDTH] IN BLOOD BY AUTOMATED COUNT: 12.8 % (ref 11.6–15.4)
GLOBULIN SER CALC-MCNC: 2.9 G/DL (ref 1.5–4.5)
GLUCOSE SERPL-MCNC: 106 MG/DL (ref 70–99)
HCT VFR BLD AUTO: 46.3 % (ref 37.5–51)
HDLC SERPL-MCNC: 48 MG/DL
HGB BLD-MCNC: 14.9 G/DL (ref 13–17.7)
LDL CALC COMMENT:: NORMAL
LDLC SERPL CALC-MCNC: 63 MG/DL (ref 0–99)
MCH RBC QN AUTO: 29.7 PG (ref 26.6–33)
MCHC RBC AUTO-ENTMCNC: 32.2 G/DL (ref 31.5–35.7)
MCV RBC AUTO: 92 FL (ref 79–97)
MICROALBUMIN UR-MCNC: 10.4 UG/ML
NRBC BLD AUTO-RTO: NORMAL %
PLATELET # BLD AUTO: 257 X10E3/UL (ref 150–450)
POTASSIUM SERPL-SCNC: 4.7 MMOL/L (ref 3.5–5.2)
PROT SERPL-MCNC: 7.4 G/DL (ref 6–8.5)
RBC # BLD AUTO: 5.02 X10E6/UL (ref 4.14–5.8)
SODIUM SERPL-SCNC: 139 MMOL/L (ref 134–144)
TRIGL SERPL-MCNC: 74 MG/DL (ref 0–149)
TSH SERPL DL<=0.005 MIU/L-ACNC: 2.42 UIU/ML (ref 0.45–4.5)
VLDLC SERPL CALC-MCNC: 15 MG/DL (ref 5–40)
WBC # BLD AUTO: 6.1 X10E3/UL (ref 3.4–10.8)

## 2025-01-20 DIAGNOSIS — E78.5 DYSLIPIDEMIA: ICD-10-CM

## 2025-01-20 DIAGNOSIS — I10 ESSENTIAL HYPERTENSION: ICD-10-CM

## 2025-01-20 RX ORDER — EZETIMIBE 10 MG/1
TABLET ORAL
Qty: 90 TABLET | Refills: 0 | Status: SHIPPED | OUTPATIENT
Start: 2025-01-20

## 2025-01-21 RX ORDER — IRBESARTAN 300 MG/1
300 TABLET ORAL DAILY
Qty: 100 TABLET | Refills: 3 | Status: SHIPPED | OUTPATIENT
Start: 2025-01-21

## 2025-01-21 NOTE — TELEPHONE ENCOUNTER
"Per BE's last OV note \"I advised continuing with the present medication regimen including high-dose statin, warfarin and aspirin.\"     Pt jenny with BE 1/28/25. Courtesy refill provided.   "

## 2025-01-28 ENCOUNTER — OFFICE VISIT (OUTPATIENT)
Dept: CARDIOLOGY | Facility: MEDICAL CENTER | Age: 89
End: 2025-01-28
Attending: INTERNAL MEDICINE
Payer: MEDICARE

## 2025-01-28 ENCOUNTER — TELEPHONE (OUTPATIENT)
Dept: VASCULAR LAB | Facility: MEDICAL CENTER | Age: 89
End: 2025-01-28
Payer: MEDICARE

## 2025-01-28 VITALS
BODY MASS INDEX: 25.06 KG/M2 | RESPIRATION RATE: 16 BRPM | HEART RATE: 60 BPM | HEIGHT: 71 IN | SYSTOLIC BLOOD PRESSURE: 126 MMHG | WEIGHT: 179 LBS | DIASTOLIC BLOOD PRESSURE: 76 MMHG | OXYGEN SATURATION: 96 %

## 2025-01-28 DIAGNOSIS — Z95.2 S/P TAVR (TRANSCATHETER AORTIC VALVE REPLACEMENT): ICD-10-CM

## 2025-01-28 DIAGNOSIS — G45.9 TIA (TRANSIENT ISCHEMIC ATTACK): ICD-10-CM

## 2025-01-28 DIAGNOSIS — I65.29 STENOSIS OF CAROTID ARTERY, UNSPECIFIED LATERALITY: ICD-10-CM

## 2025-01-28 DIAGNOSIS — I48.0 PAF (PAROXYSMAL ATRIAL FIBRILLATION) (HCC): ICD-10-CM

## 2025-01-28 PROCEDURE — 3074F SYST BP LT 130 MM HG: CPT | Performed by: INTERNAL MEDICINE

## 2025-01-28 PROCEDURE — 99214 OFFICE O/P EST MOD 30 MIN: CPT | Performed by: INTERNAL MEDICINE

## 2025-01-28 PROCEDURE — 3078F DIAST BP <80 MM HG: CPT | Performed by: INTERNAL MEDICINE

## 2025-01-28 PROCEDURE — G2211 COMPLEX E/M VISIT ADD ON: HCPCS | Performed by: INTERNAL MEDICINE

## 2025-01-28 PROCEDURE — 99213 OFFICE O/P EST LOW 20 MIN: CPT | Performed by: INTERNAL MEDICINE

## 2025-01-28 ASSESSMENT — FIBROSIS 4 INDEX: FIB4 SCORE: 2.47

## 2025-01-28 NOTE — TELEPHONE ENCOUNTER
BLOCH  Caller: Tristan Hester    Topic/issue: Patient states that the pharmacy did not receive the prescription with the 5 refills. Patient is almost out of the medication and needs to have it refilled. Please send MyChart to patient to update when this is complete.    Medication:   temazepam (RESTORIL) 30 MG capsule     Preferred Pharmacy: [facility] [address] [place] [address], [place] - [address] [[id number]]     Callback Number: [phone]    Thank you,  Ayala MESSER

## 2025-01-28 NOTE — TELEPHONE ENCOUNTER
Phone Number Called: [phone]    Call outcome: Spoke to patient regarding message below.    Message: was able to speak w/ patient about medication Temazepam. Patient was confused thought there were no refills left on medication. Advised I had spoke with pharmacy (Lich[facility]) and they advised the refill request was too soon. Patient understood and will wait for pharmacy to contact them when script is ready.    Mallika SWARTZ , Medical Ass't  [facility]   Phone: [phone]   Fax: [phone]
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
## 2025-01-30 ENCOUNTER — APPOINTMENT (OUTPATIENT)
Dept: VASCULAR LAB | Facility: MEDICAL CENTER | Age: 89
End: 2025-01-30
Attending: INTERNAL MEDICINE
Payer: MEDICARE

## 2025-02-13 ENCOUNTER — ANTICOAGULATION VISIT (OUTPATIENT)
Dept: VASCULAR LAB | Facility: MEDICAL CENTER | Age: 89
End: 2025-02-13
Attending: INTERNAL MEDICINE
Payer: MEDICARE

## 2025-02-13 VITALS — HEART RATE: 57 BPM | SYSTOLIC BLOOD PRESSURE: 147 MMHG | DIASTOLIC BLOOD PRESSURE: 88 MMHG

## 2025-02-13 DIAGNOSIS — G45.9 TIA (TRANSIENT ISCHEMIC ATTACK): ICD-10-CM

## 2025-02-13 DIAGNOSIS — I48.0 PAF (PAROXYSMAL ATRIAL FIBRILLATION) (HCC): ICD-10-CM

## 2025-02-13 DIAGNOSIS — D68.69 SECONDARY HYPERCOAGULABLE STATE (HCC): ICD-10-CM

## 2025-02-13 LAB — INR PPP: 2.9 (ref 2–3.5)

## 2025-02-13 PROCEDURE — 99211 OFF/OP EST MAY X REQ PHY/QHP: CPT | Performed by: NURSE PRACTITIONER

## 2025-02-13 PROCEDURE — 99999 PR NO CHARGE: CPT | Performed by: INTERNAL MEDICINE

## 2025-02-13 PROCEDURE — 85610 PROTHROMBIN TIME: CPT

## 2025-02-13 NOTE — PROGRESS NOTES
Anticoagulation Summary  As of 2025      INR goal:  2.0-3.0   TTR:  72.9% (9.5 y)   INR used for dosin.90 (2025)   Warfarin maintenance plan:  6 mg (4 mg x 1.5) every Tue, Thu, Sat; 4 mg (4 mg x 1) all other days   Weekly warfarin total:  34 mg   Plan last modified:  Natasha Jack, PharmD (2024)   Next INR check:  3/20/2025   Priority:  Maintenance   Target end date:  Indefinite    Indications    PAF (paroxysmal atrial fibrillation) (HCC) [I48.0]  Hx of TIA (transient ischemic attack) [G45.9]  Deep vein thrombosis (DVT) (HCC) (Resolved) [I82.409]  Atrial fibrillation (HCC) (Resolved) [I48.91]  Hx of Stroke (Resolved) [I63.9]  Secondary hypercoagulable state (HCC) [D68.69]                 Anticoagulation Episode Summary       INR check location:  Anticoagulation Clinic    Preferred lab:  --    Send INR reminders to:  --    Comments:  Takes ASA + warfarin 2/2 TIA Sep 2022 per Dr Bloch  Medications reconciled. Take naproxen as needed for arthritic pain. He is aware of the increased risk of bleeding and takes only when needed with caution          Anticoagulation Care Providers       Provider Role Specialty Phone number    Michael J Bloch, M.D. Referring Cardiovascular Disease (Cardiology) [phone]    [facility]   [phone]                  Refer to Patient Findings for HPI:  Patient Findings       Positives:  Change in health    Negatives:  Signs/symptoms of thrombosis, Signs/symptoms of bleeding, Laboratory test error suspected, Change in alcohol use, Change in activity, Upcoming invasive procedure, Emergency department visit, Upcoming dental procedure, Missed doses, Extra doses, Change in medications, Change in diet/appetite, Hospital admission, Bruising, Other complaints    Comments:  He was recently sick with URI symptoms but feeling better now.            Vitals:    25 0833   BP: (!) 147/88   Pulse: (!) 57       Verified current warfarin dosing  schedule.    Medications reconciled.  Pt is on antiplatelet therapy with ASA 81 mg for TIA.      A/P   INR is therapeutic  Reason(s) for out of range INR today: N/A      Warfarin dosing recommendation: Continue regimen as listed above.    Pt educated to contact our clinic with any changes in medications or s/s of bleeding or thrombosis. Pt is aware to seek immediate medical attention for falls, head injury or deep cuts.    Request pt to return in 5 week(s). Pt agrees.    CHEYENNE Mckinley.

## 2025-03-03 ENCOUNTER — OFFICE VISIT (OUTPATIENT)
Dept: VASCULAR LAB | Facility: MEDICAL CENTER | Age: 89
End: 2025-03-03
Attending: NURSE PRACTITIONER
Payer: MEDICARE

## 2025-03-03 VITALS
DIASTOLIC BLOOD PRESSURE: 66 MMHG | HEIGHT: 71 IN | WEIGHT: 177 LBS | HEART RATE: 62 BPM | SYSTOLIC BLOOD PRESSURE: 120 MMHG | BODY MASS INDEX: 24.78 KG/M2

## 2025-03-03 DIAGNOSIS — I10 ESSENTIAL HYPERTENSION, BENIGN: ICD-10-CM

## 2025-03-03 DIAGNOSIS — Z98.890 HISTORY OF CEA (CAROTID ENDARTERECTOMY): ICD-10-CM

## 2025-03-03 DIAGNOSIS — R06.02 SHORTNESS OF BREATH: ICD-10-CM

## 2025-03-03 DIAGNOSIS — T14.8XXA VASCULAR INJURY: ICD-10-CM

## 2025-03-03 DIAGNOSIS — R73.01 ELEVATED FASTING GLUCOSE: ICD-10-CM

## 2025-03-03 DIAGNOSIS — Z95.2 S/P TAVR (TRANSCATHETER AORTIC VALVE REPLACEMENT): ICD-10-CM

## 2025-03-03 DIAGNOSIS — E78.5 DYSLIPIDEMIA: ICD-10-CM

## 2025-03-03 DIAGNOSIS — G45.9 TIA (TRANSIENT ISCHEMIC ATTACK): ICD-10-CM

## 2025-03-03 DIAGNOSIS — S35.513D: ICD-10-CM

## 2025-03-03 DIAGNOSIS — Z79.01 CHRONIC ANTICOAGULATION: ICD-10-CM

## 2025-03-03 DIAGNOSIS — D68.69 SECONDARY HYPERCOAGULABLE STATE (HCC): ICD-10-CM

## 2025-03-03 PROCEDURE — 99212 OFFICE O/P EST SF 10 MIN: CPT

## 2025-03-03 PROCEDURE — 99214 OFFICE O/P EST MOD 30 MIN: CPT | Performed by: NURSE PRACTITIONER

## 2025-03-03 RX ORDER — WARFARIN SODIUM 4 MG/1
TABLET ORAL
Qty: 135 TABLET | Refills: 1 | Status: SHIPPED | OUTPATIENT
Start: 2025-03-03

## 2025-03-03 RX ORDER — METOPROLOL SUCCINATE 25 MG/1
12.5 TABLET, EXTENDED RELEASE ORAL DAILY
Qty: 100 TABLET | Refills: 3 | Status: ON HOLD | OUTPATIENT
Start: 2025-03-03 | End: 2025-03-26

## 2025-03-03 RX ORDER — GABAPENTIN 300 MG/1
300 CAPSULE ORAL 3 TIMES DAILY
Qty: 270 CAPSULE | Refills: 3 | Status: SHIPPED | OUTPATIENT
Start: 2025-03-03

## 2025-03-03 ASSESSMENT — FIBROSIS 4 INDEX: FIB4 SCORE: 2.47

## 2025-03-03 NOTE — PROGRESS NOTES
"VASCULAR FOLLOW UP VISIT  Subjective:   Tristan Hester is a 89 y.o. male who presents 25  for vascular f/u    Subjective     HPI:   Here for f/u of possible TIA in the setting of HTN, dyslipidemia, carotid disease and valvular heart disease, and atrial fibrillation.  Recent eye surgery -- new devices implanted to release med for glaucoma   Using two canes now for balance   Chronic back pain, limiting for him   Feels he is having worsening SOB  Saw cards- no echo ordered  Feels good with exercise-- recumebent bike every other day  Denies CP   Tolerating meds well  On warfarin and ASA without bleeding- seen in AC clinic  No TIA or CVA symptoms  Lab work done     Social History     Tobacco Use    Smoking status: Former     Current packs/day: 0.00     Average packs/day: 0.5 packs/day for 30.0 years (15.0 ttl pk-yrs)     Types: Cigarettes, Pipe     Start date: 1955     Quit date: 1985     Years since quittin.1    Smokeless tobacco: Never   Vaping Use    Vaping status: Never Used   Substance Use Topics    Alcohol use: Yes     Alcohol/week: 3.0 oz     Types: 3 Cans of beer, 3 Shots of liquor per week     Comment: 1 x week    Drug use: No     DIET AND EXERCISE:  Weight Change: Stabilized  Diet: Relatively low sodium- bit less adherent  Exercise: gym 3x per week     Objective        Objective:     /66 (BP Location: Left arm, Patient Position: Sitting, BP Cuff Size: Adult)   Pulse 62   Ht 1.803 m (5' 11\")   Wt 80.3 kg (177 lb)   BMI 24.69 kg/m²     Physical Exam  Vitals reviewed.   Constitutional:       Appearance: He is well-developed. He is not toxic-appearing or diaphoretic.   Eyes:      Extraocular Movements: Extraocular movements intact.      Conjunctiva/sclera: Conjunctivae normal.   Neck:      Vascular: No carotid bruit.   Cardiovascular:      Rate and Rhythm: Normal rate and regular rhythm.      Heart sounds: Murmur heard.   Pulmonary:      Effort: Pulmonary effort is normal. No " "respiratory distress.      Breath sounds: Normal breath sounds. No wheezing or rales.   Musculoskeletal:      Right lower leg: No edema.      Left lower leg: No edema.   Neurological:      General: No focal deficit present.      Mental Status: He is alert and oriented to person, place, and time.      Cranial Nerves: No cranial nerve deficit.      Gait: Gait normal.   Psychiatric:         Mood and Affect: Mood normal.         Behavior: Behavior normal.       Lab Results   Component Value Date    CHOLSTRLTOT 126 01/14/2025    HDL 48 01/14/2025    TRIGLYCERIDE 74 01/14/2025      Lab Results   Component Value Date/Time    LIPOPROTA <6 04/15/2020 11:36 AM      No results found for: \"APOB\"   No results found for: \"CRPHIGHSEN\"     Lab Results   Component Value Date    INR 2.90 02/13/2025         Lab Results   Component Value Date    SODIUM 139 01/14/2025    POTASSIUM 4.7 01/14/2025    CHLORIDE 101 01/14/2025    CO2 24 01/14/2025    GLUCOSE 106 (H) 01/14/2025    BUN 20 01/14/2025    CREATININE 0.90 01/14/2025    BUNCREATRAT 22 01/14/2025      Lab Results   Component Value Date    TSH 2.420 01/14/2025      Lab Results   Component Value Date    WBC 6.1 01/14/2025    RBC 5.02 01/14/2025    HEMOGLOBIN 14.9 01/14/2025    HEMATOCRIT 46.3 01/14/2025    MCV 92 01/14/2025    MCH 29.7 01/14/2025    MCHC 32.2 01/14/2025     Carotid duplex February 2021   No evidence for RIGHT carotid stenosis status post endarterectomy.   Mild LEFT internal carotid artery stenosis.     Echocardiogram March 2021  Prior echo on 8/7/20.Left ventricular ejection fraction is visually   estimated to be 60%.  Normal diastolic function.  Known TAVR aortic valve that is functioning normally with normal   transvalvular gradients. Vmax is 1.85 m/s.  Moderate eccentric mitral regurgitation.    Aortoiliac duplex April 2021   No evidence of aortic aneurysm.    No evidence of pseudoaneurysm or injury to the bilateral iliac arteries.     No stenosis of the proximal " segment of the major visceral arteries.    Waveforms and velocities of the bilateral iliac arteries are normal.    Common iliac diameter (cm):   Right- 1.63 x 1.72         Left-   1.70 x 1.72    abpm 2022  Mean daytime 142/80  Normal nocturnal dip    Echo april 2022  Compared to the images of the prior study 03/02/21, there has been no   significant change.   Normal left ventricular systolic function.  The left ventricular ejection fraction is visually estimated to be 60%.  Grade II diastolic dysfunction.  Normal right ventricular systolic function.  Mild mitral regurgitation.  Known TAVR aortic valve that is functioning normally with normal transvalvular gradients.    Carotid duplex sep 2022   Surgical changes of the right carotid bifurcation consistent with post    carotid endarterectomy status.    No significant stenosis of the bilateral internal carotid arteries (<50%).      Aortoiliac duplex june 2023   No evidence of abdominal aortic or iliac artery aneurysm.    Waveforms and velocities of the abdominal aorta and bilateral iliac arteries    are normal with no hemodynamically significant stenosis.    Bilateral common iliac arteries are slightly ectatic.    Carotid duplex jan 2024   Compared to the prior study on 09/12/2022, no major changes.   Right.    Patent right carotid endarterectomy status, no significant stenosis.      Echo march 2024  Normal left ventricular systolic function.  Mild mitral regurgitation.  Known TAVR aortic valve that is functioning normally with normal   transvalvular gradients.       Medical Decision Making:  Today's Assessment / Status / Plan:     1. Chronic anticoagulation  warfarin (COUMADIN) 4 MG Tab      2. Essential hypertension, benign  metoprolol SR (TOPROL XL) 25 MG TABLET SR 24 HR    Comp Metabolic Panel      3. Vascular injury  gabapentin (NEURONTIN) 300 MG Cap      4. S/P TAVR (transcatheter aortic valve replacement)  EC-ECHOCARDIOGRAM COMPLETE W/O CONT      5. Secondary  hypercoagulable state (HCC)  CBC WITHOUT DIFFERENTIAL      6. History of CEA (carotid endarterectomy)        7. Iliac artery injury, unspecified laterality, subsequent encounter  US-AORTA/ILIACS DUPLEX COMPLETE      8. Dyslipidemia  Lipid Profile      9. Hx of TIA (transient ischemic attack)        10. Shortness of breath  EC-ECHOCARDIOGRAM COMPLETE W/O CONT      11. Elevated fasting glucose  HEMOGLOBIN A1C        Patient Type: Secondary Prevention    Etiology of Established CVD if Present:   1. TIA - likely cardioembolic from valve disease  2. Carotid atherosclerosis -status post CEA  3. Valvular Heart Disease  4. Atrial fibrillation with onset during acute illness, now chronic  5.  DVT after back surgery jan 2016  6.  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    Lipid Management: Qualifies for Statin Therapy Based on 2018 ACC/AHA Guidelines: yes  Calculated 10-Year Risk of ASCVD: N/A  Currently on Statin: Yes  ACC/aha criteria for at least mod intensity statin  Lipoprotein a normal  Per nla goal ldl <70 and nonHDL <100 - well below threshold on most recent blood work 2/2024  Plan:  - Continue atorvastatin at current dose    - continue zetia  - Continue TLC  - Recheck fasting lipids prior to next appt     Blood Pressure Management:  Acc/aha bp goal <130/80  ABPM high prior to intensification of meds - 2 yrs ago  ABPM April 2024: good control with mean 120s/70s, but does have episodes of low BP without change in HR  Home readings labile   Does have intermittent lightheadedness - difficult to determine how much is caused by BP and HR - improved with decrease in regimen  No significant albuminuria   Possible ACE inhibitor cough  Gfr and electrolytes stable  Modest leg swelling on current dose of amlodipine, but tolerable  Plan:  - Continue metoprolol ER to 12.5 mg daily  - Continue irbesartan 300 mg daily  - Continue amlodipine to 2.5 mg daily   - Continue lifestyle modification  -Continue home  blood pressure monitoring    Glycemic Status: Prediabetic  IFG, mild and stable - actually normal on most recent  - Continue lifestyle mod  -Repeat fasting glucose with next blood work    Anti-Platelet/Anti-Coagulant Tx: yes  As previously discussed, combination of aspirin and warfarin does increase risk of bleeding, but especially in light of his relatively recent TIA and fact that he had TIA on warfarin monotherapy, using SDM we have decided that benefit likely outweighs risk at present  - Continue indefinite anticoagulation with warfarin  - continue low dose asa   - report any bleeding  - F/U with anticoagulation clinic as scheduled   - limit nsaids    Smoking: continue complete avoidance    Physical Activity:  Keep going to gym at least 3x per week.  Encouraged more frequent balance exercises.      Weight Management and Nutrition: Dietary plan was discussed with patient at this visit including c/w Med diet and decreasing his sodium a bit    Other:     1. TIA -has history of previous cardioembolic TIA likely with his native aortic valve as a source.  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  Possible recurrent TIA earlier in 2024.  His symptoms resolved after only a few minutes. No further TIA or stroke symptoms.  No evidence of any problems with his valve on recent echocardiogram or any worsening stenosis on carotid duplex.    -He should call 911 immediately with any further TIA or stroke symptoms.    -Antiplatelet and anticoag as above    2. Carotid atherosclerosis status post CEA- .  Only mild restenosis on repeat duplex.  Remains asymptomatic.  Repeat duplex in 2 years from previous -January 2026    3. Valvular Heart Disease -  now status post TAVR March 2020.  Still with unrepaired mitral disease -improved on most recent echo.  Appears asymptomatic at present.  Does not appear to be volume overloaded.  Due to recent increase SOB- will repeat echo with upcoming imaging.    - Obtain echo      4. Colonic Polyp on recent colonoscopy - repeat colonoscopy in 2019 negative per patient.   No further f/u needed    5. Insomnia - trialed zolpidem w/o success.  Continue temazepam, but minimize dose.  Patient understands addictive potential.      6.  Atrial fibrillation - defer management of rhythm and rate to cards.  Continue anticoag as above    7.  DVT - postoperative.  No recurrence.  Continue indefinite anticoagulation given concomitant afib    8.  H/o prostate ca and multiple urological issues - defer f/u to urology.  Check PSA as they recommend    9.  Balance issues, back pain and neuropathy -this seems recalcitrant to physical therapy or any intervention that we have tried previously.  Defer f/u to ENT and neurology.  Agree with acupuncture    10.  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 - now asymptomatic and with good pulses on exam.  Good arterial flow and patent stent on noninvasive study.  Report any claudication, rest pain, or ulceration immediately.  Will obtain repeat arterial duplex 2 year from previous - June 2025 -ordered today     We will partner with other providers in the management of established vascular disease and cardiometabolic risk factors.    Studies to Be Obtained:    1) carotid duplex January 2026  2) aortoiliac duplex and LLE duplex-  June 2025  3) echocardiogram with [facility] (at least by March 2026)    Labs to Be Obtained: as above     Follow up in: 6 months or sooner if needed      CHEYENNE Curtis.

## 2025-03-04 RX ORDER — GABAPENTIN 300 MG/1
300 CAPSULE ORAL 3 TIMES DAILY
Qty: 270 CAPSULE | Refills: 0 | OUTPATIENT
Start: 2025-03-04

## 2025-03-04 RX ORDER — WARFARIN SODIUM 4 MG/1
TABLET ORAL
Qty: 135 TABLET | Refills: 0 | OUTPATIENT
Start: 2025-03-04

## 2025-03-10 DIAGNOSIS — I48.0 PAF (PAROXYSMAL ATRIAL FIBRILLATION) (HCC): ICD-10-CM

## 2025-03-14 ENCOUNTER — TELEPHONE (OUTPATIENT)
Dept: VASCULAR LAB | Facility: MEDICAL CENTER | Age: 89
End: 2025-03-14
Payer: MEDICARE

## 2025-03-14 NOTE — TELEPHONE ENCOUNTER
Orders placed for vascular surveillance imaging. US LLE due in March    Request for MA to call patient and remind them to schedule their surveillance vascular imaging.     Dhara Monsivais R.N.   [facility]

## 2025-03-14 NOTE — TELEPHONE ENCOUNTER
----- Message from Nurse Practitioner ADDISON Jacobo sent at 3/5/2025  7:56 AM PST -----  I ordered it!  Lokesh Collins  ----- Message -----  From: Dhara Monsivais R.N.  Sent: 3/4/2025   1:37 PM PST  To: ADDISON Curtis    Does pt still need the LLE US?    Michael J Bloch, M.D. 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

## 2025-03-17 NOTE — TELEPHONE ENCOUNTER
Phone Number Called: [phone]    Call outcome: Spoke to patient regarding message below.    Message: was able to let patient know about additional US that was ordered. Patient understood also gave them imaging number and preferred time to do them (Provider would ideally like March) but also advised soonest available if not March is okay. Patient understood no questions at this time.    Mallika SWARTZ , Medical Ass't  [facility]   Phone: [phone]   Fax: [phone]

## 2025-03-18 ENCOUNTER — HOSPITAL ENCOUNTER (OUTPATIENT)
Dept: RADIOLOGY | Facility: MEDICAL CENTER | Age: 89
End: 2025-03-18
Attending: NURSE PRACTITIONER
Payer: MEDICARE

## 2025-03-18 DIAGNOSIS — S35.513D: ICD-10-CM

## 2025-03-18 DIAGNOSIS — T14.8XXA VASCULAR INJURY: ICD-10-CM

## 2025-03-18 PROCEDURE — 93926 LOWER EXTREMITY STUDY: CPT | Mod: LT

## 2025-03-20 ENCOUNTER — ANTICOAGULATION VISIT (OUTPATIENT)
Dept: VASCULAR LAB | Facility: MEDICAL CENTER | Age: 89
End: 2025-03-20
Attending: INTERNAL MEDICINE
Payer: MEDICARE

## 2025-03-20 VITALS — DIASTOLIC BLOOD PRESSURE: 80 MMHG | HEART RATE: 57 BPM | SYSTOLIC BLOOD PRESSURE: 131 MMHG

## 2025-03-20 DIAGNOSIS — I48.0 PAF (PAROXYSMAL ATRIAL FIBRILLATION) (HCC): ICD-10-CM

## 2025-03-20 DIAGNOSIS — G45.9 TIA (TRANSIENT ISCHEMIC ATTACK): ICD-10-CM

## 2025-03-20 DIAGNOSIS — D68.69 SECONDARY HYPERCOAGULABLE STATE (HCC): ICD-10-CM

## 2025-03-20 LAB — INR PPP: 2.5 (ref 2–3.5)

## 2025-03-20 PROCEDURE — 85610 PROTHROMBIN TIME: CPT

## 2025-03-20 PROCEDURE — 99211 OFF/OP EST MAY X REQ PHY/QHP: CPT

## 2025-03-20 NOTE — PROGRESS NOTES
Anticoagulation Summary  As of 3/20/2025      INR goal:  2.0-3.0   TTR:  73.2% (9.6 y)   INR used for dosin.50 (3/20/2025)   Warfarin maintenance plan:  6 mg (4 mg x 1.5) every Tue, Thu, Sat; 4 mg (4 mg x 1) all other days   Weekly warfarin total:  34 mg   Plan last modified:  Natasha Jack, PharmD (2024)   Next INR check:  2025   Priority:  Maintenance   Target end date:  Indefinite    Indications    PAF (paroxysmal atrial fibrillation) (HCC) [I48.0]  Hx of TIA (transient ischemic attack) [G45.9]  Deep vein thrombosis (DVT) (HCC) (Resolved) [I82.409]  Atrial fibrillation (HCC) (Resolved) [I48.91]  Hx of Stroke (Resolved) [I63.9]  Secondary hypercoagulable state (HCC) [D68.69]                 Anticoagulation Episode Summary       INR check location:  Anticoagulation Clinic    Preferred lab:  --    Send INR reminders to:  --    Comments:  Takes ASA + warfarin 2/2 TIA Sep 2022 per Dr Bloch  Medications reconciled. Take naproxen as needed for arthritic pain. He is aware of the increased risk of bleeding and takes only when needed with caution          Anticoagulation Care Providers       Provider Role Specialty Phone number    Michael J Bloch, M.D. Referring Cardiovascular Disease (Cardiology) [phone]    [facility]   [phone]                Refer to Patient Findings for HPI:  Patient Findings       Negatives:  Signs/symptoms of thrombosis, Signs/symptoms of bleeding, Laboratory test error suspected, Change in health, Change in alcohol use, Change in activity, Upcoming invasive procedure, Emergency department visit, Upcoming dental procedure, Missed doses, Extra doses, Change in medications, Change in diet/appetite, Hospital admission, Bruising, Other complaints            Date Referral Placed: 03/10/25      Vitals:  Vitals:    25 1053 25 1056   BP: (!) 143/82 131/80   Pulse: (!) 58 (!) 57       Verified current warfarin dosing schedule.    Medications  reconciled.  Pt is on antiplatelet therapy with ASA 81 mg for TIA.       A/P   INR is therapeutic  Reason(s) for out of range INR today: N/A      Warfarin dosing recommendation: Continue regimen as listed above.    Pt educated to contact our clinic with any changes in medications or s/s of bleeding or thrombosis. Pt is aware to seek immediate medical attention for falls, head injury or deep cuts.    Request pt to return in 6 week(s). Pt agrees.    Daniel De La Cruz, JerzyD

## 2025-03-23 ENCOUNTER — APPOINTMENT (OUTPATIENT)
Dept: RADIOLOGY | Facility: MEDICAL CENTER | Age: 89
DRG: 291 | End: 2025-03-23
Attending: EMERGENCY MEDICINE
Payer: MEDICARE

## 2025-03-23 ENCOUNTER — HOSPITAL ENCOUNTER (INPATIENT)
Facility: MEDICAL CENTER | Age: 89
LOS: 3 days | DRG: 291 | End: 2025-03-26
Attending: EMERGENCY MEDICINE | Admitting: INTERNAL MEDICINE
Payer: MEDICARE

## 2025-03-23 DIAGNOSIS — I50.9 ACUTE HEART FAILURE, UNSPECIFIED HEART FAILURE TYPE (HCC): ICD-10-CM

## 2025-03-23 DIAGNOSIS — I10 ESSENTIAL HYPERTENSION, BENIGN: ICD-10-CM

## 2025-03-23 DIAGNOSIS — I50.21 ACUTE HEART FAILURE WITH MILDLY REDUCED EJECTION FRACTION (HFMREF, 41-49%) (HCC): ICD-10-CM

## 2025-03-23 DIAGNOSIS — R09.02 HYPOXIA: ICD-10-CM

## 2025-03-23 DIAGNOSIS — I50.9 ACUTE CONGESTIVE HEART FAILURE, UNSPECIFIED HEART FAILURE TYPE (HCC): ICD-10-CM

## 2025-03-23 PROBLEM — R06.02 SHORTNESS OF BREATH: Status: ACTIVE | Noted: 2025-03-23

## 2025-03-23 PROBLEM — I25.10 CORONARY ARTERY DISEASE INVOLVING NATIVE CORONARY ARTERY OF NATIVE HEART WITHOUT ANGINA PECTORIS: Status: ACTIVE | Noted: 2025-03-23

## 2025-03-23 LAB
ALBUMIN SERPL BCP-MCNC: 4 G/DL (ref 3.2–4.9)
ALBUMIN/GLOB SERPL: 1.3 G/DL
ALP SERPL-CCNC: 64 U/L (ref 30–99)
ALT SERPL-CCNC: 16 U/L (ref 2–50)
ANION GAP SERPL CALC-SCNC: 12 MMOL/L (ref 7–16)
AST SERPL-CCNC: 38 U/L (ref 12–45)
BASOPHILS # BLD AUTO: 1.1 % (ref 0–1.8)
BASOPHILS # BLD: 0.07 K/UL (ref 0–0.12)
BILIRUB SERPL-MCNC: 1.6 MG/DL (ref 0.1–1.5)
BUN SERPL-MCNC: 17 MG/DL (ref 8–22)
CALCIUM ALBUM COR SERPL-MCNC: 9.1 MG/DL (ref 8.5–10.5)
CALCIUM SERPL-MCNC: 9.1 MG/DL (ref 8.4–10.2)
CHLORIDE SERPL-SCNC: 104 MMOL/L (ref 96–112)
CO2 SERPL-SCNC: 23 MMOL/L (ref 20–33)
CREAT SERPL-MCNC: 0.9 MG/DL (ref 0.5–1.4)
EKG IMPRESSION: NORMAL
EOSINOPHIL # BLD AUTO: 0.23 K/UL (ref 0–0.51)
EOSINOPHIL NFR BLD: 3.5 % (ref 0–6.9)
ERYTHROCYTE [DISTWIDTH] IN BLOOD BY AUTOMATED COUNT: 51.7 FL (ref 35.9–50)
FLUAV RNA SPEC QL NAA+PROBE: NEGATIVE
FLUBV RNA SPEC QL NAA+PROBE: NEGATIVE
GFR SERPLBLD CREATININE-BSD FMLA CKD-EPI: 82 ML/MIN/1.73 M 2
GLOBULIN SER CALC-MCNC: 3.2 G/DL (ref 1.9–3.5)
GLUCOSE SERPL-MCNC: 96 MG/DL (ref 65–99)
HCT VFR BLD AUTO: 43.8 % (ref 42–52)
HGB BLD-MCNC: 14 G/DL (ref 14–18)
IMM GRANULOCYTES # BLD AUTO: 0.02 K/UL (ref 0–0.11)
IMM GRANULOCYTES NFR BLD AUTO: 0.3 % (ref 0–0.9)
INR PPP: 2.13 (ref 0.87–1.13)
LYMPHOCYTES # BLD AUTO: 1.29 K/UL (ref 1–4.8)
LYMPHOCYTES NFR BLD: 19.5 % (ref 22–41)
MCH RBC QN AUTO: 30.3 PG (ref 27–33)
MCHC RBC AUTO-ENTMCNC: 32 G/DL (ref 32.3–36.5)
MCV RBC AUTO: 94.8 FL (ref 81.4–97.8)
MONOCYTES # BLD AUTO: 0.65 K/UL (ref 0–0.85)
MONOCYTES NFR BLD AUTO: 9.8 % (ref 0–13.4)
NEUTROPHILS # BLD AUTO: 4.36 K/UL (ref 1.82–7.42)
NEUTROPHILS NFR BLD: 65.8 % (ref 44–72)
NRBC # BLD AUTO: 0 K/UL
NRBC BLD-RTO: 0 /100 WBC (ref 0–0.2)
NT-PROBNP SERPL IA-MCNC: ABNORMAL PG/ML (ref 0–125)
PLATELET # BLD AUTO: 173 K/UL (ref 164–446)
PMV BLD AUTO: 11.5 FL (ref 9–12.9)
POTASSIUM SERPL-SCNC: 4.8 MMOL/L (ref 3.6–5.5)
PROCALCITONIN SERPL-MCNC: <0.02 NG/ML
PROT SERPL-MCNC: 7.2 G/DL (ref 6–8.2)
PROTHROMBIN TIME: 24.4 SEC (ref 12–14.6)
RBC # BLD AUTO: 4.62 M/UL (ref 4.7–6.1)
RSV RNA SPEC QL NAA+PROBE: NEGATIVE
SARS-COV-2 RNA RESP QL NAA+PROBE: NOTDETECTED
SODIUM SERPL-SCNC: 139 MMOL/L (ref 135–145)
SPECIMEN SOURCE: NORMAL
TROPONIN T SERPL-MCNC: 17 NG/L (ref 6–19)
TROPONIN T SERPL-MCNC: 17 NG/L (ref 6–19)
WBC # BLD AUTO: 6.6 K/UL (ref 4.8–10.8)

## 2025-03-23 PROCEDURE — 36415 COLL VENOUS BLD VENIPUNCTURE: CPT

## 2025-03-23 PROCEDURE — 84484 ASSAY OF TROPONIN QUANT: CPT | Mod: 91

## 2025-03-23 PROCEDURE — 83880 ASSAY OF NATRIURETIC PEPTIDE: CPT

## 2025-03-23 PROCEDURE — 99223 1ST HOSP IP/OBS HIGH 75: CPT | Mod: AI | Performed by: INTERNAL MEDICINE

## 2025-03-23 PROCEDURE — 93005 ELECTROCARDIOGRAM TRACING: CPT | Mod: TC | Performed by: EMERGENCY MEDICINE

## 2025-03-23 PROCEDURE — 96374 THER/PROPH/DIAG INJ IV PUSH: CPT

## 2025-03-23 PROCEDURE — A9270 NON-COVERED ITEM OR SERVICE: HCPCS | Performed by: INTERNAL MEDICINE

## 2025-03-23 PROCEDURE — 71045 X-RAY EXAM CHEST 1 VIEW: CPT

## 2025-03-23 PROCEDURE — 85025 COMPLETE CBC W/AUTO DIFF WBC: CPT

## 2025-03-23 PROCEDURE — 99285 EMERGENCY DEPT VISIT HI MDM: CPT

## 2025-03-23 PROCEDURE — 93005 ELECTROCARDIOGRAM TRACING: CPT | Mod: TC

## 2025-03-23 PROCEDURE — G0378 HOSPITAL OBSERVATION PER HR: HCPCS

## 2025-03-23 PROCEDURE — 0241U HCHG SARS-COV-2 COVID-19 NFCT DS RESP RNA 4 TRGT MIC: CPT

## 2025-03-23 PROCEDURE — 85610 PROTHROMBIN TIME: CPT

## 2025-03-23 PROCEDURE — 80053 COMPREHEN METABOLIC PANEL: CPT

## 2025-03-23 PROCEDURE — 700111 HCHG RX REV CODE 636 W/ 250 OVERRIDE (IP): Performed by: EMERGENCY MEDICINE

## 2025-03-23 PROCEDURE — 700102 HCHG RX REV CODE 250 W/ 637 OVERRIDE(OP): Performed by: INTERNAL MEDICINE

## 2025-03-23 PROCEDURE — 84145 PROCALCITONIN (PCT): CPT

## 2025-03-23 RX ORDER — ASPIRIN 81 MG/1
81 TABLET, CHEWABLE ORAL DAILY
Status: DISCONTINUED | OUTPATIENT
Start: 2025-03-24 | End: 2025-03-26 | Stop reason: HOSPADM

## 2025-03-23 RX ORDER — ATORVASTATIN CALCIUM 40 MG/1
40 TABLET, FILM COATED ORAL EVERY EVENING
Status: DISCONTINUED | OUTPATIENT
Start: 2025-03-23 | End: 2025-03-26 | Stop reason: HOSPADM

## 2025-03-23 RX ORDER — EZETIMIBE 10 MG/1
10 TABLET ORAL DAILY
Status: DISCONTINUED | OUTPATIENT
Start: 2025-03-24 | End: 2025-03-26 | Stop reason: HOSPADM

## 2025-03-23 RX ORDER — GABAPENTIN 300 MG/1
300 CAPSULE ORAL 3 TIMES DAILY
Status: DISCONTINUED | OUTPATIENT
Start: 2025-03-23 | End: 2025-03-26 | Stop reason: HOSPADM

## 2025-03-23 RX ORDER — WARFARIN SODIUM 3 MG/1
6 TABLET ORAL
Status: DISCONTINUED | OUTPATIENT
Start: 2025-03-25 | End: 2025-03-26 | Stop reason: HOSPADM

## 2025-03-23 RX ORDER — HYDRALAZINE HYDROCHLORIDE 20 MG/ML
10 INJECTION INTRAMUSCULAR; INTRAVENOUS EVERY 4 HOURS PRN
Status: DISCONTINUED | OUTPATIENT
Start: 2025-03-23 | End: 2025-03-25

## 2025-03-23 RX ORDER — FUROSEMIDE 10 MG/ML
40 INJECTION INTRAMUSCULAR; INTRAVENOUS ONCE
Status: COMPLETED | OUTPATIENT
Start: 2025-03-23 | End: 2025-03-23

## 2025-03-23 RX ORDER — AMLODIPINE BESYLATE 5 MG/1
2.5 TABLET ORAL DAILY
Status: DISCONTINUED | OUTPATIENT
Start: 2025-03-24 | End: 2025-03-26 | Stop reason: HOSPADM

## 2025-03-23 RX ORDER — IRBESARTAN 150 MG/1
300 TABLET ORAL DAILY
Status: DISCONTINUED | OUTPATIENT
Start: 2025-03-24 | End: 2025-03-26 | Stop reason: HOSPADM

## 2025-03-23 RX ORDER — METOPROLOL SUCCINATE 25 MG/1
25 TABLET, EXTENDED RELEASE ORAL DAILY
Status: DISCONTINUED | OUTPATIENT
Start: 2025-03-24 | End: 2025-03-26 | Stop reason: HOSPADM

## 2025-03-23 RX ORDER — TEMAZEPAM 15 MG/1
30 CAPSULE ORAL NIGHTLY PRN
Status: DISCONTINUED | OUTPATIENT
Start: 2025-03-23 | End: 2025-03-25

## 2025-03-23 RX ORDER — ACETAMINOPHEN 325 MG/1
650 TABLET ORAL EVERY 6 HOURS PRN
Status: DISCONTINUED | OUTPATIENT
Start: 2025-03-23 | End: 2025-03-26 | Stop reason: HOSPADM

## 2025-03-23 RX ORDER — WARFARIN SODIUM 2 MG/1
4 TABLET ORAL
Status: DISCONTINUED | OUTPATIENT
Start: 2025-03-23 | End: 2025-03-26 | Stop reason: HOSPADM

## 2025-03-23 RX ADMIN — FUROSEMIDE 40 MG: 10 INJECTION INTRAMUSCULAR; INTRAVENOUS at 19:18

## 2025-03-23 RX ADMIN — ATORVASTATIN CALCIUM 40 MG: 40 TABLET, FILM COATED ORAL at 21:58

## 2025-03-23 RX ADMIN — GABAPENTIN 300 MG: 300 CAPSULE ORAL at 21:58

## 2025-03-23 RX ADMIN — WARFARIN SODIUM 4 MG: 2 TABLET ORAL at 21:58

## 2025-03-23 ASSESSMENT — ENCOUNTER SYMPTOMS
GASTROINTESTINAL NEGATIVE: 1
MUSCULOSKELETAL NEGATIVE: 1
CARDIOVASCULAR NEGATIVE: 1
EYES NEGATIVE: 1
PSYCHIATRIC NEGATIVE: 1
RESPIRATORY NEGATIVE: 1
NEUROLOGICAL NEGATIVE: 1

## 2025-03-23 ASSESSMENT — CHA2DS2 SCORE
CHA2DS2 VASC SCORE: 6
DIABETES: NO
CHF OR LEFT VENTRICULAR DYSFUNCTION: NO
PRIOR STROKE OR TIA OR THROMBOEMBOLISM: YES
AGE 65 TO 74: NO
AGE 75 OR GREATER: YES
VASCULAR DISEASE: YES
SEX: MALE
HYPERTENSION: YES

## 2025-03-23 ASSESSMENT — FIBROSIS 4 INDEX: FIB4 SCORE: 2.47

## 2025-03-24 PROBLEM — I50.9 ACUTE CONGESTIVE HEART FAILURE (HCC): Status: ACTIVE | Noted: 2025-03-24

## 2025-03-24 PROBLEM — J96.01 ACUTE RESPIRATORY FAILURE WITH HYPOXEMIA (HCC): Status: ACTIVE | Noted: 2025-03-23

## 2025-03-24 LAB
ANION GAP SERPL CALC-SCNC: 10 MMOL/L (ref 7–16)
BUN SERPL-MCNC: 20 MG/DL (ref 8–22)
CALCIUM SERPL-MCNC: 8.9 MG/DL (ref 8.4–10.2)
CHLORIDE SERPL-SCNC: 103 MMOL/L (ref 96–112)
CO2 SERPL-SCNC: 26 MMOL/L (ref 20–33)
CREAT SERPL-MCNC: 0.87 MG/DL (ref 0.5–1.4)
ERYTHROCYTE [DISTWIDTH] IN BLOOD BY AUTOMATED COUNT: 50.5 FL (ref 35.9–50)
GFR SERPLBLD CREATININE-BSD FMLA CKD-EPI: 83 ML/MIN/1.73 M 2
GLUCOSE SERPL-MCNC: 92 MG/DL (ref 65–99)
HCT VFR BLD AUTO: 40.7 % (ref 42–52)
HGB BLD-MCNC: 13.2 G/DL (ref 14–18)
INR PPP: 2.11 (ref 0.87–1.13)
MCH RBC QN AUTO: 30.3 PG (ref 27–33)
MCHC RBC AUTO-ENTMCNC: 32.4 G/DL (ref 32.3–36.5)
MCV RBC AUTO: 93.3 FL (ref 81.4–97.8)
PLATELET # BLD AUTO: 163 K/UL (ref 164–446)
PMV BLD AUTO: 10.9 FL (ref 9–12.9)
POTASSIUM SERPL-SCNC: 3.8 MMOL/L (ref 3.6–5.5)
PROTHROMBIN TIME: 24.3 SEC (ref 12–14.6)
RBC # BLD AUTO: 4.36 M/UL (ref 4.7–6.1)
SODIUM SERPL-SCNC: 139 MMOL/L (ref 135–145)
TROPONIN T SERPL-MCNC: 22 NG/L (ref 6–19)
WBC # BLD AUTO: 6.7 K/UL (ref 4.8–10.8)

## 2025-03-24 PROCEDURE — 99233 SBSQ HOSP IP/OBS HIGH 50: CPT | Performed by: INTERNAL MEDICINE

## 2025-03-24 PROCEDURE — 770020 HCHG ROOM/CARE - TELE (206)

## 2025-03-24 PROCEDURE — 36415 COLL VENOUS BLD VENIPUNCTURE: CPT

## 2025-03-24 PROCEDURE — 700102 HCHG RX REV CODE 250 W/ 637 OVERRIDE(OP): Performed by: INTERNAL MEDICINE

## 2025-03-24 PROCEDURE — 85610 PROTHROMBIN TIME: CPT

## 2025-03-24 PROCEDURE — 84484 ASSAY OF TROPONIN QUANT: CPT

## 2025-03-24 PROCEDURE — A9270 NON-COVERED ITEM OR SERVICE: HCPCS | Performed by: INTERNAL MEDICINE

## 2025-03-24 PROCEDURE — 85027 COMPLETE CBC AUTOMATED: CPT

## 2025-03-24 PROCEDURE — 96376 TX/PRO/DX INJ SAME DRUG ADON: CPT

## 2025-03-24 PROCEDURE — 700111 HCHG RX REV CODE 636 W/ 250 OVERRIDE (IP): Mod: JZ | Performed by: INTERNAL MEDICINE

## 2025-03-24 PROCEDURE — 80048 BASIC METABOLIC PNL TOTAL CA: CPT

## 2025-03-24 RX ORDER — FUROSEMIDE 10 MG/ML
40 INJECTION INTRAMUSCULAR; INTRAVENOUS 2 TIMES DAILY
Status: DISCONTINUED | OUTPATIENT
Start: 2025-03-24 | End: 2025-03-26 | Stop reason: HOSPADM

## 2025-03-24 RX ADMIN — AMLODIPINE BESYLATE 2.5 MG: 5 TABLET ORAL at 05:56

## 2025-03-24 RX ADMIN — METOPROLOL SUCCINATE 25 MG: 25 TABLET, EXTENDED RELEASE ORAL at 05:57

## 2025-03-24 RX ADMIN — TEMAZEPAM 30 MG: 15 CAPSULE ORAL at 00:24

## 2025-03-24 RX ADMIN — ATORVASTATIN CALCIUM 40 MG: 40 TABLET, FILM COATED ORAL at 16:49

## 2025-03-24 RX ADMIN — FUROSEMIDE 40 MG: 10 INJECTION, SOLUTION INTRAVENOUS at 16:50

## 2025-03-24 RX ADMIN — FUROSEMIDE 40 MG: 10 INJECTION, SOLUTION INTRAVENOUS at 08:16

## 2025-03-24 RX ADMIN — GABAPENTIN 300 MG: 300 CAPSULE ORAL at 16:49

## 2025-03-24 RX ADMIN — EZETIMIBE 10 MG: 10 TABLET ORAL at 05:58

## 2025-03-24 RX ADMIN — GABAPENTIN 300 MG: 300 CAPSULE ORAL at 11:39

## 2025-03-24 RX ADMIN — IRBESARTAN 300 MG: 150 TABLET ORAL at 05:58

## 2025-03-24 RX ADMIN — TEMAZEPAM 30 MG: 15 CAPSULE ORAL at 22:23

## 2025-03-24 RX ADMIN — WARFARIN SODIUM 4 MG: 2 TABLET ORAL at 16:49

## 2025-03-24 RX ADMIN — ASPIRIN 81 MG CHEWABLE TABLET 81 MG: 81 TABLET CHEWABLE at 05:57

## 2025-03-24 RX ADMIN — GABAPENTIN 300 MG: 300 CAPSULE ORAL at 05:57

## 2025-03-24 SDOH — ECONOMIC STABILITY: TRANSPORTATION INSECURITY
IN THE PAST 12 MONTHS, HAS LACK OF RELIABLE TRANSPORTATION KEPT YOU FROM MEDICAL APPOINTMENTS, MEETINGS, WORK OR FROM GETTING THINGS NEEDED FOR DAILY LIVING?: NO

## 2025-03-24 ASSESSMENT — LIFESTYLE VARIABLES
TOTAL SCORE: 0
CONSUMPTION TOTAL: NEGATIVE
TOTAL SCORE: 0
TOTAL SCORE: 0
HAVE PEOPLE ANNOYED YOU BY CRITICIZING YOUR DRINKING: NO
ALCOHOL_USE: NO
HAVE YOU EVER FELT YOU SHOULD CUT DOWN ON YOUR DRINKING: NO
EVER FELT BAD OR GUILTY ABOUT YOUR DRINKING: NO
ON A TYPICAL DAY WHEN YOU DRINK ALCOHOL HOW MANY DRINKS DO YOU HAVE: 0
HOW MANY TIMES IN THE PAST YEAR HAVE YOU HAD 5 OR MORE DRINKS IN A DAY: 0
EVER HAD A DRINK FIRST THING IN THE MORNING TO STEADY YOUR NERVES TO GET RID OF A HANGOVER: NO
AVERAGE NUMBER OF DAYS PER WEEK YOU HAVE A DRINK CONTAINING ALCOHOL: 0
DOES PATIENT WANT TO STOP DRINKING: NO

## 2025-03-24 ASSESSMENT — COGNITIVE AND FUNCTIONAL STATUS - GENERAL
SUGGESTED CMS G CODE MODIFIER DAILY ACTIVITY: CH
STANDING UP FROM CHAIR USING ARMS: A LOT
WALKING IN HOSPITAL ROOM: A LOT
MOVING TO AND FROM BED TO CHAIR: A LITTLE
MOVING FROM LYING ON BACK TO SITTING ON SIDE OF FLAT BED: A LITTLE
MOBILITY SCORE: 16
CLIMB 3 TO 5 STEPS WITH RAILING: A LOT
DAILY ACTIVITIY SCORE: 24
SUGGESTED CMS G CODE MODIFIER MOBILITY: CK

## 2025-03-24 ASSESSMENT — SOCIAL DETERMINANTS OF HEALTH (SDOH)
WITHIN THE LAST YEAR, HAVE YOU BEEN AFRAID OF YOUR PARTNER OR EX-PARTNER?: NO
WITHIN THE PAST 12 MONTHS, YOU WORRIED THAT YOUR FOOD WOULD RUN OUT BEFORE YOU GOT THE MONEY TO BUY MORE: NEVER TRUE
WITHIN THE LAST YEAR, HAVE TO BEEN RAPED OR FORCED TO HAVE ANY KIND OF SEXUAL ACTIVITY BY YOUR PARTNER OR EX-PARTNER?: NO
WITHIN THE LAST YEAR, HAVE YOU BEEN KICKED, HIT, SLAPPED, OR OTHERWISE PHYSICALLY HURT BY YOUR PARTNER OR EX-PARTNER?: NO
IN THE PAST 12 MONTHS, HAS THE ELECTRIC, GAS, OIL, OR WATER COMPANY THREATENED TO SHUT OFF SERVICE IN YOUR HOME?: NO
WITHIN THE LAST YEAR, HAVE YOU BEEN HUMILIATED OR EMOTIONALLY ABUSED IN OTHER WAYS BY YOUR PARTNER OR EX-PARTNER?: NO
WITHIN THE PAST 12 MONTHS, THE FOOD YOU BOUGHT JUST DIDN'T LAST AND YOU DIDN'T HAVE MONEY TO GET MORE: NEVER TRUE

## 2025-03-24 ASSESSMENT — FIBROSIS 4 INDEX: FIB4 SCORE: 5.13

## 2025-03-24 ASSESSMENT — ENCOUNTER SYMPTOMS
NERVOUS/ANXIOUS: 0
SPUTUM PRODUCTION: 1
HEADACHES: 0
DIAPHORESIS: 0
DIARRHEA: 0
FEVER: 0
COUGH: 1
BACK PAIN: 1
CHILLS: 0
ABDOMINAL PAIN: 0
DIZZINESS: 0
WEAKNESS: 1
SHORTNESS OF BREATH: 1
NAUSEA: 0
VOMITING: 0

## 2025-03-24 NOTE — PROGRESS NOTES
Inpatient Anticoagulation Service Note for 3/23/2025      Reason for Anticoagulation: Atrial Fibrillation, Deep Vein Thrombosis, Transient Ischemic Attack, Other (Comments) (TAVR)   [id number] VASc Score: 6  HAS-BLED Score: 4    Hemoglobin Value: 14  Hematocrit Value: 43.8  Lab Platelet Value: 173  Target INR: 2.0 to 3.0     Date/Time INR Value    03/23/25 1725 2.13            Date/Time Dose (mg)    03/23/25 2112 4           Average Dose (mg):  (6 mg on Tues, Thurs, Sat, 4 mg all other days)  Significant Interactions: Antiplatelet Medications  Bridge Therapy: No     Reversal Agent Administered: Not Applicable  Comments: Patient with history of DVT, TIA, Afib, s/p TAVR on warfarin therapy. Patient follows with anticoagulation clinic outpatient and has a TTR of 73.2% on current regimen. Current regimen is 6 mg on Tuesday, Thursday, Saturday and 4 mg all other days. INR is therapeutic today at 2.13. Plan to continue home regimen.    Plan:  4 mg tonight, INR recheck tomorrow  Education Material Provided?: No    Pharmacist suggested discharge dosing: Continue home regimen of 6 mg on Tuesday, Thursday, Saturday and 4 mg on all other days. INR follow up within a week of discharge.        Nadege Dorsey, PharmD

## 2025-03-24 NOTE — ED TRIAGE NOTES
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

## 2025-03-24 NOTE — ASSESSMENT & PLAN NOTE
Patient has an extensive cardiac history.  Echocardiogram ordered. Chest x-ray showed  patchy bibasilar opacity, left more than right, unclear if atelectasis versus infection.  WBC is normal, procalcitonin normal, no fever, no chills, no sweats.  Patient improved without initiation of antibiotics, respiratory failure likely related to ischemic heart disease or congestive heart failure, BNP is significantly elevated over 30,000.  Continue Lasix for now

## 2025-03-24 NOTE — PROGRESS NOTES
4 Eyes Skin Assessment Completed by MARGARITA Tarango and MARGARITA Rendon.    Head Scab  Ears WDL  Nose WDL  Mouth WDL  Neck WDL  Breast/Chest WDL  Shoulder Blades WDL  Spine WDL  (R) Arm/Elbow/Hand Bruising  (L) Arm/Elbow/Hand Bruising  Abdomen WDL  Groin WDL  Scrotum/Coccyx/Buttocks inner coccyx redness blanching  (R) Leg Bruising  (L) Leg Bruising  (R) Heel/Foot/Toe dryness, peeling  (L) Heel/Foot/Toe dryness, peeling          Devices In Places Tele Box      Interventions In Place Pillows    Possible Skin Injury yes    Pictures Uploaded Into Epic yes  Wound Consult Placed N/A  RN Wound Prevention Protocol Ordered Yes

## 2025-03-24 NOTE — ED PROVIDER NOTES
ED Provider Note    CHIEF COMPLAINT  Chief Complaint   Patient presents with    Shortness of Breath     X 2 d Incr. With exertion Denies CP Chronic cough No change  Denies fever or chills  Denies calf pain Denies LE edema       EXTERNAL RECORDS REVIEWED  Reviewed recent lower extremity arterial duplex study performed last week    HPI/ROS  LIMITATION TO HISTORY   None  OUTSIDE HISTORIAN(S):  Provided additional history    Tristan Hester is a 89 y.o. male who presents for a constellation of symptoms including some dyspnea shortness of breath orthopnea without chest pain or leg swelling.  The patient has a complex history including aortic valve stenosis as well as DVT.  He is on Coumadin therapy.  He also has some mild peripheral vascular disease.  He specifically denies chest pain or leg swelling.  No high fever or productive cough.  He does report orthopnea which is new.  He does have a history of TAVR he denies hemoptysis.  He has been compliant with his Coumadin therapy    PAST MEDICAL HISTORY   has a past medical history of Aortic valve stenosis, Arrhythmia, Arthritis, Blood clotting disorder ([facility]), Bronchitis, Cancer ([facility]), Carotid arterial disease ([facility]) (1/6/2014), Cataract, DVT (deep venous thrombosis) ([facility]) (2016), Heart valve disease, HLD (hyperlipidemia) (1/6/2014), Hypertension, Mitral regurgitation (1/6/2014), Pain (12-14-15), Pneumonia, Stroke ([facility]) (2008), TIA (transient ischemic attack), Urinary bladder disorder, and Urinary incontinence.    SURGICAL HISTORY   has a past surgical history that includes carotid endarterectomy (7/10/08); prostatectomy, radial (11/2000); bladder sling male (11/2004); cervical disk and fusion anterior (10/26/2009); lumbar laminectomy diskectomy (10/8/2010); thoracoscopy (6/13/2015); fusion, spine, lumbar, plif (12/28/2015); lumbar decompression (12/28/2015); transcatheter aortic valve replacement (3/2/2020); yuan (3/2/2020); and femoral endarterectomy (Left,  3/2/2020).    FAMILY HISTORY  Family History   Problem Relation Age of Onset    Heart Disease Father         CHF at 91.    Other Brother         Aneurysm    Cancer Brother         Seminal vascular cancer    Autoimmune Disease Daughter         Lupus, RA    No Known Problems Daughter     No Known Problems Son     No Known Problems Son     Heart Attack Neg Hx        SOCIAL HISTORY  Social History     Tobacco Use    Smoking status: Former     Current packs/day: 0.00     Average packs/day: 0.5 packs/day for 30.0 years (15.0 ttl pk-yrs)     Types: Cigarettes, Pipe     Start date: 1955     Quit date: 1985     Years since quittin.2    Smokeless tobacco: Never   Vaping Use    Vaping status: Never Used   Substance and Sexual Activity    Alcohol use: Yes     Alcohol/week: 3.0 oz     Types: 3 Cans of beer, 3 Shots of liquor per week     Comment: 1 x week    Drug use: No    Sexual activity: Not Currently     No drug or alcohol abuse  CURRENT MEDICATIONS  Home Medications    **Home medications have not yet been reviewed for this encounter**       Audit from Redirected Encounters    **Home medications have not yet been reviewed for this encounter**     No current facility-administered medications for this encounter.    Current Outpatient Medications:     warfarin (COUMADIN) 4 MG Tab, TAKE 1 (ONE) TO 1 & 1/2 (ONE & ONE-HALF) TABLETS BY MOUTH ONCE DAILY AS DIRECTED BY [facility], Disp: 135 Tablet, Rfl: 1    metoprolol SR (TOPROL XL) 25 MG TABLET SR 24 HR, Take 0.5 Tablets by mouth every day., Disp: 100 Tablet, Rfl: 3    gabapentin (NEURONTIN) 300 MG Cap, Take 1 Capsule by mouth 3 times a day., Disp: 270 Capsule, Rfl: 3    irbesartan (AVAPRO) 300 MG Tab, Take 1 tablet by mouth once daily, Disp: 100 Tablet, Rfl: 3    ezetimibe (ZETIA) 10 MG Tab, Take 1 tablet by mouth once daily, Disp: 90 Tablet, Rfl: 0    temazepam (RESTORIL) 30 MG capsule, Take 1 Capsule by mouth at bedtime as needed for Sleep for up  "to 180 days., Disp: 30 Capsule, Rfl: 5    atorvastatin (LIPITOR) 80 MG tablet, TAKE 1/2 (ONE-HALF) TABLET BY MOUTH ONCE DAILY IN THE EVENING, Disp: 45 Tablet, Rfl: 3    amLODIPine (NORVASC) 2.5 MG Tab, Take 1 Tablet by mouth every day., Disp: 30 Tablet, Rfl: 11    aspirin (ASA) 81 MG Chew Tab chewable tablet, Chew 1 Tablet every day., Disp: 100 Tablet, Rfl: 11    Ascorbic Acid (VITAMIN C PO), Take 500 mg by mouth., Disp: , Rfl:     Multiple Vitamins-Minerals (DAILY MULTI VITAMIN/MINERALS PO), Take  by mouth., Disp: , Rfl:     Acetaminophen (TYLENOL PO), Take 975 mg by mouth every day., Disp: , Rfl:       ALLERGIES  Allergies   Allergen Reactions    Other Environmental Runny Nose and Itching     Seasonal, cats-hayfever       PHYSICAL EXAM  VITAL SIGNS: BP (!) 155/85   Pulse 60   Temp 36.3 °C (97.3 °F) (Temporal)   Resp 20   Ht 1.803 m (5' 11\")   Wt 81.3 kg (179 lb 3.7 oz)   SpO2 93% Comment: at rest  BMI 25.00 kg/m²    Pulse ox interpretation: Patient is hypoxic at 86% on room air  Constitutional: Alert and oriented x 3, mild distress  HEENT: Atraumatic normocephalic, pupils are equal round reactive to light extraocular movements are intact. The nares is clear, external ears are normal, mouth shows moist mucous membranes normal dentition for age  Neck: Supple, no JVD no tracheal deviation  Cardiovascular: Regular rate and rhythm no murmur rub or gallop 2+ pulses peripherally x4  Thorax & Lungs: Mild increased work of breathing bibasilar rales noted no wheezing.   GI: Soft nontender nondistended positive bowel sounds, no peritoneal signs  Skin: Warm dry no acute rash or lesion  Musculoskeletal: Moving all extremities with full range and 5 of 5 strength no acute  deformity  Neurologic: Cranial nerves III through XII are grossly intact no sensory deficit no cerebellar dysfunction   Psychiatric: Appropriate affect for situation at this time          EKG/LABS  Results for orders placed or performed during the " hospital encounter of 25   EKG    Collection Time: 25  5:12 PM   Result Value Ref Range    Report       [facility] Emergency Dept.    Test Date:  2025  Pt Name:    ANTHONY JOSHI                 Department: [facility]  MRN:        [id number]                      Room:  Gender:     Male                         Technician: SAÚL  :        1935                   Requested By:ER TRIAGE PROTOCOL  Order #:    [id number]                    Reading MD:    Measurements  Intervals                                Axis  Rate:       61                           P:          35  DE:         212                          QRS:        24  QRSD:       116                          T:          61  QT:         456  QTc:        460    Interpretive Statements  Sinus rhythm  Borderline prolonged DE interval  Nonspecific intraventricular conduction delay  Compared to ECG 2023 10:34:51  Intraventricular conduction delay now present  Sinus bradycardia no longer present  T-wave abnormality no longer present     CBC with Differential    Collection Time: 25  5:25 PM   Result Value Ref Range    WBC 6.6 4.8 - 10.8 K/uL    RBC 4.62 (L) 4.70 - 6.10 M/uL    Hemoglobin 14.0 14.0 - 18.0 g/dL    Hematocrit 43.8 42.0 - 52.0 %    MCV 94.8 81.4 - 97.8 fL    MCH 30.3 27.0 - 33.0 pg    MCHC 32.0 (L) 32.3 - 36.5 g/dL    RDW 51.7 (H) 35.9 - 50.0 fL    Platelet Count 173 164 - 446 K/uL    MPV 11.5 9.0 - 12.9 fL    Neutrophils-Polys 65.80 44.00 - 72.00 %    Lymphocytes 19.50 (L) 22.00 - 41.00 %    Monocytes 9.80 0.00 - 13.40 %    Eosinophils 3.50 0.00 - 6.90 %    Basophils 1.10 0.00 - 1.80 %    Immature Granulocytes 0.30 0.00 - 0.90 %    Nucleated RBC 0.00 0.00 - 0.20 /100 WBC    Neutrophils (Absolute) 4.36 1.82 - 7.42 K/uL    Lymphs (Absolute) 1.29 1.00 - 4.80 K/uL    Monos (Absolute) 0.65 0.00 - 0.85 K/uL    Eos (Absolute) 0.23 0.00 - 0.51 K/uL    Baso (Absolute) 0.07 0.00 - 0.12 K/uL    Immature Granulocytes  (abs) 0.02 0.00 - 0.11 K/uL    NRBC (Absolute) 0.00 K/uL   Complete Metabolic Panel (CMP)    Collection Time: 03/23/25  5:25 PM   Result Value Ref Range    Sodium 139 135 - 145 mmol/L    Potassium 4.8 3.6 - 5.5 mmol/L    Chloride 104 96 - 112 mmol/L    Co2 23 20 - 33 mmol/L    Anion Gap 12.0 7.0 - 16.0    Glucose 96 65 - 99 mg/dL    Bun 17 8 - 22 mg/dL    Creatinine 0.90 0.50 - 1.40 mg/dL    Calcium 9.1 8.4 - 10.2 mg/dL    Correct Calcium 9.1 8.5 - 10.5 mg/dL    AST(SGOT) 38 12 - 45 U/L    ALT(SGPT) 16 2 - 50 U/L    Alkaline Phosphatase 64 30 - 99 U/L    Total Bilirubin 1.6 (H) 0.1 - 1.5 mg/dL    Albumin 4.0 3.2 - 4.9 g/dL    Total Protein 7.2 6.0 - 8.2 g/dL    Globulin 3.2 1.9 - 3.5 g/dL    A-G Ratio 1.3 g/dL   proBrain Natriuretic Peptide, NT (BNP)    Collection Time: 03/23/25  5:25 PM   Result Value Ref Range    NT-proBNP 91754 (H) 0 - 125 pg/mL   Troponins NOW    Collection Time: 03/23/25  5:25 PM   Result Value Ref Range    Troponin T 17 6 - 19 ng/L   CoV-2, Flu A/B, And RSV by PCR ([employer])    Collection Time: 03/23/25  5:25 PM    Specimen: Nasopharyngeal; Respirate   Result Value Ref Range    Influenza virus A RNA Negative Negative    Influenza virus B, PCR Negative Negative    RSV, PCR Negative Negative    SARS-CoV-2 by PCR NotDetected     SARS-CoV-2 Source Nasal Swab    Prothrombin Time    Collection Time: 03/23/25  5:25 PM   Result Value Ref Range    PT 24.4 (H) 12.0 - 14.6 sec    INR 2.13 (H) 0.87 - 1.13   ESTIMATED GFR    Collection Time: 03/23/25  5:25 PM   Result Value Ref Range    GFR (CKD-EPI) 82 >60 mL/min/1.73 m 2     *Note: Due to a large number of results and/or encounters for the requested time period, some results have not been displayed. A complete set of results can be found in Results Review.      I have independently interpreted this EKG    RADIOLOGY/PROCEDURES   I have independently interpreted the diagnostic imaging associated with this visit and am waiting the final reading from the  radiologist.   My preliminary interpretation is as follows: Patchy pneumonitis versus edema    Radiologist interpretation:  DX-CHEST-PORTABLE (1 VIEW)   Final Result         Patchy bibasilar opacity, left more than right, atelectasis or infection.          COURSE & MEDICAL DECISION MAKING    ASSESSMENT, COURSE AND PLAN  Care Narrative:     This is a very pleasant 89-year-old gentleman who presents here with several days of increasing shortness of breath orthopnea.  Differential diagnosis was extensive including pneumonia acute heart failure acute coronary syndrome COVID influenza pneumonitis pneumonia not inclusively.  Here his vital signs were concerning for what was initially borderline hypoxia but we became hypoxic at 86% on room air with a good waveform and was placed on 2 L.  His workup here is negative for viral testing, BNP is profoundly elevated troponin is not particularly elevated and there is no leukocytosis or bandemia to suggest infection.  Chest x-ray suggest a mixed pattern but clinically he is likely volume overloaded.  IV Lasix has been administered.  He will be admitted to the hospitalist service for further treatment and evaluation for what appears to be acute CHF.  With a therapeutic INR I have low suspicion for pulmonary embolism          ADDITIONAL PROBLEMS MANAGED      DISPOSITION AND DISCUSSIONS  I have discussed management of the patient with the following physicians and CHREI's: Discussed plan of care with hospitalist    Discussion of management with other QHP or appropriate source(s): None     Escalation of care considered, and ultimately not performed: None    Barriers to care at this time, including but not limited to: None.     Decision tools and prescription drugs considered including, but not limited to: None.    FINAL DIAGNOSIS  1. Hypoxia        2. Acute heart failure, unspecified heart failure type (HCC)               Electronically signed by: Jered Parker M.D., 3/23/2025 5:31  PM
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
## 2025-03-24 NOTE — PROGRESS NOTES
Inpatient Anticoagulation Service Note for 3/24/2025      Reason for Anticoagulation: Atrial Fibrillation, Deep Vein Thrombosis, Transient Ischemic Attack, Other (Comments)   [id number] VASc Score: 6  HAS-BLED Score: 4    Hemoglobin Value: (!) 13.2  Hematocrit Value: (!) 40.7  Lab Platelet Value: (!) 163  Target INR: 2.0 to 3.0     Date/Time INR Value    03/24/25 0435 2.11     03/23/25 1725 2.13            Date/Time Dose (mg)    03/24/25 1500 4     03/23/25 2112 4           Average Dose (mg):  (6 mg on Tues, Thurs, Sat, 4 mg all other days)  Significant Interactions: Antiplatelet Medications  Bridge Therapy: No (If less than 5 days and overlap therapy discontinued -- document reason (i.e. Bleed Risk))    (If still on overlap therapy, if No -- document reason (i.e. Bleed Risk))    Reversal Agent Administered: Not Applicable  A/P: Patient with history of DVT, TIA, Afib, s/p TAVR on warfarin therapy. Patient follows with anticoagulation clinic outpatient and has a TTR of 73.2% on current regimen. Current regimen is 6 mg on Tuesday, Thursday, Saturday and 4 mg all other days. INR is therapeutic. Plan to continue home regimen.    Education Material Provided?: No    Pharmacist suggested discharge dosing: Continue home dosing as listed above.      Taylor Hearn, PharmD

## 2025-03-24 NOTE — H&P
Hospital Medicine History & Physical Note    Date of Service  3/23/2025    Primary Care Physician  Michael J Bloch, M.D.    Consultants  None    Code Status  Full Code    Chief Complaint  Chief Complaint   Patient presents with    Shortness of Breath     X 2 d Incr. With exertion Denies CP Chronic cough No change  Denies fever or chills  Denies calf pain Denies LE edema       History of Presenting Illness  Tristan Hester is a 89 y.o. male who presented 3/23/2025 with shortness of breath since past 2 days. A-fibrillation, HTN, HLD, chronic back pain. TAVR, CAD, CEA.     On admission, chest x-ray showed patchy bibasilar opacity, left more than right, unclear if atelectasis versus infection.  He was afebrile and hemodynamically stable.  WBCs were normal.    I discussed the plan of care with patient, his wife and ER physician.    Review of Systems  Review of Systems   Constitutional:  Positive for malaise/fatigue.   HENT: Negative.     Eyes: Negative.    Respiratory: Negative.     Cardiovascular: Negative.    Gastrointestinal: Negative.    Genitourinary: Negative.    Musculoskeletal: Negative.    Skin: Negative.    Neurological: Negative.    Endo/Heme/Allergies: Negative.    Psychiatric/Behavioral: Negative.         Past Medical History   has a past medical history of Aortic valve stenosis, Arrhythmia, Arthritis, Blood clotting disorder ([facility]), Bronchitis, Cancer ([facility]), Carotid arterial disease ([facility]) (1/6/2014), Cataract, DVT (deep venous thrombosis) ([facility]) (2016), Heart valve disease, HLD (hyperlipidemia) (1/6/2014), Hypertension, Mitral regurgitation (1/6/2014), Pain (12-14-15), Pneumonia, Stroke ([facility]) (2008), TIA (transient ischemic attack), Urinary bladder disorder, and Urinary incontinence.    Surgical History   has a past surgical history that includes carotid endarterectomy (7/10/08); prostatectomy, radial (11/2000); bladder sling male (11/2004); cervical disk and fusion anterior (10/26/2009); lumbar  laminectomy diskectomy (10/8/2010); thoracoscopy (6/13/2015); fusion, spine, lumbar, plif (12/28/2015); lumbar decompression (12/28/2015); transcatheter aortic valve replacement (3/2/2020); yuan (3/2/2020); and femoral endarterectomy (Left, 3/2/2020).     Family History  family history includes Autoimmune Disease in his daughter; Cancer in his brother; Heart Disease in his father; No Known Problems in his daughter, son, and son; Other in his brother.   Family history reviewed with patient. There is no family history that is pertinent to the chief complaint.     Social History   reports that he quit smoking about 40 years ago. His smoking use included cigarettes and pipe. He started smoking about 70 years ago. He has a 15 pack-year smoking history. He has never used smokeless tobacco. He reports current alcohol use of about 3.0 oz of alcohol per week. He reports that he does not use drugs.    Allergies  Allergies   Allergen Reactions    Other Environmental Runny Nose and Itching     Seasonal, cats-hayfever       Medications  Prior to Admission Medications   Prescriptions Last Dose Informant Patient Reported? Taking?   Acetaminophen (TYLENOL PO)   Yes No   Sig: Take 975 mg by mouth every day.   Ascorbic Acid (VITAMIN C PO)   Yes No   Sig: Take 500 mg by mouth.   Multiple Vitamins-Minerals (DAILY MULTI VITAMIN/MINERALS PO)   Yes No   Sig: Take  by mouth.   amLODIPine (NORVASC) 2.5 MG Tab   No No   Sig: Take 1 Tablet by mouth every day.   aspirin (ASA) 81 MG Chew Tab chewable tablet   No No   Sig: Chew 1 Tablet every day.   atorvastatin (LIPITOR) 80 MG tablet   No No   Sig: TAKE 1/2 (ONE-HALF) TABLET BY MOUTH ONCE DAILY IN THE EVENING   ezetimibe (ZETIA) 10 MG Tab   No No   Sig: Take 1 tablet by mouth once daily   gabapentin (NEURONTIN) 300 MG Cap   No No   Sig: Take 1 Capsule by mouth 3 times a day.   irbesartan (AVAPRO) 300 MG Tab   No No   Sig: Take 1 tablet by mouth once daily   metoprolol SR (TOPROL XL) 25 MG TABLET  SR 24 HR   No No   Sig: Take 0.5 Tablets by mouth every day.   temazepam (RESTORIL) 30 MG capsule   No No   Sig: Take 1 Capsule by mouth at bedtime as needed for Sleep for up to 180 days.   warfarin (COUMADIN) 4 MG Tab   No No   Sig: TAKE 1 (ONE) TO 1 & 1/2 (ONE & ONE-HALF) TABLETS BY MOUTH ONCE DAILY AS DIRECTED BY [facility]      Facility-Administered Medications: None       Physical Exam  Temp:  [36.3 °C (97.3 °F)] 36.3 °C (97.3 °F)  Pulse:  [60] 60  Resp:  [20] 20  BP: (155)/(85) 155/85  SpO2:  [93 %] 93 %  Blood Pressure : (!) 155/85   Temperature: 36.3 °C (97.3 °F)   Pulse: 60   Respiration: 20   Pulse Oximetry: 93 % (at rest)       Physical Exam  HENT:      Head: Normocephalic.      Nose: Nose normal.      Mouth/Throat:      Mouth: Mucous membranes are moist.   Eyes:      Pupils: Pupils are equal, round, and reactive to light.   Cardiovascular:      Rate and Rhythm: Normal rate.   Pulmonary:      Effort: Pulmonary effort is normal.   Abdominal:      Palpations: Abdomen is soft.   Musculoskeletal:      Cervical back: Normal range of motion.      Right lower leg: No edema.      Left lower leg: No edema.   Skin:     General: Skin is warm.   Neurological:      General: No focal deficit present.      Mental Status: He is alert.   Psychiatric:         Mood and Affect: Mood normal.         Laboratory:  Recent Labs     03/23/25  1725   WBC 6.6   RBC 4.62*   HEMOGLOBIN 14.0   HEMATOCRIT 43.8   MCV 94.8   MCH 30.3   MCHC 32.0*   RDW 51.7*   PLATELETCT 173   MPV 11.5     Recent Labs     03/23/25  1725   SODIUM 139   POTASSIUM 4.8   CHLORIDE 104   CO2 23   GLUCOSE 96   BUN 17   CREATININE 0.90   CALCIUM 9.1     Recent Labs     03/23/25  1725   ALTSGPT 16   ASTSGOT 38   ALKPHOSPHAT 64   TBILIRUBIN 1.6*   GLUCOSE 96     Recent Labs     03/23/25  1725   INR 2.13*     Recent Labs     03/23/25  1725   NTPROBNP 40189*         Recent Labs     03/23/25  1725   TROPONINT 17       Imaging:  DX-CHEST-PORTABLE (1  VIEW)   Final Result         Patchy bibasilar opacity, left more than right, atelectasis or infection.      EC-ECHOCARDIOGRAM COMPLETE W/ CONT    (Results Pending)       CXR reviewed    Assessment/Plan:  Justification for Admission Status  I anticipate this patient is appropriate for observation status at this time because shortness of breath, acute CHF    Patient will need a Telemetry bed on MEDICAL service .  The need is secondary to acute CHF.    * Shortness of breath- (present on admission)  Assessment & Plan  Patient has an extensive cardiac history.  Echocardiogram ordered. Chest x-ray showed  patchy bibasilar opacity, left more than right, unclear if atelectasis versus infection. He was afebrile and hemodynamically stable.  WBCs were normal.  Procalcitonin ordered.  Will hold off antibiotics for now    Coronary artery disease involving native coronary artery of native heart without angina pectoris  Assessment & Plan  Extensive cardiovascular disease, with history of TAVR, CEA, atrial fibrillation.  Continue home Coumadin, aspirin atorvastatin, Zetia, metoprolol, Avapro.  Continue outpatient cardiology follow-up.    Lower back pain- (present on admission)  Assessment & Plan  Patient has chronic back pain.  Continue gabapentin and Tylenol    Essential hypertension, benign- (present on admission)  Assessment & Plan  Blood pressure is slightly elevated.  Continue home Metoprolol, Avapro, Norvasc.  As needed hydralazine added.  Monitor and adjust medications as needed    Arthritis- (present on admission)  Assessment & Plan  Continue home gabapentin and Tylenol        VTE prophylaxis: On Coumadin

## 2025-03-24 NOTE — DISCHARGE PLANNING
ER CM met with pt at bedside. AOX4 Very pleasant. Lives in 1 story home with spouse. Ambulates with Cane. Two canes noted at bedside.  No O2 , CPAP or Home health in past. He has seen Pulmonary in remote past 10 yrs or so ago but not recent. PCP  Michael Bloch [facility]. Discussed he is on o2 at present and if not able to Wean floor CM will cont to follow for assistance with coordination for DC needs  Care Transition Team Assessment    Information Source  Orientation Level: Oriented X4  Information Given By: Patient  Informant's Name: Everardo  Who is responsible for making decisions for patient? : Patient         Elopement Risk  Legal Hold: No    Interdisciplinary Discharge Planning  Primary Care Physician: Michael Bloch MD  Lives with - Patient's Self Care Capacity: Spouse  Support Systems: Spouse / Significant Other  Housing / Facility: 1 Story House  Do You Take your Prescribed Medications Regularly: Yes  Able to Return to Previous ADL's: Yes  Mobility Issues: Yes (uses cane. Two canes at bedside)    Discharge Preparedness  What is your plan after discharge?: Home with help  What are your discharge supports?: Spouse  Prior Functional Level: Ambulatory, Uses Cane, Independent with Medication Management, Independent with Activities of Daily Living    Functional Assesment  Prior Functional Level: Ambulatory, Uses Cane, Independent with Medication Management, Independent with Activities of Daily Living    Finances  Prescription Coverage: Yes                   Domestic Abuse  Have you ever been the victim of abuse or violence?: No              Anticipated Discharge Information  Discharge Disposition: Discharged to home/self care (01)

## 2025-03-24 NOTE — CARE PLAN
Problem: Knowledge Deficit - Standard  Goal: Patient and family/care givers will demonstrate understanding of plan of care, disease process/condition, diagnostic tests and medications  Outcome: Progressing     Problem: Psychosocial  Goal: Patient's level of anxiety will decrease  Reactivated  Goal: Patient's ability to verbalize feelings about condition will improve  Reactivated  Goal: Patient's ability to re-evaluate and adapt role responsibilities will improve  Reactivated  Goal: Patient and family will demonstrate ability to cope with life altering diagnosis and/or procedure  Reactivated  Goal: Spiritual and cultural needs incorporated into hospitalization  Reactivated     Problem: Communication  Goal: The ability to communicate needs accurately and effectively will improve  Reactivated     Problem: Hemodynamics  Goal: Patient's hemodynamics, fluid balance and neurologic status will be stable or improve  Reactivated     Problem: Fluid Volume  Goal: Fluid volume balance will be maintained  Reactivated   The patient is Stable - Low risk of patient condition declining or worsening         Progress made toward(s) clinical / shift goals:      Patient is not progressing towards the following goals:

## 2025-03-24 NOTE — ASSESSMENT & PLAN NOTE
Extensive cardiovascular disease, with history of TAVR, CEA, atrial fibrillation.    Continue home Coumadin, aspirin atorvastatin, Zetia, metoprolol, Avapro.

## 2025-03-24 NOTE — ED NOTES
Patient resting in hospital bed, respirations unlabored, on 2 L nasal cannula to maintain spo2 at 94%.

## 2025-03-24 NOTE — ED NOTES
Assumed care of pt aware of plan to admit  Wife & pt aware of plan & are in agreement   No s/s of acute distress no needs at this stime

## 2025-03-24 NOTE — ASSESSMENT & PLAN NOTE
Decompensated AEB acute hypoxia and elevated BNP  Patient previously had normal systolic function 1 year ago but has significantly elevated BNP currently.    Echo pending for assessment of TAVR and LVEF

## 2025-03-24 NOTE — ED NOTES
Medication history reviewed with patient. Med rec is complete.   Allergies reviewed.   Patient denied any outpatient antibiotics in the last 30 days.   Anticoagulants (rivaroxaban, apixaban, edoxaban, dabigatran, enoxaparin) taken in the last 14 days? Yes, Anticoagulant: Warfarin, Last dose: 3/22 PM.   Dispense history available in EPIC? Yes      Nadege Dorsey, PharmD

## 2025-03-24 NOTE — PROGRESS NOTES
Telemetry Shift Summary     Rhythm sb  HR Range 52  Ectopy   Measurements  0.25/0.11/0.35  Per strip printed 1600     Normal Values  Rhythm SR  HR Range    Measurements 0.12-0.20 / 0.06-0.10  / 0.30-0.52

## 2025-03-25 ENCOUNTER — APPOINTMENT (OUTPATIENT)
Dept: CARDIOLOGY | Facility: MEDICAL CENTER | Age: 89
DRG: 291 | End: 2025-03-25
Attending: INTERNAL MEDICINE
Payer: MEDICARE

## 2025-03-25 ENCOUNTER — APPOINTMENT (OUTPATIENT)
Dept: RADIOLOGY | Facility: MEDICAL CENTER | Age: 89
DRG: 291 | End: 2025-03-25
Attending: INTERNAL MEDICINE
Payer: MEDICARE

## 2025-03-25 PROBLEM — R09.02 HYPOXIA: Status: ACTIVE | Noted: 2025-03-23

## 2025-03-25 LAB
INR PPP: 2.03 (ref 0.87–1.13)
LV EJECT FRACT  99904: 45
LV EJECT FRACT MOD 2C 99903: 34.51
LV EJECT FRACT MOD 4C 99902: 39.95
LV EJECT FRACT MOD BP 99901: 37.83
PROTHROMBIN TIME: 23.6 SEC (ref 12–14.6)
TROPONIN T SERPL-MCNC: 20 NG/L (ref 6–19)

## 2025-03-25 PROCEDURE — 93306 TTE W/DOPPLER COMPLETE: CPT | Mod: 26 | Performed by: INTERNAL MEDICINE

## 2025-03-25 PROCEDURE — 99232 SBSQ HOSP IP/OBS MODERATE 35: CPT | Performed by: STUDENT IN AN ORGANIZED HEALTH CARE EDUCATION/TRAINING PROGRAM

## 2025-03-25 PROCEDURE — 700102 HCHG RX REV CODE 250 W/ 637 OVERRIDE(OP): Performed by: INTERNAL MEDICINE

## 2025-03-25 PROCEDURE — 700102 HCHG RX REV CODE 250 W/ 637 OVERRIDE(OP): Performed by: HOSPITALIST

## 2025-03-25 PROCEDURE — 99222 1ST HOSP IP/OBS MODERATE 55: CPT | Performed by: INTERNAL MEDICINE

## 2025-03-25 PROCEDURE — A9270 NON-COVERED ITEM OR SERVICE: HCPCS | Performed by: INTERNAL MEDICINE

## 2025-03-25 PROCEDURE — 700117 HCHG RX CONTRAST REV CODE 255: Performed by: INTERNAL MEDICINE

## 2025-03-25 PROCEDURE — 84484 ASSAY OF TROPONIN QUANT: CPT

## 2025-03-25 PROCEDURE — 85610 PROTHROMBIN TIME: CPT

## 2025-03-25 PROCEDURE — 93306 TTE W/DOPPLER COMPLETE: CPT

## 2025-03-25 PROCEDURE — 770020 HCHG ROOM/CARE - TELE (206)

## 2025-03-25 PROCEDURE — A9270 NON-COVERED ITEM OR SERVICE: HCPCS | Performed by: HOSPITALIST

## 2025-03-25 PROCEDURE — 94760 N-INVAS EAR/PLS OXIMETRY 1: CPT

## 2025-03-25 PROCEDURE — 36415 COLL VENOUS BLD VENIPUNCTURE: CPT

## 2025-03-25 PROCEDURE — 700111 HCHG RX REV CODE 636 W/ 250 OVERRIDE (IP): Mod: JZ | Performed by: INTERNAL MEDICINE

## 2025-03-25 RX ORDER — TEMAZEPAM 15 MG/1
30 CAPSULE ORAL ONCE
Status: COMPLETED | OUTPATIENT
Start: 2025-03-25 | End: 2025-03-25

## 2025-03-25 RX ORDER — REGADENOSON 0.08 MG/ML
0.4 INJECTION, SOLUTION INTRAVENOUS ONCE
Status: DISCONTINUED | OUTPATIENT
Start: 2025-03-25 | End: 2025-03-25

## 2025-03-25 RX ORDER — AMINOPHYLLINE 25 MG/ML
100 INJECTION, SOLUTION INTRAVENOUS
Status: DISCONTINUED | OUTPATIENT
Start: 2025-03-25 | End: 2025-03-25

## 2025-03-25 RX ADMIN — WARFARIN SODIUM 6 MG: 3 TABLET ORAL at 17:34

## 2025-03-25 RX ADMIN — GABAPENTIN 300 MG: 300 CAPSULE ORAL at 17:34

## 2025-03-25 RX ADMIN — ATORVASTATIN CALCIUM 40 MG: 40 TABLET, FILM COATED ORAL at 17:34

## 2025-03-25 RX ADMIN — FUROSEMIDE 40 MG: 10 INJECTION, SOLUTION INTRAVENOUS at 17:34

## 2025-03-25 RX ADMIN — METOPROLOL SUCCINATE 25 MG: 25 TABLET, EXTENDED RELEASE ORAL at 09:02

## 2025-03-25 RX ADMIN — ASPIRIN 81 MG CHEWABLE TABLET 81 MG: 81 TABLET CHEWABLE at 06:17

## 2025-03-25 RX ADMIN — HUMAN ALBUMIN MICROSPHERES AND PERFLUTREN 3 ML: 10; .22 INJECTION, SOLUTION INTRAVENOUS at 17:45

## 2025-03-25 RX ADMIN — GABAPENTIN 300 MG: 300 CAPSULE ORAL at 06:16

## 2025-03-25 RX ADMIN — IRBESARTAN 300 MG: 150 TABLET ORAL at 06:16

## 2025-03-25 RX ADMIN — TEMAZEPAM 30 MG: 15 CAPSULE ORAL at 23:02

## 2025-03-25 RX ADMIN — GABAPENTIN 300 MG: 300 CAPSULE ORAL at 12:34

## 2025-03-25 RX ADMIN — FUROSEMIDE 40 MG: 10 INJECTION, SOLUTION INTRAVENOUS at 06:17

## 2025-03-25 RX ADMIN — AMLODIPINE BESYLATE 2.5 MG: 5 TABLET ORAL at 06:17

## 2025-03-25 RX ADMIN — EZETIMIBE 10 MG: 10 TABLET ORAL at 06:17

## 2025-03-25 ASSESSMENT — ENCOUNTER SYMPTOMS
FEVER: 0
PSYCHIATRIC NEGATIVE: 1
NEUROLOGICAL NEGATIVE: 1
EYES NEGATIVE: 1
DIARRHEA: 0
NAUSEA: 0
WEAKNESS: 0
DIZZINESS: 0
CONSTIPATION: 0
SHORTNESS OF BREATH: 0
CHILLS: 0
PALPITATIONS: 0
CARDIOVASCULAR NEGATIVE: 1
CONSTITUTIONAL NEGATIVE: 1
BRUISES/BLEEDS EASILY: 0
HEADACHES: 0
VOMITING: 0
SORE THROAT: 0
MUSCULOSKELETAL NEGATIVE: 1
SHORTNESS OF BREATH: 1
NECK PAIN: 0
SINUS PAIN: 0
COUGH: 0
BACK PAIN: 0
DEPRESSION: 0
EYE PAIN: 0
MYALGIAS: 0
GASTROINTESTINAL NEGATIVE: 1
BLOOD IN STOOL: 0
ABDOMINAL PAIN: 0
NERVOUS/ANXIOUS: 0

## 2025-03-25 ASSESSMENT — FIBROSIS 4 INDEX: FIB4 SCORE: 5.13

## 2025-03-25 NOTE — CONSULTS
Cardiology Initial Consultation    Date of Service  3/25/2025    Referring Physician  Branden Su M.D.    Reason for Consultation  Shortness of breath with history of TAVR placement.     History of Presenting Illness  Everardo Hester is a 89 y.o. male with a past medical history of TAVR (transcatheter aortic valve replacement using 29 mm Forbes silva 3 valve by Fernanda Parrishh and Armida Anton 03/02/20), coronary artery disease, hypertension and hyperlipidemia, atrial fibrillation on chronic anticoagulation therapy, transient ischemic attack who presented 3/23/2025 with shortness of breath. He denies chest pain. He is followed in our office predominantly by Christopher Latham.     Review of Systems  Review of Systems   Constitutional: Negative.  Negative for chills and fever.   HENT: Negative.  Negative for hearing loss.    Eyes: Negative.    Respiratory:  Positive for shortness of breath. Negative for cough.    Cardiovascular: Negative.  Negative for chest pain, palpitations and leg swelling.   Gastrointestinal: Negative.  Negative for abdominal pain, nausea and vomiting.   Genitourinary: Negative.  Negative for dysuria and urgency.   Musculoskeletal: Negative.  Negative for myalgias.   Skin: Negative.  Negative for rash.   Neurological: Negative.  Negative for dizziness, weakness and headaches.   Hematological:  Does not bruise/bleed easily.   Psychiatric/Behavioral: Negative.  The patient is not nervous/anxious.        Past Medical History   has a past medical history of Aortic valve stenosis, Arrhythmia, Arthritis, Blood clotting disorder ([facility]), Bronchitis, Cancer ([facility]), Carotid arterial disease ([facility]) (1/6/2014), Cataract, DVT (deep venous thrombosis) ([facility]) (2016), Heart valve disease, HLD (hyperlipidemia) (1/6/2014), Hypertension, Mitral regurgitation (1/6/2014), Pain (12-14-15), Pneumonia, Stroke ([facility]) (2008), TIA (transient ischemic attack), Urinary bladder disorder, and Urinary  incontinence.    He has no past medical history of Liver disease.    Surgical History   has a past surgical history that includes carotid endarterectomy (7/10/08); prostatectomy, radial (11/2000); bladder sling male (11/2004); cervical disk and fusion anterior (10/26/2009); lumbar laminectomy diskectomy (10/8/2010); thoracoscopy (6/13/2015); fusion, spine, lumbar, plif (12/28/2015); lumbar decompression (12/28/2015); transcatheter aortic valve replacement (3/2/2020); yuan (3/2/2020); and femoral endarterectomy (Left, 3/2/2020).    Family History  family history includes Autoimmune Disease in his daughter; Cancer in his brother; Heart Disease in his father; No Known Problems in his daughter, son, and son; Other in his brother.    Social History   reports that he quit smoking about 40 years ago. His smoking use included cigarettes and pipe. He started smoking about 70 years ago. He has a 15 pack-year smoking history. He has never used smokeless tobacco. He reports current alcohol use of about 3.0 oz of alcohol per week. He reports that he does not use drugs.    Medications  Prior to Admission Medications   Prescriptions Last Dose Informant Patient Reported? Taking?   Acetaminophen (TYLENOL PO) 3/23/2025 Morning  Yes Yes   Sig: Take 975-1,300 mg by mouth 2 times a day.   Ascorbic Acid (VITAMIN C PO) 3/23/2025 Morning  Yes Yes   Sig: Take 500 mg by mouth.   Multiple Vitamins-Minerals (DAILY MULTI VITAMIN/MINERALS PO) 3/23/2025 Morning  Yes Yes   Sig: Take  by mouth.   amLODIPine (NORVASC) 2.5 MG Tab 3/23/2025 Morning  No Yes   Sig: Take 1 Tablet by mouth every day.   aspirin (ASA) 81 MG Chew Tab chewable tablet 3/23/2025 Morning  No Yes   Sig: Chew 1 Tablet every day.   atorvastatin (LIPITOR) 80 MG tablet 3/22/2025 Evening  No Yes   Sig: TAKE 1/2 (ONE-HALF) TABLET BY MOUTH ONCE DAILY IN THE EVENING   ezetimibe (ZETIA) 10 MG Tab 3/23/2025 Morning  No Yes   Sig: Take 1 tablet by mouth once daily   gabapentin (NEURONTIN)  300 MG Cap 3/23/2025 Morning  No Yes   Sig: Take 1 Capsule by mouth 3 times a day.   irbesartan (AVAPRO) 300 MG Tab 3/23/2025 Morning  No Yes   Sig: Take 1 tablet by mouth once daily   metoprolol SR (TOPROL XL) 25 MG TABLET SR 24 HR 3/23/2025 Morning  No Yes   Sig: Take 0.5 Tablets by mouth every day.   Patient taking differently: Take 25 mg by mouth every day.   temazepam (RESTORIL) 30 MG capsule 3/22/2025 Evening  No Yes   Sig: Take 1 Capsule by mouth at bedtime as needed for Sleep for up to 180 days.   warfarin (COUMADIN) 4 MG Tab 3/22/2025 Evening  No Yes   Sig: TAKE 1 (ONE) TO 1 & 1/2 (ONE & ONE-HALF) TABLETS BY MOUTH ONCE DAILY AS DIRECTED BY [facility]  ANTICOAGULATION CLINIC   Patient taking differently: TAKE 1 (ONE) TO 1 & 1/2 (ONE & ONE-HALF) TABLETS BY MOUTH ONCE DAILY AS DIRECTED BY [facility]  ANTICOAGULATION [facility]    6 mg on Tues, Thurs, Sat  4 mg on all other days      Facility-Administered Medications: None       Allergies  Allergies   Allergen Reactions    Other Environmental Runny Nose and Itching     Seasonal, cats-hayfever       Vital signs in last 24 hours  Temp:  [36.1 °C (97 °F)-37.1 °C (98.8 °F)] 36.6 °C (97.8 °F)  Pulse:  [50-61] 52  Resp:  [20-22] 20  BP: ()/(50-66) 86/54  SpO2:  [90 %-94 %] 90 %    Physical Exam  Physical Exam  Constitutional:       Appearance: Normal appearance. He is well-developed and normal weight.   HENT:      Head: Normocephalic and atraumatic.      Mouth/Throat:      Mouth: Mucous membranes are moist.   Eyes:      Extraocular Movements: Extraocular movements intact.      Conjunctiva/sclera: Conjunctivae normal.   Cardiovascular:      Rate and Rhythm: Normal rate and regular rhythm.      Pulses: Normal pulses.      Heart sounds: Normal heart sounds.   Pulmonary:      Effort: Pulmonary effort is normal.      Breath sounds: Normal breath sounds.   Abdominal:      General: Bowel sounds are normal.      Palpations: Abdomen is soft.   Musculoskeletal:         General:  Normal range of motion.      Cervical back: Normal range of motion and neck supple.   Skin:     General: Skin is warm and dry.   Neurological:      General: No focal deficit present.      Mental Status: He is alert and oriented to person, place, and time. Mental status is at baseline.   Psychiatric:         Mood and Affect: Mood normal.         Behavior: Behavior normal.         Thought Content: Thought content normal.         Judgment: Judgment normal.         Lab Review  Lab Results   Component Value Date/Time    WBC 6.7 03/24/2025 02:20 AM    RBC 4.36 (L) 03/24/2025 02:20 AM    HEMOGLOBIN 13.2 (L) 03/24/2025 02:20 AM    HEMATOCRIT 40.7 (L) 03/24/2025 02:20 AM    MCV 93.3 03/24/2025 02:20 AM    MCH 30.3 03/24/2025 02:20 AM    MCHC 32.4 03/24/2025 02:20 AM    MPV 10.9 03/24/2025 02:20 AM      Lab Results   Component Value Date/Time    SODIUM 139 03/24/2025 02:20 AM    POTASSIUM 3.8 03/24/2025 02:20 AM    CHLORIDE 103 03/24/2025 02:20 AM    CO2 26 03/24/2025 02:20 AM    GLUCOSE 92 03/24/2025 02:20 AM    BUN 20 03/24/2025 02:20 AM    CREATININE 0.87 03/24/2025 02:20 AM    CREATININE 1.0 07/02/2008 03:43 PM    BUNCREATRAT 22 01/14/2025 06:10 AM      Lab Results   Component Value Date/Time    ASTSGOT 38 03/23/2025 05:25 PM    ALTSGPT 16 03/23/2025 05:25 PM     Lab Results   Component Value Date/Time    CHOLSTRLTOT 126 01/14/2025 06:10 AM    CHOLSTRLTOT 125 04/05/2022 03:04 PM    LDL 63 04/05/2022 03:04 PM    HDL 48 01/14/2025 06:10 AM    HDL 51 04/05/2022 03:04 PM    TRIGLYCERIDE 74 01/14/2025 06:10 AM    TRIGLYCERIDE 54 04/05/2022 03:04 PM    TROPONINT 20 (H) 03/25/2025 08:26 AM       Recent Labs     03/23/25  1725   NTPROBNP 52745*       Cardiac Imaging and Procedures Review  CARDIAC STUDIES/PROCEDURES:    CARDIAC CATHETERIZATION CONCLUSIONS (02/21/20)  1.  Low flow, low gradient severe aortic stenosis with peak to peak gradient   of 40 mmHg, mean gradient of 34 mmHg, calculated ENRIKE of 0.81 cm2.  2.  Nonobstructive  coronary artery disease.  3.  Dilated left ventricular systolic function with reduced left ventricular   systolic function, ejection fraction of 30%.  Global hypokinesis.  4.  Elevated left ventricular end-diastolic pressure.  5.  Mildly dilated ascending aorta.  6.  Large distal abdominal aortic and bilateral iliofemoral system with   tortuosity of the iliofemoral system, right greater than left.  (study result reviewed)     ECHOCARDIOGRAM CONCLUSIONS (03/11/24)  Normal left ventricular systolic function.  Mild mitral regurgitation.  Known TAVR aortic valve that is functioning normally with normal   transvalvular gradients.  (study result reviewed)     EKG performed on (03/23/25) was reviewed: EKG personally interpreted shows sinus rhythm.     Assessment/Plan  Shortness of breath with history of TAVR placement: He is a 89 y.o. 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 He is pending echocardiogram.    Thank you for allowing me to participate in the care of this patient.    I will continue to follow this patient    Please contact me with any questions.    Gurinder Patel M.D.   Cardiologist, [facility]  [phone]

## 2025-03-25 NOTE — CARE PLAN
The patient is Stable - Low risk of patient condition declining or worsening    Shift Goals  Clinical Goals: Diurese Comfort    Progress made toward(s) clinical / shift goals:    Problem: Knowledge Deficit - Standard  Goal: Patient and family/care givers will demonstrate understanding of plan of care, disease process/condition, diagnostic tests and medications  Outcome: Progressing     Problem: Hemodynamics  Goal: Patient's hemodynamics, fluid balance and neurologic status will be stable or improve  Outcome: Progressing     Problem: Fluid Volume  Goal: Fluid volume balance will be maintained  Outcome: Progressing       Patient is not progressing towards the following goals:

## 2025-03-25 NOTE — PROGRESS NOTES
Inpatient Anticoagulation Service Note for 3/25/2025      Reason for Anticoagulation: Atrial Fibrillation, Deep Vein Thrombosis, Transient Ischemic Attack, Other (Comments)   [id number] VASc Score: 6  HAS-BLED Score: 4    Hemoglobin Value: (!) 13.2  Hematocrit Value: (!) 40.7  Lab Platelet Value: (!) 163  Target INR: 2.0 to 3.0     Date/Time INR Value    03/25/25 1013 2.03     03/24/25 0435 2.11     03/23/25 1725 2.13            Date/Time Dose (mg)    03/25/25 1322 6     03/24/25 1500 4     03/23/25 2112 4           Average Dose (mg):  (6 mg on Tues, Thurs, Sat, 4 mg all other days)  Significant Interactions: Antiplatelet Medications  Bridge Therapy: No (If less than 5 days and overlap therapy discontinued -- document reason (i.e. Bleed Risk))    (If still on overlap therapy, if No -- document reason (i.e. Bleed Risk))    Reversal Agent Administered: Not Applicable  A/P:  Patient with history of DVT, TIA, Afib, s/p TAVR on warfarin therapy. Patient follows with anticoagulation clinic outpatient and has a TTR of 73.2% on current regimen. Current regimen is 6 mg on Tuesday, Thursday, Saturday and 4 mg all other days. INR is therapeutic. Plan to continue home regimen.    Education Material Provided?: No    Pharmacist suggested discharge dosing: Continue home regimen of 6mg on Tuesday, Thursday and Saturday, and 4mg all other days     Taylor Hearn, JerzyD

## 2025-03-25 NOTE — CARE PLAN
Problem: Knowledge Deficit - Standard  Goal: Patient and family/care givers will demonstrate understanding of plan of care, disease process/condition, diagnostic tests and medications  Outcome: Progressing     Problem: Psychosocial  Goal: Patient's level of anxiety will decrease  Outcome: Progressing  Goal: Patient's ability to verbalize feelings about condition will improve  Outcome: Progressing  Goal: Patient's ability to re-evaluate and adapt role responsibilities will improve  Outcome: Progressing  Goal: Patient and family will demonstrate ability to cope with life altering diagnosis and/or procedure  Outcome: Progressing  Goal: Spiritual and cultural needs incorporated into hospitalization  Outcome: Progressing     Problem: Communication  Goal: The ability to communicate needs accurately and effectively will improve  Outcome: Progressing     Problem: Hemodynamics  Goal: Patient's hemodynamics, fluid balance and neurologic status will be stable or improve  Outcome: Progressing     Problem: Fluid Volume  Goal: Fluid volume balance will be maintained  Outcome: Progressing   The patient is Stable - Low risk of patient condition declining or worsening    Shift Goals  Clinical Goals: Diurese Comfort    Progress made toward(s) clinical / shift goals:      Patient is not progressing towards the following goals:

## 2025-03-25 NOTE — DISCHARGE PLANNING
Discussed during IDT rounds. Per MD, wants to go home. Dispo pending echo. Stress test has been cancelled.

## 2025-03-25 NOTE — PROGRESS NOTES
Telemetry Shift Summary     Rhythm: SR/SB 1st Degree AVB  Rate: 50s-60s  Measurements: 0.25/0.10/0.50  Ectopy (reported by Monitor Tech): rare PVC/PAC     Normal Values  Rhythm: Sinus  HR:   Measurements: 0.12-0.20/0.06-0.10/0.30-0.52

## 2025-03-25 NOTE — DOCUMENTATION QUERY
[facility]                                                                       Query Response Note      PATIENT:               ANTHONY JOSHI  ACCT #:                  [id number]  MRN:                     [id number]  :                      1935  ADMIT DATE:       3/23/2025 5:20 PM  DISCH DATE:          RESPONDING  PROVIDER #:        [id number]           QUERY TEXT:    Please provide additional clinical indicators supportive of your documented diagnosis of acute respiratory failure with hypoxia.   Shortness of breath with normal pulmonary effort per H&P.  Hypoxemia seems to have resolved, improved without antibiotics, pulmonary effort is normal, no respiratory distress per progress note     Note: If you agree with a diagnosis listed, please remember to include it in your concurrent daily documentation and onto the Discharge Summary    The patient's Clinical Indicators include:  - Findings:  H&P:  Shortness of breath,  Chest x-ray showed  patchy bibasilar opacity, left more than right, unclear if atelectasis versus infection.  Pulmonary effort is normal,     - Progress note 3/24:  Hypoxemia seems to have resolved, patient feels more like himself.  However needed additional assessment with echocardiogram, stress test and additional diuresis.   Acute respiratory failure with hypoxia,  improved without initiation of antibiotics, respiratory failure likely related to ischemic heart disease or congestive heart failure  pulmonary effort is normal, no respiratory distress    - RR 20-33 (22-33 briefly, then normalized to 20)   - RA SP02:  90-93%,  SP02 1-1.5 L NC:  90-93%,  SP02 2L NC:  91-96%     - CXR:  Patchy bibasilar opacity, left more than right, atelectasis or infection.    - Treatments:  CXR, 02, lasix, echocardiogram     - Risk factors:  hypoxemia, CHF, AFIB, heart disease     Thank You,  Rosa Boudreaux RN  Clinical Documentation    [email or web address]  Connect via [email or web address] Messenger  Options provided:   -- Condition of acute respiratory failure exists, (please document additional clinical indicators)   -- Condition of Acute respiratory failure ruled out, hypoxia only, amended documentation provided in the medical record   -- Other explanation, (please specify other explanation)      Query created by: Rosa Boudreaux on 3/25/2025 12:22 PM    RESPONSE TEXT:    Condition of Acute respiratory failure ruled out, hypoxia only, amended documentation provided in the medical record          Electronically signed by:  SOSA MICHAELS MD 3/25/2025 4:38 PM

## 2025-03-26 ENCOUNTER — TELEPHONE (OUTPATIENT)
Dept: CARDIOLOGY | Facility: MEDICAL CENTER | Age: 89
End: 2025-03-26
Payer: MEDICARE

## 2025-03-26 VITALS
SYSTOLIC BLOOD PRESSURE: 94 MMHG | TEMPERATURE: 98.1 F | HEIGHT: 71 IN | BODY MASS INDEX: 25.06 KG/M2 | DIASTOLIC BLOOD PRESSURE: 50 MMHG | WEIGHT: 179.01 LBS | OXYGEN SATURATION: 95 % | HEART RATE: 57 BPM | RESPIRATION RATE: 17 BRPM

## 2025-03-26 DIAGNOSIS — Z95.2 S/P TAVR (TRANSCATHETER AORTIC VALVE REPLACEMENT): ICD-10-CM

## 2025-03-26 PROBLEM — I50.21 ACUTE HEART FAILURE WITH MILDLY REDUCED EJECTION FRACTION (HFMREF, 41-49%) (HCC): Status: RESOLVED | Noted: 2025-03-24 | Resolved: 2025-03-26

## 2025-03-26 PROBLEM — J96.01 ACUTE HYPOXIC RESPIRATORY FAILURE (HCC): Status: RESOLVED | Noted: 2025-03-23 | Resolved: 2025-03-26

## 2025-03-26 PROBLEM — I50.21 ACUTE HEART FAILURE WITH MILDLY REDUCED EJECTION FRACTION (HFMREF, 41-49%) (HCC): Status: ACTIVE | Noted: 2025-03-24

## 2025-03-26 LAB
ANION GAP SERPL CALC-SCNC: 13 MMOL/L (ref 7–16)
BUN SERPL-MCNC: 35 MG/DL (ref 8–22)
CALCIUM SERPL-MCNC: 8.8 MG/DL (ref 8.4–10.2)
CHLORIDE SERPL-SCNC: 102 MMOL/L (ref 96–112)
CO2 SERPL-SCNC: 25 MMOL/L (ref 20–33)
CREAT SERPL-MCNC: 1.03 MG/DL (ref 0.5–1.4)
GFR SERPLBLD CREATININE-BSD FMLA CKD-EPI: 70 ML/MIN/1.73 M 2
GLUCOSE SERPL-MCNC: 95 MG/DL (ref 65–99)
MAGNESIUM SERPL-MCNC: 1.9 MG/DL (ref 1.5–2.5)
POTASSIUM SERPL-SCNC: 3.6 MMOL/L (ref 3.6–5.5)
SODIUM SERPL-SCNC: 140 MMOL/L (ref 135–145)
TROPONIN T SERPL-MCNC: 24 NG/L (ref 6–19)

## 2025-03-26 PROCEDURE — 700111 HCHG RX REV CODE 636 W/ 250 OVERRIDE (IP): Mod: JZ | Performed by: INTERNAL MEDICINE

## 2025-03-26 PROCEDURE — A9270 NON-COVERED ITEM OR SERVICE: HCPCS | Performed by: INTERNAL MEDICINE

## 2025-03-26 PROCEDURE — 99239 HOSP IP/OBS DSCHRG MGMT >30: CPT | Performed by: STUDENT IN AN ORGANIZED HEALTH CARE EDUCATION/TRAINING PROGRAM

## 2025-03-26 PROCEDURE — 36415 COLL VENOUS BLD VENIPUNCTURE: CPT

## 2025-03-26 PROCEDURE — 84484 ASSAY OF TROPONIN QUANT: CPT

## 2025-03-26 PROCEDURE — 80048 BASIC METABOLIC PNL TOTAL CA: CPT

## 2025-03-26 PROCEDURE — 83735 ASSAY OF MAGNESIUM: CPT

## 2025-03-26 PROCEDURE — 700102 HCHG RX REV CODE 250 W/ 637 OVERRIDE(OP): Performed by: INTERNAL MEDICINE

## 2025-03-26 RX ORDER — SPIRONOLACTONE 25 MG/1
25 TABLET ORAL DAILY
Qty: 30 TABLET | Refills: 0 | Status: SHIPPED | OUTPATIENT
Start: 2025-03-26 | End: 2025-04-08 | Stop reason: SDUPTHER

## 2025-03-26 RX ORDER — ACETAMINOPHEN 500 MG
1000 TABLET ORAL EVERY 6 HOURS PRN
COMMUNITY
Start: 2025-03-26

## 2025-03-26 RX ORDER — FUROSEMIDE 40 MG/1
40-80 TABLET ORAL 2 TIMES DAILY
Qty: 120 TABLET | Refills: 0 | Status: SHIPPED | OUTPATIENT
Start: 2025-03-26 | End: 2025-04-03 | Stop reason: SDUPTHER

## 2025-03-26 RX ORDER — METOPROLOL SUCCINATE 25 MG/1
25 TABLET, EXTENDED RELEASE ORAL DAILY
Qty: 100 TABLET | Refills: 3
Start: 2025-03-26

## 2025-03-26 RX ADMIN — EZETIMIBE 10 MG: 10 TABLET ORAL at 05:26

## 2025-03-26 RX ADMIN — IRBESARTAN 300 MG: 150 TABLET ORAL at 05:25

## 2025-03-26 RX ADMIN — GABAPENTIN 300 MG: 300 CAPSULE ORAL at 05:25

## 2025-03-26 RX ADMIN — FUROSEMIDE 40 MG: 10 INJECTION, SOLUTION INTRAVENOUS at 05:28

## 2025-03-26 RX ADMIN — ASPIRIN 81 MG CHEWABLE TABLET 81 MG: 81 TABLET CHEWABLE at 05:25

## 2025-03-26 RX ADMIN — AMLODIPINE BESYLATE 2.5 MG: 5 TABLET ORAL at 05:26

## 2025-03-26 RX ADMIN — GABAPENTIN 300 MG: 300 CAPSULE ORAL at 12:13

## 2025-03-26 NOTE — DISCHARGE SUMMARY
Discharge Summary    CHIEF COMPLAINT ON ADMISSION  Chief Complaint   Patient presents with    Shortness of Breath     X 2 d Incr. With exertion Denies CP Chronic cough No change  Denies fever or chills  Denies calf pain Denies LE edema       Reason for Admission  Acute Hypoxia     Admission Date  3/23/2025    CODE STATUS  Full Code    HPI & HOSPITAL COURSE  This is a 89 y.o. male with AS s/p TAVR, Afib on warfarin, HTN, CAD, and h/o CEA here with exertional dyspnea.     He presented after progression of TELLEZ from limiting his usual exercises to occurring at rest. On presentation he required 2 L/min and was tachypneic consistent with hypoxic respiratory failure. CXR demonstrated L>R bibasilar opacity consistent with prior pleurectomy. BNP was 31K concerning for new decompensated CHF. Troponins were negative for acute ischemia. RPCR negative for COVID/Flu/RSV. Procal was negative indicative of unlikely benefit of antibiotics. He was diuresed with IV furosemide with resolution of AHRF. 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 Cardiology was consulted and recommended initiation of spironolactone and SGLT2 for GDMT and follow up for outpatient ischemic evaluation. He was able to ambulate without hypoxia nor limiting TELLEZ.    Therefore, he is discharged in fair and stable condition to home with close outpatient follow-up.    The patient met 2-midnight criteria for an inpatient stay at the time of discharge.    Discharge Date  3/26/25    FOLLOW UP ITEMS POST DISCHARGE    Acute HFmrEF - diuretics initiated, follow up with Cardiology within 7 days for medication/diuretic adjustment, discussion of outpatient ischemic evaluation with stress test    DISCHARGE DIAGNOSES  Principal Problem (Resolved):    Acute hypoxic respiratory failure (HCC) (POA: Yes)  Active Problems:    Essential hypertension, benign (POA: Yes)    PAF (paroxysmal atrial fibrillation) (HCC) (POA: Yes)    Lower back pain (POA:  Yes)    Coronary artery disease involving native coronary artery of native heart without angina pectoris (POA: Yes)  Resolved Problems:    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, 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%) ([facility]) (POA: Yes)      FOLLOW UP  Future Appointments   Date Time Provider Department Center   5/1/2025 10:45 AM V EXAM 4 VMED None   6/12/2025  9:15 AM [facility] EXAM 6 NONINV NINV [facility]   6/12/2025 10:15 AM [facility] EXAM 9 ECHO [facility]   9/9/2025 10:40 AM ADDISON Curtis VMED None     [address] [facility], [address]  [place] [address] [phone]  Schedule an appointment as soon as possible for a visit in 1 week(s)  after-hospital heart failure follow up      MEDICATIONS ON DISCHARGE     Medication List        START taking these medications        Instructions   Empagliflozin 10 MG Tabs tablet  Commonly known as: Jardiance   Take 1 Tablet by mouth every day.  Dose: 10 mg     furosemide 40 MG Tabs  Commonly known as: Lasix   Take 1-2 Tablets by mouth 2 times a day. Take 1 tablet twice daily. If worsening shortness of breath, swelling, or weight gain, take 2 tablets twice daily that day.  Dose: 40-80 mg     spironolactone 25 MG Tabs  Commonly known as: Aldactone   Take 1 Tablet by mouth every day.  Dose: 25 mg            CHANGE how you take these medications        Instructions   acetaminophen 500 MG Tabs  What changed:   medication strength  how much to take  when to take this  reasons to take this  Commonly known as: Tylenol   Take 2 Tablets by mouth every 6 hours as needed for Mild Pain or Moderate Pain.  Dose: 1,000 mg     warfarin 4 MG Tabs  What changed: additional instructions  Commonly known as: Coumadin   TAKE 1 (ONE) TO 1 & 1/2 (ONE & ONE-HALF) TABLETS BY MOUTH ONCE DAILY AS DIRECTED BY [facility]  ANTICOAGULATION CLINIC            CONTINUE taking these medications        Instructions   aspirin 81 MG Chew chewable tablet  Commonly known as: Asa   Chew 1  Tablet every day.  Dose: 81 mg     atorvastatin 80 MG tablet  Commonly known as: Lipitor   TAKE 1/2 (ONE-HALF) TABLET BY MOUTH ONCE DAILY IN THE EVENING     DAILY MULTI VITAMIN/MINERALS PO   Take  by mouth.     ezetimibe 10 MG Tabs  Commonly known as: Zetia   Take 1 tablet by mouth once daily     gabapentin 300 MG Caps  Commonly known as: Neurontin   Take 1 Capsule by mouth 3 times a day.  Dose: 300 mg     irbesartan 300 MG Tabs  Commonly known as: Avapro   Take 1 tablet by mouth once daily  Dose: 300 mg     metoprolol SR 25 MG Tb24  Commonly known as: Toprol XL   Take 1 Tablet by mouth every day.  Dose: 25 mg     temazepam 30 MG capsule  Commonly known as: Restoril   Take 1 Capsule by mouth at bedtime as needed for Sleep for up to 180 days.  Dose: 30 mg     VITAMIN C PO   Take 500 mg by mouth.  Dose: 500 mg            STOP taking these medications      amLODIPine 2.5 MG Tabs  Commonly known as: Norvasc              Allergies  Allergies   Allergen Reactions    Other Environmental Runny Nose and Itching     Seasonal, cats-hayfever       DIET  Orders Placed This Encounter   Procedures    Diet Order Diet: Cardiac     Standing Status:   Standing     Number of Occurrences:   1     Diet::   Cardiac [6]       ACTIVITY  As tolerated.  Weight bearing as tolerated    CONSULTATIONS  Cardiology    PROCEDURES  None    LABORATORY  Lab Results   Component Value Date    SODIUM 140 03/26/2025    POTASSIUM 3.6 03/26/2025    CHLORIDE 102 03/26/2025    CO2 25 03/26/2025    GLUCOSE 95 03/26/2025    BUN 35 (H) 03/26/2025    CREATININE 1.03 03/26/2025    CREATININE 1.0 07/02/2008        Lab Results   Component Value Date    WBC 6.7 03/24/2025    HEMOGLOBIN 13.2 (L) 03/24/2025    HEMATOCRIT 40.7 (L) 03/24/2025    PLATELETCT 163 (L) 03/24/2025        Total time of the discharge process exceeds 37 minutes.

## 2025-03-26 NOTE — CARE PLAN
The patient is Watcher - Medium risk of patient condition declining or worsening    Shift Goals  Clinical Goals: Diurese, Echo stress test  Patient Goals: Comfort    Progress made toward(s) clinical / shift goals:    Problem: Knowledge Deficit - Standard  Goal: Patient and family/care givers will demonstrate understanding of plan of care, disease process/condition, diagnostic tests and medications  3/26/2025 0035 by Pb Lam R.N.  Outcome: Progressing  3/25/2025 2009 by Pb Lam R.N.  Outcome: Progressing     Problem: Hemodynamics  Goal: Patient's hemodynamics, fluid balance and neurologic status will be stable or improve  3/26/2025 0035 by Pb Lam R.N.  Outcome: Progressing  3/25/2025 2009 by Pb Lam R.N.  Outcome: Progressing     Problem: Fluid Volume  Goal: Fluid volume balance will be maintained  3/26/2025 0035 by Pb Lam R.N.  Outcome: Progressing  3/25/2025 2009 by Pb Lam R.N.  Outcome: Progressing     Problem: Fall Risk  Goal: Patient will remain free from falls  3/26/2025 0035 by Pb Lam R.N.  Outcome: Progressing  3/25/2025 2009 by Pb Lam R.N.  Outcome: Progressing       Patient is not progressing towards the following goals:

## 2025-03-26 NOTE — TELEPHONE ENCOUNTER
[facility]    Caller: Tristan Hester    Topic/issue: Patient recently released from hospital and was prescribed Empagliflozin (JARDIANCE) 10 MG Tab tablet which is $700. Patient would rather not spend that amount of money on one medication and would like to know if there is alternatives to that medication? I scheduled patient for follow up with [facility] on 4/3 to discuss in person as well.     Callback Number: [phone]    Thank you,  Daya TORRES

## 2025-03-26 NOTE — PROGRESS NOTES
Telemetry Shift Summary     Rhythm sb  HR Range 53-59  Ectopy pvc,pac  Measurements  0.25/0.08/0.47  Per strip printed 1600     Normal Values  Rhythm SR  HR Range    Measurements 0.12-0.20 / 0.06-0.10  / 0.30-0.52
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
## 2025-03-26 NOTE — PROGRESS NOTES
Telemetry Shift Summary     Rhythm: SB 1st Degree AVB  Rate: 50s  Measurements: 0.24/0.09/0.48  Ectopy (reported by Monitor Tech): rare PVC/PAC     Normal Values  Rhythm: Sinus  HR:   Measurements: 0.12-0.20/0.06-0.10/0.30-0.52

## 2025-03-26 NOTE — DISCHARGE INSTRUCTIONS
Discharge Instructions per Branden Su M.D.    You were hospitalized for low oxygen (hypoxia) due to excess fluid build up from your heart (acute heart failure). This was determined to be due to a slight dysfunction of the heart (reduced ejection fraction) of uncertain cause.    A cardiologist recommended transition of your meds and you will follow up after discharge for further discussion of diagnostic testing or medication.    DIET: Low-sodium (2,000 mg or less daily)    ACTIVITY: Regular    DIAGNOSIS: Acute Heart Failure with Mildly Reduced Ejection Fraction    Return to ER if you develop sudden chest pain or shortness of breath, develop bleeding in your bowels or vomit, faint for any reason.

## 2025-03-26 NOTE — CARE PLAN
The patient is Watcher - Medium risk of patient condition declining or worsening    Shift Goals  Clinical Goals: Diurese, Echo stress test  Patient Goals: Comfort    Progress made toward(s) clinical / shift goals:    Problem: Knowledge Deficit - Standard  Goal: Patient and family/care givers will demonstrate understanding of plan of care, disease process/condition, diagnostic tests and medications  Outcome: Progressing     Problem: Hemodynamics  Goal: Patient's hemodynamics, fluid balance and neurologic status will be stable or improve  Outcome: Progressing     Problem: Fluid Volume  Goal: Fluid volume balance will be maintained  Outcome: Progressing     Problem: Fall Risk  Goal: Patient will remain free from falls  Outcome: Progressing       Patient is not progressing towards the following goals:

## 2025-03-27 NOTE — TELEPHONE ENCOUNTER
Message sent to [facility].     ---------------------------    Nina Hawk  You1 hour ago (11:25 AM)    JENELLE Irby.  I just spoke to Everardo about his copay and offered to go through screening for FA. He was reluctant unfortunately and mentioned he has an appointment soon with [facility]. He stated he would rather see if there are less costly medication alternatives that can be recommended.  Thank you!  Anthony Hawk  Rx Coordinator    Everardo Cobos [phone] hour ago (11:13 AM)    JENELLE  Spoke to Everardo and went over the FA options such as [facility] and [facility] at [facility]. He verbalized understanding and decided he did not want to go through the screening process instead he would like to see if there are possible med alternatives.  Outgoing call

## 2025-03-27 NOTE — TELEPHONE ENCOUNTER
Phone Number Called: [phone]     Call outcome: Spoke to patient regarding message below.    Message: Called to inform patient of BE recommendations. Patient verbalized understanding. No further questions at this time.     --------------    Christopher Bae M.D.  You2 hours ago (1:54 PM)      Can we refer to HF pharmacitst?  Thx

## 2025-04-03 ENCOUNTER — OFFICE VISIT (OUTPATIENT)
Dept: CARDIOLOGY | Facility: MEDICAL CENTER | Age: 89
End: 2025-04-03
Attending: INTERNAL MEDICINE
Payer: MEDICARE

## 2025-04-03 ENCOUNTER — TELEPHONE (OUTPATIENT)
Dept: CARDIOLOGY | Facility: MEDICAL CENTER | Age: 89
End: 2025-04-03
Payer: MEDICARE

## 2025-04-03 VITALS
SYSTOLIC BLOOD PRESSURE: 100 MMHG | WEIGHT: 178.8 LBS | BODY MASS INDEX: 25.03 KG/M2 | RESPIRATION RATE: 16 BRPM | DIASTOLIC BLOOD PRESSURE: 62 MMHG | OXYGEN SATURATION: 95 % | HEART RATE: 64 BPM | HEIGHT: 71 IN

## 2025-04-03 DIAGNOSIS — I48.0 PAF (PAROXYSMAL ATRIAL FIBRILLATION) (HCC): ICD-10-CM

## 2025-04-03 DIAGNOSIS — I50.32 CHRONIC HEART FAILURE WITH PRESERVED EJECTION FRACTION (HCC): ICD-10-CM

## 2025-04-03 DIAGNOSIS — I25.10 CORONARY ARTERY DISEASE INVOLVING NATIVE CORONARY ARTERY OF NATIVE HEART WITHOUT ANGINA PECTORIS: ICD-10-CM

## 2025-04-03 DIAGNOSIS — I34.0 MODERATE MITRAL REGURGITATION: ICD-10-CM

## 2025-04-03 DIAGNOSIS — G45.9 TIA (TRANSIENT ISCHEMIC ATTACK): ICD-10-CM

## 2025-04-03 DIAGNOSIS — I65.29 STENOSIS OF CAROTID ARTERY, UNSPECIFIED LATERALITY: ICD-10-CM

## 2025-04-03 DIAGNOSIS — Z95.2 S/P TAVR (TRANSCATHETER AORTIC VALVE REPLACEMENT): ICD-10-CM

## 2025-04-03 PROCEDURE — 3078F DIAST BP <80 MM HG: CPT | Performed by: INTERNAL MEDICINE

## 2025-04-03 PROCEDURE — G2211 COMPLEX E/M VISIT ADD ON: HCPCS | Performed by: INTERNAL MEDICINE

## 2025-04-03 PROCEDURE — 3074F SYST BP LT 130 MM HG: CPT | Performed by: INTERNAL MEDICINE

## 2025-04-03 PROCEDURE — 99215 OFFICE O/P EST HI 40 MIN: CPT | Performed by: INTERNAL MEDICINE

## 2025-04-03 PROCEDURE — 99213 OFFICE O/P EST LOW 20 MIN: CPT | Performed by: INTERNAL MEDICINE

## 2025-04-03 RX ORDER — FUROSEMIDE 40 MG/1
40-80 TABLET ORAL PRN
Qty: 90 TABLET | Refills: 3
Start: 2025-04-03

## 2025-04-03 ASSESSMENT — FIBROSIS 4 INDEX: FIB4 SCORE: 5.13
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
## 2025-04-07 ENCOUNTER — TELEPHONE (OUTPATIENT)
Dept: CARDIOLOGY | Facility: MEDICAL CENTER | Age: 89
End: 2025-04-07
Payer: MEDICARE

## 2025-04-07 NOTE — TELEPHONE ENCOUNTER
BE    Caller: Everardo Hester     Topic/issue: Wants to discuss an alternative medication to Jardiance - He can not afford the medication Please advise He is also leaving out of town but while he is gone he will need a refill, can his meds be filled early?     Callback Number: [phone]    Thank You   Ariadne DEL REAL

## 2025-04-08 ENCOUNTER — NON-PROVIDER VISIT (OUTPATIENT)
Dept: CARDIOLOGY | Facility: MEDICAL CENTER | Age: 89
End: 2025-04-08
Attending: INTERNAL MEDICINE
Payer: MEDICARE

## 2025-04-08 VITALS
SYSTOLIC BLOOD PRESSURE: 117 MMHG | HEIGHT: 71 IN | DIASTOLIC BLOOD PRESSURE: 67 MMHG | WEIGHT: 178 LBS | BODY MASS INDEX: 24.92 KG/M2 | HEART RATE: 58 BPM

## 2025-04-08 DIAGNOSIS — E78.5 DYSLIPIDEMIA: ICD-10-CM

## 2025-04-08 DIAGNOSIS — I50.21 ACUTE HEART FAILURE WITH MILDLY REDUCED EJECTION FRACTION (HFMREF, 41-49%) (HCC): ICD-10-CM

## 2025-04-08 PROCEDURE — 99213 OFFICE O/P EST LOW 20 MIN: CPT

## 2025-04-08 RX ORDER — EMPAGLIFLOZIN 10 MG/1
10 TABLET, FILM COATED ORAL DAILY
Qty: 30 TABLET | Refills: 6 | Status: SHIPPED | OUTPATIENT
Start: 2025-04-08 | End: 2026-05-13

## 2025-04-08 RX ORDER — SPIRONOLACTONE 25 MG/1
25 TABLET ORAL DAILY
Qty: 100 TABLET | Refills: 1 | Status: SHIPPED | OUTPATIENT
Start: 2025-04-08

## 2025-04-08 RX ORDER — EZETIMIBE 10 MG/1
10 TABLET ORAL DAILY
Qty: 90 TABLET | Refills: 1 | Status: SHIPPED | OUTPATIENT
Start: 2025-04-08 | End: 2025-04-08 | Stop reason: SDUPTHER

## 2025-04-08 RX ORDER — DAPAGLIFLOZIN 10 MG/1
10 TABLET, FILM COATED ORAL DAILY
Qty: 100 TABLET | Refills: 3 | Status: SHIPPED | OUTPATIENT
Start: 2025-04-08 | End: 2025-04-10

## 2025-04-08 RX ORDER — EZETIMIBE 10 MG/1
10 TABLET ORAL DAILY
Qty: 90 TABLET | Refills: 1 | Status: SHIPPED | OUTPATIENT
Start: 2025-04-08

## 2025-04-08 ASSESSMENT — FIBROSIS 4 INDEX: FIB4 SCORE: 5.13

## 2025-04-08 NOTE — TELEPHONE ENCOUNTER
Patient is scheduled for a YOANDY with anesthesia on 05- with Dr. Day. Patient   has been instructed to check in at 9:00 am for 11:00 procedure. No meds to hold. Patient has been advised they will need a  home and with them after since they are sedated. Message sent to authorizations. Sent [email or web address] message to pt with instructions.  FYI sent to [facility]

## 2025-04-08 NOTE — PATIENT INSTRUCTIONS
Entresto or ACE/ARB: continue taking Irbesartan 300 mg once daily  Beta blocker: continue taking Metoprolol 12.5 mg once daily   Diuretic: Furosemide 40 mg, as needed.   Aldosterone antagonist:  start Spironolactone 25 mg once daily, after trip   SGLT-2 Inhibitor: start Jardiance 10 mg once daily or Farxiga 10mg daily depending on cost.     [facility] pharmacy:   [phone]

## 2025-04-08 NOTE — TELEPHONE ENCOUNTER
Phone Number Called: [phone]    Call outcome: Spoke to patient regarding message below.    Message: Called to inform patient that referral from BE to HF pharmacist was processed and looks like is ready to be scheduled, scheduling number provided. Advised that BE wanted to defer to HF pharmacist for alternative to Jardiance, pt verbalized understanding. Advised pt should reach out to his pharmacy directly to see if they could fill his meds early, since he will be out of town for 15 days, pt verbalized understanding. Pt appreciative of the call and recommendations.

## 2025-04-08 NOTE — TELEPHONE ENCOUNTER
Pt was referred to HF pharmacist for med alternatives to Jardiance by MORRO on 3/27  - see tele encounter dated 3/26/25. Pt not yet seen.

## 2025-04-08 NOTE — TELEPHONE ENCOUNTER
You  Nina Wilks now (9:06 AM)     Thank you so much for the update! I will let the provider know!     Nina TORRES You2 minutes ago (9:03 AM)     Good morning Michelle!  I actually talked to Everardo last week and tried to offer FA options but he did not want to be screened for any of the assistance. When I last spoke with him he wanted a alternative medication to be suggested instead. Please refer to the encounter on 3/26/25.  Please let me know if theres anything else you'd like me to do, to assist.  Thanks!  Anthony  Rx Coordinator

## 2025-04-08 NOTE — PROGRESS NOTES
CHF Pharmacotherapy visit:    Tristan Hester  1935    Informed written consent was given on: 04/08/25      PCP:  Michael J Bloch, M.D.  [address]  [place] [address] [phone]  Referral date: 3/27/2025    Patient Care Team                Michael J Bloch, M.D. PCP - General, Internal Medicine [phone]     [address] [place] [address]    Senthil Escoto M.D. Cardiovascular Disease (Cardiology) [phone]     [address] [place] [address]    Michael Almonte M.D. Neurosurgery [phone]     [address] [facility] [address] [facility] [address]    Rosa M Acharya M.D. Dermatology [phone] [address] [place] [address]    [facility]  [phone]     [address] [place] [address]    Michael J Bloch, M.D. Internal Medicine [phone]     [address] [place] [address]            HPI  Tristan Hester is here for CHF and more specifically cost issues procuring Jardiance  Pertinent Interval History since last visit:   New patient appointment  Most recent EF:   Left Ventricular Ejection Fraction (LVEF):  Lab Results   Component Value Date/Time    LVEF 45 03/25/2025 1722    LVEF 65 03/11/2024 1547    LVEF 60 04/01/2022 1329    LVEF 60 03/02/2021 1108    LVEF 55 08/07/2020 1517       Recent Imaging Studies:        Immunization History   Administered Date(s) Administered    COVID-19, mRNA, LNP-S, PF, jose-sucrose, 30 mcg/0.3 mL 10/02/2023, 09/24/2024    INFLUENZA TIV (IM) 10/09/2010, 10/01/2014    Influenza Seasonal Injectable - Historical Data 10/09/2010    Influenza Vaccine Adult HD 10/20/2015, 09/15/2020, 10/09/2021    Influenza Vaccine Quad Inj (Pf) 09/29/2017, 09/21/2018    Influenza Vaccine Quad Recombinant 09/24/2019    Influenza split virus trivalent (PF) 10/11/2011, 10/19/2012, 10/18/2013, 10/02/2014    Influenza, unspecified formulation 10/12/2016    EZRA CLAUDIO CAP SARS-COV-2 VACCINATION (12+) 05/19/2022    PFIZER PURPLE CAP SARS-COV-2  "VACCINATION (12+) 2021, 2021    Pneumococcal Conjugate Vaccine (Prevnar/PCV-13) 12/15/2015    Pneumococcal Conjugate, unspecified formulation 2010       SOCIAL HISTORY  Social History     Tobacco Use   Smoking Status Former    Current packs/day: 0.00    Average packs/day: 0.5 packs/day for 30.0 years (15.0 ttl pk-yrs)    Types: Cigarettes, Pipe    Start date: 1955    Quit date: 1985    Years since quittin.2   Smokeless Tobacco Never        DATA REVIEW  No results found for: \"HBA1C\"       Lab Results   Component Value Date/Time    CHOLSTRLTOT 126 2025 06:10 AM    CHOLSTRLTOT 125 2022 03:04 PM    LDL 63 2022 03:04 PM    HDL 48 2025 06:10 AM    HDL 51 2022 03:04 PM    TRIGLYCERIDE 74 2025 06:10 AM    TRIGLYCERIDE 54 2022 03:04 PM       Lab Results   Component Value Date/Time    LIPOPROTA <6 04/15/2020 11:36 AM      No results found for: \"APOB\"    Lab Results   Component Value Date/Time    SODIUM 140 2025 05:47 AM    POTASSIUM 3.6 2025 05:47 AM    CHLORIDE 102 2025 05:47 AM    CO2 25 2025 05:47 AM    GLUCOSE 95 2025 05:47 AM    BUN 35 (H) 2025 05:47 AM    CREATININE 1.03 2025 05:47 AM    CREATININE 1.0 2008 03:43 PM    BUNCREATRAT 22 2025 06:10 AM     Lab Results   Component Value Date/Time    ALKPHOSPHAT 64 2025 05:25 PM    ASTSGOT 38 2025 05:25 PM    ALTSGPT 16 2025 05:25 PM    TBILIRUBIN 1.6 (H) 2025 05:25 PM    INR 2.03 (H) 2025 10:13 AM    ALBUMIN 4.0 2025 05:25 PM      Lab Results   Component Value Date/Time    RBC 4.36 (L) 2025 02:20 AM    HEMOGLOBIN 13.2 (L) 2025 02:20 AM    HEMATOCRIT 40.7 (L) 2025 02:20 AM    MCV 93.3 2025 02:20 AM    MCH 30.3 2025 02:20 AM    MCHC 32.4 2025 02:20 AM    MPV 10.9 2025 02:20 AM      No results found for: \"MALBCRT\", \"MICROALBUR\"  Lab Results   Component Value Date/Time    " "GASTON 1.380 04/05/2022 1504     No results found for: \"FREET4\"  No results found for: \"FREET3\"  No results found for: \"THYSTIMIG\"          Significant changes to laboratory values since last visit that require repeat labs:  -  Other Pertinent Blood Work:   -      Current Outpatient Medications:     spironolactone, 25 mg, Oral, DAILY, Taking    Farxiga, 10 mg, Oral, DAILY (Patient taking differently: 10 mg, Oral, DAILY, Do not take with Jardiance ), Taking Differently    Jardiance, 10 mg, Oral, DAILY (Patient taking differently: 10 mg, Oral, DAILY, He might need help with copay i.e. 340B. Do not take with Farxiga ), Taking Differently    ezetimibe, 10 mg, Oral, DAILY, Taking    metoprolol SR, 25 mg, Oral, DAILY (Patient taking differently: 12.5 mg, Oral, DAILY), Taking    irbesartan, 300 mg, Oral, DAILY, Taking    furosemide, 40-80 mg, Oral, PRN (Patient not taking: Reported on 4/8/2025), Not Taking    acetaminophen, 1,000 mg, Oral, Q6HRS PRN    warfarin, TAKE 1 (ONE) TO 1 & 1/2 (ONE & ONE-HALF) TABLETS BY MOUTH ONCE DAILY AS DIRECTED BY [facility] (Patient taking differently: TAKE 1 (ONE) TO 1 & 1/2 (ONE & ONE-HALF) TABLETS BY MOUTH ONCE DAILY AS DIRECTED BY [facility]  6 mg on Tues, Thurs, Sat 4 mg on all other days)    gabapentin, 300 mg, Oral, TID    temazepam, 30 mg, Oral, HS PRN    atorvastatin, TAKE 1/2 (ONE-HALF) TABLET BY MOUTH ONCE DAILY IN THE EVENING    aspirin, 81 mg, Oral, DAILY    Ascorbic Acid (VITAMIN C PO), Take 500 mg by mouth.    Multiple Vitamins-Minerals (DAILY MULTI VITAMIN/MINERALS PO), Take  by mouth.    Vitals:    04/08/25 1611   BP: 117/67   Pulse: (!) 58       ASSESSMENT AND PLAN    He is looking for assistance with Jardiance.  Having a hard time affording the medication.  Leaving quickly for a extended trip in approximately 1 week for 1 month.     CHF & HTN:  PUMP line number [phone] (PUMP) reviewed with patient  Is blood pressure at less " <130/80 in clinic today: y  Home BP and HR:  <120/80, HR <70  Lower extremity edema present: n  Shortness of breath at rest: n  TELLEZ, dyspnea on exertion: n  Orthopnea: n  PND, paroxysmal nocturnal dyspnea:  n  Change in weight: Stable  Exercise habits: no regular exercise program   Current Adherence to CHF and other therapies: Complete  Kidney function:   Latest Reference Range & Units 03/24/25 02:20 03/26/25 05:47   Creatinine 0.50 - 1.40 mg/dL 0.87 1.03   GFR (CKD-EPI) >60 mL/min/1.73 m 2 83 70     CHF medications:  Entresto or ACE/ARB: continue taking Irbesartan 300 mg once daily  Beta blocker: continue taking Metoprolol 12.5 mg once daily   Diuretic: Furosemide 40 mg as needed   Aldosterone antagonist:  start Spironolactone 25 mg once daily, after trip   SGLT-2 Inhibitor: start Jardiance 10 mg once daily or Farxiga 10mg daily depending on cost.  Farxiga sent to retail   [facility]ardiance [facility] to [facility] on [address] for possible 340B pricing  He understands not to take both of these together.  One or the other depending on cost      Lifestyle   Lifestyle Recommendations From Today's Visit:   Diet: common adult  Continue to eat DASH/MED style diet.   Continue to exercise as tolerated.  Salt restriction to <2300mg daily     https://www.nhlbi.nih.gov/education/dash-eating-plan          Blood Work Ordered At Today's visit: none  Studies Ordered at Todays Visit: none  Follow-Up: Return in about 6 weeks (around 5/20/2025).    Conor Locke, PharmD  [facility]  Phone: [phone], Fax: [phone]    This note was created using voice recognition software (Dragon). The accuracy of the dictation is limited by the abilities of the software. I have reviewed the note prior to signing, however some errors in grammar and context are still possible. If you have any questions related to this note please do not hesitate to contact our office.     CC:  Michael J Bloch, M.D.  Christopher Bae,  M.D.

## 2025-04-08 NOTE — TELEPHONE ENCOUNTER
Is the patient due for a refill? Yes    Was the patient seen the last 15 months? Yes    Date of last office visit: 4/3/2025    Does the patient have an upcoming appointment?  No    Provider to refill:BE    Does the patient have alf Plus and need 100-day supply? (This applies to ALL medications) Patient does not have SCP

## 2025-04-09 ENCOUNTER — TELEPHONE (OUTPATIENT)
Dept: PHARMACY | Facility: MEDICAL CENTER | Age: 89
End: 2025-04-09
Payer: MEDICARE

## 2025-04-09 PROCEDURE — RXMED WILLOW AMBULATORY MEDICATION CHARGE: Performed by: INTERNAL MEDICINE

## 2025-04-09 RX ORDER — EZETIMIBE 10 MG/1
10 TABLET ORAL DAILY
Qty: 90 TABLET | Refills: 3 | OUTPATIENT
Start: 2025-04-09

## 2025-04-09 NOTE — TELEPHONE ENCOUNTER
Received [employer] request via [employer]  for JARDIANCE 10 MG Tab tablet    . (Quantity:30, Day Supply:30)     Insurance: [employer]  Member ID:  [id number]  BIN: [id number]  PCN: [id number]  Group: [id number]     Ran Test claim via [employer] Unable to run test claim due to ins lockout. Received message from Maria Luisa, Rx Coordinator in Vascular who stated 340b prices have been discussed with patient already and patient has agreed and needs to be onboarded.

## 2025-04-09 NOTE — TELEPHONE ENCOUNTER
Received [employer] [facility] request via [employer]  for Farxiga 10 mg. (Quantity:30, Day Supply:30)     Insurance: [employer]  Member ID:  [id number]  BIN: [id number]  PCN: [id number]  Group: [id number]     Ran Test claim via [employer] [facility] Unable to run test claim due to ins lockout. Received message from Maria Luisa, Rx Coordinator in Vascular who stated 340b prices have been discussed with patient already and patient has agreed and needs to be onboarded.

## 2025-04-10 ENCOUNTER — PHARMACY VISIT (OUTPATIENT)
Dept: PHARMACY | Facility: MEDICAL CENTER | Age: 89
End: 2025-04-10
Payer: COMMERCIAL

## 2025-04-10 NOTE — TELEPHONE ENCOUNTER
Message sent to BE. Awaiting recommendations.     ------------------------    You  Christopher Bae M.D.Just now (11:45 AM)    SD  To: BE    Please see message from Nina below. Please advise. Thank you    Nina Hawk  You32 minutes ago (11:12 AM)    DS  Hello,  After speaking with Everardo he decided he wants to start on Jardiance rather than the Farxiga. Would BE like to d/c the farxiga at this time?  Thanks,  Anthony Hawk  Rx Coordinator    Everardo Cobos [phone] minutes ago (11:04 AM)    JENELLE  Spoke to Everardo about the 340b prices for both Jardiance and Farxiga. Although 340b pricing was the same for both, he decided Jardiance was a better option of the two medications.

## 2025-04-10 NOTE — TELEPHONE ENCOUNTER
Message sent to pharmacy team regarding BE recommendations below:    --------------    Christopher Bae M.D.  You1 hour ago (3:13 PM)      Ok to start jardiance 10 mg daily.  Thx  BE

## 2025-04-10 NOTE — TELEPHONE ENCOUNTER
Spoke to Everardo and he decided he wanted to go with Jardiance for his therapy instead of farxiga. He will be picking up his first refill with the [facility]. His 90 day supply will be $150.81.

## 2025-05-02 ENCOUNTER — APPOINTMENT (OUTPATIENT)
Dept: ADMISSIONS | Facility: MEDICAL CENTER | Age: 89
End: 2025-05-02
Attending: INTERNAL MEDICINE
Payer: MEDICARE

## 2025-05-07 ENCOUNTER — TELEPHONE (OUTPATIENT)
Dept: VASCULAR LAB | Facility: MEDICAL CENTER | Age: 89
End: 2025-05-07
Payer: MEDICARE

## 2025-05-07 DIAGNOSIS — J40 BRONCHITIS: ICD-10-CM

## 2025-05-07 NOTE — TELEPHONE ENCOUNTER
Phone Number Called: [phone]    Call outcome: Spoke to patient regarding message below.    Message: patient says they have had a bad cold,flu, bronchitis has been to UC and ED but symptoms have persisted since Nov. 2024. Says they will be sick for a few weeks and then feel fine then gets sick again. Is currently using z-pack. Says they dont get any better and would like to know what to do.  Says they feel slightly better but continues to fall under the same cycle.     Please Advise

## 2025-05-07 NOTE — TELEPHONE ENCOUNTER
Caller: Everardo Hester     Topic/issue: Pt would like a call from the office, pt states that he has been sick for weeks, it goes and comes and he wants to touch base with Vascular, looking for guidance     Callback Number: [phone]    Thank You   Ariadne DEL REAL

## 2025-05-07 NOTE — PROGRESS NOTES
Patient with recurrent bronchitis symptom for past few months.  Has already visited UC/ED.  Will recommend that patient continue with currently prescribed zpack and establish with [facility] Internal medicine ([phone]) for further w/u and management.     Michael Bloch, MD  Vascular Care

## 2025-05-08 ENCOUNTER — PRE-ADMISSION TESTING (OUTPATIENT)
Dept: ADMISSIONS | Facility: MEDICAL CENTER | Age: 89
End: 2025-05-08
Attending: INTERNAL MEDICINE
Payer: MEDICARE

## 2025-05-08 NOTE — TELEPHONE ENCOUNTER
Called and left message on mobile#  Letting patient know that Dr Bloch deferred care to [facility] Internal med.  Patient to call [phone] to make appt.     If patient has any further questions, you can notify Lenora Davalos, Med Ass't    Thanks,  Lenora Davalos, Med Ass't      Michael J Bloch, M.D.  Mari Pozo, CORA.P.N.; Vascular Medicine Ma19 hours ago (1:35 PM)     Pls let patient know that we are so sorry to hear that he hasn't felt well.  I think that it is outside of our scope and he would be better served by establishing with [facility] internal medicine to further w/u. Please have him call [phone] to get an appt.    Mb

## 2025-05-08 NOTE — PREADMIT AVS NOTE
Current Medications   Medication Instructions    JARDIANCE 10 MG Tab tablet HOLD 4 DAYS PRIOR TO SURGERY/PROCEDURE PER ANESTHESIA GUIDELINES AND INFORM AND CONSULT WITH YOUR CARDIOLOGIST REGARDING THESE INSTRUCTIONS.     ezetimibe (ZETIA) 10 MG Tab HOLD 24 HOURS PRIOR TO SURGERY/PROCEDURE.     furosemide (LASIX) 40 MG Tab HOLD AM OF SURGERY/PROCEDURE.    metoprolol SR (TOPROL XL) 25 MG TABLET SR 24 HR CONTINUE TAKING MEDICATION AS PRESCRIBED.     acetaminophen (TYLENOL) 500 MG Tab CONTINUE TAKING MEDICATION AS PRESCRIBED.     warfarin (COUMADIN) 4 MG Tab FOLLOW INSTRUCTIONS FROM SURGEON OR SPECIALIST    gabapentin (NEURONTIN) 300 MG Cap CONTINUE TAKING MEDICATION AS PRESCRIBED.     irbesartan (AVAPRO) 300 MG Tab HOLD 24 HOURS PRIOR TO SURGERY/PROCEDURE.     temazepam (RESTORIL) 30 MG capsule CONTINUE TAKING MEDICATION AS PRESCRIBED.     atorvastatin (LIPITOR) 80 MG tablet CONTINUE TAKING MEDICATION AS PRESCRIBED.     aspirin (ASA) 81 MG Chew Tab chewable tablet FOLLOW INSTRUCTIONS FROM SURGEON OR SPECIALIST    Ascorbic Acid (VITAMIN C PO) HOLD 7 DAYS PRIOR TO SURGERY/PROCEDURE.     Multiple Vitamins-Minerals (DAILY MULTI VITAMIN/MINERALS PO) HOLD 7 DAYS PRIOR TO SURGERY/PROCEDURE.

## 2025-05-09 ENCOUNTER — TELEPHONE (OUTPATIENT)
Dept: CARDIOLOGY | Facility: MEDICAL CENTER | Age: 89
End: 2025-05-09
Payer: MEDICARE

## 2025-05-09 NOTE — TELEPHONE ENCOUNTER
COLIN Aviles A.P.R.N.; Michelle VASQUEZ R.N.  To:     Just wanted to give an FYI, 5/12 is Monday and we will not be in office on Sunday to check with patient. However, we can call first thing Monday morning. Sending to Michelle as I will be OOO. Thank you!    Hi Michelle,    Would you be able to call patient early Monday morning to check for cold symptoms? Thank you!     ----- Message -----  From: JUDY Uriarte  Sent: 5/8/2025   2:37 PM PDT  To: Sujatha MENA R.N.  Subject: RE: Upcoming YOANDY with anesthesia 5/12/2025, *    We can keep the appointment for now, please set up a reminder to check in with him the day before so we can see if he is feeling any better    ----- Message -----  From: Sujatha MENA R.N.  Sent: 5/8/2025  12:48 PM PDT  To: JUDY Uriarte  Subject: FW: Upcoming YOANDY with anesthesia 5/12/2025, *    To:     Please see message below. Please advise if patient should still proceed with YOANDY on 5/12 as BE is OOO. Thank you      ----- Message -----  From: Christina TILLMAN R.N.  Sent: 5/8/2025  12:10 PM PDT  To: [facility] Nurse's  Subject: Upcoming YOANDY with anesthesia 5/12/2025, pt. *    During this patient's pre-admit telephone appointment today, pt. stated he has a viral infection starting two weeks ago and has symptoms of a runny nose, sneezing and productive cough. He denies fever and is scheduled for a YOANDY with anesthesia on 5/12/2025. I instructed patient to contact your office to inform you as well. Please inform cardiologist and follow up with patient at home regarding this. Thank you!

## 2025-05-12 ENCOUNTER — APPOINTMENT (OUTPATIENT)
Dept: CARDIOLOGY | Facility: MEDICAL CENTER | Age: 89
End: 2025-05-12
Attending: INTERNAL MEDICINE
Payer: MEDICARE

## 2025-05-12 ENCOUNTER — ANESTHESIA EVENT (OUTPATIENT)
Dept: CARDIOLOGY | Facility: MEDICAL CENTER | Age: 89
End: 2025-05-12
Payer: MEDICARE

## 2025-05-12 ENCOUNTER — HOSPITAL ENCOUNTER (OUTPATIENT)
Facility: MEDICAL CENTER | Age: 89
End: 2025-05-12
Attending: INTERNAL MEDICINE | Admitting: INTERNAL MEDICINE
Payer: MEDICARE

## 2025-05-12 ENCOUNTER — RESULTS FOLLOW-UP (OUTPATIENT)
Dept: CARDIOLOGY | Facility: MEDICAL CENTER | Age: 89
End: 2025-05-12

## 2025-05-12 ENCOUNTER — ANESTHESIA (OUTPATIENT)
Dept: CARDIOLOGY | Facility: MEDICAL CENTER | Age: 89
End: 2025-05-12
Payer: MEDICARE

## 2025-05-12 VITALS
SYSTOLIC BLOOD PRESSURE: 139 MMHG | HEART RATE: 50 BPM | HEIGHT: 70 IN | WEIGHT: 172.18 LBS | TEMPERATURE: 97.3 F | BODY MASS INDEX: 24.65 KG/M2 | DIASTOLIC BLOOD PRESSURE: 66 MMHG | RESPIRATION RATE: 16 BRPM | OXYGEN SATURATION: 94 %

## 2025-05-12 DIAGNOSIS — I34.0 MODERATE MITRAL REGURGITATION: ICD-10-CM

## 2025-05-12 LAB
ERYTHROCYTE [DISTWIDTH] IN BLOOD BY AUTOMATED COUNT: 51.8 FL (ref 35.9–50)
HCT VFR BLD AUTO: 42.4 % (ref 42–52)
HGB BLD-MCNC: 13.5 G/DL (ref 14–18)
INR PPP: 3.8 (ref 0.87–1.13)
MCH RBC QN AUTO: 29.2 PG (ref 27–33)
MCHC RBC AUTO-ENTMCNC: 31.8 G/DL (ref 32.3–36.5)
MCV RBC AUTO: 91.8 FL (ref 81.4–97.8)
PLATELET # BLD AUTO: 193 K/UL (ref 164–446)
PMV BLD AUTO: 10.4 FL (ref 9–12.9)
PROTHROMBIN TIME: 37.8 SEC (ref 12–14.6)
RBC # BLD AUTO: 4.62 M/UL (ref 4.7–6.1)
WBC # BLD AUTO: 5.1 K/UL (ref 4.8–10.8)

## 2025-05-12 PROCEDURE — 160002 HCHG RECOVERY MINUTES (STAT)

## 2025-05-12 PROCEDURE — 85610 PROTHROMBIN TIME: CPT

## 2025-05-12 PROCEDURE — 85027 COMPLETE CBC AUTOMATED: CPT

## 2025-05-12 PROCEDURE — 160035 HCHG PACU - 1ST 60 MINS PHASE I

## 2025-05-12 PROCEDURE — 700111 HCHG RX REV CODE 636 W/ 250 OVERRIDE (IP): Performed by: STUDENT IN AN ORGANIZED HEALTH CARE EDUCATION/TRAINING PROGRAM

## 2025-05-12 PROCEDURE — 700101 HCHG RX REV CODE 250: Performed by: STUDENT IN AN ORGANIZED HEALTH CARE EDUCATION/TRAINING PROGRAM

## 2025-05-12 PROCEDURE — 160015 HCHG STAT PREOP MINUTES

## 2025-05-12 PROCEDURE — 700105 HCHG RX REV CODE 258: Performed by: STUDENT IN AN ORGANIZED HEALTH CARE EDUCATION/TRAINING PROGRAM

## 2025-05-12 PROCEDURE — 4410588 EC-TEE W/O CONT

## 2025-05-12 PROCEDURE — 93312 ECHO TRANSESOPHAGEAL: CPT | Mod: 26 | Performed by: INTERNAL MEDICINE

## 2025-05-12 PROCEDURE — 160046 HCHG PACU - 1ST 60 MINS PHASE II

## 2025-05-12 RX ORDER — LIDOCAINE HYDROCHLORIDE 20 MG/ML
INJECTION, SOLUTION EPIDURAL; INFILTRATION; INTRACAUDAL; PERINEURAL PRN
Status: DISCONTINUED | OUTPATIENT
Start: 2025-05-12 | End: 2025-05-12 | Stop reason: SURG

## 2025-05-12 RX ORDER — SODIUM CHLORIDE, SODIUM LACTATE, POTASSIUM CHLORIDE, CALCIUM CHLORIDE 600; 310; 30; 20 MG/100ML; MG/100ML; MG/100ML; MG/100ML
INJECTION, SOLUTION INTRAVENOUS CONTINUOUS
Status: ACTIVE | OUTPATIENT
Start: 2025-05-12 | End: 2025-05-12

## 2025-05-12 RX ORDER — SODIUM CHLORIDE, SODIUM LACTATE, POTASSIUM CHLORIDE, CALCIUM CHLORIDE 600; 310; 30; 20 MG/100ML; MG/100ML; MG/100ML; MG/100ML
INJECTION, SOLUTION INTRAVENOUS
Status: DISCONTINUED | OUTPATIENT
Start: 2025-05-12 | End: 2025-05-12 | Stop reason: SURG

## 2025-05-12 RX ADMIN — LIDOCAINE HYDROCHLORIDE 50 MG: 20 INJECTION, SOLUTION EPIDURAL; INFILTRATION; INTRACAUDAL; PERINEURAL at 10:51

## 2025-05-12 RX ADMIN — PROPOFOL 70 MG: 10 INJECTION, EMULSION INTRAVENOUS at 10:51

## 2025-05-12 RX ADMIN — SODIUM CHLORIDE, POTASSIUM CHLORIDE, SODIUM LACTATE AND CALCIUM CHLORIDE: 600; 310; 30; 20 INJECTION, SOLUTION INTRAVENOUS at 10:47

## 2025-05-12 RX ADMIN — PROPOFOL 10 MG: 10 INJECTION, EMULSION INTRAVENOUS at 10:57

## 2025-05-12 RX ADMIN — PROPOFOL 10 MG: 10 INJECTION, EMULSION INTRAVENOUS at 11:10

## 2025-05-12 RX ADMIN — PROPOFOL 10 MG: 10 INJECTION, EMULSION INTRAVENOUS at 11:04

## 2025-05-12 ASSESSMENT — PAIN SCALES - GENERAL: PAIN_LEVEL: 0

## 2025-05-12 ASSESSMENT — FIBROSIS 4 INDEX: FIB4 SCORE: 3.37

## 2025-05-12 NOTE — PROGRESS NOTES
1118 - patient arrived to pacu.  2 identifiers verified, attached to monitors, alarms/parameters verified, and received report.  Patient sleeping, no signs of distress with equal chest rise and fall with respiration.        1140- patient's wife bedside and updated on plan of care.  Patient reports numbness in throat still.  Handoff report to Janie AVILA

## 2025-05-12 NOTE — DISCHARGE INSTRUCTIONS
Discharge Instructions:  Transesophageal Echocardiogram (YOANDY)    YOANDY is a test that uses sound waves to take pictures of your heart. YOANDY is done by passing a small probe attached to a flexible tube down the part of the body that moves food from your mouth to your stomach (esophagus).    The pictures give clear images of your heart. This can help your doctor see if there are problems with your heart.   General instructions    Follow instructions from your doctor about what you cannot eat or drink.   You will take out any dentures or dental retainers.   Plan to have a responsible adult take you home from the hospital or clinic.   Plan to have a responsible adult care for you for the time you are told after you leave the hospital or clinic. This is important.  What can I expect after the procedure?    You will be monitored until you leave the hospital or clinic. This includes checking your blood pressure, heart rate, breathing rate, and blood oxygen level.   Your throat may feel sore and numb. This will get better over time. You will not be allowed to eat or drink until the numbness has gone away.   It is common to have a sore throat for a day or two.   It is up to you to get the results of your procedure. Ask how to get your results when they are ready.   Pink tinge sputum is common after your procedure  Follow these instructions at home:    If you were given a sedative during your procedure, do not drive or use machines until your doctor says that it is safe.   Return to your normal activities when your doctor says that it is safe.   Keep all follow-up visits.  Go to ER / call 911:   If you cough up bright red blood or vomit blood   If you have severe pain in throat/stomach.   If you have difficulty breathing that does not go away with rest  Summary    YOANDY is a test that uses sound waves to take pictures of your heart.   You will be given a medicine to help you relax.   Pink tinge sputum is common after your  procedure     What to Expect Post Sedation    Rest and take it easy for the first 24 hours.  A responsible adult is recommended to remain with you during that time.  It is normal to feel sleepy.  We encourage you to not do anything that requires balance, judgment or coordination.    FOR 24 HOURS DO NOT:  Drive, operate machinery or run household appliances.  Drink beer or alcoholic beverages.  Make important decisions or sign legal documents.    To avoid nausea, slowly advance diet as tolerated, avoiding spicy or greasy foods for the first day.  Add more substantial food to your diet according to your provider's instructions.  INCREASE FLUIDS AND FIBER TO AVOID CONSTIPATION.    MILD FLU-LIKE SYMPTOMS ARE NORMAL.  YOU MAY EXPERIENCE GENERALIZED MUSCLE ACHES, THROAT IRRITATION, HEADACHE AND/OR SOME NAUSEA.  If any questions arise, call your provider.  If your provider is not available, please feel free to call the Surgical Center at [phone].    MEDICATIONS: Resume taking daily medication.  Take prescribed pain medication with food.  If no medication is prescribed, you may take non-aspirin pain medication if needed.  PAIN MEDICATION CAN BE VERY CONSTIPATING.  Take a stool softener or laxative such as senokot, pericolace, or milk of magnesia if needed.    Diet    Resume your normal diet as tolerated.  A diet low in cholesterol, fat, and sodium is recommended for good health.     BOWEL FUNCTION:  If you are having problems, use what you normally would or call your provider for suggestions. It also helps to stay regular by including fiber in your diet (for example: bran or fruits and vegetables) and drink plenty of liquids (water, juice, etc.).

## 2025-05-12 NOTE — H&P
CC: mitral valve regurgitation    HPI:      89 year old man PMH MR presents for elective YOANDY. No cardiac complaints. Compliant with medications.    Medications / Drug list prior to admission:  No current facility-administered medications on file prior to encounter.     Current Outpatient Medications on File Prior to Encounter   Medication Sig Dispense Refill    ezetimibe (ZETIA) 10 MG Tab Take 1 Tablet by mouth every day. 90 Tablet 1    furosemide (LASIX) 40 MG Tab Take 1-2 Tablets by mouth as needed (swelling). Take 1 tablet twice daily. If worsening shortness of breath, swelling, or weight gain, take 2 tablets twice daily that day. 90 Tablet 3    metoprolol SR (TOPROL XL) 25 MG TABLET SR 24 HR Take 1 Tablet by mouth every day. (Patient taking differently: Take 12.5 mg by mouth every day.) 100 Tablet 3    acetaminophen (TYLENOL) 500 MG Tab Take 2 Tablets by mouth every 6 hours as needed for Mild Pain or Moderate Pain. (Patient taking differently: Take 1,000 mg by mouth every day.)      warfarin (COUMADIN) 4 MG Tab TAKE 1 (ONE) TO 1 & 1/2 (ONE & ONE-HALF) TABLETS BY MOUTH ONCE DAILY AS DIRECTED BY [facility] (Patient taking differently: TAKE 1 (ONE) TO 1 & 1/2 (ONE & ONE-HALF) TABLETS BY MOUTH ONCE DAILY AS DIRECTED BY [facility]    6 mg on Tues, Thurs, Sat  4 mg on all other days) 135 Tablet 1    gabapentin (NEURONTIN) 300 MG Cap Take 1 Capsule by mouth 3 times a day. 270 Capsule 3    irbesartan (AVAPRO) 300 MG Tab Take 1 tablet by mouth once daily 100 Tablet 3    temazepam (RESTORIL) 30 MG capsule Take 1 Capsule by mouth at bedtime as needed for Sleep for up to 180 days. 30 Capsule 5    atorvastatin (LIPITOR) 80 MG tablet TAKE 1/2 (ONE-HALF) TABLET BY MOUTH ONCE DAILY IN THE EVENING 45 Tablet 3    aspirin (ASA) 81 MG Chew Tab chewable tablet Chew 1 Tablet every day. 100 Tablet 11    Ascorbic Acid (VITAMIN C PO) Take 500 mg by mouth.      spironolactone (ALDACTONE) 25 MG  Tab Take 1 Tablet by mouth every day. Ok to take with Irbesartan, due to CHF. (Patient not taking: Reported on 5/8/2025) 100 Tablet 1    JARDIANCE 10 MG Tab tablet Take 1 Tablet by mouth every day. He might need help with copay i.e. 340B (Patient taking differently: Take 10 mg by mouth every day. He might need help with copay i.e. 340B. Do not take with Farxiga) 30 Tablet 6    Multiple Vitamins-Minerals (DAILY MULTI VITAMIN/MINERALS PO) Take  by mouth.         Current list of administered Medications:  No current facility-administered medications for this encounter.    Past Medical History:   Diagnosis Date    Acute nasopharyngitis 05/08/2025    Started two weeks ago, patient states runny nose, sneezing, productive cough and denies fever.    Aortic valve stenosis     Arrhythmia     Afib    Arthritis     osteo, hips and knees    Blood clotting disorder ([facility])     , left arm    Breath shortness     Bronchitis         Cancer ([facility])     Prostate CA--2000    Carotid arterial disease ([facility]) 01/06/2014    Cataract     removed bilat    Congestive heart failure ([facility])     DVT (deep venous thrombosis) ([facility]) 2016    Glaucoma     Heart valve disease     MVR    HLD (hyperlipidemia) 01/06/2014    Hypertension     Mitral regurgitation 01/06/2014    Pain 12/14/2015    low back, hips, 2-3/10    Pneumonia         Sleep apnea     Stroke ([facility]) 2008    TIA, no residual    TIA (transient ischemic attack)     2008    Urinary bladder disorder     HAD MALE BLADDER SUSPENSION PROCEDURE DONE    Urinary incontinence     stress incontinence       Past Surgical History:   Procedure Laterality Date    TRANSCATHETER AORTIC VALVE REPLACEMENT  03/02/2020    Procedure: REPLACEMENT, AORTIC VALVE, TRANSCATHETER -;  Surgeon: Sony Barrientos M.D.;  Location: [facility];  Service: Cardiac    YOANDY  03/02/2020    Procedure: ECHOCARDIOGRAM, TRANSESOPHAGEAL;  Surgeon: Sony Barrientos M.D.;  Location: [facility]  ORS;  Service: Cardiac    FEMORAL ENDARTERECTOMY Left 03/02/2020    Procedure: Repair of left femoral  Artery Dissection;  Surgeon: Alpa Tillman M.D.;  Location: SUR[facility]ERY [facility];  Service: General    FUSION, SPINE, LUMBAR, PLIF  12/28/2015    Procedure: LUMBAR FUSION POSTERIOR L3-5 with peek rods;  Surgeon: Phylicia Almonte M.D.;  Location: SURGERY [facility];  Service:     LUMBAR DECOMPRESSION  12/28/2015    Procedure: LUMBAR DECOMPRESSION posterior redo decompression L3-5, with dural repair;  Surgeon: Phylicia Almonte M.D.;  Location: SURGERY [facility];  Service:     THORACOSCOPY  06/13/2015    Procedure: THORACOSCOPY with decortication VATS , side to be determined  ;  Surgeon: Elmira Holder M.D.;  Location: SUR[facility]ERY [facility];  Service:     LUMBAR LAMINECTOMY DISKECTOMY  10/08/2010    Performed by PHYLICIA ALMONTE at SURGERY [facility]    CERVICAL DISK AND FUSION ANTERIOR  10/26/2009    Performed by PHYLICIA ALMONTE at [facility]    CAROTID ENDARTERECTOMY  07/10/2008    Performed by LEIGH MORATAYA at SURGERY [facility]    BLADDER SLING MALE  11/01/2004    PROSTATECTOMY, RADIAL  11/01/2000    CATARACT EXTRACTION WITH IOL Bilateral     OTHER      Surgery for glaucoma       Family History   Problem Relation Age of Onset    Heart Disease Father         CHF at 91.    Other Brother         Aneurysm    Cancer Brother         Seminal vascular cancer    Autoimmune Disease Daughter         Lupus, RA    No Known Problems Daughter     No Known Problems Son     No Known Problems Son     Heart Attack Neg Hx      Patient family history was personally reviewed, no pertinent family history to current presentation    Social History     Socioeconomic History    Marital status:      Spouse name: Not on file    Number of children: Not on file    Years of education: Not on file    Highest education level: Not on file   Occupational History    Not on file   Tobacco Use    Smoking status:  Former     Current packs/day: 0.00     Average packs/day: 0.5 packs/day for 30.0 years (15.0 ttl pk-yrs)     Types: Cigarettes, Pipe     Start date: 1955     Quit date: 1985     Years since quittin.3    Smokeless tobacco: Never   Vaping Use    Vaping status: Never Used   Substance and Sexual Activity    Alcohol use: Yes     Alcohol/week: 3.0 oz     Types: 3 Cans of beer, 3 Shots of liquor per week     Comment: 1 x week    Drug use: No    Sexual activity: Not Currently   Other Topics Concern    Not on file   Social History Narrative    Not on file     Social Drivers of Health     Financial Resource Strain: Not on file   Food Insecurity: No Food Insecurity (3/24/2025)    Hunger Vital Sign     Worried About Running Out of Food in the Last Year: Never true     Ran Out of Food in the Last Year: Never true   Transportation Needs: No Transportation Needs (3/24/2025)    PRAPARE - Transportation     Lack of Transportation (Medical): No     Lack of Transportation (Non-Medical): No   Physical Activity: Not on file   Stress: Not on file   Social Connections: Not on file   Intimate Partner Violence: Not At Risk (3/24/2025)    Humiliation, Afraid, Rape, and Kick questionnaire     Fear of Current or Ex-Partner: No     Emotionally Abused: No     Physically Abused: No     Sexually Abused: No   Housing Stability: Low Risk  (3/24/2025)    Housing Stability Vital Sign     Unable to Pay for Housing in the Last Year: No     Number of Times Moved in the Last Year: 0     Homeless in the Last Year: No       ALLERGIES:  Allergies   Allergen Reactions    Other Drug      Glaucoma Drops-Name Unknown    Other Environmental Runny Nose and Itching     Seasonal, cats-hayfever    Other Misc      Newspaper causes sneezing per patient.        Review of systems:  A complete review of symptoms was reviewed with patient. This is reviewed in H&P and PMH. ALL OTHERS reviewed and negative    Physical exam:  Patient Vitals for the past 24 hrs:    "BP Temp Temp src Pulse Resp SpO2 Height Weight   05/12/25 0926 134/82 36 °C (96.8 °F) Temporal 67 16 95 % 1.778 m (5' 10\") 78.1 kg (172 lb 2.9 oz)     General: No acute distress.   EYES: no jaundice  HEENT: OP clear   Neck: No bruits No JVD.   CVS:   RRR. S1 + S2. No M/R/G. No edema.  Resp: CTAB. No wheezing or crackles/rhonchi.  Abdomen: Soft, NT, ND,  Skin: Grossly nothing acute no obvious rashes  Neurological: Alert, Moves all extremities, no cranial nerve defects on limited exam  Extremities: Pulse 2+ in b/l LE. No cyanosis.     Data:  Laboratory studies personally reviewed by me:  Recent Results (from the past 24 hours)   CBC Without Differential    Collection Time: 05/12/25  9:45 AM   Result Value Ref Range    WBC 5.1 4.8 - 10.8 K/uL    RBC 4.62 (L) 4.70 - 6.10 M/uL    Hemoglobin 13.5 (L) 14.0 - 18.0 g/dL    Hematocrit 42.4 42.0 - 52.0 %    MCV 91.8 81.4 - 97.8 fL    MCH 29.2 27.0 - 33.0 pg    MCHC 31.8 (L) 32.3 - 36.5 g/dL    RDW 51.8 (H) 35.9 - 50.0 fL    Platelet Count 193 164 - 446 K/uL    MPV 10.4 9.0 - 12.9 fL   Prothrombin Time (INR) (plasma)    Collection Time: 05/12/25  9:45 AM   Result Value Ref Range    PT 37.8 (H) 12.0 - 14.6 sec    INR 3.80 (H) 0.87 - 1.13         All pertinent features of laboratory and imaging reviewed including primary images where applicable      Active Problems:    * No active hospital problems. *  Resolved Problems:    * No resolved hospital problems. *      Assessment / Plan:  89 year old man PMH MR presents for elective YOANDY.     -YOANDY    It is my pleasure to participate in the care of Mr. Hester.  Please do not hesitate to contact me with questions or concerns.    Aron Day MD  Cardiologist [facility]    "

## 2025-05-12 NOTE — ANESTHESIA PREPROCEDURE EVALUATION
Date/Time: 05/12/25 1100    Scheduled providers: Aron Day M.D.; Rafiq Casiano M.D.    Procedure: EC-YOANDY W/O CONT    Diagnosis: Moderate mitral regurgitation [I34.0]    Indications: Mitral Regurgitation    Location: [facility] - Echocard[facility]            Relevant Problems   NEURO   (positive) Hx of TIA (transient ischemic attack)      CARDIAC   (positive) Carotid artery disease (HCC)   (positive) Coronary artery disease involving native coronary artery of native heart without angina pectoris   (positive) Essential hypertension, benign   (positive) Iliac artery injury, unspecified laterality, subsequent encounter   (positive) PAF (paroxysmal atrial fibrillation) (HCC)      Other   (positive) Arthritis     Most Recent TTE:  Sinus rhythm   Borderline prolonged DE interval   Nonspecific intraventricular conduction delay   Compared to ECG 12/11/2023 10:34:51     Most Recent EKG:  Left Ventricle  Normal left ventricular chamber size. Mild concentric left ventricular hypertrophy. Mildly reduced left ventricular systolic function. The left ventricular ejection fraction is visually estimated to be 45%. Global hypokinesis. Grade II diastolic dysfunction.     Right Ventricle  Normal right ventricular size and systolic function.     Right Atrium  Enlarged right atrium. The inferior vena cava is not well visualized.     Left Atrium  Moderately dilated left atrium.     Mitral Valve  Mild mitral annular calcification.  Mild calcification and thickening of the mitral valve leaflets and chordal structures. No mitral stenosis. Mild to moderate mitral regurgitation with eccentric, anteriorly directed jet.     Aortic Valve  Known TAVR aortic valve that is functioning normally with normal   transvalvular gradients. Vmax is 1.6 m/s. Transvalvular gradients are - Peak: 10 mmHg,  Mean: 6 mmHg. Acceleration time is 92 ms. No aortic insufficiency.     Tricuspid Valve  Structurally normal  tricuspid valve. No tricuspid stenosis. Trace   tricuspid regurgitation. Right atrial pressure is estimated to be 8 mmHg. Estimated right ventricular systolic pressure is 33 mmHg.     Pulmonic Valve  The pulmonic valve is not well visualized. No stenosis or regurgitation seen.     Pericardium  No pericardial effusion.     Aorta  Normal aortic root for body surface area. The ascending aorta is   dilated with a diameter of 4.1 cm.    Physical Exam    Airway   Mallampati: II  TM distance: >3 FB  Neck ROM: full       Cardiovascular - normal exam  Rhythm: regular  Rate: normal     Dental - normal exam           Pulmonary - normal exam  Breath sounds clear to auscultation     Abdominal    Neurological - normal exam                   Anesthesia Plan    ASA 3   ASA physical status 3 criteria: CAD (>3 months), CVA or TIA - history (> 3 months) and a thrombophilic disease requiring anticoagulation    Plan - MAC               Induction: intravenous    Postoperative Plan: Postoperative administration of opioids is intended.    Pertinent diagnostic labs and testing reviewed    Informed Consent:    Anesthetic plan and risks discussed with patient.    Use of blood products discussed with: patient whom consented to blood products.

## 2025-05-12 NOTE — PROGRESS NOTES
1140- report received from MARGARITA De La Garza. Patient has belongings in possession     1150- Patient tolerated PO fluids and snack    1219- Site is clean, dry, and intact. Discharge instructions provided to patient and relative. Discussed follow up appointments, diet, medications and activity. Patient states understanding. IV was removed. Copy of discharge provided to the patient. A signed copy of discharge is in patient's chart. All personal belongings are in patients possession. All questions have been answered.     1230- Patient ambulated to restroom with bilateral canes and wife    1235- Patient wheeled off floor by RN

## 2025-05-12 NOTE — ANESTHESIA TIME REPORT
Anesthesia Start and Stop Event Times       Date Time Event    5/12/2025 1015 Ready for Procedure     1047 Anesthesia Start     1125 Anesthesia Stop          Responsible Staff  05/12/25      Name Role Begin End    Rafiq Casiano M.D. Anesth 1047 1125          Overtime Reason:  no overtime (within assigned shift)    Comments:

## 2025-05-12 NOTE — ANESTHESIA POSTPROCEDURE EVALUATION
Patient: Tristan Hester    Procedure Summary       Date: 05/12/25 Room / Location: [facility] - Echocardiology [facility]    Anesthesia Start: 1047 Anesthesia Stop: 1125    Procedure: EC-YOANDY W/O CONT Diagnosis:       Moderate mitral regurgitation      (Mitral Regurgitation)    Scheduled Providers: Aron Day M.D.; Rafiq Casiano M.D. Responsible Provider: Rafiq Casiano M.D.    Anesthesia Type: MAC ASA Status: 3            Final Anesthesia Type: MAC  Last vitals  BP   Blood Pressure : 139/66    Temp   36.3 °C (97.3 °F)    Pulse   (!) 50   Resp   16    SpO2   94 %      Anesthesia Post Evaluation    Patient location during evaluation: PACU  Patient participation: complete - patient participated  Level of consciousness: awake and alert  Pain score: 0    Airway patency: patent  Anesthetic complications: no  Cardiovascular status: hemodynamically stable  Respiratory status: acceptable  Hydration status: euvolemic    PONV: none          No notable events documented.     Nurse Pain Score: 0 (NPRS)

## 2025-05-13 ENCOUNTER — OFFICE VISIT (OUTPATIENT)
Dept: INTERNAL MEDICINE | Facility: OTHER | Age: 89
End: 2025-05-13
Attending: INTERNAL MEDICINE
Payer: MEDICARE

## 2025-05-13 VITALS
BODY MASS INDEX: 25.6 KG/M2 | HEIGHT: 70 IN | WEIGHT: 178.8 LBS | OXYGEN SATURATION: 92 % | SYSTOLIC BLOOD PRESSURE: 91 MMHG | HEART RATE: 58 BPM | TEMPERATURE: 98.9 F | DIASTOLIC BLOOD PRESSURE: 56 MMHG

## 2025-05-13 DIAGNOSIS — Z76.89 ENCOUNTER TO ESTABLISH CARE: ICD-10-CM

## 2025-05-13 DIAGNOSIS — R68.89 FLU-LIKE SYMPTOMS: ICD-10-CM

## 2025-05-13 DIAGNOSIS — D64.9 ANEMIA, UNSPECIFIED TYPE: ICD-10-CM

## 2025-05-13 PROCEDURE — 99214 OFFICE O/P EST MOD 30 MIN: CPT | Mod: GC

## 2025-05-13 PROCEDURE — 3078F DIAST BP <80 MM HG: CPT | Mod: GC

## 2025-05-13 PROCEDURE — 3074F SYST BP LT 130 MM HG: CPT | Mod: GC

## 2025-05-13 RX ORDER — FLUTICASONE PROPIONATE 50 MCG
2 SPRAY, SUSPENSION (ML) NASAL DAILY
Qty: 16 G | Refills: 2 | Status: SHIPPED | OUTPATIENT
Start: 2025-05-13

## 2025-05-13 ASSESSMENT — ENCOUNTER SYMPTOMS
ORTHOPNEA: 0
COUGH: 1
PND: 0
WHEEZING: 0
FALLS: 0
DIZZINESS: 0
BLURRED VISION: 0
TREMORS: 0
DOUBLE VISION: 0
EYE PAIN: 0
SINUS PAIN: 0
FOCAL WEAKNESS: 0
BRUISES/BLEEDS EASILY: 0
HALLUCINATIONS: 0
SPUTUM PRODUCTION: 1
CHILLS: 0
SHORTNESS OF BREATH: 0
INSOMNIA: 0
NERVOUS/ANXIOUS: 0
ABDOMINAL PAIN: 0
SORE THROAT: 0
MEMORY LOSS: 0
WEIGHT LOSS: 0
EYE REDNESS: 0
DEPRESSION: 0
HEADACHES: 0
FEVER: 0
SEIZURES: 0
WEAKNESS: 0
MYALGIAS: 0
DIARRHEA: 0
CONSTIPATION: 0
PALPITATIONS: 0
HEARTBURN: 0

## 2025-05-13 ASSESSMENT — FIBROSIS 4 INDEX: FIB4 SCORE: 2.85

## 2025-05-13 NOTE — PROGRESS NOTES
New Patient      Patient Care Team:  Donato Hawley M.D. as PCP - General (Internal Medicine)  Senthil Escoto M.D. (Cardiovascular Disease (Cardiology))  Michael Almonte M.D. (Neurosurgery)  Rosa M Acharya M.D. (Dermatology)  [facility]  Michael J Bloch, M.D. (Internal Medicine)    CC: Establish Care with Primary Care Physician Eliza    HPI:  Tristan Hester is a 89 y.o. male with relevant medical history of history of TIA, essential hypertension, paroxysmal atrial fibrillation on warfarin, s/p TAVR 2020, dyslipidemia, carotid artery disease, HFpEF who presents today to establish care.    Mr. Hester reports that since last November, started experiencing intermittent episodes of flu-like symptoms such as runny nose, nasal congestion, headaches, general malaise associated to productive cough with clear sputum, occurring on approximately four separate occasions.  He denies fevers or chills, SOB, chest pain during these episodes and reports these symptoms last around 24-48hrs. He sought care at urgent care on two occasions, where he was prescribed azithromycin (Z-constance), which provided full symptomatic relief of his symptoms. The last episode has been one week ago, however, he reports that symptoms have completely resolved but he is interested on what could be the cause of these episodes and if he would need to take any medications in order to avoid these intermittent symptoms. He reports that these episodes have presented usually after he travels out of the state for vacations and does not get enough resting.     Other than that the patient denies any ongoing upper or lower respiratory symptoms or chest pain, SOB or lower extremity edema.     Preventative health measures:  Cancer screening:       - LDCT: N/A      - Colonoscopy: N/A      - Mammogram: N/A  ASCVD: The ASCVD Risk score (Dwayne LUNDY, et al., 2019) failed to calculate for the following reasons:    The  2019 ASCVD risk score is only valid for ages 40 to 79    Risk score cannot be calculated because patient has a medical history suggesting prior/existing ASCVD    Tobacco: stopped 40 years ago, 25 years of a half a pack  EtOH: 3 drinks weekly  Recreational drugs: No    Review of Systems   Constitutional:  Positive for malaise/fatigue. Negative for chills, fever and weight loss.   HENT:  Negative for hearing loss, sinus pain, sore throat and tinnitus.    Eyes:  Negative for blurred vision, double vision, pain and redness.   Respiratory:  Positive for cough and sputum production. Negative for shortness of breath and wheezing.    Cardiovascular:  Negative for chest pain, palpitations, orthopnea, leg swelling and PND.   Gastrointestinal:  Negative for abdominal pain, constipation, diarrhea and heartburn.   Genitourinary:  Negative for dysuria and hematuria.   Musculoskeletal:  Negative for falls, joint pain and myalgias.   Skin:  Negative for itching and rash.   Neurological:  Negative for dizziness, tremors, focal weakness, seizures, weakness and headaches.   Endo/Heme/Allergies:  Does not bruise/bleed easily.   Psychiatric/Behavioral:  Negative for depression, hallucinations and memory loss. The patient is not nervous/anxious and does not have insomnia.        Past Medical History:   Diagnosis Date    Acute nasopharyngitis 05/08/2025    Started two weeks ago, patient states runny nose, sneezing, productive cough and denies fever.    Aortic valve stenosis     Arrhythmia     Afib    Arthritis     osteo, hips and knees    Blood clotting disorder (HCC)     , left arm    Breath shortness     Bronchitis         Cancer (HCC)     Prostate CA--2000    Carotid arterial disease (formerly [facility]) 01/06/2014    Cataract     removed bilat    Congestive heart failure (formerly [facility])     DVT (deep venous thrombosis) (formerly [facility]) 2016    Glaucoma     Heart valve disease     MVR    HLD (hyperlipidemia) 01/06/2014    Hypertension     Mitral  regurgitation 01/06/2014    Pain 12/14/2015    low back, hips, 2-3/10    Pneumonia         Sleep apnea     Stroke (HCC) 2008    TIA, no residual    TIA (transient ischemic attack)     2008    Urinary bladder disorder     HAD MALE BLADDER SUSPENSION PROCEDURE DONE    Urinary incontinence     stress incontinence       Past Surgical History:   Procedure Laterality Date    TRANSCATHETER AORTIC VALVE REPLACEMENT  03/02/2020    Procedure: REPLACEMENT, AORTIC VALVE, TRANSCATHETER -;  Surgeon: Sony Barrientos M.D.;  Location: SURGERY [facility];  Service: Cardiac    YOANDY  03/02/2020    Procedure: ECHOCARDIOGRAM, TRANSESOPHAGEAL;  Surgeon: Sony Barrientos M.D.;  Location: SURGERY [facility];  Service: Cardiac    FEMORAL ENDARTERECTOMY Left 03/02/2020    Procedure: Repair of left femoral  Artery Dissection;  Surgeon: Alpa Tillman M.D.;  Location: [facility];  Service: General    FUSION, SPINE, LUMBAR, PLIF  12/28/2015    Procedure: LUMBAR FUSION POSTERIOR L3-5 with peek rods;  Surgeon: Phylicia Almonte M.D.;  Location: [facility];  Service:     LUMBAR DECOMPRESSION  12/28/2015    Procedure: LUMBAR DECOMPRESSION posterior redo decompression L3-5, with dural repair;  Surgeon: Phylicia Almonte M.D.;  Location: [facility];  Service:     THORACOSCOPY  06/13/2015    Procedure: THORACOSCOPY with decortication VATS , side to be determined  ;  Surgeon: Elmira Holder M.D.;  Location: [facility];  Service:     LUMBAR LAMINECTOMY DISKECTOMY  10/08/2010    Performed by PHYLICIA ALMONTE at [facility]    CERVICAL DISK AND FUSION ANTERIOR  10/26/2009    Performed by PHYLICIA ALMONTE at [facility]    CAROTID ENDARTERECTOMY  07/10/2008    Performed by LEIGH MORATAYA at [facility]    BLADDER SLING MALE  11/01/2004    PROSTATECTOMY, RADIAL  11/01/2000    CATARACT EXTRACTION WITH IOL Bilateral     OTHER      Surgery for glaucoma        Family History   Problem Relation Age of Onset    Heart Disease Father         CHF at 91.    Other Brother         Aneurysm    Cancer Brother         Seminal vascular cancer    Autoimmune Disease Daughter         Lupus, RA    No Known Problems Daughter     No Known Problems Son     No Known Problems Son     Heart Attack Neg Hx        Social History     Tobacco Use    Smoking status: Former     Current packs/day: 0.00     Average packs/day: 0.5 packs/day for 30.0 years (15.0 ttl pk-yrs)     Types: Cigarettes, Pipe     Start date: 1955     Quit date: 1985     Years since quittin.3    Smokeless tobacco: Never   Vaping Use    Vaping status: Never Used   Substance Use Topics    Alcohol use: Yes     Alcohol/week: 3.0 oz     Types: 3 Cans of beer, 3 Shots of liquor per week     Comment: 1 x week    Drug use: No       Occupation: Retired  Education: college  Sexual: No  Activity: No exercise  Diet: regular    Current Outpatient Medications   Medication Sig Dispense Refill    fluticasone (FLONASE) 50 MCG/ACT nasal spray Administer 2 Sprays into affected nostril(S) every day. 16 g 2    spironolactone (ALDACTONE) 25 MG Tab Take 1 Tablet by mouth every day. Ok to take with Irbesartan, due to CHF. 100 Tablet 1    JARDIANCE 10 MG Tab tablet Take 1 Tablet by mouth every day. He might need help with copay i.e. 340B (Patient taking differently: Take 10 mg by mouth every day. He might need help with copay i.e. 340B. Do not take with Farxiga) 30 Tablet 6    ezetimibe (ZETIA) 10 MG Tab Take 1 Tablet by mouth every day. 90 Tablet 1    furosemide (LASIX) 40 MG Tab Take 1-2 Tablets by mouth as needed (swelling). Take 1 tablet twice daily. If worsening shortness of breath, swelling, or weight gain, take 2 tablets twice daily that day. 90 Tablet 3    metoprolol SR (TOPROL XL) 25 MG TABLET SR 24 HR Take 1 Tablet by mouth every day. (Patient taking differently: Take 12.5 mg by mouth every day.) 100 Tablet 3    acetaminophen  "(TYLENOL) 500 MG Tab Take 2 Tablets by mouth every 6 hours as needed for Mild Pain or Moderate Pain. (Patient taking differently: Take 1,000 mg by mouth every day.)      warfarin (COUMADIN) 4 MG Tab TAKE 1 (ONE) TO 1 & 1/2 (ONE & ONE-HALF) TABLETS BY MOUTH ONCE DAILY AS DIRECTED BY [facility]  ANTICOAGULATION CLINIC (Patient taking differently: TAKE 1 (ONE) TO 1 & 1/2 (ONE & ONE-HALF) TABLETS BY MOUTH ONCE DAILY AS DIRECTED BY [facility]  ANTICOAGULATION CLINIC    6 mg on Tues, Thurs, Sat  4 mg on all other days) 135 Tablet 1    gabapentin (NEURONTIN) 300 MG Cap Take 1 Capsule by mouth 3 times a day. 270 Capsule 3    irbesartan (AVAPRO) 300 MG Tab Take 1 tablet by mouth once daily 100 Tablet 3    temazepam (RESTORIL) 30 MG capsule Take 1 Capsule by mouth at bedtime as needed for Sleep for up to 180 days. 30 Capsule 5    atorvastatin (LIPITOR) 80 MG tablet TAKE 1/2 (ONE-HALF) TABLET BY MOUTH ONCE DAILY IN THE EVENING 45 Tablet 3    aspirin (ASA) 81 MG Chew Tab chewable tablet Chew 1 Tablet every day. 100 Tablet 11    Ascorbic Acid (VITAMIN C PO) Take 500 mg by mouth.      Multiple Vitamins-Minerals (DAILY MULTI VITAMIN/MINERALS PO) Take  by mouth.       No current facility-administered medications for this visit.       Physical Exam:  BP 91/56 (BP Location: Left arm, Patient Position: Sitting, BP Cuff Size: Adult)   Pulse (!) 58   Temp 37.2 °C (98.9 °F) (Temporal)   Ht 1.778 m (5' 10\")   Wt 81.1 kg (178 lb 12.8 oz)   SpO2 92%   BMI 25.66 kg/m²   Physical Exam  Constitutional:       General: He is not in acute distress.     Appearance: Normal appearance.   HENT:      Head: Normocephalic.      Right Ear: Tympanic membrane normal.      Left Ear: Tympanic membrane normal.      Nose: Nose normal. No congestion or rhinorrhea.      Mouth/Throat:      Mouth: Mucous membranes are moist.   Eyes:      General: No scleral icterus.     Extraocular Movements: Extraocular movements intact.      Conjunctiva/sclera: Conjunctivae " normal.      Pupils: Pupils are equal, round, and reactive to light.   Cardiovascular:      Rate and Rhythm: Normal rate and regular rhythm.      Pulses: Normal pulses.      Heart sounds: Normal heart sounds. No murmur heard.     No gallop.   Pulmonary:      Effort: Pulmonary effort is normal. No respiratory distress.      Breath sounds: Normal breath sounds. No wheezing, rhonchi or rales.   Chest:      Chest wall: No tenderness.   Abdominal:      General: Bowel sounds are normal. There is no distension.      Palpations: There is no mass.      Tenderness: There is no abdominal tenderness. There is no guarding or rebound.   Genitourinary:     Rectum: Normal.   Musculoskeletal:         General: No swelling or deformity. Normal range of motion.      Cervical back: Normal range of motion and neck supple.      Right lower leg: No edema.      Left lower leg: No edema.   Lymphadenopathy:      Cervical: No cervical adenopathy.   Skin:     General: Skin is warm.      Capillary Refill: Capillary refill takes less than 2 seconds.      Coloration: Skin is not jaundiced or pale.      Findings: No bruising, erythema or rash.   Neurological:      General: No focal deficit present.      Mental Status: He is alert and oriented to person, place, and time.      Sensory: No sensory deficit.      Motor: No weakness.      Gait: Gait normal.      Deep Tendon Reflexes: Reflexes normal.   Psychiatric:         Mood and Affect: Mood normal.         Behavior: Behavior normal.         Thought Content: Thought content normal.         Judgment: Judgment normal.         Assessment and Plan:     1. Flu-like symptoms  The patient reports experiencing four episodes of flu-like symptoms over the past seven months, which he suspects may be related to bronchitis. His medical history is significant for prior smoking and lung surgery for empyema, placing him at increased risk for respiratory complications. The episodes have been characterized primarily by  upper respiratory symptoms, without wheezing or dyspnea, raising the possibility of viral respiratory infections, allergic rhinitis, or chronic sinusitis. Notably, the episodes often occur after extended air travel or vacations, suggesting environmental or exposure-related triggers. He also reports intermittent productive cough, mild shortness of breath, and lower extremity edema during these episodes, which raises consideration for mild congestive heart failure exacerbation as a contributing factor. At this time, the patient is asymptomatic and reports feeling well. Given the absence of current respiratory symptoms, antibiotic therapy is not indicated, and an ongoing viral infection is unlikely. A nasal corticosteroid spray will be initiated on a trial basis to address possible allergic or inflammatory upper airway involvement. The patient was advised to monitor for recurrence and to contact the clinic promptly if symptoms return. Further evaluation may be warranted based on future episodes.    Plan:      - fluticasone (FLONASE) 50 MCG/ACT nasal spray; Administer 2 Sprays into affected nostril(S) every day.  Dispense: 16 g; Refill: 2        2. Anemia, unspecified type  The patient has mild anemia, initially noted during a hospitalization in March for congestive heart failure exacerbation, where red blood cell count was mildly reduced. This anemia has persisted over several months, with a mildly decreased MCHC, suggesting possible hypochromia. Given the chronicity of findings, further evaluation is warranted to determine the underlying etiology, which may include nutritional deficiencies, chronic disease, or occult blood loss. A comprehensive anemia workup will be initiated,     Plan:    - IRON/TOTAL IRON BIND; Future  - FERRITIN; Future  - TSH WITH REFLEX TO FT4; Future  - VITAMIN D 25-HYDROXY    Other issues being followed by cardiology:  -Essential hypertension  -Paroxysmal atrial fibrillation on warfarin,    -HFpEF         Return in about 6 weeks (around 6/24/2025).      Donato Kay M.D., PGY-1, Internal Medicine  [facility]

## 2025-05-13 NOTE — PROGRESS NOTES
Teaching Physician Attestation      Level of Participation    I have personally interviewed and examined the patient.  In addition, I discussed with the resident physician the patient's history, exam, assessment and plan in detail.  Topics listed in my addendum were the focus of the visit.  Healthcare maintenance was not addressed this visit unless listed as a topic in my addendum.  I agree with the plan as written along with the following additions/modifications:    New Patient    PMH: MR, s/p TAVR (complicated by arterial injury s/p reconstruction), pAF, hx TIA (believe cardioembolic), carotic stenosis, CAD, and systolic HF followed by cardio, prior pleurectomy, HTN, arterial injury s/p reconstructionin setting of TAVR, and history of carotid endarterectomy followed by vascular, DVT (? Provoked in setting of back surgery), ?SA, prostate cancer and bladder suspension procedure with urinary continence, cataract surgery, lbp, sacral pressure ulcer prev followed by wound care, mild nc anemia      ? Gabapentin        Rtc 1 mo.  pcr

## 2025-05-14 ENCOUNTER — RESULTS FOLLOW-UP (OUTPATIENT)
Dept: CARDIOLOGY | Facility: MEDICAL CENTER | Age: 89
End: 2025-05-14

## 2025-05-15 ENCOUNTER — ANTICOAGULATION VISIT (OUTPATIENT)
Dept: VASCULAR LAB | Facility: MEDICAL CENTER | Age: 89
End: 2025-05-15
Attending: INTERNAL MEDICINE
Payer: MEDICARE

## 2025-05-15 VITALS — HEART RATE: 62 BPM | DIASTOLIC BLOOD PRESSURE: 75 MMHG | SYSTOLIC BLOOD PRESSURE: 135 MMHG

## 2025-05-15 DIAGNOSIS — G45.9 TIA (TRANSIENT ISCHEMIC ATTACK): ICD-10-CM

## 2025-05-15 DIAGNOSIS — D68.69 SECONDARY HYPERCOAGULABLE STATE (HCC): ICD-10-CM

## 2025-05-15 DIAGNOSIS — I48.0 PAF (PAROXYSMAL ATRIAL FIBRILLATION) (HCC): Primary | ICD-10-CM

## 2025-05-15 LAB — INR PPP: 3.4 (ref 2–3.5)

## 2025-05-15 PROCEDURE — 85610 PROTHROMBIN TIME: CPT

## 2025-05-15 PROCEDURE — 99212 OFFICE O/P EST SF 10 MIN: CPT

## 2025-05-15 NOTE — PROGRESS NOTES
Anticoagulation Summary  As of 5/15/2025      INR goal:  2.0-3.0   TTR:  73.1% (9.6 y)   INR used for dosing:  3.40 (5/15/2025)   Warfarin maintenance plan:  6 mg (4 mg x 1.5) every Tue, Thu, Sat; 4 mg (4 mg x 1) all other days   Weekly warfarin total:  34 mg   Plan last modified:  Natasha Jack, PharmD (12/6/2024)   Next INR check:  5/29/2025   Priority:  Maintenance   Target end date:  Indefinite    Indications    PAF (paroxysmal atrial fibrillation) (HCC) [I48.0]  Hx of TIA (transient ischemic attack) [G45.9]  Deep vein thrombosis (DVT) (HCC) (Resolved) [I82.409]  Atrial fibrillation (HCC) (Resolved) [I48.91]  Hx of Stroke (Resolved) [I63.9]  Secondary hypercoagulable state (HCC) [D68.69]                 Anticoagulation Episode Summary       INR check location:  Anticoagulation Clinic    Preferred lab:  --    Send INR reminders to:  --    Comments:  Takes ASA + warfarin 2/2 TIA Sep 2022 per Dr Bloch  Medications reconciled. Take naproxen as needed for arthritic pain. He is aware of the increased risk of bleeding and takes only when needed with caution          Anticoagulation Care Providers       Provider Role Specialty Phone number    Michael J Bloch, M.D. Referring Cardiovascular Disease (Cardiology) [phone]    [facility]   [phone]                Refer to Patient Findings for HPI:  Patient Findings       Positives:  Change in medications (Started on Jardiance since last visit.)    Negatives:  Signs/symptoms of thrombosis, Signs/symptoms of bleeding, Laboratory test error suspected, Change in health, Change in alcohol use, Change in activity, Upcoming invasive procedure, Emergency department visit, Upcoming dental procedure, Missed doses, Extra doses, Change in diet/appetite, Hospital admission, Bruising, Other complaints          Clinical Outcomes       Negatives:  Major bleeding event, Thromboembolic event, Anticoagulation-related hospital admission, Anticoagulation-related ED  visit, Anticoagulation-related fatality            Date Referral Placed: 3/10/25      Vitals:  Vitals:    05/15/25 1633   BP: 135/75   Pulse: 62       Verified current warfarin dosing schedule.    Medications reconciled.  Pt is on antiplatelet therapy with ASA for TIA.      A/P   INR is supratherapeutic  Reason(s) for out of range INR today: No obvious causes      Warfarin dosing recommendation: Take REDUCED dose ONCE today of 2 mg, then resume current warfarin dosing regimen.       Pt educated to contact our clinic with any changes in medications or s/s of bleeding or thrombosis. Pt is aware to seek immediate medical attention for falls, head injury or deep cuts.    Request pt to return in 2 week(s). Pt agrees.    Sal Anglin, PharmD

## 2025-05-19 ENCOUNTER — TELEPHONE (OUTPATIENT)
Dept: CARDIOLOGY | Facility: MEDICAL CENTER | Age: 89
End: 2025-05-19
Payer: MEDICARE

## 2025-05-19 NOTE — TELEPHONE ENCOUNTER
Received call back from patient. Appointment with Dr. Barrientos scheduled for 5/28/2025. All questions answered.

## 2025-05-19 NOTE — TELEPHONE ENCOUNTER
Referral from: Dr. Bae for Severe MR    Patient called on 5/19/2025.    Left message on home and mobile numbers requesting call back to discuss.       First attempt

## 2025-05-20 ENCOUNTER — APPOINTMENT (OUTPATIENT)
Dept: CARDIOLOGY | Facility: MEDICAL CENTER | Age: 89
End: 2025-05-20
Attending: INTERNAL MEDICINE
Payer: MEDICARE

## 2025-05-20 NOTE — TELEPHONE ENCOUNTER
Called pt to check in and unable to reach him on either phone number listed. Voicemail left on mobile phone (home phone going to busy signal.    Detail Level: Detailed

## 2025-05-23 ENCOUNTER — DOCUMENTATION (OUTPATIENT)
Dept: CARDIOLOGY | Facility: MEDICAL CENTER | Age: 89
End: 2025-05-23
Payer: MEDICARE

## 2025-05-23 DIAGNOSIS — Z00.6 EXAMINATION OF PARTICIPANT IN CLINICAL TRIAL: ICD-10-CM

## 2025-05-23 DIAGNOSIS — I34.0 SEVERE MITRAL REGURGITATION: Primary | ICD-10-CM

## 2025-05-23 DIAGNOSIS — I34.0 NONRHEUMATIC MITRAL VALVE REGURGITATION: ICD-10-CM

## 2025-05-23 NOTE — TELEPHONE ENCOUNTER
Dr. Barrientos recommends Mitral TASHA pathway.    Called patient to see if he could see Dr. Barrientos 5/27/2025, but he already has appts scheduled. He is agreeable to tentatively plan for Mitral TASHA 6/3/2025 if he's a candidate.

## 2025-05-23 NOTE — Clinical Note
REFERRAL APPROVAL NOTICE         Sent on May 23, 2025                   Everardo Hester  [address] [place] [address]                   Dear Mr. Hester,    After a careful review of the medical information and benefit coverage, [facility] has processed your referral. See below for additional details.    If applicable, you must be actively enrolled with your insurance for coverage of the authorized service. If you have any questions regarding your coverage, please contact your insurance directly.    REFERRAL INFORMATION   Referral #:  [id number]  Referred-To Department    Referred-By Provider:  Cardiothoracic Surgery    Sony Barrientos M.D.    Surgery - Cardiothoracic      [address]  [address]  [place] [address]  [phone] [address] [facility]  [place] [address]  [phone]    Referral Start Date:  05/23/2025  Referral End Date:   05/23/2026             SCHEDULING  If you do not already have an appointment, please call [phone] to make an appointment.     MORE INFORMATION  If you do not already have a [employer] account, sign up at: [email or web address]  You can access your medical information, make appointments, see lab results, billing information, and more.  If you have questions regarding this referral, please contact  the [facility] Referrals department at:             [phone]. Monday - Friday 8:00AM - 5:00PM.     Sincerely,    [facility]

## 2025-05-23 NOTE — PROGRESS NOTES
[facility] Mitral TASHA review: Comments: PMR. Lateral P2 flail. Anterior directed jet. Significant MAC. Leaflet length adequate to grasp. Mitral valve leaflets appear clipable. Recommend 1 22mm XTW clip at flail. Assess need for possible 2nd clip at baseline echo review.

## 2025-05-27 ENCOUNTER — TELEPHONE (OUTPATIENT)
Dept: CARDIOLOGY | Facility: MEDICAL CENTER | Age: 89
End: 2025-05-27
Payer: MEDICARE

## 2025-05-27 NOTE — TELEPHONE ENCOUNTER
Procedure Type: Isolated MVr   PERIOPERATIVE OUTCOME ESTIMATE %   Operative Mortality 3.28%   Morbidity & Mortality 7.84%   Stroke 4.19%   Renal Failure 1.36%   Reoperation 3.64%   Prolonged Ventilation 4.25%   Deep Sternal Wound Infection 0.03%   Long Hospital Stay (>14 days) 6.55%   Short Hospital Stay (<6 days) 33.6%

## 2025-05-28 ENCOUNTER — TELEPHONE (OUTPATIENT)
Dept: CARDIOLOGY | Facility: MEDICAL CENTER | Age: 89
End: 2025-05-28
Payer: MEDICARE

## 2025-05-28 ENCOUNTER — PRE-ADMISSION TESTING (OUTPATIENT)
Dept: ADMISSIONS | Facility: MEDICAL CENTER | Age: 89
End: 2025-05-28
Attending: INTERNAL MEDICINE
Payer: MEDICARE

## 2025-05-28 ENCOUNTER — OFFICE VISIT (OUTPATIENT)
Dept: CARDIOLOGY | Facility: MEDICAL CENTER | Age: 89
End: 2025-05-28
Attending: INTERNAL MEDICINE
Payer: MEDICARE

## 2025-05-28 ENCOUNTER — DOCUMENTATION (OUTPATIENT)
Dept: CARDIOLOGY | Facility: MEDICAL CENTER | Age: 89
End: 2025-05-28

## 2025-05-28 ENCOUNTER — TELEPHONE (OUTPATIENT)
Dept: VASCULAR LAB | Facility: MEDICAL CENTER | Age: 89
End: 2025-05-28

## 2025-05-28 VITALS
HEIGHT: 70 IN | RESPIRATION RATE: 16 BRPM | WEIGHT: 178 LBS | SYSTOLIC BLOOD PRESSURE: 116 MMHG | OXYGEN SATURATION: 92 % | HEART RATE: 62 BPM | BODY MASS INDEX: 25.48 KG/M2 | DIASTOLIC BLOOD PRESSURE: 60 MMHG

## 2025-05-28 DIAGNOSIS — Z01.810 PRE-OPERATIVE CARDIOVASCULAR EXAMINATION: ICD-10-CM

## 2025-05-28 DIAGNOSIS — Z01.812 PRE-OPERATIVE LABORATORY EXAMINATION: Primary | ICD-10-CM

## 2025-05-28 DIAGNOSIS — I25.10 CORONARY ARTERY DISEASE INVOLVING NATIVE CORONARY ARTERY OF NATIVE HEART WITHOUT ANGINA PECTORIS: ICD-10-CM

## 2025-05-28 DIAGNOSIS — I10 ESSENTIAL HYPERTENSION, BENIGN: Primary | ICD-10-CM

## 2025-05-28 DIAGNOSIS — I34.0 SEVERE MITRAL REGURGITATION: ICD-10-CM

## 2025-05-28 LAB
ABO GROUP BLD: NORMAL
ALBUMIN SERPL BCP-MCNC: 4 G/DL (ref 3.2–4.9)
ALBUMIN/GLOB SERPL: 1.3 G/DL
ALP SERPL-CCNC: 63 U/L (ref 30–99)
ALT SERPL-CCNC: 13 U/L (ref 2–50)
ANION GAP SERPL CALC-SCNC: 11 MMOL/L (ref 7–16)
APTT PPP: 34.5 SEC (ref 24.7–36)
AST SERPL-CCNC: 30 U/L (ref 12–45)
BASOPHILS # BLD AUTO: 1.1 % (ref 0–1.8)
BASOPHILS # BLD: 0.07 K/UL (ref 0–0.12)
BILIRUB SERPL-MCNC: 1 MG/DL (ref 0.1–1.5)
BLD GP AB SCN SERPL QL: NORMAL
BUN SERPL-MCNC: 23 MG/DL (ref 8–22)
CALCIUM ALBUM COR SERPL-MCNC: 9.2 MG/DL (ref 8.5–10.5)
CALCIUM SERPL-MCNC: 9.2 MG/DL (ref 8.5–10.5)
CHLORIDE SERPL-SCNC: 103 MMOL/L (ref 96–112)
CO2 SERPL-SCNC: 22 MMOL/L (ref 20–33)
CREAT SERPL-MCNC: 1.05 MG/DL (ref 0.5–1.4)
EKG IMPRESSION: NORMAL
EOSINOPHIL # BLD AUTO: 0.22 K/UL (ref 0–0.51)
EOSINOPHIL NFR BLD: 3.5 % (ref 0–6.9)
ERYTHROCYTE [DISTWIDTH] IN BLOOD BY AUTOMATED COUNT: 52.8 FL (ref 35.9–50)
GFR SERPLBLD CREATININE-BSD FMLA CKD-EPI: 68 ML/MIN/1.73 M 2
GLOBULIN SER CALC-MCNC: 3.1 G/DL (ref 1.9–3.5)
GLUCOSE SERPL-MCNC: 101 MG/DL (ref 65–99)
HCT VFR BLD AUTO: 44.4 % (ref 42–52)
HGB BLD-MCNC: 14.3 G/DL (ref 14–18)
IMM GRANULOCYTES # BLD AUTO: 0.02 K/UL (ref 0–0.11)
IMM GRANULOCYTES NFR BLD AUTO: 0.3 % (ref 0–0.9)
INR PPP: 2.24 (ref 0.87–1.13)
LYMPHOCYTES # BLD AUTO: 1.34 K/UL (ref 1–4.8)
LYMPHOCYTES NFR BLD: 21.5 % (ref 22–41)
MCH RBC QN AUTO: 29.5 PG (ref 27–33)
MCHC RBC AUTO-ENTMCNC: 32.2 G/DL (ref 32.3–36.5)
MCV RBC AUTO: 91.7 FL (ref 81.4–97.8)
MONOCYTES # BLD AUTO: 0.54 K/UL (ref 0–0.85)
MONOCYTES NFR BLD AUTO: 8.7 % (ref 0–13.4)
NEUTROPHILS # BLD AUTO: 4.05 K/UL (ref 1.82–7.42)
NEUTROPHILS NFR BLD: 64.9 % (ref 44–72)
NRBC # BLD AUTO: 0 K/UL
NRBC BLD-RTO: 0 /100 WBC (ref 0–0.2)
NT-PROBNP SERPL IA-MCNC: ABNORMAL PG/ML (ref 0–125)
PLATELET # BLD AUTO: 176 K/UL (ref 164–446)
PMV BLD AUTO: 10.9 FL (ref 9–12.9)
POTASSIUM SERPL-SCNC: 4.2 MMOL/L (ref 3.6–5.5)
PROT SERPL-MCNC: 7.1 G/DL (ref 6–8.2)
PROTHROMBIN TIME: 24.9 SEC (ref 12–14.6)
RBC # BLD AUTO: 4.84 M/UL (ref 4.7–6.1)
RH BLD: NORMAL
SODIUM SERPL-SCNC: 136 MMOL/L (ref 135–145)
WBC # BLD AUTO: 6.2 K/UL (ref 4.8–10.8)

## 2025-05-28 PROCEDURE — 93010 ELECTROCARDIOGRAM REPORT: CPT | Performed by: INTERNAL MEDICINE

## 2025-05-28 PROCEDURE — 86901 BLOOD TYPING SEROLOGIC RH(D): CPT

## 2025-05-28 PROCEDURE — 85025 COMPLETE CBC W/AUTO DIFF WBC: CPT

## 2025-05-28 PROCEDURE — 85730 THROMBOPLASTIN TIME PARTIAL: CPT

## 2025-05-28 PROCEDURE — 99213 OFFICE O/P EST LOW 20 MIN: CPT

## 2025-05-28 PROCEDURE — 36415 COLL VENOUS BLD VENIPUNCTURE: CPT

## 2025-05-28 PROCEDURE — 83880 ASSAY OF NATRIURETIC PEPTIDE: CPT

## 2025-05-28 PROCEDURE — 85610 PROTHROMBIN TIME: CPT

## 2025-05-28 PROCEDURE — 86900 BLOOD TYPING SEROLOGIC ABO: CPT

## 2025-05-28 PROCEDURE — 86850 RBC ANTIBODY SCREEN: CPT

## 2025-05-28 PROCEDURE — 93005 ELECTROCARDIOGRAM TRACING: CPT | Mod: TC

## 2025-05-28 PROCEDURE — 80053 COMPREHEN METABOLIC PANEL: CPT

## 2025-05-28 ASSESSMENT — PATIENT HEALTH QUESTIONNAIRE - PHQ9: CLINICAL INTERPRETATION OF PHQ2 SCORE: 0

## 2025-05-28 ASSESSMENT — FIBROSIS 4 INDEX: FIB4 SCORE: 2.85

## 2025-05-28 NOTE — TELEPHONE ENCOUNTER
Valve Conference Plan of Care: 5/28/2025 for Mitral TASHA 6/3/2025           Mitral TASHA Candidate: yes  General Anesthesia or MAC: GA with YOANDY  Unit: telemetry  Clearance needed prior to procedure: no    Check in time of 0730 6/3/2025.     Pre-op appointment completed: scheduled today 5/28/2025    NPO after midnight. Hold vitamins/supplements x7 days, warfarin x5 days (bridge with Lovenox?), Jardiance x4 days, irbesartan/Zetia x1 day, spironolactone/furosemide AM of procedure.

## 2025-05-28 NOTE — PROGRESS NOTES
"      Interventional cardiology Follow-up Consultation Note        CC: Mitral valve regurgitation    Patient ID/HPI:   89-year-old male patient with history of aortic stenosis status post TAVR, generalized weakness, history of CVA, paroxysmal atrial fibrillation, DVT, loss of balance presents with decreased energy, fatigue, his echocardiogram showed severe mitral regurgitation due to mitral valve prolapse        Past Medical History[1]      Current Medications[2]      Physical Exam:  Ambulatory Vitals  /60 (BP Location: Left arm, Patient Position: Sitting, BP Cuff Size: Adult)   Pulse 62   Resp 16   Ht 1.778 m (5' 10\")   Wt 80.7 kg (178 lb)   SpO2 92%    Oxygen Therapy:  Pulse Oximetry: 92 %  BP Readings from Last 4 Encounters:   05/28/25 116/60   05/15/25 135/75   05/13/25 91/56   05/12/25 139/66       Weight/BMI: Body mass index is 25.54 kg/m².  Wt Readings from Last 4 Encounters:   05/28/25 80.7 kg (178 lb)   05/13/25 81.1 kg (178 lb 12.8 oz)   05/12/25 78.1 kg (172 lb 2.9 oz)   04/08/25 80.7 kg (178 lb)       General: Well appearing and in no apparent distress  Neck: JVP absent, carotid bruits absent  Lungs: respiratory sounds  normal, additional breath sounds absent  Heart: Regular rhythm,   No palpable thrills on palpation, murmurs present, no rubs,   Lower extremity edema absent.       YOANDY from 5/12/2025 reviewed, independently interpreted, lateral P2 valve prolapse, flail severe eccentric mitral regurgitation      Medical Decision Making:  Problem List Items Addressed This Visit          Cardiology Medicine Problems    Essential hypertension, benign - Primary       Other    Coronary artery disease involving native coronary artery of native heart without angina pectoris     Other Visit Diagnoses         Severe mitral regurgitation              Today's encounter addressed an illness with threat to life/bodily function severe symptomatic mitral regurgitation NYHA class III stage C  Treatment options " including conservative therapy, mitral valve intervention using MitraClip discussed, risk benefits of the procedure discussed in detail.  Patient would like to proceed with the procedure.  Decrease the warfarin dose to be INR around 1.5-2 during the procedure.        Sony RAMIRES  Interventional cardiologist  [facility], [address]  [address] [address]  [place] [address]  Phone: [phone]  Fax: [phone]          [1]   Past Medical History:  Diagnosis Date    Acute nasopharyngitis 05/08/2025    Started two weeks ago, patient states runny nose, sneezing, productive cough and denies fever.    Aortic valve stenosis     Arrhythmia     Afib    Arthritis     osteo, hips and knees    Blood clotting disorder ([facility])     , left arm    Breath shortness     Bronchitis         Cancer (Formerly [facility])     Prostate CA--2000    Carotid arterial disease ([facility]ly [facility]) 01/06/2014    Cataract     removed bilat    Congestive heart failure ([facility]ly [facility])     DVT (deep venous thrombosis) ([facility]ly [facility]) 2016    Glaucoma     Heart valve disease     MVR    HLD (hyperlipidemia) 01/06/2014    Hypertension     Mitral regurgitation 01/06/2014    Pain 12/14/2015    low back, hips, 2-3/10    Pneumonia         Sleep apnea     Stroke ([facility]) 2008    TIA, no residual    TIA (transient ischemic attack)     2008    Urinary bladder disorder     HAD MALE BLADDER SUSPENSION PROCEDURE DONE    Urinary incontinence     stress incontinence   [2]   Current Outpatient Medications   Medication Sig Dispense Refill    fluticasone (FLONASE) 50 MCG/ACT nasal spray Administer 2 Sprays into affected nostril(S) every day. 16 g 2    spironolactone (ALDACTONE) 25 MG Tab Take 1 Tablet by mouth every day. Ok to take with Irbesartan, due to CHF. 100 Tablet 1    JARDIANCE 10 MG Tab tablet Take 1 Tablet by mouth every day. He might need help with copay i.e. 340B (Patient taking differently: Take 10 mg by  mouth every day. He might need help with copay i.e. 340B. Do not take with Farxiga) 30 Tablet 6    ezetimibe (ZETIA) 10 MG Tab Take 1 Tablet by mouth every day. 90 Tablet 1    furosemide (LASIX) 40 MG Tab Take 1-2 Tablets by mouth as needed (swelling). Take 1 tablet twice daily. If worsening shortness of breath, swelling, or weight gain, take 2 tablets twice daily that day. 90 Tablet 3    metoprolol SR (TOPROL XL) 25 MG TABLET SR 24 HR Take 1 Tablet by mouth every day. (Patient taking differently: Take 12.5 mg by mouth every day.) 100 Tablet 3    acetaminophen (TYLENOL) 500 MG Tab Take 2 Tablets by mouth every 6 hours as needed for Mild Pain or Moderate Pain. (Patient taking differently: Take 1,000 mg by mouth every day.)      warfarin (COUMADIN) 4 MG Tab TAKE 1 (ONE) TO 1 & 1/2 (ONE & ONE-HALF) TABLETS BY MOUTH ONCE DAILY AS DIRECTED BY [facility]  ANTICOAGULATION CLINIC (Patient taking differently: TAKE 1 (ONE) TO 1 & 1/2 (ONE & ONE-HALF) TABLETS BY MOUTH ONCE DAILY AS DIRECTED BY [facility]  ANTICOAGULATION CLINIC    6 mg on Tues, Thurs, Sat  4 mg on all other days) 135 Tablet 1    gabapentin (NEURONTIN) 300 MG Cap Take 1 Capsule by mouth 3 times a day. 270 Capsule 3    irbesartan (AVAPRO) 300 MG Tab Take 1 tablet by mouth once daily 100 Tablet 3    temazepam (RESTORIL) 30 MG capsule Take 1 Capsule by mouth at bedtime as needed for Sleep for up to 180 days. 30 Capsule 5    atorvastatin (LIPITOR) 80 MG tablet TAKE 1/2 (ONE-HALF) TABLET BY MOUTH ONCE DAILY IN THE EVENING 45 Tablet 3    aspirin (ASA) 81 MG Chew Tab chewable tablet Chew 1 Tablet every day. 100 Tablet 11    Ascorbic Acid (VITAMIN C PO) Take 500 mg by mouth.      Multiple Vitamins-Minerals (DAILY MULTI VITAMIN/MINERALS PO) Take  by mouth.       No current facility-administered medications for this visit.

## 2025-05-28 NOTE — PROGRESS NOTES
Valve Program Functional Assessment:     KCCQ12   1a) Showering/bathin  1b) Walking 1 block on ground: 2  1c) Hurrying or joggin  2) Swellin  3) Fatigue: 3  4) Shortness of breath: 3  5) Sleep sitting up: 5  6) Limited enjoyment of life: 4  7) Spend the rest of your life with HF: 4  8a) Hobbies, recreational activities:4  8b) Working or doing household chores:3  8c) Visiting family or friends: 4     Strength   1) _28_____ kg  2) _24_____ kg  3) _24_____ kg  AVG:__25.3____    YU ADLs  Patient independently preforms...   - Bathing: Yes   - Dressing: Yes   - Toileting: Yes   - Transferring: Yes   - Continence: Yes   - Feeding: Yes   Total Score: _6_/6    Living Situation  Patient lives: with spouse    Mobility Aids   Patient uses: cane, walker (prn)      FRAILTY SCORE: _0_/ 3

## 2025-05-29 ENCOUNTER — ANTICOAGULATION VISIT (OUTPATIENT)
Dept: VASCULAR LAB | Facility: MEDICAL CENTER | Age: 89
End: 2025-05-29
Attending: INTERNAL MEDICINE
Payer: MEDICARE

## 2025-05-29 ENCOUNTER — RESULTS FOLLOW-UP (OUTPATIENT)
Dept: CARDIOLOGY | Facility: MEDICAL CENTER | Age: 89
End: 2025-05-29

## 2025-05-29 ENCOUNTER — NON-PROVIDER VISIT (OUTPATIENT)
Dept: CARDIOLOGY | Facility: MEDICAL CENTER | Age: 89
End: 2025-05-29
Attending: INTERNAL MEDICINE
Payer: MEDICARE

## 2025-05-29 ENCOUNTER — DOCUMENTATION (OUTPATIENT)
Dept: CARDIOLOGY | Facility: MEDICAL CENTER | Age: 89
End: 2025-05-29
Payer: MEDICARE

## 2025-05-29 VITALS
HEART RATE: 61 BPM | DIASTOLIC BLOOD PRESSURE: 86 MMHG | BODY MASS INDEX: 25.54 KG/M2 | SYSTOLIC BLOOD PRESSURE: 154 MMHG | WEIGHT: 178 LBS

## 2025-05-29 DIAGNOSIS — G45.9 TIA (TRANSIENT ISCHEMIC ATTACK): ICD-10-CM

## 2025-05-29 DIAGNOSIS — I48.0 PAF (PAROXYSMAL ATRIAL FIBRILLATION) (HCC): Primary | ICD-10-CM

## 2025-05-29 DIAGNOSIS — I34.0 NONRHEUMATIC MITRAL VALVE REGURGITATION: Primary | ICD-10-CM

## 2025-05-29 DIAGNOSIS — D68.69 SECONDARY HYPERCOAGULABLE STATE (HCC): ICD-10-CM

## 2025-05-29 DIAGNOSIS — I50.21 ACUTE HEART FAILURE WITH MILDLY REDUCED EJECTION FRACTION (HFMREF, 41-49%) (HCC): ICD-10-CM

## 2025-05-29 LAB — INR PPP: 2.3 (ref 2–3.5)

## 2025-05-29 PROCEDURE — 85610 PROTHROMBIN TIME: CPT

## 2025-05-29 PROCEDURE — 99212 OFFICE O/P EST SF 10 MIN: CPT | Performed by: PHARMACIST

## 2025-05-29 PROCEDURE — 99211 OFF/OP EST MAY X REQ PHY/QHP: CPT | Performed by: NURSE PRACTITIONER

## 2025-05-29 RX ORDER — SPIRONOLACTONE 25 MG/1
12.5 TABLET ORAL DAILY
Qty: 100 TABLET | Refills: 1 | Status: SHIPPED | OUTPATIENT
Start: 2025-05-29

## 2025-05-29 ASSESSMENT — FIBROSIS 4 INDEX: FIB4 SCORE: 4.16

## 2025-05-29 NOTE — TELEPHONE ENCOUNTER
----- Message from Nurse Chrissie MENA R.N. sent at 5/28/2025  4:16 PM PDT -----  Regarding: INR  Good afternoon,Patient is having mitral transcatheter ovos-fq-gcqt repair (mitral TASHA) 6/3/2025. Dr. Barrientos would like the patient to continue his warfarin with an INR goal of about 2 prior to procedure.Please reach out with any questions or concerns.Thank you,Chrissie Capps, BSN, RNSUniversity [facility] Nurse Coordinator
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
## 2025-05-29 NOTE — TELEPHONE ENCOUNTER
Late documentation:    Met with patient and wife Tori 5/28/2025 after office visit with Dr. Barrientos    Patient notified of procedure date/time and check in process.     All questions were answered. Patient has direct extension for any further questions/concerns prior to the procedure.

## 2025-05-29 NOTE — PROGRESS NOTES
CHF Pharmacotherapy Visit    Date Referral Placed: 3/27/25    Tristan Hester is here for CHF    HPI  Pertinent Interval History since last visit:   Since last visit, pt was able to obtain and start Jardiance. Obtaining via [employer] 340b.   Mitral clip procedure scheduled for 6/3/25.    Most recent EF:  Lab Results   Component Value Date/Time    LVEF 45 03/25/2025 1722    LVEF 65 03/11/2024 1547    LVEF 60 04/01/2022 1329       Potential Barriers to Care:  Adherence: Not currently taking spironolactone. Denies missed doses of all other medications.   Side effects: none  Affordability: No issues. Jardiance costs $150/3 months.  Others: N/a    Current CHF Medications - including dose:   Entresto or ACE/ARB: Irbesartan 300 mg once daily  Beta blocker: Metoprolol SR 25 mg once daily   Diuretic: Furosemide 40 mg, 1-2 tabs daily PRN - takes a dose Q1-2wks  Aldosterone antagonist: Spironolactone 25 mg once daily - NOT currently taking d/t pharmacy discrepancy.  SGLT2i: Jaridance 10 mg once daily    Home Vitals:  BP ~ 's/50-70's  HR~ 50-60's      Lifestyle:  Change in weight: Stable  Exercise habits: moderate regular exercise program - Goes to the gym thrice weekly. Does 25-30 min on recumbent bicycle. Resistance training for 30 min. Plans to participate in cardiac rehab after upcoming procedure.  Diet: low sodium - Eats twice daily. Coffee in AM, eats lunch and dinner. Hamburgers, sausage, steak. Oatmeal cookies occasionally. Lots of fruits and vegetables. Occasional beer or old fashioned.    DATA REVIEW  Vitals:    05/29/25 0858   BP: (!) 154/86   Pulse: 61       Lab Results   Component Value Date/Time    SODIUM 136 05/28/2025 02:50 PM    POTASSIUM 4.2 05/28/2025 02:50 PM    CHLORIDE 103 05/28/2025 02:50 PM    CO2 22 05/28/2025 02:50 PM    GLUCOSE 101 (H) 05/28/2025 02:50 PM    BUN 23 (H) 05/28/2025 02:50 PM    CREATININE 1.05 05/28/2025 02:50 PM    CREATININE 1.0 07/02/2008 03:43 PM    BUNCREATRAT 23 05/09/2025  "07:04 AM     Lab Results   Component Value Date/Time    ALKPHOSPHAT 63 05/28/2025 02:50 PM    ASTSGOT 30 05/28/2025 02:50 PM    ALTSGPT 13 05/28/2025 02:50 PM    TBILIRUBIN 1.0 05/28/2025 02:50 PM    INR 2.24 (H) 05/28/2025 02:50 PM    ALBUMIN 4.0 05/28/2025 02:50 PM      No results found for: \"MALBCRT\", \"MICROALBUR\"    Renal function:  Calculated creatinine clearance: ~ 54 mL/min   eGFR:   GFR (CKD-EPI)   Date Value Ref Range Status   05/28/2025 68 >60 mL/min/1.73 m 2 Final     Comment:     Estimated Glomerular Filtration Rate is calculated using  race neutral CKD-EPI 2021 equation per NKF-ASN recommendations.           Other:  Immunization History   Administered Date(s) Administered    COVID-19, mRNA, LNP-S, PF, jose-sucrose, 30 mcg/0.3 mL 10/02/2023, 09/24/2024    INFLUENZA TIV (IM) 10/09/2010, 10/01/2014    Influenza Seasonal Injectable - Historical Data 10/09/2010    Influenza Vaccine Adult HD 10/20/2015, 09/15/2020, 10/09/2021    Influenza Vaccine Quad Inj (Pf) 09/29/2017, 09/21/2018    Influenza Vaccine Quad Recombinant 09/24/2019    Influenza split virus trivalent (PF) 10/11/2011, 10/19/2012, 10/18/2013, 10/02/2014    Influenza, unspecified formulation 10/12/2016    PFIZER CLAUDIO CAP SARS-COV-2 VACCINATION (12+) 05/19/2022    PFIZER PURPLE CAP SARS-COV-2 VACCINATION (12+) 01/21/2021, 02/11/2021    Pneumococcal Conjugate Vaccine (Prevnar/PCV-13) 12/15/2015    Pneumococcal Conjugate, unspecified formulation 01/01/2010       Recent Imaging Studies:    None since last visit    ASSESSMENT AND PLAN  -CHF  Pt presents to clinic doing well overall. He has an upcoming mitral clip procedure on 6/3/25.   From a CHF standpoint, pt denies dizziness (states he has lightheadedness in the AM) or swelling issues. He does note some TELLEZ.   Anticipate improvement w/ upcoming procedure.  Pt reports that he does not routinely monitor his home vitals. He relies on his OV's for this. Encouraged him to start doing so, especially " "during \"lightheadedness\" episodes if possible.   Clinic BP elevated today and HR at goal.  Upon pt interview, noted that pt has not been taking his spironolactone d/t pharmacy refusing to dispense d/t DDI (?).  Will continue to optimize CHF GDMT as tolerated.    CHF medications (changes are bolded)  Entresto or ACE/ARB: Irbesartan 300 mg once daily  Beta blocker: Metoprolol SR 25 mg once daily   Diuretic: Furosemide 40 mg, 1-2 tabs daily PRN   Aldosterone antagonist: RESTART spironolactone w/ decreased dose of  12.5 mg once daily   SGLT2i: Jaridance 10 mg once daily    -Lifestyle   Recommendations From Today's Visit:   Continue to limit fluid intake to 2L/day and Na+ intake to 2gm/day  Continue to eat DASH/MED style diet.   Continue to exercise as tolerated.     Blood Work/Studies Ordered At Today's visit:   BMP    Follow-Up:   5 weeks per pt    Maxime Cruz, Elle, BCACP    CC:  Andy L Silva-Santisteban Merino, M.D. Bloch, Michael J, M.D.  "

## 2025-05-29 NOTE — PROGRESS NOTES
Anticoagulation Summary  As of 2025      INR goal:  2.0-3.0   TTR:  73.1% (9.6 y)   INR used for dosin.30 (2025)   Warfarin maintenance plan:  6 mg (4 mg x 1.5) every Tue, Thu, Sat; 4 mg (4 mg x 1) all other days   Weekly warfarin total:  34 mg   Plan last modified:  Natasha Jack, PharmD (2024)   Next INR check:  2025   Priority:  Maintenance   Target end date:  Indefinite    Indications    PAF (paroxysmal atrial fibrillation) (HCC) [I48.0]  Hx of TIA (transient ischemic attack) [G45.9]  Deep vein thrombosis (DVT) (HCC) (Resolved) [I82.409]  Atrial fibrillation (HCC) (Resolved) [I48.91]  Hx of Stroke (Resolved) [I63.9]  Secondary hypercoagulable state (HCC) [D68.69]                 Anticoagulation Episode Summary       INR check location:  Anticoagulation Clinic    Preferred lab:  --    Send INR reminders to:  --    Comments:  Takes ASA + warfarin 2/2 TIA Sep 2022 per Dr Bloch  Medications reconciled. Take naproxen as needed for arthritic pain. He is aware of the increased risk of bleeding and takes only when needed with caution          Anticoagulation Care Providers       Provider Role Specialty Phone number    Michael J Bloch, M.D. Referring Cardiovascular Disease (Cardiology) [phone]    [facility] Anticoagulation Services   [phone]                Refer to Patient Findings for HPI:  Patient Findings       Positives:  Upcoming invasive procedure    Negatives:  Signs/symptoms of thrombosis, Signs/symptoms of bleeding, Laboratory test error suspected, Change in health, Change in alcohol use, Change in activity, Emergency department visit, Upcoming dental procedure, Missed doses, Extra doses, Change in medications, Change in diet/appetite, Hospital admission, Bruising, Other complaints    Comments:  Patient is having mitral transcatheter ouze-em-ntem repair (mitral TASHA) 6/3/2025. Dr. Barrientos would like the patient to continue his warfarin with an INR goal of about 2 prior to  procedure.          Clinical Outcomes       Negatives:  Major bleeding event, Thromboembolic event, Anticoagulation-related hospital admission, Anticoagulation-related ED visit, Anticoagulation-related fatality    Comments:  Patient is having mitral transcatheter jmmw-hn-djus repair (mitral TASHA) 6/3/2025. Dr. Barrientos would like the patient to continue his warfarin with an INR goal of about 2 prior to procedure.            Date Referral Placed: 3/10/25      Vitals:  There were no vitals filed for this visit.  Had BP taken at CHF this AM.    Verified current warfarin dosing schedule.    Medications reconciled.  Pt is not on antiplatelet therapy.      A/P   INR is therapeutic today at 2.4. Needs INR ~2.0 five days from now for mitral TASHA procedure. With shared decision-making, he will continue his current regimen but consume an extra serving of greens over the weekend.  Reason(s) for out of range INR today: N/A      Warfarin dosing recommendation: Continue regimen as listed above.    Pt educated to contact our clinic with any changes in medications or s/s of bleeding or thrombosis. Pt is aware to seek immediate medical attention for falls, head injury or deep cuts.    Request pt to return in 4 day(s). Pt agrees.    CHEYENNE Mckinley.

## 2025-05-29 NOTE — PROGRESS NOTES
Thank you for the opportunity to participate in your patient's care.     Your patient is scheduled for mitral valve transcatheter wbjc-pe-cyba repair (Mitral TASHA) on 6/3/2025    Sincerely,    [facility]    JENA Flores, RN, Structural Heart Program Nurse Coordinator ([phone])  JENA Monroe, RN, Structural Heart Program Nurse Coordinator ([phone])

## 2025-05-30 ENCOUNTER — PRE-ADMISSION TESTING (OUTPATIENT)
Dept: ADMISSIONS | Facility: MEDICAL CENTER | Age: 89
DRG: 266 | End: 2025-05-30
Attending: INTERNAL MEDICINE
Payer: MEDICARE

## 2025-05-30 RX ORDER — VALACYCLOVIR HYDROCHLORIDE 1 G/1
1000 TABLET, FILM COATED ORAL DAILY
COMMUNITY
Start: 2025-03-04 | End: 2025-06-11

## 2025-06-02 ENCOUNTER — ANTICOAGULATION VISIT (OUTPATIENT)
Dept: VASCULAR LAB | Facility: MEDICAL CENTER | Age: 89
End: 2025-06-02
Attending: INTERNAL MEDICINE
Payer: MEDICARE

## 2025-06-02 VITALS — DIASTOLIC BLOOD PRESSURE: 90 MMHG | SYSTOLIC BLOOD PRESSURE: 161 MMHG | HEART RATE: 70 BPM

## 2025-06-02 DIAGNOSIS — D68.69 SECONDARY HYPERCOAGULABLE STATE (HCC): ICD-10-CM

## 2025-06-02 DIAGNOSIS — G45.9 TIA (TRANSIENT ISCHEMIC ATTACK): ICD-10-CM

## 2025-06-02 DIAGNOSIS — I48.0 PAF (PAROXYSMAL ATRIAL FIBRILLATION) (HCC): Primary | ICD-10-CM

## 2025-06-02 LAB — INR PPP: 2.5 (ref 2–3.5)

## 2025-06-02 PROCEDURE — 99212 OFFICE O/P EST SF 10 MIN: CPT | Performed by: NURSE PRACTITIONER

## 2025-06-02 PROCEDURE — 85610 PROTHROMBIN TIME: CPT

## 2025-06-02 NOTE — PROGRESS NOTES
Anticoagulation Summary  As of 2025      INR goal:  2.0-3.0   TTR:  73.1% (9.6 y)   INR used for dosin.50 (2025)   Warfarin maintenance plan:  6 mg (4 mg x 1.5) every Tue, Thu, Sat; 4 mg (4 mg x 1) all other days   Weekly warfarin total:  34 mg   Plan last modified:  Natasha Jack, PharmD (2024)   Next INR check:  2025   Priority:  Maintenance   Target end date:  Indefinite    Indications    PAF (paroxysmal atrial fibrillation) (HCC) [I48.0]  Hx of TIA (transient ischemic attack) [G45.9]  Deep vein thrombosis (DVT) (HCC) (Resolved) [I82.409]  Atrial fibrillation (HCC) (Resolved) [I48.91]  Hx of Stroke (Resolved) [I63.9]  Secondary hypercoagulable state (HCC) [D68.69]                 Anticoagulation Episode Summary       INR check location:  Anticoagulation Clinic    Preferred lab:  --    Send INR reminders to:  --    Comments:  Takes ASA + warfarin 2/2 TIA Sep 2022 per Dr Bloch  Medications reconciled. Take naproxen as needed for arthritic pain. He is aware of the increased risk of bleeding and takes only when needed with caution          Anticoagulation Care Providers       Provider Role Specialty Phone number    Michael J Bloch, M.D. Referring Cardiovascular Disease (Cardiology) [phone]    [facility] Anticoagulation Services   [phone]                Refer to Patient Findings for HPI:  Patient Findings       Positives:  Upcoming invasive procedure, Bruising    Negatives:  Signs/symptoms of thrombosis, Signs/symptoms of bleeding, Laboratory test error suspected, Change in health, Change in alcohol use, Change in activity, Emergency department visit, Upcoming dental procedure, Missed doses, Extra doses, Change in medications, Change in diet/appetite, Hospital admission, Other complaints    Comments:  Scheduled for mitral TASHA surgery tomorrow with Dr Barrientos. INR needs to be ~2.0. He did consume extra servings of greens over the weekend as instructed.          Clinical Outcomes        Negatives:  Major bleeding event, Thromboembolic event, Anticoagulation-related hospital admission, Anticoagulation-related ED visit, Anticoagulation-related fatality    Comments:  Scheduled for mitral TASHA surgery tomorrow with Dr Barrientos. INR needs to be ~2.0. He did consume extra servings of greens over the weekend as instructed.            Date Referral Placed: 3/10/02      Vitals:  Vitals:    06/02/25 1427   BP: (!) 161/90   Pulse: 70   Elevated in clinic today. He is asymptomatic. Recommend he recheck his BP later today at home and monitor.    Verified current warfarin dosing schedule.    Medications reconciled.  Pt is on antiplatelet therapy with ASA 81 mg for TIA.      A/P   INR is 2.5. Needs to be 1.5-2.0 tomorrow. Will have pt take a one time dose decrease tonight to help his INR come down for tomorrow's surgery.  Reason(s) for out of range INR today: N/A      Warfarin dosing recommendation: take 2 mg tonight then resume your previous regimen.    Pt educated to contact our clinic with any changes in medications or s/s of bleeding or thrombosis. Pt is aware to seek immediate medical attention for falls, head injury or deep cuts.    Request pt to return in 1 week(s). Pt agrees.    CHEYENNE Mckinley.    Cc: Dr Barrientos

## 2025-06-02 NOTE — Clinical Note
Hi Dr Barrientos, I saw this pt in the [facility] clinic today and his INR is 2.5. I'm having him take a reduced dose tonight to try and help get his INR 1.5-2.0 for tomorrow.  Just J CARLOS. Thanks, Thi

## 2025-06-03 ENCOUNTER — HOSPITAL ENCOUNTER (INPATIENT)
Facility: MEDICAL CENTER | Age: 89
LOS: 2 days | DRG: 266 | End: 2025-06-05
Attending: INTERNAL MEDICINE | Admitting: INTERNAL MEDICINE
Payer: MEDICARE

## 2025-06-03 ENCOUNTER — APPOINTMENT (OUTPATIENT)
Dept: CARDIOLOGY | Facility: MEDICAL CENTER | Age: 89
DRG: 266 | End: 2025-06-03
Attending: ANESTHESIOLOGY
Payer: MEDICARE

## 2025-06-03 ENCOUNTER — ANESTHESIA (OUTPATIENT)
Dept: SURGERY | Facility: MEDICAL CENTER | Age: 89
DRG: 266 | End: 2025-06-03
Payer: MEDICARE

## 2025-06-03 ENCOUNTER — ANESTHESIA EVENT (OUTPATIENT)
Dept: SURGERY | Facility: MEDICAL CENTER | Age: 89
DRG: 266 | End: 2025-06-03
Payer: MEDICARE

## 2025-06-03 ENCOUNTER — APPOINTMENT (OUTPATIENT)
Dept: RADIOLOGY | Facility: MEDICAL CENTER | Age: 89
DRG: 266 | End: 2025-06-03
Attending: NURSE PRACTITIONER
Payer: MEDICARE

## 2025-06-03 DIAGNOSIS — I34.0 MODERATE MITRAL REGURGITATION: ICD-10-CM

## 2025-06-03 DIAGNOSIS — I50.21 ACUTE HEART FAILURE WITH MILDLY REDUCED EJECTION FRACTION (HFMREF, 41-49%) (HCC): ICD-10-CM

## 2025-06-03 DIAGNOSIS — Z95.818 S/P MITRAL VALVE CLIP IMPLANTATION: Primary | ICD-10-CM

## 2025-06-03 DIAGNOSIS — Z98.890 S/P MITRAL VALVE CLIP IMPLANTATION: Primary | ICD-10-CM

## 2025-06-03 DIAGNOSIS — I10 ESSENTIAL HYPERTENSION, BENIGN: ICD-10-CM

## 2025-06-03 DIAGNOSIS — E78.5 HYPERLIPIDEMIA, UNSPECIFIED HYPERLIPIDEMIA TYPE: ICD-10-CM

## 2025-06-03 PROBLEM — I77.9 CAROTID ARTERY DISEASE (HCC): Status: RESOLVED | Noted: 2020-02-18 | Resolved: 2025-06-03

## 2025-06-03 LAB
ACT BLD: 314 SEC (ref 74–137)
ALBUMIN SERPL BCP-MCNC: 3.3 G/DL (ref 3.2–4.9)
ALBUMIN/GLOB SERPL: 1.2 G/DL
ALP SERPL-CCNC: 57 U/L (ref 30–99)
ALT SERPL-CCNC: 12 U/L (ref 2–50)
ANION GAP SERPL CALC-SCNC: 10 MMOL/L (ref 7–16)
AST SERPL-CCNC: 24 U/L (ref 12–45)
BILIRUB SERPL-MCNC: 1.1 MG/DL (ref 0.1–1.5)
BUN SERPL-MCNC: 19 MG/DL (ref 8–22)
CALCIUM ALBUM COR SERPL-MCNC: 9.2 MG/DL (ref 8.5–10.5)
CALCIUM SERPL-MCNC: 8.6 MG/DL (ref 8.5–10.5)
CHLORIDE SERPL-SCNC: 108 MMOL/L (ref 96–112)
CO2 SERPL-SCNC: 21 MMOL/L (ref 20–33)
CREAT SERPL-MCNC: 0.82 MG/DL (ref 0.5–1.4)
EKG IMPRESSION: NORMAL
ERYTHROCYTE [DISTWIDTH] IN BLOOD BY AUTOMATED COUNT: 54.3 FL (ref 35.9–50)
GFR SERPLBLD CREATININE-BSD FMLA CKD-EPI: 84 ML/MIN/1.73 M 2
GLOBULIN SER CALC-MCNC: 2.8 G/DL (ref 1.9–3.5)
GLUCOSE SERPL-MCNC: 99 MG/DL (ref 65–99)
HCT VFR BLD AUTO: 41 % (ref 42–52)
HGB BLD-MCNC: 13.2 G/DL (ref 14–18)
LV EJECT FRACT  99904: 55
MCH RBC QN AUTO: 30.3 PG (ref 27–33)
MCHC RBC AUTO-ENTMCNC: 32.2 G/DL (ref 32.3–36.5)
MCV RBC AUTO: 94 FL (ref 81.4–97.8)
NT-PROBNP SERPL IA-MCNC: ABNORMAL PG/ML (ref 0–125)
PLATELET # BLD AUTO: 152 K/UL (ref 164–446)
PMV BLD AUTO: 11.1 FL (ref 9–12.9)
POTASSIUM SERPL-SCNC: 4.1 MMOL/L (ref 3.6–5.5)
PROT SERPL-MCNC: 6.1 G/DL (ref 6–8.2)
RBC # BLD AUTO: 4.36 M/UL (ref 4.7–6.1)
SODIUM SERPL-SCNC: 139 MMOL/L (ref 135–145)
WBC # BLD AUTO: 5.2 K/UL (ref 4.8–10.8)

## 2025-06-03 PROCEDURE — 80053 COMPREHEN METABOLIC PANEL: CPT

## 2025-06-03 PROCEDURE — 85347 COAGULATION TIME ACTIVATED: CPT

## 2025-06-03 PROCEDURE — 160048 HCHG OR STATISTICAL LEVEL 1-5: Performed by: INTERNAL MEDICINE

## 2025-06-03 PROCEDURE — 160015 HCHG STAT PREOP MINUTES: Performed by: INTERNAL MEDICINE

## 2025-06-03 PROCEDURE — 770020 HCHG ROOM/CARE - TELE (206)

## 2025-06-03 PROCEDURE — C1887 CATHETER, GUIDING: HCPCS | Performed by: INTERNAL MEDICINE

## 2025-06-03 PROCEDURE — C1893 INTRO/SHEATH, FIXED,NON-PEEL: HCPCS | Performed by: INTERNAL MEDICINE

## 2025-06-03 PROCEDURE — C1894 INTRO/SHEATH, NON-LASER: HCPCS | Performed by: INTERNAL MEDICINE

## 2025-06-03 PROCEDURE — 33418 REPAIR TCAT MITRAL VALVE: CPT | Mod: Q0 | Performed by: INTERNAL MEDICINE

## 2025-06-03 PROCEDURE — 700111 HCHG RX REV CODE 636 W/ 250 OVERRIDE (IP): Performed by: ANESTHESIOLOGY

## 2025-06-03 PROCEDURE — 700105 HCHG RX REV CODE 258: Performed by: NURSE PRACTITIONER

## 2025-06-03 PROCEDURE — 700105 HCHG RX REV CODE 258: Performed by: INTERNAL MEDICINE

## 2025-06-03 PROCEDURE — 71045 X-RAY EXAM CHEST 1 VIEW: CPT

## 2025-06-03 PROCEDURE — 160031 HCHG SURGERY MINUTES - 1ST 30 MINS LEVEL 5: Performed by: INTERNAL MEDICINE

## 2025-06-03 PROCEDURE — 83880 ASSAY OF NATRIURETIC PEPTIDE: CPT

## 2025-06-03 PROCEDURE — 700101 HCHG RX REV CODE 250: Performed by: INTERNAL MEDICINE

## 2025-06-03 PROCEDURE — 02UG3JZ SUPPLEMENT MITRAL VALVE WITH SYNTHETIC SUBSTITUTE, PERCUTANEOUS APPROACH: ICD-10-PCS | Performed by: INTERNAL MEDICINE

## 2025-06-03 PROCEDURE — 700101 HCHG RX REV CODE 250: Performed by: ANESTHESIOLOGY

## 2025-06-03 PROCEDURE — 93005 ELECTROCARDIOGRAM TRACING: CPT | Mod: TC | Performed by: NURSE PRACTITIONER

## 2025-06-03 PROCEDURE — 93010 ELECTROCARDIOGRAM REPORT: CPT | Performed by: INTERNAL MEDICINE

## 2025-06-03 PROCEDURE — 160042 HCHG SURGERY MINUTES - EA ADDL 1 MIN LEVEL 5: Performed by: INTERNAL MEDICINE

## 2025-06-03 PROCEDURE — C1769 GUIDE WIRE: HCPCS | Performed by: INTERNAL MEDICINE

## 2025-06-03 PROCEDURE — 36415 COLL VENOUS BLD VENIPUNCTURE: CPT

## 2025-06-03 PROCEDURE — 160035 HCHG PACU - 1ST 60 MINS PHASE I: Performed by: INTERNAL MEDICINE

## 2025-06-03 PROCEDURE — 700102 HCHG RX REV CODE 250 W/ 637 OVERRIDE(OP): Performed by: NURSE PRACTITIONER

## 2025-06-03 PROCEDURE — C1751 CATH, INF, PER/CENT/MIDLINE: HCPCS | Performed by: INTERNAL MEDICINE

## 2025-06-03 PROCEDURE — 93312 ECHO TRANSESOPHAGEAL: CPT

## 2025-06-03 PROCEDURE — 700111 HCHG RX REV CODE 636 W/ 250 OVERRIDE (IP): Performed by: INTERNAL MEDICINE

## 2025-06-03 PROCEDURE — 85027 COMPLETE CBC AUTOMATED: CPT

## 2025-06-03 PROCEDURE — C1760 CLOSURE DEV, VASC: HCPCS | Performed by: INTERNAL MEDICINE

## 2025-06-03 PROCEDURE — C1883 ADAPT/EXT, PACING/NEURO LEAD: HCPCS | Performed by: INTERNAL MEDICINE

## 2025-06-03 PROCEDURE — 700111 HCHG RX REV CODE 636 W/ 250 OVERRIDE (IP): Mod: JZ | Performed by: NURSE PRACTITIONER

## 2025-06-03 PROCEDURE — A9270 NON-COVERED ITEM OR SERVICE: HCPCS | Performed by: NURSE PRACTITIONER

## 2025-06-03 PROCEDURE — 160009 HCHG ANES TIME/MIN: Performed by: INTERNAL MEDICINE

## 2025-06-03 PROCEDURE — B245ZZ4 ULTRASONOGRAPHY OF LEFT HEART, TRANSESOPHAGEAL: ICD-10-PCS | Performed by: INTERNAL MEDICINE

## 2025-06-03 PROCEDURE — 160002 HCHG RECOVERY MINUTES (STAT): Performed by: INTERNAL MEDICINE

## 2025-06-03 DEVICE — KIT MITRACLIP G4 SYSTEM CATHETER SGC0701 (1/EA): Type: IMPLANTABLE DEVICE | Site: HEART | Status: FUNCTIONAL

## 2025-06-03 RX ORDER — WARFARIN SODIUM 4 MG/1
4-6 TABLET ORAL SEE ADMIN INSTRUCTIONS
Status: DISCONTINUED | OUTPATIENT
Start: 2025-06-03 | End: 2025-06-03

## 2025-06-03 RX ORDER — SODIUM CHLORIDE, SODIUM LACTATE, POTASSIUM CHLORIDE, CALCIUM CHLORIDE 600; 310; 30; 20 MG/100ML; MG/100ML; MG/100ML; MG/100ML
INJECTION, SOLUTION INTRAVENOUS CONTINUOUS
Status: ACTIVE | OUTPATIENT
Start: 2025-06-03 | End: 2025-06-03

## 2025-06-03 RX ORDER — POLYETHYLENE GLYCOL 3350 17 G/17G
1 POWDER, FOR SOLUTION ORAL 2 TIMES DAILY PRN
Status: DISCONTINUED | OUTPATIENT
Start: 2025-06-03 | End: 2025-06-05 | Stop reason: HOSPADM

## 2025-06-03 RX ORDER — SODIUM CHLORIDE, SODIUM LACTATE, POTASSIUM CHLORIDE, CALCIUM CHLORIDE 600; 310; 30; 20 MG/100ML; MG/100ML; MG/100ML; MG/100ML
INJECTION, SOLUTION INTRAVENOUS CONTINUOUS
Status: DISCONTINUED | OUTPATIENT
Start: 2025-06-03 | End: 2025-06-03 | Stop reason: HOSPADM

## 2025-06-03 RX ORDER — ONDANSETRON 2 MG/ML
4 INJECTION INTRAMUSCULAR; INTRAVENOUS EVERY 4 HOURS PRN
Status: DISCONTINUED | OUTPATIENT
Start: 2025-06-03 | End: 2025-06-05 | Stop reason: HOSPADM

## 2025-06-03 RX ORDER — LIDOCAINE HYDROCHLORIDE 20 MG/ML
INJECTION, SOLUTION INFILTRATION; PERINEURAL
Status: DISCONTINUED | OUTPATIENT
Start: 2025-06-03 | End: 2025-06-03 | Stop reason: HOSPADM

## 2025-06-03 RX ORDER — ONDANSETRON 2 MG/ML
4 INJECTION INTRAMUSCULAR; INTRAVENOUS
Status: DISCONTINUED | OUTPATIENT
Start: 2025-06-03 | End: 2025-06-03 | Stop reason: HOSPADM

## 2025-06-03 RX ORDER — METOPROLOL SUCCINATE 25 MG/1
12.5 TABLET, EXTENDED RELEASE ORAL EVERY EVENING
Status: DISCONTINUED | OUTPATIENT
Start: 2025-06-03 | End: 2025-06-05 | Stop reason: HOSPADM

## 2025-06-03 RX ORDER — DIPHENHYDRAMINE HCL 25 MG
25 TABLET ORAL NIGHTLY PRN
Status: DISCONTINUED | OUTPATIENT
Start: 2025-06-03 | End: 2025-06-05 | Stop reason: HOSPADM

## 2025-06-03 RX ORDER — TEMAZEPAM 7.5 MG/1
30 CAPSULE ORAL NIGHTLY PRN
Status: DISCONTINUED | OUTPATIENT
Start: 2025-06-03 | End: 2025-06-05 | Stop reason: HOSPADM

## 2025-06-03 RX ORDER — DOCUSATE SODIUM 100 MG/1
100 CAPSULE, LIQUID FILLED ORAL 2 TIMES DAILY
Status: DISCONTINUED | OUTPATIENT
Start: 2025-06-03 | End: 2025-06-05 | Stop reason: HOSPADM

## 2025-06-03 RX ORDER — HYDRALAZINE HYDROCHLORIDE 20 MG/ML
10 INJECTION INTRAMUSCULAR; INTRAVENOUS
Status: DISCONTINUED | OUTPATIENT
Start: 2025-06-03 | End: 2025-06-05 | Stop reason: HOSPADM

## 2025-06-03 RX ORDER — ATORVASTATIN CALCIUM 40 MG/1
40 TABLET, FILM COATED ORAL EVERY EVENING
Status: DISCONTINUED | OUTPATIENT
Start: 2025-06-03 | End: 2025-06-05 | Stop reason: HOSPADM

## 2025-06-03 RX ORDER — SPIRONOLACTONE 25 MG/1
12.5 TABLET ORAL DAILY
Status: DISCONTINUED | OUTPATIENT
Start: 2025-06-03 | End: 2025-06-04

## 2025-06-03 RX ORDER — ALBUTEROL SULFATE 5 MG/ML
2.5 SOLUTION RESPIRATORY (INHALATION)
Status: DISCONTINUED | OUTPATIENT
Start: 2025-06-03 | End: 2025-06-03 | Stop reason: HOSPADM

## 2025-06-03 RX ORDER — SODIUM PHOSPHATE,MONO-DIBASIC 19G-7G/118
1 ENEMA (ML) RECTAL
Status: DISCONTINUED | OUTPATIENT
Start: 2025-06-03 | End: 2025-06-05 | Stop reason: HOSPADM

## 2025-06-03 RX ORDER — WARFARIN SODIUM 4 MG/1
4 TABLET ORAL
Status: DISCONTINUED | OUTPATIENT
Start: 2025-06-04 | End: 2025-06-05 | Stop reason: HOSPADM

## 2025-06-03 RX ORDER — AMOXICILLIN 250 MG
1 CAPSULE ORAL NIGHTLY
Status: DISCONTINUED | OUTPATIENT
Start: 2025-06-03 | End: 2025-06-05 | Stop reason: HOSPADM

## 2025-06-03 RX ORDER — GABAPENTIN 300 MG/1
300-600 CAPSULE ORAL 2 TIMES DAILY
Status: DISCONTINUED | OUTPATIENT
Start: 2025-06-03 | End: 2025-06-03

## 2025-06-03 RX ORDER — DIPHENHYDRAMINE HYDROCHLORIDE 50 MG/ML
12.5 INJECTION, SOLUTION INTRAMUSCULAR; INTRAVENOUS
Status: DISCONTINUED | OUTPATIENT
Start: 2025-06-03 | End: 2025-06-03 | Stop reason: HOSPADM

## 2025-06-03 RX ORDER — HEPARIN SODIUM 1000 [USP'U]/ML
INJECTION, SOLUTION INTRAVENOUS; SUBCUTANEOUS PRN
Status: DISCONTINUED | OUTPATIENT
Start: 2025-06-03 | End: 2025-06-03 | Stop reason: SURG

## 2025-06-03 RX ORDER — ACETAMINOPHEN 325 MG/1
650 TABLET ORAL EVERY 6 HOURS PRN
Status: DISCONTINUED | OUTPATIENT
Start: 2025-06-03 | End: 2025-06-05 | Stop reason: HOSPADM

## 2025-06-03 RX ORDER — LORATADINE 10 MG/1
10 TABLET ORAL
COMMUNITY

## 2025-06-03 RX ORDER — ROCURONIUM BROMIDE 10 MG/ML
INJECTION, SOLUTION INTRAVENOUS PRN
Status: DISCONTINUED | OUTPATIENT
Start: 2025-06-03 | End: 2025-06-03 | Stop reason: SURG

## 2025-06-03 RX ORDER — EPHEDRINE SULFATE 50 MG/ML
INJECTION, SOLUTION INTRAVENOUS PRN
Status: DISCONTINUED | OUTPATIENT
Start: 2025-06-03 | End: 2025-06-03 | Stop reason: SURG

## 2025-06-03 RX ORDER — LIDOCAINE HYDROCHLORIDE 40 MG/ML
SOLUTION TOPICAL PRN
Status: DISCONTINUED | OUTPATIENT
Start: 2025-06-03 | End: 2025-06-03 | Stop reason: SURG

## 2025-06-03 RX ORDER — DIPHENHYDRAMINE HYDROCHLORIDE 50 MG/ML
25 INJECTION, SOLUTION INTRAMUSCULAR; INTRAVENOUS EVERY 6 HOURS PRN
Status: DISCONTINUED | OUTPATIENT
Start: 2025-06-03 | End: 2025-06-05 | Stop reason: HOSPADM

## 2025-06-03 RX ORDER — ASPIRIN 81 MG/1
81 TABLET ORAL DAILY
Status: DISCONTINUED | OUTPATIENT
Start: 2025-06-04 | End: 2025-06-05 | Stop reason: HOSPADM

## 2025-06-03 RX ORDER — FUROSEMIDE 10 MG/ML
40 INJECTION INTRAMUSCULAR; INTRAVENOUS 2 TIMES DAILY
Status: DISCONTINUED | OUTPATIENT
Start: 2025-06-03 | End: 2025-06-04

## 2025-06-03 RX ORDER — EZETIMIBE 10 MG/1
10 TABLET ORAL DAILY
Status: DISCONTINUED | OUTPATIENT
Start: 2025-06-03 | End: 2025-06-05 | Stop reason: HOSPADM

## 2025-06-03 RX ORDER — AMOXICILLIN 250 MG
1 CAPSULE ORAL
Status: DISCONTINUED | OUTPATIENT
Start: 2025-06-03 | End: 2025-06-05 | Stop reason: HOSPADM

## 2025-06-03 RX ORDER — IRBESARTAN 150 MG/1
300 TABLET ORAL DAILY
Status: DISCONTINUED | OUTPATIENT
Start: 2025-06-03 | End: 2025-06-05 | Stop reason: HOSPADM

## 2025-06-03 RX ORDER — BISACODYL 10 MG
10 SUPPOSITORY, RECTAL RECTAL
Status: DISCONTINUED | OUTPATIENT
Start: 2025-06-03 | End: 2025-06-05 | Stop reason: HOSPADM

## 2025-06-03 RX ORDER — SODIUM CHLORIDE 9 MG/ML
INJECTION, SOLUTION INTRAVENOUS CONTINUOUS
Status: ACTIVE | OUTPATIENT
Start: 2025-06-03 | End: 2025-06-03

## 2025-06-03 RX ORDER — ONDANSETRON 2 MG/ML
INJECTION INTRAMUSCULAR; INTRAVENOUS PRN
Status: DISCONTINUED | OUTPATIENT
Start: 2025-06-03 | End: 2025-06-03 | Stop reason: SURG

## 2025-06-03 RX ORDER — GABAPENTIN 300 MG/1
300 CAPSULE ORAL EVERY EVENING
Status: DISCONTINUED | OUTPATIENT
Start: 2025-06-03 | End: 2025-06-05 | Stop reason: HOSPADM

## 2025-06-03 RX ORDER — BUPIVACAINE HYDROCHLORIDE 2.5 MG/ML
INJECTION, SOLUTION EPIDURAL; INFILTRATION; INTRACAUDAL; PERINEURAL
Status: DISCONTINUED | OUTPATIENT
Start: 2025-06-03 | End: 2025-06-03 | Stop reason: HOSPADM

## 2025-06-03 RX ORDER — CEFAZOLIN SODIUM 1 G/3ML
INJECTION, POWDER, FOR SOLUTION INTRAMUSCULAR; INTRAVENOUS PRN
Status: DISCONTINUED | OUTPATIENT
Start: 2025-06-03 | End: 2025-06-03 | Stop reason: SURG

## 2025-06-03 RX ORDER — DEXAMETHASONE SODIUM PHOSPHATE 4 MG/ML
INJECTION, SOLUTION INTRA-ARTICULAR; INTRALESIONAL; INTRAMUSCULAR; INTRAVENOUS; SOFT TISSUE PRN
Status: DISCONTINUED | OUTPATIENT
Start: 2025-06-03 | End: 2025-06-03 | Stop reason: SURG

## 2025-06-03 RX ORDER — GABAPENTIN 300 MG/1
600 CAPSULE ORAL EVERY MORNING
Status: DISCONTINUED | OUTPATIENT
Start: 2025-06-04 | End: 2025-06-05 | Stop reason: HOSPADM

## 2025-06-03 RX ORDER — DAPAGLIFLOZIN 10 MG/1
10 TABLET, FILM COATED ORAL DAILY
Status: DISCONTINUED | OUTPATIENT
Start: 2025-06-03 | End: 2025-06-05 | Stop reason: HOSPADM

## 2025-06-03 RX ORDER — WARFARIN SODIUM 6 MG/1
6 TABLET ORAL
Status: DISCONTINUED | OUTPATIENT
Start: 2025-06-03 | End: 2025-06-05 | Stop reason: HOSPADM

## 2025-06-03 RX ADMIN — EPHEDRINE SULFATE 10 MG: 50 INJECTION, SOLUTION INTRAVENOUS at 10:11

## 2025-06-03 RX ADMIN — WARFARIN SODIUM 6 MG: 6 TABLET ORAL at 17:42

## 2025-06-03 RX ADMIN — PROPOFOL 200 MG: 10 INJECTION, EMULSION INTRAVENOUS at 09:48

## 2025-06-03 RX ADMIN — DAPAGLIFLOZIN 10 MG: 10 TABLET, FILM COATED ORAL at 13:50

## 2025-06-03 RX ADMIN — GABAPENTIN 300 MG: 300 CAPSULE ORAL at 17:42

## 2025-06-03 RX ADMIN — SODIUM CHLORIDE, POTASSIUM CHLORIDE, SODIUM LACTATE AND CALCIUM CHLORIDE: 600; 310; 30; 20 INJECTION, SOLUTION INTRAVENOUS at 09:38

## 2025-06-03 RX ADMIN — FUROSEMIDE 40 MG: 10 INJECTION, SOLUTION INTRAVENOUS at 13:52

## 2025-06-03 RX ADMIN — ROCURONIUM BROMIDE 50 MG: 10 INJECTION INTRAVENOUS at 09:48

## 2025-06-03 RX ADMIN — SODIUM CHLORIDE: 9 INJECTION, SOLUTION INTRAVENOUS at 13:43

## 2025-06-03 RX ADMIN — SUGAMMADEX 200 MG: 100 INJECTION, SOLUTION INTRAVENOUS at 10:43

## 2025-06-03 RX ADMIN — ATORVASTATIN CALCIUM 40 MG: 40 TABLET, FILM COATED ORAL at 17:41

## 2025-06-03 RX ADMIN — DOCUSATE SODIUM 50 MG AND SENNOSIDES 8.6 MG 1 TABLET: 8.6; 5 TABLET, FILM COATED ORAL at 21:08

## 2025-06-03 RX ADMIN — DEXAMETHASONE SODIUM PHOSPHATE 4 MG: 4 INJECTION INTRA-ARTICULAR; INTRALESIONAL; INTRAMUSCULAR; INTRAVENOUS; SOFT TISSUE at 10:02

## 2025-06-03 RX ADMIN — CEFAZOLIN 2 G: 1 INJECTION, POWDER, FOR SOLUTION INTRAMUSCULAR; INTRAVENOUS at 09:54

## 2025-06-03 RX ADMIN — HEPARIN SODIUM 10000 UNITS: 1000 INJECTION, SOLUTION INTRAVENOUS; SUBCUTANEOUS at 10:08

## 2025-06-03 RX ADMIN — METOPROLOL SUCCINATE 12.5 MG: 25 TABLET, EXTENDED RELEASE ORAL at 17:42

## 2025-06-03 RX ADMIN — IRBESARTAN 300 MG: 150 TABLET ORAL at 15:33

## 2025-06-03 RX ADMIN — SPIRONOLACTONE 12.5 MG: 25 TABLET ORAL at 13:45

## 2025-06-03 RX ADMIN — DOCUSATE SODIUM 100 MG: 100 CAPSULE, LIQUID FILLED ORAL at 17:42

## 2025-06-03 RX ADMIN — EZETIMIBE 10 MG: 10 TABLET ORAL at 13:50

## 2025-06-03 RX ADMIN — LIDOCAINE HYDROCHLORIDE 4 ML: 40 SOLUTION TOPICAL at 09:49

## 2025-06-03 RX ADMIN — ONDANSETRON 4 MG: 2 INJECTION INTRAMUSCULAR; INTRAVENOUS at 10:02

## 2025-06-03 RX ADMIN — TEMAZEPAM 30 MG: 7.5 CAPSULE ORAL at 21:08

## 2025-06-03 ASSESSMENT — COGNITIVE AND FUNCTIONAL STATUS - GENERAL
DAILY ACTIVITIY SCORE: 24
MOBILITY SCORE: 22
SUGGESTED CMS G CODE MODIFIER MOBILITY: CJ
CLIMB 3 TO 5 STEPS WITH RAILING: A LITTLE
SUGGESTED CMS G CODE MODIFIER DAILY ACTIVITY: CH
WALKING IN HOSPITAL ROOM: A LITTLE

## 2025-06-03 ASSESSMENT — PAIN DESCRIPTION - PAIN TYPE
TYPE: ACUTE PAIN
TYPE: CHRONIC PAIN
TYPE: ACUTE PAIN

## 2025-06-03 ASSESSMENT — FIBROSIS 4 INDEX: FIB4 SCORE: 4.16

## 2025-06-03 NOTE — PROGRESS NOTES
Medication history reviewed with PT at bedside    [facility] is complete per PT reporting    Allergies reviewed.     Patient denies any outpatient antibiotics in the last 30 days.     Patient is taking Warfarin 4-6mg. Last dose was 1/2 tab = 2mg on 06/02/2025 in the PM per M.D. orders.    Dispense history is available in [facility].    Preferred pharmacy for this encounter - [facility] ([phone])

## 2025-06-03 NOTE — PROCEDURES
DATE OF PROCEDURE: 6/3/25      PROCEDURES:  1. Transcatheter mitral valve edge to edge repair (TASHA or Mitraclip)    PRE PROCEDURAL DIAGNOSIS:  Severe symptomatic primary mitral regurgitation NYHA III C  and high surgical risk.    POST PROCEDURAL DIAGNOSIS:  Successful transcatheter mitral valve edge to edge repair using XTW clip, reduction of mitral regurgitation from 4+ to 1-2+    Surgeons: Dr. Barrientos (interventional cardiologist)       DESCRIPTION OF PROCEDURE:  After informed consent was signed by the   patient, the patient was brought into the OR 19.  Bilateral groin was prepped and draped in   usual sterile manner.  The initial timeout was performed.     A 6-Canadian venous sheath was placed in the right femoral vein by Modified Seldinger   technique under ultrasound guidance. A PerClose devices was delivered after dilatation of skin track.  An 8.5-Canadian TorFlex venous sheath was advanced. Half dose IV heparin was given. A Herscher   needle was inserted into the catheter and both the needle and the sheath were placed   in the right atrium. Under transesophageal echocardiogram guidance, the Herscher needle   was used to puncture the septum and the catheter was advanced into the left atrium.   Rest of the half dose Heparin was given. A Protrack pigtail wire was positioned into the left   atrium. The sheath was removed and the femoral access site was dilated with   multiple dialators. The 22/24-Canadian steerable guide catheter handle was inserted into  the left atrium under transesophageal echocardiogram guidance and the dilator was   removed.  A XTW Clip attached to the delivery catheter handle was inserted in to   the left atrium. Again, under transesophageal echocardiogram guidance, the clip was   opened, advanced into the left ventricle and pulled up to the mitral valve leaflets at the   Lateral A2/P2 position. The leaflets were grasped and the clip was partially closed.   The severity of mitral regurgitation  and the mitral valve gradient were measured.   The clip was then closed fully and released.The delivery catheter was removed.    The mitral regurgitation was reduced from 4+ to 1-2+, the mean gradient was 3 mmHg.  Posterior leaflet at the location of prolapse was thin and diseased, while clip was attached to delivery system,  There was trivial mitral regurgitation but after releasing the clip, there was 1-2+ mitral regurgitation.    The patient tolerated the procedure well. At the end of procedure hemodynamics were   again obtained. The steerable guide catheter was removed and the right femoral venous   access site was closed via PerClose closure.  The patient was then extubated and transferred to PACU in stable condition.      RECOMMENDATION: Guideline directed medical therapy.

## 2025-06-03 NOTE — PROGRESS NOTES
4 Eyes Skin Assessment Completed by MARGARITA Truong and MARGARITA Waller.    Skin assessment is primarily focused on high risk bony prominences. Pay special attention to skin beneath and around medical devices, high risk bony prominences, skin to skin areas and areas where the patient lacks sensation to feel pain and areas where the patient previously had breakdown.     Head (Occipital):  Scab and Red top of head from blue light therapy at dermatologist   Ears (Under Medical Devices): WDL   Nose (Under Medical Devices): WDL   Mouth:  WDL   Neck: WDL   Breast/Chest:  WDL   Shoulder Blades:  WDL scar upper back   Spine:   WDL   (R) Arm/Elbow/Hand: Skin Tear, Red, Bruising, and Discoloration   (L) Arm/Elbow/Hand: Skin Tear, Red, Bruising, and Discoloration   Abdomen: WDL   Pannus/Groin:  Red and Moisture and Skin tear on penis    Sacrum/Coccyx:   Red and Blanching   (R) Ischial Tuberosity (Sit Bones):  WDL   (L) Ischial Tuberosity (Sit Bones):  WDL   (R) Leg:  Scab and Discoloration   (L) Leg:  Scab and Discoloration   (R) Heel:  Boggy   (R) Foot/Toe: WDL   (L) Heel: Boggy   (L) Foot/Toe:  Purple/maroon and Bruising       DEVICES IN USE:   Respiratory Devices:  Nasal cannula  Feeding Devices:  N/A   Lines & BP Monitoring Devices:  Peripheral IV and BP cuff    Orthopedic Devices:  N/A  Miscellaneous Devices:  Telemetry monitor and Condom cath    PROTOCOL INTERVENTIONS:   Standard/Trauma Bed:  Already in place  Elbows Padded with Offloading Dressing:  Already in place  Condom Cath/Purewick:  Already in place  Interdry:  Applied this assessment  Silicone Nasal Cannula Tubing:  Already in place  Nasal Cannula with Gray Foams:  Already in place    WOUND PHOTOS:   Completed and in EPIC     WOUND CONSULT:   N/A, no advanced wound care needs identified

## 2025-06-03 NOTE — ANESTHESIA TIME REPORT
Anesthesia Start and Stop Event Times       Date Time Event    6/3/2025 0917 Ready for Procedure     0938 Anesthesia Start     1058 Anesthesia Stop          Responsible Staff  06/03/25      Name Role Begin End    Basilio Sanchez M.D. Anesth 0938 1058          Overtime Reason:  no overtime (within assigned shift)    Comments:

## 2025-06-03 NOTE — ANESTHESIA PROCEDURE NOTES
Airway    Date/Time: 6/3/2025 9:49 AM    Performed by: Basilio Sanchez M.D.  Authorized by: Basilio Sanchez M.D.    Location:  OR  Urgency:  Elective  Difficult Airway: No    Indications for Airway Management:  Anesthesia      Spontaneous Ventilation: absent    Sedation Level:  Deep  Preoxygenated: Yes    Patient Position:  Sniffing  Mask Difficulty Assessment:  0 - not attempted  Final Airway Type:  Endotracheal airway  Final Endotracheal Airway:  ETT  Cuffed: Yes    Technique Used for Successful ETT Placement:  Direct laryngoscopy    Insertion Site:  Oral  Blade Type:  Mary  Laryngoscope Blade/Videolaryngoscope Blade Size:  3  ETT Size (mm):  6.5  Measured from:  Teeth  ETT to Teeth (cm):  22  Placement Verified by: auscultation and capnometry    Cormack-Lehane Classification:  Grade I - full view of glottis  Number of Attempts at Approach:  1

## 2025-06-03 NOTE — ANESTHESIA PREPROCEDURE EVALUATION
" Case: [id number] Date/Time: 06/03/25 0855    Procedures:       ECHOCARDIOGRAM, TRANSESOPHAGEAL, INTRAOPERATIVE (Throat)      PROCEDURE, MITRAL VALVE, TRANSCATHETER (Right: Groin)    Anesthesia type: General    Pre-op diagnosis: SEVERE MITRAL REGURGITATION    Location: [facility] OR 19 / SURGERY [facility]    Surgeons: Sony Barrientos M.D.            Relevant Problems   NEURO   (positive) Hx of TIA (transient ischemic attack)      CARDIAC   (positive) Carotid artery disease (HCC)   (positive) Coronary artery disease involving native coronary artery of native heart without angina pectoris   (positive) Essential hypertension, benign   (positive) Iliac artery injury, unspecified laterality, subsequent encounter   (positive) PAF (paroxysmal atrial fibrillation) (HCC)      Other   (positive) Arthritis     /66   Pulse 62   Temp 36.4 °C (97.5 °F) (Temporal)   Resp 15   Ht 1.803 m (5' 11\")   Wt 79.5 kg (175 lb 4.3 oz)   SpO2 92%   BMI 24.44 kg/m²     Physical Exam    Airway   Mallampati: II  TM distance: >3 FB  Neck ROM: full       Cardiovascular - normal exam  Rhythm: regular  Rate: normal    (-) murmur     Dental - normal exam           Pulmonary - normal examBreath sounds clear to auscultation     Abdominal    Neurological - normal exam                   Anesthesia Plan    ASA 4       Plan - general       Airway plan will be ETT  YOANDY Planned        Induction: intravenous    Postoperative Plan: Postoperative administration of opioids is intended.    Pertinent diagnostic labs and testing reviewed    Informed Consent:    Anesthetic plan and risks discussed with patient.    Use of blood products discussed with: patient whom consented to blood products.           "

## 2025-06-03 NOTE — ANESTHESIA POSTPROCEDURE EVALUATION
Patient: Tristan Hester    Procedure Summary       Date: 06/03/25 Room / Location: [place] [address] / SURGERY [facility]    Anesthesia Start: 0938 Anesthesia Stop: 1058    Procedures:       ECHOCARDIOGRAM, TRANSESOPHAGEAL, INTRAOPERATIVE (Throat)      PROCEDURE, MITRAL VALVE, TRANSCATHETER (Right: Groin) Diagnosis: (SEVERE MITRAL REGURGITATION)    Surgeons: Sony Barrientos M.D. Responsible Provider: Basilio Sanchez M.D.    Anesthesia Type: general ASA Status: 4            Final Anesthesia Type: general  Last vitals  BP   Blood Pressure : (!) 157/75    Temp   (!) 35.8 °C (96.4 °F)    Pulse   (!) 58   Resp   19    SpO2   97 %      Anesthesia Post Evaluation    Patient location during evaluation: PACU  Patient participation: complete - patient participated  Level of consciousness: awake and alert    Airway patency: patent  Anesthetic complications: no  Cardiovascular status: hemodynamically stable  Respiratory status: face mask    PONV: none          There were no known notable events for this encounter.     Nurse Pain Score: 0 (NPRS)

## 2025-06-03 NOTE — OR NURSING
1055- Pt arrives to PACU from OR on 6L of oxygen via mask. OPA in place. R groin CDI.     1100- OPA removed. Pt denies pain at this time.     1109- chest x ray at bedside    1117- EKG at bedside.     1147- Attempt to call pt wife Tori, no answer at this time.     1228- Report called to Dionne AVILA.    1242- Pt wife Tori called and updated.

## 2025-06-03 NOTE — PROGRESS NOTES
"Inpatient Anticoagulation Service Note for 6/3/2025      Reason for Anticoagulation: Deep Vein Thrombosis, Atrial Fibrillation, Stroke       Hemoglobin Value: (!) 13.2  Hematocrit Value: (!) 41  Lab Platelet Value: (!) 152  Target INR: 2.0 to 3.0     None      Date/Time Dose (mg)    06/03/25 1502 6      Average Dose (mg):  (PTA dose: warfarin 6 mg T/T/S and 4 mg all other days)  Significant Interactions: Not Applicable  Bridge Therapy: No    Assessment:  89-year-old male with PMH significant for aortic stenosis s/p TAVR, CVA, Afib, DVT on warfarin PTA followed by [facility] anticoagulation clinic. Last appointment was yesterday prior to admission. Per anticoagulation clinic note: \"INR is 2.5. Needs to be 1.5-2.0 tomorrow. Will have pt take a one time dose decrease tonight to help his INR come down for tomorrow's surgery. \" Patient was instructed to take 2 mg (instead of usual 4 mg he takes on Mondays).   S/p Mitraclip today. Renal & hepatic function are stable. CBC overall stable with drop in platelets noted - not unusual post op but will monitor.   No INR today, not anticipating significant changes from yesterdays INR since it takes a few days to see effect from any dose changes. Therefore, plan is to continue usual home dosing tonight and and re-assess INR in AM.     Plan:  6 mg   Education Material Provided?: No (established warfarin)    Pharmacist suggested discharge dosing: Continue home dose - warfarin 6 mg every Tue/Thu/Sat and 4 mg all other days. Recheck INR within 5 days of discharge.      Maryana Joyner, PharmD    "

## 2025-06-03 NOTE — ANESTHESIA PROCEDURE NOTES
YOANDY    Date/Time: 6/3/2025 9:54 AM    Performed by: Basilio Sanchez M.D.  Authorized by: Basilio Sanchez M.D.    Start Time:6/3/2025 9:54 AM  Preanesthetic Checklist: patient identified, IV checked, site marked, risks and benefits discussed, surgical consent, monitors and equipment checked, pre-op evaluation and timeout performed    Indication for YOANDY: diagnostic   Patient Location: OR  Intubated: Yes  Bite Block: Yes  Heart Visualized: Yes  Insertion: atraumatic    **See FULL YOANDY report in patient's chart via CV Synapse**

## 2025-06-03 NOTE — ANESTHESIA PROCEDURE NOTES
Arterial Line    Performed by: Basilio Sanchez M.D.  Authorized by: Basilio Sanchez M.D.    Start Time:  6/3/2025 9:52 AM  End Time:  6/3/2025 9:53 AM  Localization: surface landmarks    Patient Location:  OR  Indication: continuous blood pressure monitoring        Catheter Size:  20 G  Seldinger Technique?: Yes    Laterality:  Right  Site:  Radial artery  Line Secured:  Antimicrobial disc, tape and transparent dressing  Events: patient tolerated procedure well with no complications

## 2025-06-04 ENCOUNTER — APPOINTMENT (OUTPATIENT)
Dept: RADIOLOGY | Facility: MEDICAL CENTER | Age: 89
DRG: 266 | End: 2025-06-04
Attending: NURSE PRACTITIONER
Payer: MEDICARE

## 2025-06-04 LAB
ALBUMIN SERPL BCP-MCNC: 3.5 G/DL (ref 3.2–4.9)
ALBUMIN/GLOB SERPL: 1.2 G/DL
ALP SERPL-CCNC: 60 U/L (ref 30–99)
ALT SERPL-CCNC: 13 U/L (ref 2–50)
ANION GAP SERPL CALC-SCNC: 11 MMOL/L (ref 7–16)
AST SERPL-CCNC: 27 U/L (ref 12–45)
BILIRUB SERPL-MCNC: 1.2 MG/DL (ref 0.1–1.5)
BUN SERPL-MCNC: 23 MG/DL (ref 8–22)
CALCIUM ALBUM COR SERPL-MCNC: 9.2 MG/DL (ref 8.5–10.5)
CALCIUM SERPL-MCNC: 8.8 MG/DL (ref 8.5–10.5)
CHLORIDE SERPL-SCNC: 105 MMOL/L (ref 96–112)
CO2 SERPL-SCNC: 23 MMOL/L (ref 20–33)
CREAT SERPL-MCNC: 1.04 MG/DL (ref 0.5–1.4)
EKG IMPRESSION: NORMAL
ERYTHROCYTE [DISTWIDTH] IN BLOOD BY AUTOMATED COUNT: 54.7 FL (ref 35.9–50)
GFR SERPLBLD CREATININE-BSD FMLA CKD-EPI: 69 ML/MIN/1.73 M 2
GLOBULIN SER CALC-MCNC: 3 G/DL (ref 1.9–3.5)
GLUCOSE SERPL-MCNC: 143 MG/DL (ref 65–99)
HCT VFR BLD AUTO: 43.9 % (ref 42–52)
HGB BLD-MCNC: 13.8 G/DL (ref 14–18)
INR PPP: 2.17 (ref 0.87–1.13)
MCH RBC QN AUTO: 29.7 PG (ref 27–33)
MCHC RBC AUTO-ENTMCNC: 31.4 G/DL (ref 32.3–36.5)
MCV RBC AUTO: 94.6 FL (ref 81.4–97.8)
NT-PROBNP SERPL IA-MCNC: ABNORMAL PG/ML (ref 0–125)
PLATELET # BLD AUTO: 168 K/UL (ref 164–446)
PMV BLD AUTO: 10.9 FL (ref 9–12.9)
POTASSIUM SERPL-SCNC: 4.4 MMOL/L (ref 3.6–5.5)
PROT SERPL-MCNC: 6.5 G/DL (ref 6–8.2)
PROTHROMBIN TIME: 24.3 SEC (ref 12–14.6)
RBC # BLD AUTO: 4.64 M/UL (ref 4.7–6.1)
SODIUM SERPL-SCNC: 139 MMOL/L (ref 135–145)
WBC # BLD AUTO: 7.9 K/UL (ref 4.8–10.8)

## 2025-06-04 PROCEDURE — 85027 COMPLETE CBC AUTOMATED: CPT

## 2025-06-04 PROCEDURE — 770020 HCHG ROOM/CARE - TELE (206)

## 2025-06-04 PROCEDURE — 93010 ELECTROCARDIOGRAM REPORT: CPT | Performed by: INTERNAL MEDICINE

## 2025-06-04 PROCEDURE — 36415 COLL VENOUS BLD VENIPUNCTURE: CPT

## 2025-06-04 PROCEDURE — 85610 PROTHROMBIN TIME: CPT

## 2025-06-04 PROCEDURE — 700102 HCHG RX REV CODE 250 W/ 637 OVERRIDE(OP): Performed by: NURSE PRACTITIONER

## 2025-06-04 PROCEDURE — 83880 ASSAY OF NATRIURETIC PEPTIDE: CPT

## 2025-06-04 PROCEDURE — A9270 NON-COVERED ITEM OR SERVICE: HCPCS | Performed by: NURSE PRACTITIONER

## 2025-06-04 PROCEDURE — 93005 ELECTROCARDIOGRAM TRACING: CPT | Mod: TC | Performed by: NURSE PRACTITIONER

## 2025-06-04 PROCEDURE — 80053 COMPREHEN METABOLIC PANEL: CPT

## 2025-06-04 PROCEDURE — 71045 X-RAY EXAM CHEST 1 VIEW: CPT

## 2025-06-04 PROCEDURE — 700111 HCHG RX REV CODE 636 W/ 250 OVERRIDE (IP): Mod: JZ | Performed by: NURSE PRACTITIONER

## 2025-06-04 RX ORDER — FUROSEMIDE 10 MG/ML
40 INJECTION INTRAMUSCULAR; INTRAVENOUS DAILY
Status: DISCONTINUED | OUTPATIENT
Start: 2025-06-05 | End: 2025-06-05 | Stop reason: HOSPADM

## 2025-06-04 RX ORDER — SPIRONOLACTONE 25 MG/1
25 TABLET ORAL DAILY
Status: DISCONTINUED | OUTPATIENT
Start: 2025-06-05 | End: 2025-06-05 | Stop reason: HOSPADM

## 2025-06-04 RX ADMIN — GABAPENTIN 600 MG: 300 CAPSULE ORAL at 05:46

## 2025-06-04 RX ADMIN — WARFARIN SODIUM 4 MG: 4 TABLET ORAL at 16:55

## 2025-06-04 RX ADMIN — ATORVASTATIN CALCIUM 40 MG: 40 TABLET, FILM COATED ORAL at 16:56

## 2025-06-04 RX ADMIN — DOCUSATE SODIUM 100 MG: 100 CAPSULE, LIQUID FILLED ORAL at 05:46

## 2025-06-04 RX ADMIN — ASPIRIN 81 MG: 81 TABLET, COATED ORAL at 05:46

## 2025-06-04 RX ADMIN — FUROSEMIDE 40 MG: 10 INJECTION, SOLUTION INTRAVENOUS at 05:47

## 2025-06-04 RX ADMIN — DOCUSATE SODIUM 50 MG AND SENNOSIDES 8.6 MG 1 TABLET: 8.6; 5 TABLET, FILM COATED ORAL at 21:44

## 2025-06-04 RX ADMIN — SPIRONOLACTONE 12.5 MG: 25 TABLET ORAL at 05:46

## 2025-06-04 RX ADMIN — EZETIMIBE 10 MG: 10 TABLET ORAL at 05:46

## 2025-06-04 RX ADMIN — DOCUSATE SODIUM 100 MG: 100 CAPSULE, LIQUID FILLED ORAL at 16:54

## 2025-06-04 RX ADMIN — IRBESARTAN 300 MG: 150 TABLET ORAL at 05:46

## 2025-06-04 RX ADMIN — TEMAZEPAM 30 MG: 7.5 CAPSULE ORAL at 21:44

## 2025-06-04 RX ADMIN — DAPAGLIFLOZIN 10 MG: 10 TABLET, FILM COATED ORAL at 05:46

## 2025-06-04 RX ADMIN — GABAPENTIN 300 MG: 300 CAPSULE ORAL at 16:54

## 2025-06-04 ASSESSMENT — ENCOUNTER SYMPTOMS
SHORTNESS OF BREATH: 1
FATIGUE: 0
PALPITATIONS: 0
DIZZINESS: 0

## 2025-06-04 ASSESSMENT — SOCIAL DETERMINANTS OF HEALTH (SDOH)
WITHIN THE PAST 12 MONTHS, YOU WORRIED THAT YOUR FOOD WOULD RUN OUT BEFORE YOU GOT THE MONEY TO BUY MORE: NEVER TRUE
WITHIN THE PAST 12 MONTHS, THE FOOD YOU BOUGHT JUST DIDN'T LAST AND YOU DIDN'T HAVE MONEY TO GET MORE: NEVER TRUE
WITHIN THE LAST YEAR, HAVE TO BEEN RAPED OR FORCED TO HAVE ANY KIND OF SEXUAL ACTIVITY BY YOUR PARTNER OR EX-PARTNER?: NO
WITHIN THE LAST YEAR, HAVE YOU BEEN AFRAID OF YOUR PARTNER OR EX-PARTNER?: NO
IN THE PAST 12 MONTHS, HAS THE ELECTRIC, GAS, OIL, OR WATER COMPANY THREATENED TO SHUT OFF SERVICE IN YOUR HOME?: NO
WITHIN THE LAST YEAR, HAVE YOU BEEN KICKED, HIT, SLAPPED, OR OTHERWISE PHYSICALLY HURT BY YOUR PARTNER OR EX-PARTNER?: NO
WITHIN THE LAST YEAR, HAVE YOU BEEN HUMILIATED OR EMOTIONALLY ABUSED IN OTHER WAYS BY YOUR PARTNER OR EX-PARTNER?: NO

## 2025-06-04 ASSESSMENT — LIFESTYLE VARIABLES
DOES PATIENT WANT TO STOP DRINKING: NO
TOTAL SCORE: 0
EVER FELT BAD OR GUILTY ABOUT YOUR DRINKING: NO
CONSUMPTION TOTAL: NEGATIVE
ON A TYPICAL DAY WHEN YOU DRINK ALCOHOL HOW MANY DRINKS DO YOU HAVE: 2
TOTAL SCORE: 0
AVERAGE NUMBER OF DAYS PER WEEK YOU HAVE A DRINK CONTAINING ALCOHOL: 2
HAVE PEOPLE ANNOYED YOU BY CRITICIZING YOUR DRINKING: NO
TOTAL SCORE: 0
EVER HAD A DRINK FIRST THING IN THE MORNING TO STEADY YOUR NERVES TO GET RID OF A HANGOVER: NO
HAVE YOU EVER FELT YOU SHOULD CUT DOWN ON YOUR DRINKING: NO
HOW MANY TIMES IN THE PAST YEAR HAVE YOU HAD 5 OR MORE DRINKS IN A DAY: 0
ALCOHOL_USE: YES

## 2025-06-04 ASSESSMENT — PAIN DESCRIPTION - PAIN TYPE: TYPE: ACUTE PAIN

## 2025-06-04 ASSESSMENT — FIBROSIS 4 INDEX: FIB4 SCORE: 3.92

## 2025-06-04 NOTE — PROGRESS NOTES
Monitor Summary  Rhythm: SR  Rate: 56-68  Ectopy: rare PVC, PAC  Measurements:.22 /.12/.48

## 2025-06-04 NOTE — PROGRESS NOTES
Monitor Summary:     Rhythm: SR with 1st degree  Rate: 60s  Ectopy: (R) PAC (R) PVC  Measurements: 0.21/0.08/0.52              12 Hour Chart Check

## 2025-06-04 NOTE — CARE PLAN
The patient is Stable - Low risk of patient condition declining or worsening         Progress made toward(s) clinical / shift goals:    Problem: Fall Risk  Goal: Patient will remain free from falls  Outcome: Progressing     Problem: Knowledge Deficit - Standard  Goal: Patient and family/care givers will demonstrate understanding of plan of care, disease process/condition, diagnostic tests and medications  Outcome: Progressing     Problem: Hemodynamics  Goal: Patient's hemodynamics, fluid balance and neurologic status will be stable or improve  Outcome: Progressing     Problem: Respiratory  Goal: Patient will achieve/maintain optimum respiratory ventilation and gas exchange  Outcome: Progressing     Problem: Fluid Volume  Goal: Fluid volume balance will be maintained  Outcome: Progressing     Problem: Urinary Elimination  Goal: Establish and maintain regular urinary output  Outcome: Progressing     Problem: Mobility  Goal: Patient's capacity to carry out activities will improve  Outcome: Progressing     Problem: Self Care  Goal: Patient will have the ability to perform ADLs independently or with assistance (bathe, groom, dress, toilet and feed)  Outcome: Progressing     Problem: Wound/ / Incision Healing  Goal: Patient's wound/surgical incision will decrease in size and heals properly  Outcome: Progressing       Patient is not progressing towards the following goals:

## 2025-06-04 NOTE — PROGRESS NOTES
Patient hypotensive but asymptomatic at this time; 88/50  Notified STEWART Charles and received orders to hold afternoon lasix 40 mg IV and change scheduled lasix from twice a day to once a day.

## 2025-06-04 NOTE — PROGRESS NOTES
Pt arrived to unit with PACU RN via bed. Chart, labs, and orders reviewed, sign and held released. Pt resting supine in bed until 1500, breathing even and unlabored on 3L, denies pain and in no acute distress. A&O x4. Tele monitoring initiated. Fall precautions including bed alarm in place and education provided. Call light within reach, bed locked and in lowest position, denies other needs at this time.

## 2025-06-04 NOTE — PROGRESS NOTES
Bedside report received and patient care assumed. Pt is resting in bed, A&O4, with pain 2/10 in the right groin, and is on 1L NC. Tele box on. All fall precautions are in place, belongings at bedside table.  Pt was updated on POC, no questions or concerns. Pt educated on use of call light for assistance.

## 2025-06-04 NOTE — CARE PLAN
"The patient is Stable - Low risk of patient condition declining or worsening    Shift Goals  Clinical Goals: Patient will verbalize understanding of POC. Patient will not require supplemental oxygen. Patient will discharge home.  Patient Goals: \"Get to go home today.\"  Family Goals: DENZEL    Progress made toward(s) clinical / shift goals:  Patient provided a verbal discussion related to POC and verbalized understanding. Patient educated on plan to stay admitted as a means to continue diuresis for volume overload. Documented strict I/O's. Patient ambulated and was up to chair for all meals. Patient consuming >75% of all meal trays. Patient educated on incentive spirometer and used IS x4/hour. Patient off supplemental oxygen and is on room air at this time. Pt is able to verbalize pain on a scale of 1-10 and is able to verbalize comfort goal. Pain management plan followed and pt educated on nonpharmacologic and pharmacological comfort measures. Pt remains free from falls at this time. Safety precautions in place. Pt educated on calling for assistance when needed. Proper hand hygiene before and after patient care to ensure patient will remain free from infection.      Patient is not progressing towards the following goals: N/A      Problem: Hemodynamics  Goal: Patient's hemodynamics, fluid balance and neurologic status will be stable or improve  Outcome: Not Progressing     "

## 2025-06-04 NOTE — PROGRESS NOTES
"Inpatient Anticoagulation Service Note for 6/4/2025      Reason for Anticoagulation: Deep Vein Thrombosis, Atrial Fibrillation, Stroke       Hemoglobin Value: (!) 13.8  Hematocrit Value: 43.9  Lab Platelet Value: 168  Target INR: 2.0 to 3.0     Date/Time INR Value    06/04/25 0005 2.17       Date/Time Dose (mg)    06/04/25 1355 4     06/03/25 1502 6      Average Dose (mg):  (PTA dose: warfarin 6 mg T/T/S and 4 mg all other days)  Significant Interactions: Antiplatelet Medications    Assessment:  89-year-old male with PMH significant for aortic stenosis s/p TAVR, CVA, Afib, DVT on warfarin PTA followed by [facility] anticoagulation clinic. Last appointment was yesterday prior to admission. Per anticoagulation clinic note: \"INR is 2.5. Needs to be 1.5-2.0 tomorrow. Will have pt take a one time dose decrease tonight to help his INR come down for tomorrow's surgery. \" Patient was instructed to take 2 mg (instead of usual 4 mg he takes on Mondays).   S/p Mitraclip yesterday. Renal & hepatic function are stable. H/H/PLTs are stable this morning.   INR is at goal (on the lower end of the goal likely 2' warfarin dose cut by half by anticoag clinic for one day on 6/2 prior to procedure - see info above). Anticipating stable INR tomorrow so will continue with usual home dose. Will repeat INR tomorrow morning just to ensure it is stable given fluid shifts which can affect the INR (currently diuresing on IV lasix). If INR is stable tomorrow morning, then can likely switch to Tue/Thu/Sat INRs since patient has been stable on this dose for quiet a while.     Plan:  4 mg  Education Material Provided?: No (established warfarin)    Pharmacist suggested discharge dosing: Continue previous home dose of warfarin 6 mg Tue/Thu/Sat and 4 mg all other days. Recommend clinic follow up within 3-5 days of discharge.      Maryana Joyner, PharmD    "

## 2025-06-04 NOTE — PROGRESS NOTES
Received bedside report from MARGARITA Hughes, pt care assumed. VS WDL, pt assessment complete. Pt A&Ox4, no c/o pain at this time. POC discussed with pt and verbalizes no questions. Pt denies any additional needs at this time. Bed locked and in lowest position, bed/chair alarm on. Pt educated on fall risk and verbalized understanding, call light within reach, hourly rounding initiated.

## 2025-06-04 NOTE — PROGRESS NOTES
Cardiology Follow Up Progress Note    Date of Service  6/4/2025    Attending Physician  Sony Barrientos M.D.    Chief Complaint   Elective     HPI  Everardo Hester is a 89 y.o. male admitted 6/3/2025 for elective es clip procedure.    Hx of severe MR now S/P Es clip X1, S/P TAVR, PAF on chronic anticoagulation with coumadin, prostate CA, prior DVT, HLD, HTN, TIA, carotid disease with prior CEA in '08 (Abdias BRADFORD), and anemia.    Interim Events  POD1: sitting in chair, using walker for mobility, condom catheter in place for incontinence, remains on low dose oxygen, feels good    Review of Systems  Review of Systems   Constitutional:  Negative for fatigue.   Respiratory:  Positive for shortness of breath.    Cardiovascular:  Negative for chest pain, palpitations and leg swelling.   Genitourinary:  Positive for difficulty urinating and frequency.   Neurological:  Negative for dizziness.       Vital signs in last 24 hours  Temp:  [35.8 °C (96.4 °F)-36.5 °C (97.7 °F)] 36.5 °C (97.7 °F)  Pulse:  [54-88] 57  Resp:  [10-20] 16  BP: ()/(56-90) 110/60  SpO2:  [90 %-99 %] 90 %    Physical Exam  Physical Exam  Vitals and nursing note reviewed.   Constitutional:       Appearance: Normal appearance. He is normal weight.   HENT:      Head: Normocephalic and atraumatic.   Cardiovascular:      Rate and Rhythm: Normal rate and regular rhythm.      Pulses: Normal pulses.      Heart sounds: Normal heart sounds.   Pulmonary:      Effort: Pulmonary effort is normal.      Breath sounds: Normal breath sounds.   Musculoskeletal:         General: Normal range of motion.   Skin:     General: Skin is warm and dry.      Capillary Refill: Capillary refill takes less than 2 seconds.   Neurological:      General: No focal deficit present.      Mental Status: He is alert and oriented to person, place, and time. Mental status is at baseline.   Psychiatric:         Mood and Affect: Mood normal.         Behavior: Behavior normal.          Thought Content: Thought content normal.         Judgment: Judgment normal.         Lab Review  Lab Results   Component Value Date/Time    WBC 7.9 06/04/2025 12:05 AM    RBC 4.64 (L) 06/04/2025 12:05 AM    HEMOGLOBIN 13.8 (L) 06/04/2025 12:05 AM    HEMATOCRIT 43.9 06/04/2025 12:05 AM    MCV 94.6 06/04/2025 12:05 AM    MCH 29.7 06/04/2025 12:05 AM    MCHC 31.4 (L) 06/04/2025 12:05 AM    MPV 10.9 06/04/2025 12:05 AM      Lab Results   Component Value Date/Time    SODIUM 139 06/04/2025 12:05 AM    POTASSIUM 4.4 06/04/2025 12:05 AM    CHLORIDE 105 06/04/2025 12:05 AM    CO2 23 06/04/2025 12:05 AM    GLUCOSE 143 (H) 06/04/2025 12:05 AM    BUN 23 (H) 06/04/2025 12:05 AM    CREATININE 1.04 06/04/2025 12:05 AM    CREATININE 1.0 07/02/2008 03:43 PM    BUNCREATRAT 23 05/09/2025 07:04 AM      Lab Results   Component Value Date/Time    ASTSGOT 27 06/04/2025 12:05 AM    ALTSGPT 13 06/04/2025 12:05 AM     Lab Results   Component Value Date/Time    CHOLSTRLTOT 126 01/14/2025 06:10 AM    CHOLSTRLTOT 125 04/05/2022 03:04 PM    LDL 63 04/05/2022 03:04 PM    HDL 48 01/14/2025 06:10 AM    HDL 51 04/05/2022 03:04 PM    TRIGLYCERIDE 74 01/14/2025 06:10 AM    TRIGLYCERIDE 54 04/05/2022 03:04 PM    TROPONINT 24 (H) 03/26/2025 05:47 AM       Recent Labs     06/03/25  1101 06/04/25  0005   NTPROBNP [id number]* [id number]*       Cardiac Imaging and Procedures Review  EKG:  My personal interpretation of the EKG dated 6/4/25 is sinus rhythm rate of 62 bpm    6/3/25: Intraoperative YOANDY during mitraclip procedure.  Baseline images show   normal biventricular function with EF = 55%. Degenerative mitral valve   disease with posterior leaflet flail at the P2 scallop.  Severe (4+)   eccetric mitral regurgitation.   A single mitraclip was placed at the A2/P2 coaptation with mild (1-2+)   residual MR.  Mean mitral gradient = 3 mm Hg at a heart rate of 55 bpm   in NSR.  Findings communicated at the time of exam.     Imaging  Chest X-Ray:  6/4/25 no acute  process     Assessment/Plan  1. S/P TASHA, NYHA II with prior TAVR  -doing well post-op, needs further diuresis, will keep overnight  -cont asa lifelong  -groins CDI with mild bruising and small nodule   -SBE prophylaxis understands lifelong  -PT pending ambulation today  -OK to shower today and remove dressing, leave open to air  -reviewed hospital imaging, labs, and EKG  -EKG shows SR rate of 62 bpm    2. 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 PAF on chronic anticoagulation  -SR on EKG  -cont warfarin per pharmacy  -cont bb  -follow    4. HLD with CAD and PVD  -no angina or claudication  -cont statin, zetia, and asa until INR therapeutic-then OK to DC    5. TIA  -cont statin, zetia, warfarin  -follow, no deficits    Thank you for allowing me to participate in the care of this patient.  I will continue to follow this patient    Please contact me with any questions.    YONATAN Massey.   Cardiologist, [facility]  [phone]

## 2025-06-05 VITALS
HEART RATE: 70 BPM | TEMPERATURE: 97.2 F | RESPIRATION RATE: 16 BRPM | BODY MASS INDEX: 24.35 KG/M2 | OXYGEN SATURATION: 94 % | WEIGHT: 173.94 LBS | SYSTOLIC BLOOD PRESSURE: 120 MMHG | DIASTOLIC BLOOD PRESSURE: 65 MMHG | HEIGHT: 71 IN

## 2025-06-05 PROBLEM — R53.1 WEAKNESS: Status: RESOLVED | Noted: 2023-12-11 | Resolved: 2025-06-05

## 2025-06-05 LAB
ANION GAP SERPL CALC-SCNC: 10 MMOL/L (ref 7–16)
BUN SERPL-MCNC: 29 MG/DL (ref 8–22)
CALCIUM SERPL-MCNC: 8.9 MG/DL (ref 8.5–10.5)
CHLORIDE SERPL-SCNC: 104 MMOL/L (ref 96–112)
CO2 SERPL-SCNC: 24 MMOL/L (ref 20–33)
CREAT SERPL-MCNC: 1.13 MG/DL (ref 0.5–1.4)
EKG IMPRESSION: NORMAL
GFR SERPLBLD CREATININE-BSD FMLA CKD-EPI: 62 ML/MIN/1.73 M 2
GLUCOSE SERPL-MCNC: 98 MG/DL (ref 65–99)
INR PPP: 2.48 (ref 0.87–1.13)
NT-PROBNP SERPL IA-MCNC: ABNORMAL PG/ML (ref 0–125)
POTASSIUM SERPL-SCNC: 4 MMOL/L (ref 3.6–5.5)
PROTHROMBIN TIME: 27 SEC (ref 12–14.6)
SODIUM SERPL-SCNC: 138 MMOL/L (ref 135–145)

## 2025-06-05 PROCEDURE — 85610 PROTHROMBIN TIME: CPT

## 2025-06-05 PROCEDURE — 97535 SELF CARE MNGMENT TRAINING: CPT

## 2025-06-05 PROCEDURE — 97162 PT EVAL MOD COMPLEX 30 MIN: CPT

## 2025-06-05 PROCEDURE — 83880 ASSAY OF NATRIURETIC PEPTIDE: CPT

## 2025-06-05 PROCEDURE — 36415 COLL VENOUS BLD VENIPUNCTURE: CPT

## 2025-06-05 PROCEDURE — A9270 NON-COVERED ITEM OR SERVICE: HCPCS | Performed by: NURSE PRACTITIONER

## 2025-06-05 PROCEDURE — 700111 HCHG RX REV CODE 636 W/ 250 OVERRIDE (IP): Mod: JZ | Performed by: NURSE PRACTITIONER

## 2025-06-05 PROCEDURE — 93010 ELECTROCARDIOGRAM REPORT: CPT | Performed by: INTERNAL MEDICINE

## 2025-06-05 PROCEDURE — 80048 BASIC METABOLIC PNL TOTAL CA: CPT

## 2025-06-05 PROCEDURE — 700105 HCHG RX REV CODE 258: Performed by: NURSE PRACTITIONER

## 2025-06-05 PROCEDURE — 700102 HCHG RX REV CODE 250 W/ 637 OVERRIDE(OP): Performed by: NURSE PRACTITIONER

## 2025-06-05 PROCEDURE — 93005 ELECTROCARDIOGRAM TRACING: CPT | Mod: TC | Performed by: NURSE PRACTITIONER

## 2025-06-05 RX ORDER — SPIRONOLACTONE 25 MG/1
25 TABLET ORAL DAILY
Qty: 100 TABLET | Refills: 3 | Status: SHIPPED | OUTPATIENT
Start: 2025-06-05

## 2025-06-05 RX ORDER — SODIUM CHLORIDE 9 MG/ML
250 INJECTION, SOLUTION INTRAVENOUS ONCE
Status: COMPLETED | OUTPATIENT
Start: 2025-06-05 | End: 2025-06-05

## 2025-06-05 RX ORDER — ATORVASTATIN CALCIUM 40 MG/1
40 TABLET, FILM COATED ORAL EVERY EVENING
Qty: 100 TABLET | Refills: 3 | Status: SHIPPED | OUTPATIENT
Start: 2025-06-05

## 2025-06-05 RX ORDER — SODIUM CHLORIDE 9 MG/ML
500 INJECTION, SOLUTION INTRAVENOUS ONCE
Status: COMPLETED | OUTPATIENT
Start: 2025-06-05 | End: 2025-06-05

## 2025-06-05 RX ORDER — FUROSEMIDE 20 MG/1
20 TABLET ORAL DAILY
Qty: 100 TABLET | Refills: 3 | Status: SHIPPED | OUTPATIENT
Start: 2025-06-05

## 2025-06-05 RX ORDER — METOPROLOL SUCCINATE 25 MG/1
25 TABLET, EXTENDED RELEASE ORAL EVERY EVENING
Qty: 100 TABLET | Refills: 3 | Status: SHIPPED | OUTPATIENT
Start: 2025-06-05

## 2025-06-05 RX ADMIN — DOCUSATE SODIUM 100 MG: 100 CAPSULE, LIQUID FILLED ORAL at 05:23

## 2025-06-05 RX ADMIN — ASPIRIN 81 MG: 81 TABLET, COATED ORAL at 05:23

## 2025-06-05 RX ADMIN — SODIUM CHLORIDE 250 ML: 9 INJECTION, SOLUTION INTRAVENOUS at 10:00

## 2025-06-05 RX ADMIN — GABAPENTIN 600 MG: 300 CAPSULE ORAL at 05:21

## 2025-06-05 RX ADMIN — IRBESARTAN 300 MG: 150 TABLET ORAL at 05:22

## 2025-06-05 RX ADMIN — FUROSEMIDE 40 MG: 10 INJECTION, SOLUTION INTRAVENOUS at 05:21

## 2025-06-05 RX ADMIN — SODIUM CHLORIDE 500 ML: 9 INJECTION, SOLUTION INTRAVENOUS at 11:00

## 2025-06-05 RX ADMIN — EZETIMIBE 10 MG: 10 TABLET ORAL at 05:23

## 2025-06-05 RX ADMIN — DAPAGLIFLOZIN 10 MG: 10 TABLET, FILM COATED ORAL at 05:23

## 2025-06-05 RX ADMIN — SPIRONOLACTONE 25 MG: 25 TABLET ORAL at 05:22

## 2025-06-05 ASSESSMENT — COGNITIVE AND FUNCTIONAL STATUS - GENERAL
SUGGESTED CMS G CODE MODIFIER MOBILITY: CJ
CLIMB 3 TO 5 STEPS WITH RAILING: A LITTLE
WALKING IN HOSPITAL ROOM: A LITTLE
MOBILITY SCORE: 22

## 2025-06-05 ASSESSMENT — GAIT ASSESSMENTS
GAIT LEVEL OF ASSIST: STANDBY ASSIST
ASSISTIVE DEVICE: FRONT WHEEL WALKER
DISTANCE (FEET): 100
DEVIATION: BRADYKINETIC

## 2025-06-05 ASSESSMENT — FIBROSIS 4 INDEX: FIB4 SCORE: 3.92

## 2025-06-05 NOTE — DISCHARGE INSTRUCTIONS
HF Patient Discharge Instructions  Monitor your weight daily, and maintain a weight chart, to track your weight changes.   Activity as tolerated, unless your Doctor has ordered otherwise. Other activity order: NA.  Follow a low fat, low cholesterol, low salt diet unless instructed otherwise by your Doctor. Read the labels on the back of food products and track your intake of fat, cholesterol and salt.   Fluid Restriction No. If a Fluid Restriction has been ordered by your Doctor, measure fluids with a measuring cup to ensure that you are not exceeding the restriction.   No smoking.  Oxygen No. If your Doctor has ordered that you wear Oxygen at home, it is important to wear it as ordered.  Did you receive an explanation from staff on the importance of taking each of your medications and why it is necessary to keep taking them unless your doctor says to stop? Yes  Were all of your questions answered about how to manage your heart failure and what to do if you have increased signs and symptoms after you go home? Yes  Do you feel like your heart failure care team involved you in the care treatment plan and allowed you to make decisions regarding your care while in the hospital and addressed any discharge needs you might have? Yes    See the educational handout provided at discharge for more information on monitoring your daily weight, activity and diet. This also explains more about Heart Failure, symptoms of a flare-up and some of the tests that you have undergone.     Warning Signs of a Flare-Up include:  Swelling in the ankles or lower legs.  Shortness of breath, while at rest, or while doing normal activities.   Shortness of breath at night when in bed, or coughing in bed.   Requiring more pillows to sleep at night, or needing to sit up at night to sleep.  Feeling weak, dizzy or fatigued.     When to call your Doctor:  Call your Primary Care Physician or Cardiologist if:   You experience any pain radiating to your  jaw or neck.  You have any difficulty breathing.  You experience weight gain of 3 lbs in a day or 5 lbs in a week.   You feel any palpitations or irregular heartbeats.  You become dizzy or lose consciousness.   If you have had an angiogram or had a pacemaker or AICD placed, and experience:  Bleeding, drainage or swelling at the surgical / puncture site.  Fever greater than 100.0 F  Shock from internal defibrillator.  Cool and / or numb extremities.     Please access the [facility] My HF Guide/Heart Failure Interactive Workbook:   [email or web address]

## 2025-06-05 NOTE — DOCUMENTATION QUERY
"                                                                         Pending sale to [facility]                                                                       Query Response Note      PATIENT:               ANTHONY JOSHI  ACCT #:                  [id number]  MRN:                     [id number]  :                      1935  ADMIT DATE:       6/3/2025 7:32 AM  DISCH DATE:        2025 1:30 PM  RESPONDING  PROVIDER #:        [id number]           QUERY TEXT:    Can the acuity of the heart failure be further specified based on the clinical indicators and required treatments?    The patient's Clinical Indicators include:  89yo with dx of HTN, paroxysmal atrial fibrillation, hyperlipidemia     BNP 35048   BNP 99542   BNP 85073     YOANDY LVEF 55%     PN \"Positive shortness of breath\"   PN \"HFrEF Stage C, Class II LVEF 45%\"    Risk factors: advanced age, elevated BNP, hyperlipidemia, paroxysmal atrial fibrillation, HTN, heart failure reduced EF  Treatments: labwork, imaging, IV Lasix, medication, monitoring    Note: If you agree with a diagnosis listed above, please remember to include it in your concurrent daily documentation and onto the Discharge Summary.    Contact me with questions.     Thank you,  SAVANNAH Sevilla, CDI  [email or web address]  Options provided:   -- Acute on Chronic   -- Acute   -- Chronic   -- Other explanation, (please specify other explanation)   -- Unable to determine      Query created by: Dana Perez on 2025 8:51 AM    RESPONSE TEXT:    Acute on Chronic          Electronically signed by:  TERESA DUTTA MD 2025 3:57 PM              "

## 2025-06-05 NOTE — THERAPY
Physical Therapy   Initial Evaluation     Patient Name:  Tristan Hester  Age:  89 y.o., Sex:  male  Medical Record #:  [id number]  Today's Date: 6/5/2025     Precautions  Medical: Fall Risk, Cardiac    Assessment  Patient is an 89 y.o. male with hx of severe MR, HFrEF, TAVR, PAF on AC, prostate CA, prior DVT, HLD, HTN, TIA, and carotid disease with prior CEA in '08. Pt admitted s/p elective TASHA on 6/3, being managed for post op hypotension. PT eval complete, pt currently presents just below his functional baseline due to impaired standing activity tolerance and balance. However, pt is at SBA for all mobility with use of FWW. Pt's BP was labile throughout with mild symptoms, but it stabilized when pt was up and moving around. Recommend pt DC with cardiac rehab once medically cleared. Will follow while admitted.     Plan    Physical Therapy Initial Treatment Plan   Treatment Plan : Bed Mobility, Gait Training, Neuro Re-Education / Balance, Self Care / Home Evaluation, Stair Training, Therapeutic Activities, Therapeutic Exercise  Treatment Frequency: 3 Times per Week  Duration: Until Therapy Goals Met    DC Equipment Recommendations: None  Discharge Recommendations: Other - (cardiac rehab)         Vitals   O2 (LPM) 0   O2 Delivery Device None - Room Air   Vitals Comments initially hypotensive in 70s/40s sitting in the chair, however BP improved to 90s/50s with mobility   Pain 0 - 10 Group   Therapist Pain Assessment   (no c/o pain)   Non Verbal Descriptors   Non Verbal Scale  Calm   Prior Living Situation   Prior Services None;Home-Independent   Housing / Facility 2 Story House   Steps Into Home 1   Steps In Home   (FOS)   Equipment Owned 4-Wheel Walker;Single Point Cane  (walking sticks)   Lives with - Patient's Self Care Capacity Spouse   Comments pt reports living with his wife who is typically home to assist if needed. pt goes up his stairs twice a day typically   Prior Level of Functional Mobility   Bed  Mobility Independent   Transfer Status Independent   Ambulation Independent   Ambulation Distance limited community distances   Assistive Devices Used   (walking sticks most of the time)   Stairs Independent   Cognition    Cognition / Consciousness WDL   Level of Consciousness Alert   Comments pleasant and participatory   Active ROM Lower Body    Active ROM Lower Body  WDL   Strength Lower Body   Lower Body Strength  WDL   Coordination Lower Body    Coordination Lower Body  WDL   Balance Assessment   Sitting Balance (Static) Good   Sitting Balance (Dynamic) Fair +   Standing Balance (Static) Fair +   Standing Balance (Dynamic) Fair   Weight Shift Sitting Fair   Weight Shift Standing Fair   Comments FWW   Bed Mobility    Comments NT, in chair pre/post   Gait Analysis   Gait Level Of Assist Standby Assist   Assistive Device Front Wheel Walker   Distance (Feet) 100   # of Times Distance was Traveled 1   Deviation Bradykinetic   # of Stairs Climbed 10   Level of Assist with Stairs Standby Assist   Weight Bearing Status no restrictions   Comments kyphotic posture, baseline   Functional Mobility   Sit to Stand Supervised   Bed, Chair, Wheelchair Transfer Supervised   Mobility FWW   6 Clicks Assessment - How much HELP from from another person do you currently need... (If the patient hasn't done an activity recently, how much help from another person do you think he/she would need if he/she tried?)   Turning from your back to your side while in a flat bed without using bedrails? 4   Moving from lying on your back to sitting on the side of a flat bed without using bedrails? 4   Moving to and from a bed to a chair (including a wheelchair)? 4   Standing up from a chair using your arms (e.g., wheelchair, or bedside chair)? 4   Walking in hospital room? 3   Climbing 3-5 steps with a railing? 3   6 clicks Mobility Score 22   Short Term Goals    Short Term Goal # 1 pt will move supine<>eob with spv in 6 tx for bed mobility.    Short Term Goal # 2 pt will ambulate 150 ft with fww and spv in 6 tx for household distances.   Education Group   Education Provided Cardiac Precautions;Role of Physical Therapist   Cardiac Precautions Patient Response Patient;Acceptance;Explanation;Handout;Verbal Demonstration;Action Demonstration   Role of Physical Therapist Patient Response Patient;Acceptance;Explanation;Verbal Demonstration   Additional Comments education on cardiac precautions, activity progress for cardio and resistance training, RPE scale/talk test, cardiac rehab   Physical Therapy Initial Treatment Plan    Treatment Plan  Bed Mobility;Gait Training;Neuro Re-Education / Balance;Self Care / Home Evaluation;Stair Training;Therapeutic Activities;Therapeutic Exercise   Treatment Frequency 3 Times per Week   Duration Until Therapy Goals Met   Problem List    Problems Impaired Bed Mobility;Impaired Ambulation;Impaired Balance;Decreased Activity Tolerance   Anticipated Discharge Equipment and Recommendations   DC Equipment Recommendations None   Discharge Recommendations Other -  (cardiac rehab)   Interdisciplinary Plan of Care Collaboration   IDT Collaboration with  Nursing   Patient Position at End of Therapy Seated;Chair Alarm On;Call Light within Reach;Tray Table within Reach;Phone within Reach   Collaboration Comments RN updated   Session Information   Date / Session Number  6/5- 1 (1/3, 6/11)

## 2025-06-05 NOTE — DISCHARGE SUMMARY
PRIMARY DISCHARGE DIAGNOSIS: Status post MitraClip X 1 XTW clip    PROCEDURES:    1. Successful TASHA with MitraClip X1 XTW clip , transfemoral approach under general anesthesia on 6/3/25  2. Intraoperative transesophageal echocardiogram showing:  Intraoperative YOANDY during mitraclip procedure.  Baseline images show   normal biventricular function with EF = 55%. Degenerative mitral valve   disease with posterior leaflet flail at the P2 scallop.  Severe (4+)   eccetric mitral regurgitation.   A single mitraclip was placed at the A2/P2 coaptation with mild (1-2+)   residual MR.  Mean mitral gradient = 3 mm Hg at a heart rate of 55 bpm   in NSR.  Findings communicated at the time of exam.   3. CXR on 6/4/25 showing no acute findings  4. EKG on 6/5/25 showing SB 59 bpm with first degree block  5. Labs on 6/5/25 showing   Recent Labs     06/03/25  1101 06/04/25  0005   WBC 5.2 7.9   RBC 4.36* 4.64*   HEMOGLOBIN 13.2* 13.8*   HEMATOCRIT 41.0* 43.9   MCV 94.0 94.6   MCH 30.3 29.7   RDW 54.3* 54.7*   PLATELETCT 152* 168   MPV 11.1 10.9     Recent Labs     06/03/25  1101 06/04/25  0005 06/05/25  0050   SODIUM 139 139 138   POTASSIUM 4.1 4.4 4.0   CHLORIDE 108 105 104   CO2 21 23 24   GLUCOSE 99 143* 98   BUN 19 23* 29*     INR   Date Value Ref Range Status   06/05/2025 2.48 (H) 0.87 - 1.13 Final     Comment:     INR - Non-therapeutic Reference Range: 0.87-1.13  INR - Therapeutic Reference Range: 2.0-4.0       POC INR   Date Value Ref Range Status   02/08/2023 2.2 (H) 0.9 - 1.2 Final     Comment:     INR - Non-therapeutic Reference Range: 0.9-1.2  INR - Therapeutic Reference Range: 2.0-4.0       HOSPITAL COURSE: The patient is a pleasant 89 year old male with severe symptomatic mitral regurgitation, acute on chronic systolic heart failure with rEF of 45%, S/P TAVR, PAF on chronic anticoagulation with coumadin, prostate CA, prior DVT, HLD, HTN, TIA, carotid disease with prior CEA in '08 (Abdias [facility]), and anemia. Due to the  patient's symptoms, the patient underwent successful MitraClip described as above. Post-procedure, the patient did well. They did require IV diuresis during their stay and will continue on oral diuretics post-operatively. His medications were adjusted for hypotension. Acute heart failure exacerbation as evidenced by: signs and symptoms of shortness of breath, paroxysmal nocturnal dyspnea, weakness/fatigue; Echocardiogram showing EF < 50%, severe valvular regurgitation; corroborated by elevated BNP 22,000's.  They were able to ambulate without difficulty. He was assessed by RN staff to potentially need oxygen at 0.5 L at night, we will follow this outpatient as no inpatient oxygen assessment was completed prior to his discharge. No further events were noted during their stay. They are now off oxygen and are to be discharged to home with his .    DISCHARGE MEDICATIONS:      Medication List        CHANGE how you take these medications        Instructions   atorvastatin 40 MG Tabs  What changed:   medication strength  See the new instructions.  Commonly known as: Lipitor   Take 1 Tablet by mouth every evening.  Dose: 40 mg     furosemide 20 MG Tabs  What changed:   medication strength  how much to take  when to take this  reasons to take this  additional instructions  Commonly known as: Lasix   Take 1 Tablet by mouth every day.  Dose: 20 mg     gabapentin 300 MG Caps  What changed:   how much to take  when to take this  additional instructions  Commonly known as: Neurontin   Take 1 Capsule by mouth 3 times a day.  Dose: 300 mg     Jardiance 10 MG Tabs tablet  Generic drug: Empagliflozin   Doctor's comments: This rx was submitted by a pharmacist working under a collaborative practice agreement. [facility], Phone: [phone], Fax: [phone]  Take 1 Tablet by mouth every day. He might need help with copay i.e. 340B  Dose: 10 mg     metoprolol SR 25 MG Tb24  What changed: when to take this  Commonly known as: Toprol  XL   Take 1 Tablet by mouth every evening.  Dose: 25 mg     spironolactone 25 MG Tabs  What changed:   how much to take  additional instructions  Commonly known as: Aldactone   Take 1 Tablet by mouth every day.  Dose: 25 mg     warfarin 4 MG Tabs  What changed:   how much to take  how to take this  when to take this  additional instructions  Commonly known as: Coumadin   TAKE 1 (ONE) TO 1 & 1/2 (ONE & ONE-HALF) TABLETS BY MOUTH ONCE DAILY AS DIRECTED BY [facility]            CONTINUE taking these medications        Instructions   acetaminophen 500 MG Tabs  Commonly known as: Tylenol   Take 2 Tablets by mouth every 6 hours as needed for Mild Pain or Moderate Pain.  Dose: 1,000 mg     BIOTIN PO   Take 1 Tablet by mouth every day.  Dose: 1 Tablet     DAILY MULTI VITAMIN/MINERALS PO   Take 1 Tablet by mouth every day.  Dose: 1 Tablet     ezetimibe 10 MG Tabs  Commonly known as: Zetia   Doctor's comments: This rx was submitted by a pharmacist working under a collaborative practice agreement. [facility], Phone: [phone], Fax: [phone]  Take 1 Tablet by mouth every day.  Dose: 10 mg     fluticasone 50 MCG/ACT nasal spray  Commonly known as: Flonase   Administer 2 Sprays into affected nostril(S) every day.  Dose: 2 Spray     loratadine 10 MG Tabs  Commonly known as: Claritin   Take 10 mg by mouth 1 time a day as needed (allergies).  Dose: 10 mg     temazepam 30 MG capsule  Commonly known as: Restoril   Take 1 Capsule by mouth at bedtime as needed for Sleep for up to 180 days.  Dose: 30 mg     valacyclovir 1 GM Tabs  Commonly known as: Valtrex   Take 1,000 mg by mouth every day. 3 day course prior to blue light procedure on 05/27/2025  COMPLETED  Dose: 1,000 mg     VITAMIN C PO   Take 500 mg by mouth every day.  Dose: 500 mg            STOP taking these medications      aspirin 81 MG Chew chewable tablet  Commonly known as: Asa     irbesartan 300 MG Tabs  Commonly known as: Avapro             DISCHARGE INSTRUCTIONS: They are given discharge instructions on potential post-operative complications and symptoms to watch out for. Their groin sites were checked and were clean, dry, and intact. Patient or family to notify us for any complications noted on the discharge instructions. They will follow up with myself, Patrizia Sarmiento DNP, STEWART, on Tuesday in our cardiology office. They will get repeat labs before their follow up appointment. They will then follow up with a repeat echocardiogram in one month for post MitraClip assessment.     Future Appointments   Date Time Provider Department Center   6/11/2025  3:15 PM JUDY Massey [facility] None   6/11/2025  4:15 PM [facility] EXAM 5 [facility] None   6/12/2025  9:15 AM [facility] EXAM 6 [facility]   6/24/2025  1:00 PM Donato Hawley M.D. [facility] [place]   7/2/2025  2:15 PM [facility] EXAM 8 NONINV [facility]   7/3/2025 10:15 AM PHARMACIST-CAM B [facility] None   7/10/2025  2:15 PM JUDY Massey [facility] None   9/9/2025 10:40 AM ADDISON Curtis [facility] None   6/3/2026 12:15 PM [facility] EXAM 12 ECHO [facility]     Discharge time spent with patient was 35 minutes.

## 2025-06-05 NOTE — CARE PLAN
The patient is Stable - Low risk of patient condition declining or worsening    Shift Goals  Clinical Goals: diurese, monitor BP, VSS, safety  Patient Goals: sleep  Family Goals: DENZEL    Progress made toward(s) clinical / shift goals:    Problem: Fall Risk  Goal: Patient will remain free from falls  Description: Target End Date:  Prior to discharge or change in level of careDocument interventions on the [facility] Fall Risk Assessment1.  Assess for fall risk factors2.  Implement fall precautions  Outcome: Progressing     Problem: Respiratory  Goal: Patient will achieve/maintain optimum respiratory ventilation and gas exchange  Description: Target End Date:  Prior to discharge or change in level of careDocument on Assessment flowsheet1.  Assess and monitor rate, rhythm, depth and effort of respiration2.  Breath sounds assessed qshift and/or as needed3.  Assess O2 saturation, administer/titrate oxygen as ordered4.  Position patient for maximum ventilatory efficiency5.  Turn, cough, and deep breath with splinting to improve effectiveness6.  Collaborate with RT to administer medication/treatments per order7.  Encourage use of incentive spirometer and encourage patient to cough after use and utilize splinting techniques if applicable8.  Airway suctioning9.  Monitor sputum production for changes in color, consistency and [id number]. Perform frequent oral [id number]. Alternate physical activity with rest periods  Outcome: Progressing     Problem: Mobility  Goal: Patient's capacity to carry out activities will improve  Description: Target End Date:  Prior to discharge or change in level of care1.  Assess for barriers to mobility/activity2.  Implement activity per interdisciplinary team recommendations3.  Target activity level identified and patient/family/caregiver aware of goal4.  Provide assistive devices5.  Instruct patient/caregiver on proper use of assistive/adaptive devices6.  Schedule activities and rest periods to  decrease effects of fatigue7.  Encourage mobilization to extent of ability8.  Maintain proper body alignment9.  Provide adequate pain management to allow progressive [id number]. Implement pace maker precautions as needed  Outcome: Progressing     Problem: Self Care  Goal: Patient will have the ability to perform ADLs independently or with assistance (bathe, groom, dress, toilet and feed)  Description: Target End Date:  Prior to discharge or change in level of careDocument on ADL flowsheet1.  Assess the capability and level of deficiency to perform ADLs2.  Encourage family/care giver involvement3.  Provide assistive devices4.  Consider PT/OT evaluations5.  Maintain support, give positive feedback, encourage self-care allowing extra time and verbal cuing as needed6.  Avoid doing something for patients they can do themselves, but provide assistance as needed7.  Assist in anticipating/planning individual needs8.  Collaborate with Case Management and  to meet discharge needs  Outcome: Progressing       Patient is not progressing towards the following goals:

## 2025-06-05 NOTE — PROGRESS NOTES
Discharge orders received. PIV and tele box removed. HF education provided. All belongings returned. Refused vaccines. All questions answered, escorted outside with staff.

## 2025-06-05 NOTE — PROGRESS NOTES
Patient requiring 0.5 lpm NC while asleep to maintain SPO2 >90% due to shallow breathing.  Patient hypotensive and symptomatic BP 74/45. Patient endorses subjective complaint of light headed ness, dizziness, and fatigue. Notified STEWART Charles and received order for 250 mL NS bolus now.

## 2025-06-11 ENCOUNTER — ANTICOAGULATION VISIT (OUTPATIENT)
Dept: VASCULAR LAB | Facility: MEDICAL CENTER | Age: 89
End: 2025-06-11
Attending: INTERNAL MEDICINE
Payer: MEDICARE

## 2025-06-11 ENCOUNTER — OFFICE VISIT (OUTPATIENT)
Dept: CARDIOLOGY | Facility: MEDICAL CENTER | Age: 89
End: 2025-06-11
Attending: NURSE PRACTITIONER
Payer: MEDICARE

## 2025-06-11 VITALS
RESPIRATION RATE: 18 BRPM | HEART RATE: 60 BPM | WEIGHT: 172 LBS | HEIGHT: 71 IN | SYSTOLIC BLOOD PRESSURE: 106 MMHG | BODY MASS INDEX: 24.08 KG/M2 | OXYGEN SATURATION: 95 % | DIASTOLIC BLOOD PRESSURE: 70 MMHG

## 2025-06-11 DIAGNOSIS — S35.513D: ICD-10-CM

## 2025-06-11 DIAGNOSIS — I48.0 PAF (PAROXYSMAL ATRIAL FIBRILLATION) (HCC): ICD-10-CM

## 2025-06-11 DIAGNOSIS — I10 ESSENTIAL HYPERTENSION, BENIGN: ICD-10-CM

## 2025-06-11 DIAGNOSIS — Z95.2 S/P TAVR (TRANSCATHETER AORTIC VALVE REPLACEMENT): ICD-10-CM

## 2025-06-11 DIAGNOSIS — Z98.890 HISTORY OF CEA (CAROTID ENDARTERECTOMY): ICD-10-CM

## 2025-06-11 DIAGNOSIS — Z98.890 S/P MITRAL VALVE CLIP IMPLANTATION: Primary | ICD-10-CM

## 2025-06-11 DIAGNOSIS — Z95.818 S/P MITRAL VALVE CLIP IMPLANTATION: Primary | ICD-10-CM

## 2025-06-11 DIAGNOSIS — G45.9 TIA (TRANSIENT ISCHEMIC ATTACK): ICD-10-CM

## 2025-06-11 DIAGNOSIS — D68.69 SECONDARY HYPERCOAGULABLE STATE (HCC): ICD-10-CM

## 2025-06-11 DIAGNOSIS — I25.10 CORONARY ARTERY DISEASE INVOLVING NATIVE CORONARY ARTERY OF NATIVE HEART WITHOUT ANGINA PECTORIS: ICD-10-CM

## 2025-06-11 DIAGNOSIS — I48.0 PAF (PAROXYSMAL ATRIAL FIBRILLATION) (HCC): Primary | ICD-10-CM

## 2025-06-11 LAB
EKG IMPRESSION: NORMAL
INR PPP: 2 (ref 2–3.5)

## 2025-06-11 PROCEDURE — 93010 ELECTROCARDIOGRAM REPORT: CPT | Performed by: INTERNAL MEDICINE

## 2025-06-11 PROCEDURE — 85610 PROTHROMBIN TIME: CPT

## 2025-06-11 PROCEDURE — 3078F DIAST BP <80 MM HG: CPT | Performed by: NURSE PRACTITIONER

## 2025-06-11 PROCEDURE — 99211 OFF/OP EST MAY X REQ PHY/QHP: CPT

## 2025-06-11 PROCEDURE — 99214 OFFICE O/P EST MOD 30 MIN: CPT | Performed by: NURSE PRACTITIONER

## 2025-06-11 PROCEDURE — 3074F SYST BP LT 130 MM HG: CPT | Performed by: NURSE PRACTITIONER

## 2025-06-11 PROCEDURE — 99213 OFFICE O/P EST LOW 20 MIN: CPT | Performed by: NURSE PRACTITIONER

## 2025-06-11 PROCEDURE — 93005 ELECTROCARDIOGRAM TRACING: CPT | Mod: TC | Performed by: NURSE PRACTITIONER

## 2025-06-11 ASSESSMENT — ENCOUNTER SYMPTOMS
DIZZINESS: 1
CLAUDICATION: 0
MYALGIAS: 0
COUGH: 0
SHORTNESS OF BREATH: 1
ORTHOPNEA: 0
PALPITATIONS: 0
ABDOMINAL PAIN: 0
PND: 0
FEVER: 0

## 2025-06-11 ASSESSMENT — FIBROSIS 4 INDEX: FIB4 SCORE: 3.97

## 2025-06-11 NOTE — PROGRESS NOTES
Chief Complaint   Patient presents with    Follow-Up     1 week post MITRAL     Subjective     Everardo Hester is a 89 y.o. male who presents today for 1 week S/P TASHA.    Hx of severe MR now S/P Es clip X1, S/P TAVR, PAF on chronic anticoagulation with coumadin, prostate CA, prior DVT, HLD, HTN, TIA, carotid disease with prior CEA in '08 (Abdias BRADFORD), and anemia.     Mild imbalance and lightheadedness at times, present pre-op. Mild fatigue and shortness of breath.    He has no chest pain, edema, or palpitations.     Past Medical History[1]  Past Surgical History[2]  Family History   Problem Relation Age of Onset    Heart Disease Father         CHF at 91.    Other Brother         Aneurysm    Cancer Brother         Seminal vascular cancer    Autoimmune Disease Daughter         Lupus, RA    No Known Problems Daughter     No Known Problems Son     No Known Problems Son     Heart Attack Neg Hx      Social History     Socioeconomic History    Marital status:      Spouse name: Not on file    Number of children: Not on file    Years of education: Not on file    Highest education level: Not on file   Occupational History    Not on file   Tobacco Use    Smoking status: Former     Current packs/day: 0.00     Average packs/day: 0.5 packs/day for 30.0 years (15.0 ttl pk-yrs)     Types: Cigarettes, Pipe     Start date: 1955     Quit date: 1985     Years since quittin.4    Smokeless tobacco: Never   Vaping Use    Vaping status: Never Used   Substance and Sexual Activity    Alcohol use: Yes     Alcohol/week: 1.2 oz     Types: 2 Standard drinks or equivalent per week     Comment: 1-2 times a week    Drug use: No    Sexual activity: Not Currently   Other Topics Concern    Not on file   Social History Narrative    Not on file     Social Drivers of Health     Financial Resource Strain: Not on file   Food Insecurity: No Food Insecurity (2025)    Hunger Vital Sign     Worried About Running Out of Food in the  "Last Year: Never true     Ran Out of Food in the Last Year: Never true   Transportation Needs: No Transportation Needs (6/4/2025)    PRAPARE - Transportation     Lack of Transportation (Medical): No     Lack of Transportation (Non-Medical): No   Physical Activity: Not on file   Stress: Not on file   Social Connections: Not on file   Intimate Partner Violence: Not At Risk (6/4/2025)    Humiliation, Afraid, Rape, and Kick questionnaire     Fear of Current or Ex-Partner: No     Emotionally Abused: No     Physically Abused: No     Sexually Abused: No   Housing Stability: Low Risk  (6/4/2025)    Housing Stability Vital Sign     Unable to Pay for Housing in the Last Year: No     Number of Times Moved in the Last Year: 0     Homeless in the Last Year: No     Allergies[3]  Encounter Medications[4]  Review of Systems   Constitutional:  Negative for fever and malaise/fatigue.   Respiratory:  Positive for shortness of breath. Negative for cough.         Mild shortness of breath   Cardiovascular:  Negative for chest pain, palpitations, orthopnea, claudication, leg swelling and PND.   Gastrointestinal:  Negative for abdominal pain.   Musculoskeletal:  Negative for myalgias.   Neurological:  Positive for dizziness.        Imbalance, present pre-op              Objective     Ht 1.803 m (5' 11\")   BMI 24.26 kg/m²     Physical Exam  Vitals and nursing note reviewed.   Constitutional:       Appearance: Normal appearance. He is well-developed and normal weight.   HENT:      Head: Normocephalic and atraumatic.   Neck:      Vascular: No JVD.   Cardiovascular:      Rate and Rhythm: Normal rate and regular rhythm.      Pulses: Normal pulses.      Heart sounds: Normal heart sounds.   Pulmonary:      Effort: Pulmonary effort is normal.      Breath sounds: Normal breath sounds.   Musculoskeletal:         General: Normal range of motion.      Comments: Dual canes for balance   Skin:     General: Skin is warm and dry.      Capillary Refill: " Capillary refill takes less than 2 seconds.   Neurological:      General: No focal deficit present.      Mental Status: He is alert and oriented to person, place, and time. Mental status is at baseline.   Psychiatric:         Mood and Affect: Mood normal.         Behavior: Behavior normal.         Thought Content: Thought content normal.         Judgment: Judgment normal.                Assessment & Plan     1. S/P mitral valve clip implantation  EKG      2. S/P TAVR (transcatheter aortic valve replacement)        3. Coronary artery disease involving native coronary artery of native heart without angina pectoris        4. Essential hypertension, benign        5. History of CEA (carotid endarterectomy)        6. Hx of TIA (transient ischemic attack)        7. Iliac artery injury, unspecified laterality, subsequent encounter        8. PAF (paroxysmal atrial fibrillation) (HCC)        9. Secondary hypercoagulable state (HCC)          Medical Decision Making: Today's Assessment/Status/Plan:      1. S/P TASHA, NYHA II with prior TAVR  -doing well post-op, needs further diuresis, will keep overnight  -cont warfarin in lieu of aspirin lifelong  -groins CDI with mild bruising and small nodule   -SBE prophylaxis understands lifelong  -reviewed hospital imaging, labs, and EKG     2. 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 PAF on chronic anticoagulation  -SR on EKG  -cont warfarin per pharmacy  -cont bb  -follow      4. HLD with CAD and PVD  -no angina or claudication  -cont statin, zetia     5. TIA  -cont statin, zetia,  warfarin  -follow, no deficits    Patient is to follow up with Patrizia WILLIAM in 1 month with echo and labs.                             [1]   Past Medical History:  Diagnosis Date    Acute nasopharyngitis 05/08/2025    Started two weeks ago, patient states runny nose, sneezing, productive cough and denies fever.    Aortic valve stenosis     Arrhythmia     Afib    Arthritis     osteo, hips and knees    Blood clotting disorder (HCC)     , left arm    Breath shortness     Bronchitis         Cancer ([facility])     Prostate CA--2000    Carotid arterial disease ([facility]) 01/06/2014    Cataract     removed bilat    Congestive heart failure ([facility])     DVT (deep venous thrombosis) ([facility]) 2016    Glaucoma     Heart valve disease     MVR    HLD (hyperlipidemia) 01/06/2014    Hypertension     Kidney cysts     left    Mitral regurgitation 01/06/2014    Pain 12/14/2015    low back, hips, 2-3/10    Pneumonia         Sleep apnea     no CPAP    Stroke ([facility]) 2008    TIA, no residual    TIA (transient ischemic attack)     2008    Urinary bladder disorder     HAD MALE BLADDER SUSPENSION PROCEDURE DONE    Urinary incontinence     stress incontinence   [2]   Past Surgical History:  Procedure Laterality Date    ECHOCARDIOGRAM, TRANSESOPHAGEAL, INTRAOPERATIVE N/A 6/3/2025    Procedure: ECHOCARDIOGRAM, TRANSESOPHAGEAL, INTRAOPERATIVE;  Surgeon: Sony Barrientos M.D.;  Location: [facility];  Service: Cardiac    TRANSCATHETER MITRAL VALVE REPAIR Right 6/3/2025    Procedure: PROCEDURE, MITRAL VALVE, TRANSCATHETER;  Surgeon: Sony Barrientos M.D.;  Location: [facility];  Service: Cardiac    TRANSCATHETER AORTIC VALVE REPLACEMENT  03/02/2020    Procedure: REPLACEMENT, AORTIC VALVE, TRANSCATHETER -;  Surgeon: Sony Barrientos M.D.;  Location: [facility];  Service: Cardiac    YOANDY  03/02/2020    Procedure: ECHOCARDIOGRAM, TRANSESOPHAGEAL;  Surgeon: Sony Barrientos M.D.;  Location:  SURGERY [facility];  Service: Cardiac    FEMORAL ENDARTERECTOMY Left 03/02/2020    Procedure: Repair of left femoral  Artery Dissection;  Surgeon: Alpa Tillman M.D.;  Location: SURGERY [facility];  Service: General    FUSION, SPINE, LUMBAR, PLIF  12/28/2015    Procedure: LUMBAR FUSION POSTERIOR L3-5 with peek rods;  Surgeon: Phylicia Almonte M.D.;  Location: SURGERY [facility];  Service:     LUMBAR DECOMPRESSION  12/28/2015    Procedure: LUMBAR DECOMPRESSION posterior redo decompression L3-5, with dural repair;  Surgeon: Phylicia Almonte M.D.;  Location: SURGERY [facility];  Service:     THORACOSCOPY  06/13/2015    Procedure: THORACOSCOPY with decortication VATS , side to be determined  ;  Surgeon: Elmira Holder M.D.;  Location: [facility];  Service:     LUMBAR LAMINECTOMY DISKECTOMY  10/08/2010    Performed by PHYLICIA ALMONTE at SURGERY [facility]    CERVICAL DISK AND FUSION ANTERIOR  10/26/2009    Performed by PHYLICIA ALMONTE at [facility]    CAROTID ENDARTERECTOMY  07/10/2008    Performed by LEIGH MORATAYA at [facility]    BLADDER SLING MALE  11/01/2004    PROSTATECTOMY, RADIAL  11/01/2000    CATARACT EXTRACTION WITH IOL Bilateral     OTHER      Surgery for glaucoma   [3]   Allergies  Allergen Reactions    Other Environmental Runny Nose and Itching     Seasonal, cats-hayfever    Other Misc Unspecified     Newspaper causes sneezing per patient.    [4]   Outpatient Encounter Medications as of 6/11/2025   Medication Sig Dispense Refill    furosemide (LASIX) 20 MG Tab Take 1 Tablet by mouth every day. 100 Tablet 3    atorvastatin (LIPITOR) 40 MG Tab Take 1 Tablet by mouth every evening. 100 Tablet 3    spironolactone (ALDACTONE) 25 MG Tab Take 1 Tablet by mouth every day. 100 Tablet 3    metoprolol SR (TOPROL XL) 25 MG TABLET SR 24 HR Take 1 Tablet by mouth every evening. 100 Tablet 3    loratadine (CLARITIN) 10 MG Tab Take 10 mg by mouth 1 time a day as  needed (allergies).      valacyclovir (VALTREX) 1 GM Tab Take 1,000 mg by mouth every day. 3 day course prior to blue light procedure on 05/27/2025  COMPLETED      BIOTIN PO Take 1 Tablet by mouth every day.      fluticasone (FLONASE) 50 MCG/ACT nasal spray Administer 2 Sprays into affected nostril(S) every day. 16 g 2    JARDIANCE 10 MG Tab tablet Take 1 Tablet by mouth every day. He might need help with copay i.e. 340B 30 Tablet 6    ezetimibe (ZETIA) 10 MG Tab Take 1 Tablet by mouth every day. 90 Tablet 1    acetaminophen (TYLENOL) 500 MG Tab Take 2 Tablets by mouth every 6 hours as needed for Mild Pain or Moderate Pain.      warfarin (COUMADIN) 4 MG Tab TAKE 1 (ONE) TO 1 & 1/2 (ONE & ONE-HALF) TABLETS BY MOUTH ONCE DAILY AS DIRECTED BY [facility] (Patient taking differently: Take 4-6 mg by mouth see administration instructions. 1.5 tabs = 6mg on Tues, Thurs and Sat  1 tab = 4mg on all other days) 135 Tablet 1    gabapentin (NEURONTIN) 300 MG Cap Take 1 Capsule by mouth 3 times a day. 270 Capsule 3    temazepam (RESTORIL) 30 MG capsule Take 1 Capsule by mouth at bedtime as needed for Sleep for up to 180 days. 30 Capsule 5    Ascorbic Acid (VITAMIN C PO) Take 500 mg by mouth every day.      Multiple Vitamins-Minerals (DAILY MULTI VITAMIN/MINERALS PO) Take 1 Tablet by mouth every day.       No facility-administered encounter medications on file as of 6/11/2025.

## 2025-06-11 NOTE — PATIENT INSTRUCTIONS
Obtain labs in 2 days prior to next appointment.    Continue same medications.    Call for progressive or worsening symptoms.    Follow up in 1 month with echocardiogram.

## 2025-06-11 NOTE — PROGRESS NOTES
Anticoagulation Summary  As of 2025      INR goal:  2.0-3.0   TTR:  73.1% (9.6 y)   INR used for dosin.00 (2025)   Warfarin maintenance plan:  6 mg (4 mg x 1.5) every Tue, Thu, Sat; 4 mg (4 mg x 1) all other days   Weekly warfarin total:  34 mg   Plan last modified:  Natasha Jack, PharmD (2024)   Next INR check:  2025   Priority:  Maintenance   Target end date:  Indefinite    Indications    PAF (paroxysmal atrial fibrillation) (HCC) [I48.0]  Hx of TIA (transient ischemic attack) [G45.9]  Deep vein thrombosis (DVT) (HCC) (Resolved) [I82.409]  Atrial fibrillation (HCC) (Resolved) [I48.91]  Hx of Stroke (Resolved) [I63.9]  Secondary hypercoagulable state (HCC) [D68.69]                 Anticoagulation Episode Summary       INR check location:  Anticoagulation Clinic    Preferred lab:  --    Send INR reminders to:  --    Comments:  Medications reconciled. Take naproxen as needed for arthritic pain. He is aware of the increased risk of bleeding and takes only when needed with caution          Anticoagulation Care Providers       Provider Role Specialty Phone number    Michael J Bloch, M.D. Referring Cardiovascular Disease (Cardiology) [phone]    [facility]   [phone]                Refer to Patient Findings for HPI:  Patient Findings       Positives:  Change in medications (Started spironolactone last week; cardiology stopped baby aspirin last week)    Negatives:  Signs/symptoms of thrombosis, Signs/symptoms of bleeding, Laboratory test error suspected, Change in health, Change in alcohol use, Change in activity, Upcoming invasive procedure, Emergency department visit, Upcoming dental procedure, Missed doses, Extra doses, Change in diet/appetite, Hospital admission, Bruising, Other complaints          Clinical Outcomes       Negatives:  Major bleeding event, Thromboembolic event, Anticoagulation-related hospital admission, Anticoagulation-related ED visit,  Anticoagulation-related fatality          Date Referral Placed: 03/10/2025    Vitals:  There were no vitals filed for this visit.  Pt declined vitals    Verified current warfarin dosing schedule.    Medications reconciled.  Pt is not on antiplatelet therapy.      A/P   INR is therapeutic  Reason(s) for out of range INR today: N/A      Warfarin dosing recommendation: Continue regimen as listed above.    Pt educated to contact our clinic with any changes in medications or s/s of bleeding or thrombosis. Pt is aware to seek immediate medical attention for falls, head injury or deep cuts.    Request pt to return in 2 week(s). Pt agrees.    Karrie Barker, PharmD

## 2025-06-12 ENCOUNTER — DOCUMENTATION (OUTPATIENT)
Dept: CARDIOLOGY | Facility: MEDICAL CENTER | Age: 89
End: 2025-06-12
Payer: MEDICARE

## 2025-06-12 ENCOUNTER — APPOINTMENT (OUTPATIENT)
Dept: RADIOLOGY | Facility: MEDICAL CENTER | Age: 89
End: 2025-06-12
Attending: NURSE PRACTITIONER
Payer: MEDICARE

## 2025-06-12 NOTE — PROGRESS NOTES
On behalf of [facility]'s Structural Heart Program, we would like to thank you for allowing us to participate in the care of your patient.     He underwent a successful mitral valve transcatheter [id number] repair (Mitral TASHA) on Tuesday, Leslie 3, 2025.     Your patient is scheduled to follow up with our Structural Heart Program at one month and one year post procedure.     Again, thank you for allowing us to participate in the care of your patient. If you have any questions, please do not hesitate to contact our Structural Heart team.     Sincerely,    [facility]    JENA Flores, RN, Structural Heart Program Nurse Coordinator ([phone])  JENA Monroe, RN, Structural Heart Program Nurse Coordinator ([phone])

## 2025-06-24 ENCOUNTER — APPOINTMENT (OUTPATIENT)
Dept: VASCULAR LAB | Facility: MEDICAL CENTER | Age: 89
End: 2025-06-24
Attending: INTERNAL MEDICINE
Payer: MEDICARE

## 2025-06-24 ENCOUNTER — APPOINTMENT (OUTPATIENT)
Dept: INTERNAL MEDICINE | Facility: OTHER | Age: 89
End: 2025-06-24
Payer: MEDICARE

## 2025-06-24 ENCOUNTER — OFFICE VISIT (OUTPATIENT)
Dept: INTERNAL MEDICINE | Facility: OTHER | Age: 89
End: 2025-06-24
Payer: MEDICARE

## 2025-06-24 VITALS
SYSTOLIC BLOOD PRESSURE: 120 MMHG | TEMPERATURE: 97.7 F | BODY MASS INDEX: 24.36 KG/M2 | OXYGEN SATURATION: 97 % | WEIGHT: 174 LBS | HEART RATE: 52 BPM | HEIGHT: 71 IN | DIASTOLIC BLOOD PRESSURE: 62 MMHG

## 2025-06-24 DIAGNOSIS — R53.82 CHRONIC FATIGUE: ICD-10-CM

## 2025-06-24 DIAGNOSIS — D64.9 ANEMIA, UNSPECIFIED TYPE: Primary | ICD-10-CM

## 2025-06-24 PROCEDURE — 3074F SYST BP LT 130 MM HG: CPT | Mod: GC

## 2025-06-24 PROCEDURE — 99214 OFFICE O/P EST MOD 30 MIN: CPT | Mod: 25,GC

## 2025-06-24 PROCEDURE — 3078F DIAST BP <80 MM HG: CPT | Mod: GC

## 2025-06-24 ASSESSMENT — ENCOUNTER SYMPTOMS
BACK PAIN: 0
MYALGIAS: 0
WEAKNESS: 0
DIARRHEA: 0
DIZZINESS: 0
WEIGHT LOSS: 0
BLURRED VISION: 0
DOUBLE VISION: 0
FEVER: 0
NAUSEA: 0
CONSTIPATION: 0
SORE THROAT: 0
COUGH: 0
SPUTUM PRODUCTION: 0
VOMITING: 0
HEADACHES: 0
NERVOUS/ANXIOUS: 0
SHORTNESS OF BREATH: 0
CHILLS: 0
PALPITATIONS: 0
ABDOMINAL PAIN: 0

## 2025-06-24 ASSESSMENT — FIBROSIS 4 INDEX: FIB4 SCORE: 3.97

## 2025-06-24 ASSESSMENT — LIFESTYLE VARIABLES: SUBSTANCE_ABUSE: 0

## 2025-06-24 NOTE — PROGRESS NOTES
"    Established Patient    Patient Care Team:  Donato Hawley M.D. as PCP - General (Internal Medicine)  Senthil Escoto M.D. (Cardiovascular Disease (Cardiology))  Michael Almonte M.D. (Neurosurgery)  Rosa M Acharya M.D. (Dermatology)  [facility]  Michael J Bloch, M.D. (Internal Medicine)    HPI:  Tristan Hester is a 89 y.o. male with relevant past medical history of TIA, essential hypertension, paroxysmal atrial fibrillation on warfarin, s/p TAVR 2020, dyslipidemia, carotid artery disease, HFpEF, mitral regurgitation status post MitraClip who presents today for follow-up.    Chief Complaint   Patient presents with    Follow-Up     No complaints for patient today      Patient came asymptomatic for follow-up.  Patient did not do iron studies or thyroid function studies nor vit D, since he recently on the hospital they were run there. Also, he did not want to pay them, if his insurance does not cover them.       Review of Systems   Constitutional:  Positive for malaise/fatigue. Negative for chills, fever and weight loss.   HENT:  Negative for congestion and sore throat.    Eyes:  Negative for blurred vision and double vision.   Respiratory:  Negative for cough, sputum production and shortness of breath.    Cardiovascular:  Negative for chest pain, palpitations and leg swelling.   Gastrointestinal:  Negative for abdominal pain, constipation, diarrhea, nausea and vomiting.   Genitourinary:  Negative for dysuria.   Musculoskeletal:  Negative for back pain, joint pain and myalgias.   Neurological:  Negative for dizziness, weakness and headaches.   Psychiatric/Behavioral:  Negative for substance abuse. The patient is not nervous/anxious.    :    Past Medical History[1]    Social History[2]    Current Medications[3]    /62 (BP Location: Left arm, Patient Position: Sitting, BP Cuff Size: Adult)   Pulse (!) 52   Temp 36.5 °C (97.7 °F) (Temporal)   Ht 1.803 m (5' 11\")   " Wt 78.9 kg (174 lb)   SpO2 97%   BMI 24.27 kg/m²   Physical Exam  Constitutional:       General: He is not in acute distress.     Appearance: Normal appearance. He is not ill-appearing.   HENT:      Right Ear: Tympanic membrane normal.      Left Ear: Tympanic membrane normal.      Nose: Nose normal.      Mouth/Throat:      Mouth: Mucous membranes are moist.   Eyes:      Extraocular Movements: Extraocular movements intact.      Conjunctiva/sclera: Conjunctivae normal.      Pupils: Pupils are equal, round, and reactive to light.   Cardiovascular:      Rate and Rhythm: Normal rate and regular rhythm.      Pulses: Normal pulses.      Heart sounds: Normal heart sounds. No murmur heard.  Pulmonary:      Effort: Pulmonary effort is normal. No respiratory distress.      Breath sounds: Normal breath sounds.   Chest:      Chest wall: No tenderness.   Abdominal:      General: Abdomen is flat. Bowel sounds are normal. There is no distension.      Palpations: Abdomen is soft.      Tenderness: There is no abdominal tenderness. There is no right CVA tenderness or left CVA tenderness.   Musculoskeletal:         General: No swelling or tenderness. Normal range of motion.      Cervical back: Normal range of motion and neck supple. No rigidity.      Right lower leg: No edema.      Left lower leg: No edema.   Skin:     General: Skin is warm.      Coloration: Skin is not jaundiced or pale.   Neurological:      General: No focal deficit present.      Mental Status: He is alert and oriented to person, place, and time.      Cranial Nerves: No cranial nerve deficit.      Sensory: No sensory deficit.      Motor: No weakness.       Assessment and Plan:     1. Anemia, unspecified type  2. Chronic fatigue  Mild anemia hb 13.8.  Persistent anemia over several months, with a mildly decreased MCHC, suggesting possible hypochromia. Given the chronicity of findings, further evaluation is warranted to determine the underlying etiology, which may  include nutritional deficiencies, chronic disease, or occult blood loss. Patient has pending labs from 5/13/25 that he will need to get done  -Encourage patient to do labs 1 week prior next visit in 3 months for follow-up.   - CBC WITH DIFFERENTIAL; Future  - IRON/TOTAL IRON BIND; Future  - TSH WITH REFLEX TO FT4; Future  - VITAMIN D,25 HYDROXY (DEFICIENCY); Future  - FERRITIN; Future    Return in about 3 months (around 9/24/2025) for follow-up with PCP Dr. Kong .      This note was created using voice recognition software.  While every attempt is made to ensure accuracy of transcription, occasionally errors occur.    Michelle Humphrey MD  Internal Medicine PGY-2           [1]   Past Medical History:  Diagnosis Date    Acute nasopharyngitis 05/08/2025    Started two weeks ago, patient states runny nose, sneezing, productive cough and denies fever.    Aortic valve stenosis     Arrhythmia     Afib    Arthritis     osteo, hips and knees    Blood clotting disorder (HCC)     , left arm    Breath shortness     Bronchitis         Cancer ([facility])     Prostate CA--2000    Carotid arterial disease ([facility]) 01/06/2014    Cataract     removed bilat    Congestive heart failure ([facility])     DVT (deep venous thrombosis) ([facility]) 2016    Glaucoma     Heart valve disease     MVR    HLD (hyperlipidemia) 01/06/2014    Hypertension     Kidney cysts     left    Mitral regurgitation 01/06/2014    Pain 12/14/2015    low back, hips, 2-3/10    Pneumonia         Sleep apnea     no CPAP    Stroke ([facility]) 2008    TIA, no residual    TIA (transient ischemic attack)     2008    Urinary bladder disorder     HAD MALE BLADDER SUSPENSION PROCEDURE DONE    Urinary incontinence     stress incontinence   [2]   Social History  Tobacco Use    Smoking status: Former     Current packs/day: 0.00     Average packs/day: 0.5 packs/day for 30.0 years (15.0 ttl pk-yrs)     Types: Cigarettes, Pipe     Start date: 1/1/1955     Quit date: 1/1/1985     Years  since quittin.5    Smokeless tobacco: Never   Vaping Use    Vaping status: Never Used   Substance Use Topics    Alcohol use: Yes     Alcohol/week: 1.2 oz     Types: 2 Standard drinks or equivalent per week     Comment: 1-2 times a week    Drug use: No   [3]   Current Outpatient Medications   Medication Sig Dispense Refill    furosemide (LASIX) 20 MG Tab Take 1 Tablet by mouth every day. 100 Tablet 3    atorvastatin (LIPITOR) 40 MG Tab Take 1 Tablet by mouth every evening. 100 Tablet 3    spironolactone (ALDACTONE) 25 MG Tab Take 1 Tablet by mouth every day. 100 Tablet 3    metoprolol SR (TOPROL XL) 25 MG TABLET SR 24 HR Take 1 Tablet by mouth every evening. 100 Tablet 3    loratadine (CLARITIN) 10 MG Tab Take 10 mg by mouth 1 time a day as needed (allergies).      BIOTIN PO Take 1 Tablet by mouth every day.      fluticasone (FLONASE) 50 MCG/ACT nasal spray Administer 2 Sprays into affected nostril(S) every day. 16 g 2    JARDIANCE 10 MG Tab tablet Take 1 Tablet by mouth every day. He might need help with copay i.e. 340B 30 Tablet 6    ezetimibe (ZETIA) 10 MG Tab Take 1 Tablet by mouth every day. 90 Tablet 1    acetaminophen (TYLENOL) 500 MG Tab Take 2 Tablets by mouth every 6 hours as needed for Mild Pain or Moderate Pain.      warfarin (COUMADIN) 4 MG Tab TAKE 1 (ONE) TO 1 & 1/2 (ONE & ONE-HALF) TABLETS BY MOUTH ONCE DAILY AS DIRECTED BY [facility] 135 Tablet 1    gabapentin (NEURONTIN) 300 MG Cap Take 1 Capsule by mouth 3 times a day. 270 Capsule 3    temazepam (RESTORIL) 30 MG capsule Take 1 Capsule by mouth at bedtime as needed for Sleep for up to 180 days. 30 Capsule 5    Ascorbic Acid (VITAMIN C PO) Take 500 mg by mouth every day.      Multiple Vitamins-Minerals (DAILY MULTI VITAMIN/MINERALS PO) Take 1 Tablet by mouth every day.      AMOXICILLIN PO Take  by mouth as needed (dental use). (Patient not taking: Reported on 2025)       No current facility-administered medications for  this visit.

## 2025-07-02 ENCOUNTER — TELEPHONE (OUTPATIENT)
Dept: CARDIOLOGY | Facility: MEDICAL CENTER | Age: 89
End: 2025-07-02

## 2025-07-02 NOTE — PROGRESS NOTES
CHF Pharmacotherapy Visit    Date Referral Placed: 3/27/25    Tristan Hester is here for CHF    HPI  Pertinent Interval History since last visit:   Underwent mitral TASHA surgery on 6/3/25.  Had repeat echo.    Most recent EF:  Lab Results   Component Value Date/Time    LVEF 45 03/25/2025 1722    LVEF 65 03/11/2024 1547    LVEF 60 04/01/2022 1329       Potential Barriers to Care:  Adherence: Not currently taking spironolactone. Denies missed doses of all other medications. ***  Side effects: none  Affordability: No issues. Jardiance costs $150/3 months.  Others: N/a    Current CHF Medications - including dose:   Entresto or ACE/ARB: Irbesartan 300 mg once daily  Beta blocker: Metoprolol SR 25 mg once daily   Diuretic: Furosemide 40 mg, 1-2 tabs daily PRN - takes a dose Q1-2wks  Aldosterone antagonist: Spironolactone 25 mg once daily - NOT currently taking d/t pharmacy discrepancy.  SGLT2i: Jaridance 10 mg once daily    Home Vitals:  BP ~ 's/50-70's  HR~ 50-60's      Lifestyle:  Change in weight: Stable  Exercise habits: moderate regular exercise program - Goes to the gym thrice weekly. Does 25-30 min on recumbent bicycle. Resistance training for 30 min. Plans to participate in cardiac rehab after upcoming procedure.  Diet: low sodium - Eats twice daily. Coffee in AM, eats lunch and dinner. Hamburgers, sausage, steak. Oatmeal cookies occasionally. Lots of fruits and vegetables. Occasional beer or old fashioned.    DATA REVIEW  There were no vitals filed for this visit.      Lab Results   Component Value Date/Time    SODIUM 142 06/19/2025 06:45 AM    SODIUM 138 06/05/2025 12:50 AM    POTASSIUM 4.8 06/19/2025 06:45 AM    POTASSIUM 4.0 06/05/2025 12:50 AM    CHLORIDE 106 06/19/2025 06:45 AM    CHLORIDE 104 06/05/2025 12:50 AM    CO2 20 06/19/2025 06:45 AM    CO2 24 06/05/2025 12:50 AM    GLUCOSE 99 06/19/2025 06:45 AM    GLUCOSE 98 06/05/2025 12:50 AM    BUN 36 (H) 06/19/2025 06:45 AM    BUN 29 (H) 06/05/2025  "12:50 AM    CREATININE 1.07 06/19/2025 06:45 AM    CREATININE 1.13 06/05/2025 12:50 AM    CREATININE 1.0 07/02/2008 03:43 PM    BUNCREATRAT 34 (H) 06/19/2025 06:45 AM     Lab Results   Component Value Date/Time    ALKPHOSPHAT 60 06/04/2025 12:05 AM    ASTSGOT 27 06/04/2025 12:05 AM    ALTSGPT 13 06/04/2025 12:05 AM    TBILIRUBIN 1.2 06/04/2025 12:05 AM    INR 2.00 06/11/2025 04:27 PM    ALBUMIN 3.5 06/04/2025 12:05 AM      No results found for: \"MALBCRT\", \"MICROALBUR\"    Renal function:  Calculated creatinine clearance: ~ 52 mL/min   eGFR:   GFR (CKD-EPI)   Date Value Ref Range Status   06/05/2025 62 >60 mL/min/1.73 m 2 Final     Comment:     Estimated Glomerular Filtration Rate is calculated using  race neutral CKD-EPI 2021 equation per NKF-ASN recommendations.           Other:  Immunization History   Administered Date(s) Administered    COVID-19, mRNA, LNP-S, PF, jose-sucrose, 30 mcg/0.3 mL 10/02/2023, 09/24/2024    INFLUENZA TIV (IM) 10/09/2010, 10/01/2014    Influenza Seasonal Injectable - Historical Data 10/09/2010    Influenza Vaccine Adult HD 10/20/2015, 09/15/2020, 10/09/2021    Influenza Vaccine Quad Inj (Pf) 09/29/2017, 09/21/2018    Influenza Vaccine Quad Recombinant 09/24/2019    Influenza split virus trivalent (PF) 10/11/2011, 10/19/2012, 10/18/2013, 10/02/2014    Influenza, unspecified formulation 10/12/2016    PFIZER CLAUDIO CAP SARS-COV-2 VACCINATION (12+) 05/19/2022    PFIZER PURPLE CAP SARS-COV-2 VACCINATION (12+) 01/21/2021, 02/11/2021    Pneumococcal Conjugate Vaccine (Prevnar/PCV-13) 12/15/2015    Pneumococcal Conjugate, unspecified formulation 01/01/2010       Recent Imaging Studies:    Recent imaging studies, including echo, were available in EMR and reviewed with patient at today's visit    ASSESSMENT AND PLAN  -CHF  Since last visit, pt had repeat echo which showed mild improvement from 45% to 55% - now WNL.  ***  Pt presents to clinic doing well overall. He has an upcoming mitral clip " "procedure on 6/3/25.   From a CHF standpoint, pt denies dizziness (states he has lightheadedness in the AM) or swelling issues. He does note some TELLEZ.   Anticipate improvement w/ upcoming procedure.  Pt reports that he does not routinely monitor his home vitals. He relies on his OV's for this. Encouraged him to start doing so, especially during \"lightheadedness\" episodes if possible.   Clinic BP elevated today and HR at goal.  Upon pt interview, noted that pt has not been taking his spironolactone d/t pharmacy refusing to dispense d/t DDI (?).  Will continue to optimize CHF GDMT as tolerated.    CHF medications (changes are bolded)  Entresto or ACE/ARB: Irbesartan 300 mg once daily  Beta blocker: Metoprolol SR 25 mg once daily   Diuretic: Furosemide 40 mg, 1-2 tabs daily PRN   Aldosterone antagonist: RESTART spironolactone w/ decreased dose of  12.5 mg once daily   SGLT2i: Jaridance 10 mg once daily    -Lifestyle   Recommendations From Today's Visit:   Continue to limit fluid intake to 2L/day and Na+ intake to 2gm/day  Continue to eat DASH/MED style diet.   Continue to exercise as tolerated.     Blood Work/Studies Ordered At Today's visit:   ***    Follow-Up:   ***    Maxime Cruz, PharmD, BCACP    CC:  Donato Hawley M.D.  Bloch, Michael J, M.D.  "

## 2025-07-03 ENCOUNTER — APPOINTMENT (OUTPATIENT)
Dept: CARDIOLOGY | Facility: MEDICAL CENTER | Age: 89
End: 2025-07-03
Attending: NURSE PRACTITIONER
Payer: MEDICARE

## 2025-07-08 ENCOUNTER — ANTICOAGULATION VISIT (OUTPATIENT)
Dept: VASCULAR LAB | Facility: MEDICAL CENTER | Age: 89
End: 2025-07-08
Attending: INTERNAL MEDICINE
Payer: MEDICARE

## 2025-07-08 VITALS
BODY MASS INDEX: 24.28 KG/M2 | DIASTOLIC BLOOD PRESSURE: 54 MMHG | SYSTOLIC BLOOD PRESSURE: 82 MMHG | HEART RATE: 64 BPM | HEIGHT: 71 IN

## 2025-07-08 DIAGNOSIS — D68.69 SECONDARY HYPERCOAGULABLE STATE (HCC): ICD-10-CM

## 2025-07-08 DIAGNOSIS — I48.0 PAF (PAROXYSMAL ATRIAL FIBRILLATION) (HCC): Primary | ICD-10-CM

## 2025-07-08 DIAGNOSIS — G45.9 TIA (TRANSIENT ISCHEMIC ATTACK): ICD-10-CM

## 2025-07-08 LAB — INR PPP: 4.8 (ref 2–3.5)

## 2025-07-08 PROCEDURE — 85610 PROTHROMBIN TIME: CPT

## 2025-07-08 PROCEDURE — 99212 OFFICE O/P EST SF 10 MIN: CPT | Performed by: PHARMACIST

## 2025-07-08 NOTE — PROGRESS NOTES
Anticoagulation Summary  As of 2025      INR goal:  2.0-3.0   TTR:  72.9% (9.7 y)   INR used for dosin.80 (2025)   Warfarin maintenance plan:  6 mg (4 mg x 1.5) every Tue, Thu, Sat; 4 mg (4 mg x 1) all other days   Weekly warfarin total:  34 mg   Plan last modified:  Natasha Jack, PharmD (2024)   Next INR check:  2025   Priority:  Maintenance   Target end date:  Indefinite    Indications    PAF (paroxysmal atrial fibrillation) (HCC) [I48.0]  Hx of TIA (transient ischemic attack) [G45.9]  Deep vein thrombosis (DVT) (HCC) (Resolved) [I82.409]  Atrial fibrillation (HCC) (Resolved) [I48.91]  Hx of Stroke (Resolved) [I63.9]  Secondary hypercoagulable state (HCC) [D68.69]                 Anticoagulation Episode Summary       INR check location:  Anticoagulation Clinic    Preferred lab:  --    Send INR reminders to:  --    Comments:  Medications reconciled. Take naproxen as needed for arthritic pain. He is aware of the increased risk of bleeding and takes only when needed with caution          Anticoagulation Care Providers       Provider Role Specialty Phone number    Michael J Bloch, M.D. Referring Cardiovascular Disease (Cardiology) [phone]    [facility]   [phone]                  Refer to Patient Findings for HPI:  Patient Findings       Positives:  Upcoming dental procedure (root canal scheduled for 7/10/25), Change in medications (Stopped ASA ~3 weeks ago, Started spironolactone ~3 weeks; taking percocet), Change in diet/appetite (eating less per pt)    Negatives:  Signs/symptoms of thrombosis, Signs/symptoms of bleeding, Laboratory test error suspected, Change in health, Change in alcohol use, Change in activity, Upcoming invasive procedure, Emergency department visit, Missed doses, Extra doses, Hospital admission, Bruising, Other complaints    Comments:  Emergency root canal , 2025; did not stop meds           Clinical Outcomes       Negatives:  Major  "bleeding event, Thromboembolic event, Anticoagulation-related hospital admission, Anticoagulation-related ED visit, Anticoagulation-related fatality    Comments:  Emergency root canal Sunday, 7/6/2025; did not stop meds             Date Referral Placed: 3/10/25      Vitals: Pt notes he's likely dehydrated today. He denies sx for his hypotension in clinic.  Vitals:    07/08/25 1059   BP: (!) 82/54   Pulse: 64   Height: 1.803 m (5' 10.98\")         Verified current warfarin dosing schedule.    Medications reconciled.  Pt is not on antiplatelet therapy.      A/P   INR is supratherapeutic      Warfarin dosing recommendation: Hold two days, then resume normal schedule. Pt instructed to resume normal diet with greens.     Pt educated to contact our clinic with any changes in medications or s/s of bleeding or thrombosis. Pt is aware to seek immediate medical attention for falls, head injury or deep cuts.    Request pt to return in 2 week(s). Pt agrees.    Kita Petersen, Pharmacy Intern    Maxime Cruz, PharmD, BCACP  "

## 2025-07-09 ENCOUNTER — TELEPHONE (OUTPATIENT)
Dept: CARDIOLOGY | Facility: MEDICAL CENTER | Age: 89
End: 2025-07-09
Payer: MEDICARE

## 2025-07-09 PROCEDURE — RXMED WILLOW AMBULATORY MEDICATION CHARGE: Performed by: INTERNAL MEDICINE

## 2025-07-09 NOTE — TELEPHONE ENCOUNTER
"Patient was a no show to 1 month post Mitral TASHA echo.     Called and spoke to patient's spouse Tori. She states patient is currently having a root canal. Per Tori, patient cancelled echo because he \"has too many health issues going on and did not wish to keep the visit.\" Confirmed patient's appointment with Patrizia WILLIAM tomorrow 7/10. Tori recommends discussing echo tomorrow at visit.    Called and spoke to patient. Reminded patient that due to risk of endocarditis, dental appointments are not advised within 3 months post Mitral TASHA. Per patient, his family friend who is a dentist already worked on his teeth over the weekend. Patient states he is \"between the devil and a hard place\" and does not want to live with pain for the next 3 months so will be proceeding.     Patient states he believes he is on Augmentin, but antibiotic status is unclear.     No dental clearance received.     Patrizia WILLIAM to discuss further at visit tomorrow.  "

## 2025-07-10 ENCOUNTER — OFFICE VISIT (OUTPATIENT)
Dept: CARDIOLOGY | Facility: MEDICAL CENTER | Age: 89
End: 2025-07-10
Attending: NURSE PRACTITIONER
Payer: MEDICARE

## 2025-07-10 VITALS
SYSTOLIC BLOOD PRESSURE: 92 MMHG | RESPIRATION RATE: 16 BRPM | OXYGEN SATURATION: 96 % | DIASTOLIC BLOOD PRESSURE: 52 MMHG | HEIGHT: 71 IN | BODY MASS INDEX: 23.52 KG/M2 | WEIGHT: 168 LBS | HEART RATE: 70 BPM

## 2025-07-10 DIAGNOSIS — D68.69 SECONDARY HYPERCOAGULABLE STATE (HCC): ICD-10-CM

## 2025-07-10 DIAGNOSIS — I25.10 CORONARY ARTERY DISEASE INVOLVING NATIVE CORONARY ARTERY OF NATIVE HEART WITHOUT ANGINA PECTORIS: ICD-10-CM

## 2025-07-10 DIAGNOSIS — I10 ESSENTIAL HYPERTENSION, BENIGN: ICD-10-CM

## 2025-07-10 DIAGNOSIS — S35.513D: ICD-10-CM

## 2025-07-10 DIAGNOSIS — Z98.890 S/P MITRAL VALVE CLIP IMPLANTATION: Primary | ICD-10-CM

## 2025-07-10 DIAGNOSIS — Z95.818 S/P MITRAL VALVE CLIP IMPLANTATION: Primary | ICD-10-CM

## 2025-07-10 DIAGNOSIS — G45.9 TIA (TRANSIENT ISCHEMIC ATTACK): ICD-10-CM

## 2025-07-10 DIAGNOSIS — Z98.890 HISTORY OF CEA (CAROTID ENDARTERECTOMY): ICD-10-CM

## 2025-07-10 DIAGNOSIS — I48.0 PAF (PAROXYSMAL ATRIAL FIBRILLATION) (HCC): ICD-10-CM

## 2025-07-10 DIAGNOSIS — Z95.2 S/P TAVR (TRANSCATHETER AORTIC VALVE REPLACEMENT): ICD-10-CM

## 2025-07-10 PROCEDURE — 99213 OFFICE O/P EST LOW 20 MIN: CPT | Performed by: NURSE PRACTITIONER

## 2025-07-10 PROCEDURE — 99212 OFFICE O/P EST SF 10 MIN: CPT | Performed by: NURSE PRACTITIONER

## 2025-07-10 PROCEDURE — 3078F DIAST BP <80 MM HG: CPT | Performed by: NURSE PRACTITIONER

## 2025-07-10 PROCEDURE — 99214 OFFICE O/P EST MOD 30 MIN: CPT | Performed by: NURSE PRACTITIONER

## 2025-07-10 PROCEDURE — 3074F SYST BP LT 130 MM HG: CPT | Performed by: NURSE PRACTITIONER

## 2025-07-10 ASSESSMENT — ENCOUNTER SYMPTOMS
ORTHOPNEA: 0
DIZZINESS: 1
COUGH: 0
FEVER: 0
CLAUDICATION: 0
PND: 0
PALPITATIONS: 0
SHORTNESS OF BREATH: 1
MYALGIAS: 0
ABDOMINAL PAIN: 0

## 2025-07-10 ASSESSMENT — FIBROSIS 4 INDEX: FIB4 SCORE: 3.97

## 2025-07-10 NOTE — PROGRESS NOTES
Chief Complaint   Patient presents with    Follow-Up     F/V Dx: S/P mitral valve clip implantation     Subjective     Everardo Hester is a 89 y.o. male who presents today for 1 month S/P TASHA.    Hx of severe MR now S/P Es clip X1, S/P TAVR, PAF on chronic anticoagulation with coumadin, prostate CA, prior DVT, HLD, HTN, TIA, carotid disease with prior CEA in '08 (Abdias BRADFORD), and anemia.     Mild imbalance and lightheadedness at times, present pre-op. Mild fatigue and shortness of breath.    NO show to echocardiogram, he thought he cancelled it out of confusion that he didn't need it.    He also had dental work the other day with dental pain on  and needed root canal urgently. He did take prophylactic antibiotics 30 minutes before.    He has no chest pain, edema, or palpitations.     Past Medical History[1]  Past Surgical History[2]  Family History   Problem Relation Age of Onset    Heart Disease Father         CHF at 91.    Other Brother         Aneurysm    Cancer Brother         Seminal vascular cancer    Autoimmune Disease Daughter         Lupus, RA    No Known Problems Daughter     No Known Problems Son     No Known Problems Son     Heart Attack Neg Hx      Social History     Socioeconomic History    Marital status:      Spouse name: Not on file    Number of children: Not on file    Years of education: Not on file    Highest education level: Not on file   Occupational History    Not on file   Tobacco Use    Smoking status: Former     Current packs/day: 0.00     Average packs/day: 0.5 packs/day for 30.0 years (15.0 ttl pk-yrs)     Types: Cigarettes, Pipe     Start date: 1955     Quit date: 1985     Years since quittin.5    Smokeless tobacco: Never   Vaping Use    Vaping status: Never Used   Substance and Sexual Activity    Alcohol use: Yes     Alcohol/week: 1.2 oz     Types: 2 Standard drinks or equivalent per week     Comment: 1-2 times a week    Drug use: No    Sexual activity: Not  "Currently   Other Topics Concern    Not on file   Social History Narrative    Not on file     Social Drivers of Health     Financial Resource Strain: Not on file   Food Insecurity: No Food Insecurity (6/4/2025)    Hunger Vital Sign     Worried About Running Out of Food in the Last Year: Never true     Ran Out of Food in the Last Year: Never true   Transportation Needs: No Transportation Needs (6/4/2025)    PRAPARE - Transportation     Lack of Transportation (Medical): No     Lack of Transportation (Non-Medical): No   Physical Activity: Not on file   Stress: Not on file   Social Connections: Not on file   Intimate Partner Violence: Not At Risk (6/4/2025)    Humiliation, Afraid, Rape, and Kick questionnaire     Fear of Current or Ex-Partner: No     Emotionally Abused: No     Physically Abused: No     Sexually Abused: No   Housing Stability: Low Risk  (6/4/2025)    Housing Stability Vital Sign     Unable to Pay for Housing in the Last Year: No     Number of Times Moved in the Last Year: 0     Homeless in the Last Year: No     Allergies[3]  Encounter Medications[4]  Review of Systems   Constitutional:  Negative for fever and malaise/fatigue.   Respiratory:  Positive for shortness of breath. Negative for cough.         Mild shortness of breath   Cardiovascular:  Negative for chest pain, palpitations, orthopnea, claudication, leg swelling and PND.   Gastrointestinal:  Negative for abdominal pain.   Musculoskeletal:  Negative for myalgias.   Neurological:  Positive for dizziness.        Imbalance, present pre-op              Objective     BP 92/52 (BP Location: Left arm, Patient Position: Sitting, BP Cuff Size: Adult)   Pulse 70   Resp 16   Ht 1.803 m (5' 11\")   Wt 76.2 kg (168 lb)   SpO2 96%   BMI 23.43 kg/m²     Physical Exam  Vitals and nursing note reviewed.   Constitutional:       Appearance: Normal appearance. He is well-developed and normal weight.   HENT:      Head: Normocephalic and atraumatic.   Neck:      " Vascular: No JVD.   Cardiovascular:      Rate and Rhythm: Normal rate and regular rhythm.      Pulses: Normal pulses.      Heart sounds: Normal heart sounds.   Pulmonary:      Effort: Pulmonary effort is normal.      Breath sounds: Normal breath sounds.   Musculoskeletal:         General: Normal range of motion.      Comments: Dual canes for balance   Skin:     General: Skin is warm and dry.      Capillary Refill: Capillary refill takes less than 2 seconds.   Neurological:      General: No focal deficit present.      Mental Status: He is alert and oriented to person, place, and time. Mental status is at baseline.   Psychiatric:         Mood and Affect: Mood normal.         Behavior: Behavior normal.         Thought Content: Thought content normal.         Judgment: Judgment normal.                Assessment & Plan     1. S/P mitral valve clip implantation        2. S/P TAVR (transcatheter aortic valve replacement)        3. Coronary artery disease involving native coronary artery of native heart without angina pectoris        4. Essential hypertension, benign        5. History of CEA (carotid endarterectomy)        6. Hx of TIA (transient ischemic attack)        7. Iliac artery injury, unspecified laterality, subsequent encounter        8. PAF (paroxysmal atrial fibrillation) (HCC)        9. Secondary hypercoagulable state (HCC)          Medical Decision Making: Today's Assessment/Status/Plan:      1. S/P TASHA, NYHA II with prior TAVR  -doing well post-op  -cont warfarin in lieu of aspirin lifelong  -SBE prophylaxis understands lifelong, recent dental work due to root canal due to dental pain, ABX given per patient, not our office  -no echo, no show to echo appt, he will reschedule     2. 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 PAF on chronic anticoagulation  -SR on EKG  -cont warfarin per pharmacy  -cont bb  -follow      4. HLD with CAD and PVD  -no angina or claudication  -cont statin, zetia     5. TIA  -cont statin, zetia, warfarin  -follow, no deficits    Patient is to follow up with Patrizia WILLIAM in 1 year with echo. Echo 1 month ordered and pending.                               [1]   Past Medical History:  Diagnosis Date    Acute nasopharyngitis 05/08/2025    Started two weeks ago, patient states runny nose, sneezing, productive cough and denies fever.    Aortic valve stenosis     Arrhythmia     Afib    Arthritis     osteo, hips and knees    Blood clotting disorder (HCC)     , left arm    Breath shortness     Bronchitis         Cancer ([facility])     Prostate CA--2000    Carotid arterial disease (HCC) 01/06/2014    Cataract     removed bilat    Congestive heart failure (HCC)     DVT (deep venous thrombosis) ([facility]) 2016    Glaucoma     Heart valve disease     MVR    HLD (hyperlipidemia) 01/06/2014    Hypertension     Kidney cysts     left    Mitral regurgitation 01/06/2014    Pain 12/14/2015    low back, hips, 2-3/10    Pneumonia         Sleep apnea     no CPAP    Stroke ([facility]) 2008    TIA, no residual    TIA (transient ischemic attack)     2008    Urinary bladder disorder     HAD MALE BLADDER SUSPENSION PROCEDURE DONE    Urinary incontinence     stress incontinence   [2]   Past Surgical History:  Procedure Laterality Date    ECHOCARDIOGRAM, TRANSESOPHAGEAL, INTRAOPERATIVE N/A 6/3/2025    Procedure: ECHOCARDIOGRAM, TRANSESOPHAGEAL, INTRAOPERATIVE;  Surgeon: Sony Barrientos M.D.;  Location: SURGERY [facility];  Service: Cardiac    TRANSCATHETER MITRAL VALVE REPAIR Right 6/3/2025     Procedure: PROCEDURE, MITRAL VALVE, TRANSCATHETER;  Surgeon: Sony Barrientos M.D.;  Location: [facility];  Service: Cardiac    TRANSCATHETER AORTIC VALVE REPLACEMENT  03/02/2020    Procedure: REPLACEMENT, AORTIC VALVE, TRANSCATHETER -;  Surgeon: Sony Barrientos M.D.;  Location: SURGERY [facility];  Service: Cardiac    YOANDY  03/02/2020    Procedure: ECHOCARDIOGRAM, TRANSESOPHAGEAL;  Surgeon: Sony Barrientos M.D.;  Location: SURGERY [facility];  Service: Cardiac    FEMORAL ENDARTERECTOMY Left 03/02/2020    Procedure: Repair of left femoral  Artery Dissection;  Surgeon: Alpa Tillman M.D.;  Location: SURGERY [facility];  Service: General    FUSION, SPINE, LUMBAR, PLIF  12/28/2015    Procedure: LUMBAR FUSION POSTERIOR L3-5 with peek rods;  Surgeon: Phylicia Almonte M.D.;  Location: [facility];  Service:     LUMBAR DECOMPRESSION  12/28/2015    Procedure: LUMBAR DECOMPRESSION posterior redo decompression L3-5, with dural repair;  Surgeon: Phylicia Almonte M.D.;  Location: [facility];  Service:     THORACOSCOPY  06/13/2015    Procedure: THORACOSCOPY with decortication VATS , side to be determined  ;  Surgeon: Elmira Holder M.D.;  Location: [facility];  Service:     LUMBAR LAMINECTOMY DISKECTOMY  10/08/2010    Performed by PHYLICIA ALMONTE at [facility]    CERVICAL DISK AND FUSION ANTERIOR  10/26/2009    Performed by PHYLICIA ALMONTE at [facility]    CAROTID ENDARTERECTOMY  07/10/2008    Performed by LEIGH MORATAYA at [facility]    BLADDER SLING MALE  11/01/2004    PROSTATECTOMY, RADIAL  11/01/2000    CATARACT EXTRACTION WITH IOL Bilateral     OTHER      Surgery for glaucoma   [3]   Allergies  Allergen Reactions    Other Environmental Runny Nose and Itching     Seasonal, cats-hayfever    Other Misc Unspecified     Newspaper causes sneezing per patient.    [4]   Outpatient Encounter Medications as of 7/10/2025    Medication Sig Dispense Refill    furosemide (LASIX) 20 MG Tab Take 1 Tablet by mouth every day. 100 Tablet 3    atorvastatin (LIPITOR) 40 MG Tab Take 1 Tablet by mouth every evening. 100 Tablet 3    spironolactone (ALDACTONE) 25 MG Tab Take 1 Tablet by mouth every day. 100 Tablet 3    metoprolol SR (TOPROL XL) 25 MG TABLET SR 24 HR Take 1 Tablet by mouth every evening. 100 Tablet 3    loratadine (CLARITIN) 10 MG Tab Take 10 mg by mouth 1 time a day as needed (allergies).      BIOTIN PO Take 1 Tablet by mouth every day.      fluticasone (FLONASE) 50 MCG/ACT nasal spray Administer 2 Sprays into affected nostril(S) every day. 16 g 2    JARDIANCE 10 MG Tab tablet Take 1 Tablet by mouth every day. He might need help with copay i.e. 340B 30 Tablet 6    ezetimibe (ZETIA) 10 MG Tab Take 1 Tablet by mouth every day. 90 Tablet 1    acetaminophen (TYLENOL) 500 MG Tab Take 2 Tablets by mouth every 6 hours as needed for Mild Pain or Moderate Pain.      warfarin (COUMADIN) 4 MG Tab TAKE 1 (ONE) TO 1 & 1/2 (ONE & ONE-HALF) TABLETS BY MOUTH ONCE DAILY AS DIRECTED BY [facility] 135 Tablet 1    gabapentin (NEURONTIN) 300 MG Cap Take 1 Capsule by mouth 3 times a day. 270 Capsule 3    Ascorbic Acid (VITAMIN C PO) Take 500 mg by mouth every day.      Multiple Vitamins-Minerals (DAILY MULTI VITAMIN/MINERALS PO) Take 1 Tablet by mouth every day.      [DISCONTINUED] AMOXICILLIN PO Take  by mouth as needed (dental use). (Patient not taking: Reported on 6/24/2025)       No facility-administered encounter medications on file as of 7/10/2025.

## 2025-07-10 NOTE — PATIENT INSTRUCTIONS
Obtain lab with new code.    Obtain echocardiogram within the next month for review of clip.    Continue same medications.    Follow up in 1 year with echocardiogram in 1 year.

## 2025-07-13 ENCOUNTER — PHARMACY VISIT (OUTPATIENT)
Dept: PHARMACY | Facility: MEDICAL CENTER | Age: 89
End: 2025-07-13
Payer: COMMERCIAL

## 2025-07-15 ENCOUNTER — DOCUMENTATION (OUTPATIENT)
Dept: CARDIOLOGY | Facility: MEDICAL CENTER | Age: 89
End: 2025-07-15
Payer: MEDICARE

## 2025-07-15 NOTE — PROGRESS NOTES
Valve Program Functional Assessment:     KCCQ12   1a) Showering/bathin  1b) Walking 1 block on ground: 3  1c) Hurrying or joggin  2) Swellin  3) Fatigue: 6  4) Shortness of breath: 6  5) Sleep sitting up: 5  6) Limited enjoyment of life: 5  7) Spend the rest of your life with HF: 4  8a) Hobbies, recreational activities:4  8b) Working or doing household chores:4  8c) Visiting family or friends: 4

## 2025-07-16 ENCOUNTER — HOSPITAL ENCOUNTER (OUTPATIENT)
Dept: CARDIOLOGY | Facility: MEDICAL CENTER | Age: 89
End: 2025-07-16
Attending: INTERNAL MEDICINE
Payer: MEDICARE

## 2025-07-16 DIAGNOSIS — I34.0 NONRHEUMATIC MITRAL VALVE REGURGITATION: ICD-10-CM

## 2025-07-16 PROCEDURE — 93306 TTE W/DOPPLER COMPLETE: CPT

## 2025-07-17 LAB
LV EJECT FRACT  99904: 55
LV EJECT FRACT MOD 2C 99903: 50.58
LV EJECT FRACT MOD 4C 99902: 56.92
LV EJECT FRACT MOD BP 99901: 52.93

## 2025-07-17 PROCEDURE — 93306 TTE W/DOPPLER COMPLETE: CPT | Mod: 26 | Performed by: INTERNAL MEDICINE

## 2025-07-22 ENCOUNTER — ANTICOAGULATION VISIT (OUTPATIENT)
Dept: VASCULAR LAB | Facility: MEDICAL CENTER | Age: 89
End: 2025-07-22
Attending: INTERNAL MEDICINE
Payer: MEDICARE

## 2025-07-22 VITALS — HEART RATE: 53 BPM | DIASTOLIC BLOOD PRESSURE: 74 MMHG | SYSTOLIC BLOOD PRESSURE: 129 MMHG

## 2025-07-22 DIAGNOSIS — I48.0 PAF (PAROXYSMAL ATRIAL FIBRILLATION) (HCC): Primary | ICD-10-CM

## 2025-07-22 DIAGNOSIS — G45.9 TIA (TRANSIENT ISCHEMIC ATTACK): ICD-10-CM

## 2025-07-22 DIAGNOSIS — D68.69 SECONDARY HYPERCOAGULABLE STATE (HCC): ICD-10-CM

## 2025-07-22 LAB — INR PPP: 2 (ref 2–3.5)

## 2025-07-22 PROCEDURE — 99213 OFFICE O/P EST LOW 20 MIN: CPT

## 2025-07-22 PROCEDURE — 85610 PROTHROMBIN TIME: CPT

## 2025-07-22 NOTE — PROGRESS NOTES
Anticoagulation Summary  As of 2025      INR goal:  2.0-3.0   TTR:  72.7% (9.8 y)   INR used for dosin.00 (2025)   Warfarin maintenance plan:  6 mg (4 mg x 1.5) every Tue, Thu, Sat; 4 mg (4 mg x 1) all other days   Weekly warfarin total:  34 mg   Plan last modified:  Natasha Jack PharmD (2024)   Next INR check:  2025   Priority:  Maintenance   Target end date:  Indefinite    Indications    PAF (paroxysmal atrial fibrillation) (HCC) [I48.0]  Hx of TIA (transient ischemic attack) [G45.9]  Deep vein thrombosis (DVT) (HCC) (Resolved) [I82.409]  Atrial fibrillation (HCC) (Resolved) [I48.91]  Hx of Stroke (Resolved) [I63.9]  Secondary hypercoagulable state (HCC) [D68.69]                 Anticoagulation Episode Summary       INR check location:  Anticoagulation Clinic    Preferred lab:  --    Send INR reminders to:  AMB ANTICOAG POOL    Comments:  Medications reconciled. Take naproxen as needed for arthritic pain. He is aware of the increased risk of bleeding and takes only when needed with caution          Anticoagulation Care Providers       Provider Role Specialty Phone number    Michael J Bloch, M.D. Referring Cardiovascular Disease (Cardiology) [phone]    [facility]   [phone]                Refer to Patient Findings for HPI:  Patient Findings       Negatives:  Signs/symptoms of thrombosis, Signs/symptoms of bleeding, Laboratory test error suspected, Change in health, Change in alcohol use, Change in activity, Upcoming invasive procedure, Emergency department visit, Upcoming dental procedure, Missed doses, Extra doses, Change in medications, Change in diet/appetite, Hospital admission, Bruising, Other complaints          Clinical Outcomes       Negatives:  Major bleeding event, Thromboembolic event, Anticoagulation-related hospital admission, Anticoagulation-related ED visit, Anticoagulation-related fatality            Date Referral Placed:  3/10/25      Vitals:  Vitals:    07/22/25 1057   BP: 129/74   Pulse: (!) 53       Verified current warfarin dosing schedule.    Medications reconciled.  Pt is not on antiplatelet therapy.      A/P   INR is therapeutic  Reason(s) for out of range INR today: N/A      Warfarin dosing recommendation: Continue regimen as listed above.    Pt educated to contact our clinic with any changes in medications or s/s of bleeding or thrombosis. Pt is aware to seek immediate medical attention for falls, head injury or deep cuts.    Request pt to return in 4 week(s). Pt agrees.    Sal Anglin, PharmD

## 2025-07-28 ENCOUNTER — TELEPHONE (OUTPATIENT)
Dept: INTERNAL MEDICINE | Facility: OTHER | Age: 89
End: 2025-07-28
Payer: MEDICARE

## 2025-07-28 DIAGNOSIS — F51.01 PRIMARY INSOMNIA: ICD-10-CM

## 2025-07-28 NOTE — TELEPHONE ENCOUNTER
Spoke to patient. Patient stated he would like Temazepam 30mg sent to pharmacy. Previous PCP would send a 30 day supply with 5 refills. Not on med list but he did have his active med list scanned and it is in that list. Please review. Patient is out as of today.

## 2025-07-28 NOTE — TELEPHONE ENCOUNTER
Patient called and said he needs one of his medications refilled and that the pharmacy faxed over the order. He will be out as of today. Thank you!

## 2025-07-29 ENCOUNTER — TELEPHONE (OUTPATIENT)
Dept: VASCULAR LAB | Facility: MEDICAL CENTER | Age: 89
End: 2025-07-29
Payer: MEDICARE

## 2025-07-29 RX ORDER — TEMAZEPAM 30 MG/1
30 CAPSULE ORAL NIGHTLY PRN
Qty: 30 CAPSULE | Refills: 5 | Status: SHIPPED | OUTPATIENT
Start: 2025-07-29 | End: 2026-01-25

## 2025-07-29 NOTE — TELEPHONE ENCOUNTER
Pt due for surveillance imaging, not yet scheduled. Due for AO duplex last month. Cancelled last appt.     Request for MA to call patient and remind them to schedule their surveillance vascular imaging.

## 2025-08-07 ENCOUNTER — NON-PROVIDER VISIT (OUTPATIENT)
Dept: CARDIOLOGY | Facility: MEDICAL CENTER | Age: 89
End: 2025-08-07
Attending: NURSE PRACTITIONER
Payer: MEDICARE

## 2025-08-07 VITALS
DIASTOLIC BLOOD PRESSURE: 85 MMHG | WEIGHT: 174.8 LBS | HEART RATE: 55 BPM | BODY MASS INDEX: 24.38 KG/M2 | SYSTOLIC BLOOD PRESSURE: 146 MMHG

## 2025-08-07 DIAGNOSIS — I10 ESSENTIAL HYPERTENSION, BENIGN: Primary | ICD-10-CM

## 2025-08-07 PROCEDURE — 99214 OFFICE O/P EST MOD 30 MIN: CPT | Performed by: PHARMACIST

## 2025-08-07 RX ORDER — IRBESARTAN 150 MG/1
150 TABLET ORAL NIGHTLY
Qty: 100 TABLET | Refills: 3 | Status: SHIPPED | OUTPATIENT
Start: 2025-08-07 | End: 2026-09-11

## 2025-08-07 ASSESSMENT — FIBROSIS 4 INDEX: FIB4 SCORE: 3.97

## 2025-08-19 ENCOUNTER — ANTICOAGULATION VISIT (OUTPATIENT)
Dept: VASCULAR LAB | Facility: MEDICAL CENTER | Age: 89
End: 2025-08-19
Attending: INTERNAL MEDICINE
Payer: MEDICARE

## 2025-08-19 VITALS — DIASTOLIC BLOOD PRESSURE: 83 MMHG | SYSTOLIC BLOOD PRESSURE: 138 MMHG | HEART RATE: 51 BPM

## 2025-08-19 DIAGNOSIS — G45.9 TIA (TRANSIENT ISCHEMIC ATTACK): ICD-10-CM

## 2025-08-19 DIAGNOSIS — I48.0 PAF (PAROXYSMAL ATRIAL FIBRILLATION) (HCC): Primary | ICD-10-CM

## 2025-08-19 DIAGNOSIS — D68.69 SECONDARY HYPERCOAGULABLE STATE (HCC): ICD-10-CM

## 2025-08-19 LAB — INR PPP: 2.7 (ref 2–3.5)

## 2025-08-19 PROCEDURE — 99213 OFFICE O/P EST LOW 20 MIN: CPT | Performed by: PHARMACIST

## 2025-08-19 PROCEDURE — 85610 PROTHROMBIN TIME: CPT

## (undated) DEVICE — DRAPE 36X28IN RAD CARM BND BG - (25/CA) O

## (undated) DEVICE — GLOVE BIOGEL INDICATOR SZ 8 SURGICAL PF LTX - (50/BX 4BX/CA)

## (undated) DEVICE — GLOVE BIOGEL SZ 7.5 SURGICAL PF LTX - (50PR/BX 4BX/CA)

## (undated) DEVICE — SUTURE DEVICE CLOSURE REPAIR SYSTEM PERCLOSE PROSTYLE (10EA/PK)

## (undated) DEVICE — CATHETER 6FR AL1 100CM (5/BX)

## (undated) DEVICE — TOWELS CLOTH SURGICAL - (4/PK 20PK/CA)

## (undated) DEVICE — DEVICE CLOSER PERCLOSE - (10/BX) PROGLIDE SYSTEM

## (undated) DEVICE — SUTURE CV

## (undated) DEVICE — NEEDLE PERCUTANEOUS THINWALL 7CM 18GA BSDN 18-7.0

## (undated) DEVICE — KIT VASCULAR DILATOR

## (undated) DEVICE — TRANSDUCER BIFURCATED MONITORING KIT (10EA/CA)

## (undated) DEVICE — CLIP SM INTNL HRZN TI ESCP LGT - (24EA/PK 25PK/BX)

## (undated) DEVICE — SET LEADWIRE 5 LEAD BEDSIDE DISPOSABLE ECG (1SET OF 5/EA)

## (undated) DEVICE — GUIDEWIRE STARTER STRAIGHT FIXED CORE .035 150CM 4 STRAIGHT PTFE/HEPARIN COATED (10/BX)

## (undated) DEVICE — DECANTER FLD BLS - (50/CA)

## (undated) DEVICE — SET BIFURCATED BLOOD - (48EA/CS)

## (undated) DEVICE — DRESSING TRANSPARENT FILM TEGADERM 4 X 4.75" (50EA/BX)"

## (undated) DEVICE — COVER CAMERA LENS LIGHT HANDLE STUBBY DISPOSABLE (20EA/BX)

## (undated) DEVICE — SHEATH DESTINATION WITH DILATOR 6FR 45CM

## (undated) DEVICE — SPONGE XRAY 8X4 STERL. 12PL - (10EA/TY 80TY/CA)

## (undated) DEVICE — ELECTRODE DUAL RETURN W/ CORD - (50/PK)

## (undated) DEVICE — TRANSDUCER ADULT DISP. SINGLE BONDED STERILE - (20EA/CA)

## (undated) DEVICE — CONTAINER, SPECIMEN, STERILE

## (undated) DEVICE — Device

## (undated) DEVICE — SODIUM CHL. INJ. 0.9% 500ML (24EA/CA 50CA/PF)

## (undated) DEVICE — TUBING PRSS MNTR 72IN M/ M LL - (25/BX) MONIT. LINE W/MALE L/L

## (undated) DEVICE — LACTATED RINGERS INJ 1000 ML - (14EA/CA 60CA/PF)

## (undated) DEVICE — SUTURE 4-0 VICRYL PLUS SH - UNDYED 27 INCH (36PK/BX)

## (undated) DEVICE — HEAD HOLDER JUNIOR/ADULT

## (undated) DEVICE — STOPCOCK IV 400 PSI 3W ROT (50EA/BX)

## (undated) DEVICE — GUIDEWIRES STARTER (PTFE COATED) J CURVED FIXED CORE 0.035 180CM 10 3 MM J FS

## (undated) DEVICE — SUTURE GENERAL

## (undated) DEVICE — GELAQUASONIC 100 ULTRASOUND - 48/BX 20GM STERILE FOIL POUCH

## (undated) DEVICE — INTRODUCER CATHETER DILATOR PROTRUDING 8FR 2.5CM (10EA/BX)

## (undated) DEVICE — SYRINGE SAFETY 5 ML 18 GA X 1-1/2 BLUNT LL (100/BX 4BX/CA)

## (undated) DEVICE — ELECTRODE RADIOLUCNT SOLID GEL DEFIB PADS (12EA/CA)

## (undated) DEVICE — BAG RESUSCITATION DISPOSABLE - WITH MASK (10 EA/CA)

## (undated) DEVICE — SET EXTENSION WITH 2 PORTS (48EA/CA) ***PART #2C8610 IS A SUBSTITUTE*****

## (undated) DEVICE — NEPTUNE 4 PORT MANIFOLD - (20/PK)

## (undated) DEVICE — WIRE GUIDE LUNDQST.035X180 - TSMG-35-180-4-LES ORDER BY BOX (5EA/BX)

## (undated) DEVICE — SYRINGE 10 ML CONTROL LL (25EA/BX 4BX/CA)

## (undated) DEVICE — GOWN SURGEONS X-LARGE - DISP. (30/CA)

## (undated) DEVICE — DRAPE MAYO STAND - (30/CA)

## (undated) DEVICE — DERMABOND ADVANCED - (12EA/BX)

## (undated) DEVICE — VESSELOOP SUPER MAXI BLUE - SURG-I-LOOP (2EA/PK 10PK/BX)

## (undated) DEVICE — SUTURE 0 ETHIBOND MO6 C/R - (12/BX) 8-18 INCH ETHICON

## (undated) DEVICE — GLOVE SZ 7.5 BIOGEL PI MICRO - PF LF (50PR/BX)

## (undated) DEVICE — SUTURE 3-0 SILK 12 X 18 IN - (36/BX)

## (undated) DEVICE — SHEATH GUIDING PIGTAIL RF WIRE ACCESS SOLUTION VERSACROSS L230 CM 45 D (1EA)

## (undated) DEVICE — CATHETER THERMALDILUTION SWAN - (5EA/CA)

## (undated) DEVICE — MICRODRIP PRIMARY VENTED 60 (48EA/CA) THIS WAS PART #2C8428 WHICH WAS DISCONTINUED

## (undated) DEVICE — WIPE INSTRUMENT MICROWIPE (20EA/SP)

## (undated) DEVICE — CABLE TEMPORARY PACING

## (undated) DEVICE — DRAPE MAGNETIC (INSTRA-MAG) - (30/CA)

## (undated) DEVICE — SET FLUID WARMING STANDARD FLOW - (10/CA)

## (undated) DEVICE — SYRINGE 20 ML LL (50EA/BX 4BX/CA)

## (undated) DEVICE — SENSOR OXIMETER ADULT SPO2 RD SET (20EA/BX)

## (undated) DEVICE — SUTURE 0 ETHIBOND CT-1 30 IN (36PK/BX)

## (undated) DEVICE — CATHETER PIGTAIL 6FR 145 (5EA/BX)

## (undated) DEVICE — BLADE SURGICAL #11 - (50/BX)

## (undated) DEVICE — CLIP MED INTNL HRZN TI ESCP - (25/BX)

## (undated) DEVICE — GLIDECATH 4FR STRAIGHT 65CM (5EA/BX)

## (undated) DEVICE — COVER FOOT UNIVERSAL DISP. - (25EA/CA)

## (undated) DEVICE — SUTURE 4-0 MONOCRYL PLUS PS-2 - 27 INCH (36/BX)

## (undated) DEVICE — TUBE E-T HI-LO CUFF 7.0MM (10EA/PK)

## (undated) DEVICE — SUCTION INSTRUMENT YANKAUER OPEN TIP W/O VENT (50EA/CA)

## (undated) DEVICE — PACK TAVR (3EA/CA)

## (undated) DEVICE — SURGIFOAM (SIZE 100) - (6EA/CA)

## (undated) DEVICE — CANISTER SUCTION 3000ML MECHANICAL FILTER AUTO SHUTOFF MEDI-VAC NONSTERILE LF DISP (40EA/CA)

## (undated) DEVICE — INTRODUCER SHEATH 6FR 2.5CM - DILATOR PROTRUDING (10/BX)

## (undated) DEVICE — SLEEVE, VASO, THIGH, MED

## (undated) DEVICE — CATHETER ANGIO OMNI FLUSH 5F 65CM (10EA/BX)

## (undated) DEVICE — SUTURE 5-0 PROLENE C-1 D/A 24 (36PK/BX)"

## (undated) DEVICE — SYRINGE DISP. 12 CC LL - (100/BX)

## (undated) DEVICE — DEVICE INFLATION ATRION NOVALFEX TRANSFEMORAL SYSTEM (1EA)

## (undated) DEVICE — BAG ISOLATION 20X20 INVISI - SHEILD (10EA/BX)

## (undated) DEVICE — GLOVE BIOGEL PI INDICATOR SZ 8.0 SURGICAL PF LF -(50/BX 4BX/CA)

## (undated) DEVICE — SYS DLV COST CLS RM TEMP - INJECTATE (CO-SET II) (10EA/CA)

## (undated) DEVICE — STOPCOCK MALE 4-WAY - (50/CA)

## (undated) DEVICE — TRAY, CV CATH MULTI-LUM.AK-25703

## (undated) DEVICE — DRAPE LARGE 3 QUARTER - (20/CA)

## (undated) DEVICE — SYRINGE NON SAFETY 10 CC 21 GA X 1-1/2 IN (100/BX 4BX/CA)

## (undated) DEVICE — SUCTION INSTRUMENT YANKAUER BULBOUS TIP W/O VENT (50EA/CA)

## (undated) DEVICE — KIT INTROPERCUTANEOUS SHEATH - 8.5 FR W/MAX BARRIER AND BIOPATCH (5/CA)

## (undated) DEVICE — SPONGE GAUZESTER 4 X 4 4PLY - (128PK/CA)

## (undated) DEVICE — SUTURE 3-0 VICRYL PLUS SH - 8X 18 INCH (12/BX)

## (undated) DEVICE — DEVICE INFLATION DIGITAL BLUE DIAMOND (5EA/BX)

## (undated) DEVICE — VESSELOOP MAXI BLUE STERILE- SURG-I-LOOP (10EA/BX)

## (undated) DEVICE — SYRINGE 30 ML LL (56/BX)

## (undated) DEVICE — IV TUBING HI-FLO RATE W/CLAMP (50/CA)

## (undated) DEVICE — SUTURE 3-0 VICRYL PLUS FS-1 27 (36PK/BX)"

## (undated) DEVICE — BLANKET UNDERBODY FULL ACCES - (5/CA)

## (undated) DEVICE — CANISTER SUCTION 3000ML MECHANICAL FILTER AUTO SHUTOFF MEDI-VAC NONSTERILE LF DISP  (40EA/CA)

## (undated) DEVICE — SYR ANGIO CNRST INJ HI-PRS 3W 65 - (10EA/CA)"

## (undated) DEVICE — STOPCOCK 3-WAY W/SWIVEL LEVER LOCK (50EA/CA)

## (undated) DEVICE — SOD. CHL. INJ. 0.9% 1000 ML - (14EA/CA 60CA/PF)

## (undated) DEVICE — D-5-W INJ. 500 ML - (24EA/CA)

## (undated) DEVICE — IV SET, NON-VENT PLUMB PUMP

## (undated) DEVICE — SYRINGE SAFETY TB 1 ML 27 GA X 1/2 IN (100/BX 4BX/CA)

## (undated) DEVICE — ELECTRODE 850 FOAM ADHESIVE - HYDROGEL RADIOTRNSPRNT (50/PK)

## (undated) DEVICE — GOWN WARMING STANDARD FLEX - (30/CA)

## (undated) DEVICE — SHEATH RO 6F 10CM (10EA/BX)

## (undated) DEVICE — BALLOON ARMADA 10X40X80CM

## (undated) DEVICE — TUBING CLEARLINK DUO-VENT - C-FLO (48EA/CA)

## (undated) DEVICE — GLOVE SZ 6.5 BIOGEL PI MICRO - PF LF (50PR/BX)

## (undated) DEVICE — SUTURE 6-0 PROLENE C-1 D/A 24 (36PK/BX)"

## (undated) DEVICE — MASK ANESTHESIA ADULT  - (100/CA)

## (undated) DEVICE — GLOVE BIOGEL PI INDICATOR SZ 6.5 SURGICAL PF LF - (50/BX 4BX/CA)

## (undated) DEVICE — PACK SINGLE BASIN - (6/CA)

## (undated) DEVICE — KIT ROOM DECONTAMINATION

## (undated) DEVICE — WIRE GUIDE AES .035 260CM WITH 3MM J TIP"

## (undated) DEVICE — VESSELOOP MINI BLUE STERILE - SURG-I-LOOP (10EA/BX)

## (undated) DEVICE — KIT RADIAL ARTERY 20GA W/MAX BARRIER AND BIOPATCH (5EA/CA) #10740 IS FOR THE SET RADIAL ARTERIAL

## (undated) DEVICE — PACK MAJOR BASIN - (2EA/CA)

## (undated) DEVICE — SET INTRO MIRCROPUNCTURE - MPIS-501-SST

## (undated) DEVICE — DRAPE SURGICAL U 77X120 - (10/CA)

## (undated) DEVICE — SUTURE 3-0 VICRYL PLUS CT-1 - 36 INCH (36/BX)

## (undated) DEVICE — DRAPE CLEAR W/ELASTIC BAND RAD CARM 40 X40" (20EA/CA)"

## (undated) DEVICE — KIT ANESTHESIA W/CIRCUIT & 3/LT BAG W/FILTER (20EA/CA)

## (undated) DEVICE — ARMBOARD  SMALL IV 9 INLONG - (25EA/CA)

## (undated) DEVICE — PROTECTOR ULNA NERVE - (36PR/CA)

## (undated) DEVICE — CRIMPER CATHETER EDWARDS DISPOSABLE (1EA)

## (undated) DEVICE — KIT RADIAL ARTERY 20GA W/MAX BARRIER AND BIOPATCH  (5EA/CA) #10740 IS FOR THE SET RADIAL ARTERIAL

## (undated) DEVICE — PEN SKIN MARKER W/RULER - (50EA/BX)

## (undated) DEVICE — KIT RETROFIT PROBE COVERS (24EA/BX)

## (undated) DEVICE — STAPLER SKIN DISP - (6/BX 10BX/CA) VISISTAT

## (undated) DEVICE — ELECTRODE DFBR ADLT 6.5X5IN (12PR/CA) *8900-4003 PART # FOR CA QTY. PART #8900-4004 IS EACH QTY

## (undated) DEVICE — BLADE SURGICAL #15 - (50/BX 3BX/CA)

## (undated) DEVICE — GUIDEWIRE STARTER J CURVED FIXED CORE .035 260CM 10 3MM PTFE/HEPARIN COATED (5EA/BX)

## (undated) DEVICE — PAD PREP 24 X 48 CUFFED - (100/CA)

## (undated) DEVICE — DISH PETRI STERILE (50EA/CA)

## (undated) DEVICE — COVER LIGHT HANDLE ALC PLUS DISP (18EA/BX)

## (undated) DEVICE — CHLORAPREP 26 ML APPLICATOR - ORANGE TINT(25/CA)

## (undated) DEVICE — INTRODUCER CATHETER  DILATOR PROTRUDING 8FR 2.5CM (10EA/BX)

## (undated) DEVICE — GLOVE BIOGEL SZ 6.5 SURGICAL PF LTX (50PR/BX 4BX/CA)

## (undated) DEVICE — SENSOR SPO2 NEO LNCS ADHESIVE (20/BX) SEE USER NOTES

## (undated) DEVICE — SHEATH RO 6F 25CM (10EA/BX)

## (undated) DEVICE — SUTURE 6-0 PROLENE BV-1 D/A 24 (36PK/BX)"

## (undated) DEVICE — GLIDESHEATH SLENDER NITINOL KIT .021 GW 6FR 10CM SINGLE WALL

## (undated) DEVICE — DRAPE IOBAN II INCISE 23X17 - (10EA/BX 4BX/CA)

## (undated) DEVICE — COVER LIGHT HANDLE FLEXIBLE - SOFT (2EA/PK 80PK/CA)

## (undated) DEVICE — SUTURE 6-0 PROLENE BV-1 D/A 30 (36PK/BX)"

## (undated) DEVICE — SODIUM CHL IRRIGATION 0.9% 1000ML (12EA/CA)

## (undated) DEVICE — GLOVESZ 8.5 BIOGEL PI MICRO - PF LF (50PR/BX)

## (undated) DEVICE — SUTURE OHS

## (undated) DEVICE — TUBE CONNECT SUCTION CLEAR 120 X 1/4" (50EA/CA)"

## (undated) DEVICE — SYRINGE SAFETY 10 ML 18 GA X 1 1/2 BLUNT LL (100/BX 4BX/CA)

## (undated) DEVICE — NEEDLE THINWALL 19GA X 7CM